# Patient Record
Sex: MALE | Race: WHITE | NOT HISPANIC OR LATINO | Employment: OTHER | URBAN - METROPOLITAN AREA
[De-identification: names, ages, dates, MRNs, and addresses within clinical notes are randomized per-mention and may not be internally consistent; named-entity substitution may affect disease eponyms.]

---

## 2017-01-13 DIAGNOSIS — I15.1 HYPERTENSION SECONDARY TO OTHER RENAL DISORDERS: ICD-10-CM

## 2017-01-13 DIAGNOSIS — N18.30 CKD (CHRONIC KIDNEY DISEASE) STAGE 3, GFR 30-59 ML/MIN (H): Primary | ICD-10-CM

## 2017-01-13 DIAGNOSIS — Q79.4 PRUNE BELLY SYNDROME: ICD-10-CM

## 2017-01-17 ENCOUNTER — TELEPHONE (OUTPATIENT)
Dept: NEPHROLOGY | Facility: CLINIC | Age: 22
End: 2017-01-17

## 2017-01-17 NOTE — TELEPHONE ENCOUNTER
Spoke to patient's mother to confirm information left in phone message -she verbalized understanding of all-and asked if he should continue his prophylatic antibiotic while he is taking his cipro  For 10 days. This is zithromax as a phrophylactic med for his lungs. Will ask Dr. Collado and let mom know.   Heidi Boone,RN,CPN

## 2017-01-17 NOTE — TELEPHONE ENCOUNTER
Left message again for patient's mother that Greg should have labs in 3 months at PCP-have faxed orders to [facility]-and patient should have also begun Cipro 500 mg daily due to positive urine culture with a bacteria he is not usually colonized with.  Dr. Collado had sent prescription to pharmacy and verified with pharmacy that it should be  once daily.  Asked mom to return call to verify that she received this information.  Heidi Boone,RN,CPN

## 2017-02-14 DIAGNOSIS — N18.30 CKD (CHRONIC KIDNEY DISEASE) STAGE 3, GFR 30-59 ML/MIN (H): ICD-10-CM

## 2017-02-14 RX ORDER — AMLODIPINE BESYLATE 5 MG/1
5 TABLET ORAL
Qty: 180 TABLET | Refills: 3 | Status: SHIPPED | OUTPATIENT
Start: 2017-02-14 | End: 2018-03-05

## 2017-02-21 ENCOUNTER — TRANSFERRED RECORDS (OUTPATIENT)
Dept: HEALTH INFORMATION MANAGEMENT | Facility: CLINIC | Age: 22
End: 2017-02-21

## 2017-04-27 DIAGNOSIS — N18.30 CKD (CHRONIC KIDNEY DISEASE) STAGE 3, GFR 30-59 ML/MIN (H): ICD-10-CM

## 2017-06-28 ENCOUNTER — TRANSFERRED RECORDS (OUTPATIENT)
Dept: HEALTH INFORMATION MANAGEMENT | Facility: CLINIC | Age: 22
End: 2017-06-28

## 2017-07-10 DIAGNOSIS — N18.4 CKD (CHRONIC KIDNEY DISEASE) STAGE 4, GFR 15-29 ML/MIN (H): Primary | ICD-10-CM

## 2017-07-10 NOTE — NURSING NOTE
Labs per clinic 2A protocol.  CKD 3  Last OV: new patient  Mary Scott LPN  Nephrology  [facility]  [phone]

## 2017-07-11 ENCOUNTER — OFFICE VISIT (OUTPATIENT)
Dept: NEPHROLOGY | Facility: CLINIC | Age: 22
End: 2017-07-11
Attending: INTERNAL MEDICINE
Payer: MEDICARE

## 2017-07-11 ENCOUNTER — ALLIED HEALTH/NURSE VISIT (OUTPATIENT)
Dept: TRANSPLANT | Facility: CLINIC | Age: 22
End: 2017-07-11
Attending: INTERNAL MEDICINE
Payer: MEDICARE

## 2017-07-11 VITALS
DIASTOLIC BLOOD PRESSURE: 66 MMHG | WEIGHT: 107.4 LBS | HEART RATE: 66 BPM | SYSTOLIC BLOOD PRESSURE: 106 MMHG | TEMPERATURE: 97.8 F | BODY MASS INDEX: 20.28 KG/M2 | HEIGHT: 61 IN

## 2017-07-11 DIAGNOSIS — N18.4 CKD (CHRONIC KIDNEY DISEASE) STAGE 4, GFR 15-29 ML/MIN (H): Primary | ICD-10-CM

## 2017-07-11 DIAGNOSIS — N18.4 CKD (CHRONIC KIDNEY DISEASE) STAGE 4, GFR 15-29 ML/MIN (H): ICD-10-CM

## 2017-07-11 LAB
ALBUMIN SERPL-MCNC: 3.6 G/DL (ref 3.4–5)
ALBUMIN UR-MCNC: 100 MG/DL
ANION GAP SERPL CALCULATED.3IONS-SCNC: 8 MMOL/L (ref 3–14)
APPEARANCE UR: CLEAR
BACTERIA #/AREA URNS HPF: ABNORMAL /HPF
BILIRUB UR QL STRIP: NEGATIVE
BUN SERPL-MCNC: 44 MG/DL (ref 7–30)
CALCIUM SERPL-MCNC: 8.8 MG/DL (ref 8.5–10.1)
CHLORIDE SERPL-SCNC: 108 MMOL/L (ref 94–109)
CO2 SERPL-SCNC: 21 MMOL/L (ref 20–32)
COLOR UR AUTO: YELLOW
CREAT SERPL-MCNC: 2.93 MG/DL (ref 0.66–1.25)
CREAT UR-MCNC: 53 MG/DL
ERYTHROCYTE [DISTWIDTH] IN BLOOD BY AUTOMATED COUNT: 13.2 % (ref 10–15)
FERRITIN SERPL-MCNC: 16 NG/ML (ref 26–388)
GFR SERPL CREATININE-BSD FRML MDRD: 27 ML/MIN/1.7M2
GLUCOSE SERPL-MCNC: 116 MG/DL (ref 70–99)
GLUCOSE UR STRIP-MCNC: NEGATIVE MG/DL
HCT VFR BLD AUTO: 39 % (ref 40–53)
HGB BLD-MCNC: 13 G/DL (ref 13.3–17.7)
HGB UR QL STRIP: NEGATIVE
IRON SATN MFR SERPL: 26 % (ref 15–46)
IRON SERPL-MCNC: 77 UG/DL (ref 35–180)
KETONES UR STRIP-MCNC: NEGATIVE MG/DL
LEUKOCYTE ESTERASE UR QL STRIP: ABNORMAL
MCH RBC QN AUTO: 29.9 PG (ref 26.5–33)
MCHC RBC AUTO-ENTMCNC: 33.3 G/DL (ref 31.5–36.5)
MCV RBC AUTO: 90 FL (ref 78–100)
MICROALBUMIN UR-MCNC: 1450 MG/L
MICROALBUMIN/CREAT UR: 2730.7 MG/G CR (ref 0–17)
MUCOUS THREADS #/AREA URNS LPF: PRESENT /LPF
NITRATE UR QL: POSITIVE
PH UR STRIP: 6 PH (ref 5–7)
PHOSPHATE SERPL-MCNC: 2.9 MG/DL (ref 2.5–4.5)
PLATELET # BLD AUTO: 305 10E9/L (ref 150–450)
POTASSIUM SERPL-SCNC: 4.4 MMOL/L (ref 3.4–5.3)
PROT UR-MCNC: 1.61 G/L
PROT/CREAT 24H UR: 3.04 G/G CR (ref 0–0.2)
PTH-INTACT SERPL-MCNC: 191 PG/ML (ref 12–72)
RBC # BLD AUTO: 4.35 10E12/L (ref 4.4–5.9)
RBC #/AREA URNS AUTO: <1 /HPF (ref 0–2)
SODIUM SERPL-SCNC: 137 MMOL/L (ref 133–144)
SP GR UR STRIP: 1 (ref 1–1.03)
TIBC SERPL-MCNC: 296 UG/DL (ref 240–430)
URN SPEC COLLECT METH UR: ABNORMAL
UROBILINOGEN UR STRIP-MCNC: 0 MG/DL (ref 0–2)
WBC # BLD AUTO: 8.8 10E9/L (ref 4–11)
WBC #/AREA URNS AUTO: 6 /HPF (ref 0–2)

## 2017-07-11 PROCEDURE — 82728 ASSAY OF FERRITIN: CPT | Performed by: INTERNAL MEDICINE

## 2017-07-11 PROCEDURE — 87186 SC STD MICRODIL/AGAR DIL: CPT | Performed by: INTERNAL MEDICINE

## 2017-07-11 PROCEDURE — 83540 ASSAY OF IRON: CPT | Performed by: INTERNAL MEDICINE

## 2017-07-11 PROCEDURE — 87088 URINE BACTERIA CULTURE: CPT | Performed by: INTERNAL MEDICINE

## 2017-07-11 PROCEDURE — 85027 COMPLETE CBC AUTOMATED: CPT | Performed by: INTERNAL MEDICINE

## 2017-07-11 PROCEDURE — 81001 URINALYSIS AUTO W/SCOPE: CPT | Performed by: INTERNAL MEDICINE

## 2017-07-11 PROCEDURE — 83970 ASSAY OF PARATHORMONE: CPT | Performed by: INTERNAL MEDICINE

## 2017-07-11 PROCEDURE — 82043 UR ALBUMIN QUANTITATIVE: CPT | Performed by: INTERNAL MEDICINE

## 2017-07-11 PROCEDURE — 36415 COLL VENOUS BLD VENIPUNCTURE: CPT | Performed by: INTERNAL MEDICINE

## 2017-07-11 PROCEDURE — 82306 VITAMIN D 25 HYDROXY: CPT | Performed by: INTERNAL MEDICINE

## 2017-07-11 PROCEDURE — 87086 URINE CULTURE/COLONY COUNT: CPT | Performed by: INTERNAL MEDICINE

## 2017-07-11 PROCEDURE — 84156 ASSAY OF PROTEIN URINE: CPT | Performed by: INTERNAL MEDICINE

## 2017-07-11 PROCEDURE — 99212 OFFICE O/P EST SF 10 MIN: CPT | Mod: ZF

## 2017-07-11 PROCEDURE — 83550 IRON BINDING TEST: CPT | Performed by: INTERNAL MEDICINE

## 2017-07-11 PROCEDURE — 80069 RENAL FUNCTION PANEL: CPT | Performed by: INTERNAL MEDICINE

## 2017-07-11 ASSESSMENT — PAIN SCALES - GENERAL: PAINLEVEL: NO PAIN (0)

## 2017-07-11 NOTE — MR AVS SNAPSHOT
"              After Visit Summary   7/11/2017    Aidan Louie    MRN: [id number]           Patient Information     Date Of Birth          1995        Visit Information        Provider Department      7/11/2017 2:00 PM Haleigh Gallo MD [facility] Nephrology        Today's Diagnoses     CKD (chronic kidney disease) stage 4, GFR 15-29 ml/min (H)    -  1      Care Instructions    Follow up in 3 months with renal panel           Follow-ups after your visit        Follow-up notes from your care team     Return in about 3 months (around 10/11/2017).      Your next 10 appointments already scheduled     Jan 31, 2018  2:30 PM CST   (Arrive by 2:00 PM)   Return Visit with Haleigh Gallo MD   [facility] Nephrology ([facility])    [address] [place] [address] [phone]              Who to contact     If you have questions or need follow up information about today's clinic visit or your schedule please contact [facility] NEPHROLOGY directly at [phone].  Normal or non-critical lab and imaging results will be communicated to you by Automattichart, letter or phone within 4 business days after the clinic has received the results. If you do not hear from us within 7 days, please contact the clinic through Autom[employer] or phone. If you have a critical or abnormal lab result, we will notify you by phone as soon as possible.  Submit refill requests through [employer] or call your pharmacy and they will forward the refill request to us. Please allow 3 business days for your refill to be completed.          Additional Information About Your Visit        Automatt[employer]ar[employer]s Information     [employer] lets you send messages to your doctor, view your test results, renew your prescriptions, schedule appointments and more. To sign up, go to [email or web address] . Click on \"Log in\" on the left side of the screen, which will take you to the Welcome page. Then click on \"Sign up Now\" on the right side of " "the page.     You will be asked to enter the access code listed below, as well as some personal information. Please follow the directions to create your username and password.     Your access code is: [email or web address]  Expires: 2018  5:30 AM     Your access code will  in 90 days. If you need help or a new code, please call your [facility] clinic or [phone].        Care EveryWhere ID     This is your Care EveryWhere ID. This could be used by other organizations to access your [facility] medical records  [id number]        Your Vitals Were     Pulse Temperature Height BMI (Body Mass Index)          66 97.8  F (36.6  C) (Oral) 1.549 m (5' 1\") 20.29 kg/m2         Blood Pressure from Last 3 Encounters:   17 108/69   17 97/70   17 106/66    Weight from Last 3 Encounters:   17 50.2 kg (110 lb 9.6 oz)   17 50.3 kg (111 lb)   17 48.7 kg (107 lb 6.4 oz)              We Performed the Following     Urine Culture Aerobic Bacterial        Primary Care Provider Office Phone # Fax #    Leena Lehman [phone]       [facility] [address] [facility]  [place] [address]        Equal Access to Services     [facility]: Hadii aad ku hadasho Soomaali, waaxda luqadaha, qaybta kaalmada adeegyada, tez cordon . So [facility] [phone].    ATENCIÓN: Si habla español, tiene a lowe disposición servicios gratuitos de asistencia lingüística. Llame al [phone].    We comply with applicable federal civil rights laws and [place] laws. We do not discriminate on the basis of race, color, national origin, age, disability, sex, sexual orientation, or gender identity.            Thank you!     Thank you for choosing [facility] NEPHROLOGY  for your care. Our goal is always to provide you with excellent care. Hearing back from our patients is one way we can continue to improve our services. Please take a few minutes to complete the written survey that you may " receive in the mail after your visit with us. Thank you!             Your Updated Medication List - Protect others around you: Learn how to safely use, store and throw away your medicines at www.disposemymeds.org.          This list is accurate as of: 7/11/17 11:59 PM.  Always use your most recent med list.                   Brand Name Dispense Instructions for use Diagnosis    albuterol (2.5 MG/3ML) 0.083% neb solution      Take 1 ampule by nebulization 3 times daily    CKD (chronic kidney disease) stage 3, GFR 30-59 ml/min, HTN (hypertension), Prune belly syndrome       amLODIPine 5 MG tablet    NORVASC    180 tablet    Take 1 tablet (5 mg) by mouth 2 times daily    CKD (chronic kidney disease) stage 3, GFR 30-59 ml/min       azithromycin 250 MG tablet    ZITHROMAX     Take  by mouth. Mon, Wed, Fri    CKD (chronic kidney disease) stage 3, GFR 30-59 ml/min       calcium carbonate 1250 MG tablet    OS-SUSAN 500 mg [place]. 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 disorders       captopril 0.1 mg/mL 0.1 mg/mL Susp    CAPOTEN    460 mL    Take 2.5 mg three times daily (2.5 mg/5ml)    CKD (chronic kidney disease) stage 3, GFR 30-59 ml/min       cloNIDine 0.1 MG tablet    CATAPRES    60 tablet    Take 1 tablet (0.1 mg) by mouth 2 times daily    CKD (chronic kidney disease) stage 3, GFR 30-59 ml/min       tobramycin (PF) 300 MG/5ML neb solution    DAX     Take 1 ampule by nebulization as needed    CKD (chronic kidney disease) stage 3, GFR 30-59 ml/min, HTN (hypertension), Prune belly syndrome       ZANTAC PO      Take 75 mg by mouth 2 times daily    CKD (chronic kidney disease) stage 3, GFR 30-59 ml/min

## 2017-07-11 NOTE — MR AVS SNAPSHOT
"              After Visit Summary   7/11/2017    Aidan Louie    MRN: [id number]           Patient Information     Date Of Birth          1995        Visit Information        Provider Department      7/11/2017 3:30 PM  NEPHROLOGY NURSE [facility]        Today's Diagnoses     CKD (chronic kidney disease) stage 4, GFR 15-29 ml/min (H)    -  1       Follow-ups after your visit        Your next 10 appointments already scheduled     Jul 11, 2017  3:30 PM CDT   Nurse Visit with  NEPHROLOGY NURSE   [facility] ([facility] [place])    [address]  [address] [facility] [address] [phone]              Who to contact     If you have questions or need follow up information about today's clinic visit or your schedule please contact [facility] SOLID ORGAN TRANSPLANT directly at [phone].  Normal or non-critical lab and imaging results will be communicated to you by BombBombhart, letter or phone within 4 business days after the clinic has received the results. If you do not hear from us within 7 days, please contact the clinic through BombBombhart or phone. If you have a critical or abnormal lab result, we will notify you by phone as soon as possible.  Submit refill requests through [employer]P or call your pharmacy and they will forward the refill request to us. Please allow 3 business days for your refill to be completed.          Additional Information About Your Visit        MyChart Information     2C2P lets you send messages to your doctor, view your test results, renew your prescriptions, schedule appointments and more. To sign up, go to [email or web address] . Click on \"Log in\" on the left side of the screen, which will take you to the Welcome page. Then click on \"Sign up Now\" on the right side of the page.     You will be asked to enter the access code listed below, as well as some personal information. Please follow the directions to " create your username and password.     Your access code is: [email or web address]  Expires: 2017  8:41 AM     Your access code will  in 90 days. If you need help or a new code, please call your [facility] or [phone].        Care EveryWhere ID     This is your Care EveryWhere ID. This could be used by other organizations to access your [address] medical records  [id number]         Blood Pressure from Last 3 Encounters:   17 106/66   16 109/65   09/17/15 115/72    Weight from Last 3 Encounters:   17 48.7 kg (107 lb 6.4 oz)   16 49.4 kg (108 lb 14.5 oz)   09/17/15 51.5 kg (113 lb 8.6 oz) (2 %)*     * Growth percentiles are based on [place] 2-20 Years data.              Today, you had the following     No orders found for display       Primary Care Provider Office Phone # Fax #    Leena Lehman [phone]       [facility] [address] [facility]][place] [address]        Equal Access to Services     [facility] : Hadii aad ku hadasho Soomaali, waaxda luqadaha, qaybta kaalmada adeegyada, tez mixon hayjohannan thomas cordon . So [facility] [phone].    ATENCIÓN: Si habla español, tiene a lowe disposición servicios gratuitos de asistencia lingüística. Llame al [phone].    We comply with applicable federal civil rights laws and [place] laws. We do not discriminate on the basis of race, color, national origin, age, disability sex, sexual orientation or gender identity.            Thank you!     Thank you for choosing [facility] SOLID ORGAN TRANSPLANT  for your care. Our goal is always to provide you with excellent care. Hearing back from our patients is one way we can continue to improve our services. Please take a few minutes to complete the written survey that you may receive in the mail after your visit with us. Thank you!             Your Updated Medication List - Protect others around you: Learn how to safely use, store and throw away your medicines at  www.disposemymeds.org.          This list is accurate as of: 7/11/17  2:44 PM.  Always use your most recent med list.                   Brand Name Dispense Instructions for use Diagnosis    albuterol (2.5 MG/3ML) 0.083% neb solution      Take 1 ampule by nebulization daily    CKD (chronic kidney disease) stage 3, GFR 30-59 ml/min, HTN (hypertension), Prune belly syndrome       amLODIPine 5 MG tablet    NORVASC    180 tablet    Take 1 tablet (5 mg) by mouth 2 times daily    CKD (chronic kidney disease) stage 3, GFR 30-59 ml/min       azithromycin 250 MG tablet    ZITHROMAX     Take  by mouth. Mon, Wed, Fri    CKD (chronic kidney disease) stage 3, GFR 30-59 ml/min       calcium carbonate 1250 MG tablet    OS-SUSAN 500 mg [place]. 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 disorders       captopril 0.1 mg/mL 0.1 mg/mL Susp    CAPOTEN    460 mL    Take 2.5 mg three times daily (2.5 mg/5ml)    CKD (chronic kidney disease) stage 3, GFR 30-59 ml/min       cholecalciferol 2000 UNITS tablet     30 tablet    Take 2,000 Units by mouth daily    CKD (chronic kidney disease) stage 3, GFR 30-59 ml/min, Prune belly syndrome, Hypertension secondary to other renal disorders       cloNIDine 0.1 MG tablet    CATAPRES    60 tablet    Take 1 tablet (0.1 mg) by mouth 2 times daily    CKD (chronic kidney disease) stage 3, GFR 30-59 ml/min       ipratropium - albuterol 0.5 mg/2.5 mg/3 mL 0.5-2.5 (3) MG/3ML neb solution    DUONEB     Take 1 ampule by nebulization 3 times daily    CKD (chronic kidney disease) stage 3, GFR 30-59 ml/min, HTN (hypertension), Prune belly syndrome       PREDNISOLONE      Every other day    CKD (chronic kidney disease) stage 3, GFR 30-59 ml/min       tobramycin (PF) 300 MG/5ML neb solution    DAX     Take 1 ampule by nebulization as needed    CKD (chronic kidney  disease) stage 3, GFR 30-59 ml/min, HTN (hypertension), Prune belly syndrome       ZANTAC PO      Take 75 mg by mouth 2 times daily    CKD (chronic kidney disease) stage 3, GFR 30-59 ml/min

## 2017-07-11 NOTE — PROGRESS NOTES
Nephrology Clinic    Aidan Louie MRN:[id number] YOB: 1995  Date of Service: 07/11/2017  Primary care provider: Leena Lehman  Requesting physician: Junaid Hess MD      ASSESSMENT AND RECOMMENDATIONS:       1. 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  Will try to get his scheduled for his transplant evaluation  On Calcium and vit D supplements, PTH 98 last checked in 12/16    2. HTN  Well controlled on [id number] BID,  captopril 2.5  and clonidine 0.1mg BID    3. Hb at goal, low iron stores but Hb is 13    4. 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 He has a hx of recurrent UTI and worsening creatinine in the past few years. He was counselled on his options of HD and transplant in his last nephrologist appointment.  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  He has presented for establishment of care with his parents  They report recurrent UTI x2 /yr usually treated with ciprofloxacin  He denies any dysuria abdominal/back pain at this time, no fevers chills. He usually has fatigue back pain when he has an active UTI. UA and labs were drawn this am.  He has been working in jobs realted to adults with disabilities and has been very busy and that usually leads to him not being able to flush his bladder and wearing izaguirre bag or not catheterizing as frequently as he would.    His other histories include HTN, BL hearing loss on hearing aids, short stature , choanal atresia SP repair, Dawood Reece anomaly, restrictive lung disease.    PAST MEDICAL HISTORY:  Past Medical History:   Diagnosis Date     Bilateral reflux nephropathy      CKD (chronic kidney disease) stage 4, GFR 15-29 ml/min (H)      Hearing loss      Dawood Reece sequence      Prune belly syndrome      Recurrent UTI      Respiratory bronchiolitis interstitial lung disease (H)      Restrictive lung disease      Tracheostomy dependence (H)      PAST SURGICAL HISTORY:  Past Surgical History:   Procedure Laterality Date     Bilateral ureteral reimplantation  5/16/2002    abdominoplasty, Mitrofanoff creation     CYSTOSCOPY  11/14/11     EXAM UNDER ANESTHESIA THROAT  6/10/2003    tonsillar hypertrophy     EXAM UNDER ANESTHESIA, RESTORATIONS, EXTRACTION(S) DENTAL COMPLEX, COMBINED  6/5/2006     GASTROSTOMY TUBE       GASTROSTOMY TUBE  3/16/2002    button change     HERNIORRHAPHY INGUINAL BILATERAL  7/23/99    left ochiopexy     LARYNGOSCOPY, BRONCHOSCOPY, COMBINED  6/21/2002     LARYNGOSCOPY, BRONCHOSCOPY, COMBINED  12/12/2002    bilateral ear debridement     LARYNGOSCOPY, BRONCHOSCOPY, COMBINED  5/28/2003     LARYNGOSCOPY, BRONCHOSCOPY, COMBINED  7/13/2007     Failed Deflux injection     LARYNGOSCOPY, BRONCHOSCOPY, COMBINED  12      VESICOSTOMY       Redo right ureteral reimplantation  3/12/2004    Excision bladder diverticuli, Left to Right pyelopyelostomy     Replacement of trach  10/15/12     Takedown and revision of Mitrofanoff stoma  2006     TRACHEOSTOMY INFANT       MEDICATIONS:  Prescription Medications as of 2017             captopril 0.1 mg/mL (CAPOTEN) 0.1 mg/mL SUSP Take 2.5 mg three times daily (2.5 mg/5ml)    amLODIPine (NORVASC) 5 MG tablet Take 1 tablet (5 mg) by mouth 2 times daily    cholecalciferol 2000 UNITS tablet Take 2,000 Units by mouth daily    calcium carbonate (OS-SUSAN 500 MG [place]. [place]) 500 MG tablet Take 1 tablet (500 mg) by mouth 3 times daily (with meals)    cloNIDine (CATAPRES) 0.1 MG tablet Take 1 tablet (0.1 mg) by mouth 2 times daily    albuterol (2.5 MG/3ML) 0.083% nebulizer solution Take 1 ampule by nebulization daily    ipratropium - albuterol 0.5 mg/2.5 mg/3 mL (DUONEB) 0.5-2.5 (3) MG/3ML nebulization Take 1 ampule by nebulization 3 times daily    tobramycin, PF, (DAX) 300 MG/5ML nebulizer solution Take 1 ampule by nebulization as needed    azithromycin (ZITHROMAX) 250 MG tablet Take  by mouth. Mon, Wed, Fri    PREDNISOLONE Every other day    Ranitidine HCl (ZANTAC PO) Take 75 mg by mouth 2 times daily          ALLERGIES:    Allergies   Allergen Reactions     Latex      Tape [Adhesive Tape]      REVIEW OF SYSTEMS:  A comprehensive review of systems was performed and found to be negative except as described here or above.   Review Of Systems  Skin: negative for, pigmentation, rash, scaling, bruising  Eyes: negative for, visual blurring, redness, tearing, itching  Ears/Nose/Throat: negative for, sneezing, postnasal drainage, vertigo  Respiratory: No shortness of breath, dyspnea on exertion, cough, or hemoptysis  Cardiovascular: negative for, palpitations, tachycardia and chest pain  Gastrointestinal: negative  "for, dysphagia, nausea, vomiting and heartburn  Genitourinary: has hx of urinary obstruction and self catheterizes, negative for, nocturia, dysuria and frequency  Musculoskeletal: negative for, arthritis, joint pain and joint swelling  Neurologic: negative for, headaches and syncope  Psychiatric: negative for, anxiety, nervous breakdown and depression  Hematologic/Lymphatic/Immunologic: negative for, allergies and anemia  Endocrine: negative for, thyroid disorder, hot flashes and night sweats      SOCIAL HISTORY:   Social History     Social History     Marital status: Single     Spouse name: N/A     Number of children: N/A     Years of education: N/A     Occupational History     Not on file.     Social History Main Topics     Smoking status: Never Smoker     Smokeless tobacco: Never Used     Alcohol use No     Drug use: No     Sexual activity: Not on file     Other Topics Concern     Not on file     Social History Narrative     FAMILY MEDICAL HISTORY:   Family History   Problem Relation Age of Onset     Type 2 Diabetes Mother      Type 2 Diabetes Maternal Grandmother      Type 2 Diabetes Maternal Grandfather      Type 2 Diabetes Paternal Grandmother      Alzheimer Disease Paternal Grandfather      PHYSICAL EXAM:   /66  Pulse 66  Temp 97.8  F (36.6  C) (Oral)  Ht 1.549 m (5' 1\")  Wt 48.7 kg (107 lb 6.4 oz)  BMI 20.29 kg/m2  GENERAL APPEARANCE: alert and no distress  EYES: nonicteric  HENT: mouth without ulcers or lesions, trach+  NECK: supple, no adenopathy  RESP: lungs clear to auscultation   CV: regular rhythm, normal rate, no rub  ABDOMEN: soft, nontender, normal bowel sounds, no HSM   Extremities: no clubbing, cyanosis, or edema  MS: no evidence of inflammation in joints, no muscle tenderness  SKIN: no rash  NEURO: mentation intact and speech intact  PSYCH: affect normal/bright   LABS:   CMP  Recent Labs   Lab Test  07/11/17   1337  12/22/16   1325  09/17/15   1205  02/05/15   1110   NA  137  143  139  " 141   POTASSIUM  4.4  4.4  4.6  4.2   CHLORIDE  108  110*  111*  111*   CO2  21  24  21  21   ANIONGAP  8  9  7  9   GLC  116*  89  140*  118*   BUN  44*  40*  50*  40*   CR  2.93*  2.79*  2.57*  2.07*   GFRESTIMATED  27*  29*  32*  42*   GFRESTBLACK  33*  35*  39*  51*   SUSAN  8.8  8.7  8.7*  8.7*   MAG   --    --   2.1  2.0   PHOS  2.9  4.2  2.7*  3.5   ALBUMIN  3.6  3.3*  3.8  3.5   ALKPHOS   --    --   80  87     CBC  Recent Labs   Lab Test  07/11/17   1337  12/22/16   1325  09/17/15   1205  02/05/15   1110  10/03/13   2128   HGB  13.0*  11.7*  13.0*  13.8  13.2   WBC  8.8   --   8.7  7.7  11.4*   RBC  4.35*   --   4.44  4.69  4.48   HCT  39.0*   --   38.9*  41.0  38.9   MCV  90   --   88  87  87   MCH  29.9   --   29.3  29.4  29.5   MCHC  33.3   --   33.4  33.7  33.9   RDW  13.2   --   13.5  13.2  13.3   PLT  305   --   326  281  338     INRNo lab results found.  ABGNo lab results found.   URINE STUDIES  Recent Labs   Lab Test  07/11/17   1341  12/22/16   1328  09/17/15   1410  02/05/15   1115   COLOR  Yellow  Straw  Quantity not sufficient  Straw   APPEARANCE  Clear  Slightly Cloudy  Quantity not sufficient  Clear   URINEGLC  Negative  Negative  Quantity not sufficient*  Negative   URINEBILI  Negative  Negative  Quantity not sufficient*  Negative   URINEKETONE  Negative  Negative  Quantity not sufficient*  Negative   SG  1.005  1.006  Quantity not sufficient  1.005   UBLD  Negative  Trace*  Quantity not sufficient*  Trace*   URINEPH  6.0  6.5  Quantity not sufficient  6.0   PROTEIN  100*  100*  Quantity not sufficient*  100*   NITRITE  Positive*  Negative  Quantity not sufficient*  Negative   LEUKEST  Small*  Large*  Quantity not sufficient*  Large*   RBCU  <1  6*  Quantity not sufficient  1   WBCU  6*  >182*  Quantity not sufficient*  15*     Recent Labs   Lab Test  12/22/16   1328  02/05/15   1115   UTPG  3.05*  5.28*     PTH  Recent Labs   Lab Test  12/22/16   1325  09/17/15   1205  02/05/15   1110   10/03/13   2128  09/20/12   1200   PTHI  98*  133*  303*  141*  74*     IRON STUDIES  Recent Labs   Lab Test  07/11/17   1337  12/22/16   1325  09/17/15   1205  02/05/15   1110   IRON  77  135  99  160   FEB  296  241  297  281   IRONSAT  26  56*  33  57*   JUVENAL  16*  56  27  34     IMAGING:  Reviewed in EMR    Haleigh Gallo MD     This patient has been evaluated by me, Glen Abel MD, on 7/11/17.  I discussed with the fellow, Dr. Gallo, and agree with the findings and plan in this note.    I have reviewed 7/11/17 medications, vital signs and labs.    Total time spent was >60 minutes, and more than 50% of face to face time was spent in counseling and/or coordination of care regarding principles of multidisciplinary care, medication management, and CKD education.   Glen Abel MD

## 2017-07-11 NOTE — NURSING NOTE
"Chief Complaint   Patient presents with     New Patient     Peds. to Adult transition       Initial /66  Pulse 66  Temp 97.8  F (36.6  C) (Oral)  Ht 1.549 m (5' 1\")  Wt 48.7 kg (107 lb 6.4 oz)  BMI 20.29 kg/m2 Estimated body mass index is 20.29 kg/(m^2) as calculated from the following:    Height as of this encounter: 1.549 m (5' 1\").    Weight as of this encounter: 48.7 kg (107 lb 6.4 oz).  Medication Reconciliation: complete  "

## 2017-07-11 NOTE — NURSING NOTE
Discussed with fellow. Nurse visit not needed at this time. Unable to cancel appointment as patient had arrived.     Yoly Mejia RN

## 2017-07-11 NOTE — LETTER
7/11/2017     RE: Aidan Louie  [address]  [place] [address]     Dear Colleague,    Thank you for referring your patient, Aidan Louie, to the [facility] NEPHROLOGY at [facility]. Please see a copy of my visit note below.    Nephrology Clinic    Aidan Louie MRN:[id number] YOB: 1995  Date of Service: 07/11/2017  Primary care provider: Leena Lehman  Requesting physician: Junaid Hess MD      ASSESSMENT AND RECOMMENDATIONS:       1. 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  Will try to get his scheduled for his transplant evaluation  On Calcium and vit D supplements, PTH 98 last checked in 12/16    2. HTN  Well controlled on [id number] BID,  captopril 2.5  and clonidine 0.1mg BID    3. Hb at goal, low iron stores but Hb is 13    4. 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 He has a hx of  recurrent UTI and worsening creatinine in the past few years. He was counselled on his options of HD and transplant in his last nephrologist appointment.  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 He has had reconstructed ureter and bladder and has to self catheterize with minimal x3 irrigation with 180ml saline , but he has been not compliant with his saline flushes, sec to current jobs with disabilities.  He has presented for establishment of care with his parents  They report recurrent UTI x2 /yr usually treated with ciprofloxacin  He denies any dysuria abdominal/back pain at this time, no fevers chills. He usually has fatigue back pain when he has an active UTI. UA and labs were drawn this am.  He has been working in jobs realted to adults with disabilities and has been very busy and that usually leads to him not being able to flush his bladder and wearing izaguirre bag or not catheterizing as frequently as he would.    His other histories include HTN, BL hearing loss on hearing aids, short stature , choanal atresia SP repair, Dawood Reece anomaly, restrictive lung disease.    PAST MEDICAL HISTORY:  Past Medical History:   Diagnosis Date     Bilateral reflux nephropathy      CKD (chronic kidney disease) stage 4, GFR 15-29 ml/min (H)      Hearing loss      Dawood Reece sequence      Prune belly syndrome      Recurrent UTI      Respiratory bronchiolitis interstitial lung disease (H)      Restrictive lung disease      Tracheostomy dependence (H)      PAST SURGICAL HISTORY:  Past Surgical History:   Procedure Laterality Date     Bilateral ureteral reimplantation  5/16/2002    abdominoplasty, Mitrofanoff creation     CYSTOSCOPY  11/14/11     EXAM UNDER ANESTHESIA THROAT  6/10/2003    tonsillar hypertrophy     EXAM UNDER ANESTHESIA, RESTORATIONS, EXTRACTION(S) DENTAL COMPLEX, COMBINED  6/5/2006     GASTROSTOMY TUBE       GASTROSTOMY TUBE  3/16/2002    button change     HERNIORRHAPHY  INGUINAL BILATERAL  99    left ochiopexy     LARYNGOSCOPY, BRONCHOSCOPY, COMBINED  2002     LARYNGOSCOPY, BRONCHOSCOPY, COMBINED  2002    bilateral ear debridement     LARYNGOSCOPY, BRONCHOSCOPY, COMBINED  2003     LARYNGOSCOPY, BRONCHOSCOPY, COMBINED  2007    Failed Deflux injection     LARYNGOSCOPY, BRONCHOSCOPY, COMBINED  12      VESICOSTOMY       Redo right ureteral reimplantation  3/12/2004    Excision bladder diverticuli, Left to Right pyelopyelostomy     Replacement of trach  10/15/12     Takedown and revision of Mitrofanoff stoma  2006     TRACHEOSTOMY INFANT       MEDICATIONS:  Prescription Medications as of 2017             captopril 0.1 mg/mL (CAPOTEN) 0.1 mg/mL SUSP Take 2.5 mg three times daily (2.5 mg/5ml)    amLODIPine (NORVASC) 5 MG tablet Take 1 tablet (5 mg) by mouth 2 times daily    cholecalciferol 2000 UNITS tablet Take 2,000 Units by mouth daily    calcium carbonate (OS-SUSAN 500 MG [place]. CA) 500 MG tablet Take 1 tablet (500 mg) by mouth 3 times daily (with meals)    cloNIDine (CATAPRES) 0.1 MG tablet Take 1 tablet (0.1 mg) by mouth 2 times daily    albuterol (2.5 MG/3ML) 0.083% nebulizer solution Take 1 ampule by nebulization daily    ipratropium - albuterol 0.5 mg/2.5 mg/3 mL (DUONEB) 0.5-2.5 (3) MG/3ML nebulization Take 1 ampule by nebulization 3 times daily    tobramycin, PF, (DAX) 300 MG/5ML nebulizer solution Take 1 ampule by nebulization as needed    azithromycin (ZITHROMAX) 250 MG tablet Take  by mouth. Mon, Wed, Fri    PREDNISOLONE Every other day    Ranitidine HCl (ZANTAC PO) Take 75 mg by mouth 2 times daily          ALLERGIES:    Allergies   Allergen Reactions     Latex      Tape [Adhesive Tape]      REVIEW OF SYSTEMS:  A comprehensive review of systems was performed and found to be negative except as described here or above.   Review Of Systems  Skin: negative for, pigmentation, rash, scaling, bruising  Eyes: negative for, visual  "blurring, redness, tearing, itching  Ears/Nose/Throat: negative for, sneezing, postnasal drainage, vertigo  Respiratory: No shortness of breath, dyspnea on exertion, cough, or hemoptysis  Cardiovascular: negative for, palpitations, tachycardia and chest pain  Gastrointestinal: negative for, dysphagia, nausea, vomiting and heartburn  Genitourinary: has hx of urinary obstruction and self catheterizes, negative for, nocturia, dysuria and frequency  Musculoskeletal: negative for, arthritis, joint pain and joint swelling  Neurologic: negative for, headaches and syncope  Psychiatric: negative for, anxiety, nervous breakdown and depression  Hematologic/Lymphatic/Immunologic: negative for, allergies and anemia  Endocrine: negative for, thyroid disorder, hot flashes and night sweats      SOCIAL HISTORY:   Social History     Social History     Marital status: Single     Spouse name: N/A     Number of children: N/A     Years of education: N/A     Occupational History     Not on file.     Social History Main Topics     Smoking status: Never Smoker     Smokeless tobacco: Never Used     Alcohol use No     Drug use: No     Sexual activity: Not on file     Other Topics Concern     Not on file     Social History Narrative     FAMILY MEDICAL HISTORY:   Family History   Problem Relation Age of Onset     Type 2 Diabetes Mother      Type 2 Diabetes Maternal Grandmother      Type 2 Diabetes Maternal Grandfather      Type 2 Diabetes Paternal Grandmother      Alzheimer Disease Paternal Grandfather      PHYSICAL EXAM:   /66  Pulse 66  Temp 97.8  F (36.6  C) (Oral)  Ht 1.549 m (5' 1\")  Wt 48.7 kg (107 lb 6.4 oz)  BMI 20.29 kg/m2  GENERAL APPEARANCE: alert and no distress  EYES: nonicteric  HENT: mouth without ulcers or lesions, trach+  NECK: supple, no adenopathy  RESP: lungs clear to auscultation   CV: regular rhythm, normal rate, no rub  ABDOMEN: soft, nontender, normal bowel sounds, no HSM   Extremities: no clubbing, cyanosis, " or edema  MS: no evidence of inflammation in joints, no muscle tenderness  SKIN: no rash  NEURO: mentation intact and speech intact  PSYCH: affect normal/bright   LABS:   CMP  Recent Labs   Lab Test  07/11/17   1337 12/22/16   1325  09/17/15   1205  02/05/15   1110   NA  137  143  139  141   POTASSIUM  4.4  4.4  4.6  4.2   CHLORIDE  108  110*  111*  111*   CO2  21  24  21  21   ANIONGAP  8  9  7  9   GLC  116*  89  140*  118*   BUN  44*  40*  50*  40*   CR  2.93*  2.79*  2.57*  2.07*   GFRESTIMATED  27*  29*  32*  42*   GFRESTBLACK  33*  35*  39*  51*   SUSAN  8.8  8.7  8.7*  8.7*   MAG   --    --   2.1  2.0   PHOS  2.9  4.2  2.7*  3.5   ALBUMIN  3.6  3.3*  3.8  3.5   ALKPHOS   --    --   80  87     CBC  Recent Labs   Lab Test  07/11/17   1337  12/22/16   1325  09/17/15   1205  02/05/15   1110  10/03/13   2128   HGB  13.0*  11.7*  13.0*  13.8  13.2   WBC  8.8   --   8.7  7.7  11.4*   RBC  4.35*   --   4.44  4.69  4.48   HCT  39.0*   --   38.9*  41.0  38.9   MCV  90   --   88  87  87   MCH  29.9   --   29.3  29.4  29.5   MCHC  33.3   --   33.4  33.7  33.9   RDW  13.2   --   13.5  13.2  13.3   PLT  305   --   326  281  338     INRNo lab results found.  ABGNo lab results found.   URINE STUDIES  Recent Labs   Lab Test  07/11/17   1341  12/22/16   1328  09/17/15   1410  02/05/15   1115   COLOR  Yellow  Straw  Quantity not sufficient  Straw   APPEARANCE  Clear  Slightly Cloudy  Quantity not sufficient  Clear   URINEGLC  Negative  Negative  Quantity not sufficient*  Negative   URINEBILI  Negative  Negative  Quantity not sufficient*  Negative   URINEKETONE  Negative  Negative  Quantity not sufficient*  Negative   SG  1.005  1.006  Quantity not sufficient  1.005   UBLD  Negative  Trace*  Quantity not sufficient*  Trace*   URINEPH  6.0  6.5  Quantity not sufficient  6.0   PROTEIN  100*  100*  Quantity not sufficient*  100*   NITRITE  Positive*  Negative  Quantity not sufficient*  Negative   LEUKEST  Small*  Large*  Quantity  not sufficient*  Large*   RBCU  <1  6*  Quantity not sufficient  1   WBCU  6*  >182*  Quantity not sufficient*  15*     Recent Labs   Lab Test  12/22/16   1328  02/05/15   1115   UTPG  3.05*  5.28*     PTH  Recent Labs   Lab Test  12/22/16   1325  09/17/15   1205  02/05/15   1110  10/03/13   2128  09/20/12   1200   PTHI  98*  133*  303*  141*  74*     IRON STUDIES  Recent Labs   Lab Test  07/11/17   1337  12/22/16   1325  09/17/15   1205  02/05/15   1110   IRON  77  135  99  160   FEB  296  241  297  281   IRONSAT  26  56*  33  57*   JUVENAL  16*  56  27  34     IMAGING:  Reviewed in EMR    Haleigh Gallo MD     This patient has been evaluated by me, Glen Abel MD, on 7/11/17.  I discussed with the fellow, Dr. Gallo, and agree with the findings and plan in this note.    I have reviewed 7/11/17 medications, vital signs and labs.    Total time spent was >60 minutes, and more than 50% of face to face time was spent in counseling and/or coordination of care regarding principles of multidisciplinary care, medication management, and CKD education.   Glen Abel MD

## 2017-07-12 LAB — DEPRECATED CALCIDIOL+CALCIFEROL SERPL-MC: 32 UG/L (ref 20–75)

## 2017-07-12 NOTE — NURSING NOTE
Referred patient to [facility] and for transplant evaluation, per Dr. Abel's recommendations.    Yoly Mejia RN

## 2017-07-13 LAB
BACTERIA SPEC CULT: ABNORMAL
Lab: ABNORMAL
MICRO REPORT STATUS: ABNORMAL
MICROORGANISM SPEC CULT: ABNORMAL
SPECIMEN SOURCE: ABNORMAL

## 2017-07-21 ENCOUNTER — APPOINTMENT (OUTPATIENT)
Dept: TRANSPLANT | Facility: CLINIC | Age: 22
End: 2017-07-21
Attending: INTERNAL MEDICINE
Payer: MEDICARE

## 2017-07-26 ENCOUNTER — MEDICAL CORRESPONDENCE (OUTPATIENT)
Dept: TRANSPLANT | Facility: CLINIC | Age: 22
End: 2017-07-26

## 2017-08-02 ENCOUNTER — DOCUMENTATION ONLY (OUTPATIENT)
Dept: TRANSPLANT | Facility: CLINIC | Age: 22
End: 2017-08-02

## 2017-08-02 NOTE — PROGRESS NOTES
I had Aidan's case briefly reviewed at today's kidney selection committee meeting; Dr Ordonez presiding.  We reviewed his multiple conditions but mainly focused on his lung disease and dependence on oxygen at night which is typically a contraindication to transplant.  Dr Ordonez and the team suggest that I have Pulmonary give opinion on candidacy, specifically Dr Golden.  No evaluation scheduled at this point.  EPIC message to Dr Golden.

## 2017-08-03 DIAGNOSIS — Q79.4 PRUNE BELLY SYNDROME: ICD-10-CM

## 2017-08-03 DIAGNOSIS — J84.9 INTERSTITIAL LUNG DISEASE (H): ICD-10-CM

## 2017-08-03 DIAGNOSIS — Z76.82 ORGAN TRANSPLANT CANDIDATE: Primary | ICD-10-CM

## 2017-08-23 DIAGNOSIS — Z01.818 ENCOUNTER FOR PREOPERATIVE EXAMINATION FOR GENERAL SURGICAL PROCEDURE: Primary | ICD-10-CM

## 2017-08-29 ENCOUNTER — TRANSFERRED RECORDS (OUTPATIENT)
Dept: HEALTH INFORMATION MANAGEMENT | Facility: CLINIC | Age: 22
End: 2017-08-29

## 2017-09-14 DIAGNOSIS — I15.1 HYPERTENSION SECONDARY TO OTHER RENAL DISORDERS: ICD-10-CM

## 2017-09-14 DIAGNOSIS — N18.30 CKD (CHRONIC KIDNEY DISEASE) STAGE 3, GFR 30-59 ML/MIN (H): ICD-10-CM

## 2017-09-14 DIAGNOSIS — Q79.4 PRUNE BELLY SYNDROME: ICD-10-CM

## 2017-09-14 NOTE — TELEPHONE ENCOUNTER
Received message from pediatric nephrology clinic:  This patient's pharmacy from [facility] in [place] called to request a refill for his Vit. D 3 2000 units daily.  Their number is [phone].     Rx sent, per standing orders.    Yoly Mejia RN

## 2017-09-27 ENCOUNTER — TELEPHONE (OUTPATIENT)
Dept: TRANSPLANT | Facility: CLINIC | Age: 22
End: 2017-09-27

## 2017-09-28 ENCOUNTER — TRANSFERRED RECORDS (OUTPATIENT)
Dept: HEALTH INFORMATION MANAGEMENT | Facility: CLINIC | Age: 22
End: 2017-09-28

## 2017-11-06 NOTE — TELEPHONE ENCOUNTER
Spoke to mom to let her know that Greg should continue the zithromax as ordered per Dr. Collado, this medication covers a different bug than the cipro for treating the UTI. Mom verbalized understanding.  Heidi BooneRN,CPN     175

## 2017-11-13 DIAGNOSIS — N18.4 CKD (CHRONIC KIDNEY DISEASE) STAGE 4, GFR 15-29 ML/MIN (H): Primary | ICD-10-CM

## 2017-11-20 ENCOUNTER — OFFICE VISIT (OUTPATIENT)
Dept: PULMONOLOGY | Facility: CLINIC | Age: 22
End: 2017-11-20
Attending: INTERNAL MEDICINE
Payer: MEDICARE

## 2017-11-20 VITALS
DIASTOLIC BLOOD PRESSURE: 70 MMHG | RESPIRATION RATE: 18 BRPM | HEART RATE: 62 BPM | WEIGHT: 111 LBS | OXYGEN SATURATION: 98 % | BODY MASS INDEX: 20.97 KG/M2 | SYSTOLIC BLOOD PRESSURE: 97 MMHG

## 2017-11-20 DIAGNOSIS — Q79.4 PRUNE BELLY SYNDROME: Primary | ICD-10-CM

## 2017-11-20 DIAGNOSIS — Z76.82 ORGAN TRANSPLANT CANDIDATE: ICD-10-CM

## 2017-11-20 DIAGNOSIS — Q79.4 PRUNE BELLY SYNDROME: ICD-10-CM

## 2017-11-20 DIAGNOSIS — J84.9 INTERSTITIAL LUNG DISEASE (H): ICD-10-CM

## 2017-11-20 PROCEDURE — 40000809 ZZH STATISTIC NO DOCUMENTATION TO SUPPORT CHARGE

## 2017-11-20 PROCEDURE — 99212 OFFICE O/P EST SF 10 MIN: CPT | Mod: ZF

## 2017-11-20 ASSESSMENT — PAIN SCALES - GENERAL: PAINLEVEL: NO PAIN (0)

## 2017-11-20 NOTE — PROGRESS NOTES
Reason for Visit  Aidan Louie is a 21 year old male who is referred by Dr Lee and Page for establish care, evaluate for renal transplant  Pulmonary HPI  I did receive a letter from Dr. Lee with a summary of Greg's clinical course throughout his life.  Currently on supplemental Oxygen 1 lpm at night. Just finished 2 month prednisone taper in October, not sure if any difference noticed. Was on prednisone lifelong with most recent dose 7.5mg QOD then tapered down to 5 then 2.5.  LIberated from ventilator at age 8, had been on vent since 15 months adenovirus infection - he was in a clinical trial (NO?) at the time of infection.  Of note, Dr. Lee's letter indicated he was on vancomycin nebulized but his mother corrected that today and said that he has only been on vancomycin IV.    Activities of daily living:  Goes to the the [facility] twice a week and rides bike  He carries stuff up steps at work, works in a restaurant  Independent with running errands on the city bus to the grocery store etc.  Current regimen:  Albuterol/ipratropium neb 3x daily, 4x if sick.  Tobramycin neb as needed, usually 7 days when needed for bronchitis. He does have a history of pseudomonas.   Azithromycin 3x weekly because of frequent infection.  Bovona 5.0 ped tube in place    Has previously seen Dr Case newton and  Dr Salina Villa  [address], [place] [address]  Phone: [phone]    Seeing Dr Abel adult nephrology at     The patient was seen and examined by Sarah Hi MD   Current Outpatient Prescriptions   Medication     cholecalciferol 2000 UNITS tablet     captopril 0.1 mg/mL (CAPOTEN) 0.1 mg/mL SUSP     amLODIPine (NORVASC) 5 MG tablet     calcium carbonate (OS-SUSAN 500 MG [place]. CA) 500 MG tablet     cloNIDine (CATAPRES) 0.1 MG tablet     albuterol (2.5 MG/3ML) 0.083% nebulizer solution     ipratropium - albuterol 0.5 mg/2.5 mg/3 mL (DUONEB) 0.5-2.5 (3) MG/3ML nebulization      tobramycin, PF, (DAX) 300 MG/5ML nebulizer solution     azithromycin (ZITHROMAX) 250 MG tablet     PREDNISOLONE     Ranitidine HCl (ZANTAC PO)     No current facility-administered medications for this visit.      Allergies   Allergen Reactions     Latex      Tape [Adhesive Tape]      Social History     Social History     Marital status: Single     Spouse name: N/A     Number of children: N/A     Years of education: N/A     Occupational History     Not on file.     Social History Main Topics     Smoking status: Never Smoker     Smokeless tobacco: Never Used     Alcohol use No     Drug use: No     Sexual activity: Not on file     Other Topics Concern     Not on file     Social History Narrative     Past Medical History:   Diagnosis Date     Bilateral reflux nephropathy      CKD (chronic kidney disease) stage 4, GFR 15-29 ml/min (H)      Hearing loss      Dawood Reece sequence      Prune belly syndrome      Recurrent UTI      Respiratory bronchiolitis interstitial lung disease (H)      Restrictive lung disease      Tracheostomy dependence (H)      Past Surgical History:   Procedure Laterality Date     Bilateral ureteral reimplantation  2002    abdominoplasty, Mitrofanoff creation     CYSTOSCOPY  11     EXAM UNDER ANESTHESIA THROAT  6/10/2003    tonsillar hypertrophy     EXAM UNDER ANESTHESIA, RESTORATIONS, EXTRACTION(S) DENTAL COMPLEX, COMBINED  2006     GASTROSTOMY TUBE       GASTROSTOMY TUBE  3/16/2002    button change     HERNIORRHAPHY INGUINAL BILATERAL  99    left ochiopexy     LARYNGOSCOPY, BRONCHOSCOPY, COMBINED  2002     LARYNGOSCOPY, BRONCHOSCOPY, COMBINED  2002    bilateral ear debridement     LARYNGOSCOPY, BRONCHOSCOPY, COMBINED  2003     LARYNGOSCOPY, BRONCHOSCOPY, COMBINED  2007    Failed Deflux injection     LARYNGOSCOPY, BRONCHOSCOPY, COMBINED  12      VESICOSTOMY       Redo right ureteral reimplantation  3/12/2004    Excision bladder diverticuli,  Left to Right pyelopyelostomy     Replacement of trach  10/15/12     Takedown and revision of Mitrofanoff stoma  12/8/2006     TRACHEOSTOMY INFANT       Family History   Problem Relation Age of Onset     Type 2 Diabetes Mother      Type 2 Diabetes Maternal Grandmother      Type 2 Diabetes Maternal Grandfather      Type 2 Diabetes Paternal Grandmother      Alzheimer Disease Paternal Grandfather      ROS Pulmonary  Dyspnea: No, Cough: No, Chest pain: No, Wheezing: No, Sputum Production: No, Hemoptysis: No  A complete ROS was otherwise negative except as noted in the HPI.  BP 97/70 (BP Location: Left arm, Patient Position: Sitting, Cuff Size: Adult Regular)  Pulse 62  Resp 18  Wt 50.3 kg (111 lb)  SpO2 98%  BMI 20.97 kg/m2  Exam:   GENERAL APPEARANCE: Well developed, well nourished, alert, and in no apparent distress.  EYES: PERRL, EOMI  HENT: Nasal mucosa with no edema and no hyperemia. No nasal polyps.  EARS: Canals clear, TMs normal  MOUTH: Oral mucosa is moist, without any lesions, no tonsillar enlargement, no oropharyngeal exudate.  NECK: Tracheostomy, Bivona 5.0 with speaking valve supple, no masses, no thyromegaly.  LYMPHATICS: No significant axillary, cervical, or supraclavicular nodes.  RESP: normal percussion, good air flow throughout.  No crackles. No rhonchi. No wheezes.  CV: Normal S1, S2, regular rhythm, normal rate. No murmur.  No rub. No gallop. No LE edema.   ABDOMEN:  Left upper quadrant gastric button. Bowel sounds normal, soft, nontender, no HSM or masses.   MS: extremities normal. No clubbing. No cyanosis.  SKIN: no rash on limited exam  NEURO: Mentation intact, speech normal, normal strength and tone, normal gait and stance  PSYCH: mentation appears normal. and affect normal/bright  Results:  PFTS today (performed after the visit)Severe obstruction, although some late through the trach was noted he did meet ATS criteria for acceptability and reproducibility most recent pulmonary function  tests showed an FEV1 of 1.49 and FVC of 2.65, so there has been a significant decline today.    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  I'm seen for the first time today.  I received a letter of introduction from Dr. Case Lee his lifelong pediatric pulmonologists.  Patient is undergoing evaluation for renal transplant it sounds like he is very active and has a nearly normal lites, including independent living classes for adults with disabilities. He denies breathing limitations and although he has a history of frequent infections, has had about one a year for the last few years.  He is just now getting over influenza, his mother is also sick.  He does have a tracheostomy, which she's had for essentially most of his life with short episodes of attempted decannulation.  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  He does not use a tracheostomy except for suctioning airway clearance.

## 2017-11-20 NOTE — NURSING NOTE
Chief Complaint   Patient presents with     Consult     Patient is here to discuss surgery     Emma Chapman CMA 2:49 PM on 11/20/2017.

## 2017-11-20 NOTE — LETTER
11/20/2017       RE: Aidan Louie  [address]  [place] [address]     Dear Colleague,    Thank you for referring your patient, Aidan Louie, to the [facility] at [facility]. Please see a copy of my visit note below.    Reason for Visit  Aidan Louie is a 21 year old male who is referred by Dr Lee and Page for establish care, evaluate for renal transplant  Pulmonary HPI  I did receive a letter from Dr. Lee with a summary of Greg's clinical course throughout his life.  Currently on supplemental Oxygen 1 lpm at night. Just finished 2 month prednisone taper in October, not sure if any difference noticed. Was on prednisone lifelong with most recent dose 7.5mg QOD then tapered down to 5 then 2.5.  LIberated from ventilator at age 8, had been on vent since 16 months adenovirus infection - he was in a clinical trial (NO?) at the time of infection.  Of note, Dr. Lee's letter indicated he was on vancomycin nebulized but his mother corrected that today and said that he has only been on vancomycin IV.    Activities of daily living:  Goes to the the [facility] twice a week and rides bike  He carries stuff up steps at work, works in a restaurant  Independent with running errands on the city bus to the grocery store etc.  Current regimen:  Albuterol/ipratropium neb 3x daily, 4x if sick.  Tobramycin neb as needed, usually 7 days when needed for bronchitis. He does have a history of pseudomonas.   Azithromycin 3x weekly because of frequent infection.  Bovona 5.0 ped tube in place    Has previously seen Dr Case zamora pulchele and  Dr Salina Villa  [address], [place] [address]  Phone: [phone]    Seeing Dr Abel adult nephrology at     The patient was seen and examined by Sarah Hi MD   Current Outpatient Prescriptions   Medication     cholecalciferol 2000 UNITS tablet     captopril 0.1  mg/mL (CAPOTEN) 0.1 mg/mL SUSP     amLODIPine (NORVASC) 5 MG tablet     calcium carbonate (OS-SUSAN 500 MG [place]. CA) 500 MG tablet     cloNIDine (CATAPRES) 0.1 MG tablet     albuterol (2.5 MG/3ML) 0.083% nebulizer solution     ipratropium - albuterol 0.5 mg/2.5 mg/3 mL (DUONEB) 0.5-2.5 (3) MG/3ML nebulization     tobramycin, PF, (DAX) 300 MG/5ML nebulizer solution     azithromycin (ZITHROMAX) 250 MG tablet     PREDNISOLONE     Ranitidine HCl (ZANTAC PO)     No current facility-administered medications for this visit.      Allergies   Allergen Reactions     Latex      Tape [Adhesive Tape]      Social History     Social History     Marital status: Single     Spouse name: N/A     Number of children: N/A     Years of education: N/A     Occupational History     Not on file.     Social History Main Topics     Smoking status: Never Smoker     Smokeless tobacco: Never Used     Alcohol use No     Drug use: No     Sexual activity: Not on file     Other Topics Concern     Not on file     Social History Narrative     Past Medical History:   Diagnosis Date     Bilateral reflux nephropathy      CKD (chronic kidney disease) stage 4, GFR 15-29 ml/min (H)      Hearing loss      Dawood Reece sequence      Prune belly syndrome      Recurrent UTI      Respiratory bronchiolitis interstitial lung disease (H)      Restrictive lung disease      Tracheostomy dependence (H)      Past Surgical History:   Procedure Laterality Date     Bilateral ureteral reimplantation  5/16/2002    abdominoplasty, Mitrofanoff creation     CYSTOSCOPY  11/14/11     EXAM UNDER ANESTHESIA THROAT  6/10/2003    tonsillar hypertrophy     EXAM UNDER ANESTHESIA, RESTORATIONS, EXTRACTION(S) DENTAL COMPLEX, COMBINED  6/5/2006     GASTROSTOMY TUBE       GASTROSTOMY TUBE  3/16/2002    button change     HERNIORRHAPHY INGUINAL BILATERAL  7/23/99    left ochiopexy     LARYNGOSCOPY, BRONCHOSCOPY, COMBINED  6/21/2002     LARYNGOSCOPY, BRONCHOSCOPY, COMBINED  12/12/2002     bilateral ear debridement     LARYNGOSCOPY, BRONCHOSCOPY, COMBINED  2003     LARYNGOSCOPY, BRONCHOSCOPY, COMBINED  2007    Failed Deflux injection     LARYNGOSCOPY, BRONCHOSCOPY, COMBINED  12      VESICOSTOMY       Redo right ureteral reimplantation  3/12/2004    Excision bladder diverticuli, Left to Right pyelopyelostomy     Replacement of trach  10/15/12     Takedown and revision of Mitrofanoff stoma  2006     TRACHEOSTOMY INFANT       Family History   Problem Relation Age of Onset     Type 2 Diabetes Mother      Type 2 Diabetes Maternal Grandmother      Type 2 Diabetes Maternal Grandfather      Type 2 Diabetes Paternal Grandmother      Alzheimer Disease Paternal Grandfather      ROS Pulmonary  Dyspnea: No, Cough: No, Chest pain: No, Wheezing: No, Sputum Production: No, Hemoptysis: No  A complete ROS was otherwise negative except as noted in the HPI.  BP 97/70 (BP Location: Left arm, Patient Position: Sitting, Cuff Size: Adult Regular)  Pulse 62  Resp 18  Wt 50.3 kg (111 lb)  SpO2 98%  BMI 20.97 kg/m2  Exam:   GENERAL APPEARANCE: Well developed, well nourished, alert, and in no apparent distress.  EYES: PERRL, EOMI  HENT: Nasal mucosa with no edema and no hyperemia. No nasal polyps.  EARS: Canals clear, TMs normal  MOUTH: Oral mucosa is moist, without any lesions, no tonsillar enlargement, no oropharyngeal exudate.  NECK: Tracheostomy, Bivona 5.0 with speaking valve supple, no masses, no thyromegaly.  LYMPHATICS: No significant axillary, cervical, or supraclavicular nodes.  RESP: normal percussion, good air flow throughout.  No crackles. No rhonchi. No wheezes.  CV: Normal S1, S2, regular rhythm, normal rate. No murmur.  No rub. No gallop. No LE edema.   ABDOMEN:  Left upper quadrant gastric button. Bowel sounds normal, soft, nontender, no HSM or masses.   MS: extremities normal. No clubbing. No cyanosis.  SKIN: no rash on limited exam  NEURO: Mentation intact, speech normal, normal  strength and tone, normal gait and stance  PSYCH: mentation appears normal. and affect normal/bright  Results:  PFTS today (performed after the visit)Severe obstruction, although some late through the trach was noted he did meet ATS criteria for acceptability and reproducibility most recent pulmonary function tests showed an FEV1 of 1.49 and FVC of 2.65, so there has been a significant decline today.    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  I'm seen for the first time today.  I received a letter of introduction from Dr. Case Lee his lifelong pediatric pulmonologists.  Patient is undergoing evaluation for renal transplant it sounds like he is very active and has a nearly normal lites, including independent living classes for adults with disabilities. He denies breathing limitations and although he has a history of frequent infections, has had about one a year for the last few years.  He is just now getting over influenza, his mother is also sick.  He does have a tracheostomy, which she's had for essentially most of his life with short episodes of attempted decannulation.  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  He does not use a tracheostomy except for suctioning airway clearance.               Again, thank you for allowing me to participate in the care of your patient.      Sincerely,    Sarah Hi MD

## 2017-11-20 NOTE — MR AVS SNAPSHOT
"              After Visit Summary   11/20/2017    Aidan Louie    MRN: [id number]           Patient Information     Date Of Birth          1995        Visit Information        Provider Department      11/20/2017 3:00 PM Sarah Hi MD [facility]        Today's Diagnoses     Organ transplant candidate        Prune belly syndrome        Interstitial lung disease (H)           Follow-ups after your visit        Your next 10 appointments already scheduled     Jan 31, 2018  2:30 PM CST   (Arrive by 2:00 PM)   Return Visit with Haleigh Gallo MD   [facility]phrology ([facility])    [address] [place] [id number] [phone]              Who to contact     If you have questions or need follow up information about today's clinic visit or your schedule please contact [facility] directly at [phone].  Normal or non-critical lab and imaging results will be communicated to you by MyChart, letter or phone within 4 business days after the clinic has received the results. If you do not hear from us within 7 days, please contact the clinic through [employer] or phone. If you have a critical or abnormal lab result, we will notify you by phone as soon as possible.  Submit refill requests through [employer] or call your pharmacy and they will forward the refill request to us. Please allow 3 business days for your refill to be completed.          Additional Information About Your Visit        [employer] Information     [employer]ics lets you send messages to your doctor, view your test results, renew your prescriptions, schedule appointments and more. To sign up, go to [email or web address] . Click on \"Log in\" on the left side of the screen, which will take you to the Welcome page. Then click on \"Sign up Now\" on the right side of the page.     You will be asked to enter the access code listed below, as " well as some personal information. Please follow the directions to create your username and password.     Your access code is: [email or web address]  Expires: 2018  5:30 AM     Your access code will  in 90 days. If you need help or a new code, please call your [facility] clinic or [phone].        Care EveryWhere ID     This is your Care EveryWhere ID. This could be used by other organizations to access your [facility] medical records  [id number]        Your Vitals Were     Pulse Respirations Pulse Oximetry BMI (Body Mass Index)          62 18 98% 20.97 kg/m2         Blood Pressure from Last 3 Encounters:   No data found for BP    Weight from Last 3 Encounters:   No data found for Wt              Today, you had the following     No orders found for display       Primary Care Provider Office Phone # Fax #    Leena Lehman [phone]       [facility] [facility] [address]  [place] [address]        Equal Access to Services     [facility] Hospital[facility] : Hadii aad ku hadasho Soomaali, waaxda luqadaha, qaybta kaalmada adeegyada, waxay idiin hayaan adeeg kharash la'johannan . [facility] [phone].    ATENCIÓN: Si habla español, tiene a lowe disposición servicios gratuitos de asistencia lingüística. [facility] [phone].    We comply with applicable federal civil rights laws and [place] laws. We do not discriminate on the basis of race, color, national origin, age, disability, sex, sexual orientation, or gender identity.            Thank you!     Thank you for choosing [facility] FOR LUNG SCIENCE AND HEALTH  for your care. Our goal is always to provide you with excellent care. Hearing back from our patients is one way we can continue to improve our services. Please take a few minutes to complete the written survey that you may receive in the mail after your visit with us. Thank you!             Your Updated Medication List - Protect others around you: Learn how to safely use, store and throw away your  medicines at www.disposemymeds.org.          This list is accurate as of: 11/20/17 11:59 PM.  Always use your most recent med list.                   Brand Name Dispense Instructions for use Diagnosis    albuterol (2.5 MG/3ML) 0.083% neb solution      Take 1 ampule by nebulization 3 times daily    CKD (chronic kidney disease) stage 3, GFR 30-59 ml/min, HTN (hypertension), Prune belly syndrome       amLODIPine 5 MG tablet    NORVASC    180 tablet    Take 1 tablet (5 mg) by mouth 2 times daily    CKD (chronic kidney disease) stage 3, GFR 30-59 ml/min       AUGMENTIN 875-125 MG per tablet   Generic drug:  amoxicillin-clavulanate     20 tablet    Take 1 tablet by mouth 2 times daily        azithromycin 250 MG tablet    ZITHROMAX     Take  by mouth. Mon, Wed, Fri    CKD (chronic kidney disease) stage 3, GFR 30-59 ml/min       calcium carbonate 1250 MG tablet    OS-SUSAN 500 mg [place]. 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 disorders       captopril 0.1 mg/mL 0.1 mg/mL Susp    CAPOTEN    460 mL    Take 2.5 mg three times daily (2.5 mg/5ml)    CKD (chronic kidney disease) stage 3, GFR 30-59 ml/min       cholecalciferol 2000 UNITS tablet     30 tablet    Take 2,000 Units by mouth daily    CKD (chronic kidney disease) stage 3, GFR 30-59 ml/min, Prune belly syndrome, Hypertension secondary to other renal disorders       cloNIDine 0.1 MG tablet    CATAPRES    60 tablet    Take 1 tablet (0.1 mg) by mouth 2 times daily    CKD (chronic kidney disease) stage 3, GFR 30-59 ml/min       ipratropium 0.02 % neb solution    ATROVENT    225 mL    Take 2.5 mLs (0.5 mg) by nebulization 3 times daily        tobramycin (PF) 300 MG/5ML neb solution    DAX     Take 1 ampule by nebulization as needed    CKD (chronic kidney disease) stage 3, GFR 30-59 ml/min, HTN (hypertension), Prune belly  syndrome       ZANTAC PO      Take 75 mg by mouth 2 times daily    CKD (chronic kidney disease) stage 3, GFR 30-59 ml/min

## 2017-11-21 ENCOUNTER — OFFICE VISIT (OUTPATIENT)
Dept: NEPHROLOGY | Facility: CLINIC | Age: 22
End: 2017-11-21
Attending: INTERNAL MEDICINE
Payer: MEDICARE

## 2017-11-21 VITALS
DIASTOLIC BLOOD PRESSURE: 69 MMHG | HEART RATE: 77 BPM | OXYGEN SATURATION: 98 % | HEIGHT: 61 IN | BODY MASS INDEX: 20.88 KG/M2 | SYSTOLIC BLOOD PRESSURE: 108 MMHG | WEIGHT: 110.6 LBS

## 2017-11-21 DIAGNOSIS — D63.1 ANEMIA OF CHRONIC RENAL FAILURE, STAGE 4 (SEVERE) (H): Primary | ICD-10-CM

## 2017-11-21 DIAGNOSIS — N18.4 ANEMIA OF CHRONIC RENAL FAILURE, STAGE 4 (SEVERE) (H): Primary | ICD-10-CM

## 2017-11-21 PROCEDURE — 99212 OFFICE O/P EST SF 10 MIN: CPT | Mod: ZF

## 2017-11-21 ASSESSMENT — PAIN SCALES - GENERAL: PAINLEVEL: NO PAIN (0)

## 2017-11-21 NOTE — LETTER
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 He has seen a pulmonologist and will likely be cleared since he has had minimal O2 needs and has been active with the current resp status.   On Calcium and vit D supplements, PTH 98 last checked in 12/16  Discussed kidney smart education and will need to be scheduled for that.   Counseled regarding the transplant assessment process and to stay in touch with his transplant coordinator for further directions.     HTN  Well controlled on [id number] BID,  captopril 2.5  and clonidine 0.1mg BID     Mild hyperkalemia  K was 4.9   Will continue to followlikely sec to poor renal functions    He was also sick with marii symptoms and just finished a course of Augmentin will follow on the 2 month labs      Recurrent UTI:  Gram -ve in prior cultures likely colonized  Advised to do regular flushes and catheterization     Patient was seen and assessed with Dr Abel  Plan of care discussed at length with his parents  Advised to follow up in 2 months with labs     REASON FOR CONSULT: worsening creatinine  and switch from pediatric to adult nephrology     HISTORY OF PRESENT ILLNESS:  Aidan Louie is a 21 year old male who presents for evaluation of   His CKDIV sec to Prune belly syndrome   He has been following Dr Judd for his CKD, and Dr Schilling has been his surgeon. He has a hx of recurrent UTI and worsening creatinine in the past few years. He was counselled on his options of HD and transplant in his last nephrologist appointment.  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 He has had reconstructed ureter and bladder and has to self catheterize with minimal x3 irrigation with 180ml saline , but he has been not compliant with his saline flushes, sec to current jobs with disabilities.  He presented for follow up with his parents  They report recurrent UTI x2 /yr usually treated with ciprofloxacin  He denies any dysuria abdominal/back pain at this time, no fevers chills. He usually has fatigue back pain when he has an active UTI.   He has been working in jobs realted to adults with disabilities and has been very busy and reports no new issues with the activity at this time.    His other histories include HTN, BL hearing loss on hearing aids, short stature , choanal atresia SP repair, Dawood Reece anomaly, restrictive lung disease.     PAST MEDICAL HISTORY:  Past Medical History:   Diagnosis Date     Bilateral reflux nephropathy      CKD (chronic kidney disease) stage 4, GFR 15-29 ml/min (H)      Hearing loss      Dawood Reece sequence      Prune belly syndrome      Recurrent UTI      Respiratory bronchiolitis interstitial lung disease (H)      Restrictive lung disease      Tracheostomy dependence (H)      PAST SURGICAL HISTORY:  Past Surgical History:   Procedure Laterality Date     Bilateral ureteral reimplantation  5/16/2002    abdominoplasty, Mitrofanoff creation     CYSTOSCOPY  11/14/11     EXAM UNDER ANESTHESIA THROAT  6/10/2003    tonsillar hypertrophy     EXAM  UNDER ANESTHESIA, RESTORATIONS, EXTRACTION(S) DENTAL COMPLEX, COMBINED  2006     GASTROSTOMY TUBE       GASTROSTOMY TUBE  3/16/2002    button change     HERNIORRHAPHY INGUINAL BILATERAL  99    left ochiopexy     LARYNGOSCOPY, BRONCHOSCOPY, COMBINED  2002     LARYNGOSCOPY, BRONCHOSCOPY, COMBINED  2002    bilateral ear debridement     LARYNGOSCOPY, BRONCHOSCOPY, COMBINED  2003     LARYNGOSCOPY, BRONCHOSCOPY, COMBINED  2007    Failed Deflux injection     LARYNGOSCOPY, BRONCHOSCOPY, COMBINED  12      VESICOSTOMY       Redo right ureteral reimplantation  3/12/2004    Excision bladder diverticuli, Left to Right pyelopyelostomy     Replacement of trach  10/15/12     Takedown and revision of Mitrofanoff stoma  2006     TRACHEOSTOMY INFANT       MEDICATIONS:  Prescription Medications as of 2017             amoxicillin-clavulanate (AUGMENTIN) 875-125 MG per tablet Take 1 tablet by mouth 2 times daily    ipratropium (ATROVENT) 0.02 % neb solution Take 2.5 mLs (0.5 mg) by nebulization 3 times daily    cholecalciferol 2000 UNITS tablet Take 2,000 Units by mouth daily    captopril 0.1 mg/mL (CAPOTEN) 0.1 mg/mL SUSP Take 2.5 mg three times daily (2.5 mg/5ml)    amLODIPine (NORVASC) 5 MG tablet Take 1 tablet (5 mg) by mouth 2 times daily    calcium carbonate (OS-SUSAN 500 MG [place]. CA) 500 MG tablet Take 1 tablet (500 mg) by mouth 3 times daily (with meals)    cloNIDine (CATAPRES) 0.1 MG tablet Take 1 tablet (0.1 mg) by mouth 2 times daily    albuterol (2.5 MG/3ML) 0.083% nebulizer solution Take 1 ampule by nebulization 3 times daily    tobramycin, PF, (DAX) 300 MG/5ML nebulizer solution Take 1 ampule by nebulization as needed    azithromycin (ZITHROMAX) 250 MG tablet Take  by mouth. Mon, Wed, Fri    Ranitidine HCl (ZANTAC PO) Take 75 mg by mouth 2 times daily          ALLERGIES:    Allergies   Allergen Reactions     Latex      Latex      Other reaction(s): Other (see comments)      "Tape [Adhesive Tape]      REVIEW OF SYSTEMS:  A comprehensive review of systems was performed and found to be negative except as described here or above.   SOCIAL HISTORY:   Social History     Social History     Marital status: Single     Spouse name: N/A     Number of children: N/A     Years of education: N/A     Occupational History     Not on file.     Social History Main Topics     Smoking status: Never Smoker     Smokeless tobacco: Never Used     Alcohol use No     Drug use: No     Sexual activity: Not on file     Other Topics Concern     Not on file     Social History Narrative     FAMILY MEDICAL HISTORY:   Family History   Problem Relation Age of Onset     Type 2 Diabetes Mother      Type 2 Diabetes Maternal Grandmother      Type 2 Diabetes Maternal Grandfather      Type 2 Diabetes Paternal Grandmother      Alzheimer Disease Paternal Grandfather      PHYSICAL EXAM:   /69  Pulse 77  Ht 1.549 m (5' 1\")  Wt 50.2 kg (110 lb 9.6 oz)  SpO2 98%  BMI 20.9 kg/m2  GENERAL APPEARANCE: alert and no distress  EYES: nonicteric  HENT: mouth without ulcers or lesions  NECK: supple, no adenopathy, Ytach+  RESP: lungs clear to auscultation   CV: regular rhythm, normal rate, no rub  ABDOMEN: soft, non tender, suprapubic cathter   Extremities: no clubbing, cyanosis, or edema  MS: no evidence of inflammation in joints, no muscle tenderness  SKIN: no rash  NEURO: mentation intact and speech normal  PSYCH: affect normal/bright   LABS:   CMP  Recent Labs   Lab Test  07/11/17   1337  12/22/16   1325  09/17/15   1205  02/05/15   1110   NA  137  143  139  141   POTASSIUM  4.4  4.4  4.6  4.2   CHLORIDE  108  110*  111*  111*   CO2  21  24  21  21   ANIONGAP  8  9  7  9   GLC  116*  89  140*  118*   BUN  44*  40*  50*  40*   CR  2.93*  2.79*  2.57*  2.07*   GFRESTIMATED  27*  29*  32*  42*   GFRESTBLACK  33*  35*  39*  51*   SUSAN  8.8  8.7  8.7*  8.7*   MAG   --    --   2.1  2.0   PHOS  2.9  4.2  2.7*  3.5   ALBUMIN  3.6  3.3*  " 3.8  3.5   ALKPHOS   --    --   80  87     CBC  Recent Labs   Lab Test  07/11/17 1337  12/22/16   1325  09/17/15   1205  02/05/15   1110  10/03/13   2128   HGB  13.0*  11.7*  13.0*  13.8  13.2   WBC  8.8   --   8.7  7.7  11.4*   RBC  4.35*   --   4.44  4.69  4.48   HCT  39.0*   --   38.9*  41.0  38.9   MCV  90   --   88  87  87   MCH  29.9   --   29.3  29.4  29.5   MCHC  33.3   --   33.4  33.7  33.9   RDW  13.2   --   13.5  13.2  13.3   PLT  305   --   326  281  338     INRNo lab results found.  ABGNo lab results found.   URINE STUDIES  Recent Labs   Lab Test  07/11/17   1341  12/22/16   1328  09/17/15   1410  02/05/15   1115   COLOR  Yellow  Straw  Quantity not sufficient  Straw   APPEARANCE  Clear  Slightly Cloudy  Quantity not sufficient  Clear   URINEGLC  Negative  Negative  Quantity not sufficient*  Negative   URINEBILI  Negative  Negative  Quantity not sufficient*  Negative   URINEKETONE  Negative  Negative  Quantity not sufficient*  Negative   SG  1.005  1.006  Quantity not sufficient  1.005   UBLD  Negative  Trace*  Quantity not sufficient*  Trace*   URINEPH  6.0  6.5  Quantity not sufficient  6.0   PROTEIN  100*  100*  Quantity not sufficient*  100*   NITRITE  Positive*  Negative  Quantity not sufficient*  Negative   LEUKEST  Small*  Large*  Quantity not sufficient*  Large*   RBCU  <1  6*  Quantity not sufficient  1   WBCU  6*  >182*  Quantity not sufficient*  15*     Recent Labs   Lab Test  07/11/17   1341  12/22/16   1328  02/05/15   1115   UTPG  3.04*  3.05*  5.28*     PTH  Recent Labs   Lab Test  07/11/17 1337 12/22/16   1325  09/17/15   1205  02/05/15   1110  10/03/13   2128  09/20/12   1200   PTHI  191*  98*  133*  303*  141*  74*     IRON STUDIES  Recent Labs   Lab Test  07/11/17   1337  12/22/16   1325  09/17/15   1205  02/05/15   1110   IRON  77  135  99  160   FEB  296  241  297  281   IRONSAT  26  56*  33  57*   JUVENAL  16*  56  27  34     IMAGING:  All imaging studies reviewed by me.   Haleigh  MD Sabino     This patient has been evaluated by me, Glen Abel MD, on 11/21/17.  I discussed with the fellow, Dr. Gallo, and agree with the findings and plan in this note.  I have reviewed 11/21/17 medications, vital signs and labs.       Total time I spent was >40 minutes, and more than 50% of face to face time with the patient was spent in counseling and/or coordination of care regarding principles of multidisciplinary care, medication management, and CKD education.    MD Haleigh Esquivel MD

## 2017-11-21 NOTE — NURSING NOTE
"Chief Complaint   Patient presents with     RECHECK     Follow up CKD stage 4.       Initial /69  Pulse 77  Ht 1.549 m (5' 1\")  Wt 50.2 kg (110 lb 9.6 oz)  SpO2 98%  BMI 20.9 kg/m2 Estimated body mass index is 20.9 kg/(m^2) as calculated from the following:    Height as of this encounter: 1.549 m (5' 1\").    Weight as of this encounter: 50.2 kg (110 lb 9.6 oz).  Medication Reconciliation: complete   Bebe Borrero., CMA    "

## 2017-11-21 NOTE — PROGRESS NOTES
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 He has seen a pulmonologist and will likely be cleared since he has had minimal O2 needs and has been active with the current resp status.   On Calcium and vit D supplements, PTH 98 last checked in 12/16  Discussed kidney smart education and will need to be scheduled for that.   Counseled regarding the transplant assessment process and to stay in touch with his transplant coordinator for further directions.     HTN  Well controlled on [id number] BID,  captopril 2.5  and clonidine 0.1mg BID     Mild hyperkalemia  K was 4.9   Will continue to followlikely sec to poor renal functions    He was also sick with marii symptoms and just finished a course of Augmentin will follow on the 2 month labs      Recurrent UTI:  Gram -ve in prior cultures likely colonized  Advised to do regular flushes and catheterization     Patient was seen and assessed with Dr Abel  Plan of care discussed at length with his parents  Advised to follow up in 2 months with labs     REASON FOR CONSULT: worsening creatinine and switch from pediatric to adult nephrology     HISTORY OF PRESENT ILLNESS:  Aidan REMY  Liban is a 21 year old male who presents for evaluation of   His CKDIV sec to Prune belly syndrome   He has been following Dr Judd for his CKD, and Dr Schilling has been his surgeon. He has a hx of recurrent UTI and worsening creatinine in the past few years. He was counselled on his options of HD and transplant in his last nephrologist appointment.  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 He has had reconstructed ureter and bladder and has to self catheterize with minimal x3 irrigation with 180ml saline , but he has been not compliant with his saline flushes, sec to current jobs with disabilities.  He presented for follow up with his parents  They report recurrent UTI x2 /yr usually treated with ciprofloxacin  He denies any dysuria abdominal/back pain at this time, no fevers chills. He usually has fatigue back pain when he has an active UTI.   He has been working in jobs realted to adults with disabilities and has been very busy and reports no new issues with the activity at this time.    His other histories include HTN, BL hearing loss on hearing aids, short stature , choanal atresia SP repair, Dawood Reece anomaly, restrictive lung disease.     PAST MEDICAL HISTORY:  Past Medical History:   Diagnosis Date     Bilateral reflux nephropathy      CKD (chronic kidney disease) stage 4, GFR 15-29 ml/min (H)      Hearing loss      Dawood Reece sequence      Prune belly syndrome      Recurrent UTI      Respiratory bronchiolitis interstitial lung disease (H)      Restrictive lung disease      Tracheostomy dependence (H)      PAST SURGICAL HISTORY:  Past Surgical History:   Procedure Laterality Date     Bilateral ureteral reimplantation  5/16/2002    abdominoplasty, Mitrofanoff creation     CYSTOSCOPY  11/14/11     EXAM UNDER ANESTHESIA THROAT  6/10/2003    tonsillar hypertrophy     EXAM UNDER ANESTHESIA, RESTORATIONS, EXTRACTION(S) DENTAL COMPLEX, COMBINED  6/5/2006      GASTROSTOMY TUBE       GASTROSTOMY TUBE  3/16/2002    button change     HERNIORRHAPHY INGUINAL BILATERAL  99    left ochiopexy     LARYNGOSCOPY, BRONCHOSCOPY, COMBINED  2002     LARYNGOSCOPY, BRONCHOSCOPY, COMBINED  2002    bilateral ear debridement     LARYNGOSCOPY, BRONCHOSCOPY, COMBINED  2003     LARYNGOSCOPY, BRONCHOSCOPY, COMBINED  2007    Failed Deflux injection     LARYNGOSCOPY, BRONCHOSCOPY, COMBINED  12      VESICOSTOMY       Redo right ureteral reimplantation  3/12/2004    Excision bladder diverticuli, Left to Right pyelopyelostomy     Replacement of trach  10/15/12     Takedown and revision of Mitrofanoff stoma  2006     TRACHEOSTOMY INFANT       MEDICATIONS:  Prescription Medications as of 2017             amoxicillin-clavulanate (AUGMENTIN) 875-125 MG per tablet Take 1 tablet by mouth 2 times daily    ipratropium (ATROVENT) 0.02 % neb solution Take 2.5 mLs (0.5 mg) by nebulization 3 times daily    cholecalciferol 2000 UNITS tablet Take 2,000 Units by mouth daily    captopril 0.1 mg/mL (CAPOTEN) 0.1 mg/mL SUSP Take 2.5 mg three times daily (2.5 mg/5ml)    amLODIPine (NORVASC) 5 MG tablet Take 1 tablet (5 mg) by mouth 2 times daily    calcium carbonate (OS-SUSAN 500 MG [place]. [place]) 500 MG tablet Take 1 tablet (500 mg) by mouth 3 times daily (with meals)    cloNIDine (CATAPRES) 0.1 MG tablet Take 1 tablet (0.1 mg) by mouth 2 times daily    albuterol (2.5 MG/3ML) 0.083% nebulizer solution Take 1 ampule by nebulization 3 times daily    tobramycin, PF, (DAX) 300 MG/5ML nebulizer solution Take 1 ampule by nebulization as needed    azithromycin (ZITHROMAX) 250 MG tablet Take  by mouth. Mon, Wed, Fri    Ranitidine HCl (ZANTAC PO) Take 75 mg by mouth 2 times daily          ALLERGIES:    Allergies   Allergen Reactions     Latex      Latex      Other reaction(s): Other (see comments)     Tape [Adhesive Tape]      REVIEW OF SYSTEMS:  A comprehensive review of systems was  "performed and found to be negative except as described here or above.   SOCIAL HISTORY:   Social History     Social History     Marital status: Single     Spouse name: N/A     Number of children: N/A     Years of education: N/A     Occupational History     Not on file.     Social History Main Topics     Smoking status: Never Smoker     Smokeless tobacco: Never Used     Alcohol use No     Drug use: No     Sexual activity: Not on file     Other Topics Concern     Not on file     Social History Narrative     FAMILY MEDICAL HISTORY:   Family History   Problem Relation Age of Onset     Type 2 Diabetes Mother      Type 2 Diabetes Maternal Grandmother      Type 2 Diabetes Maternal Grandfather      Type 2 Diabetes Paternal Grandmother      Alzheimer Disease Paternal Grandfather      PHYSICAL EXAM:   /69  Pulse 77  Ht 1.549 m (5' 1\")  Wt 50.2 kg (110 lb 9.6 oz)  SpO2 98%  BMI 20.9 kg/m2  GENERAL APPEARANCE: alert and no distress  EYES: nonicteric  HENT: mouth without ulcers or lesions  NECK: supple, no adenopathy, Ytach+  RESP: lungs clear to auscultation   CV: regular rhythm, normal rate, no rub  ABDOMEN: soft, non tender, suprapubic cathter   Extremities: no clubbing, cyanosis, or edema  MS: no evidence of inflammation in joints, no muscle tenderness  SKIN: no rash  NEURO: mentation intact and speech normal  PSYCH: affect normal/bright   LABS:   CMP  Recent Labs   Lab Test  07/11/17   1337  12/22/16   1325  09/17/15   1205  02/05/15   1110   NA  137  143  139  141   POTASSIUM  4.4  4.4  4.6  4.2   CHLORIDE  108  110*  111*  111*   CO2  21  24  21  21   ANIONGAP  8  9  7  9   GLC  116*  89  140*  118*   BUN  44*  40*  50*  40*   CR  2.93*  2.79*  2.57*  2.07*   GFRESTIMATED  27*  29*  32*  42*   GFRESTBLACK  33*  35*  39*  51*   SUSAN  8.8  8.7  8.7*  8.7*   MAG   --    --   2.1  2.0   PHOS  2.9  4.2  2.7*  3.5   ALBUMIN  3.6  3.3*  3.8  3.5   ALKPHOS   --    --   80  87     CBC  Recent Labs   Lab Test  07/11/17   " 1337  12/22/16   1325  09/17/15   1205  02/05/15   1110  10/03/13   2128   HGB  13.0*  11.7*  13.0*  13.8  13.2   WBC  8.8   --   8.7  7.7  11.4*   RBC  4.35*   --   4.44  4.69  4.48   HCT  39.0*   --   38.9*  41.0  38.9   MCV  90   --   88  87  87   MCH  29.9   --   29.3  29.4  29.5   MCHC  33.3   --   33.4  33.7  33.9   RDW  13.2   --   13.5  13.2  13.3   PLT  305   --   326  281  338     INRNo lab results found.  ABGNo lab results found.   URINE STUDIES  Recent Labs   Lab Test  07/11/17   1341  12/22/16   1328  09/17/15   1410  02/05/15   1115   COLOR  Yellow  Straw  Quantity not sufficient  Straw   APPEARANCE  Clear  Slightly Cloudy  Quantity not sufficient  Clear   URINEGLC  Negative  Negative  Quantity not sufficient*  Negative   URINEBILI  Negative  Negative  Quantity not sufficient*  Negative   URINEKETONE  Negative  Negative  Quantity not sufficient*  Negative   SG  1.005  1.006  Quantity not sufficient  1.005   UBLD  Negative  Trace*  Quantity not sufficient*  Trace*   URINEPH  6.0  6.5  Quantity not sufficient  6.0   PROTEIN  100*  100*  Quantity not sufficient*  100*   NITRITE  Positive*  Negative  Quantity not sufficient*  Negative   LEUKEST  Small*  Large*  Quantity not sufficient*  Large*   RBCU  <1  6*  Quantity not sufficient  1   WBCU  6*  >182*  Quantity not sufficient*  15*     Recent Labs   Lab Test  07/11/17   1341  12/22/16   1328  02/05/15   1115   UTPG  3.04*  3.05*  5.28*     PTH  Recent Labs   Lab Test  07/11/17   1337  12/22/16   1325  09/17/15   1205  02/05/15   1110  10/03/13   2128  09/20/12   1200   PTHI  191*  98*  133*  303*  141*  74*     IRON STUDIES  Recent Labs   Lab Test  07/11/17   1337  12/22/16   1325  09/17/15   1205  02/05/15   1110   IRON  77  135  99  160   FEB  296  241  297  281   IRONSAT  26  56*  33  57*   JUVENAL  16*  56  27  34     IMAGING:  All imaging studies reviewed by me.   Haleigh Gallo MD     This patient has been evaluated by me, Glen Abel MD, on  11/21/17.  I discussed with the fellow, Dr. Gallo, and agree with the findings and plan in this note.  I have reviewed 11/21/17 medications, vital signs and labs.       Total time I spent was >40 minutes, and more than 50% of face to face time with the patient was spent in counseling and/or coordination of care regarding principles of multidisciplinary care, medication management, and CKD education.    Glen Abel MD

## 2017-11-21 NOTE — MR AVS SNAPSHOT
"              After Visit Summary   11/21/2017    Aidan Louie    MRN: [id number]           Patient Information     Date Of Birth          1995        Visit Information        Provider Department      11/21/2017 4:00 PM Haleigh Gallo MD [facility] Nephrology        Care Instructions    Follow up in 2 months with labs          Follow-ups after your visit        Follow-up notes from your care team     Return in about 2 months (around 1/21/2018).      Your next 10 appointments already scheduled     Jan 31, 2018  2:30 PM CST   (Arrive by 2:00 PM)   Return Visit with MD ZACHARY Jaquez [facility] Nephrology ([facility])    [address] [place] [address]  3rd [facility] [address] [phone]              Who to contact     If you have questions or need follow up information about today's clinic visit or your schedule please contact [facility] NEPHROLOGY directly at [phone].  Normal or non-critical lab and imaging results will be communicated to you by MyChart, letter or phone within 4 business days after the clinic has received the results. If you do not hear from us within 7 days, please contact the clinic through [facility] [employer] or phone. If you have a critical or abnormal lab result, we will notify you by phone as soon as possible.  Submit refill requests through [facility] or call your pharmacy and they will forward the refill request to us. Please allow 3 business days for your refill to be completed.          Additional Information About Your Visit        [facility] [employer] Information     [facility] lets you send messages to your doctor, view your test results, renew your prescriptions, schedule appointments and more. To sign up, go to [email or web address] . Click on \"Log in\" on the left side of the screen, which will take you to the Welcome page. Then click on \"Sign up Now\" on the right side of the page.     You will be asked to enter the access code listed below, as well as some personal " "information. Please follow the directions to create your username and password.     Your access code is: [email or web address]  Expires: 2018  5:30 AM     Your access code will  in 90 days. If you need help or a new code, please call your [facility] clinic or [phone].        Care EveryWhere ID     This is your Care EveryWhere ID. This could be used by other organizations to access your [facility] medical records  [id number]        Your Vitals Were     Pulse Height Pulse Oximetry BMI (Body Mass Index)          77 1.549 m (5' 1\") 98% 20.9 kg/m2         Blood Pressure from Last 3 Encounters:   17 108/69   17 97/70   17 106/66    Weight from Last 3 Encounters:   17 50.2 kg (110 lb 9.6 oz)   17 50.3 kg (111 lb)   17 48.7 kg (107 lb 6.4 oz)              Today, you had the following     No orders found for display       Primary Care Provider Office Phone # Fax #    Leena Lehman [phone]       [facility] [place] [facility] [address] [facility] [place] [address]        Equal Access to Services     [facility] Center[facility]ICH[facility] : Hadii aad ku hadasho Soomaali, waaxda luqadaha, qaybta kaalmada adeegyada, waxay idiin hayjohannan thomas khangel lathelma . So [facility] [phone].    ATENCIÓN: Si habla español, tiene a lowe disposición servicios gratuitos de asistencia lingüística. Llame al [phone].    We comply with applicable federal civil rights laws and [place] laws. We do not discriminate on the basis of race, color, national origin, age, disability, sex, sexual orientation, or gender identity.            Thank you!     Thank you for choosing [facility] NEPHROLOGY  for your care. Our goal is always to provide you with excellent care. Hearing back from our patients is one way we can continue to improve our services. Please take a few minutes to complete the written survey that you may receive in the mail after your visit with us. Thank you!             Your Updated Medication List - Protect others " around you: Learn how to safely use, store and throw away your medicines at www.disposemymeds.org.          This list is accurate as of: 11/21/17  5:29 PM.  Always use your most recent med list.                   Brand Name Dispense Instructions for use Diagnosis    albuterol (2.5 MG/3ML) 0.083% neb solution      Take 1 ampule by nebulization 3 times daily    CKD (chronic kidney disease) stage 3, GFR 30-59 ml/min, HTN (hypertension), Prune belly syndrome       amLODIPine 5 MG tablet    NORVASC    180 tablet    Take 1 tablet (5 mg) by mouth 2 times daily    CKD (chronic kidney disease) stage 3, GFR 30-59 ml/min       AUGMENTIN 875-125 MG per tablet   Generic drug:  amoxicillin-clavulanate     20 tablet    Take 1 tablet by mouth 2 times daily        azithromycin 250 MG tablet    ZITHROMAX     Take  by mouth. Mon, Wed, Fri    CKD (chronic kidney disease) stage 3, GFR 30-59 ml/min       calcium carbonate 1250 MG tablet    OS-SUSAN 500 mg [place]. 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 disorders       captopril 0.1 mg/mL 0.1 mg/mL Susp    CAPOTEN    460 mL    Take 2.5 mg three times daily (2.5 mg/5ml)    CKD (chronic kidney disease) stage 3, GFR 30-59 ml/min       cholecalciferol 2000 UNITS tablet     30 tablet    Take 2,000 Units by mouth daily    CKD (chronic kidney disease) stage 3, GFR 30-59 ml/min, Prune belly syndrome, Hypertension secondary to other renal disorders       cloNIDine 0.1 MG tablet    CATAPRES    60 tablet    Take 1 tablet (0.1 mg) by mouth 2 times daily    CKD (chronic kidney disease) stage 3, GFR 30-59 ml/min       ipratropium 0.02 % neb solution    ATROVENT    225 mL    Take 2.5 mLs (0.5 mg) by nebulization 3 times daily        tobramycin (PF) 300 MG/5ML neb solution    DAX     Take 1 ampule by nebulization as needed    CKD (chronic kidney  disease) stage 3, GFR 30-59 ml/min, HTN (hypertension), Prune belly syndrome       ZANTAC PO      Take 75 mg by mouth 2 times daily    CKD (chronic kidney disease) stage 3, GFR 30-59 ml/min

## 2017-11-22 ENCOUNTER — TELEPHONE (OUTPATIENT)
Dept: TRANSPLANT | Facility: CLINIC | Age: 22
End: 2017-11-22

## 2017-11-22 ENCOUNTER — CARE COORDINATION (OUTPATIENT)
Dept: NEPHROLOGY | Facility: CLINIC | Age: 22
End: 2017-11-22

## 2017-11-22 LAB
EXPTIME-PRE: 9.95 SEC
FEF2575-%PRED-PRE: 9 %
FEF2575-PRE: 0.36 L/SEC
FEF2575-PRED: 4 L/SEC
FEFMAX-%PRED-PRE: 32 %
FEFMAX-PRE: 2.71 L/SEC
FEFMAX-PRED: 8.21 L/SEC
FEV1-%PRED-PRE: 29 %
FEV1-PRE: 1.01 L
FEV1FEV6-PRE: 51 %
FEV1FEV6-PRED: 84 %
FEV1FVC-PRE: 47 %
FEV1FVC-PRED: 87 %
FIFMAX-PRE: 1.97 L/SEC
FVC-%PRED-PRE: 54 %
FVC-PRE: 2.14 L
FVC-PRED: 3.96 L

## 2017-11-22 NOTE — TELEPHONE ENCOUNTER
I received messages from Dr Abel and Yoly Mejia that Mom had questions about next steps and the kidney transplant process.  I called Mom Valerie.  I told her that the transplant team will review the Pulmonologist's note when it is ready.  If there are no overt contraindications to transplant we will make arrangements for Aidan to come for the formal transplant evaluation or add any steps if suggested by Pulmonary.  I told her to stand by for now and it was OK to call me if she has additional questions.

## 2017-11-22 NOTE — PROGRESS NOTES
Received update from [facility] that they've been unable to reach patient to schedule class. Discussed with Dr. Gallo, who mentioned this with patient and family in the appointment. Sounds like the mother was confused about the class.    Called mother to discuss class. She is very interested in attending. Provided contact information for Stephanie Serrato.    Also discussed transplant evaluation. They have questions about the next steps, would like call from transplant team. Message sent to patient's coordinator.    Yoly Mejia RN

## 2017-12-20 ENCOUNTER — TELEPHONE (OUTPATIENT)
Dept: TRANSPLANT | Facility: CLINIC | Age: 22
End: 2017-12-20

## 2017-12-20 NOTE — TELEPHONE ENCOUNTER
I called mother Valerie to provide feedback from today's meeting where Aidan's case was briefly reviewed; Dr Garcia presiding.  I told her that the team reviewed his Pulmonary visit and specifically his poor PFTs.  I informed her that the decision was that we not proceed with transplant process at this time and noted that his last GFR was 27.  She was quite upset to hear this.  I told her that the team was reluctant to elevate his infection risk and do him more harm than good.  She expressed dissatisfaction with recent visit in Pulmonary clinic.  I told her I would be communicating with Dr Abel, maybe they would want to consider re-referral in the future when kidney function closer to qualifying level and if lung function stabilizes.

## 2018-01-04 ENCOUNTER — DOCUMENTATION ONLY (OUTPATIENT)
Dept: TRANSPLANT | Facility: CLINIC | Age: 23
End: 2018-01-04

## 2018-01-04 NOTE — PROGRESS NOTES
Message sent to Dr Abel that the transplant discussed and does not want to proceed with evaluating Aidan at this time.  Closing this referral for now.

## 2018-01-17 DIAGNOSIS — N18.4 CKD (CHRONIC KIDNEY DISEASE) STAGE 4, GFR 15-29 ML/MIN (H): Primary | ICD-10-CM

## 2018-02-14 ENCOUNTER — TELEPHONE (OUTPATIENT)
Dept: NEPHROLOGY | Facility: CLINIC | Age: 23
End: 2018-02-14

## 2018-02-14 NOTE — TELEPHONE ENCOUNTER
Lab orders have been faxed to [facility] [phone]. Left VM informing patient. Provided number for call back.  Mary Scott LPN  Nephrology  [phone]

## 2018-02-28 ENCOUNTER — OFFICE VISIT (OUTPATIENT)
Dept: NEPHROLOGY | Facility: CLINIC | Age: 23
End: 2018-02-28
Attending: INTERNAL MEDICINE
Payer: MEDICARE

## 2018-02-28 VITALS
WEIGHT: 105.6 LBS | HEIGHT: 61 IN | BODY MASS INDEX: 19.94 KG/M2 | DIASTOLIC BLOOD PRESSURE: 67 MMHG | TEMPERATURE: 97.3 F | OXYGEN SATURATION: 98 % | HEART RATE: 65 BPM | SYSTOLIC BLOOD PRESSURE: 107 MMHG

## 2018-02-28 DIAGNOSIS — N18.4 CKD (CHRONIC KIDNEY DISEASE) STAGE 4, GFR 15-29 ML/MIN (H): Primary | ICD-10-CM

## 2018-02-28 PROCEDURE — G0463 HOSPITAL OUTPT CLINIC VISIT: HCPCS | Mod: ZF

## 2018-02-28 ASSESSMENT — PAIN SCALES - GENERAL: PAINLEVEL: NO PAIN (0)

## 2018-02-28 NOTE — MR AVS SNAPSHOT
"              After Visit Summary   2/28/2018    Aidan Louie    MRN: [id number]           Patient Information     Date Of Birth          1995        Visit Information        Provider Department      2/28/2018 4:30 PM Haleigh Gallo MD [facility] Nephrology        Today's Diagnoses     CKD (chronic kidney disease) stage 4, GFR 15-29 ml/min (H)    -  1      Care Instructions    Follow up with pulm and urology for further care planning            Follow-ups after your visit        Follow-up notes from your care team     Return in about 4 months (around 6/27/2018).      Who to contact     If you have questions or need follow up information about today's clinic visit or your schedule please contact [facility] NEPHROLOGY directly at [phone].  Normal or non-critical lab and imaging results will be communicated to you by PageFreezerhart, letter or phone within 4 business days after the clinic has received the results. If you do not hear from us within 7 days, please contact the clinic through PageFreezerhart or phone. If you have a critical or abnormal lab result, we will notify you by phone as soon as possible.  Submit refill requests through [employer] or call your pharmacy and they will forward the refill request to us. Please allow 3 business days for your refill to be completed.          Additional Information About Your Visit        PageFreezerharlink bird Information     [employer] lets you send messages to your doctor, view your test results, renew your prescriptions, schedule appointments and more. To sign up, go to [email or web address] . Click on \"Log in\" on the left side of the screen, which will take you to the Welcome page. Then click on \"Sign up Now\" on the right side of the page.     You will be asked to enter the access code listed below, as well as some personal information. Please follow the directions to create your username and password.     Your access code is: [email or web address]  Expires: 5/15/2018  6:30 AM     Your access code " "will  in 90 days. If you need help or a new code, please call your [place] clinic or [phone].        Care EveryWhere ID     This is your Care EveryWhere ID. This could be used by other organizations to access your [place] medical records  [id number]        Your Vitals Were     Pulse Temperature Height Pulse Oximetry BMI (Body Mass Index)       65 97.3  F (36.3  C) (Oral) 1.549 m (5' 1\") 98% 19.95 kg/m2        Blood Pressure from Last 3 Encounters:   18 107/67   17 108/69   17 97/70    Weight from Last 3 Encounters:   18 47.9 kg (105 lb 9.6 oz)   17 50.2 kg (110 lb 9.6 oz)   17 50.3 kg (111 lb)              Today, you had the following     No orders found for display       Primary Care Provider Office Phone # Fax #    Leena Dominik [phone]       [facility] [place] [facility] [address] [facility] [place] [address]        Equal Access to Services     [facility]: Hadii aad ku hadasho Soomaali, waaxda luqadaha, qaybta kaalmada adeegyada, tez cordon . So [facility] [phone].    ATENCIÓN: Si habla español, tiene a lowe disposición servicios gratuitos de asistencia lingüística. Llame al [phone].    We comply with applicable federal civil rights laws and [place] laws. We do not discriminate on the basis of race, color, national origin, age, disability, sex, sexual orientation, or gender identity.            Thank you!     Thank you for choosing [facility] NEPHROLOGY  for your care. Our goal is always to provide you with excellent care. Hearing back from our patients is one way we can continue to improve our services. Please take a few minutes to complete the written survey that you may receive in the mail after your visit with us. Thank you!             Your Updated Medication List - Protect others around you: Learn how to safely use, store and throw away your medicines at [email or web address].          This list is accurate as of " 2/28/18 11:59 PM.  Always use your most recent med list.                   Brand Name Dispense Instructions for use Diagnosis    albuterol (2.5 MG/3ML) 0.083% neb solution      Take 1 ampule by nebulization 3 times daily    CKD (chronic kidney disease) stage 3, GFR 30-59 ml/min, HTN (hypertension), Prune belly syndrome       amLODIPine 5 MG tablet    NORVASC    180 tablet    Take 1 tablet (5 mg) by mouth 2 times daily    CKD (chronic kidney disease) stage 3, GFR 30-59 ml/min       AUGMENTIN 875-125 MG per tablet   Generic drug:  amoxicillin-clavulanate     20 tablet    Take 1 tablet by mouth 2 times daily        azithromycin 250 MG tablet    ZITHROMAX     Take  by mouth. Mon, Wed, Fri    CKD (chronic kidney disease) stage 3, GFR 30-59 ml/min       calcium carbonate 1250 MG tablet    OS-SUSAN 500 mg [place]. 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 disorders       captopril 0.1 mg/mL 0.1 mg/mL Susp    CAPOTEN    460 mL    Take 2.5 mg three times daily (2.5 mg/5ml)    CKD (chronic kidney disease) stage 3, GFR 30-59 ml/min       cholecalciferol 2000 UNITS tablet     30 tablet    Take 2,000 Units by mouth daily    CKD (chronic kidney disease) stage 3, GFR 30-59 ml/min, Prune belly syndrome, Hypertension secondary to other renal disorders       cloNIDine 0.1 MG tablet    CATAPRES    60 tablet    Take 1 tablet (0.1 mg) by mouth 2 times daily    CKD (chronic kidney disease) stage 3, GFR 30-59 ml/min       ipratropium 0.02 % neb solution    ATROVENT    225 mL    Take 2.5 mLs (0.5 mg) by nebulization 3 times daily        tobramycin (PF) 300 MG/5ML neb solution    DAX     Take 1 ampule by nebulization as needed    CKD (chronic kidney disease) stage 3, GFR 30-59 ml/min, HTN (hypertension), Prune belly syndrome       ZANTAC PO      Take 75 mg by mouth 2 times daily    CKD  (chronic kidney disease) stage 3, GFR 30-59 ml/min

## 2018-02-28 NOTE — LETTER
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 He has been transitioning from pediatric pulmonologist to adult pulmonologist. He has had transplant evaluation and was denied sec to concerns for breathing status and GFR still >20  On Calcium and vit D supplements, PTH 98 last checked in 12/16  Discussed kidney smart education and has completed that  -- creatinine and GFR stable in the past 6-8 months  -- continues to straight cath, he has had a UTI and is currently on Cipro , follows with urology     Euvolemic  HTN  Well controlled on amlodipine 5mg BID,  captopril 2.5  and clonidine 0.1mg BID     Mild hyperkalemia  K was 5.1  Will continue to followlikely sec to poor renal functions     Recurrent UTI:  Gram -ve in prior cultures likely colonized  Advised to do regular flushes and catheterization  -- symptomatic UTI and is on Cipro now     Patient was seen and assessed with Dr Abel  Plan of care discussed at length with his parents    Advised to follow up in 4 months with labs     REASON FOR CONSULT: CKD  IV     HISTORY OF PRESENT ILLNESS:  Aidan Louie is a 21 year old male who presents for evaluation of his CKDIV sec to Prune belly syndrome   He has been following Dr Hess for his CKD, and Dr Schilling has been his surgeon. He has a hx of recurrent UTI and worsening creatinine in the past few years. He was counselled on his options of HD and transplant in his last nephrologist appointment.  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 He has had reconstructed ureter and bladder and has to self catheterize with minimal x3 irrigation with 180ml saline. Since he has been more compliant with his saline flushes, he has had less frequent UTI.  He presented for follow up with his parents  He denies any dysuria abdominal/back pain at this time, no fevers chills. He usually has fatigue back pain when he has an active UTI.   He has been working in jobs realted to adults with disabilities and has been very busy and reports no new issues with the activity at this time.  His other histories include HTN, BL hearing loss on hearing aids, short stature , choanal atresia SP repair, Dawood Reece anomaly, restrictive lung disease.    He presents no new issues today, his parents had questions regarding his transplant evaluation and denial. They are in the middle of finding a new PCP and will see the new pulmonologist in May. He has had recurrent Flu and resp tract infection this winter and feels that he ha shad a mild decline in the resp status that he needed prednisone for.     PAST MEDICAL HISTORY:  Past Medical History:   Diagnosis Date     Bilateral reflux nephropathy      CKD (chronic kidney disease) stage 4, GFR 15-29 ml/min (H)      Hearing loss      Dawood Reece sequence      Prune belly syndrome      Recurrent UTI      Respiratory bronchiolitis interstitial lung disease (H)      Restrictive lung disease      Tracheostomy dependence (H)      PAST SURGICAL HISTORY:  Past Surgical  History:   Procedure Laterality Date     Bilateral ureteral reimplantation  2002    abdominoplasty, Mitrofanoff creation     CYSTOSCOPY  12     EXAM UNDER ANESTHESIA THROAT  6/10/2003    tonsillar hypertrophy     EXAM UNDER ANESTHESIA, RESTORATIONS, EXTRACTION(S) DENTAL COMPLEX, COMBINED  2006     GASTROSTOMY TUBE       GASTROSTOMY TUBE  3/16/2002    button change     HERNIORRHAPHY INGUINAL BILATERAL  89    left ochiopexy     LARYNGOSCOPY, BRONCHOSCOPY, COMBINED  2002     LARYNGOSCOPY, BRONCHOSCOPY, COMBINED  2002    bilateral ear debridement     LARYNGOSCOPY, BRONCHOSCOPY, COMBINED  2003     LARYNGOSCOPY, BRONCHOSCOPY, COMBINED  2007    Failed Deflux injection     LARYNGOSCOPY, BRONCHOSCOPY, COMBINED  12      VESICOSTOMY       Redo right ureteral reimplantation  3/12/2004    Excision bladder diverticuli, Left to Right pyelopyelostomy     Replacement of trach  10/15/12     Takedown and revision of Mitrofanoff stoma  2006     TRACHEOSTOMY INFANT       MEDICATIONS:  Prescription Medications as of 3/2/2018             amoxicillin-clavulanate (AUGMENTIN) 875-125 MG per tablet Take 1 tablet by mouth 2 times daily    ipratropium (ATROVENT) 0.02 % neb solution Take 2.5 mLs (0.5 mg) by nebulization 3 times daily    cholecalciferol 2000 UNITS tablet Take 2,000 Units by mouth daily    captopril 0.1 mg/mL (CAPOTEN) 0.1 mg/mL SUSP Take 2.5 mg three times daily (2.5 mg/5ml)    amLODIPine (NORVASC) 5 MG tablet Take 1 tablet (5 mg) by mouth 2 times daily    calcium carbonate (OS-SUSAN 500 MG [place]. CA) 500 MG tablet Take 1 tablet (500 mg) by mouth 3 times daily (with meals)    cloNIDine (CATAPRES) 0.1 MG tablet Take 1 tablet (0.1 mg) by mouth 2 times daily    albuterol (2.5 MG/3ML) 0.083% nebulizer solution Take 1 ampule by nebulization 3 times daily    tobramycin, PF, (DAX) 300 MG/5ML nebulizer solution Take 1 ampule by nebulization as needed    azithromycin (ZITHROMAX) 250  "MG tablet Take  by mouth. Mon, Wed, Fri    Ranitidine HCl (ZANTAC PO) Take 75 mg by mouth 2 times daily          ALLERGIES:    Allergies   Allergen Reactions     Latex      Latex      Other reaction(s): Other (see comments)     Tape [Adhesive Tape]      REVIEW OF SYSTEMS:  A comprehensive review of systems was performed and found to be negative except as described here or above.   SOCIAL HISTORY:   Social History     Social History     Marital status: Single     Spouse name: N/A     Number of children: N/A     Years of education: N/A     Occupational History     Not on file.     Social History Main Topics     Smoking status: Never Smoker     Smokeless tobacco: Never Used     Alcohol use No     Drug use: No     Sexual activity: Not on file     Other Topics Concern     Not on file     Social History Narrative     FAMILY MEDICAL HISTORY:   Family History   Problem Relation Age of Onset     Type 2 Diabetes Mother      Type 2 Diabetes Maternal Grandmother      Type 2 Diabetes Maternal Grandfather      Type 2 Diabetes Paternal Grandmother      Alzheimer Disease Paternal Grandfather      PHYSICAL EXAM:   /67 (BP Location: Right arm, Patient Position: Sitting, Cuff Size: Adult Regular)  Pulse 65  Temp 97.3  F (36.3  C) (Oral)  Ht 1.549 m (5' 1\")  Wt 47.9 kg (105 lb 9.6 oz)  SpO2 98%  BMI 19.95 kg/m2  GENERAL APPEARANCE: alert and no distress  EYES: nonicteric  HENT: mouth without ulcers or lesions  NECK: supple, no adenopathy, Ytach+  RESP: lungs clear to auscultation   CV: regular rhythm, normal rate, no rub  ABDOMEN: soft, non tender, suprapubic cathter   Extremities: no clubbing, cyanosis, or edema  MS: no evidence of inflammation in joints, no muscle tenderness  SKIN: no rash  NEURO: mentation intact and speech normal  PSYCH: affect normal/bright   LABS:   CMP  Recent Labs   Lab Test  07/11/17   1337  12/22/16   1325  09/17/15   1205  02/05/15   1110   NA  137  143  139  141   POTASSIUM  4.4  4.4  4.6  4.2 "   CHLORIDE  108  110*  111*  111*   CO2  21  24  21  21   ANIONGAP  8  9  7  9   GLC  116*  89  140*  118*   BUN  44*  40*  50*  40*   CR  2.93*  2.79*  2.57*  2.07*   GFRESTIMATED  27*  29*  32*  42*   GFRESTBLACK  33*  35*  39*  51*   SUSAN  8.8  8.7  8.7*  8.7*   MAG   --    --   2.1  2.0   PHOS  2.9  4.2  2.7*  3.5   ALBUMIN  3.6  3.3*  3.8  3.5   ALKPHOS   --    --   80  87     CBC  Recent Labs   Lab Test  07/11/17   1337  12/22/16   1325  09/17/15   1205  02/05/15   1110  10/03/13   2128   HGB  13.0*  11.7*  13.0*  13.8  13.2   WBC  8.8   --   8.7  7.7  11.4*   RBC  4.35*   --   4.44  4.69  4.48   HCT  39.0*   --   38.9*  41.0  38.9   MCV  90   --   88  87  87   MCH  29.9   --   29.3  29.4  29.5   MCHC  33.3   --   33.4  33.7  33.9   RDW  13.2   --   13.5  13.2  13.3   PLT  305   --   326  281  338     INRNo lab results found.  ABGNo lab results found.   URINE STUDIES  Recent Labs   Lab Test  07/11/17   1341  12/22/16   1328  09/17/15   1410  02/05/15   1115   COLOR  Yellow  Straw  Quantity not sufficient  Straw   APPEARANCE  Clear  Slightly Cloudy  Quantity not sufficient  Clear   URINEGLC  Negative  Negative  Quantity not sufficient*  Negative   URINEBILI  Negative  Negative  Quantity not sufficient*  Negative   URINEKETONE  Negative  Negative  Quantity not sufficient*  Negative   SG  1.005  1.006  Quantity not sufficient  1.005   UBLD  Negative  Trace*  Quantity not sufficient*  Trace*   URINEPH  6.0  6.5  Quantity not sufficient  6.0   PROTEIN  100*  100*  Quantity not sufficient*  100*   NITRITE  Positive*  Negative  Quantity not sufficient*  Negative   LEUKEST  Small*  Large*  Quantity not sufficient*  Large*   RBCU  <1  6*  Quantity not sufficient  1   WBCU  6*  >182*  Quantity not sufficient*  15*     Recent Labs   Lab Test  07/11/17   1341  12/22/16   1328  02/05/15   1115   UTPG  3.04*  3.05*  5.28*     PTH  Recent Labs   Lab Test  07/11/17   1337  12/22/16   1325  09/17/15   1205  02/05/15   1110   10/03/13   2128  09/20/12   1200   PTHI  191*  98*  133*  303*  141*  74*     IRON STUDIES  Recent Labs   Lab Test  07/11/17   1337  12/22/16   1325  09/17/15   1205  02/05/15   1110   IRON  77  135  99  160   FEB  296  241  297  281   IRONSAT  26  56*  33  57*   JUVENAL  16*  56  27  34     IMAGING:  All imaging studies reviewed by me.   Haleigh Gallo MD     This patient has been evaluated by me, Glen Abel MD, on 2/28/18.  I discussed with the fellow, Dr. Gallo, and agree with the findings and plan in this note.  I have reviewed 2/28/18 medications, vital signs and labs.       Total time I spent was > 40 minutes, and more than 50% of face to face time with the patient was spent in counseling and/or coordination of care regarding principles of multidisciplinary care, medication management, and CKD education.    MD Haleigh Esquivel MD

## 2018-02-28 NOTE — PROGRESS NOTES
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 He has been transitioning from pediatric pulmonologist to adult pulmonologist. He has had transplant evaluation and was denied sec to concerns for breathing status and GFR still >20  On Calcium and vit D supplements, PTH 98 last checked in 12/16  Discussed kidney smart education and has completed that  -- creatinine and GFR stable in the past 6-8 months  -- continues to straight cath, he has had a UTI and is currently on Cipro , follows with urology     Euvolemic  HTN  Well controlled on amlodipine 5mg BID,  captopril 2.5  and clonidine 0.1mg BID     Mild hyperkalemia  K was 5.1  Will continue to followlikely sec to poor renal functions     Recurrent UTI:  Gram -ve in prior cultures likely colonized  Advised to do regular flushes and catheterization  -- symptomatic UTI and is on Cipro now     Patient was seen and assessed with Dr Abel  Plan of care discussed at length with his parents    Advised to follow up in 4 months with labs     REASON FOR CONSULT: CKD IV     HISTORY OF PRESENT ILLNESS:  Aidan Louie is a 21 year old male who presents for  evaluation of his CKDIV sec to Prune belly syndrome   He has been following Dr Hess for his CKD, and Dr Schilling has been his surgeon. He has a hx of recurrent UTI and worsening creatinine in the past few years. He was counselled on his options of HD and transplant in his last nephrologist appointment.  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 He has had reconstructed ureter and bladder and has to self catheterize with minimal x3 irrigation with 180ml saline. Since he has been more compliant with his saline flushes, he has had less frequent UTI.  He presented for follow up with his parents  He denies any dysuria abdominal/back pain at this time, no fevers chills. He usually has fatigue back pain when he has an active UTI.   He has been working in jobs realted to adults with disabilities and has been very busy and reports no new issues with the activity at this time.  His other histories include HTN, BL hearing loss on hearing aids, short stature , choanal atresia SP repair, Dawood Reece anomaly, restrictive lung disease.    He presents no new issues today, his parents had questions regarding his transplant evaluation and denial. They are in the middle of finding a new PCP and will see the new pulmonologist in May. He has had recurrent Flu and resp tract infection this winter and feels that he ha shad a mild decline in the resp status that he needed prednisone for.     PAST MEDICAL HISTORY:  Past Medical History:   Diagnosis Date     Bilateral reflux nephropathy      CKD (chronic kidney disease) stage 4, GFR 15-29 ml/min (H)      Hearing loss      Dawood Reece sequence      Prune belly syndrome      Recurrent UTI      Respiratory bronchiolitis interstitial lung disease (H)      Restrictive lung disease      Tracheostomy dependence (H)      PAST SURGICAL HISTORY:  Past Surgical History:   Procedure Laterality Date     Bilateral ureteral reimplantation  5/16/2002     abdominoplasty, Mitrofanoff creation     CYSTOSCOPY  12     EXAM UNDER ANESTHESIA THROAT  6/10/2003    tonsillar hypertrophy     EXAM UNDER ANESTHESIA, RESTORATIONS, EXTRACTION(S) DENTAL COMPLEX, COMBINED  2006     GASTROSTOMY TUBE       GASTROSTOMY TUBE  3/16/2002    button change     HERNIORRHAPHY INGUINAL BILATERAL  89    left ochiopexy     LARYNGOSCOPY, BRONCHOSCOPY, COMBINED  2002     LARYNGOSCOPY, BRONCHOSCOPY, COMBINED  2002    bilateral ear debridement     LARYNGOSCOPY, BRONCHOSCOPY, COMBINED  2003     LARYNGOSCOPY, BRONCHOSCOPY, COMBINED  2007    Failed Deflux injection     LARYNGOSCOPY, BRONCHOSCOPY, COMBINED  12      VESICOSTOMY       Redo right ureteral reimplantation  3/12/2004    Excision bladder diverticuli, Left to Right pyelopyelostomy     Replacement of trach  10/15/12     Takedown and revision of Mitrofanoff stoma  2006     TRACHEOSTOMY INFANT       MEDICATIONS:  Prescription Medications as of 3/2/2018             amoxicillin-clavulanate (AUGMENTIN) 875-125 MG per tablet Take 1 tablet by mouth 2 times daily    ipratropium (ATROVENT) 0.02 % neb solution Take 2.5 mLs (0.5 mg) by nebulization 3 times daily    cholecalciferol 2000 UNITS tablet Take 2,000 Units by mouth daily    captopril 0.1 mg/mL (CAPOTEN) 0.1 mg/mL SUSP Take 2.5 mg three times daily (2.5 mg/5ml)    amLODIPine (NORVASC) 5 MG tablet Take 1 tablet (5 mg) by mouth 2 times daily    calcium carbonate (OS-SUSAN 500 MG [place]. CA) 500 MG tablet Take 1 tablet (500 mg) by mouth 3 times daily (with meals)    cloNIDine (CATAPRES) 0.1 MG tablet Take 1 tablet (0.1 mg) by mouth 2 times daily    albuterol (2.5 MG/3ML) 0.083% nebulizer solution Take 1 ampule by nebulization 3 times daily    tobramycin, PF, (DAX) 300 MG/5ML nebulizer solution Take 1 ampule by nebulization as needed    azithromycin (ZITHROMAX) 250 MG tablet Take  by mouth. Mon, Wed, Fri    Ranitidine HCl (ZANTAC PO) Take 75 mg by mouth  "2 times daily          ALLERGIES:    Allergies   Allergen Reactions     Latex      Latex      Other reaction(s): Other (see comments)     Tape [Adhesive Tape]      REVIEW OF SYSTEMS:  A comprehensive review of systems was performed and found to be negative except as described here or above.   SOCIAL HISTORY:   Social History     Social History     Marital status: Single     Spouse name: N/A     Number of children: N/A     Years of education: N/A     Occupational History     Not on file.     Social History Main Topics     Smoking status: Never Smoker     Smokeless tobacco: Never Used     Alcohol use No     Drug use: No     Sexual activity: Not on file     Other Topics Concern     Not on file     Social History Narrative     FAMILY MEDICAL HISTORY:   Family History   Problem Relation Age of Onset     Type 2 Diabetes Mother      Type 2 Diabetes Maternal Grandmother      Type 2 Diabetes Maternal Grandfather      Type 2 Diabetes Paternal Grandmother      Alzheimer Disease Paternal Grandfather      PHYSICAL EXAM:   /67 (BP Location: Right arm, Patient Position: Sitting, Cuff Size: Adult Regular)  Pulse 65  Temp 97.3  F (36.3  C) (Oral)  Ht 1.549 m (5' 1\")  Wt 47.9 kg (105 lb 9.6 oz)  SpO2 98%  BMI 19.95 kg/m2  GENERAL APPEARANCE: alert and no distress  EYES: nonicteric  HENT: mouth without ulcers or lesions  NECK: supple, no adenopathy, Ytach+  RESP: lungs clear to auscultation   CV: regular rhythm, normal rate, no rub  ABDOMEN: soft, non tender, suprapubic cathter   Extremities: no clubbing, cyanosis, or edema  MS: no evidence of inflammation in joints, no muscle tenderness  SKIN: no rash  NEURO: mentation intact and speech normal  PSYCH: affect normal/bright   LABS:   CMP  Recent Labs   Lab Test  07/11/17   1337  12/22/16   1325  09/17/15   1205  02/05/15   1110   NA  137  143  139  141   POTASSIUM  4.4  4.4  4.6  4.2   CHLORIDE  108  110*  111*  111*   CO2  21  24  21  21   ANIONGAP  8  9  7  9   GLC  116*  " 89  140*  118*   BUN  44*  40*  50*  40*   CR  2.93*  2.79*  2.57*  2.07*   GFRESTIMATED  27*  29*  32*  42*   GFRESTBLACK  33*  35*  39*  51*   SUSAN  8.8  8.7  8.7*  8.7*   MAG   --    --   2.1  2.0   PHOS  2.9  4.2  2.7*  3.5   ALBUMIN  3.6  3.3*  3.8  3.5   ALKPHOS   --    --   80  87     CBC  Recent Labs   Lab Test  07/11/17   1337  12/22/16   1325  09/17/15   1205  02/05/15   1110  10/03/13   2128   HGB  13.0*  11.7*  13.0*  13.8  13.2   WBC  8.8   --   8.7  7.7  11.4*   RBC  4.35*   --   4.44  4.69  4.48   HCT  39.0*   --   38.9*  41.0  38.9   MCV  90   --   88  87  87   MCH  29.9   --   29.3  29.4  29.5   MCHC  33.3   --   33.4  33.7  33.9   RDW  13.2   --   13.5  13.2  13.3   PLT  305   --   326  281  338     INRNo lab results found.  ABGNo lab results found.   URINE STUDIES  Recent Labs   Lab Test  07/11/17   1341  12/22/16   1328  09/17/15   1410  02/05/15   1115   COLOR  Yellow  Straw  Quantity not sufficient  Straw   APPEARANCE  Clear  Slightly Cloudy  Quantity not sufficient  Clear   URINEGLC  Negative  Negative  Quantity not sufficient*  Negative   URINEBILI  Negative  Negative  Quantity not sufficient*  Negative   URINEKETONE  Negative  Negative  Quantity not sufficient*  Negative   SG  1.005  1.006  Quantity not sufficient  1.005   UBLD  Negative  Trace*  Quantity not sufficient*  Trace*   URINEPH  6.0  6.5  Quantity not sufficient  6.0   PROTEIN  100*  100*  Quantity not sufficient*  100*   NITRITE  Positive*  Negative  Quantity not sufficient*  Negative   LEUKEST  Small*  Large*  Quantity not sufficient*  Large*   RBCU  <1  6*  Quantity not sufficient  1   WBCU  6*  >182*  Quantity not sufficient*  15*     Recent Labs   Lab Test  07/11/17   1341  12/22/16   1328  02/05/15   1115   UTPG  3.04*  3.05*  5.28*     PTH  Recent Labs   Lab Test  07/11/17   1337  12/22/16   1325  09/17/15   1205  02/05/15   1110  10/03/13   2128  09/20/12   1200   PTHI  191*  98*  133*  303*  141*  74*     IRON  STUDIES  Recent Labs   Lab Test  07/11/17   1337  12/22/16   1325  09/17/15   1205  02/05/15   1110   IRON  77  135  99  160   FEB  296  241  297  281   IRONSAT  26  56*  33  57*   JUVENAL  16*  56  27  34     IMAGING:  All imaging studies reviewed by me.   Haleigh Gallo MD     This patient has been evaluated by me, Glen Abel MD, on 2/28/18.  I discussed with the fellow, Dr. Gallo, and agree with the findings and plan in this note.  I have reviewed 2/28/18 medications, vital signs and labs.       Total time I spent was > 40 minutes, and more than 50% of face to face time with the patient was spent in counseling and/or coordination of care regarding principles of multidisciplinary care, medication management, and CKD education.    Glen Abel MD

## 2018-03-02 DIAGNOSIS — R06.00 DYSPNEA: Primary | ICD-10-CM

## 2018-03-05 ENCOUNTER — TELEPHONE (OUTPATIENT)
Dept: NEPHROLOGY | Facility: CLINIC | Age: 23
End: 2018-03-05

## 2018-03-05 DIAGNOSIS — N18.30 CKD (CHRONIC KIDNEY DISEASE) STAGE 3, GFR 30-59 ML/MIN (H): ICD-10-CM

## 2018-03-05 RX ORDER — CLONIDINE HYDROCHLORIDE 0.1 MG/1
0.1 TABLET ORAL 2 TIMES DAILY
Qty: 180 TABLET | Refills: 3 | Status: SHIPPED | OUTPATIENT
Start: 2018-03-05 | End: 2019-02-23

## 2018-03-05 RX ORDER — AMLODIPINE BESYLATE 5 MG/1
5 TABLET ORAL
Qty: 180 TABLET | Refills: 3 | Status: SHIPPED | OUTPATIENT
Start: 2018-03-05 | End: 2019-02-24

## 2018-03-05 NOTE — TELEPHONE ENCOUNTER
Akanksha with [facility], called to inquire on Rx captopril 0.1 mg/mL compound.    Please callback PharmD at [phone].

## 2018-03-06 ENCOUNTER — TELEPHONE (OUTPATIENT)
Dept: PULMONOLOGY | Facility: CLINIC | Age: 23
End: 2018-03-06

## 2018-03-06 NOTE — TELEPHONE ENCOUNTER
On Friday, 3.2.18, I spoke with pt's mother, Valerie. Valerie stated they will not be going back to Dr. Hi because they were highly unsatisfied with their care experience on their last visit. Valerie stated she plans to research and find new pulmonologist for Aidan elsewhere.

## 2018-03-06 NOTE — TELEPHONE ENCOUNTER
Confirmed with pharmacy previously refilling Captopril that compounded medication is captopril 0.1mg/ml  Si.5 mg TID (2.5mg/5ml).  [address] [facility] [address]  [phone]  Mary Scott LPN  Nephrology  [phone]

## 2018-03-07 ENCOUNTER — TELEPHONE (OUTPATIENT)
Dept: PULMONOLOGY | Facility: CLINIC | Age: 23
End: 2018-03-07

## 2018-03-07 NOTE — TELEPHONE ENCOUNTER
I called and spoke with Valerie, Greg's mom, to follow-up on the recent conversation I had with Dr. Lai.  He had asked me to see Greg again with a specific attention to candidacy for kidney transplant.  Dr. Lai and I agreed it would be difficult to say for sure but might make sense to talk with the lung transplant team about risk for future infection given his tracheostomy and his history.  Valerie reported to concerns to me.    1.  She would like to see different pulmonologist because she did not feel that I appropriately listened to her concerns and history about Greg.  She did not like the fact that I directed most of my questions to Greg and tried to hear the history from his perspective because she does not believe he remembered or was in a position to give the correct answers.  I apologized and reassured her that I would be happy to transfer his care to one of my colleagues or anyone else she preferred.    2.  She just received a letter in the mail that [facility] and [facility] would not be accepting their insurance, [place] Medicaid.  So she is attempting to find a pulmonologist closer to home, she has a local pulmonologist who has agreed to work with Greg's pediatric pulmonologist Dr. Lee.  I suggested that the transplant team may be able to offer some suggestions for her to get an exception given that renal transplant is only available at limited institutions and there may be some kind of waiver possible.

## 2018-05-01 ENCOUNTER — TRANSFERRED RECORDS (OUTPATIENT)
Dept: HEALTH INFORMATION MANAGEMENT | Facility: CLINIC | Age: 23
End: 2018-05-01

## 2018-05-03 ENCOUNTER — TELEPHONE (OUTPATIENT)
Dept: NEPHROLOGY | Facility: CLINIC | Age: 23
End: 2018-05-03

## 2018-05-03 NOTE — TELEPHONE ENCOUNTER
Patients mom reports that patient has a Adult Pulmonologist they like, Dr. Jose Baez at [facility] for Adult Pulm T [phone]. Wondering when we should schedule patient to be seen in Nephrology clinic. Call to [facility] to have OV notes per Adult Pulm faxed to clinic per Dr. Gallo. Awaiting follow up on when to schedule with Neph.  Mary Scott LPN  Nephrology  [phone]

## 2018-06-27 ENCOUNTER — TELEPHONE (OUTPATIENT)
Dept: NEPHROLOGY | Facility: CLINIC | Age: 23
End: 2018-06-27

## 2018-06-27 NOTE — TELEPHONE ENCOUNTER
Per Dr. Gallo, please see that patient get labs repeated as it has been a while , will call with results and go from there. Patient and family going on vacation July 10-18. Will fax orders to [facility] [phone]. Patients mom said he will get done next week.  Mary Scott LPN  Nephrology  [phone]

## 2018-07-23 NOTE — TELEPHONE ENCOUNTER
"Spoke to patient's mom Valerie, they got back from trip on Monday last week, unfortunately, they had some \"illness and injuries\" but Valerie states that she will call to get patient in for labs this week or early next.  Mary Scott, JOSEN  Nephrology  [phone]    "

## 2018-08-09 ENCOUNTER — TRANSFERRED RECORDS (OUTPATIENT)
Dept: HEALTH INFORMATION MANAGEMENT | Facility: CLINIC | Age: 23
End: 2018-08-09

## 2018-08-10 LAB
ALBUMIN SERPL-MCNC: 3.4 G/DL
ANION GAP SERPL CALCULATED.3IONS-SCNC: 9 MMOL/L
BUN SERPL-MCNC: 45 MG/DL
CALCIUM SERPL-MCNC: 8.7 MG/DL
CHLORIDE SERPLBLD-SCNC: 108 MMOL/L
CO2 SERPL-SCNC: 22 MMOL/L
CREAT SERPL-MCNC: 2.9 MG/DL
CREATININE RANDOM URINE: 226
CREATININE RANDOM URINE: 94
FERRITIN SERPL-MCNC: 14 NG/ML
GFR SERPL CREATININE-BSD FRML MDRD: 27.6 ML/MIN/1.73M2
GLUCOSE SERPL-MCNC: 103 MG/DL (ref 70–99)
HEMOGLOBIN: 12.3 G/DL (ref 13.3–17.7)
IRON BINDING CAP: 266
IRON SATURATION INDEX: 67 %
IRON: 178 UG/DL
MICROALBUMIN URINE MG/G CR (EXTERNAL): 1896
MICROALBUMIN, URINE: 178.2 MG/DL
PHOSPHATE SERPL-MCNC: 4.2 MG/DL
POTASSIUM SERPL-SCNC: 4.1 MMOL/L
PROT UR-MCNC: 93.4 G/DL
PROTEIN/CREATININE RATIO - QUEST: 2.42
PTH, INTACT: 152
SODIUM SERPL-SCNC: 139 MMOL/L

## 2018-08-23 LAB
25 OH VIT D TOTAL: 37
25 OH VIT D3: 37
VITAMIN D2 SERPL-MCNC: <4 PG/ML

## 2018-09-26 ENCOUNTER — TRANSFERRED RECORDS (OUTPATIENT)
Dept: HEALTH INFORMATION MANAGEMENT | Facility: CLINIC | Age: 23
End: 2018-09-26

## 2018-10-24 ENCOUNTER — TRANSFERRED RECORDS (OUTPATIENT)
Dept: HEALTH INFORMATION MANAGEMENT | Facility: CLINIC | Age: 23
End: 2018-10-24

## 2018-10-30 DIAGNOSIS — N18.4 CKD (CHRONIC KIDNEY DISEASE) STAGE 4, GFR 15-29 ML/MIN (H): Primary | ICD-10-CM

## 2018-11-07 ENCOUNTER — TELEPHONE (OUTPATIENT)
Dept: NEPHROLOGY | Facility: CLINIC | Age: 23
End: 2018-11-07

## 2018-11-07 NOTE — TELEPHONE ENCOUNTER
Patients momValerie called needing to reschedule appointment for tomorrow. Rescheduled for 12/6 at 130 with Dr. Abel. Valerie reports that patient has new PCP at [facility] Dr. Carla Cruz who recently saw patient. Will call to obtain recent office visit notes and labs. Will update Dr. Abel of change as well.  Mary Scott LPN  Nephrology  [phone]

## 2019-01-03 ENCOUNTER — OFFICE VISIT (OUTPATIENT)
Dept: NEPHROLOGY | Facility: CLINIC | Age: 24
End: 2019-01-03
Attending: INTERNAL MEDICINE
Payer: MEDICARE

## 2019-01-03 VITALS
WEIGHT: 101.2 LBS | DIASTOLIC BLOOD PRESSURE: 70 MMHG | HEART RATE: 79 BPM | SYSTOLIC BLOOD PRESSURE: 110 MMHG | BODY MASS INDEX: 19.12 KG/M2 | OXYGEN SATURATION: 98 %

## 2019-01-03 DIAGNOSIS — M80.00XS OSTEOPOROSIS WITH CURRENT PATHOLOGICAL FRACTURE, UNSPECIFIED OSTEOPOROSIS TYPE, SEQUELA: ICD-10-CM

## 2019-01-03 DIAGNOSIS — E87.20 METABOLIC ACIDOSIS: Primary | ICD-10-CM

## 2019-01-03 DIAGNOSIS — M26.04 MANDIBULAR HYPOPLASIA: ICD-10-CM

## 2019-01-03 DIAGNOSIS — N18.4 CKD (CHRONIC KIDNEY DISEASE) STAGE 4, GFR 15-29 ML/MIN (H): ICD-10-CM

## 2019-01-03 LAB
ALBUMIN SERPL-MCNC: 3.6 G/DL (ref 3.4–5)
ANION GAP SERPL CALCULATED.3IONS-SCNC: 9 MMOL/L (ref 3–14)
BUN SERPL-MCNC: 53 MG/DL (ref 7–30)
CALCIUM SERPL-MCNC: 8.4 MG/DL (ref 8.5–10.1)
CHLORIDE SERPL-SCNC: 114 MMOL/L (ref 94–109)
CO2 SERPL-SCNC: 18 MMOL/L (ref 20–32)
CREAT SERPL-MCNC: 3.22 MG/DL (ref 0.66–1.25)
CREAT UR-MCNC: 52 MG/DL
GFR SERPL CREATININE-BSD FRML MDRD: 26 ML/MIN/{1.73_M2}
GLUCOSE SERPL-MCNC: 109 MG/DL (ref 70–99)
HGB BLD-MCNC: 11.6 G/DL (ref 13.3–17.7)
MICROALBUMIN UR-MCNC: 1030 MG/L
MICROALBUMIN/CREAT UR: 1969.41 MG/G CR (ref 0–17)
PHOSPHATE SERPL-MCNC: 3.6 MG/DL (ref 2.5–4.5)
POTASSIUM SERPL-SCNC: 4.3 MMOL/L (ref 3.4–5.3)
PROT UR-MCNC: 1.36 G/L
PROT/CREAT 24H UR: 2.6 G/G CR (ref 0–0.2)
PTH-INTACT SERPL-MCNC: 177 PG/ML (ref 18–80)
SODIUM SERPL-SCNC: 141 MMOL/L (ref 133–144)

## 2019-01-03 PROCEDURE — 82306 VITAMIN D 25 HYDROXY: CPT | Performed by: INTERNAL MEDICINE

## 2019-01-03 PROCEDURE — 83970 ASSAY OF PARATHORMONE: CPT | Performed by: INTERNAL MEDICINE

## 2019-01-03 PROCEDURE — G0463 HOSPITAL OUTPT CLINIC VISIT: HCPCS | Mod: ZF

## 2019-01-03 PROCEDURE — 84080 ASSAY ALKALINE PHOSPHATASES: CPT | Performed by: INTERNAL MEDICINE

## 2019-01-03 PROCEDURE — 80069 RENAL FUNCTION PANEL: CPT | Performed by: INTERNAL MEDICINE

## 2019-01-03 PROCEDURE — 36415 COLL VENOUS BLD VENIPUNCTURE: CPT | Performed by: INTERNAL MEDICINE

## 2019-01-03 PROCEDURE — 85018 HEMOGLOBIN: CPT | Performed by: INTERNAL MEDICINE

## 2019-01-03 PROCEDURE — 82043 UR ALBUMIN QUANTITATIVE: CPT | Performed by: INTERNAL MEDICINE

## 2019-01-03 PROCEDURE — 84156 ASSAY OF PROTEIN URINE: CPT | Performed by: INTERNAL MEDICINE

## 2019-01-03 RX ORDER — SODIUM BICARBONATE 650 MG/1
650 TABLET ORAL 3 TIMES DAILY
Qty: 270 TABLET | Refills: 0 | Status: SHIPPED | OUTPATIENT
Start: 2019-01-03 | End: 2019-05-14

## 2019-01-03 RX ORDER — PREDNISONE 1 MG/1
1 TABLET ORAL EVERY OTHER DAY
Refills: 6 | Status: ON HOLD | COMMUNITY
Start: 2018-12-18 | End: 2023-04-08

## 2019-01-03 RX ORDER — FAMOTIDINE 40 MG/1
40 TABLET, FILM COATED ORAL 2 TIMES DAILY
COMMUNITY

## 2019-01-03 ASSESSMENT — PAIN SCALES - GENERAL: PAINLEVEL: NO PAIN (0)

## 2019-01-03 NOTE — LETTER
1/3/2019      RE: Aidan Louie  [address]  [place] [address]         Nephrology Clinic    Glen Abel MD  2019     Name: Aidan Louie  MRN: [id number]  Age: 23 year old  : 1995  Referring provider: Nathalia Collado     Assessment and Plan:  Mr. 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    1. CKD, stage 4:  Baseline creatinine now seems to be ~3.2 with an GFR ~26.  Etiology secondary to obstructive nephropathy from congenital abnormalities of the urogenital system and recurrent UTIs.  He was counseled regarding his options with HD and transplant.  He was advised that secondary to his trach he may have a challenge but he can be evaluated for the transplant possibilities. He has been transitioning from pediatric pulmonologist to adult pulmonologist. He has had transplant evaluation and appears to be a candidate but will not be listed until his GFR is < 20 ml/min.    -- CKD education and dialysis options provided again today   -- continue captopril 2.5 for albuminuria/renoprotection.  Urine albumin ~2 g/g today so would like to increase captopril but will reassess at next visit  -- continues to straight cath and close monitoring for UTIs; he follows closely with urology  -- RTC in 4 months     2. HTN/Volume:  Blood pressure at goal of <130/80 and euvolemic on exam.  -continue amlodipine 5mg BID,  captopril 2.5  and clonidine 0.1mg BID  -as mentioned, will likely go up on captopril at next visit for management of albuminuria    3. Anemia of CKD: Hgb at goal (11.6 today).    -no need for POORNIMA therapy  -check iron stores at next visit      4. CKD-MBD: Corrected calcium and phos at goal. Vitamin D levels at goal.  PTH mildly elevated at 177. DEXA with evidence of osteoporosis.  Bone specific alk phos  within normal limits. Secondary hyperparathyroidism does not seem to be a big contributor to decreased bone mineralization at this time.    -continue cholecalciferol at 2000 units daily  -no need for active vitamin D (calcitriol) at this time  -will refer to endocrinology for further management of osteoporosis. If therapy initiated, then would favor denosumab.      5. Metabolic acidosis: Due to CKD.    -will start sodium bicarbonate 650 mg TID for acidosis with the goal of slowing progression of CKD and bone disease     6. Recurrent UTIs: Colonized so based on symptoms.  No evidence of UTI at this time.   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  Previously followed by pediatric ENT.    -will refer to adult ENT for further management, especially regarding trach management     Follow-up: Return in about 4 months (around 5/3/2019).     HPI:   Aidan Louie is a 23 year old male who presents for evaluation of his  CKD IV secondary to Prune belly syndrome. His other histories include hypertension, bilateral hearing loss on hearing aids, short stature, choanal atresia SP repair, Dawood Reece anomaly, and restrictive lung disease. He has been following with Dr. Hess for his CKD, and Dr. Schilling has been his surgeon. He has a history of recurrent UTI and worsening creatinine in the past few years. Patient has a history of Prune belly syndrome and extensive urological abnormalities. He has had reconstructed ureter and bladder and has to self catheterize every 3 hours with minimal irrigation with 180ml saline. Since he has been more compliant with his saline flushes, he has had less frequent UTI. The patient was last evaluated in nephrology clinic on 02/28/2018 by me.    Recently, mother reports they were able to establish care with a PCP in [place], [place] in September 2018. At  that time, they discussed tapering prednisone use. They are also planning to establish care with nephrology in [place]. They had also seen a pulmonologist in November 2018, who performed bone density studies which showed significantly decreased growth hormone and moderate to severe osteoporosis. They would like to establish care with an endocrinologist at the [facility] to proceed forward with this. Mother notes Greg does typically use 1 L oxygen at night because he often leans forward when sleeping and cuts off his airway, so the oxygen mask prevents him from leaning forward and obstructing his tracheostomy. He otherwise is not dependent on oxygen. Mother would also like to see someone who could alter his tracheostomy, as it is somewhat displaced. Greg has been working at his job frequently and reports no issues with this. He has not had any recent illnesses.     Review of Systems:   Pertinent items are noted in HPI or as below, remainder of complete ROS is negative.      Active Medications:      albuterol (2.5 MG/3ML) 0.083% nebulizer solution, Take 1 ampule by nebulization 4 times daily , Disp: , Rfl:      amLODIPine (NORVASC) 5 MG tablet, Take 1 tablet (5 mg) by mouth 2 times daily, Disp: 180 tablet, Rfl: 3     amoxicillin-clavulanate (AUGMENTIN) 875-125 MG per tablet, Take 1 tablet by mouth 2 times daily, Disp: 20 tablet, Rfl: 0     azithromycin (ZITHROMAX) 250 MG tablet, Take  by mouth. Mon, Wed, Fri, Disp: , Rfl:      calcium carbonate (OS-SUSAN 500 MG [place]. CA) 500 MG tablet, Take 1 tablet (500 mg) by mouth 3 times daily (with meals), Disp: 90 tablet, Rfl: 6     captopril 0.1 mg/mL (CAPOTEN) 0.1 mg/mL SUSP, Take 2.5 mg three times daily (2.5 mg/5ml), Disp: 460 mL, Rfl: 11     cholecalciferol 2000 UNITS tablet, Take 2,000 Units by mouth daily, Disp: 30 tablet, Rfl: 11     cloNIDine (CATAPRES) 0.1 MG tablet, Take 1 tablet (0.1 mg) by mouth 2 times daily, Disp: 180 tablet, Rfl: 3     famotidine (PEPCID) 20 MG  tablet, Take 20 mg by mouth 2 times daily, Disp: , Rfl:      ipratropium (ATROVENT) 0.02 % neb solution, Take 0.5 mg by nebulization 4 times daily , Disp: 225 mL, Rfl:      predniSONE (DELTASONE) 5 MG tablet, Take 5 mg by mouth every other day Take 7.5mg daily ., Disp: , Rfl: 6     sodium bicarbonate 650 MG tablet, Take 1 tablet (650 mg) by mouth 3 times daily, Disp: 270 tablet, Rfl: 0     tobramycin, PF, (DAX) 300 MG/5ML nebulizer solution, Take 1 ampule by nebulization as needed, Disp: , Rfl:      ranitidine (ZANTAC) 150 MG tablet, Take 0.5 tablets (75 mg) by mouth 2 times daily (Patient not taking: Reported on 1/3/2019), Disp: 90 tablet, Rfl: 3     Allergies:   Latex  Tape [adhesive tape]      Past Medical History:  Bilateral reflux nephropathy  CKD (chronic kidney disease) stage 4, GFR 15-29 ml/min    Hearing loss   Hypertension   Dawood Reece sequence   Prune belly syndrome   Recurrent UTI   Respiratory bronchiolitis interstitial lung disease  Restrictive lung disease   Tracheostomy dependence   Vitamin D deficiency     Past Surgical History:  Bilateral ureteral reimplantation, abdominoplasty, mitrofanoff creation  Cystoscopy  Gastrostomy tube    Herniorrhaphy inguinal bilateral, left ochiopexy  Laryngoscopy, bronchoscopy, combined x5   vesicostomy    Redo right ureteral reimplantation  Replacement of trach   Takedown and revision of mitrofanoff stoma     Family History:   The patient has family history of diabetes (mother).    Social History:   The patient presents here with his mother and aunt. The patient has never smoked. Denies alcohol use.     Physical Exam:  /70   Pulse 79   Wt 45.9 kg (101 lb 3.2 oz)   SpO2 98%   BMI 19.12 kg/m      GENERAL APPEARANCE: alert and no distress  EYES: nonicteric  HENT: mouth without ulcers or lesions  NECK: supple, no adenopathy, trach present  RESP: lungs clear to auscultation   CV: regular rhythm, normal rate, no rub  ABDOMEN: soft, nontender,  nondistended  : No CVA tenderness   Extremities: no significant edema  MS: no evidence of inflammation in joints, no muscle tenderness  SKIN: no concerning rash  NEURO: mentation intact and speech normal  PSYCH: affect normal/bright     Laboratory:  CMP  Recent Labs   Lab Test 01/03/19  1438 08/09/18  1056 07/11/17  1337 12/22/16  1325 09/17/15  1205 02/05/15  1110    139 137 143 139 141   POTASSIUM 4.3 4.1 4.4 4.4 4.6 4.2   CHLORIDE 114* 108 108 110* 111* 111*   CO2 18* 22 21 24 21 21   ANIONGAP 9 9 8 9 7 9   * 103* 116* 89 140* 118*   BUN 53* 45 44* 40* 50* 40*   CR 3.22* 2.9 2.93* 2.79* 2.57* 2.07*   GFRESTIMATED 26* 27.6 27* 29* 32* 42*   GFRESTBLACK 30*  --  33* 35* 39* 51*   SUSAN 8.4* 8.7 8.8 8.7 8.7* 8.7*   MAG  --   --   --   --  2.1 2.0   PHOS 3.6  --  2.9 4.2 2.7* 3.5   ALBUMIN 3.6 3.4 3.6 3.3* 3.8 3.5   ALKPHOS  --   --   --   --  80 87     CBC  Recent Labs   Lab Test 01/03/19  1438 08/09/18  1056 07/11/17  1337 12/22/16  1325 09/17/15  1205 02/05/15  1110 10/03/13  2128   HGB 11.6* 12.3* 13.0* 11.7* 13.0* 13.8 13.2   WBC  --   --  8.8  --  8.7 7.7 11.4*   RBC  --   --  4.35*  --  4.44 4.69 4.48   HCT  --   --  39.0*  --  38.9* 41.0 38.9   MCV  --   --  90  --  88 87 87   MCH  --   --  29.9  --  29.3 29.4 29.5   MCHC  --   --  33.3  --  33.4 33.7 33.9   RDW  --   --  13.2  --  13.5 13.2 13.3   PLT  --   --  305  --  326 281 338     INR  No lab results found.     ABG  No lab results found.     URINE STUDIES  Recent Labs   Lab Test 07/11/17  1341 12/22/16  1328 09/17/15  1410 02/05/15  1115   COLOR Yellow Straw Quantity not sufficient Straw   APPEARANCE Clear Slightly Cloudy Quantity not sufficient Clear   URINEGLC Negative Negative Quantity not sufficient* Negative   URINEBILI Negative Negative Quantity not sufficient* Negative   URINEKETONE Negative Negative Quantity not sufficient* Negative   SG 1.005 1.006 Quantity not sufficient 1.005   UBLD Negative Trace* Quantity not sufficient* Trace*    URINEPH 6.0 6.5 Quantity not sufficient 6.0   PROTEIN 100* 100* Quantity not sufficient* 100*   NITRITE Positive* Negative Quantity not sufficient* Negative   LEUKEST Small* Large* Quantity not sufficient* Large*   RBCU <1 6* Quantity not sufficient 1   WBCU 6* >182* Quantity not sufficient* 15*     Recent Labs   Lab Test 01/03/19  1435 07/11/17  1341 12/22/16  1328 02/05/15  1115   UTPG 2.60* 3.04* 3.05* 5.28*     PTH  Recent Labs   Lab Test 07/11/17  1337 12/22/16  1325 09/17/15  1205 02/05/15  1110 10/03/13  2128 09/20/12  1200   PTHI 191* 98* 133* 303* 141* 74*     IRON STUDIES   Recent Labs   Lab Test 08/09/18  1056 08/09/18  1046 07/11/17  1337 12/22/16  1325 09/17/15  1205 02/05/15  1110   IRON 178  --  77 135 99 160     --  296 241 297 281   IRONSAT 67  --  26 56* 33 57*   JUVENAL  --  14 16* 56 27 34       Imaging:   None    Scribe Disclosure:   I, Yisel Rudd, am serving as a scribe to document services personally performed by Glen Abel MD at this visit, based upon the provider's statements to me. All documentation has been reviewed by the aforementioned provider prior to being entered into the official medical record.     Portions of this medical record were completed by a scribe. UPON MY REVIEW AND AUTHENTICATION BY ELECTRONIC SIGNATURE, this confirms (a) I performed the applicable clinical services, and (b) the record is accurate.    Total time spent was >40 minutes, and more than 50% of face to face time was spent in counseling and/or coordination of care regarding principles of multidisciplinary care, medication management, and chronic kidney disease education.  MD Glen Esquivel MD

## 2019-01-03 NOTE — NURSING NOTE
Chief Complaint   Patient presents with     RECHECK     CKD, stage 4     /70   Pulse 79   Wt 45.9 kg (101 lb 3.2 oz)   SpO2 98%   BMI 19.12 kg/m    Shua Keith CMA

## 2019-01-03 NOTE — PROGRESS NOTES
Nephrology Clinic    Glen Abel MD  2019     Name: Aidan Louie  MRN: [id number]  Age: 23 year old  : 1995  Referring provider: Nathalia Collado     Assessment and Plan:  Mr. 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    1. CKD, stage 4:  Baseline creatinine now seems to be ~3.2 with an GFR ~26.  Etiology secondary to obstructive nephropathy from congenital abnormalities of the urogenital system and recurrent UTIs.  He was counseled regarding his options with HD and transplant.  He was advised that secondary to his trach he may have a challenge but he can be evaluated for the transplant possibilities. He has been transitioning from pediatric pulmonologist to adult pulmonologist. He has had transplant evaluation and appears to be a candidate but will not be listed until his GFR is < 20 ml/min.    -- CKD education and dialysis options provided again today   -- continue captopril 2.5 for albuminuria/renoprotection.  Urine albumin ~2 g/g today so would like to increase captopril but will reassess at next visit  -- continues to straight cath and close monitoring for UTIs; he follows closely with urology  -- RTC in 4 months     2. HTN/Volume:  Blood pressure at goal of <130/80 and euvolemic on exam.  -continue amlodipine 5mg BID,  captopril 2.5  and clonidine 0.1mg BID  -as mentioned, will likely go up on captopril at next visit for management of albuminuria    3. Anemia of CKD: Hgb at goal (11.6 today).    -no need for POORNIMA therapy  -check iron stores at next visit      4. CKD-MBD: Corrected calcium and phos at goal. Vitamin D levels at goal.  PTH mildly elevated at 177. DEXA with evidence of osteoporosis.  Bone specific alk phos within normal limits. Secondary hyperparathyroidism does not seem to be a big  contributor to decreased bone mineralization at this time.    -continue cholecalciferol at 2000 units daily  -no need for active vitamin D (calcitriol) at this time  -will refer to endocrinology for further management of osteoporosis. If therapy initiated, then would favor denosumab.      5. Metabolic acidosis: Due to CKD.    -will start sodium bicarbonate 650 mg TID for acidosis with the goal of slowing progression of CKD and bone disease     6. Recurrent UTIs: Colonized so based on symptoms.  No evidence of UTI at this time.   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  Previously followed by pediatric ENT.    -will refer to adult ENT for further management, especially regarding trach management     Follow-up: Return in about 4 months (around 5/3/2019).     HPI:   Aidan Louie is a 23 year old male who presents for evaluation of his CKD IV secondary to Prune belly syndrome. His other histories include hypertension, bilateral hearing loss on hearing aids, short stature, choanal atresia SP repair, Dawood Reece anomaly, and restrictive lung disease. He has been following with Dr. Hess for his CKD, and Dr. Schilling has been his surgeon. He has a history of recurrent UTI and worsening creatinine in the past few years. Patient has a history of Prune belly syndrome and extensive urological abnormalities. He has had reconstructed ureter and bladder and has to self catheterize every 3 hours with minimal irrigation with 180ml saline. Since he has been more compliant with his saline flushes, he has had less frequent UTI. The patient was last evaluated in nephrology clinic on 02/28/2018 by me.    Recently, mother reports they were able to establish care with a PCP in [place], [place] in September 2018. At that time, they discussed tapering prednisone use. They are also planning to  establish care with nephrology in [place]. They had also seen a pulmonologist in November 2018, who performed bone density studies which showed significantly decreased growth hormone and moderate to severe osteoporosis. They would like to establish care with an endocrinologist at the [facility] to proceed forward with this. Mother notes Greg does typically use 1 L oxygen at night because he often leans forward when sleeping and cuts off his airway, so the oxygen mask prevents him from leaning forward and obstructing his tracheostomy. He otherwise is not dependent on oxygen. Mother would also like to see someone who could alter his tracheostomy, as it is somewhat displaced. Greg has been working at his job frequently and reports no issues with this. He has not had any recent illnesses.     Review of Systems:   Pertinent items are noted in HPI or as below, remainder of complete ROS is negative.      Active Medications:      albuterol (2.5 MG/3ML) 0.083% nebulizer solution, Take 1 ampule by nebulization 4 times daily , Disp: , Rfl:      amLODIPine (NORVASC) 5 MG tablet, Take 1 tablet (5 mg) by mouth 2 times daily, Disp: 180 tablet, Rfl: 3     amoxicillin-clavulanate (AUGMENTIN) 875-125 MG per tablet, Take 1 tablet by mouth 2 times daily, Disp: 20 tablet, Rfl: 0     azithromycin (ZITHROMAX) 250 MG tablet, Take  by mouth. Mon, Wed, Fri, Disp: , Rfl:      calcium carbonate (OS-SUSAN 500 MG [place]. CA) 500 MG tablet, Take 1 tablet (500 mg) by mouth 3 times daily (with meals), Disp: 90 tablet, Rfl: 6     captopril 0.1 mg/mL (CAPOTEN) 0.1 mg/mL SUSP, Take 2.5 mg three times daily (2.5 mg/5ml), Disp: 460 mL, Rfl: 11     cholecalciferol 2000 UNITS tablet, Take 2,000 Units by mouth daily, Disp: 30 tablet, Rfl: 11     cloNIDine (CATAPRES) 0.1 MG tablet, Take 1 tablet (0.1 mg) by mouth 2 times daily, Disp: 180 tablet, Rfl: 3     famotidine (PEPCID) 20 MG tablet, Take 20 mg by mouth 2 times daily, Disp: , Rfl:      ipratropium  (ATROVENT) 0.02 % neb solution, Take 0.5 mg by nebulization 4 times daily , Disp: 225 mL, Rfl:      predniSONE (DELTASONE) 5 MG tablet, Take 5 mg by mouth every other day Take 7.5mg daily ., Disp: , Rfl: 6     sodium bicarbonate 650 MG tablet, Take 1 tablet (650 mg) by mouth 3 times daily, Disp: 270 tablet, Rfl: 0     tobramycin, PF, (DAX) 300 MG/5ML nebulizer solution, Take 1 ampule by nebulization as needed, Disp: , Rfl:      ranitidine (ZANTAC) 150 MG tablet, Take 0.5 tablets (75 mg) by mouth 2 times daily (Patient not taking: Reported on 1/3/2019), Disp: 90 tablet, Rfl: 3     Allergies:   Latex  Tape [adhesive tape]      Past Medical History:  Bilateral reflux nephropathy  CKD (chronic kidney disease) stage 4, GFR 15-29 ml/min    Hearing loss   Hypertension   Dawood Reece sequence   Prune belly syndrome   Recurrent UTI   Respiratory bronchiolitis interstitial lung disease  Restrictive lung disease   Tracheostomy dependence   Vitamin D deficiency     Past Surgical History:  Bilateral ureteral reimplantation, abdominoplasty, mitrofanoff creation  Cystoscopy  Gastrostomy tube    Herniorrhaphy inguinal bilateral, left ochiopexy  Laryngoscopy, bronchoscopy, combined x5   vesicostomy    Redo right ureteral reimplantation  Replacement of trach   Takedown and revision of mitrofanoff stoma     Family History:   The patient has family history of diabetes (mother).    Social History:   The patient presents here with his mother and aunt. The patient has never smoked. Denies alcohol use.     Physical Exam:  /70   Pulse 79   Wt 45.9 kg (101 lb 3.2 oz)   SpO2 98%   BMI 19.12 kg/m     GENERAL APPEARANCE: alert and no distress  EYES: nonicteric  HENT: mouth without ulcers or lesions  NECK: supple, no adenopathy, trach present  RESP: lungs clear to auscultation   CV: regular rhythm, normal rate, no rub  ABDOMEN: soft, nontender, nondistended  : No CVA tenderness   Extremities: no significant edema  MS: no  evidence of inflammation in joints, no muscle tenderness  SKIN: no concerning rash  NEURO: mentation intact and speech normal  PSYCH: affect normal/bright     Laboratory:  CMP  Recent Labs   Lab Test 01/03/19  1438 08/09/18  1056 07/11/17  1337 12/22/16  1325 09/17/15  1205 02/05/15  1110    139 137 143 139 141   POTASSIUM 4.3 4.1 4.4 4.4 4.6 4.2   CHLORIDE 114* 108 108 110* 111* 111*   CO2 18* 22 21 24 21 21   ANIONGAP 9 9 8 9 7 9   * 103* 116* 89 140* 118*   BUN 53* 45 44* 40* 50* 40*   CR 3.22* 2.9 2.93* 2.79* 2.57* 2.07*   GFRESTIMATED 26* 27.6 27* 29* 32* 42*   GFRESTBLACK 30*  --  33* 35* 39* 51*   SUSAN 8.4* 8.7 8.8 8.7 8.7* 8.7*   MAG  --   --   --   --  2.1 2.0   PHOS 3.6  --  2.9 4.2 2.7* 3.5   ALBUMIN 3.6 3.4 3.6 3.3* 3.8 3.5   ALKPHOS  --   --   --   --  80 87     CBC  Recent Labs   Lab Test 01/03/19  1438 08/09/18  1056 07/11/17  1337 12/22/16  1325 09/17/15  1205 02/05/15  1110 10/03/13  2128   HGB 11.6* 12.3* 13.0* 11.7* 13.0* 13.8 13.2   WBC  --   --  8.8  --  8.7 7.7 11.4*   RBC  --   --  4.35*  --  4.44 4.69 4.48   HCT  --   --  39.0*  --  38.9* 41.0 38.9   MCV  --   --  90  --  88 87 87   MCH  --   --  29.9  --  29.3 29.4 29.5   MCHC  --   --  33.3  --  33.4 33.7 33.9   RDW  --   --  13.2  --  13.5 13.2 13.3   PLT  --   --  305  --  326 281 338     INR  No lab results found.     ABG  No lab results found.     URINE STUDIES  Recent Labs   Lab Test 07/11/17  1341 12/22/16  1328 09/17/15  1410 02/05/15  1115   COLOR Yellow Straw Quantity not sufficient Straw   APPEARANCE Clear Slightly Cloudy Quantity not sufficient Clear   URINEGLC Negative Negative Quantity not sufficient* Negative   URINEBILI Negative Negative Quantity not sufficient* Negative   URINEKETONE Negative Negative Quantity not sufficient* Negative   SG 1.005 1.006 Quantity not sufficient 1.005   UBLD Negative Trace* Quantity not sufficient* Trace*   URINEPH 6.0 6.5 Quantity not sufficient 6.0   PROTEIN 100* 100* Quantity not  sufficient* 100*   NITRITE Positive* Negative Quantity not sufficient* Negative   LEUKEST Small* Large* Quantity not sufficient* Large*   RBCU <1 6* Quantity not sufficient 1   WBCU 6* >182* Quantity not sufficient* 15*     Recent Labs   Lab Test 01/03/19  1435 07/11/17  1341 12/22/16  1328 02/05/15  1115   UTPG 2.60* 3.04* 3.05* 5.28*     PTH  Recent Labs   Lab Test 07/11/17  1337 12/22/16  1325 09/17/15  1205 02/05/15  1110 10/03/13  2128 09/20/12  1200   PTHI 191* 98* 133* 303* 141* 74*     IRON STUDIES   Recent Labs   Lab Test 08/09/18  1056 08/09/18  1046 07/11/17  1337 12/22/16  1325 09/17/15  1205 02/05/15  1110   IRON 178  --  77 135 99 160     --  296 241 297 281   IRONSAT 67  --  26 56* 33 57*   JUVENAL  --  14 16* 56 27 34       Imaging:   None    Scribe Disclosure:   I, Yisel Rudd, am serving as a scribe to document services personally performed by Glen Abel MD at this visit, based upon the provider's statements to me. All documentation has been reviewed by the aforementioned provider prior to being entered into the official medical record.     Portions of this medical record were completed by a scribe. UPON MY REVIEW AND AUTHENTICATION BY ELECTRONIC SIGNATURE, this confirms (a) I performed the applicable clinical services, and (b) the record is accurate.    Total time spent was >40 minutes, and more than 50% of face to face time was spent in counseling and/or coordination of care regarding principles of multidisciplinary care, medication management, and chronic kidney disease education.  Glen Abel MD

## 2019-01-03 NOTE — PATIENT INSTRUCTIONS
-We have referred you to endocrinology for osteoporosis.  -We have referred you to ENT for possible trach replacement.  -Please start sodium bicarbonate 1 tab TID  -We will look into the transplant evaluation process.   -Otherwise, return to clinic in 4 months

## 2019-01-05 LAB — ALP BONE SERPL-MCNC: 10.8 UG/L (ref 10–28.8)

## 2019-01-07 LAB
DEPRECATED CALCIDIOL+CALCIFEROL SERPL-MC: <44 UG/L (ref 20–75)
VITAMIN D2 SERPL-MCNC: <5 UG/L
VITAMIN D3 SERPL-MCNC: 39 UG/L

## 2019-02-23 DIAGNOSIS — N18.30 CKD (CHRONIC KIDNEY DISEASE) STAGE 3, GFR 30-59 ML/MIN (H): ICD-10-CM

## 2019-02-24 DIAGNOSIS — N18.30 CKD (CHRONIC KIDNEY DISEASE) STAGE 3, GFR 30-59 ML/MIN (H): ICD-10-CM

## 2019-02-24 RX ORDER — CLONIDINE HYDROCHLORIDE 0.1 MG/1
TABLET ORAL
Qty: 180 TABLET | Refills: 0 | Status: SHIPPED | OUTPATIENT
Start: 2019-02-24 | End: 2019-05-27

## 2019-02-24 RX ORDER — AMLODIPINE BESYLATE 5 MG/1
TABLET ORAL
Qty: 180 TABLET | Refills: 0 | Status: SHIPPED | OUTPATIENT
Start: 2019-02-24 | End: 2019-05-27

## 2019-02-28 NOTE — TELEPHONE ENCOUNTER
FUTURE VISIT INFORMATION      FUTURE VISIT INFORMATION:    Date: 3/4/19    Time: 10:40AM    [place] [facility] [place]  REFERRAL INFORMATION:    Referring provider:  Glen Abel MD    Referring providers clinic:  [facility]     Reason for visit/diagnosis  For trach management or replacement    RECORDS REQUESTED FROM:       Clinic name Comments Records Status Imaging Status   [facility]  1/3/19 notes with Dr Robles [facility]  9/26/18 notes with Dr Bernie Cruz  Scanned in [facility]  8/29/17 operative notes with Dr Taz Jose     10/24/11 CT Chest   10/23/13 XR Chest Scanned in [facility] PACS   [facility] 11/20/17 XR Chest [facility]   [facility]  10/24/13, 11/26/14 notes with Dr Case Lee   Scanned in EPIC

## 2019-03-04 ENCOUNTER — OFFICE VISIT (OUTPATIENT)
Dept: OTOLARYNGOLOGY | Facility: CLINIC | Age: 24
End: 2019-03-04
Payer: MEDICARE

## 2019-03-04 ENCOUNTER — PRE VISIT (OUTPATIENT)
Dept: OTOLARYNGOLOGY | Facility: CLINIC | Age: 24
End: 2019-03-04

## 2019-03-04 DIAGNOSIS — Z98.1 HISTORY OF FUSION OF CERVICAL SPINE: ICD-10-CM

## 2019-03-04 DIAGNOSIS — Q87.0 PIERRE ROBIN SYNDROME: ICD-10-CM

## 2019-03-04 DIAGNOSIS — Z93.0 TRACHEOSTOMY IN PLACE (H): Primary | ICD-10-CM

## 2019-03-04 NOTE — PROGRESS NOTES
Dear Dr. Abel:    I had the pleasure of meeting Aidan Louie in consultation at the [facility] of the [facility] at your request, for evaluation of airway and tracheostomy. The note from our visit follows. Speech recognition software may have been used in the documentation below; input is reviewed before signature to the best of my ability. I appreciate the opportunity to participate in the care of this pleasant patient.    Please feel free to contact me with any questions.    Sincerely yours,    Bebe Dumas M.D., M.P.H.  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     He was a longtime patient of Dr. Reid's, and has had a trach for many years because of the mandibular hypoplasia. Per report, a number of attempts toward decannulation had been made, but ultimately the decision was made to keep the trach in place due to concerns about his airway. He went to the operating room with Dr. Jose in August 2017 for flexible and rigid laryngoscopy/bronchoscopy. Per the operative note, this showed moderate trismus and BOT collapse; redundant, distorted, floppy epiglottis, with a grade 4 laryngoscopy. A small granuloma was seen on the medial left arytenoid. Vocal fold mobility and subglottis were normal. The 5-0 pediatric Bivona tube was noted to be in good position. Distal airway was unremarkable. It was observed that  he would be a difficult intubation. With a Rothman blade under the epiglottis he was thought possibly intubatable but this was felt to be difficult in the best case situation.    He uses oxygen only at night (1L) primarily to prevent him from leaning forward and blocking his tracheostomy. The mask helps to prevent obstruction. He has had some difficulty with the trach coming out of position. It is frequently partially out of his neck anteriorly and they are interested in exploring options for alternatives. His mom wonders if they could try out an adult size trach. His trach was last changed about a month ago.     Prior Epic and outside records were reviewed for this visit.      MEDICATIONS:     Current Outpatient Medications   Medication Sig Dispense Refill     albuterol (2.5 MG/3ML) 0.083% nebulizer solution Take 1 ampule by nebulization 4 times daily        amLODIPine (NORVASC) 5 MG tablet TAKE 1 TABLET(5 MG) BY MOUTH TWICE DAILY 180 tablet 0     amoxicillin-clavulanate (AUGMENTIN) 875-125 MG per tablet Take 1 tablet by mouth 2 times daily 20 tablet 0     azithromycin (ZITHROMAX) 250 MG tablet Take  by mouth. Mon, Wed, Fri       calcium carbonate (OS-SUSAN 500 MG [place]. [place]) 500 MG tablet Take 1 tablet (500 mg) by mouth 3 times daily (with meals) 90 tablet 6     captopril 0.1 mg/mL (CAPOTEN) 0.1 mg/mL SUSP Take 2.5 mg three times daily (2.5 mg/5ml) 460 mL 11     cholecalciferol 2000 UNITS tablet Take 2,000 Units by mouth daily 30 tablet 11     cloNIDine (CATAPRES) 0.1 MG tablet TAKE 1 TABLET(0.1 MG) BY MOUTH TWICE DAILY 180 tablet 0     famotidine (PEPCID) 20 MG tablet Take 20 mg by mouth 2 times daily       ipratropium (ATROVENT) 0.02 % neb solution Take 0.5 mg by nebulization 4 times daily  225 mL      predniSONE (DELTASONE) 5 MG tablet Take 5 mg by mouth every other day Take 7.5mg daily .  6     ranitidine (ZANTAC) 150 MG tablet Take 0.5 tablets (75 mg) by mouth 2 times daily (Patient not taking: Reported on  1/3/2019) 90 tablet 3     sodium bicarbonate 650 MG tablet Take 1 tablet (650 mg) by mouth 3 times daily 270 tablet 0     tobramycin, PF, (DAX) 300 MG/5ML nebulizer solution Take 1 ampule by nebulization as needed         ALLERGIES:    Allergies   Allergen Reactions     Latex      Latex      Other reaction(s): Other (see comments)     Tape [Adhesive Tape]        PAST MEDICAL HISTORY:   Past Medical History:   Diagnosis Date     Bilateral reflux nephropathy      CKD (chronic kidney disease) stage 4, GFR 15-29 ml/min (H)      Hearing loss      Dawood Reece sequence      Prune belly syndrome      Recurrent UTI      Respiratory bronchiolitis interstitial lung disease (H)      Restrictive lung disease      Tracheostomy dependence (H)         PAST SURGICAL HISTORY:   Past Surgical History:   Procedure Laterality Date     Bilateral ureteral reimplantation  2002    abdominoplasty, Mitrofanoff creation     CYSTOSCOPY  12     EXAM UNDER ANESTHESIA THROAT  6/10/2003    tonsillar hypertrophy     EXAM UNDER ANESTHESIA, RESTORATIONS, EXTRACTION(S) DENTAL COMPLEX, COMBINED  2006     GASTROSTOMY TUBE       GASTROSTOMY TUBE  3/16/2002    button change     HERNIORRHAPHY INGUINAL BILATERAL  99    left ochiopexy     LARYNGOSCOPY, BRONCHOSCOPY, COMBINED  2002     LARYNGOSCOPY, BRONCHOSCOPY, COMBINED  2002    bilateral ear debridement     LARYNGOSCOPY, BRONCHOSCOPY, COMBINED  2003     LARYNGOSCOPY, BRONCHOSCOPY, COMBINED  2007    Failed Deflux injection     LARYNGOSCOPY, BRONCHOSCOPY, COMBINED  12      VESICOSTOMY       Redo right ureteral reimplantation  3/12/2004    Excision bladder diverticuli, Left to Right pyelopyelostomy     Replacement of trach  10/15/12     Takedown and revision of Mitrofanoff stoma  2006     TRACHEOSTOMY INFANT         HABITS/SOCIAL HISTORY:    Social History     Tobacco Use     Smoking status: Never Smoker     Smokeless tobacco: Never Used   Substance  Use Topics     Alcohol use: No     Alcohol/week: 0.0 oz     FAMILY HISTORY:    Family History   Problem Relation Age of Onset     Diabetes Type 2  Mother      Diabetes Type 2  Maternal Grandmother      Diabetes Type 2  Maternal Grandfather      Diabetes Type 2  Paternal Grandmother      Alzheimer Disease Paternal Grandfather     Noncontributory.    REVIEW OF SYSTEMS:  The patient completed a comprehensive 11 point review of systems (below), which was reviewed. Positives are as noted below; pertinent findings are as noted in the HPI.     Patient Supplied Answers to Review of Systems  No flowsheet data found.    PHYSICAL EXAMINATION:  General: The patient was alert and conversant, and in no acute distress. Patient accompanied by his mother and aunt.  Eyes: PERRL, conjunctiva and lids normal, sclera nonicteric. Dysconjugate on right lateral gaze.  Nose: Anterior rhinoscopy: no gross abnormalities. no  bleeding; no  mucopurulence; septum grossly normal, mild to moderate mucoid drainage and/or crusting.  Oral cavity/oropharynx: No masses or lesions. Dentition in fair condition. Tongue mobility and palate elevation intact and symmetric.  Ears: Normal auricles, external auditory canals bilaterally. Visualized portions of tympanic membranes normal bilaterally.   Neck: No palpable cervical lymphadenopathy. Landmarks palpable. 5-O pediatric Bivona in position, but with about 2 cm visible beyond the neck flange. Trach site clean.  Resp: Breathing comfortably.  Neuro: Symmetric facial function. Other cranial nerves as documented above.  Psych: Normal affect, pleasant and cooperative. Has some developmental delay but is pleasant, interactive, conversant.  Voice/speech: No significant dysphonia. Able to speak with PM in position.  Extremities: No cyanosis, clubbing, or edema of the upper extremities.    Intake scores  Total Score for Last Patient-Answered VHI Questionnaire  No flowsheet data found.  Total Score for Last  Patient-Answered EAT Questionnaire  No flowsheet data found.  Total Score for Last Patient-Answered CSI Questionnaire  No flowsheet data found.    PROCEDURE:   Procedure: Flexible tracheobronchoscopy through established trach incision  Indications: The procedure was warranted to assess the position and/or condition of the tracheostomy tube and trachea  Description: After written and verbal consent were obtained, the endoscope was passed through the patient's tracheostomy. This allowed visualization of the trachea down to the ralu and mainstem bronchi.  The trach was also pulled back to allow evaluation of the trach tract, and then was replaced and secured. Bending upwards revealed good bilateral vocal fold mobility.  Findings: Trach tube in good position within the airway. Trachea clear to raul, which was sharp. Unremarkable takeoffs of mainstem bronchi. Trach tract unremarkable. Scattered mild mucus.    Procedure:  Tracheostomy tube change  Indication: This procedure was warranted as part of routine trach care, and was requested by the patient/caregiver.  Description of procedure:  The existing tracheostomy tube was removed and an adult Bivona uncuffed 5-O tube was placed using the introducer. It was selected based on same inner diameter, similar outer diameter, and longer length than his pediatric Bivona. The introducer was withdrawn and replaced with the inner cannula, and the neck ties were secured. Flexible fiberoptic endoscopy through the tube confirmed good position. There was no granulation tissue at the trach site.  The patient tolerated the procedure without difficulty. He was observed for an hour and felt that the new trach was reasonable with respect to breathing and speaking.    Procedure: Flexible fiberoptic laryngoscopy  Indications: This procedure was warranted to evaluate the patient's laryngeal anatomy and function. Risks, benefits, and alternatives were discussed.  Description: After written  informed consent was obtained, a time-out was performed to confirm patient identity, procedure, and procedure site. Topical 3% lidocaine with 0.25% phenylephrine was applied to the nasal cavities. I performed the endoscopy and no complications were apparent.  Performed by: Bebe Dumas MD MPH  SLP: NA  Findings: Normal nasopharynx. Normal mucosa, base of tongue, valleculae, and epiglottis. Small epiglottis. The right true vocal fold demonstrated normal mobility. The left true vocal fold demonstrated normal mobility. The medial edges of the vocal folds appeared smooth and straight. No focal mucosal lesions were observed on the true vocal folds.  Mucosa of the false vocal folds, aryepiglottic folds, piriform sinuses, and posterior glottis unremarkable. Airway was patent. Trach noted to be in good position.             IMPRESSION AND PLAN:   Aidan Louie presents for assessment of his tracheostomy tube, which was changed for an adult sized Bivona of similar caliber (5-0 uncuffed Bivona) which does seem to sit better in his neck. He is a known difficult airway due to his trismus, limited neck extension, and mandibular hypoplasia. Due to significant risks to his safety if he should have a failed intubation, as well as his history of failed attempts for decannulation, the decision has been made in the past to keep the trach in situ. I think this is very reasonable particularly given that he will need airway management for upcoming planned procedures.    The new trach appears to sit well. The trach site itself looks good.    He will return in six to eight weeks for a repeat exam, or sooner as needed. I appreciate the opportunity to participate in the care of this patient.

## 2019-03-04 NOTE — LETTER
3/4/2019      RE: Aidan Louie  [address]  [place] [address]       Dear Dr. Abel:    I had the pleasure of meeting Aidan Louie in consultation at the [facility] of the [facility] at your request, for evaluation of airway and tracheostomy. The note from our visit follows. Speech recognition software may have been used in the documentation below; input is reviewed before signature to the best of my ability. I appreciate the opportunity to participate in the care of this pleasant patient.    Please feel free to contact me with any questions.    Sincerely yours,    Bebe Dumas M.D., M.P.H.  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     He was a longtime patient of Dr. Reid's, and has had a trach for many years because of the mandibular hypoplasia. Per report, a number of attempts toward decannulation had been made, but ultimately the decision was made to keep the trach in place due to concerns about his airway. He went to the operating room with Dr. Jose in August 2017 for flexible and rigid laryngoscopy/bronchoscopy. Per the operative note, this showed moderate trismus and BOT collapse; redundant, distorted, floppy epiglottis, with a grade 4 laryngoscopy. A small granuloma was seen on the medial left arytenoid. Vocal fold mobility and subglottis were normal. The 5-0 pediatric Bivona tube  was noted to be in good position. Distal airway was unremarkable. It was observed that he would be a difficult intubation. With a Rothman blade under the epiglottis he was thought possibly intubatable but this was felt to be difficult in the best case situation.    He uses oxygen only at night (1L) primarily to prevent him from leaning forward and blocking his tracheostomy. The mask helps to prevent obstruction. He has had some difficulty with the trach coming out of position. It is frequently partially out of his neck anteriorly and they are interested in exploring options for alternatives. His mom wonders if they could try out an adult size trach. His trach was last changed about a month ago.     Prior Epic and outside records were reviewed for this visit.      MEDICATIONS:     Current Outpatient Medications   Medication Sig Dispense Refill     albuterol (2.5 MG/3ML) 0.083% nebulizer solution Take 1 ampule by nebulization 4 times daily        amLODIPine (NORVASC) 5 MG tablet TAKE 1 TABLET(5 MG) BY MOUTH TWICE DAILY 180 tablet 0     amoxicillin-clavulanate (AUGMENTIN) 875-125 MG per tablet Take 1 tablet by mouth 2 times daily 20 tablet 0     azithromycin (ZITHROMAX) 250 MG tablet Take  by mouth. Mon, Wed, Fri       calcium carbonate (OS-SUSAN 500 MG [place]. [place]) 500 MG tablet Take 1 tablet (500 mg) by mouth 3 times daily (with meals) 90 tablet 6     captopril 0.1 mg/mL (CAPOTEN) 0.1 mg/mL SUSP Take 2.5 mg three times daily (2.5 mg/5ml) 460 mL 11     cholecalciferol 2000 UNITS tablet Take 2,000 Units by mouth daily 30 tablet 11     cloNIDine (CATAPRES) 0.1 MG tablet TAKE 1 TABLET(0.1 MG) BY MOUTH TWICE DAILY 180 tablet 0     famotidine (PEPCID) 20 MG tablet Take 20 mg by mouth 2 times daily       ipratropium (ATROVENT) 0.02 % neb solution Take 0.5 mg by nebulization 4 times daily  225 mL      predniSONE (DELTASONE) 5 MG tablet Take 5 mg by mouth every other day Take 7.5mg daily .  6     ranitidine (ZANTAC) 150 MG tablet  Take 0.5 tablets (75 mg) by mouth 2 times daily (Patient not taking: Reported on 1/3/2019) 90 tablet 3     sodium bicarbonate 650 MG tablet Take 1 tablet (650 mg) by mouth 3 times daily 270 tablet 0     tobramycin, PF, (DAX) 300 MG/5ML nebulizer solution Take 1 ampule by nebulization as needed         ALLERGIES:    Allergies   Allergen Reactions     Latex      Latex      Other reaction(s): Other (see comments)     Tape [Adhesive Tape]        PAST MEDICAL HISTORY:   Past Medical History:   Diagnosis Date     Bilateral reflux nephropathy      CKD (chronic kidney disease) stage 4, GFR 15-29 ml/min (H)      Hearing loss      Dawood Reece sequence      Prune belly syndrome      Recurrent UTI      Respiratory bronchiolitis interstitial lung disease (H)      Restrictive lung disease      Tracheostomy dependence (H)         PAST SURGICAL HISTORY:   Past Surgical History:   Procedure Laterality Date     Bilateral ureteral reimplantation  2002    abdominoplasty, Mitrofanoff creation     CYSTOSCOPY  12     EXAM UNDER ANESTHESIA THROAT  6/10/2003    tonsillar hypertrophy     EXAM UNDER ANESTHESIA, RESTORATIONS, EXTRACTION(S) DENTAL COMPLEX, COMBINED  2006     GASTROSTOMY TUBE       GASTROSTOMY TUBE  3/16/2002    button change     HERNIORRHAPHY INGUINAL BILATERAL  99    left ochiopexy     LARYNGOSCOPY, BRONCHOSCOPY, COMBINED  2002     LARYNGOSCOPY, BRONCHOSCOPY, COMBINED  2002    bilateral ear debridement     LARYNGOSCOPY, BRONCHOSCOPY, COMBINED  2003     LARYNGOSCOPY, BRONCHOSCOPY, COMBINED  2007    Failed Deflux injection     LARYNGOSCOPY, BRONCHOSCOPY, COMBINED  12      VESICOSTOMY       Redo right ureteral reimplantation  3/12/2004    Excision bladder diverticuli, Left to Right pyelopyelostomy     Replacement of trach  10/15/12     Takedown and revision of Mitrofanoff stoma  2006     TRACHEOSTOMY INFANT         HABITS/SOCIAL HISTORY:    Social History     Tobacco Use      Smoking status: Never Smoker     Smokeless tobacco: Never Used   Substance Use Topics     Alcohol use: No     Alcohol/week: 0.0 oz     FAMILY HISTORY:    Family History   Problem Relation Age of Onset     Diabetes Type 2  Mother      Diabetes Type 2  Maternal Grandmother      Diabetes Type 2  Maternal Grandfather      Diabetes Type 2  Paternal Grandmother      Alzheimer Disease Paternal Grandfather     Noncontributory.    REVIEW OF SYSTEMS:  The patient completed a comprehensive 11 point review of systems (below), which was reviewed. Positives are as noted below; pertinent findings are as noted in the HPI.     Patient Supplied Answers to Review of Systems  No flowsheet data found.    PHYSICAL EXAMINATION:  General: The patient was alert and conversant, and in no acute distress. Patient accompanied by his mother and aunt.  Eyes: PERRL, conjunctiva and lids normal, sclera nonicteric. Dysconjugate on right lateral gaze.  Nose: Anterior rhinoscopy: no gross abnormalities. no  bleeding; no  mucopurulence; septum grossly normal, mild to moderate mucoid drainage and/or crusting.  Oral cavity/oropharynx: No masses or lesions. Dentition in fair condition. Tongue mobility and palate elevation intact and symmetric.  Ears: Normal auricles, external auditory canals bilaterally. Visualized portions of tympanic membranes normal bilaterally.   Neck: No palpable cervical lymphadenopathy. Landmarks palpable. 5-O pediatric Bivona in position, but with about 2 cm visible beyond the neck flange. Trach site clean.  Resp: Breathing comfortably.  Neuro: Symmetric facial function. Other cranial nerves as documented above.  Psych: Normal affect, pleasant and cooperative. Has some developmental delay but is pleasant, interactive, conversant.  Voice/speech: No significant dysphonia. Able to speak with PM in position.  Extremities: No cyanosis, clubbing, or edema of the upper extremities.    Intake scores  Total Score for Last  Patient-Answered VHI Questionnaire  No flowsheet data found.  Total Score for Last Patient-Answered EAT Questionnaire  No flowsheet data found.  Total Score for Last Patient-Answered CSI Questionnaire  No flowsheet data found.    PROCEDURE:   Procedure: Flexible tracheobronchoscopy through established trach incision  Indications: The procedure was warranted to assess the position and/or condition of the tracheostomy tube and trachea  Description: After written and verbal consent were obtained, the endoscope was passed through the patient's tracheostomy. This allowed visualization of the trachea down to the raul and mainstem bronchi.  The trach was also pulled back to allow evaluation of the trach tract, and then was replaced and secured. Bending upwards revealed good bilateral vocal fold mobility.  Findings: Trach tube in good position within the airway. Trachea clear to raul, which was sharp. Unremarkable takeoffs of mainstem bronchi. Trach tract unremarkable. Scattered mild mucus.    Procedure:  Tracheostomy tube change  Indication: This procedure was warranted as part of routine trach care, and was requested by the patient/caregiver.  Description of procedure:  The existing tracheostomy tube was removed and an adult Bivona uncuffed 5-O tube was placed using the introducer. It was selected based on same inner diameter, similar outer diameter, and longer length than his pediatric Bivona. The introducer was withdrawn and replaced with the inner cannula, and the neck ties were secured. Flexible fiberoptic endoscopy through the tube confirmed good position. There was no granulation tissue at the trach site.  The patient tolerated the procedure without difficulty. He was observed for an hour and felt that the new trach was reasonable with respect to breathing and speaking.    Procedure: Flexible fiberoptic laryngoscopy  Indications: This procedure was warranted to evaluate the patient's laryngeal anatomy and  function. Risks, benefits, and alternatives were discussed.  Description: After written informed consent was obtained, a time-out was performed to confirm patient identity, procedure, and procedure site. Topical 3% lidocaine with 0.25% phenylephrine was applied to the nasal cavities. I performed the endoscopy and no complications were apparent.  Performed by: Bebe Dumas MD MPH  SLP: NA  Findings: Normal nasopharynx. Normal mucosa, base of tongue, valleculae, and epiglottis. Small epiglottis. The right true vocal fold demonstrated normal mobility. The left true vocal fold demonstrated normal mobility. The medial edges of the vocal folds appeared smooth and straight. No focal mucosal lesions were observed on the true vocal folds.  Mucosa of the false vocal folds, aryepiglottic folds, piriform sinuses, and posterior glottis unremarkable. Airway was patent. Trach noted to be in good position.             IMPRESSION AND PLAN:   Aidan Louie presents for assessment of his tracheostomy tube, which was changed for an adult sized Bivona of similar caliber (5-0 uncuffed Bivona) which does seem to sit better in his neck. He is a known difficult airway due to his trismus, limited neck extension, and mandibular hypoplasia. Due to significant risks to his safety if he should have a failed intubation, as well as his history of failed attempts for decannulation, the decision has been made in the past to keep the trach in situ. I think this is very reasonable particularly given that he will need airway management for upcoming planned procedures.    The new trach appears to sit well. The trach site itself looks good.    He will return in six to eight weeks for a repeat exam, or sooner as needed. I appreciate the opportunity to participate in the care of this patient.       Bebe Dumas MD

## 2019-03-05 DIAGNOSIS — M26.04 MANDIBULAR HYPOPLASIA: Primary | ICD-10-CM

## 2019-04-10 DIAGNOSIS — N18.30 CKD (CHRONIC KIDNEY DISEASE) STAGE 3, GFR 30-59 ML/MIN (H): ICD-10-CM

## 2019-04-12 DIAGNOSIS — N18.30 CKD (CHRONIC KIDNEY DISEASE) STAGE 3, GFR 30-59 ML/MIN (H): ICD-10-CM

## 2019-04-12 NOTE — PROGRESS NOTES
Call to [facility], they dont refill compund medications. Resent to [facility] in [place]. Patients mom has been informed.  Mary Scott LPN  Nephrology  [phone]

## 2019-05-04 DIAGNOSIS — N18.30 CKD (CHRONIC KIDNEY DISEASE) STAGE 3, GFR 30-59 ML/MIN (H): Primary | ICD-10-CM

## 2019-05-13 ENCOUNTER — OFFICE VISIT (OUTPATIENT)
Dept: NEPHROLOGY | Facility: CLINIC | Age: 24
End: 2019-05-13
Attending: INTERNAL MEDICINE
Payer: MEDICARE

## 2019-05-13 ENCOUNTER — OFFICE VISIT (OUTPATIENT)
Dept: ENDOCRINOLOGY | Facility: CLINIC | Age: 24
End: 2019-05-13
Payer: MEDICARE

## 2019-05-13 VITALS
OXYGEN SATURATION: 96 % | BODY MASS INDEX: 20.32 KG/M2 | WEIGHT: 107.6 LBS | DIASTOLIC BLOOD PRESSURE: 76 MMHG | HEIGHT: 61 IN | HEART RATE: 50 BPM | SYSTOLIC BLOOD PRESSURE: 114 MMHG

## 2019-05-13 VITALS
DIASTOLIC BLOOD PRESSURE: 76 MMHG | WEIGHT: 107 LBS | SYSTOLIC BLOOD PRESSURE: 114 MMHG | HEART RATE: 50 BPM | HEIGHT: 61 IN | BODY MASS INDEX: 20.2 KG/M2

## 2019-05-13 DIAGNOSIS — N18.30 CKD (CHRONIC KIDNEY DISEASE) STAGE 3, GFR 30-59 ML/MIN (H): ICD-10-CM

## 2019-05-13 DIAGNOSIS — N18.4 CKD (CHRONIC KIDNEY DISEASE) STAGE 4, GFR 15-29 ML/MIN (H): ICD-10-CM

## 2019-05-13 DIAGNOSIS — N18.4 CKD (CHRONIC KIDNEY DISEASE) STAGE 4, GFR 15-29 ML/MIN (H): Primary | ICD-10-CM

## 2019-05-13 DIAGNOSIS — R79.89 HIGH SERUM PARATHYROID HORMONE (PTH): ICD-10-CM

## 2019-05-13 DIAGNOSIS — M85.9 LOW BONE DENSITY FOR AGE: ICD-10-CM

## 2019-05-13 DIAGNOSIS — E04.9 GOITER: Primary | ICD-10-CM

## 2019-05-13 DIAGNOSIS — R62.52 SHORT STATURE: ICD-10-CM

## 2019-05-13 LAB
ALBUMIN SERPL-MCNC: 3.2 G/DL (ref 3.4–5)
ANION GAP SERPL CALCULATED.3IONS-SCNC: 10 MMOL/L (ref 3–14)
BUN SERPL-MCNC: 64 MG/DL (ref 7–30)
CALCIUM SERPL-MCNC: 8.8 MG/DL (ref 8.5–10.1)
CHLORIDE SERPL-SCNC: 116 MMOL/L (ref 94–109)
CO2 SERPL-SCNC: 18 MMOL/L (ref 20–32)
CREAT SERPL-MCNC: 3.25 MG/DL (ref 0.66–1.25)
DEPRECATED CALCIDIOL+CALCIFEROL SERPL-MC: 36 UG/L (ref 20–75)
ERYTHROCYTE [DISTWIDTH] IN BLOOD BY AUTOMATED COUNT: 13.4 % (ref 10–15)
GFR SERPL CREATININE-BSD FRML MDRD: 26 ML/MIN/{1.73_M2}
GLUCOSE SERPL-MCNC: 81 MG/DL (ref 70–99)
HCT VFR BLD AUTO: 34.9 % (ref 40–53)
HGB BLD-MCNC: 11.3 G/DL (ref 13.3–17.7)
MCH RBC QN AUTO: 29.4 PG (ref 26.5–33)
MCHC RBC AUTO-ENTMCNC: 32.4 G/DL (ref 31.5–36.5)
MCV RBC AUTO: 91 FL (ref 78–100)
PHOSPHATE SERPL-MCNC: 4.4 MG/DL (ref 2.5–4.5)
PLATELET # BLD AUTO: 308 10E9/L (ref 150–450)
POTASSIUM SERPL-SCNC: 4.1 MMOL/L (ref 3.4–5.3)
PTH-INTACT SERPL-MCNC: 195 PG/ML (ref 18–80)
RBC # BLD AUTO: 3.85 10E12/L (ref 4.4–5.9)
SODIUM SERPL-SCNC: 144 MMOL/L (ref 133–144)
WBC # BLD AUTO: 10 10E9/L (ref 4–11)

## 2019-05-13 PROCEDURE — 83970 ASSAY OF PARATHORMONE: CPT | Performed by: INTERNAL MEDICINE

## 2019-05-13 PROCEDURE — G0463 HOSPITAL OUTPT CLINIC VISIT: HCPCS | Mod: ZF

## 2019-05-13 PROCEDURE — 82306 VITAMIN D 25 HYDROXY: CPT | Performed by: INTERNAL MEDICINE

## 2019-05-13 PROCEDURE — 85027 COMPLETE CBC AUTOMATED: CPT | Performed by: INTERNAL MEDICINE

## 2019-05-13 PROCEDURE — 36415 COLL VENOUS BLD VENIPUNCTURE: CPT | Performed by: INTERNAL MEDICINE

## 2019-05-13 PROCEDURE — 80069 RENAL FUNCTION PANEL: CPT | Performed by: INTERNAL MEDICINE

## 2019-05-13 ASSESSMENT — MIFFLIN-ST. JEOR
SCORE: 1348.73
SCORE: 1351.83

## 2019-05-13 ASSESSMENT — PAIN SCALES - GENERAL
PAINLEVEL: NO PAIN (0)
PAINLEVEL: NO PAIN (0)

## 2019-05-13 NOTE — ASSESSMENT & PLAN NOTE
In the absence of fracture history, I wouldn't call what we are seeing osteoporosis.  In fact, additional analysis of the DXA might give different results, such as if the BMD was assessed for his height age, or for bone age.   He likely never achieved a normal BMD, he is still at the age where he would be capable of continuing to increase  BMD.  I would not start therapy for the BMD without a bone biopsy which is a process that would require referral to ortho, tetracycline labeling, and then bone biopsy, followed by long time to get the results.  The BSAP is not high and the PTH is high.

## 2019-05-13 NOTE — PROGRESS NOTES
Nephrology Clinic    Glen Abel MD  2019     Name: Aidan Louie  MRN: [id number]  Age: 23 year old  : 1995  Referring provider: Nathalia Collado     Assessment and Plan:  Mr. 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     1. CKD, stage 4:  Baseline creatinine now seems to be ~3.2 with a GFR ~26.  Etiology secondary to obstructive nephropathy from congenital abnormalities of the urogenital system and recurrent UTIs.  He was counseled regarding his options with renal replacement therapy including transplant.  He was advised that secondary to his trach he may have a challenge but remains a potential transplant candidate as the trach is not for hypoxic respiratory disease. He has been transitioning from pediatric pulmonologist to adult pulmonologist. He has had transplant evaluation and appears to be a candidate but will not be listed until his GFR is < 20 ml/min.    -- CKD education and dialysis options provided again today   -- continue captopril 2.5 for albuminuria/renoprotection.  Urine albumin ~2 g/g today so would like to increase captopril but will reassess at next visit  -- continues to straight cath and close monitoring for UTIs; he follows closely with urology  -- RTC in 4 months     2. HTN/Volume:  Blood pressure at goal of <130/80 and euvolemic on exam.  -continue amlodipine 5mg BID,  captopril 2.5  and clonidine 0.1mg BID  -again, would like to increase captopril next for albuminuria which we will consider at next visit and potentially wean him off clonidine     3. Anemia of CKD: Hgb at goal (11.6 today).    -no need for POORNIMA therapy  -check iron stores at next visit      4. CKD-MBD: Corrected calcium and phos at goal. Vitamin D levels at goal.  PTH mildly elevated at 177. DEXA with evidence of  osteoporosis.  Bone specific alk phos within normal limits. Secondary hyperparathyroidism does not seem to be a big contributor to decreased bone mineralization at this time.    -continue cholecalciferol at 2000 units daily  -no need for active vitamin D (calcitriol) at this time  -will refer to endocrinology for further management of osteoporosis. If therapy initiated, then would favor denosumab.       5. Metabolic acidosis: Due to CKD.    -will start sodium bicarbonate 650 mg TID for acidosis with the goal of slowing progression of CKD and bone disease      6. Recurrent UTIs: Colonized so based on symptoms.  No evidence of UTI at this time.   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 This recently became irritated, but there are no signs of infection today. Previously followed by pediatric ENT.    -now followed by adult ENT        Follow-up: Return in about 6 months (around 11/13/2019).    Reason For Visit:   Follow-up.     HPI:   Aidan oLuie is a 23 year old male who presents for follow-up of his CKD IV secondary to Prune belly syndrome. His other histories include hypertension, bilateral hearing loss on hearing aids, short stature, choanal atresia SP repair, Dawood Reece anomaly, and restrictive lung disease. He has been following with Dr. Hess for his CKD, and Dr. Schilling has been his surgeon. He has a history of recurrent UTI and worsening creatinine in the past few years. Patient has a history of Prune belly syndrome and extensive urological abnormalities. He has had reconstructed ureter and bladder and has to self catheterize every 3 hours with minimal irrigation with 180ml saline. Since he has been more compliant with his saline flushes, he has had less frequent UTI. The patient was last evaluated in nephrology clinic on 1/3/19 by me.    Since his last visit, he has  been doing well in terms of his kidney function. His mother reports that his tracheostomy recently became irritated and he had it removed. He has not been as medically compliant, which his mother attributes to his low mood since the loss of his father at the end of March. They have no other concerns.       Review of Systems:   Pertinent items are noted in HPI or as below, remainder of complete ROS is negative.      Active Medications:  Current Outpatient Medications:      albuterol (2.5 MG/3ML) 0.083% nebulizer solution, Take 1 ampule by nebulization 4 times daily , Disp: , Rfl:      amLODIPine (NORVASC) 5 MG tablet, TAKE 1 TABLET(5 MG) BY MOUTH TWICE DAILY, Disp: 180 tablet, Rfl: 0     amoxicillin-clavulanate (AUGMENTIN) 875-125 MG per tablet, Take 1 tablet by mouth 2 times daily, Disp: 20 tablet, Rfl: 0     azithromycin (ZITHROMAX) 250 MG tablet, Take  by mouth. Mon, Wed, Fri, Disp: , Rfl:      calcium carbonate (OS-SUSAN 500 MG [place]. [place]) 500 MG tablet, Take 1 tablet (500 mg) by mouth 3 times daily (with meals), Disp: 90 tablet, Rfl: 6     captopril 0.1 mg/mL (CAPOTEN) 0.1 mg/mL SUSP, Take 2.5 mg three times daily (2.5 mg/5ml), Disp: 460 mL, Rfl: 11     cholecalciferol 2000 UNITS tablet, Take 2,000 Units by mouth daily, Disp: 30 tablet, Rfl: 11     cloNIDine (CATAPRES) 0.1 MG tablet, TAKE 1 TABLET(0.1 MG) BY MOUTH TWICE DAILY, Disp: 180 tablet, Rfl: 0     famotidine (PEPCID) 20 MG tablet, Take 20 mg by mouth 2 times daily, Disp: , Rfl:      ipratropium (ATROVENT) 0.02 % neb solution, Take 0.5 mg by nebulization 4 times daily , Disp: 225 mL, Rfl:      order for DME, Equipment being ordered: DME  Adult Bivona Uncuffed Tracheostomy Tube-5.0, Disp: 1 Units, Rfl: 11     predniSONE (DELTASONE) 5 MG tablet, Take 5 mg by mouth every other day Take 7.5mg daily ., Disp: , Rfl: 6     sodium bicarbonate 650 MG tablet, Take 1 tablet (650 mg) by mouth 3 times daily, Disp: 270 tablet, Rfl: 0     tobramycin, PF, (ADX) 300 MG/5ML  "nebulizer solution, Take 1 ampule by nebulization as needed, Disp: , Rfl:      ranitidine (ZANTAC) 150 MG tablet, Take 0.5 tablets (75 mg) by mouth 2 times daily (Patient not taking: Reported on 1/3/2019), Disp: 90 tablet, Rfl: 3     Allergies:   Latex; Latex; and Tape [adhesive tape]      Past Medical History:  Bilateral reflux nephropathy  CKD (chronic kidney disease) stage 4, GFR 15-29 ml/min     Hearing loss      Hypertension   Dawood Reece sequence             Prune belly syndrome   Recurrent UTI   Respiratory bronchiolitis interstitial lung disease  Restrictive lung disease            Tracheostomy dependence   Vitamin D deficiency      Past Surgical History:  Bilateral ureteral reimplantation, abdominoplasty, mitrofanoff creation  Cystoscopy  Gastrostomy tube                     Herniorrhaphy inguinal bilateral, left ochiopexy  Laryngoscopy, bronchoscopy, combined x5   vesicostomy               Redo right ureteral reimplantation  Replacement of trach    Takedown and revision of mitrofanoff stoma     Family History:   The patient has family history of diabetes (mother).     Social History:   The patient presents here with his mother and aunt. The patient has never smoked. Denies alcohol use.     Physical Exam:  /76   Pulse 50   Ht 1.55 m (5' 1.02\")   Wt 48.8 kg (107 lb 9.6 oz)   SpO2 96%   BMI 20.31 kg/m     GENERAL APPEARANCE: alert and no distress  EYES: nonicteric  HENT: mouth without ulcers or lesions  NECK: supple, no adenopathy, trach present  RESP: lungs clear to auscultation   CV: regular rhythm, normal rate, no rub  ABDOMEN: soft, nontender, nondistended  : No CVA tenderness   Extremities: no significant edema  MS: no evidence of inflammation in joints, no muscle tenderness  SKIN: no concerning rash  NEURO: mentation intact and speech normal  PSYCH: affect normal/bright     Laboratory:  CMP  Recent Labs   Lab Test 19  1027 19  1438 18  1056 17  1337 " 12/22/16  1325 09/17/15  1205 02/05/15  1110    141 139 137 143 139 141   POTASSIUM 4.1 4.3 4.1 4.4 4.4 4.6 4.2   CHLORIDE 116* 114* 108 108 110* 111* 111*   CO2 18* 18* 22 21 24 21 21   ANIONGAP 10 9 9 8 9 7 9   GLC 81 109* 103* 116* 89 140* 118*   BUN 64* 53* 45 44* 40* 50* 40*   CR 3.25* 3.22* 2.9 2.93* 2.79* 2.57* 2.07*   GFRESTIMATED 26* 26* 27.6 27* 29* 32* 42*   GFRESTBLACK 30* 30*  --  33* 35* 39* 51*   SUSAN 8.8 8.4* 8.7 8.8 8.7 8.7* 8.7*   MAG  --   --   --   --   --  2.1 2.0   PHOS 4.4 3.6  --  2.9 4.2 2.7* 3.5   ALBUMIN 3.2* 3.6 3.4 3.6 3.3* 3.8 3.5   ALKPHOS  --   --   --   --   --  80 87     CBC  Recent Labs   Lab Test 05/13/19  1027 01/03/19  1438 08/09/18  1056 07/11/17  1337  09/17/15  1205 02/05/15  1110   HGB 11.3* 11.6* 12.3* 13.0*   < > 13.0* 13.8   WBC 10.0  --   --  8.8  --  8.7 7.7   RBC 3.85*  --   --  4.35*  --  4.44 4.69   HCT 34.9*  --   --  39.0*  --  38.9* 41.0   MCV 91  --   --  90  --  88 87   MCH 29.4  --   --  29.9  --  29.3 29.4   MCHC 32.4  --   --  33.3  --  33.4 33.7   RDW 13.4  --   --  13.2  --  13.5 13.2     --   --  305  --  326 281    < > = values in this interval not displayed.     INR  No lab results found.  ABG  No lab results found.   URINE STUDIES  Recent Labs   Lab Test 07/11/17  1341 12/22/16  1328 09/17/15  1410 02/05/15  1115   COLOR Yellow Straw Quantity not sufficient Straw   APPEARANCE Clear Slightly Cloudy Quantity not sufficient Clear   URINEGLC Negative Negative Quantity not sufficient* Negative   URINEBILI Negative Negative Quantity not sufficient* Negative   URINEKETONE Negative Negative Quantity not sufficient* Negative   SG 1.005 1.006 Quantity not sufficient 1.005   UBLD Negative Trace* Quantity not sufficient* Trace*   URINEPH 6.0 6.5 Quantity not sufficient 6.0   PROTEIN 100* 100* Quantity not sufficient* 100*   NITRITE Positive* Negative Quantity not sufficient* Negative   LEUKEST Small* Large* Quantity not sufficient* Large*   RBCU <1 6*  Quantity not sufficient 1   WBCU 6* >182* Quantity not sufficient* 15*     Recent Labs   Lab Test 01/03/19  1435 07/11/17  1341 12/22/16  1328 02/05/15  1115   UTPG 2.60* 3.04* 3.05* 5.28*     PTH  Recent Labs   Lab Test 01/03/19  1438 07/11/17  1337 12/22/16  1325 09/17/15  1205 02/05/15  1110 10/03/13  2128 09/20/12  1200   PTHI 177* 191* 98* 133* 303* 141* 74*     IRON STUDIES   Recent Labs   Lab Test 08/09/18  1056 08/09/18  1046 07/11/17  1337 12/22/16  1325 09/17/15  1205 02/05/15  1110   IRON 178  --  77 135 99 160     --  296 241 297 281   IRONSAT 67  --  26 56* 33 57*   JUVENAL  --  14 16* 56 27 34       Scribe Disclosure:   I, Chung Dumont, am serving as a scribe to document services personally performed by Glen Abel MD at this visit, based upon the provider's statements to me. All documentation has been reviewed by the aforementioned provider prior to being entered into the official medical record.    Total time spent was 30 minutes, and more than 50% of face to face time was spent in counseling and/or coordination of care regarding principles of multidisciplinary care, medication management, and chronic kidney disease education.  Glen Abel MD

## 2019-05-13 NOTE — LETTER
2019       RE: Aidan Louie  [address]  [place] [address]     Dear Colleague,    Thank you for referring your patient, Aidan Louie, to the [facility] at [facility]. Please see a copy of my visit note below.      Nephrology Clinic    Glen Abel MD  2019     Name: Aidan Louie  MRN: [id number]  Age: 23 year old  : 1995  Referring provider: Nathalia Collado     Assessment and Plan:  Mr. 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     1. CKD, stage 4:  Baseline creatinine now seems to be ~3.2 with a GFR ~26.  Etiology secondary to obstructive nephropathy from congenital abnormalities of the urogenital system and recurrent UTIs.  He was counseled regarding his options with renal replacement therapy including transplant.  He was advised that secondary to his trach he may have a challenge but remains a potential transplant candidate as the trach is not for hypoxic respiratory disease. He has been transitioning from pediatric pulmonologist to adult pulmonologist. He has had transplant evaluation and appears to be a candidate but will not be listed until his GFR is < 20 ml/min.    -- CKD education and dialysis options provided again today   -- continue captopril 2.5 for albuminuria/renoprotection.  Urine albumin ~2 g/g today so would like to increase captopril but will reassess at next visit  -- continues to straight cath and close monitoring for UTIs; he follows closely with urology  -- RTC in 4 months     2. HTN/Volume:  Blood pressure at goal of <130/80 and euvolemic on exam.  -continue amlodipine 5mg BID,  captopril 2.5  and clonidine 0.1mg BID  -again, would like to increase captopril next for albuminuria which we will consider at next  visit and potentially wean him off clonidine     3. Anemia of CKD: Hgb at goal (11.6 today).    -no need for POORNIMA therapy  -check iron stores at next visit      4. CKD-MBD: Corrected calcium and phos at goal. Vitamin D levels at goal.  PTH mildly elevated at 177. DEXA with evidence of osteoporosis.  Bone specific alk phos within normal limits. Secondary hyperparathyroidism does not seem to be a big contributor to decreased bone mineralization at this time.    -continue cholecalciferol at 2000 units daily  -no need for active vitamin D (calcitriol) at this time  -will refer to endocrinology for further management of osteoporosis. If therapy initiated, then would favor denosumab.       5. Metabolic acidosis: Due to CKD.    -will start sodium bicarbonate 650 mg TID for acidosis with the goal of slowing progression of CKD and bone disease      6. Recurrent UTIs: Colonized so based on symptoms.  No evidence of UTI at this time.   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 This recently became irritated, but there are no signs of infection today. Previously followed by pediatric ENT.    -now followed by adult ENT        Follow-up: Return in about 6 months (around 11/13/2019).    Reason For Visit:   Follow-up.     HPI:   Aidan Louie is a 23 year old male who presents for follow-up of his CKD IV secondary to Prune belly syndrome. His other histories include hypertension, bilateral hearing loss on hearing aids, short stature, choanal atresia SP repair, Dawood Reece anomaly, and restrictive lung disease. He has been following with Dr. Hess for his CKD, and Dr. Schilling has been his surgeon. He has a history of recurrent UTI and worsening creatinine in the past few years. Patient has a history of Prune belly syndrome and extensive urological abnormalities. He has had reconstructed ureter and  bladder and has to self catheterize every 3 hours with minimal irrigation with 180ml saline. Since he has been more compliant with his saline flushes, he has had less frequent UTI. The patient was last evaluated in nephrology clinic on 1/3/19 by me.    Since his last visit, he has been doing well in terms of his kidney function. His mother reports that his tracheostomy recently became irritated and he had it removed. He has not been as medically compliant, which his mother attributes to his low mood since the loss of his father at the end of March. They have no other concerns.       Review of Systems:   Pertinent items are noted in HPI or as below, remainder of complete ROS is negative.      Active Medications:  Current Outpatient Medications:      albuterol (2.5 MG/3ML) 0.083% nebulizer solution, Take 1 ampule by nebulization 4 times daily , Disp: , Rfl:      amLODIPine (NORVASC) 5 MG tablet, TAKE 1 TABLET(5 MG) BY MOUTH TWICE DAILY, Disp: 180 tablet, Rfl: 0     amoxicillin-clavulanate (AUGMENTIN) 875-125 MG per tablet, Take 1 tablet by mouth 2 times daily, Disp: 20 tablet, Rfl: 0     azithromycin (ZITHROMAX) 250 MG tablet, Take  by mouth. Mon, Wed, Fri, Disp: , Rfl:      calcium carbonate (OS-SUSAN 500 MG [place]. [place]) 500 MG tablet, Take 1 tablet (500 mg) by mouth 3 times daily (with meals), Disp: 90 tablet, Rfl: 6     captopril 0.1 mg/mL (CAPOTEN) 0.1 mg/mL SUSP, Take 2.5 mg three times daily (2.5 mg/5ml), Disp: 460 mL, Rfl: 11     cholecalciferol 2000 UNITS tablet, Take 2,000 Units by mouth daily, Disp: 30 tablet, Rfl: 11     cloNIDine (CATAPRES) 0.1 MG tablet, TAKE 1 TABLET(0.1 MG) BY MOUTH TWICE DAILY, Disp: 180 tablet, Rfl: 0     famotidine (PEPCID) 20 MG tablet, Take 20 mg by mouth 2 times daily, Disp: , Rfl:      ipratropium (ATROVENT) 0.02 % neb solution, Take 0.5 mg by nebulization 4 times daily , Disp: 225 mL, Rfl:      order for DME, Equipment being ordered: DME  Adult Bivona Uncuffed Tracheostomy Tube-5.0,  "Disp: 1 Units, Rfl: 11     predniSONE (DELTASONE) 5 MG tablet, Take 5 mg by mouth every other day Take 7.5mg daily ., Disp: , Rfl: 6     sodium bicarbonate 650 MG tablet, Take 1 tablet (650 mg) by mouth 3 times daily, Disp: 270 tablet, Rfl: 0     tobramycin, PF, (DAX) 300 MG/5ML nebulizer solution, Take 1 ampule by nebulization as needed, Disp: , Rfl:      ranitidine (ZANTAC) 150 MG tablet, Take 0.5 tablets (75 mg) by mouth 2 times daily (Patient not taking: Reported on 1/3/2019), Disp: 90 tablet, Rfl: 3     Allergies:   Latex; Latex; and Tape [adhesive tape]      Past Medical History:  Bilateral reflux nephropathy  CKD (chronic kidney disease) stage 4, GFR 15-29 ml/min     Hearing loss      Hypertension   Dawood Reece sequence             Prune belly syndrome   Recurrent UTI   Respiratory bronchiolitis interstitial lung disease  Restrictive lung disease            Tracheostomy dependence   Vitamin D deficiency      Past Surgical History:  Bilateral ureteral reimplantation, abdominoplasty, mitrofanoff creation  Cystoscopy  Gastrostomy tube                     Herniorrhaphy inguinal bilateral, left ochiopexy  Laryngoscopy, bronchoscopy, combined x5   vesicostomy               Redo right ureteral reimplantation  Replacement of trach    Takedown and revision of mitrofanoff stoma     Family History:   The patient has family history of diabetes (mother).     Social History:   The patient presents here with his mother and aunt. The patient has never smoked. Denies alcohol use.     Physical Exam:  /76   Pulse 50   Ht 1.55 m (5' 1.02\")   Wt 48.8 kg (107 lb 9.6 oz)   SpO2 96%   BMI 20.31 kg/m      GENERAL APPEARANCE: alert and no distress  EYES: nonicteric  HENT: mouth without ulcers or lesions  NECK: supple, no adenopathy, trach present  RESP: lungs clear to auscultation   CV: regular rhythm, normal rate, no rub  ABDOMEN: soft, nontender, nondistended  : No CVA tenderness   Extremities: no significant " edema  MS: no evidence of inflammation in joints, no muscle tenderness  SKIN: no concerning rash  NEURO: mentation intact and speech normal  PSYCH: affect normal/bright     Laboratory:  CMP  Recent Labs   Lab Test 05/13/19  1027 01/03/19  1438 08/09/18  1056 07/11/17  1337 12/22/16  1325 09/17/15  1205 02/05/15  1110    141 139 137 143 139 141   POTASSIUM 4.1 4.3 4.1 4.4 4.4 4.6 4.2   CHLORIDE 116* 114* 108 108 110* 111* 111*   CO2 18* 18* 22 21 24 21 21   ANIONGAP 10 9 9 8 9 7 9   GLC 81 109* 103* 116* 89 140* 118*   BUN 64* 53* 45 44* 40* 50* 40*   CR 3.25* 3.22* 2.9 2.93* 2.79* 2.57* 2.07*   GFRESTIMATED 26* 26* 27.6 27* 29* 32* 42*   GFRESTBLACK 30* 30*  --  33* 35* 39* 51*   SUSAN 8.8 8.4* 8.7 8.8 8.7 8.7* 8.7*   MAG  --   --   --   --   --  2.1 2.0   PHOS 4.4 3.6  --  2.9 4.2 2.7* 3.5   ALBUMIN 3.2* 3.6 3.4 3.6 3.3* 3.8 3.5   ALKPHOS  --   --   --   --   --  80 87     CBC  Recent Labs   Lab Test 05/13/19  1027 01/03/19  1438 08/09/18  1056 07/11/17  1337  09/17/15  1205 02/05/15  1110   HGB 11.3* 11.6* 12.3* 13.0*   < > 13.0* 13.8   WBC 10.0  --   --  8.8  --  8.7 7.7   RBC 3.85*  --   --  4.35*  --  4.44 4.69   HCT 34.9*  --   --  39.0*  --  38.9* 41.0   MCV 91  --   --  90  --  88 87   MCH 29.4  --   --  29.9  --  29.3 29.4   MCHC 32.4  --   --  33.3  --  33.4 33.7   RDW 13.4  --   --  13.2  --  13.5 13.2     --   --  305  --  326 281    < > = values in this interval not displayed.     INR  No lab results found.  ABG  No lab results found.   URINE STUDIES  Recent Labs   Lab Test 07/11/17  1341 12/22/16  1328 09/17/15  1410 02/05/15  1115   COLOR Yellow Straw Quantity not sufficient Straw   APPEARANCE Clear Slightly Cloudy Quantity not sufficient Clear   URINEGLC Negative Negative Quantity not sufficient* Negative   URINEBILI Negative Negative Quantity not sufficient* Negative   URINEKETONE Negative Negative Quantity not sufficient* Negative   SG 1.005 1.006 Quantity not sufficient 1.005   UBLD  Negative Trace* Quantity not sufficient* Trace*   URINEPH 6.0 6.5 Quantity not sufficient 6.0   PROTEIN 100* 100* Quantity not sufficient* 100*   NITRITE Positive* Negative Quantity not sufficient* Negative   LEUKEST Small* Large* Quantity not sufficient* Large*   RBCU <1 6* Quantity not sufficient 1   WBCU 6* >182* Quantity not sufficient* 15*     Recent Labs   Lab Test 01/03/19  1435 07/11/17  1341 12/22/16  1328 02/05/15  1115   UTPG 2.60* 3.04* 3.05* 5.28*     PTH  Recent Labs   Lab Test 01/03/19  1438 07/11/17  1337 12/22/16  1325 09/17/15  1205 02/05/15  1110 10/03/13  2128 09/20/12  1200   PTHI 177* 191* 98* 133* 303* 141* 74*     IRON STUDIES   Recent Labs   Lab Test 08/09/18  1056 08/09/18  1046 07/11/17  1337 12/22/16  1325 09/17/15  1205 02/05/15  1110   IRON 178  --  77 135 99 160     --  296 241 297 281   IRONSAT 67  --  26 56* 33 57*   JUVENAL  --  14 16* 56 27 34       Scribe Disclosure:   I, Chung Dumont, am serving as a scribe to document services personally performed by Glen Abel MD at this visit, based upon the provider's statements to me. All documentation has been reviewed by the aforementioned provider prior to being entered into the official medical record.    Total time spent was 30 minutes, and more than 50% of face to face time was spent in counseling and/or coordination of care regarding principles of multidisciplinary care, medication management, and chronic kidney disease education.  Glen Abel MD         Again, thank you for allowing me to participate in the care of your patient.      Sincerely,    lGen Abel MD
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
## 2019-05-13 NOTE — ASSESSMENT & PLAN NOTE
with metabolic acidosis, secondary hyperparathyroidism. Followed in Renal . This is a risk for the bone.  It also limits the treatment options if he has osteoporosis.

## 2019-05-13 NOTE — ASSESSMENT & PLAN NOTE
history of GH treatment, ending approximately age 17.  We do not have the records related to this.  His mother believes he had low GH then.
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
## 2019-05-13 NOTE — LETTER
2019      RE: Aidan Louie  [address]  [place] [address]         Nephrology Clinic    Glen Abel MD  2019     Name: Aidan Louie  MRN: [id number]  Age: 23 year old  : 1995  Referring provider: Nathalia Collado     Assessment and Plan:  Mr. 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     1. CKD, stage 4:  Baseline creatinine now seems to be ~3.2 with a GFR ~26.  Etiology secondary to obstructive nephropathy from congenital abnormalities of the urogenital system and recurrent UTIs.  He was counseled regarding his options with renal replacement therapy including transplant.  He was advised that secondary to his trach he may have a challenge but remains a potential transplant candidate as the trach is not for hypoxic respiratory disease. He has been transitioning from pediatric pulmonologist to adult pulmonologist. He has had transplant evaluation and appears to be a candidate but will not be listed until his GFR is < 20 ml/min.    -- CKD education and dialysis options provided again today   -- continue captopril 2.5 for albuminuria/renoprotection.  Urine albumin ~2 g/g today so would like to increase captopril but will reassess at next visit  -- continues to straight cath and close monitoring for UTIs; he follows closely with urology  -- RTC in 4 months     2. HTN/Volume:  Blood pressure at goal of <130/80 and euvolemic on exam.  -continue amlodipine 5mg BID,  captopril 2.5  and clonidine 0.1mg BID  -again, would like to increase captopril next for albuminuria which we will consider at next visit and potentially wean him off clonidine     3. Anemia of CKD: Hgb at goal (11.6 today).    -no need for POORNIMA therapy  -check iron stores at next visit      4. CKD-MBD: Corrected calcium and phos at  goal. Vitamin D levels at goal.  PTH mildly elevated at 177. DEXA with evidence of osteoporosis.  Bone specific alk phos within normal limits. Secondary hyperparathyroidism does not seem to be a big contributor to decreased bone mineralization at this time.    -continue cholecalciferol at 2000 units daily  -no need for active vitamin D (calcitriol) at this time  -will refer to endocrinology for further management of osteoporosis. If therapy initiated, then would favor denosumab.       5. Metabolic acidosis: Due to CKD.    -will start sodium bicarbonate 650 mg TID for acidosis with the goal of slowing progression of CKD and bone disease      6. Recurrent UTIs: Colonized so based on symptoms.  No evidence of UTI at this time.   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 This recently became irritated, but there are no signs of infection today. Previously followed by pediatric ENT.    -now followed by adult ENT        Follow-up: Return in about 6 months (around 11/13/2019).    Reason For Visit:   Follow-up.     HPI:   Aidan Louie is a 23 year old male who presents for follow-up of his CKD IV secondary to Prune belly syndrome. His other histories include hypertension, bilateral hearing loss on hearing aids, short stature, choanal atresia SP repair, Dawood Reece anomaly, and restrictive lung disease. He has been following with Dr. Hess for his CKD, and Dr. Schilling has been his surgeon. He has a history of recurrent UTI and worsening creatinine in the past few years. Patient has a history of Prune belly syndrome and extensive urological abnormalities. He has had reconstructed ureter and bladder and has to self catheterize every 3 hours with minimal irrigation with 180ml saline. Since he has been more compliant with his saline flushes, he has had less frequent UTI. The patient was last  evaluated in nephrology clinic on 1/3/19 by me.    Since his last visit, he has been doing well in terms of his kidney function. His mother reports that his tracheostomy recently became irritated and he had it removed. He has not been as medically compliant, which his mother attributes to his low mood since the loss of his father at the end of March. They have no other concerns.       Review of Systems:   Pertinent items are noted in HPI or as below, remainder of complete ROS is negative.      Active Medications:  Current Outpatient Medications:      albuterol (2.5 MG/3ML) 0.083% nebulizer solution, Take 1 ampule by nebulization 4 times daily , Disp: , Rfl:      amLODIPine (NORVASC) 5 MG tablet, TAKE 1 TABLET(5 MG) BY MOUTH TWICE DAILY, Disp: 180 tablet, Rfl: 0     amoxicillin-clavulanate (AUGMENTIN) 875-125 MG per tablet, Take 1 tablet by mouth 2 times daily, Disp: 20 tablet, Rfl: 0     azithromycin (ZITHROMAX) 250 MG tablet, Take  by mouth. Mon, Wed, Fri, Disp: , Rfl:      calcium carbonate (OS-SUSAN 500 MG [place]. [place]) 500 MG tablet, Take 1 tablet (500 mg) by mouth 3 times daily (with meals), Disp: 90 tablet, Rfl: 6     captopril 0.1 mg/mL (CAPOTEN) 0.1 mg/mL SUSP, Take 2.5 mg three times daily (2.5 mg/5ml), Disp: 460 mL, Rfl: 11     cholecalciferol 2000 UNITS tablet, Take 2,000 Units by mouth daily, Disp: 30 tablet, Rfl: 11     cloNIDine (CATAPRES) 0.1 MG tablet, TAKE 1 TABLET(0.1 MG) BY MOUTH TWICE DAILY, Disp: 180 tablet, Rfl: 0     famotidine (PEPCID) 20 MG tablet, Take 20 mg by mouth 2 times daily, Disp: , Rfl:      ipratropium (ATROVENT) 0.02 % neb solution, Take 0.5 mg by nebulization 4 times daily , Disp: 225 mL, Rfl:      order for DME, Equipment being ordered: DME  Adult Bivona Uncuffed Tracheostomy Tube-5.0, Disp: 1 Units, Rfl: 11     predniSONE (DELTASONE) 5 MG tablet, Take 5 mg by mouth every other day Take 7.5mg daily ., Disp: , Rfl: 6     sodium bicarbonate 650 MG tablet, Take 1 tablet (650 mg) by mouth  "3 times daily, Disp: 270 tablet, Rfl: 0     tobramycin, PF, (DAX) 300 MG/5ML nebulizer solution, Take 1 ampule by nebulization as needed, Disp: , Rfl:      ranitidine (ZANTAC) 150 MG tablet, Take 0.5 tablets (75 mg) by mouth 2 times daily (Patient not taking: Reported on 1/3/2019), Disp: 90 tablet, Rfl: 3     Allergies:   Latex; Latex; and Tape [adhesive tape]      Past Medical History:  Bilateral reflux nephropathy  CKD (chronic kidney disease) stage 4, GFR 15-29 ml/min     Hearing loss      Hypertension   Dawood Reece sequence             Prune belly syndrome   Recurrent UTI   Respiratory bronchiolitis interstitial lung disease  Restrictive lung disease            Tracheostomy dependence   Vitamin D deficiency      Past Surgical History:  Bilateral ureteral reimplantation, abdominoplasty, mitrofanoff creation  Cystoscopy  Gastrostomy tube                     Herniorrhaphy inguinal bilateral, left ochiopexy  Laryngoscopy, bronchoscopy, combined x5   vesicostomy               Redo right ureteral reimplantation  Replacement of trach    Takedown and revision of mitrofanoff stoma     Family History:   The patient has family history of diabetes (mother).     Social History:   The patient presents here with his mother and aunt. The patient has never smoked. Denies alcohol use.     Physical Exam:  /76   Pulse 50   Ht 1.55 m (5' 1.02\")   Wt 48.8 kg (107 lb 9.6 oz)   SpO2 96%   BMI 20.31 kg/m      GENERAL APPEARANCE: alert and no distress  EYES: nonicteric  HENT: mouth without ulcers or lesions  NECK: supple, no adenopathy, trach present  RESP: lungs clear to auscultation   CV: regular rhythm, normal rate, no rub  ABDOMEN: soft, nontender, nondistended  : No CVA tenderness   Extremities: no significant edema  MS: no evidence of inflammation in joints, no muscle tenderness  SKIN: no concerning rash  NEURO: mentation intact and speech normal  PSYCH: affect normal/bright     Laboratory:  CMP  Recent Labs "   Lab Test 05/13/19  1027 01/03/19  1438 08/09/18  1056 07/11/17  1337 12/22/16  1325 09/17/15  1205 02/05/15  1110    141 139 137 143 139 141   POTASSIUM 4.1 4.3 4.1 4.4 4.4 4.6 4.2   CHLORIDE 116* 114* 108 108 110* 111* 111*   CO2 18* 18* 22 21 24 21 21   ANIONGAP 10 9 9 8 9 7 9   GLC 81 109* 103* 116* 89 140* 118*   BUN 64* 53* 45 44* 40* 50* 40*   CR 3.25* 3.22* 2.9 2.93* 2.79* 2.57* 2.07*   GFRESTIMATED 26* 26* 27.6 27* 29* 32* 42*   GFRESTBLACK 30* 30*  --  33* 35* 39* 51*   SUSAN 8.8 8.4* 8.7 8.8 8.7 8.7* 8.7*   MAG  --   --   --   --   --  2.1 2.0   PHOS 4.4 3.6  --  2.9 4.2 2.7* 3.5   ALBUMIN 3.2* 3.6 3.4 3.6 3.3* 3.8 3.5   ALKPHOS  --   --   --   --   --  80 87     CBC  Recent Labs   Lab Test 05/13/19  1027 01/03/19  1438 08/09/18  1056 07/11/17  1337  09/17/15  1205 02/05/15  1110   HGB 11.3* 11.6* 12.3* 13.0*   < > 13.0* 13.8   WBC 10.0  --   --  8.8  --  8.7 7.7   RBC 3.85*  --   --  4.35*  --  4.44 4.69   HCT 34.9*  --   --  39.0*  --  38.9* 41.0   MCV 91  --   --  90  --  88 87   MCH 29.4  --   --  29.9  --  29.3 29.4   MCHC 32.4  --   --  33.3  --  33.4 33.7   RDW 13.4  --   --  13.2  --  13.5 13.2     --   --  305  --  326 281    < > = values in this interval not displayed.     INR  No lab results found.  ABG  No lab results found.   URINE STUDIES  Recent Labs   Lab Test 07/11/17  1341 12/22/16  1328 09/17/15  1410 02/05/15  1115   COLOR Yellow Straw Quantity not sufficient Straw   APPEARANCE Clear Slightly Cloudy Quantity not sufficient Clear   URINEGLC Negative Negative Quantity not sufficient* Negative   URINEBILI Negative Negative Quantity not sufficient* Negative   URINEKETONE Negative Negative Quantity not sufficient* Negative   SG 1.005 1.006 Quantity not sufficient 1.005   UBLD Negative Trace* Quantity not sufficient* Trace*   URINEPH 6.0 6.5 Quantity not sufficient 6.0   PROTEIN 100* 100* Quantity not sufficient* 100*   NITRITE Positive* Negative Quantity not sufficient* Negative    LEUKEST Small* Large* Quantity not sufficient* Large*   RBCU <1 6* Quantity not sufficient 1   WBCU 6* >182* Quantity not sufficient* 15*     Recent Labs   Lab Test 01/03/19  1435 07/11/17  1341 12/22/16  1328 02/05/15  1115   UTPG 2.60* 3.04* 3.05* 5.28*     PTH  Recent Labs   Lab Test 01/03/19  1438 07/11/17  1337 12/22/16  1325 09/17/15  1205 02/05/15  1110 10/03/13  2128 09/20/12  1200   PTHI 177* 191* 98* 133* 303* 141* 74*     IRON STUDIES   Recent Labs   Lab Test 08/09/18  1056 08/09/18  1046 07/11/17  1337 12/22/16  1325 09/17/15  1205 02/05/15  1110   IRON 178  --  77 135 99 160     --  296 241 297 281   IRONSAT 67  --  26 56* 33 57*   JUVENAL  --  14 16* 56 27 34       Scribe Disclosure:   I, Chung Dumont, am serving as a scribe to document services personally performed by Glen Abel MD at this visit, based upon the provider's statements to me. All documentation has been reviewed by the aforementioned provider prior to being entered into the official medical record.    Total time spent was 30 minutes, and more than 50% of face to face time was spent in counseling and/or coordination of care regarding principles of multidisciplinary care, medication management, and chronic kidney disease education.  Glen Abel MD

## 2019-05-13 NOTE — ASSESSMENT & PLAN NOTE
In the setting of CKD stage 4 is likely due to secondary hyperparathyroidism.  Indeed at times the calcium is low or low normal.  This is a risk for the bone.

## 2019-05-14 DIAGNOSIS — E87.20 METABOLIC ACIDOSIS: ICD-10-CM

## 2019-05-15 ENCOUNTER — TELEPHONE (OUTPATIENT)
Dept: NEPHROLOGY | Facility: CLINIC | Age: 24
End: 2019-05-15

## 2019-05-15 DIAGNOSIS — E87.20 METABOLIC ACIDOSIS: ICD-10-CM

## 2019-05-15 RX ORDER — SODIUM BICARBONATE 650 MG/1
TABLET ORAL
Qty: 270 TABLET | Refills: 0 | OUTPATIENT
Start: 2019-05-15

## 2019-05-15 RX ORDER — SODIUM BICARBONATE 650 MG/1
TABLET ORAL
Qty: 270 TABLET | Refills: 3 | Status: SHIPPED | OUTPATIENT
Start: 2019-05-15 | End: 2019-05-23

## 2019-05-15 NOTE — TELEPHONE ENCOUNTER
Reviewed with Dr. Abel. Patient hadn't started bicarb yet, but will start now and follow up with next set of labs for dose adjustments (if needed). Rx sent.    Yoly Mejia RN

## 2019-05-15 NOTE — TELEPHONE ENCOUNTER
Faxed urine albumin and urine protein to [facility]  Mary Scott LPN  Nephrology  [phone]

## 2019-05-23 DIAGNOSIS — E87.20 METABOLIC ACIDOSIS: ICD-10-CM

## 2019-05-23 RX ORDER — SODIUM BICARBONATE 650 MG/1
TABLET ORAL
Qty: 270 TABLET | Refills: 3 | Status: SHIPPED | OUTPATIENT
Start: 2019-05-23 | End: 2020-05-18

## 2019-05-23 RX ORDER — SODIUM BICARBONATE 650 MG/1
TABLET ORAL
Qty: 270 TABLET | Refills: 0 | Status: SHIPPED | OUTPATIENT
Start: 2019-05-23 | End: 2021-06-10

## 2019-05-23 NOTE — TELEPHONE ENCOUNTER
Received a call from patients mom Valerie, unable to  sodium bicarbonate. Call to pharmacy, medication refill was not received for some reason? Resent. Valerie has been updated.  Mary Scott LPN  Nephrology  [phone]

## 2019-05-27 DIAGNOSIS — N18.30 CKD (CHRONIC KIDNEY DISEASE) STAGE 3, GFR 30-59 ML/MIN (H): ICD-10-CM

## 2019-05-28 RX ORDER — CLONIDINE HYDROCHLORIDE 0.1 MG/1
TABLET ORAL
Qty: 180 TABLET | Refills: 0 | Status: SHIPPED | OUTPATIENT
Start: 2019-05-28 | End: 2019-07-01

## 2019-05-28 RX ORDER — AMLODIPINE BESYLATE 5 MG/1
TABLET ORAL
Qty: 180 TABLET | Refills: 0 | Status: SHIPPED | OUTPATIENT
Start: 2019-05-28 | End: 2019-08-26

## 2019-06-04 ENCOUNTER — TELEPHONE (OUTPATIENT)
Dept: ENDOCRINOLOGY | Facility: CLINIC | Age: 24
End: 2019-06-04

## 2019-06-04 NOTE — TELEPHONE ENCOUNTER
[facility] Call Center    Phone Message    May a detailed message be left on voicemail: yes    Reason for Call: Other: PT's mother is requesting to have Dr. Judge's lab orders faxed to [facility] Attn: Dr. Bernie Chou at [phone].  Please follow up with Valerie if needed.      Action Taken: Message routed to:  [facility] ([facility] endo

## 2019-06-05 ENCOUNTER — TELEPHONE (OUTPATIENT)
Dept: ENDOCRINOLOGY | Facility: CLINIC | Age: 24
End: 2019-06-05

## 2019-06-05 ENCOUNTER — TRANSFERRED RECORDS (OUTPATIENT)
Dept: HEALTH INFORMATION MANAGEMENT | Facility: CLINIC | Age: 24
End: 2019-06-05

## 2019-06-05 NOTE — TELEPHONE ENCOUNTER
The 6/10/19 follow up appt was erroneously placed in a new patient space.  Please see if you can reschedule this visit to a telephone visit on 6/21/19 Friday morning.  Thanks    Mandie Judge MD

## 2019-06-06 ENCOUNTER — DOCUMENTATION ONLY (OUTPATIENT)
Dept: ENDOCRINOLOGY | Facility: CLINIC | Age: 24
End: 2019-06-06

## 2019-06-06 LAB
CORTISOL AM: 12.1 MCG/DL (ref 4.3–22.4)
FREE TESTOSTERONE CALC: 8.4 NG/DL (ref 4.4–16.5)
FSH SERPL-ACNC: 27.6 M[IU]/ML
LH, SERUM: 9.1 MIU/ML (ref 1.5–9.3)
PROLACTIN: 12.5 MCG/L (ref 2.1–17.7)
SEX HORMONE BINDING GLOBULIN: 44 NMOL/L (ref 10–61)
T4 FREE SERPL-MCNC: 0.8 NG/DL (ref 0.7–1.4)
TESTOST SERPL-MCNC: 484 NG/DL (ref 200–700)
THYR PEROXIDASE ABY: <28 IU/ML
TSH SERPL-ACNC: 1.51 MCU/ML (ref 0.35–4.5)

## 2019-06-10 ENCOUNTER — OFFICE VISIT (OUTPATIENT)
Dept: OTOLARYNGOLOGY | Facility: CLINIC | Age: 24
End: 2019-06-10
Payer: MEDICARE

## 2019-06-10 VITALS
SYSTOLIC BLOOD PRESSURE: 123 MMHG | WEIGHT: 107 LBS | HEIGHT: 61 IN | BODY MASS INDEX: 20.2 KG/M2 | DIASTOLIC BLOOD PRESSURE: 77 MMHG

## 2019-06-10 DIAGNOSIS — M26.04 MANDIBULAR HYPOPLASIA: ICD-10-CM

## 2019-06-10 DIAGNOSIS — Z98.1 HISTORY OF FUSION OF CERVICAL SPINE: ICD-10-CM

## 2019-06-10 DIAGNOSIS — R13.10 DYSPHAGIA, UNSPECIFIED TYPE: ICD-10-CM

## 2019-06-10 DIAGNOSIS — Z93.0 TRACHEOSTOMY IN PLACE (H): Primary | ICD-10-CM

## 2019-06-10 ASSESSMENT — MIFFLIN-ST. JEOR: SCORE: 1348.73

## 2019-06-10 NOTE — PROGRESS NOTES
Dear Colleague:    Aidan Louie recently returned for follow-up at the [facility]. My clinic note from our visit is enclosed below.  Speech recognition software may have been used in the documentation below; input is reviewed before signature to the best of my ability.     I appreciate the ongoing opportunity to participate in this patient's care.    Please feel free to contact me with any questions.      Sincerely yours,  Bebe Dumas M.D., M.P.H.  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    He was a longtime patient of Dr. Reid's, and has had a trach for many years because of the mandibular hypoplasia. Per report, a number of attempts toward decannulation had been made, but ultimately the decision was made to keep the trach in place due to concerns about his airway. He went to the operating room with Dr. Jose in August 2017 for flexible and rigid laryngoscopy/bronchoscopy. Per the operative note, this showed moderate trismus and base of tongue collapse, redundant, distorted, floppy epiglottis, with a grade 4 laryngoscopy. A small granuloma was seen on the medial left arytenoid. Vocal fold mobility and subglottis were normal. The 5-0 pediatric Bivona tube was noted to be in good position. Distal airway was unremarkable. It was observed that he would be a difficult intubation. With a Rothman blade under the epiglottis he was thought possibly intubatable but this was felt to  be difficult in the best case situation.    He uses oxygen only at night (1L) primarily to prevent him from leaning forward and blocking his tracheostomy. The mask helps to prevent obstruction. At the last visit, we changed his trach to his current 5-0 Bivona size.    He has a new 5-0 Bivona in currently.  Mom reports that there was some swelling that interfered with a trach change a month ago. She therefore left the trach out for about a week. Subsequently, she gave him some prednisone that they had at the house, and then was able to replace the trach with limited difficulty.    He has had increased trach secretions and coughing lately, although he has also not been as diligent about routine trach care, according to his mother. It seems like he has difficulty swallowing solids. Liquids are okay. He has had no recent swallow study.      MEDICATIONS:     Current Outpatient Medications   Medication Sig Dispense Refill     albuterol (2.5 MG/3ML) 0.083% nebulizer solution Take 1 ampule by nebulization 4 times daily        amLODIPine (NORVASC) 5 MG tablet TAKE 1 TABLET(5 MG) BY MOUTH TWICE DAILY 180 tablet 0     amoxicillin-clavulanate (AUGMENTIN) 875-125 MG per tablet Take 1 tablet by mouth 2 times daily 20 tablet 0     azithromycin (ZITHROMAX) 250 MG tablet Take  by mouth. Mon, Wed, Fri       calcium carbonate (OS-SUSAN 500 MG [place]. [place]) 500 MG tablet Take 1 tablet (500 mg) by mouth 3 times daily (with meals) 90 tablet 6     captopril 0.1 mg/mL (CAPOTEN) 0.1 mg/mL SUSP Take 2.5 mg three times daily (2.5 mg/5ml) 460 mL 11     cholecalciferol 2000 UNITS tablet Take 2,000 Units by mouth daily 30 tablet 11     cloNIDine (CATAPRES) 0.1 MG tablet TAKE 1 TABLET(0.1 MG) BY MOUTH TWICE DAILY 180 tablet 0     famotidine (PEPCID) 20 MG tablet Take 20 mg by mouth 2 times daily       ipratropium (ATROVENT) 0.02 % neb solution Take 0.5 mg by nebulization 4 times daily  225 mL      order for DME Equipment being ordered: DME    Adult  Bivona Uncuffed Tracheostomy Tube-5.0 1 Units 11     sodium bicarbonate 650 MG tablet TAKE 1 TABLET BY MOUTH THREE TIMES DAILY 270 tablet 3     sodium bicarbonate 650 MG tablet TAKE 1 TABLET BY MOUTH THREE TIMES DAILY 270 tablet 0     tobramycin, PF, (DAX) 300 MG/5ML nebulizer solution Take 1 ampule by nebulization as needed       predniSONE (DELTASONE) 5 MG tablet Take 5 mg by mouth every other day Take 7.5mg daily .  6     ranitidine (ZANTAC) 150 MG tablet Take 0.5 tablets (75 mg) by mouth 2 times daily (Patient not taking: Reported on 1/3/2019) 90 tablet 3       ALLERGIES:    Allergies   Allergen Reactions     Latex      Latex      Other reaction(s): Other (see comments)     Tape [Adhesive Tape]        NEW PMH/PSH: None    REVIEW OF SYSTEMS:  The patient completed a comprehensive 11 point review of systems (below), which was reviewed. Positives are as noted below.  Patient Supplied Answers to Review of Systems   ENT ROS 6/10/2019   Constitutional Unexplained fatigue   Cardiopulmonary Cough   Musculoskeletal Sore or stiff joints, Back pain       PHYSICAL EXAM:  General: The patient was alert and conversant, and in no acute distress. Patient accompanied by his mother.  Oral cavity/oropharynx: No masses or lesions. Dentition unchanged since prior. Tongue mobility and palate elevation intact and symmetric.  Neck: 5-O Bivona in good position, no significant granulation tissue. Mild to moderate crusting.  Resp: Breathing comfortably, no stridor or stertor.  Neuro: Symmetric facial function. Other cranial nerve function as documented above.  Psych: Normal affect, pleasant and cooperative.  Voice/speech: Mild dysphonia characterized by breathiness and roughness. Speaking well with PM valve in place.      Intake scores  Last 2 Scores for Patient-Answered VHI Questionnaire  No flowsheet data found.   Last 2 Scores for Patient-Answered EAT Questionnaire  No flowsheet data found.     Last 2 Scores for Patient-Answered CSI  Questionnaire  No flowsheet data found.      Procedure: Flexible tracheobronchoscopy through established trach incision  Indications: The procedure was warranted to assess the position and/or condition of the tracheostomy tube and trachea  Description: After written and verbal consent were obtained, the endoscope was passed through the patient's tracheostomy. This allowed visualization of the trachea down to the raul and mainstem bronchi.  The trach was also pulled back to allow evaluation of the trach tract, and then was replaced and secured.  Findings: Trach tube in good position within the airway. Trachea clear to raul, which was sharp. Unremarkable takeoffs of mainstem bronchi. Trach tract unremarkable. No significant granulation tissue.      IMPRESSION AND PLAN:   Aidan Louie returns with the trach in good position. He does have crusting and dried secretions in the trach.     We discussed conservative measures include humidification and lavages to keep the trach clean. They are performing regular trach changes at home.     Plan:  1) Swallow study with esophagram to evaluate swallowing further given solid dysphagia.  2) Improved trach care as discussed above. He does not have a humidifier for his trach so we will order this along with supplies. We talked about potential issues with granulation tissue and preventive measures.  3) If things are going well with the trach, he will return in one year for a checkup, but if there are any concerns (including difficulty with trach changes) they should return sooner.    I appreciate the opportunity to participate in the care of this pleasant patient.

## 2019-06-10 NOTE — PATIENT INSTRUCTIONS
Thank you for choosing [facility] today.    Please follow-up with Dr. Dumas in approximately one year.    Dr. Dumas recommended a swallow test be completed.     If you have any further questions or concerns, call us at [phone].

## 2019-06-10 NOTE — LETTER
6/10/2019      RE: Aidan Louie  [address]  [place] [address]       Dear Colleague:    Aidan Louie recently returned for follow-up at the [facility]. My clinic note from our visit is enclosed below.  Speech recognition software may have been used in the documentation below; input is reviewed before signature to the best of my ability.     I appreciate the ongoing opportunity to participate in this patient's care.    Please feel free to contact me with any questions.      Sincerely yours,  Bebe Dumas M.D., M.P.H.  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    He was a longtime patient of Dr. Reid's, and has had a trach for many years because of the mandibular hypoplasia. Per report, a number of attempts toward decannulation had been made, but ultimately the decision was made to keep the trach in place due to concerns about his airway. He went to the operating room with Dr. Jose in August 2017 for flexible and rigid laryngoscopy/bronchoscopy. Per the operative note, this showed moderate trismus and base of tongue collapse, redundant, distorted, floppy epiglottis, with a grade 4 laryngoscopy. A small granuloma was seen on the medial left arytenoid. Vocal fold mobility and subglottis were normal. The 5-0 pediatric Bivona tube was noted to be in good position. Distal airway was unremarkable. It was observed that he would be a difficult intubation. With a  Rothman blade under the epiglottis he was thought possibly intubatable but this was felt to be difficult in the best case situation.    He uses oxygen only at night (1L) primarily to prevent him from leaning forward and blocking his tracheostomy. The mask helps to prevent obstruction. At the last visit, we changed his trach to his current 5-0 Bivona size.    He has a new 5-0 Bivona in currently.  Mom reports that there was some swelling that interfered with a trach change a month ago. She therefore left the trach out for about a week. Subsequently, she gave him some prednisone that they had at the house, and then was able to replace the trach with limited difficulty.    He has had increased trach secretions and coughing lately, although he has also not been as diligent about routine trach care, according to his mother. It seems like he has difficulty swallowing solids. Liquids are okay. He has had no recent swallow study.      MEDICATIONS:     Current Outpatient Medications   Medication Sig Dispense Refill     albuterol (2.5 MG/3ML) 0.083% nebulizer solution Take 1 ampule by nebulization 4 times daily        amLODIPine (NORVASC) 5 MG tablet TAKE 1 TABLET(5 MG) BY MOUTH TWICE DAILY 180 tablet 0     amoxicillin-clavulanate (AUGMENTIN) 875-125 MG per tablet Take 1 tablet by mouth 2 times daily 20 tablet 0     azithromycin (ZITHROMAX) 250 MG tablet Take  by mouth. Mon, Wed, Fri       calcium carbonate (OS-SUSAN 500 MG [place]. CA) 500 MG tablet Take 1 tablet (500 mg) by mouth 3 times daily (with meals) 90 tablet 6     captopril 0.1 mg/mL (CAPOTEN) 0.1 mg/mL SUSP Take 2.5 mg three times daily (2.5 mg/5ml) 460 mL 11     cholecalciferol 2000 UNITS tablet Take 2,000 Units by mouth daily 30 tablet 11     cloNIDine (CATAPRES) 0.1 MG tablet TAKE 1 TABLET(0.1 MG) BY MOUTH TWICE DAILY 180 tablet 0     famotidine (PEPCID) 20 MG tablet Take 20 mg by mouth 2 times daily       ipratropium (ATROVENT) 0.02 % neb solution Take 0.5 mg by  nebulization 4 times daily  225 mL      order for DME Equipment being ordered: DME    Adult Bivona Uncuffed Tracheostomy Tube-5.0 1 Units 11     sodium bicarbonate 650 MG tablet TAKE 1 TABLET BY MOUTH THREE TIMES DAILY 270 tablet 3     sodium bicarbonate 650 MG tablet TAKE 1 TABLET BY MOUTH THREE TIMES DAILY 270 tablet 0     tobramycin, PF, (DAX) 300 MG/5ML nebulizer solution Take 1 ampule by nebulization as needed       predniSONE (DELTASONE) 5 MG tablet Take 5 mg by mouth every other day Take 7.5mg daily .  6     ranitidine (ZANTAC) 150 MG tablet Take 0.5 tablets (75 mg) by mouth 2 times daily (Patient not taking: Reported on 1/3/2019) 90 tablet 3       ALLERGIES:    Allergies   Allergen Reactions     Latex      Latex      Other reaction(s): Other (see comments)     Tape [Adhesive Tape]        NEW PMH/PSH: None    REVIEW OF SYSTEMS:  The patient completed a comprehensive 11 point review of systems (below), which was reviewed. Positives are as noted below.  Patient Supplied Answers to Review of Systems   ENT ROS 6/10/2019   Constitutional Unexplained fatigue   Cardiopulmonary Cough   Musculoskeletal Sore or stiff joints, Back pain       PHYSICAL EXAM:  General: The patient was alert and conversant, and in no acute distress. Patient accompanied by his mother.  Oral cavity/oropharynx: No masses or lesions. Dentition unchanged since prior. Tongue mobility and palate elevation intact and symmetric.  Neck: 5-O Bivona in good position, no significant granulation tissue. Mild to moderate crusting.  Resp: Breathing comfortably, no stridor or stertor.  Neuro: Symmetric facial function. Other cranial nerve function as documented above.  Psych: Normal affect, pleasant and cooperative.  Voice/speech: Mild dysphonia characterized by breathiness and roughness. Speaking well with PM valve in place.      Intake scores  Last 2 Scores for Patient-Answered VHI Questionnaire  No flowsheet data found.   Last 2 Scores for  Patient-Answered EAT Questionnaire  No flowsheet data found.     Last 2 Scores for Patient-Answered CSI Questionnaire  No flowsheet data found.      Procedure: Flexible tracheobronchoscopy through established trach incision  Indications: The procedure was warranted to assess the position and/or condition of the tracheostomy tube and trachea  Description: After written and verbal consent were obtained, the endoscope was passed through the patient's tracheostomy. This allowed visualization of the trachea down to the raul and mainstem bronchi.  The trach was also pulled back to allow evaluation of the trach tract, and then was replaced and secured.  Findings: Trach tube in good position within the airway. Trachea clear to raul, which was sharp. Unremarkable takeoffs of mainstem bronchi. Trach tract unremarkable. No significant granulation tissue.      IMPRESSION AND PLAN:   Aidan Louie returns with the trach in good position. He does have crusting and dried secretions in the trach.     We discussed conservative measures include humidification and lavages to keep the trach clean. They are performing regular trach changes at home.     Plan:  1) Swallow study with esophagram to evaluate swallowing further given solid dysphagia.  2) Improved trach care as discussed above. He does not have a humidifier for his trach so we will order this along with supplies. We talked about potential issues with granulation tissue and preventive measures.  3) If things are going well with the trach, he will return in one year for a checkup, but if there are any concerns (including difficulty with trach changes) they should return sooner.    I appreciate the opportunity to participate in the care of this pleasant patient.         Bebe Dumas MD

## 2019-06-13 PROBLEM — R79.89 HIGH SERUM FOLLICLE STIMULATING HORMONE (FSH): Status: ACTIVE | Noted: 2019-06-13

## 2019-06-14 ENCOUNTER — TELEPHONE (OUTPATIENT)
Dept: OTOLARYNGOLOGY | Facility: CLINIC | Age: 24
End: 2019-06-14

## 2019-06-14 ENCOUNTER — VIRTUAL VISIT (OUTPATIENT)
Dept: ENDOCRINOLOGY | Facility: CLINIC | Age: 24
End: 2019-06-14
Payer: MEDICARE

## 2019-06-14 DIAGNOSIS — R79.89 HIGH SERUM FOLLICLE STIMULATING HORMONE (FSH): ICD-10-CM

## 2019-06-14 DIAGNOSIS — E04.9 GOITER: ICD-10-CM

## 2019-06-14 DIAGNOSIS — N18.4 CKD (CHRONIC KIDNEY DISEASE) STAGE 4, GFR 15-29 ML/MIN (H): ICD-10-CM

## 2019-06-14 DIAGNOSIS — R79.89 HIGH SERUM PARATHYROID HORMONE (PTH): ICD-10-CM

## 2019-06-14 DIAGNOSIS — Z79.52 ON CORTICOSTEROID THERAPY: ICD-10-CM

## 2019-06-14 DIAGNOSIS — M85.9 LOW BONE DENSITY FOR AGE: Primary | ICD-10-CM

## 2019-06-14 NOTE — ASSESSMENT & PLAN NOTE
with metabolic acidosis, secondary hyperparathyroidism. Followed in Renal . This is a risk for the bone.  It also limits the bone  treatment options

## 2019-06-14 NOTE — PROGRESS NOTES
Endocrine telephone visit     Attending Assessment/Plan :     Low bone density for age  As stated before, in the absence of fracture history, I wouldn't diagnosis osteoporosis from his DXA alone.  Additional analysis of the DXA might give different results, such as if the BMD was assessed for his height age, or for bone age.   He likely never achieved a normal BMD, he is still at the age where he would be capable of continuing to increase  BMD.  I would not start therapy for the BMD without a bone biopsy which is a process that would require referral to ortho, tetracycline labeling, and then bone biopsy, followed by long time to get the results.  The BSAP is not high and the PTH is high.      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  I would be interested in past examinations for the testes, or any ultrasound imaging,    [email or web address]    CKD (chronic kidney disease) stage 4, GFR 15-29 ml/min (H)  with metabolic acidosis, secondary hyperparathyroidism. Followed in Renal . This is a risk for the bone.  It also limits the bone  treatment options     Goiter  TFTS are normal, TPO is normal    On corticosteroid therapy  This is for his lungs.  Longstanding treatment may have caused HPA suppression.     See patient instructions  Mom is uncomfortable with my recommendation to follow up after the 2020 DXA.   Will bring him back in one year    Start time 0800  Stop time end 840 AM  Total time: 40 minutes    Mandie Judge MD      HISTORY OF PRESENT ILLNESS  Greg presents today along with his mother Valerie for follow up of recent appt.   I have seen him once before. Goiter was noted on exam  Since then, labs were obtained on 6/5/19.       10/24/18 DXA ([facility] [place]) showed lowest Z-score -3.5 (T-score -3.4)  at th total bilateral  hip (mean).  He has had no  bone fractures.   He has known kyphosis (48 degrees T3 through L3)  and lumbar scoliosis (10 degrees T11 through L3) based on 2005 spine xray at [place].      Until the age of 11 he had many medical issues. His mother says he has been on corticosteroid since birth .   He took GH shots through [facility] x at least 4 years (from age 9-10), ending at age 16-17.  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 Mom again  recalls he was 5'3 at the time the shots were stopped but he lost  Height to 5'.        Dietary calcium  Milk one glass/day  Ice cream when he can  Yogurt : no  Cheese yes and no    Labs  9/17/15: 1,25 OH D 25.4, vitamin D 21  7/1/17: vitamin D 32  8/9/18 , [place] 1896 mg/d creatinine  1/3/19: Ca 8.4, creatinine 3.22, , vitamin D 39, BSAP 10.8 (10.8-28.8 mcg/L)  6/5/19: cortisol 12.1, FSH 27.6, LH 9.1, prolactin 12.5, free T4 0.8, TSH 1.51, total testosterone 484, SHBG 44, free testosterone 8.4,  TPO < 28    Imaging  3/24/02 testicular US [place]: normal testicular US    REVIEW OF SYSTEMS  Gets long tangled hair between buttocks  Respiratory: mom thinks he needs humidity for the trach; new trach  He does get erections  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 he had orchiopexy at 9 months ([facility]).    Mom recalls past history of testes getting tangled/twisted/testicluar pain-  No way he could cooperate with bone biopsy  She notes there are a couple of other offices where he also had relevant care in the past.  Complaints of Pain in low back and hips and knees   Mom is worried she will miss something and it will be too late.    Past Medical History  Past Medical History:   Diagnosis Date     Bilateral reflux nephropathy      CKD (chronic kidney disease) stage 4,  GFR 15-29 ml/min (H)      Hearing loss      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      Prune belly syndrome      Recurrent UTI      Respiratory bronchiolitis interstitial lung disease (H)      Restrictive lung disease      Scoliosis 2002     Tracheostomy dependence (H)        Medications    Current Outpatient Medications   Medication Sig Dispense Refill     albuterol (2.5 MG/3ML) 0.083% nebulizer solution Take 1 ampule by nebulization 4 times daily        amLODIPine (NORVASC) 5 MG tablet TAKE 1 TABLET(5 MG) BY MOUTH TWICE DAILY 180 tablet 0     amoxicillin-clavulanate (AUGMENTIN) 875-125 MG per tablet Take 1 tablet by mouth 2 times daily 20 tablet 0     azithromycin (ZITHROMAX) 250 MG tablet Take  by mouth. Mon, Wed, Fri       calcium carbonate (OS-SUSAN 500 MG [place]. CA) 500 MG tablet Take 1 tablet (500 mg) by mouth 3 times daily (with meals) 90 tablet 6     captopril 0.1 mg/mL (CAPOTEN) 0.1 mg/mL SUSP Take 2.5 mg three times daily (2.5 mg/5ml) 460 mL 11     cholecalciferol 2000 UNITS tablet Take 2,000 Units by mouth daily 30 tablet 11     cloNIDine (CATAPRES) 0.1 MG tablet TAKE 1 TABLET(0.1 MG) BY MOUTH TWICE DAILY 180 tablet 0     famotidine (PEPCID) 20 MG tablet Take 20 mg by mouth 2 times daily       ipratropium (ATROVENT) 0.02 % neb solution Take 0.5 mg by nebulization 4 times daily  225 mL      order for DME Equipment being ordered: Humidifier (heated), humidifier attachment, saline lavages, humidivent mask 1 each 3     order for DME Equipment being ordered: DME    Adult Bivona Uncuffed Tracheostomy Tube-5.0 1 Units 11     predniSONE (DELTASONE) 5 MG tablet Take 5 mg by mouth every other day Take 7.5mg daily .  6     ranitidine (ZANTAC) 150 MG tablet Take 0.5 tablets (75 mg) by mouth 2 times daily (Patient not taking: Reported on 1/3/2019) 90 tablet 3     sodium bicarbonate 650 MG tablet TAKE 1 TABLET BY MOUTH THREE TIMES DAILY 270 tablet 3     sodium bicarbonate 650 MG tablet TAKE 1 TABLET BY MOUTH THREE  TIMES DAILY 270 tablet 0     tobramycin, PF, (DXA) 300 MG/5ML nebulizer solution Take 1 ampule by nebulization as needed       Past Surgical History:   Procedure Laterality Date     Bilateral ureteral reimplantation  2002    abdominoplasty, Mitrofanoff creation     CYSTOSCOPY  12     EXAM UNDER ANESTHESIA THROAT  6/10/2003    tonsillar hypertrophy     EXAM UNDER ANESTHESIA, RESTORATIONS, EXTRACTION(S) DENTAL COMPLEX, COMBINED  2006     GASTROSTOMY TUBE       GASTROSTOMY TUBE  3/16/2002    button change     HERNIORRHAPHY INGUINAL BILATERAL  89    left ochiopexy     LARYNGOSCOPY, BRONCHOSCOPY, COMBINED  2002     LARYNGOSCOPY, BRONCHOSCOPY, COMBINED  2002    bilateral ear debridement     LARYNGOSCOPY, BRONCHOSCOPY, COMBINED  2003     LARYNGOSCOPY, BRONCHOSCOPY, COMBINED  2007    Failed Deflux injection     LARYNGOSCOPY, BRONCHOSCOPY, COMBINED  12      VESICOSTOMY       Redo right ureteral reimplantation  3/12/2004    Excision bladder diverticuli, Left to Right pyelopyelostomy     Replacement of trach  10/15/12     Takedown and revision of Mitrofanoff stoma  2006     TRACHEOSTOMY INFANT         Family History  family history includes Alzheimer Disease in his paternal grandfather; Diabetes Type 2  in his maternal grandfather, maternal grandmother, mother, and paternal grandmother; Thyroid Cancer in his maternal great-grandmother; Thyroid Disease in his maternal aunt.    Social History  Social History     Tobacco Use     Smoking status: Never Smoker     Smokeless tobacco: Never Used   Substance Use Topics     Alcohol use: No     Alcohol/week: 0.0 oz     Drug use: No     Lives with mother.     Physical Exam  There were no vitals taken for this visit.  There is no height or weight on file to calculate BMI.    DATA REVIEW     Ref. Range 2019 00:00   Cortisol Latest Ref Range: 4.3 - 22.4 mcg/dL 12.1   FSH Unknown 27.6   LH Latest Ref Range: 1.5 - 9.3 mIU/mL 9.1    Prolactin Latest Ref Range: 2.1 - 17.7 mcg/L 12.5   T4 Free Latest Ref Range: 0.7 - 1.4 ng/dL 0.8   Testosterone Total Latest Ref Range: 200 - 700 ng/dL 484   TSH Latest Ref Range: 0.35 - 4.50 mcU/mL 1.51   Sex Hormone Binding Globulin Latest Ref Range: 10 - 61 nmol/L 44   Thyroid Peroxidase Antibody Latest Units: IU/mL <28   FREE TESTOSTERONE CALC Latest Ref Range: 4.4 - 16.5 ng/dL 8.4

## 2019-06-14 NOTE — ASSESSMENT & PLAN NOTE
As stated before, in the absence of fracture history, I wouldn't diagnosis osteoporosis from his DXA alone.  Additional analysis of the DXA might give different results, such as if the BMD was assessed for his height age, or for bone age.   He likely never achieved a normal BMD, he is still at the age where he would be capable of continuing to increase  BMD.  I would not start therapy for the BMD without a bone biopsy which is a process that would require referral to ortho, tetracycline labeling, and then bone biopsy, followed by long time to get the results.  The BSAP is not high and the PTH is high.

## 2019-06-14 NOTE — ASSESSMENT & PLAN NOTE
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  I would be interested in past examinations for the testes, or any ultrasound imaging,    [email or web address]

## 2019-06-14 NOTE — PATIENT INSTRUCTIONS
Repeat bone density 10/24/2020 at [facility] so it can be compared to the last one.  See me after it is done.    Keep me informed of what is happening with prednisone treatment, specifically if the lung doctors are considering dose reduction below 5 mg/day.  If they want to reduce we should test the adrenal function first.     Please get me the [facility] Endocrinology records - doctors notes, labs, imaging;   I am especially interested in testicular imaging (ultrasound), bone imaging, growth charts    Get me records from other doctors that might be related to Endocrine system or bone.      Reduce calcium supplement to twice/day and keep the one glass of milk/day - spread these all out over the day    Work with the primary care doctor relative to the mechanical /orthopedic concerns.

## 2019-06-14 NOTE — TELEPHONE ENCOUNTER
[facility] Call Center    Phone Message    May a detailed message be left on voicemail: yes    Reason for Call: Other: Florencio, from [facility] called to say that she does not understand the RX sent. The RX was not signed and there are issues surrounding medicare. Florencio says they will need a face to face note that says pt will benefit from this humidifier. Florencio says she cannot do saline lavage, and she does not know what a hemavent is. Please call to clarify. Ask for Florencio in respiratory.      Action Taken: Message routed to:  [facility] ([facility]): ent

## 2019-06-17 ENCOUNTER — TRANSFERRED RECORDS (OUTPATIENT)
Dept: HEALTH INFORMATION MANAGEMENT | Facility: CLINIC | Age: 24
End: 2019-06-17

## 2019-06-26 DIAGNOSIS — Z93.0 TRACHEOSTOMY IN PLACE (H): Primary | ICD-10-CM

## 2019-07-01 DIAGNOSIS — N18.30 CKD (CHRONIC KIDNEY DISEASE) STAGE 3, GFR 30-59 ML/MIN (H): ICD-10-CM

## 2019-07-02 ENCOUNTER — THERAPY VISIT (OUTPATIENT)
Dept: SPEECH THERAPY | Facility: CLINIC | Age: 24
End: 2019-07-02
Payer: MEDICARE

## 2019-07-02 ENCOUNTER — ANCILLARY PROCEDURE (OUTPATIENT)
Dept: GENERAL RADIOLOGY | Facility: CLINIC | Age: 24
End: 2019-07-02
Attending: OTOLARYNGOLOGY
Payer: MEDICARE

## 2019-07-02 DIAGNOSIS — Z93.0 TRACHEOSTOMY IN PLACE (H): ICD-10-CM

## 2019-07-02 DIAGNOSIS — R13.10 DYSPHAGIA, UNSPECIFIED TYPE: ICD-10-CM

## 2019-07-02 RX ORDER — CLONIDINE HYDROCHLORIDE 0.1 MG/1
TABLET ORAL
Qty: 180 TABLET | Refills: 0 | Status: SHIPPED | OUTPATIENT
Start: 2019-07-02 | End: 2019-11-29

## 2019-07-02 RX ORDER — BARIUM SULFATE 400 MG/ML
SUSPENSION ORAL ONCE
Status: COMPLETED | OUTPATIENT
Start: 2019-07-02 | End: 2019-07-02

## 2019-07-02 RX ADMIN — BARIUM SULFATE 20 ML: 400 SUSPENSION ORAL at 13:56

## 2019-07-02 NOTE — TELEPHONE ENCOUNTER
Sent via fax an updated DME to Florencio in respiratory regarding tracheostomy humidification equipment requirements.

## 2019-07-05 NOTE — PROGRESS NOTES
07/02/19 1057   General Information   Type Of Visit Initial   Start Of Care Date 07/02/19   Referring Physician Bebe Dumas MD    Orders Evaluate And Treat   Orders Comment Video swallow study    Medical Diagnosis Dysphagia    Onset Of Illness/injury Or Date Of Surgery 03/04/19   Precautions/limitations No Known Precautions/limitations   Hearing WFL with aides in place    Pertinent History of Current Problem/OT: Additional Occupational Profile Info Pt is a 22 y/o male with a complex medical history including Dawood-Reece mandibular hypoplasia, coanal atresia s/p repair, cervical spine surgery (s/p fusion), restrictive lung disease, 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  Pt present with a chronic trach (adult Bivona #5); per mother, trach is in place due to concern for intubation due to obstructive upper airway anatomy vs. lung complications.  Pt has Jose-key button and a history of PEG placed at 4 months of age.  Mother reports pt takes full oral intake for nutrition but relies on FT for additional hydration and/or additional hydration in the setting of illness.   Mother reported pt begain taking solid textures at age 8; pt identifies pizza, popcorn, sandwiches, and white chocolate as preferred items.  Pt and mother report dysphagia to solid texture which began in March after trach change.  Pt had previously been in a pediatric Bivona #5.  Pt was unable to report on difficulty but mother reports concern for items remaining at the thyroid prominence as pt is taking multiple swallows to clear solid textures as well as a needing to take liquids with food.  Mother also reports eating in a reclined position and a strong cough at times which results in removal of PMSV and evidence of food particles expelled from being expelled from the trach.  There is no concern for difficulty with liquid consistencies.     Respiratory Status  Tracheostomy;Other (see comments)  (PMSV in place )   Prior Level Of Function Swallowing   Prior Level Of Function Comment Regular textures and thin liquids    Patient Role/employment History Disabled   Living Environment House/townhome   Patient/family Goals Pt pleasant and cooperative.     Clinical Swallow Evaluation   Oral Musculature generally intact   Structural Abnormalities present   Dentition present and adequate   Mucosal Quality good   Mandibular Strength and Mobility intact   Oral Labial Strength and Mobility WFL   Lingual Strength and Mobility WFL   Velar Elevation intact   Laryngeal Function Cough;Throat clear;Swallow;Voicing initiated   Oral Musculature Comments WFL   VFSS Evaluation   VFSS Additional Documentation Yes   VFSS Eval: Radiology   Radiologist [facility] Resident    Views Taken left lateral;A/P   Physical Location of Procedure [facility] Radiology    VFSS Eval: Thin Liquid Texture Trial   Mode of Presentation, Thin Liquid cup;straw;self-fed   Order of Presentation 1, 2, 3, 8, 10, 11   Preparatory Phase WFL   Oral Phase, Thin Liquid Premature pharyngeal entry   Pharyngeal Phase, Thin Liquid Residue in valleculae   Rosenbek's Penetration Aspiration Scale: Thin Liquid Trial Results 2 - contrast enters airway, remains above the vocal cords, no residue remains (penetration)   Diagnostic Statement Swallow intermittently delayed to the level of the pyriforms.  Reduced elevation and anterior movement of the laryngeal complex resulted in reduced epiglottic inversion.  Flash penetration observed on consecutive sips of thin liquids.  No other overt s/s of aspiration observed.     VFSS Eval: Nectar Thick Liquid Texture Trial   Mode of Presentation, Nectar cup;self-fed   Order of Presentation 4, 5   Preparatory Phase WFL   Oral Phase, Nectar Premature pharyngeal entry   Pharyngeal Phase, Nectar Delayed swallow reflex   Rosenbek's Penetration Aspiration Scale: Nectar-Thick Liquid Trial Results 1 - no  aspiration, contrast does not enter airway   Diagnostic Statement Swallow delayed to the level of the pyriforms; suspect reaction to poor taste/texture as a component factor to delayed swallow response.  No evidence of aspiration or penetration.     VFSS Eval: Puree Solid Texture Trial   Mode of Presentation, Puree spoon;self-fed   Order of Presentation 6   Preparatory Phase WFL   Oral Phase, Puree WFL;other (see comments)  (Slowed oral transit)   Pharyngeal Phase, Puree WFL   Rosenbek's Penetration Aspiration Scale: Puree Food Trial Results 1 - no aspiration, contrast does not enter airway   Diagnostic Statement No evidence of aspiration or penetration.  No residue remaining after pharyngeal swallow resposne.    VFSS Eval: Solid Food Texture Trial   Mode of Presentation, Solid self-fed   Order of Presentation 7, 9   Preparatory Phase WFL;Other (see comments)  (Increased time for mastication)   Oral Phase, Solid WFL   Pharyngeal Phase, Solid other (see comments)  (Residue along the pharyngeal wall )   Rosenbek's Penetration Aspiration Scale: Solid Food Trial Results 1 - no aspiration, contrast does not enter airway   Diagnostic Statement No evidence of aspiration or penetration.  Minimal amounts of residue present along the pharyngeal wall which cleared with use of a liquid wash.     Swallow Compensations   Swallow Compensations Pacing;Reduce amounts;Alternate viscosity of consistencies   Educational Assessment   Barriers to Learning No barriers   Esophageal Phase of Swallow   Patient reports or presents with symptoms of esophageal dysphagia Yes   Esophageal sweep performed during today s vidofluoroscopic exam  Please refer to radiologist's report for details   Esophageal comments Please see the radiologist report for a detailed description of skills    General Therapy Interventions   Planned Therapy Interventions Dysphagia Treatment   Dysphagia treatment Instruction of safe swallow strategies   Swallow Eval:  Clinical Impressions   Skilled Criteria for Therapy Intervention Skilled criteria met.  Treatment indicated.   Dysphagia Outcome Severity Scale (NIMISHA) Level 5 - NIMISHA   Treatment Diagnosis Mild oropharyngeal dysphagia    Diet texture recommendations Thin liquids;Regular diet   Recommended Feeding/Eating Techniques alternate between small bites and sips of food/liquid;maintain upright posture during/after eating for 30 mins;small sips/bites   Rehab Potential good, to achieve stated therapy goals   Therapy Frequency other (see comments)  (1x following VFSS)   Predicted Duration of Therapy Intervention (days/wks) 1 session    Anticipated Discharge Disposition home w/ assist   Risks and Benefits of Treatment have been explained. Yes   Patient, family and/or staff in agreement with Plan of Care Yes   Clinical Impression Comments Pt seen for a video swallow study per MD orders.  Pt presents with Bivona #5 trach and PMSV in place; pt and mother report increased difficulty with swallowing solids since trach change in March 2019.  Oral mechanism exam was unremarkable.  Under fluoroscopy, pt is exhibiting mild oropharyngeal dysphagia.  Oral phase was slow but functional.  Swallow trigger was intermittently delayed to the level of the pyriforms upon liquid trials.  Reduced elevation and anterior movement of the laryngeal complex was observed resulting in reduced epiglottic inversion.  Flash penetration observed x1 on consecutive sips of thin liquids. Pt tolerated purees and solids with no evidence of aspiration or penetration.  Minimal amounts of residue were observed along the pharyngeal wall after solid texture trials.  This residue was cleared with a liquid wash.  At this time, pt is at increased risk for aspiration with his current level of skills but remains appropriate for a regular texture diet and thin liquids when implementing the following aspiration precautions (i..e, full upright positioning) and safe swallow  strategies (i.e., pacing, small bites/sips, alternating consistencies).  Pt and mother educated on recommendations, rationale, and aspiration precautions/strategies following today's evaluation.  Further ST services for dysphagia are not indicated at this time.     Swallow Goals   SLP Swallow Goals 1   Swallow Goal 1   Goal Identifier 1   Goal Description Pt and mother will demonstrate understanding and agreement with information provided re: swallow A&P, aspiration precautions/safe swallow strategies, and rationale as determined by the SLP.    Target Date 07/02/19   Date Met 07/02/19   Total Session Time   SLP Eval: VideoFluoroscopic Swallow function Minutes (43555) 45   Total Evaluation Time 45   Therapy Certification   Certification date from 07/02/19   Certification date to 07/02/19   Medical Diagnosis Dysphagia    Certification I certify the need for these services furnished under this plan of treatment and while under my care.  (Physician co-signature of this document indicates review and certification of the therapy plan).     Thank you for the referral of Aidan Louie.  If you have any questions about this report, please contact me using the information below.       Yisel Beth M.S. CCC-SLP  Speech Language Pathologist   [facility] [place] [facility]   Department of Otoolaryngology, D&T- 4th Floor / [address]  Phone: [phone]  Pager: [phone]  Email: [email or web address]

## 2019-07-09 ENCOUNTER — TELEPHONE (OUTPATIENT)
Dept: OTOLARYNGOLOGY | Facility: CLINIC | Age: 24
End: 2019-07-09

## 2019-07-09 DIAGNOSIS — Z93.0 TRACHEOSTOMY IN PLACE (H): Primary | ICD-10-CM

## 2019-07-09 NOTE — TELEPHONE ENCOUNTER
"Talked to patient's mother. She explained that Greg had his trach partially extruding and that \"all the little bubbly looking tissue on the inside of the stoma fell out.\"      Patients mother sent above images. Per Valerie (patients mother), she increased his prednisone and his nebulizer treatments, but is adamant that he is not having trouble breathing. Insisted that the patient go to the ED to be treated. Valerie stated she would \"hopefully be able to get the trach back in tomorrow\". This nurse again stressed the importance of going to the ED, Valerie refused. Valerie also states that the granulation tissue causes him a lot of pain and he wants them removed. Valerie also stated that \"I just put a piece of gauze over it today so that he could go to work.\"     "

## 2019-07-09 NOTE — TELEPHONE ENCOUNTER
[facility] Call Center    Phone Message    May a detailed message be left on voicemail: yes    Reason for Call: Other: Patient's mother calling stating she would like to speak with Yisel on Dr. Dumas's team asap. She further states that her son's trach came partially out and she is unable to get it back in. She refused the writer's recommendation to go the ER and she declined the offer of speaking to the Triage Nurse as well. She wants Yisel to call her asap at [phone]. Thanks.      Action Taken: Message routed to:  [facility] ([facility] Ent

## 2019-07-11 DIAGNOSIS — Z93.0 TRACHEOSTOMY IN PLACE (H): Primary | ICD-10-CM

## 2019-07-31 ENCOUNTER — TELEPHONE (OUTPATIENT)
Dept: OTOLARYNGOLOGY | Facility: CLINIC | Age: 24
End: 2019-07-31

## 2019-07-31 DIAGNOSIS — Z93.0 TRACHEOSTOMY IN PLACE (H): Primary | ICD-10-CM

## 2019-07-31 RX ORDER — CIPROFLOXACIN AND DEXAMETHASONE 3; 1 MG/ML; MG/ML
SUSPENSION/ DROPS AURICULAR (OTIC)
Qty: 7.5 ML | Refills: 0 | Status: SHIPPED | OUTPATIENT
Start: 2019-07-31 | End: 2019-08-10

## 2019-07-31 NOTE — TELEPHONE ENCOUNTER
Received the following email from Greg's mom Valerie:     Thee Morillo,  Greg's throat has swelled to the point where those 'bumps' inside are pushing out and painful. Any chance there is latex in trach tube?   I'm including a couple pictures from last night after he returned from work.   As you can see he is also constantly draining mucus and Small amounts of  blood from irritation.   Other information is when he swelled last time I took out trach for 5 days, which is how long it took, to try to get swelling down. During the five days I increased his Prednisone 20mg QD and made sure of Duo Nebs QID. After 5 days I replaced trach, then the mucus & blood started from irritation over 5-7 days again. Tried keeping clean but by about day 10 this extreme swelling happened over a couple days. Please take note of last two pics that have a puss [place] and a black sore under trach itself. One is last night, next pic is 8 hours later.   Right now giving Tylenol 750mg TID since yesterday after work.   Please advise. Do I take it out again?  Sincerely,  Vlaerie Louie               Forwarded e-mail to Dr. Dumas to possibly get patient into the OR on Friday.    Called mom and informed her of the tentative plan, mom agreed to plan.

## 2019-08-01 DIAGNOSIS — Z93.0 TRACHEOSTOMY IN PLACE (H): ICD-10-CM

## 2019-08-01 DIAGNOSIS — L92.9 GRANULATION TISSUE OF SITE OF TRACHEOSTOMY: Primary | ICD-10-CM

## 2019-08-01 DIAGNOSIS — Z98.890 GRANULATION TISSUE OF SITE OF TRACHEOSTOMY: Primary | ICD-10-CM

## 2019-08-01 NOTE — PROGRESS NOTES
Discussed trach site appearance with inpatient WOC RN as a curbside; she recommended consideration of Cavilon and Optifoam as well as outpatient consult with WOC RN at [facility]. Referral placed.

## 2019-08-02 ENCOUNTER — OFFICE VISIT (OUTPATIENT)
Dept: OTOLARYNGOLOGY | Facility: CLINIC | Age: 24
End: 2019-08-02
Payer: MEDICARE

## 2019-08-02 VITALS — HEIGHT: 61 IN | BODY MASS INDEX: 20.2 KG/M2 | WEIGHT: 107 LBS

## 2019-08-02 DIAGNOSIS — L92.9 GRANULATION TISSUE OF SITE OF TRACHEOSTOMY: Primary | ICD-10-CM

## 2019-08-02 DIAGNOSIS — Q87.0 PIERRE ROBIN SYNDROME: ICD-10-CM

## 2019-08-02 DIAGNOSIS — Z93.0 TRACHEOSTOMY IN PLACE (H): ICD-10-CM

## 2019-08-02 DIAGNOSIS — M26.04 MANDIBULAR HYPOPLASIA: ICD-10-CM

## 2019-08-02 DIAGNOSIS — Z98.890 GRANULATION TISSUE OF SITE OF TRACHEOSTOMY: Primary | ICD-10-CM

## 2019-08-02 PROCEDURE — 87070 CULTURE OTHR SPECIMN AEROBIC: CPT | Performed by: OTOLARYNGOLOGY

## 2019-08-02 PROCEDURE — 87077 CULTURE AEROBIC IDENTIFY: CPT | Performed by: OTOLARYNGOLOGY

## 2019-08-02 PROCEDURE — 87186 SC STD MICRODIL/AGAR DIL: CPT | Performed by: OTOLARYNGOLOGY

## 2019-08-02 ASSESSMENT — MIFFLIN-ST. JEOR: SCORE: 1348.73

## 2019-08-02 NOTE — LETTER
8/2/2019      RE: Aidan Louie  [address]  [place] [address]       Dear Colleague:  Aidan Louie recently returned for follow-up at the [facility]. My clinic note from our visit is enclosed below.  Speech recognition software may have been used in the documentation below; input is reviewed before signature to the best of my ability.     I appreciate the ongoing opportunity to participate in this patient's care.    Please feel free to contact me with any questions.      Sincerely yours,  Bebe Dumas M.D., M.P.H.  , Laryngology  Director, 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    He was a longtime patient of Dr. Reid's, and has had a trach for many years because of the mandibular hypoplasia. Per report, a number of attempts toward decannulation had been made, but ultimately the decision was made to keep the trach in place due to concerns about his airway. He went to the operating room with Dr. Jose in August 2017 for flexible and rigid laryngoscopy/bronchoscopy. Per the operative note, this showed moderate trismus and base of tongue collapse, redundant, distorted, floppy epiglottis, with a grade 4 laryngoscopy. A small granuloma was seen on the medial left arytenoid. Vocal fold mobility and subglottis were normal. The 5-0 pediatric Bivona tube was noted to be in good position. Distal airway was unremarkable. It was observed that he would be a difficult intubation. With a Rothman  blade under the epiglottis he was thought possibly intubatable but this was felt to be difficult in the best case situation.    He currently has a 5-O Bivona in place. Over the past several weeks he has had increasing difficulty with granulation around the trach as well as associated inflammation and purulent drainage. His mother had contacted us a few weeks ago and I had prescribed antibiotics, but when she went to the pharmacy she was told there were no medications for pickup. The situation then worsened. I re-prescribed antibiotics and he was able to start this 2 days ago. The drainage has improved somewhat and the swelling has also noticeably improved, although there is still granulation present.    Apparently our previous trach supply orders were rejected and need to be re-ordered.      MEDICATIONS:     Current Outpatient Medications   Medication Sig Dispense Refill     albuterol (2.5 MG/3ML) 0.083% nebulizer solution Take 1 ampule by nebulization 4 times daily        amLODIPine (NORVASC) 5 MG tablet TAKE 1 TABLET(5 MG) BY MOUTH TWICE DAILY 180 tablet 0     amoxicillin-clavulanate (AUGMENTIN) 875-125 MG tablet Take 1 tablet by mouth 2 times daily 20 tablet 0     azithromycin (ZITHROMAX) 250 MG tablet Take  by mouth. Mon, Wed, Fri       calcium carbonate (OS-SUSAN 500 MG [place]) 500 MG tablet Take 1 tablet (500 mg) by mouth 3 times daily (with meals) 90 tablet 6     captopril 0.1 mg/mL (CAPOTEN) 0.1 mg/mL SUSP Take 2.5 mg three times daily (2.5 mg/5ml) 460 mL 11     cholecalciferol 2000 UNITS tablet Take 2,000 Units by mouth daily 30 tablet 11     ciprofloxacin-dexamethasone (CIPRODEX) 0.3-0.1 % otic suspension Apply 5 drops to tracheostomy site 2 times daily for 14 days. 7.5 mL 0     cloNIDine (CATAPRES) 0.1 MG tablet TAKE 1 TABLET(0.1 MG) BY MOUTH TWICE DAILY 180 tablet 0     famotidine (PEPCID) 20 MG tablet Take 20 mg by mouth 2 times daily       ipratropium (ATROVENT) 0.02 % neb solution Take 0.5 mg by  "nebulization 4 times daily  225 mL      order for DME Equipment being ordered: DME -    1)Adult Bivona Uncuffed Tracheostomy Tube-5.0  2)Deep suction - 10 fr  3)Suction container 1200 mL capacity  4) Suction tubing   5) Hernandez Connector 1 each 3     order for DME Tracheostomy Humidification Equipment  Equipment being ordered:     1) Air compressor  2) Nebulizer bottle  3) Aerosol tubing  4) Trach mask  5) Sterile water  6) Saline ampules (\"bullets\")  7) Heat Moisture Exchanger (HME) Also known by several other terms including: Thermal Humidifying Filters, Swedish nose, Artificial nose, Filter, Thermovent T.  8) Room/home humidifiers 1 each 11     order for DME Equipment being ordered: Humidifier (heated), humidifier attachment, saline lavages, humidivent mask 1 each 3     order for DME Equipment being ordered: DME    Adult Bivona Uncuffed Tracheostomy Tube-5.0 1 Units 11     predniSONE (DELTASONE) 5 MG tablet Take 5 mg by mouth every other day Take 7.5mg daily .  6     ranitidine (ZANTAC) 150 MG tablet Take 0.5 tablets (75 mg) by mouth 2 times daily (Patient not taking: Reported on 1/3/2019) 90 tablet 3     sodium bicarbonate 650 MG tablet TAKE 1 TABLET BY MOUTH THREE TIMES DAILY 270 tablet 3     sodium bicarbonate 650 MG tablet TAKE 1 TABLET BY MOUTH THREE TIMES DAILY 270 tablet 0     tobramycin, PF, (DAX) 300 MG/5ML nebulizer solution Take 1 ampule by nebulization as needed         ALLERGIES:    Allergies   Allergen Reactions     Latex      Latex      Other reaction(s): Other (see comments)     Tape [Adhesive Tape]        NEW PMH/PSH: None    REVIEW OF SYSTEMS:  The patient completed a comprehensive 11 point review of systems (below), which was reviewed. Positives are as noted below.  Patient Supplied Answers to Review of Systems  [facility] ENT ROS 6/10/2019   Constitutional Unexplained fatigue   Cardiopulmonary Cough   Musculoskeletal Sore or stiff joints, Back pain       PHYSICAL EXAM:  General: The patient was alert " and conversant, and in no acute distress. Patient accompanied by his mother and nurse.  Neck: Bivona 5 in place, moderate to severe granulation surrounding trach, some of it relatively mature, other more fresh. Moderate yellowish drainage, sent for culture. Moderate surrounding skin erythema and irritation, blanching; no fungal plaques.  Resp: Breathing comfortably, no stridor or stertor.  Neuro: Symmetric facial function. Other cranial nerve function as documented above.  Psych: Normal affect, pleasant and cooperative.  Voice/speech: No significant dysphonia.      Intake scores  Last 2 Scores for Patient-Answered VHI Questionnaire  No flowsheet data found.   Last 2 Scores for Patient-Answered EAT Questionnaire  No flowsheet data found.   Last 2 Scores for Patient-Answered CSI Questionnaire  No flowsheet data found.      Procedure: Flexible tracheobronchoscopy through established trach incision  Indications: The procedure was warranted to assess the position and/or condition of the tracheostomy tube and trachea  Description: After written and verbal consent were obtained, the endoscope was passed through the patient's tracheostomy. This allowed visualization of the trachea down to the raul and mainstem bronchi.  The trach was also pulled back to allow evaluation of the trach tract, and then was replaced and secured.  Findings: Trach tube in good position within the airway. Trachea clear to raul, which was sharp. Unremarkable takeoffs of mainstem bronchi. Trach tract unremarkable. Scant scattered yellow-white secretions.    Procedure:  Tracheostomy tube change  Indication: This procedure was warranted as part of routine trach care, and was requested by the patient/caregiver.  Description of procedure:  The existing tracheostomy tube was removed and a new identical tube was placed using the introducer. The introducer was withdrawn and replaced with the inner cannula, and the neck ties were secured. Flexible  fiberoptic endoscopy through the tube confirmed good position. The patient tolerated the procedure without difficulty.    Procedure: Injection of steroids to granulation tissue  Indication: This procedure was warranted to reduce the severity of his granulation tissue.   Description of procedure: After verbal consent was obtained, the areas of most prominent granulation were injected with Kenalog-40. The granulation was also treated with silver nitrate, with the excess dabbed away with cotton swabs. The patient tolerated the procedure without difficulty. Optifoam was placed; Cavilon skin barrier was also placed.      IMPRESSION AND PLAN:   Aidan Louie returns with significant granulation tissue around the trach. Today we completed a trach change, steroid injection, and silver nitrate topical treatment. I recommended finishing the antibiotics since they have already started to help. I also sent a culture. I recommended that they keep the area as clean and dry as possible, using Cavilon 1-2/xday and replacing the Optifoam 1-2x/day, and keeping the trach ties snug. We will hold off on gtts. I also placed a consult for him to see the Wound[facility]Ostomy Care clinic for additional help managing the trach site, as this has been a chronic problem. The various needed trach supplies will be re-ordered. I will see him back in 2-3 weeks to assess his progress. They understand that my hope is to manage this without having him need general anesthesia, and that my concern is any resected granulation tissue would immediately regrow if the area remains moist and inflamed. Greater than 40 minutes were spent on this visit, of which more than 50% was spent on counseling and coordination of care.     Bebe Dumas MD

## 2019-08-02 NOTE — PROGRESS NOTES
Dear Colleague:  Aidan Louie recently returned for follow-up at the [facility]. My clinic note from our visit is enclosed below.  Speech recognition software may have been used in the documentation below; input is reviewed before signature to the best of my ability.     I appreciate the ongoing opportunity to participate in this patient's care.    Please feel free to contact me with any questions.      Sincerely yours,  Bebe Dumas M.D., M.P.H.  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    He was a longtime patient of Dr. Reid's, and has had a trach for many years because of the mandibular hypoplasia. Per report, a number of attempts toward decannulation had been made, but ultimately the decision was made to keep the trach in place due to concerns about his airway. He went to the operating room with Dr. Jose in August 2017 for flexible and rigid laryngoscopy/bronchoscopy. Per the operative note, this showed moderate trismus and base of tongue collapse, redundant, distorted, floppy epiglottis, with a grade 4 laryngoscopy. A small granuloma was seen on the medial left arytenoid. Vocal fold mobility and subglottis were normal. The 5-0 pediatric Bivona tube was noted to be in good position. Distal airway was unremarkable. It was observed that he would be a difficult intubation. With a Rothman blade under the epiglottis he was thought possibly intubatable but this was felt to be  difficult in the best case situation.    He currently has a 5-O Bivona in place. Over the past several weeks he has had increasing difficulty with granulation around the trach as well as associated inflammation and purulent drainage. His mother had contacted us a few weeks ago and I had prescribed antibiotics, but when she went to the pharmacy she was told there were no medications for pickup. The situation then worsened. I re-prescribed antibiotics and he was able to start this 2 days ago. The drainage has improved somewhat and the swelling has also noticeably improved, although there is still granulation present.    Apparently our previous trach supply orders were rejected and need to be re-ordered.      MEDICATIONS:     Current Outpatient Medications   Medication Sig Dispense Refill     albuterol (2.5 MG/3ML) 0.083% nebulizer solution Take 1 ampule by nebulization 4 times daily        amLODIPine (NORVASC) 5 MG tablet TAKE 1 TABLET(5 MG) BY MOUTH TWICE DAILY 180 tablet 0     amoxicillin-clavulanate (AUGMENTIN) 875-125 MG tablet Take 1 tablet by mouth 2 times daily 20 tablet 0     azithromycin (ZITHROMAX) 250 MG tablet Take  by mouth. Mon, Wed, Fri       calcium carbonate (OS-SUSAN 500 MG [place]. [place]) 500 MG tablet Take 1 tablet (500 mg) by mouth 3 times daily (with meals) 90 tablet 6     captopril 0.1 mg/mL (CAPOTEN) 0.1 mg/mL SUSP Take 2.5 mg three times daily (2.5 mg/5ml) 460 mL 11     cholecalciferol 2000 UNITS tablet Take 2,000 Units by mouth daily 30 tablet 11     ciprofloxacin-dexamethasone (CIPRODEX) 0.3-0.1 % otic suspension Apply 5 drops to tracheostomy site 2 times daily for 14 days. 7.5 mL 0     cloNIDine (CATAPRES) 0.1 MG tablet TAKE 1 TABLET(0.1 MG) BY MOUTH TWICE DAILY 180 tablet 0     famotidine (PEPCID) 20 MG tablet Take 20 mg by mouth 2 times daily       ipratropium (ATROVENT) 0.02 % neb solution Take 0.5 mg by nebulization 4 times daily  225 mL      order for DME Equipment being ordered: DME  "-    1)Adult Bivona Uncuffed Tracheostomy Tube-5.0  2)Deep suction - 10 fr  3)Suction container 1200 mL capacity  4) Suction tubing   5) Hernandez Connector 1 each 3     order for DME Tracheostomy Humidification Equipment  Equipment being ordered:     1) Air compressor  2) Nebulizer bottle  3) Aerosol tubing  4) Trach mask  5) Sterile water  6) Saline ampules (\"bullets\")  7) Heat Moisture Exchanger (HME) Also known by several other terms including: Thermal Humidifying Filters, Swedish nose, Artificial nose, Filter, Thermovent T.  8) Room/home humidifiers 1 each 11     order for DME Equipment being ordered: Humidifier (heated), humidifier attachment, saline lavages, humidivent mask 1 each 3     order for DME Equipment being ordered: DME    Adult Bivona Uncuffed Tracheostomy Tube-5.0 1 Units 11     predniSONE (DELTASONE) 5 MG tablet Take 5 mg by mouth every other day Take 7.5mg daily .  6     ranitidine (ZANTAC) 150 MG tablet Take 0.5 tablets (75 mg) by mouth 2 times daily (Patient not taking: Reported on 1/3/2019) 90 tablet 3     sodium bicarbonate 650 MG tablet TAKE 1 TABLET BY MOUTH THREE TIMES DAILY 270 tablet 3     sodium bicarbonate 650 MG tablet TAKE 1 TABLET BY MOUTH THREE TIMES DAILY 270 tablet 0     tobramycin, PF, (DAX) 300 MG/5ML nebulizer solution Take 1 ampule by nebulization as needed         ALLERGIES:    Allergies   Allergen Reactions     Latex      Latex      Other reaction(s): Other (see comments)     Tape [Adhesive Tape]        NEW PMH/PSH: None    REVIEW OF SYSTEMS:  The patient completed a comprehensive 11 point review of systems (below), which was reviewed. Positives are as noted below.  Patient Supplied Answers to Review of Systems   ENT ROS 6/10/2019   Constitutional Unexplained fatigue   Cardiopulmonary Cough   Musculoskeletal Sore or stiff joints, Back pain       PHYSICAL EXAM:  General: The patient was alert and conversant, and in no acute distress. Patient accompanied by his mother and " nurse.  Neck: Bivona 5 in place, moderate to severe granulation surrounding trach, some of it relatively mature, other more fresh. Moderate yellowish drainage, sent for culture. Moderate surrounding skin erythema and irritation, blanching; no fungal plaques.  Resp: Breathing comfortably, no stridor or stertor.  Neuro: Symmetric facial function. Other cranial nerve function as documented above.  Psych: Normal affect, pleasant and cooperative.  Voice/speech: No significant dysphonia.      Intake scores  Last 2 Scores for Patient-Answered VHI Questionnaire  No flowsheet data found.   Last 2 Scores for Patient-Answered EAT Questionnaire  No flowsheet data found.   Last 2 Scores for Patient-Answered CSI Questionnaire  No flowsheet data found.      Procedure: Flexible tracheobronchoscopy through established trach incision  Indications: The procedure was warranted to assess the position and/or condition of the tracheostomy tube and trachea  Description: After written and verbal consent were obtained, the endoscope was passed through the patient's tracheostomy. This allowed visualization of the trachea down to the raul and mainstem bronchi.  The trach was also pulled back to allow evaluation of the trach tract, and then was replaced and secured.  Findings: Trach tube in good position within the airway. Trachea clear to raul, which was sharp. Unremarkable takeoffs of mainstem bronchi. Trach tract unremarkable. Scant scattered yellow-white secretions.    Procedure:  Tracheostomy tube change  Indication: This procedure was warranted as part of routine trach care, and was requested by the patient/caregiver.  Description of procedure:  The existing tracheostomy tube was removed and a new identical tube was placed using the introducer. The introducer was withdrawn and replaced with the inner cannula, and the neck ties were secured. Flexible fiberoptic endoscopy through the tube confirmed good position. The patient tolerated  the procedure without difficulty.    Procedure: Injection of steroids to granulation tissue  Indication: This procedure was warranted to reduce the severity of his granulation tissue.   Description of procedure: After verbal consent was obtained, the areas of most prominent granulation were injected with Kenalog-40. The granulation was also treated with silver nitrate, with the excess dabbed away with cotton swabs. The patient tolerated the procedure without difficulty. Optifoam was placed; Cavilon skin barrier was also placed.      IMPRESSION AND PLAN:   Aidan Louie returns with significant granulation tissue around the trach. Today we completed a trach change, steroid injection, and silver nitrate topical treatment. I recommended finishing the antibiotics since they have already started to help. I also sent a culture. I recommended that they keep the area as clean and dry as possible, using Cavilon 1-2/xday and replacing the Optifoam 1-2x/day, and keeping the trach ties snug. We will hold off on gtts. I also placed a consult for him to see the Wound/Ostomy Care clinic for additional help managing the trach site, as this has been a chronic problem. The various needed trach supplies will be re-ordered. I will see him back in 2-3 weeks to assess his progress. They understand that my hope is to manage this without having him need general anesthesia, and that my concern is any resected granulation tissue would immediately regrow if the area remains moist and inflamed. Greater than 40 minutes were spent on this visit, of which more than 50% was spent on counseling and coordination of care.

## 2019-08-05 LAB
BACTERIA SPEC CULT: ABNORMAL
BACTERIA SPEC CULT: ABNORMAL
Lab: ABNORMAL
SPECIMEN SOURCE: ABNORMAL

## 2019-08-06 ENCOUNTER — OFFICE VISIT (OUTPATIENT)
Dept: WOUND CARE | Facility: CLINIC | Age: 24
End: 2019-08-06
Payer: MEDICARE

## 2019-08-06 DIAGNOSIS — J95.09 TRACHEOSTOMY GRANULOMA (H): Primary | ICD-10-CM

## 2019-08-06 NOTE — PROGRESS NOTES
"   Patient comes to wound clinic for wound assessment per request of dr. Dumas. 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   Objective findings:    Location:    Anterior neck trach    Trach doesn't have erythema and friable exuberant flesh external to the trach however there is swelling which was treated with steroid injections last week. Per pt's mom the swelling has improved significantly. Pt should keep this area dry . Consider different type of trach since mom believes this area started swelling after a 'new type of trach\" was palced   I wasn't comfortable treating this with silver nitrate without an order in additon there wasn't an idicateion today. Mom will contact me fwith order numbers for the products they are requesting and I will call those in per verbal order.         Subjective finding:     Pt states: doing alright, mom does most his trach care    Patient is assessed for discomfort which is: absent    Today's status of the wound: initial assessment    Treatment: At the end of the visit mom stated that she thought I was going to see them weekly for silver nitrate treatments which didn't appear to be indicated today. I will call in verbal order for Optifoam if pt's mom calls me with the product number she is requesting. However today there was minimal drainage noted  I will contact dr Bebe Dumas if weekly silver nitrate treatments is indeed the plan. Spoke with Dr Dumas who concurred with current plan to hold silver nitrate for now.  Order for Optifoam called in to [facility].   Fax:  [phone]   Phone:  [phone]     Pt received the following instructions:    Signs and symptoms of infection taught.  Patient " needs to be seen prn.  Dr. Fink  was available for supervision of care if needed or if questions should arise and regarding plan of care.  Nery De La Fuente RN, CWON

## 2019-08-13 DIAGNOSIS — L92.9 GRANULATION TISSUE OF SITE OF TRACHEOSTOMY: Primary | ICD-10-CM

## 2019-08-13 DIAGNOSIS — Z93.0 TRACHEOSTOMY IN PLACE (H): ICD-10-CM

## 2019-08-13 DIAGNOSIS — J95.09 TRACHEOSTOMY GRANULOMA (H): ICD-10-CM

## 2019-08-13 DIAGNOSIS — Z98.890 GRANULATION TISSUE OF SITE OF TRACHEOSTOMY: Primary | ICD-10-CM

## 2019-08-22 ENCOUNTER — TELEPHONE (OUTPATIENT)
Dept: OTOLARYNGOLOGY | Facility: CLINIC | Age: 24
End: 2019-08-22

## 2019-08-22 DIAGNOSIS — Z93.0 TRACHEOSTOMY IN PLACE (H): ICD-10-CM

## 2019-08-22 NOTE — TELEPHONE ENCOUNTER
Patient's mother, Valerie, sent this nurse photos of current tracheostomy site (see pictures below). Also stated they were sent the wrong wound care items. Instead of optifoam, they were sent 4 x 4 fenestrated gauze. Sent reply to Valerie stating I would reach out to Dr. Dumas to see what she would like to do.

## 2019-08-26 ENCOUNTER — TELEPHONE (OUTPATIENT)
Dept: OTOLARYNGOLOGY | Facility: CLINIC | Age: 24
End: 2019-08-26

## 2019-08-26 DIAGNOSIS — N18.30 CKD (CHRONIC KIDNEY DISEASE) STAGE 3, GFR 30-59 ML/MIN (H): ICD-10-CM

## 2019-08-26 RX ORDER — AMLODIPINE BESYLATE 5 MG/1
TABLET ORAL
Qty: 180 TABLET | Refills: 3 | Status: SHIPPED | OUTPATIENT
Start: 2019-08-26 | End: 2020-09-22

## 2019-08-26 NOTE — TELEPHONE ENCOUNTER
Patient's mother, Valerie, e-mailed this nurse. Please see below:    Infection, its yellow and green. The 'pouches' have grown around the trach and I can't separate them. It's like having a hole on top and then original hole where you can see trach. We are out of drops. Picture taken today. I hope the full picture   opens up because it shows a great view.      Sincerely Valerie Liban      This nurses response:   Topher Valerie,     Where you able to  the prescription for Augmentin that I sent to the pharmacy?? That will continue to be helpful if you haven't already picked them up. Also, we aren't going to refill the prescription for the drops because we want to try and keep it dry. I did reach out to Nery, the wound care nurse about the gauze (she was the one who ordered it for you) and I will be calling the respiratory supply company to make sure we get him the right stuff.       At his last appointment with Dr. Dumas she recommended a follow-up of 2 - 3 weeks. Where you able to make that appointment? If not let me know and I can get some dates and times we could see him.       At this point - Dr. Dumas does not feel that surgery is the right way to go simply because they will just come back and she doesn't want him to undergo anesthesia if it can be helped. However she would like to reassess the area, to see the progress. I know  you have sent me photos but she is recommending an appointment.       We understand how difficult it is for you to make the trip here and would be willing to find someone closer to you if this is something that would help? Please let me know what you would like to do.      -Yisel     Response from Valerie:   Pharmacy said there was no rx for him. I will check again. This thing is ugly and I'll take the soonest appointment you have. He's also been very tired and complaining of being hot on/off. He keeps turning on air conditioner on and off to cool down. I don't think it's a fever.    She took a sample, what was result?   Thanks for your help.   Valeriecandice Louie [phone]    This nurse called pharmacy and confirmed the prescription was filled and is ready for pickup. Sent Valerie response with information.

## 2019-08-28 DIAGNOSIS — L92.9 GRANULATION TISSUE OF SITE OF TRACHEOSTOMY: ICD-10-CM

## 2019-08-28 DIAGNOSIS — J95.09 TRACHEOSTOMY GRANULOMA (H): ICD-10-CM

## 2019-08-28 DIAGNOSIS — Z98.890 GRANULATION TISSUE OF SITE OF TRACHEOSTOMY: ICD-10-CM

## 2019-08-28 DIAGNOSIS — Z93.0 TRACHEOSTOMY IN PLACE (H): ICD-10-CM

## 2019-09-06 ENCOUNTER — TELEPHONE (OUTPATIENT)
Dept: OTOLARYNGOLOGY | Facility: CLINIC | Age: 24
End: 2019-09-06

## 2019-09-06 NOTE — TELEPHONE ENCOUNTER
[facility] Call Center    Phone Message    May a detailed message be left on voicemail: yes    Reason for Call: Other: Pt's mom Valerie is calling stating she needs to speak with Yisel, pt was squeezed into Dr. Dumas's schedule today however, mom woke up sick and would like to be rescheduled to next week.     Action Taken: Message routed to:  [facility] ENT

## 2019-09-09 ENCOUNTER — OFFICE VISIT (OUTPATIENT)
Dept: OTOLARYNGOLOGY | Facility: CLINIC | Age: 24
End: 2019-09-09
Payer: MEDICARE

## 2019-09-09 VITALS — WEIGHT: 107 LBS | HEIGHT: 61 IN | BODY MASS INDEX: 20.2 KG/M2

## 2019-09-09 DIAGNOSIS — J95.09 TRACHEOSTOMY GRANULOMA (H): ICD-10-CM

## 2019-09-09 DIAGNOSIS — Z98.890 GRANULATION TISSUE OF SITE OF TRACHEOSTOMY: Primary | ICD-10-CM

## 2019-09-09 DIAGNOSIS — L92.9 GRANULATION TISSUE OF SITE OF TRACHEOSTOMY: Primary | ICD-10-CM

## 2019-09-09 DIAGNOSIS — M26.04 MANDIBULAR HYPOPLASIA: ICD-10-CM

## 2019-09-09 DIAGNOSIS — Z93.0 TRACHEOSTOMY IN PLACE (H): ICD-10-CM

## 2019-09-09 DIAGNOSIS — Z98.1 HISTORY OF FUSION OF CERVICAL SPINE: ICD-10-CM

## 2019-09-09 ASSESSMENT — MIFFLIN-ST. JEOR: SCORE: 1348.73

## 2019-09-09 NOTE — PROGRESS NOTES
Dear Colleague:    Aidan Louie recently returned for follow-up at the [facility]. My clinic note from our visit is enclosed below.  Speech recognition software may have been used in the documentation below; input is reviewed before signature to the best of my ability.     I appreciate the ongoing opportunity to participate in this patient's care.    Please feel free to contact me with any questions.      Sincerely yours,  Bebe Dumas M.D., M.P.H.  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    He was a longtime patient of Dr. Reid's, and has had a trach for many years because of the mandibular hypoplasia. Per report, a number of attempts toward decannulation had been made, but ultimately the decision was made to keep the trach in place due to concerns about his airway. He went to the operating room with Dr. Jose in August 2017 for flexible and rigid laryngoscopy/bronchoscopy. Per the operative note, this showed moderate trismus and base of tongue collapse, redundant, distorted, floppy epiglottis, with a grade 4 laryngoscopy. A small granuloma was seen on the medial left arytenoid. Vocal fold mobility and subglottis were normal. The 5-0 pediatric Bivona tube was noted to be in good position. Distal airway was unremarkable. It was observed that he would be a difficult intubation. With a Rothman blade under the epiglottis, he was thought possibly intubatable but this was felt  "to be difficult in the best case situation.    He currently has a 5-0 Bivona in place. Previously he had presented with significant granulation tissue and responded well to steroid injection as well as oral antibiotics. He did not follow up as scheduled, but then called reporting increasing drainage. A repeat course of antibiotics has helped considerably and the granulation is significantly decreased.      MEDICATIONS:     Current Outpatient Medications   Medication Sig Dispense Refill     albuterol (2.5 MG/3ML) 0.083% nebulizer solution Take 1 ampule by nebulization 4 times daily        amLODIPine (NORVASC) 5 MG tablet TAKE 1 TABLET(5 MG) BY MOUTH TWICE DAILY 180 tablet 3     amoxicillin-clavulanate (AUGMENTIN) 875-125 MG tablet Take 1 tablet by mouth 2 times daily 20 tablet 0     azithromycin (ZITHROMAX) 250 MG tablet Take  by mouth. Mon, Wed, Fri       calcium carbonate (OS-SUSAN 500 MG [place]. [place]) 500 MG tablet Take 1 tablet (500 mg) by mouth 3 times daily (with meals) 90 tablet 6     captopril 0.1 mg/mL (CAPOTEN) 0.1 mg/mL SUSP Take 2.5 mg three times daily (2.5 mg/5ml) 460 mL 11     cholecalciferol 2000 UNITS tablet Take 2,000 Units by mouth daily 30 tablet 11     cloNIDine (CATAPRES) 0.1 MG tablet TAKE 1 TABLET(0.1 MG) BY MOUTH TWICE DAILY 180 tablet 0     famotidine (PEPCID) 20 MG tablet Take 20 mg by mouth 2 times daily       ipratropium (ATROVENT) 0.02 % neb solution Take 0.5 mg by nebulization 4 times daily  225 mL      order for DME Tracheostomy Humidification Equipment  Equipment being ordered:     1) Air compressor  2) Nebulizer bottle  3) Aerosol tubing  4) Trach mask  5) Sterile water  6) Saline ampules (\"bullets\")  7) Heat Moisture Exchanger (HME) Also known by several other terms including: Thermal Humidifying Filters, Swedish nose, Artificial nose, Filter, Thermovent T.  8) Room/home humidifiers 1 each 11     order for DME Optifoam Basic   [id number] 30 each 11     order for DME Equipment being ordered: " DME -    1)Adult Bivona Uncuffed Tracheostomy Tube-5.0  2)Deep suction - 10 fr  3)Suction container 1200 mL capacity  4) Suction tubing   5) Hernandez Connector 1 each 3     order for DME Equipment being ordered: Humidifier (heated), humidifier attachment, saline lavages, humidivent mask 1 each 3     order for DME Equipment being ordered: DME    Adult Bivona Uncuffed Tracheostomy Tube-5.0 1 Units 11     predniSONE (DELTASONE) 5 MG tablet Take 5 mg by mouth every other day Take 7.5mg daily .  6     ranitidine (ZANTAC) 150 MG tablet Take 0.5 tablets (75 mg) by mouth 2 times daily (Patient not taking: Reported on 1/3/2019) 90 tablet 3     sodium bicarbonate 650 MG tablet TAKE 1 TABLET BY MOUTH THREE TIMES DAILY 270 tablet 3     sodium bicarbonate 650 MG tablet TAKE 1 TABLET BY MOUTH THREE TIMES DAILY 270 tablet 0     tobramycin, PF, (DAX) 300 MG/5ML nebulizer solution Take 1 ampule by nebulization as needed         ALLERGIES:    Allergies   Allergen Reactions     Latex      Latex      Other reaction(s): Other (see comments)     Tape [Adhesive Tape]        NEW PMH/PSH: None    REVIEW OF SYSTEMS:  The patient completed a comprehensive 11 point review of systems (below), which was reviewed. Positives are as noted below.  Patient Supplied Answers to Review of Systems  [facility] ENT ROS 6/10/2019   Constitutional Unexplained fatigue   Cardiopulmonary Cough   Musculoskeletal Sore or stiff joints, Back pain       PHYSICAL EXAM:  General: The patient was alert and conversant, and in no acute distress. Patient accompanied by his mother and nurse.  Neck: No palpable cervical lymphadenopathy. Trach in good position. Significantly decreased granulation surrounding trach, but there appears to be a synechia now between the previously separate collection of granulation tissue. Otherwise, tract appears healthy.  Resp: Breathing comfortably, no stridor or stertor.  Neuro: Symmetric facial function.  Psych: Normal affect, pleasant and  cooperative.  Voice/speech: stable, no significant dysphonia, mild stable dysarthria consistent with history of hearing impairment.    Intake scores  Last 2 Scores for Patient-Answered VHI Questionnaire  No flowsheet data found.   Last 2 Scores for Patient-Answered EAT Questionnaire  No flowsheet data found.   Last 2 Scores for Patient-Answered CSI Questionnaire  No flowsheet data found.      Procedure 1: Flexible tracheobronchoscopy through established trach incision  Indications: The procedure was warranted to assess the position and/or condition of the tracheostomy tube and trachea  Description: After written and verbal consent were obtained, the endoscope was passed through the patient's tracheostomy. This allowed visualization of the trachea down to the raul and mainstem bronchi.  Findings: Trach tube in good position within the airway. Trachea clear to raul, which was sharp. Unremarkable takeoffs of mainstem bronchi. Trach tract unremarkable. Scattered translucent mucus.    Procedure 2: Injection of steroids to granulation tissue  Indication: This procedure was warranted to reduce the severity of his granulation tissue.   Description of procedure: After verbal consent was obtained, the areas of most prominent granulation were injected with Kenalog-40 after 1% lidocaine with 1:100,000 epinephrine was injected. The granulation was also treated with silver nitrate, with the excess dabbed away with cotton swabs. The patient tolerated the procedure without difficulty.       IMPRESSION AND PLAN:   Aidan Louie returns with interval improvement of his trach granulation tissue. It seemed that the steroid injections helped and the oral antibiotics made the most significant difference, according to the patient and his mother. Today, Greg expressed frustration that the problem has not already been fixed and stated he felt that things were not being examined carefully enough. I explained that I am not sure how  we could examine things more, since at each visit he has tracheoscopy as well as a thorough trach check, and we have been treating the granulation tissue with steady improvement. After some discussion I believe he understood that the time required for improvement is not a sign of lack of care or carefulness.  I did also offer to help him find another provider if he feels that my approach does not fit with his preferences, but he did not seem to wish to do that.     For today I recommended:  1) Continue keeping the trach site clean and dry.  2) If there is any increase in drainage or inflammation, start a repeat course of antibiotics immediately, a prescription will be provided as a precaution.  3) Return in three weeks for a repeat trach site check. We discussed that management has been challenging due to various logistical barriers and difficulties with them getting back to the clinic (his mother is about to have a major surgery and has been ill), so things may take some time, but with time and patience we expect there to be improvement. If the synechium thins out enough, I would like to divide it in clinic rather than putting him under general anesthesia.    I appreciate the opportunity to participate in the care of this patient. Greater than 40 minutes were spent on this visit, of which more than 50% was spent on counseling and coordination of care.

## 2019-09-09 NOTE — LETTER
9/9/2019      RE: Aidan Louie  [address]  [place] [address]       Dear Colleague:    Aidan Louie recently returned for follow-up at the [facility]. My clinic note from our visit is enclosed below.  Speech recognition software may have been used in the documentation below; input is reviewed before signature to the best of my ability.     I appreciate the ongoing opportunity to participate in this patient's care.    Please feel free to contact me with any questions.      Sincerely yours,  Bebe Dmuas M.D., M.P.H.  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    He was a longtime patient of Dr. Reid's, and has had a trach for many years because of the mandibular hypoplasia. Per report, a number of attempts toward decannulation had been made, but ultimately the decision was made to keep the trach in place due to concerns about his airway. He went to the operating room with Dr. Jose in August 2017 for flexible and rigid laryngoscopy/bronchoscopy. Per the operative note, this showed moderate trismus and base of tongue collapse, redundant, distorted, floppy epiglottis, with a grade 4 laryngoscopy. A small granuloma was seen on the medial left arytenoid. Vocal fold mobility and subglottis were normal. The 5-0 pediatric Bivona tube was noted to be in good position. Distal airway was unremarkable. It was observed that he would be a difficult intubation. With a  "Rothman blade under the epiglottis, he was thought possibly intubatable but this was felt to be difficult in the best case situation.    He currently has a 5-0 Bivona in place. Previously he had presented with significant granulation tissue and responded well to steroid injection as well as oral antibiotics. He did not follow up as scheduled, but then called reporting increasing drainage. A repeat course of antibiotics has helped considerably and the granulation is significantly decreased.      MEDICATIONS:     Current Outpatient Medications   Medication Sig Dispense Refill     albuterol (2.5 MG/3ML) 0.083% nebulizer solution Take 1 ampule by nebulization 4 times daily        amLODIPine (NORVASC) 5 MG tablet TAKE 1 TABLET(5 MG) BY MOUTH TWICE DAILY 180 tablet 3     amoxicillin-clavulanate (AUGMENTIN) 875-125 MG tablet Take 1 tablet by mouth 2 times daily 20 tablet 0     azithromycin (ZITHROMAX) 250 MG tablet Take  by mouth. Mon, Wed, Fri       calcium carbonate (OS-SUSAN 500 MG [place]. [place]) 500 MG tablet Take 1 tablet (500 mg) by mouth 3 times daily (with meals) 90 tablet 6     captopril 0.1 mg/mL (CAPOTEN) 0.1 mg/mL SUSP Take 2.5 mg three times daily (2.5 mg/5ml) 460 mL 11     cholecalciferol 2000 UNITS tablet Take 2,000 Units by mouth daily 30 tablet 11     cloNIDine (CATAPRES) 0.1 MG tablet TAKE 1 TABLET(0.1 MG) BY MOUTH TWICE DAILY 180 tablet 0     famotidine (PEPCID) 20 MG tablet Take 20 mg by mouth 2 times daily       ipratropium (ATROVENT) 0.02 % neb solution Take 0.5 mg by nebulization 4 times daily  225 mL      order for DME Tracheostomy Humidification Equipment  Equipment being ordered:     1) Air compressor  2) Nebulizer bottle  3) Aerosol tubing  4) Trach mask  5) Sterile water  6) Saline ampules (\"bullets\")  7) Heat Moisture Exchanger (HME) Also known by several other terms including: Thermal Humidifying Filters, Swedish nose, Artificial nose, Filter, Thermovent T.  8) Room/home humidifiers 1 each 11     " order for DME Optifoam Basic   [id number] 30 each 11     order for DME Equipment being ordered: DME -    1)Adult Bivona Uncuffed Tracheostomy Tube-5.0  2)Deep suction - 10 fr  3)Suction container 1200 mL capacity  4) Suction tubing   5) Hernandez Connector 1 each 3     order for DME Equipment being ordered: Humidifier (heated), humidifier attachment, saline lavages, humidivent mask 1 each 3     order for DME Equipment being ordered: DME    Adult Bivona Uncuffed Tracheostomy Tube-5.0 1 Units 11     predniSONE (DELTASONE) 5 MG tablet Take 5 mg by mouth every other day Take 7.5mg daily .  6     ranitidine (ZANTAC) 150 MG tablet Take 0.5 tablets (75 mg) by mouth 2 times daily (Patient not taking: Reported on 1/3/2019) 90 tablet 3     sodium bicarbonate 650 MG tablet TAKE 1 TABLET BY MOUTH THREE TIMES DAILY 270 tablet 3     sodium bicarbonate 650 MG tablet TAKE 1 TABLET BY MOUTH THREE TIMES DAILY 270 tablet 0     tobramycin, PF, (DAX) 300 MG/5ML nebulizer solution Take 1 ampule by nebulization as needed         ALLERGIES:    Allergies   Allergen Reactions     Latex      Latex      Other reaction(s): Other (see comments)     Tape [Adhesive Tape]        NEW PMH/PSH: None    REVIEW OF SYSTEMS:  The patient completed a comprehensive 11 point review of systems (below), which was reviewed. Positives are as noted below.  Patient Supplied Answers to Review of Systems   ENT ROS 6/10/2019   Constitutional Unexplained fatigue   Cardiopulmonary Cough   Musculoskeletal Sore or stiff joints, Back pain       PHYSICAL EXAM:  General: The patient was alert and conversant, and in no acute distress. Patient accompanied by his mother and nurse.  Neck: No palpable cervical lymphadenopathy. Trach in good position. Significantly decreased granulation surrounding trach, but there appears to be a synechia now between the previously separate collection of granulation tissue. Otherwise, tract appears healthy.  Resp: Breathing comfortably, no stridor  or stertor.  Neuro: Symmetric facial function.  Psych: Normal affect, pleasant and cooperative.  Voice/speech: stable, no significant dysphonia, mild stable dysarthria consistent with history of hearing impairment.    Intake scores  Last 2 Scores for Patient-Answered VHI Questionnaire  No flowsheet data found.   Last 2 Scores for Patient-Answered EAT Questionnaire  No flowsheet data found.   Last 2 Scores for Patient-Answered CSI Questionnaire  No flowsheet data found.      Procedure 1: Flexible tracheobronchoscopy through established trach incision  Indications: The procedure was warranted to assess the position and/or condition of the tracheostomy tube and trachea  Description: After written and verbal consent were obtained, the endoscope was passed through the patient's tracheostomy. This allowed visualization of the trachea down to the raul and mainstem bronchi.  Findings: Trach tube in good position within the airway. Trachea clear to raul, which was sharp. Unremarkable takeoffs of mainstem bronchi. Trach tract unremarkable. Scattered translucent mucus.    Procedure 2: Injection of steroids to granulation tissue  Indication: This procedure was warranted to reduce the severity of his granulation tissue.   Description of procedure: After verbal consent was obtained, the areas of most prominent granulation were injected with Kenalog-40 after 1% lidocaine with 1:100,000 epinephrine was injected. The granulation was also treated with silver nitrate, with the excess dabbed away with cotton swabs. The patient tolerated the procedure without difficulty.       IMPRESSION AND PLAN:   Aidan Louie returns with interval improvement of his trach granulation tissue. It seemed that the steroid injections helped and the oral antibiotics made the most significant difference, according to the patient and his mother. Today, Greg expressed frustration that the problem has not already been fixed and stated he felt that  things were not being examined carefully enough. I explained that I am not sure how we could examine things more, since at each visit he has tracheoscopy as well as a thorough trach check, and we have been treating the granulation tissue with steady improvement. After some discussion I believe he understood that the time required for improvement is not a sign of lack of care or carefulness.  I did also offer to help him find another provider if he feels that my approach does not fit with his preferences, but he did not seem to wish to do that.     For today I recommended:  1) Continue keeping the trach site clean and dry.  2) If there is any increase in drainage or inflammation, start a repeat course of antibiotics immediately, a prescription will be provided as a precaution.  3) Return in three weeks for a repeat trach site check. We discussed that management has been challenging due to various logistical barriers and difficulties with them getting back to the clinic (his mother is about to have a major surgery and has been ill), so things may take some time, but with time and patience we expect there to be improvement. If the synechium thins out enough, I would like to divide it in clinic rather than putting him under general anesthesia.    I appreciate the opportunity to participate in the care of this patient. Greater than 40 minutes were spent on this visit, of which more than 50% was spent on counseling and coordination of care.     Bebe Dumas MD

## 2019-09-23 DIAGNOSIS — Z93.0 TRACHEOSTOMY IN PLACE (H): ICD-10-CM

## 2019-09-24 ENCOUNTER — MEDICAL CORRESPONDENCE (OUTPATIENT)
Dept: HEALTH INFORMATION MANAGEMENT | Facility: CLINIC | Age: 24
End: 2019-09-24

## 2019-10-07 ENCOUNTER — OFFICE VISIT (OUTPATIENT)
Dept: OTOLARYNGOLOGY | Facility: CLINIC | Age: 24
End: 2019-10-07
Payer: MEDICARE

## 2019-10-07 VITALS — HEIGHT: 61 IN | BODY MASS INDEX: 20.2 KG/M2 | WEIGHT: 107 LBS

## 2019-10-07 DIAGNOSIS — M26.04 MANDIBULAR HYPOPLASIA: ICD-10-CM

## 2019-10-07 DIAGNOSIS — Z98.1 HISTORY OF FUSION OF CERVICAL SPINE: ICD-10-CM

## 2019-10-07 DIAGNOSIS — J95.09 TRACHEOSTOMY GRANULOMA (H): ICD-10-CM

## 2019-10-07 DIAGNOSIS — Z43.0 TRACHEOSTOMY CARE (H): ICD-10-CM

## 2019-10-07 DIAGNOSIS — Z93.0 TRACHEOSTOMY IN PLACE (H): Primary | ICD-10-CM

## 2019-10-07 DIAGNOSIS — Q87.0 PIERRE ROBIN SYNDROME: ICD-10-CM

## 2019-10-07 ASSESSMENT — MIFFLIN-ST. JEOR: SCORE: 1348.73

## 2019-10-07 NOTE — LETTER
10/7/2019      RE: Aidan Louie  [address]  [place] [address]       Dear Colleague:    Aidan Louie recently returned for follow-up at the [facility]. My clinic note from our visit is enclosed below.  Speech recognition software may have been used in the documentation below; input is reviewed before signature to the best of my ability.     I appreciate the ongoing opportunity to participate in this patient's care.    Please feel free to contact me with any questions.      Sincerely yours,  Bebe Dumas M.D., M.P.H.  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    He was a longtime patient of Dr. Reid's, and has had a trach for many years because of the mandibular hypoplasia. Per report, a number of attempts toward decannulation had been made, but ultimately the decision was made to keep the trach in place due to concerns about his airway. He went to the operating room with Dr. Jose in August 2017 for flexible and rigid laryngoscopy/bronchoscopy. Per the operative note, this showed moderate trismus and base of tongue collapse, redundant, distorted, floppy epiglottis, with a grade 4 laryngoscopy. A small granuloma was seen on the medial left arytenoid. Vocal fold mobility and subglottis were normal. The 5-0 pediatric Bivona tube was noted to be in good position. Distal airway was unremarkable. It was observed that he would be a difficult intubation. With a  "Rothman blade under the epiglottis he was thought possibly intubatable but this was felt to be difficult in the best case situation.    He currently has a 5-O Bivona in place. He has recently had a few courses of antibiotics. Significant improvement on exam. He is still having some crusting, but granulation tissue has decreased in size.        MEDICATIONS:     Current Outpatient Medications   Medication Sig Dispense Refill     order for DME Equipment being ordered: Medi-Vac, plastic tubing connector lara type, cat 361, [facility], 1 lara tip/month x 12 months. Use for tracheostomy suctioning 1 each 11     albuterol (2.5 MG/3ML) 0.083% nebulizer solution Take 1 ampule by nebulization 4 times daily        amLODIPine (NORVASC) 5 MG tablet TAKE 1 TABLET(5 MG) BY MOUTH TWICE DAILY 180 tablet 3     amoxicillin-clavulanate (AUGMENTIN) 875-125 MG tablet Take 1 tablet by mouth 2 times daily 20 tablet 0     azithromycin (ZITHROMAX) 250 MG tablet Take  by mouth. Mon, Wed, Fri       calcium carbonate (OS-SUSAN 500 MG [place]. [place]) 500 MG tablet Take 1 tablet (500 mg) by mouth 3 times daily (with meals) 90 tablet 6     captopril 0.1 mg/mL (CAPOTEN) 0.1 mg/mL SUSP Take 2.5 mg three times daily (2.5 mg/5ml) 460 mL 11     cholecalciferol 2000 UNITS tablet Take 2,000 Units by mouth daily 30 tablet 11     cloNIDine (CATAPRES) 0.1 MG tablet TAKE 1 TABLET(0.1 MG) BY MOUTH TWICE DAILY 180 tablet 0     famotidine (PEPCID) 20 MG tablet Take 20 mg by mouth 2 times daily       ipratropium (ATROVENT) 0.02 % neb solution Take 0.5 mg by nebulization 4 times daily  225 mL      order for DME Equipment being ordered: DME -    Deep suction kits - 12 fr 90 each 3     order for DME Tracheostomy Humidification Equipment  Equipment being ordered:     1) Air compressor  2) Nebulizer bottle  3) Aerosol tubing  4) Trach mask  5) Sterile water  6) Saline ampules (\"bullets\")  7) Heat Moisture Exchanger (HME) Also known by several other " terms including: Thermal Humidifying Filters, Swedish nose, Artificial nose, Filter, Thermovent T.  8) Room/home humidifiers 1 each 11     order for DME Optifoam Basic   [id number] 30 each 11     order for DME Equipment being ordered: Humidifier (heated), humidifier attachment, saline lavages, humidivent mask 1 each 3     order for DME Equipment being ordered: DME    Adult Bivona Uncuffed Tracheostomy Tube-5.0 1 Units 11     predniSONE (DELTASONE) 5 MG tablet Take 5 mg by mouth every other day Take 7.5mg daily .  6     ranitidine (ZANTAC) 150 MG tablet Take 0.5 tablets (75 mg) by mouth 2 times daily (Patient not taking: Reported on 1/3/2019) 90 tablet 3     sodium bicarbonate 650 MG tablet TAKE 1 TABLET BY MOUTH THREE TIMES DAILY 270 tablet 3     sodium bicarbonate 650 MG tablet TAKE 1 TABLET BY MOUTH THREE TIMES DAILY 270 tablet 0     tobramycin, PF, (DAX) 300 MG/5ML nebulizer solution Take 1 ampule by nebulization as needed         ALLERGIES:    Allergies   Allergen Reactions     Latex      Latex      Other reaction(s): Other (see comments)     Tape [Adhesive Tape]        NEW PMH/PSH: None    REVIEW OF SYSTEMS:  The patient completed a comprehensive 11 point review of systems (below), which was reviewed. Positives are as noted below.  Patient Supplied Answers to Review of Systems   ENT ROS 6/10/2019   Constitutional Unexplained fatigue   Cardiopulmonary Cough   Musculoskeletal Sore or stiff joints, Back pain       PHYSICAL EXAM:  General: The patient was alert and conversant, and in no acute distress.    Oral cavity/oropharynx: No masses or lesions. Dentition unchanged since prior. Tongue mobility and palate elevation intact and symmetric.  Neck: No palpable cervical lymphadenopathy, 5-O Bivona in good position. Small scar across granulation tissue.  Resp: Breathing comfortably, no stridor or stertor.  Neuro: Symmetric facial function.   Psych: Normal affect, pleasant and cooperative.  Voice/speech:  Stable.      Intake scores  Last 2 Scores for Patient-Answered VHI Questionnaire  No flowsheet data found.   Last 2 Scores for Patient-Answered EAT Questionnaire  No flowsheet data found.   Last 2 Scores for Patient-Answered CSI Questionnaire  No flowsheet data found.      Procedure: Division of scar tissue, injection of steroids  Indication: This procedure was warranted given a scar band that formed between two regions of granulation tissue on either side of the tracheostomy tube. The granulation was injected with 1% lidocaine with 1:100,000 epinephrine. It was then sharply divided. The granulation was then injected with 0.5 ml of kenalog-40. Then silver nitrate was applied sparingly.    Procedure: Flexible tracheobronchoscopy through established trach incision  Indications: The procedure was warranted to assess the position and/or condition of the tracheostomy tube and trachea  Description: After written and verbal consent were obtained, the endoscope was passed through the patient's tracheostomy. This allowed visualization of the trachea down to the raul and mainstem bronchi.  Findings: Trach tube in good position within the airway. Trachea clear to raul, which was sharp. Unremarkable takeoffs of mainstem bronchi.     Procedure:  Tracheostomy tube change  Indication: This procedure was warranted as part of routine trach care, and was requested by the patient/caregiver. It was performed by the patient's mother under my supervision.  Description of procedure:  The existing tracheostomy tube was removed and a new identical tube was placed using the introducer. The introducer was withdrawn and replaced with the inner cannula, and the neck ties were secured. Flexible fiberoptic endoscopy through the tube confirmed good position.   The patient tolerated the procedure without difficulty.    IMPRESSION AND PLAN:   Aidan Louie returns for trach care. He has significant improvement on exam. He is still having  some crusting, but granulation tissue has dramatically decreased in size.  Today we completed a steroid injection as well as a division of a band of granuloma that crossed across the upper stoma. His mother was also able to complete a trach change under my supervision. Now that the granulation tissue is significantly reduced, his mother is comfortable resuming trach care, which she has done for a long time for him. We discussed the importance of good trach hygiene, which is important for preventing a repeated flare of granulation/infection of the trach site. They would like to follow up in about 6 months, or sooner as needed. I appreciate the opportunity to participate in the care of this pleasant patient.         Bebe Dumas MD

## 2019-10-07 NOTE — NURSING NOTE
Per patient's mom, DM order for medi-vac plastic tubing connector lara type was faxed to [phone] as instructed.    Medi-vac plastic tubing connector lara type cat 361, manufacture for [employer]  1 lara tip/monthx 12 month for use of tracheostomy suctioning.    [phone] [facility]  Fax [phone]    I spoke with Alvin and he will contact patient's mom.

## 2019-10-07 NOTE — PROGRESS NOTES
Dear Colleague:    Aidan Louie recently returned for follow-up at the [facility]. My clinic note from our visit is enclosed below.  Speech recognition software may have been used in the documentation below; input is reviewed before signature to the best of my ability.     I appreciate the ongoing opportunity to participate in this patient's care.    Please feel free to contact me with any questions.      Sincerely yours,  Bebe Dumas M.D., M.P.H.  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    He was a longtime patient of Dr. Reid's, and has had a trach for many years because of the mandibular hypoplasia. Per report, a number of attempts toward decannulation had been made, but ultimately the decision was made to keep the trach in place due to concerns about his airway. He went to the operating room with Dr. Jose in August 2017 for flexible and rigid laryngoscopy/bronchoscopy. Per the operative note, this showed moderate trismus and base of tongue collapse, redundant, distorted, floppy epiglottis, with a grade 4 laryngoscopy. A small granuloma was seen on the medial left arytenoid. Vocal fold mobility and subglottis were normal. The 5-0 pediatric Bivona tube was noted to be in good position. Distal airway was unremarkable. It was observed that he would be a difficult intubation. With a Rothman blade under the epiglottis he was thought possibly intubatable but this was felt to  "be difficult in the best case situation.    He currently has a 5-O Bivona in place. He has recently had a few courses of antibiotics. Significant improvement on exam. He is still having some crusting, but granulation tissue has decreased in size.        MEDICATIONS:     Current Outpatient Medications   Medication Sig Dispense Refill     order for DME Equipment being ordered: Medi-Vac, plastic tubing connector lara type, cat 361, [facility], 1 lara tip/month x 12 months. Use for tracheostomy suctioning 1 each 11     albuterol (2.5 MG/3ML) 0.083% nebulizer solution Take 1 ampule by nebulization 4 times daily        amLODIPine (NORVASC) 5 MG tablet TAKE 1 TABLET(5 MG) BY MOUTH TWICE DAILY 180 tablet 3     amoxicillin-clavulanate (AUGMENTIN) 875-125 MG tablet Take 1 tablet by mouth 2 times daily 20 tablet 0     azithromycin (ZITHROMAX) 250 MG tablet Take  by mouth. Mon, Wed, Fri       calcium carbonate (OS-SUSAN 500 MG [place]. [place]) 500 MG tablet Take 1 tablet (500 mg) by mouth 3 times daily (with meals) 90 tablet 6     captopril 0.1 mg/mL (CAPOTEN) 0.1 mg/mL SUSP Take 2.5 mg three times daily (2.5 mg/5ml) 460 mL 11     cholecalciferol 2000 UNITS tablet Take 2,000 Units by mouth daily 30 tablet 11     cloNIDine (CATAPRES) 0.1 MG tablet TAKE 1 TABLET(0.1 MG) BY MOUTH TWICE DAILY 180 tablet 0     famotidine (PEPCID) 20 MG tablet Take 20 mg by mouth 2 times daily       ipratropium (ATROVENT) 0.02 % neb solution Take 0.5 mg by nebulization 4 times daily  225 mL      order for DME Equipment being ordered: DME -    Deep suction kits - 12 fr 90 each 3     order for DME Tracheostomy Humidification Equipment  Equipment being ordered:     1) Air compressor  2) Nebulizer bottle  3) Aerosol tubing  4) Trach mask  5) Sterile water  6) Saline ampules (\"bullets\")  7) Heat Moisture Exchanger (HME) Also known by several other terms including: Thermal Humidifying Filters, Swedish nose, Artificial nose, Filter, " Thermovent T.  8) Room/home humidifiers 1 each 11     order for DME Optifoam Basic   [id number] 30 each 11     order for DME Equipment being ordered: Humidifier (heated), humidifier attachment, saline lavages, humidivent mask 1 each 3     order for DME Equipment being ordered: DME    Adult Bivona Uncuffed Tracheostomy Tube-5.0 1 Units 11     predniSONE (DELTASONE) 5 MG tablet Take 5 mg by mouth every other day Take 7.5mg daily .  6     ranitidine (ZANTAC) 150 MG tablet Take 0.5 tablets (75 mg) by mouth 2 times daily (Patient not taking: Reported on 1/3/2019) 90 tablet 3     sodium bicarbonate 650 MG tablet TAKE 1 TABLET BY MOUTH THREE TIMES DAILY 270 tablet 3     sodium bicarbonate 650 MG tablet TAKE 1 TABLET BY MOUTH THREE TIMES DAILY 270 tablet 0     tobramycin, PF, (DAX) 300 MG/5ML nebulizer solution Take 1 ampule by nebulization as needed         ALLERGIES:    Allergies   Allergen Reactions     Latex      Latex      Other reaction(s): Other (see comments)     Tape [Adhesive Tape]        NEW PMH/PSH: None    REVIEW OF SYSTEMS:  The patient completed a comprehensive 11 point review of systems (below), which was reviewed. Positives are as noted below.  Patient Supplied Answers to Review of Systems   ENT ROS 6/10/2019   Constitutional Unexplained fatigue   Cardiopulmonary Cough   Musculoskeletal Sore or stiff joints, Back pain       PHYSICAL EXAM:  General: The patient was alert and conversant, and in no acute distress.    Oral cavity/oropharynx: No masses or lesions. Dentition unchanged since prior. Tongue mobility and palate elevation intact and symmetric.  Neck: No palpable cervical lymphadenopathy, 5-O Bivona in good position. Small scar across granulation tissue.  Resp: Breathing comfortably, no stridor or stertor.  Neuro: Symmetric facial function.   Psych: Normal affect, pleasant and cooperative.  Voice/speech: Stable.      Intake scores  Last 2 Scores for Patient-Answered VHI Questionnaire  No flowsheet  data found.   Last 2 Scores for Patient-Answered EAT Questionnaire  No flowsheet data found.   Last 2 Scores for Patient-Answered CSI Questionnaire  No flowsheet data found.      Procedure: Division of scar tissue, injection of steroids  Indication: This procedure was warranted given a scar band that formed between two regions of granulation tissue on either side of the tracheostomy tube. The granulation was injected with 1% lidocaine with 1:100,000 epinephrine. It was then sharply divided. The granulation was then injected with 0.5 ml of kenalog-40. Then silver nitrate was applied sparingly.    Procedure: Flexible tracheobronchoscopy through established trach incision  Indications: The procedure was warranted to assess the position and/or condition of the tracheostomy tube and trachea  Description: After written and verbal consent were obtained, the endoscope was passed through the patient's tracheostomy. This allowed visualization of the trachea down to the raul and mainstem bronchi.  Findings: Trach tube in good position within the airway. Trachea clear to raul, which was sharp. Unremarkable takeoffs of mainstem bronchi.     Procedure:  Tracheostomy tube change  Indication: This procedure was warranted as part of routine trach care, and was requested by the patient/caregiver. It was performed by the patient's mother under my supervision.  Description of procedure:  The existing tracheostomy tube was removed and a new identical tube was placed using the introducer. The introducer was withdrawn and replaced with the inner cannula, and the neck ties were secured. Flexible fiberoptic endoscopy through the tube confirmed good position.   The patient tolerated the procedure without difficulty.    IMPRESSION AND PLAN:   Aidan Louie returns for trach care. He has significant improvement on exam. He is still having some crusting, but granulation tissue has dramatically decreased in size.  Today we completed a  steroid injection as well as a division of a band of granuloma that crossed across the upper stoma. His mother was also able to complete a trach change under my supervision. Now that the granulation tissue is significantly reduced, his mother is comfortable resuming trach care, which she has done for a long time for him. We discussed the importance of good trach hygiene, which is important for preventing a repeated flare of granulation/infection of the trach site. They would like to follow up in about 6 months, or sooner as needed. I appreciate the opportunity to participate in the care of this pleasant patient.

## 2019-10-08 NOTE — PATIENT INSTRUCTIONS
You were seen in the ENT Clinic today by Dr. Dumas  If you have any questions or concerns after your appointment, please call   -  ENT Clinic: [phone] scheduling option 1 and nurse advice option 3.          Thank you for choosing [facility] and allowing us to be apart of your care team!  Yahaira Seaman LPN

## 2019-11-06 ENCOUNTER — TELEPHONE (OUTPATIENT)
Dept: OTOLARYNGOLOGY | Facility: CLINIC | Age: 24
End: 2019-11-06

## 2019-11-06 NOTE — TELEPHONE ENCOUNTER
Patient's mother calling requesting DME orders for patient so that she can go directly to the pharmacy with them, as the pharmacy continues to say they are not receiving them. Sent all DME's via mail to patient's mother. Number given for Dr. Piter Garibay's RN Care Coordinator. Valerie also stated the patient's trach site is back to how it was when they came in for the appointment despite cleaning. Valerie wanted patient to be seen again.

## 2019-11-13 ENCOUNTER — DOCUMENTATION ONLY (OUTPATIENT)
Dept: OTOLARYNGOLOGY | Facility: CLINIC | Age: 24
End: 2019-11-13

## 2019-11-20 DIAGNOSIS — N18.4 CKD (CHRONIC KIDNEY DISEASE) STAGE 4, GFR 15-29 ML/MIN (H): Primary | ICD-10-CM

## 2019-11-20 DIAGNOSIS — N31.9 NEUROGENIC BLADDER: ICD-10-CM

## 2019-11-29 DIAGNOSIS — N18.30 CKD (CHRONIC KIDNEY DISEASE) STAGE 3, GFR 30-59 ML/MIN (H): ICD-10-CM

## 2019-11-29 RX ORDER — CLONIDINE HYDROCHLORIDE 0.1 MG/1
TABLET ORAL
Qty: 180 TABLET | Refills: 0 | Status: SHIPPED | OUTPATIENT
Start: 2019-11-29 | End: 2019-12-09

## 2019-12-06 ENCOUNTER — TELEPHONE (OUTPATIENT)
Dept: NEPHROLOGY | Facility: CLINIC | Age: 24
End: 2019-12-06

## 2019-12-06 DIAGNOSIS — N18.30 CKD (CHRONIC KIDNEY DISEASE) STAGE 3, GFR 30-59 ML/MIN (H): Primary | ICD-10-CM

## 2019-12-06 DIAGNOSIS — N18.4 CKD (CHRONIC KIDNEY DISEASE) STAGE 4, GFR 15-29 ML/MIN (H): ICD-10-CM

## 2019-12-09 ENCOUNTER — OFFICE VISIT (OUTPATIENT)
Dept: NEPHROLOGY | Facility: CLINIC | Age: 24
End: 2019-12-09
Attending: INTERNAL MEDICINE
Payer: MEDICARE

## 2019-12-09 ENCOUNTER — OFFICE VISIT (OUTPATIENT)
Dept: OTOLARYNGOLOGY | Facility: CLINIC | Age: 24
End: 2019-12-09
Payer: MEDICARE

## 2019-12-09 VITALS — WEIGHT: 105 LBS | BODY MASS INDEX: 19.83 KG/M2 | HEIGHT: 61 IN

## 2019-12-09 VITALS
OXYGEN SATURATION: 97 % | SYSTOLIC BLOOD PRESSURE: 140 MMHG | TEMPERATURE: 97.7 F | BODY MASS INDEX: 20.01 KG/M2 | HEART RATE: 74 BPM | WEIGHT: 105.9 LBS | DIASTOLIC BLOOD PRESSURE: 87 MMHG

## 2019-12-09 DIAGNOSIS — Q87.0 PIERRE ROBIN SYNDROME: ICD-10-CM

## 2019-12-09 DIAGNOSIS — N18.4 CKD (CHRONIC KIDNEY DISEASE) STAGE 4, GFR 15-29 ML/MIN (H): ICD-10-CM

## 2019-12-09 DIAGNOSIS — N18.30 CKD (CHRONIC KIDNEY DISEASE) STAGE 3, GFR 30-59 ML/MIN (H): ICD-10-CM

## 2019-12-09 DIAGNOSIS — M26.04 MANDIBULAR HYPOPLASIA: ICD-10-CM

## 2019-12-09 DIAGNOSIS — Z43.0 TRACHEOSTOMY CARE (H): ICD-10-CM

## 2019-12-09 DIAGNOSIS — Z93.0 TRACHEOSTOMY IN PLACE (H): Primary | ICD-10-CM

## 2019-12-09 DIAGNOSIS — I15.1 HYPERTENSION SECONDARY TO OTHER RENAL DISORDERS: Primary | ICD-10-CM

## 2019-12-09 LAB
ALBUMIN SERPL-MCNC: 3.6 G/DL (ref 3.4–5)
ANION GAP SERPL CALCULATED.3IONS-SCNC: 7 MMOL/L (ref 3–14)
BUN SERPL-MCNC: 48 MG/DL (ref 7–30)
CALCIUM SERPL-MCNC: 9.4 MG/DL (ref 8.5–10.1)
CHLORIDE SERPL-SCNC: 106 MMOL/L (ref 94–109)
CO2 SERPL-SCNC: 25 MMOL/L (ref 20–32)
CREAT SERPL-MCNC: 4.31 MG/DL (ref 0.66–1.25)
ERYTHROCYTE [DISTWIDTH] IN BLOOD BY AUTOMATED COUNT: 12.8 % (ref 10–15)
GFR SERPL CREATININE-BSD FRML MDRD: 18 ML/MIN/{1.73_M2}
GLUCOSE SERPL-MCNC: 91 MG/DL (ref 70–99)
HCT VFR BLD AUTO: 37.4 % (ref 40–53)
HGB BLD-MCNC: 12.1 G/DL (ref 13.3–17.7)
MCH RBC QN AUTO: 29.1 PG (ref 26.5–33)
MCHC RBC AUTO-ENTMCNC: 32.4 G/DL (ref 31.5–36.5)
MCV RBC AUTO: 90 FL (ref 78–100)
PHOSPHATE SERPL-MCNC: 4.2 MG/DL (ref 2.5–4.5)
PLATELET # BLD AUTO: 313 10E9/L (ref 150–450)
POTASSIUM SERPL-SCNC: 4.1 MMOL/L (ref 3.4–5.3)
PTH-INTACT SERPL-MCNC: 331 PG/ML (ref 18–80)
RBC # BLD AUTO: 4.16 10E12/L (ref 4.4–5.9)
SODIUM SERPL-SCNC: 137 MMOL/L (ref 133–144)
WBC # BLD AUTO: 9.2 10E9/L (ref 4–11)

## 2019-12-09 PROCEDURE — 85027 COMPLETE CBC AUTOMATED: CPT | Performed by: INTERNAL MEDICINE

## 2019-12-09 PROCEDURE — 83970 ASSAY OF PARATHORMONE: CPT | Performed by: INTERNAL MEDICINE

## 2019-12-09 PROCEDURE — 80069 RENAL FUNCTION PANEL: CPT | Performed by: INTERNAL MEDICINE

## 2019-12-09 PROCEDURE — G0463 HOSPITAL OUTPT CLINIC VISIT: HCPCS | Mod: ZF

## 2019-12-09 PROCEDURE — 82306 VITAMIN D 25 HYDROXY: CPT | Performed by: INTERNAL MEDICINE

## 2019-12-09 PROCEDURE — 36415 COLL VENOUS BLD VENIPUNCTURE: CPT | Performed by: INTERNAL MEDICINE

## 2019-12-09 RX ORDER — CLONIDINE HYDROCHLORIDE 0.1 MG/1
0.1 TABLET ORAL 2 TIMES DAILY
Qty: 180 TABLET | Refills: 11 | Status: SHIPPED | OUTPATIENT
Start: 2019-12-09 | End: 2021-02-24

## 2019-12-09 ASSESSMENT — MIFFLIN-ST. JEOR: SCORE: 1339.4

## 2019-12-09 ASSESSMENT — PAIN SCALES - GENERAL
PAINLEVEL: NO PAIN (0)
PAINLEVEL: NO PAIN (0)

## 2019-12-09 NOTE — LETTER
2019       RE: Aidan Louie  [address]  [place] [address]     Dear Colleague,    Thank you for referring your patient, Aidan Louie, to the [facility] NEPHROLOGY at [facility]. Please see a copy of my visit note below.      Nephrology Clinic    Glen Abel MD  2019     Name: Aidan Louie  MRN: [id number]  Age: 23 year old  : 1995  Referring provider: Nathalia Collado     Assessment and Plan:  Mr. 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     1. CKD, stage 4:  Previous baseline creatinine now seems to be ~3.2 with a GFR ~26 but now 4.3 with an eGFR of 18 ml/min.  Etiology secondary to obstructive nephropathy from congenital abnormalities of the urogenital system and recurrent UTIs.  His kidney disease appears to have worsened over the past year.  He was counseled regarding his options with renal replacement therapy including transplant.  He was advised that secondary to his trach he may have a challenge but remains a potential transplant candidate as the trach is not for hypoxic respiratory disease. He has been transitioning from pediatric pulmonologist to adult pulmonologist. He has had transplant evaluation and appears to be a candidate but will not be listed until his GFR is < 20 ml/min.    -- CKD education and dialysis options provided again today   -- continue captopril 2.5 for albuminuria/renoprotection.  Urine albumin ~2 g/g in past so would like to increase captopril but will reassess at next visit  -- continues to straight cath and close monitoring for UTIs; he follows closely with urology  -- will refer again for becoming active on the kidney transplant list  -- RTC in 4 months     2. HTN/Volume:  Blood pressure at goal of <130/80 and  euvolemic on exam.    -continue amlodipine 5mg BID,  captopril 2.5  and clonidine 0.1mg BID  -again, would like to increase captopril next for albuminuria which we will consider at next visit and potentially wean him off clonidine     3. Anemia of CKD: Hgb at goal (12.1 today).    -no need for POORNIMA therapy  -check iron stores at next visit      4. CKD-MBD: Corrected calcium and phos at goal. Vitamin D levels at goal.  PTH elevated at 331. DEXA with evidence of osteoporosis.  Bone specific alk phos within normal limits. Secondary hyperparathyroidism does not seem to be a big contributor to decreased bone mineralization at this time.    -continue cholecalciferol at 2000 units daily  -no need for active vitamin D (calcitriol) at this time  -followed by endocrinology and no further management of osteoporosis recommended at this time. If therapy initiated, then would favor denosumab.       5. Metabolic acidosis: Due to CKD.    -will start sodium bicarbonate 650 mg TID for acidosis with the goal of slowing progression of CKD and bone disease      6. Recurrent UTIs: Colonized so based on symptoms.  No evidence of UTI at this time.   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 This recently became irritated, but there are no signs of infection today. Previously followed by pediatric ENT.    -now followed by adult ENT     Reason For Visit:   CKD follow-up    HPI:   Aidan Louie is a 23 year old male who presents for follow-up of his CKD IV secondary to Prune belly syndrome. His other histories include hypertension, bilateral hearing loss on hearing aids, short stature, choanal atresia SP repair, Dawood Reece anomaly, and restrictive lung disease. He has been following with Dr. Hess for his CKD, and Dr. Schilling has been his surgeon. He has a history of recurrent UTI and worsening creatinine  in the past few years. Patient has a history of Prune belly syndrome and extensive urological abnormalities. He has had reconstructed ureter and bladder and has to self catheterize every 3 hours with minimal irrigation with 180ml saline. Since he has been more compliant with his saline flushes, he has had less frequent UTI.    He presents today with his mother who reports that he is doing well in regard to his kidney function. She notes that his Mitrofanoff has been leaking more than usual. She feels that his tracheostomy has been healing appropriately. He is continued on amlodipine twice daily. No other concerns.      Review of Systems:   Pertinent items are noted in HPI or as below, remainder of complete ROS is negative.      Active Medications:     Current Outpatient Medications:      albuterol (2.5 MG/3ML) 0.083% nebulizer solution, Take 1 ampule by nebulization 4 times daily , Disp: , Rfl:      amLODIPine (NORVASC) 5 MG tablet, TAKE 1 TABLET(5 MG) BY MOUTH TWICE DAILY, Disp: 180 tablet, Rfl: 3     azithromycin (ZITHROMAX) 250 MG tablet, Take  by mouth. Mon, Wed, Fri, Disp: , Rfl:      calcium carbonate (OS-SUSAN 500 MG [place]. [place]) 500 MG tablet, Take 1 tablet (500 mg) by mouth 3 times daily (with meals), Disp: 90 tablet, Rfl: 6     captopril 0.1 mg/mL (CAPOTEN) 0.1 mg/mL SUSP, Take 2.5 mg three times daily (2.5 mg/5ml), Disp: 460 mL, Rfl: 11     cholecalciferol 2000 UNITS tablet, Take 2,000 Units by mouth daily, Disp: 30 tablet, Rfl: 11     cloNIDine (CATAPRES) 0.1 MG tablet, TAKE 1 TABLET(0.1 MG) BY MOUTH TWICE DAILY, Disp: 180 tablet, Rfl: 0     famotidine (PEPCID) 20 MG tablet, Take 20 mg by mouth 2 times daily, Disp: , Rfl:      ipratropium (ATROVENT) 0.02 % neb solution, Take 0.5 mg by nebulization 4 times daily , Disp: 225 mL, Rfl:      order for DME, Equipment being ordered: Medi-Vac, plastic tubing connector lara type, cat 361, [employer], 1 lara tip/month x 12 months. Use for  "tracheostomy suctioning, Disp: 1 each, Rfl: 11     order for DME, Equipment being ordered: DME  Adult Bivona Uncuffed Tracheostomy Tube-5.0  Patient will do weekly trach changes., Disp: 4 Units, Rfl: 11     order for DME, Equipment being ordered: DME -  Deep suction kits - 12 fr, Disp: 90 each, Rfl: 3     order for DME, Tracheostomy Humidification Equipment Equipment being ordered:   1) Air compressor 2) Nebulizer bottle 3) Aerosol tubing 4) Trach mask 5) Sterile water 6) Saline ampules (\"bullets\") 7) Heat Moisture Exchanger (HME) Also known by several other terms including: Thermal Humidifying Filters, Swedish nose, Artificial nose, Filter, Thermovent T. 8) Room/home humidifiers, Disp: 1 each, Rfl: 11     order for DME, Optifoam Basic   [id number], Disp: 30 each, Rfl: 11     order for DME, Equipment being ordered: Humidifier (heated), humidifier attachment, saline lavages, humidivent mask, Disp: 1 each, Rfl: 3     predniSONE (DELTASONE) 5 MG tablet, Take 5 mg by mouth every other day Take 7.5mg daily ., Disp: , Rfl: 6     sodium bicarbonate 650 MG tablet, TAKE 1 TABLET BY MOUTH THREE TIMES DAILY, Disp: 270 tablet, Rfl: 3     tobramycin, PF, (DAX) 300 MG/5ML nebulizer solution, Take 1 ampule by nebulization as needed, Disp: , Rfl:      amoxicillin-clavulanate (AUGMENTIN) 875-125 MG tablet, Take 1 tablet by mouth 2 times daily (Patient not taking: Reported on 12/9/2019), Disp: 20 tablet, Rfl: 0     ranitidine (ZANTAC) 150 MG tablet, Take 0.5 tablets (75 mg) by mouth 2 times daily (Patient not taking: Reported on 1/3/2019), Disp: 90 tablet, Rfl: 3     sodium bicarbonate 650 MG tablet, TAKE 1 TABLET BY MOUTH THREE TIMES DAILY (Patient not taking: Reported on 12/9/2019), Disp: 270 tablet, Rfl: 0     Allergies:   Latex; Latex; and Tape [adhesive tape]      Past Medical History:  Bilateral reflux nephropathy  CKD (chronic kidney disease) stage 4, GFR 15-29 ml/min     Hearing loss      Hypertension   Dawood Newell " sequence             Prune belly syndrome   Recurrent UTI   Respiratory bronchiolitis interstitial lung disease  Restrictive lung disease            Tracheostomy dependence   Vitamin D deficiency      Past Surgical History:  Bilateral ureteral reimplantation, abdominoplasty, mitrofanoff creation  Cystoscopy  Gastrostomy tube                     Herniorrhaphy inguinal bilateral, left ochiopexy  Laryngoscopy, bronchoscopy, combined x5   vesicostomy               Redo right ureteral reimplantation  Replacement of trach    Takedown and revision of mitrofanoff stoma     Family History:   The patient has family history of diabetes (mother).      Social History:   The patient presents here with his mother and aunt. The patient has never smoked. Denies alcohol use.      Physical Exam:  BP (!) 140/87 (BP Location: Left arm, Patient Position: Sitting, Cuff Size: Adult Regular)   Pulse 74   Temp 97.7  F (36.5  C)   Wt 48 kg (105 lb 14.4 oz)   SpO2 97%   BMI 20.01 kg/m      GENERAL APPEARANCE: alert and no distress  EYES: nonicteric  HENT: mouth without ulcers or lesions  NECK: supple, no adenopathy, trach present  RESP: lungs clear to auscultation   CV: regular rhythm, normal rate, no rub  ABDOMEN: soft, nontender, nondistended  : no CVA tenderness   Extremities: no significant edema  MS: no evidence of inflammation in joints, no muscle tenderness  SKIN: no concerning rash  NEURO: mentation intact and speech normal  PSYCH: affect normal/bright     Laboratory:  CMP  Recent Labs   Lab Test 19  1027 19  1438 18  1056 17  1337 16  1325 09/17/15  1205 02/05/15  1110    141 139 137 143 139 141   POTASSIUM 4.1 4.3 4.1 4.4 4.4 4.6 4.2   CHLORIDE 116* 114* 108 108 110* 111* 111*   CO2 18* 18* 22 21 24 21 21   ANIONGAP 10 9 9 8 9 7 9   GLC 81 109* 103* 116* 89 140* 118*   BUN 64* 53* 45 44* 40* 50* 40*   CR 3.25* 3.22* 2.9 2.93* 2.79* 2.57* 2.07*   GFRESTIMATED 26* 26* 27.6 27* 29* 32*  42*   GFRESTBLACK 30* 30*  --  33* 35* 39* 51*   SUSAN 8.8 8.4* 8.7 8.8 8.7 8.7* 8.7*   MAG  --   --   --   --   --  2.1 2.0   PHOS 4.4 3.6  --  2.9 4.2 2.7* 3.5   ALBUMIN 3.2* 3.6 3.4 3.6 3.3* 3.8 3.5   ALKPHOS  --   --   --   --   --  80 87     CBC  Recent Labs   Lab Test 05/13/19  1027 01/03/19  1438 08/09/18  1056 07/11/17  1337  09/17/15  1205 02/05/15  1110   HGB 11.3* 11.6* 12.3* 13.0*   < > 13.0* 13.8   WBC 10.0  --   --  8.8  --  8.7 7.7   RBC 3.85*  --   --  4.35*  --  4.44 4.69   HCT 34.9*  --   --  39.0*  --  38.9* 41.0   MCV 91  --   --  90  --  88 87   MCH 29.4  --   --  29.9  --  29.3 29.4   MCHC 32.4  --   --  33.3  --  33.4 33.7   RDW 13.4  --   --  13.2  --  13.5 13.2     --   --  305  --  326 281    < > = values in this interval not displayed.     INR  No lab results found.  ABG  No lab results found.   URINE STUDIES  Recent Labs   Lab Test 07/11/17  1341 12/22/16  1328 09/17/15  1410 02/05/15  1115   COLOR Yellow Straw Quantity not sufficient Straw   APPEARANCE Clear Slightly Cloudy Quantity not sufficient Clear   URINEGLC Negative Negative Quantity not sufficient* Negative   URINEBILI Negative Negative Quantity not sufficient* Negative   URINEKETONE Negative Negative Quantity not sufficient* Negative   SG 1.005 1.006 Quantity not sufficient 1.005   UBLD Negative Trace* Quantity not sufficient* Trace*   URINEPH 6.0 6.5 Quantity not sufficient 6.0   PROTEIN 100* 100* Quantity not sufficient* 100*   NITRITE Positive* Negative Quantity not sufficient* Negative   LEUKEST Small* Large* Quantity not sufficient* Large*   RBCU <1 6* Quantity not sufficient 1   WBCU 6* >182* Quantity not sufficient* 15*     Recent Labs   Lab Test 01/03/19  1435 07/11/17  1341 12/22/16  1328 02/05/15  1115   UTPG 2.60* 3.04* 3.05* 5.28*     PTH  Recent Labs   Lab Test 05/13/19  1027 01/03/19  1438 07/11/17  1337 12/22/16  1325 09/17/15  1205 02/05/15  1110 10/03/13  2128 09/20/12  1200   PTHI 195* 177* 191* 98* 133*  303* 141* 74*     IRON STUDIES   Recent Labs   Lab Test 08/09/18  1056 08/09/18  1046 07/11/17  1337 12/22/16  1325 09/17/15  1205 02/05/15  1110   IRON 178  --  77 135 99 160     --  296 241 297 281   IRONSAT 67  --  26 56* 33 57*   JUVENAL  --  14 16* 56 27 34       Scribe Disclosure:   I, Chung Dumont, am serving as a scribe to document services personally performed by Glen Abel MD at this visit, based upon the provider's statements to me. All documentation has been reviewed by the aforementioned provider prior to being entered into the official medical record.    Total time spent was >40 minutes, and more than 50% of face to face time was spent in counseling and/or coordination of care regarding principles of multidisciplinary care, medication management, and chronic kidney disease education.  Glen Abel MD      Again, thank you for allowing me to participate in the care of your patient.      Sincerely,    Glen Abel MD

## 2019-12-09 NOTE — LETTER
2019      RE: Aidan Louie  [address]  [place] [address]         Nephrology Clinic    Glen Abel MD  2019     Name: Aidan Louie  MRN: [id number]  Age: 23 year old  : 1995  Referring provider: Nathalia Collado     Assessment and Plan:  Mr. 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     1. CKD, stage 4:  Previous baseline creatinine now seems to be ~3.2 with a GFR ~26 but now 4.3 with an eGFR of 18 ml/min.  Etiology secondary to obstructive nephropathy from congenital abnormalities of the urogenital system and recurrent UTIs.  His kidney disease appears to have worsened over the past year.  He was counseled regarding his options with renal replacement therapy including transplant.  He was advised that secondary to his trach he may have a challenge but remains a potential transplant candidate as the trach is not for hypoxic respiratory disease. He has been transitioning from pediatric pulmonologist to adult pulmonologist. He has had transplant evaluation and appears to be a candidate but will not be listed until his GFR is < 20 ml/min.    -- CKD education and dialysis options provided again today   -- continue captopril 2.5 for albuminuria/renoprotection.  Urine albumin ~2 g/g in past so would like to increase captopril but will reassess at next visit  -- continues to straight cath and close monitoring for UTIs; he follows closely with urology  -- will refer again for becoming active on the kidney transplant list  -- RTC in 4 months     2. HTN/Volume:  Blood pressure at goal of <130/80 and euvolemic on exam.    -continue amlodipine 5mg BID,  captopril 2.5  and clonidine 0.1mg BID  -again, would like to increase captopril next for albuminuria which we will consider at next visit and  potentially wean him off clonidine     3. Anemia of CKD: Hgb at goal (12.1 today).    -no need for POORNIMA therapy  -check iron stores at next visit      4. CKD-MBD: Corrected calcium and phos at goal. Vitamin D levels at goal.  PTH elevated at 331. DEXA with evidence of osteoporosis.  Bone specific alk phos within normal limits. Secondary hyperparathyroidism does not seem to be a big contributor to decreased bone mineralization at this time.    -continue cholecalciferol at 2000 units daily  -no need for active vitamin D (calcitriol) at this time  -followed by endocrinology and no further management of osteoporosis recommended at this time. If therapy initiated, then would favor denosumab.       5. Metabolic acidosis: Due to CKD.    -will start sodium bicarbonate 650 mg TID for acidosis with the goal of slowing progression of CKD and bone disease      6. Recurrent UTIs: Colonized so based on symptoms.  No evidence of UTI at this time.   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 This recently became irritated, but there are no signs of infection today. Previously followed by pediatric ENT.    -now followed by adult ENT     Reason For Visit:   CKD follow-up    HPI:   Aidan Louie is a 23 year old male who presents for follow-up of his CKD IV secondary to Prune belly syndrome. His other histories include hypertension, bilateral hearing loss on hearing aids, short stature, choanal atresia SP repair, Dawood Reece anomaly, and restrictive lung disease. He has been following with Dr. Hess for his CKD, and Dr. Schilling has been his surgeon. He has a history of recurrent UTI and worsening creatinine in the past few years. Patient has a history of Prune belly syndrome and extensive urological abnormalities. He has had reconstructed ureter and bladder and has to self catheterize every 3  hours with minimal irrigation with 180ml saline. Since he has been more compliant with his saline flushes, he has had less frequent UTI.    He presents today with his mother who reports that he is doing well in regard to his kidney function. She notes that his Mitrofanoff has been leaking more than usual. She feels that his tracheostomy has been healing appropriately. He is continued on amlodipine twice daily. No other concerns.      Review of Systems:   Pertinent items are noted in HPI or as below, remainder of complete ROS is negative.      Active Medications:     Current Outpatient Medications:      albuterol (2.5 MG/3ML) 0.083% nebulizer solution, Take 1 ampule by nebulization 4 times daily , Disp: , Rfl:      amLODIPine (NORVASC) 5 MG tablet, TAKE 1 TABLET(5 MG) BY MOUTH TWICE DAILY, Disp: 180 tablet, Rfl: 3     azithromycin (ZITHROMAX) 250 MG tablet, Take  by mouth. Mon, Wed, Fri, Disp: , Rfl:      calcium carbonate (OS-SUSAN 500 MG [place]. [place]) 500 MG tablet, Take 1 tablet (500 mg) by mouth 3 times daily (with meals), Disp: 90 tablet, Rfl: 6     captopril 0.1 mg/mL (CAPOTEN) 0.1 mg/mL SUSP, Take 2.5 mg three times daily (2.5 mg/5ml), Disp: 460 mL, Rfl: 11     cholecalciferol 2000 UNITS tablet, Take 2,000 Units by mouth daily, Disp: 30 tablet, Rfl: 11     cloNIDine (CATAPRES) 0.1 MG tablet, TAKE 1 TABLET(0.1 MG) BY MOUTH TWICE DAILY, Disp: 180 tablet, Rfl: 0     famotidine (PEPCID) 20 MG tablet, Take 20 mg by mouth 2 times daily, Disp: , Rfl:      ipratropium (ATROVENT) 0.02 % neb solution, Take 0.5 mg by nebulization 4 times daily , Disp: 225 mL, Rfl:      order for DME, Equipment being ordered: Medi-Vac, plastic tubing connector lara type, cat 361, [employer], 1 lara tip/month x 12 months. Use for tracheostomy suctioning, Disp: 1 each, Rfl: 11     order for DME, Equipment being ordered: DME  Adult Bivona Uncuffed Tracheostomy Tube-5.0  Patient will do weekly trach changes., Disp: 4 Units,  "Rfl: 11     order for DME, Equipment being ordered: DME -  Deep suction kits - 12 fr, Disp: 90 each, Rfl: 3     order for DME, Tracheostomy Humidification Equipment Equipment being ordered:   1) Air compressor 2) Nebulizer bottle 3) Aerosol tubing 4) Trach mask 5) Sterile water 6) Saline ampules (\"bullets\") 7) Heat Moisture Exchanger (HME) Also known by several other terms including: Thermal Humidifying Filters, Swedish nose, Artificial nose, Filter, Thermovent T. 8) Room/home humidifiers, Disp: 1 each, Rfl: 11     order for DME, Optifoam Basic   [id number], Disp: 30 each, Rfl: 11     order for DME, Equipment being ordered: Humidifier (heated), humidifier attachment, saline lavages, humidivent mask, Disp: 1 each, Rfl: 3     predniSONE (DELTASONE) 5 MG tablet, Take 5 mg by mouth every other day Take 7.5mg daily ., Disp: , Rfl: 6     sodium bicarbonate 650 MG tablet, TAKE 1 TABLET BY MOUTH THREE TIMES DAILY, Disp: 270 tablet, Rfl: 3     tobramycin, PF, (DAX) 300 MG/5ML nebulizer solution, Take 1 ampule by nebulization as needed, Disp: , Rfl:      amoxicillin-clavulanate (AUGMENTIN) 875-125 MG tablet, Take 1 tablet by mouth 2 times daily (Patient not taking: Reported on 12/9/2019), Disp: 20 tablet, Rfl: 0     ranitidine (ZANTAC) 150 MG tablet, Take 0.5 tablets (75 mg) by mouth 2 times daily (Patient not taking: Reported on 1/3/2019), Disp: 90 tablet, Rfl: 3     sodium bicarbonate 650 MG tablet, TAKE 1 TABLET BY MOUTH THREE TIMES DAILY (Patient not taking: Reported on 12/9/2019), Disp: 270 tablet, Rfl: 0     Allergies:   Latex; Latex; and Tape [adhesive tape]      Past Medical History:  Bilateral reflux nephropathy  CKD (chronic kidney disease) stage 4, GFR 15-29 ml/min     Hearing loss      Hypertension   Dawood Reece sequence             Prune belly syndrome   Recurrent UTI   Respiratory bronchiolitis interstitial lung disease  Restrictive lung disease            Tracheostomy dependence   Vitamin D deficiency      Past " Surgical History:  Bilateral ureteral reimplantation, abdominoplasty, mitrofanoff creation  Cystoscopy  Gastrostomy tube                     Herniorrhaphy inguinal bilateral, left ochiopexy  Laryngoscopy, bronchoscopy, combined x5   vesicostomy               Redo right ureteral reimplantation  Replacement of trach    Takedown and revision of mitrofanoff stoma     Family History:   The patient has family history of diabetes (mother).      Social History:   The patient presents here with his mother and aunt. The patient has never smoked. Denies alcohol use.      Physical Exam:  BP (!) 140/87 (BP Location: Left arm, Patient Position: Sitting, Cuff Size: Adult Regular)   Pulse 74   Temp 97.7  F (36.5  C)   Wt 48 kg (105 lb 14.4 oz)   SpO2 97%   BMI 20.01 kg/m      GENERAL APPEARANCE: alert and no distress  EYES: nonicteric  HENT: mouth without ulcers or lesions  NECK: supple, no adenopathy, trach present  RESP: lungs clear to auscultation   CV: regular rhythm, normal rate, no rub  ABDOMEN: soft, nontender, nondistended  : no CVA tenderness   Extremities: no significant edema  MS: no evidence of inflammation in joints, no muscle tenderness  SKIN: no concerning rash  NEURO: mentation intact and speech normal  PSYCH: affect normal/bright     Laboratory:  CMP  Recent Labs   Lab Test 19  1027 19  1438 18  1056 17  1337 16  1325 09/17/15  1205 02/05/15  1110    141 139 137 143 139 141   POTASSIUM 4.1 4.3 4.1 4.4 4.4 4.6 4.2   CHLORIDE 116* 114* 108 108 110* 111* 111*   CO2 18* 18* 22 21 24 21 21   ANIONGAP 10 9 9 8 9 7 9   GLC 81 109* 103* 116* 89 140* 118*   BUN 64* 53* 45 44* 40* 50* 40*   CR 3.25* 3.22* 2.9 2.93* 2.79* 2.57* 2.07*   GFRESTIMATED 26* 26* 27.6 27* 29* 32* 42*   GFRESTBLACK 30* 30*  --  33* 35* 39* 51*   SUSAN 8.8 8.4* 8.7 8.8 8.7 8.7* 8.7*   MAG  --   --   --   --   --  2.1 2.0   PHOS 4.4 3.6  --  2.9 4.2 2.7* 3.5   ALBUMIN 3.2* 3.6 3.4 3.6 3.3* 3.8 3.5    ALKPHOS  --   --   --   --   --  80 87     CBC  Recent Labs   Lab Test 05/13/19  1027 01/03/19  1438 08/09/18  1056 07/11/17  1337  09/17/15  1205 02/05/15  1110   HGB 11.3* 11.6* 12.3* 13.0*   < > 13.0* 13.8   WBC 10.0  --   --  8.8  --  8.7 7.7   RBC 3.85*  --   --  4.35*  --  4.44 4.69   HCT 34.9*  --   --  39.0*  --  38.9* 41.0   MCV 91  --   --  90  --  88 87   MCH 29.4  --   --  29.9  --  29.3 29.4   MCHC 32.4  --   --  33.3  --  33.4 33.7   RDW 13.4  --   --  13.2  --  13.5 13.2     --   --  305  --  326 281    < > = values in this interval not displayed.     INR  No lab results found.  ABG  No lab results found.   URINE STUDIES  Recent Labs   Lab Test 07/11/17  1341 12/22/16  1328 09/17/15  1410 02/05/15  1115   COLOR Yellow Straw Quantity not sufficient Straw   APPEARANCE Clear Slightly Cloudy Quantity not sufficient Clear   URINEGLC Negative Negative Quantity not sufficient* Negative   URINEBILI Negative Negative Quantity not sufficient* Negative   URINEKETONE Negative Negative Quantity not sufficient* Negative   SG 1.005 1.006 Quantity not sufficient 1.005   UBLD Negative Trace* Quantity not sufficient* Trace*   URINEPH 6.0 6.5 Quantity not sufficient 6.0   PROTEIN 100* 100* Quantity not sufficient* 100*   NITRITE Positive* Negative Quantity not sufficient* Negative   LEUKEST Small* Large* Quantity not sufficient* Large*   RBCU <1 6* Quantity not sufficient 1   WBCU 6* >182* Quantity not sufficient* 15*     Recent Labs   Lab Test 01/03/19  1435 07/11/17  1341 12/22/16  1328 02/05/15  1115   UTPG 2.60* 3.04* 3.05* 5.28*     PTH  Recent Labs   Lab Test 05/13/19  1027 01/03/19  1438 07/11/17  1337 12/22/16  1325 09/17/15  1205 02/05/15  1110 10/03/13  2128 09/20/12  1200   PTHI 195* 177* 191* 98* 133* 303* 141* 74*     IRON STUDIES   Recent Labs   Lab Test 08/09/18  1056 08/09/18  1046 07/11/17  1337 12/22/16  1325 09/17/15  1205 02/05/15  1110   IRON 178  --  77 135 99 160     --  296 241  297 281   [facility] 67  --  26 56* 33 57*   JUVENAL  --  14 16* 56 27 34       Scribe Disclosure:   I, Chung Dumont, am serving as a scribe to document services personally performed by Glen Abel MD at this visit, based upon the provider's statements to me. All documentation has been reviewed by the aforementioned provider prior to being entered into the official medical record.    Total time spent was >40 minutes, and more than 50% of face to face time was spent in counseling and/or coordination of care regarding principles of multidisciplinary care, medication management, and chronic kidney disease education.  MD Glen Esquivel MD

## 2019-12-09 NOTE — PROGRESS NOTES
Dear Colleague:    Aidan Louie recently returned for follow-up at the [facility]. My clinic note from our visit is enclosed below.  Speech recognition software may have been used in the documentation below; input is reviewed before signature to the best of my ability.     I appreciate the ongoing opportunity to participate in this patient's care.    Please feel free to contact me with any questions.      Sincerely yours,  Bebe Dumas M.D., M.P.H.  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    He was a longtime patient of Dr. Reid's, and has had a trach for many years because of the mandibular hypoplasia. Per report, a number of attempts toward decannulation had been made, but ultimately the decision was made to keep the trach in place due to concerns about his airway. He went to the operating room with Dr. Jose in August 2017 for flexible and rigid laryngoscopy/bronchoscopy. Per the operative note, this showed moderate trismus and base of tongue collapse, redundant, distorted, floppy epiglottis, with a grade 4 laryngoscopy. A small granuloma was seen on the medial left arytenoid. Vocal fold mobility and subglottis were normal. The 5-0 pediatric Bivona tube was noted to be in good position. Distal airway was unremarkable. It was observed that he would be a difficult intubation. With a Rothman blade under the epiglottis he was thought possibly intubatable but this was felt to  be difficult in the best case situation.    He currently has a 5-0 Bivona in place, and has a vertically wide stoma. 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  Fortunately, with good wound care, antibiotics, steroid injections, and silver nitrate, as well as clipping of the stomach him, his trach site significantly improved.  He returns today for a routine trach check and change. His mother reports that a month ago, his trach site did get inflamed again, and she was uncomfortable doing a routine trach change at home. A few days ago, it settled back down. She believes the patient may had been picking at his trach site.      MEDICATIONS:     Current Outpatient Medications   Medication Sig Dispense Refill     albuterol (2.5 MG/3ML) 0.083% nebulizer solution Take 1 ampule by nebulization 4 times daily        amLODIPine (NORVASC) 5 MG tablet TAKE 1 TABLET(5 MG) BY MOUTH TWICE DAILY 180 tablet 3     azithromycin (ZITHROMAX) 250 MG tablet Take  by mouth. Mon, Wed, Fri       calcium carbonate (OS-SUSAN 500 MG [place]. [place]) 500 MG tablet Take 1 tablet (500 mg) by mouth 3 times daily (with meals) 90 tablet 6     captopril 0.1 mg/mL (CAPOTEN) 0.1 mg/mL SUSP Take 2.5 mg three times daily (2.5 mg/5ml) 460 mL 11     cholecalciferol 2000 UNITS tablet Take 2,000 Units by mouth daily 30 tablet 11     cloNIDine (CATAPRES) 0.1 MG tablet Take 1 tablet (0.1 mg) by mouth 2 times daily 180 tablet 11     famotidine (PEPCID) 20 MG tablet Take 20 mg by mouth 2 times daily       ipratropium (ATROVENT) 0.02 % neb solution Take 0.5 mg by nebulization 4 times daily  225 mL      order for DME Equipment being ordered: Medi-Vac, plastic tubing connector lara type, cat 361, [facility] [employer], 1 lara tip/month x 12 months. Use for tracheostomy suctioning 1 each  "11     order for DME Equipment being ordered: DME    Adult Bivona Uncuffed Tracheostomy Tube-5.0    Patient will do weekly trach changes. 4 Units 11     order for DME Equipment being ordered: DME -    Deep suction kits - 12 fr 90 each 3     order for DME Tracheostomy Humidification Equipment  Equipment being ordered:     1) Air compressor  2) Nebulizer bottle  3) Aerosol tubing  4) Trach mask  5) Sterile water  6) Saline ampules (\"bullets\")  7) Heat Moisture Exchanger (HME) Also known by several other terms including: Thermal Humidifying Filters, Swedish nose, Artificial nose, Filter, Thermovent T.  8) Room/home humidifiers 1 each 11     order for DME Optifoam Basic   [id number] 30 each 11     order for DME Equipment being ordered: Humidifier (heated), humidifier attachment, saline lavages, humidivent mask 1 each 3     predniSONE (DELTASONE) 5 MG tablet Take 5 mg by mouth every other day Take 7.5mg daily .  6     sodium bicarbonate 650 MG tablet TAKE 1 TABLET BY MOUTH THREE TIMES DAILY 270 tablet 3     tobramycin, PF, (DAX) 300 MG/5ML nebulizer solution Take 1 ampule by nebulization as needed       amoxicillin-clavulanate (AUGMENTIN) 875-125 MG tablet Take 1 tablet by mouth 2 times daily (Patient not taking: Reported on 12/9/2019) 20 tablet 0     ranitidine (ZANTAC) 150 MG tablet Take 0.5 tablets (75 mg) by mouth 2 times daily (Patient not taking: Reported on 1/3/2019) 90 tablet 3     sodium bicarbonate 650 MG tablet TAKE 1 TABLET BY MOUTH THREE TIMES DAILY (Patient not taking: Reported on 12/9/2019) 270 tablet 0       ALLERGIES:    Allergies   Allergen Reactions     Latex      Latex      Other reaction(s): Other (see comments)     Tape [Adhesive Tape]        NEW PMH/PSH: None    REVIEW OF SYSTEMS:  The patient completed a comprehensive 11 point review of systems (below), which was reviewed. Positives are as noted below.  Patient Supplied Answers to Review of Systems  [facility] E[facility] ROS 6/10/2019   Constitutional Unexplained " fatigue   Cardiopulmonary Cough   Musculoskeletal Sore or stiff joints, Back pain       PHYSICAL EXAM:  General: The patient was alert and conversant, and in no acute distress. Patient accompanied by his mother.  Neck: No palpable cervical lymphadenopathy, trach in good position, mild granulomas bilaterally. Broad cranial-caudal trach site. No acute infection.  Resp: Breathing comfortably, no stridor or stertor.  Neuro: Symmetric facial function.   Psych: Normal affect, pleasant and cooperative.  Voice/speech: No significant dysphonia.      Intake scores  Last 2 Scores for Patient-Answered VHI Questionnaire  No flowsheet data found.   Last 2 Scores for Patient-Answered EAT Questionnaire  No flowsheet data found.   Last 2 Scores for Patient-Answered CSI Questionnaire  No flowsheet data found.      Procedure:  Tracheostomy tube change  Indication: This procedure was warranted as part of routine trach care, and was requested by the patient/caregiver.  Description of procedure:  His mother performed the trach change under my supervision. The existing tracheostomy tube was removed and a new identical tube was placed using the introducer. The introducer was withdrawn and replaced with the inner cannula, and the neck ties were secured. Flexible fiberoptic endoscopy through the tube confirmed good position.   The patient tolerated the procedure without difficulty.    Procedure: Flexible tracheobronchoscopy through established trach incision  Indications: The procedure was warranted to assess the position and/or condition of the tracheostomy tube and trachea  Description: After written and verbal consent were obtained, the endoscope was passed through the patient's tracheostomy. This allowed visualization of the trachea down to the raul and mainstem bronchi.   Findings: Trach tube in good position within the airway. Trachea clear to raul, which was sharp. Unremarkable takeoffs of mainstem bronchi.     IMPRESSION AND  PLAN:   Aidan Louie returns with an essentially stable exam although it sounds like his trach site did flare up again between visits. This time it resolved without antibiotics or other intervention.     We discussed the importance of not picking at the site so that the trach tube can sit comfortably and the tissue surrounding the trach does not get inflamed.     He will return in six months for a checkup or sooner as needed, as his mother is comfortable performing trach changes. I appreciate the opportunity to participate in the care of this pleasant patient.

## 2019-12-09 NOTE — PROGRESS NOTES
Nephrology Clinic    Glen Abel MD  2019     Name: Aidan Louie  MRN: [id number]  Age: 23 year old  : 1995  Referring provider: Nathalia Collado     Assessment and Plan:  Mr. 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     1. CKD, stage 4:  Previous baseline creatinine now seems to be ~3.2 with a GFR ~26 but now 4.3 with an eGFR of 18 ml/min.  Etiology secondary to obstructive nephropathy from congenital abnormalities of the urogenital system and recurrent UTIs.  His kidney disease appears to have worsened over the past year.  He was counseled regarding his options with renal replacement therapy including transplant.  He was advised that secondary to his trach he may have a challenge but remains a potential transplant candidate as the trach is not for hypoxic respiratory disease. He has been transitioning from pediatric pulmonologist to adult pulmonologist. He has had transplant evaluation and appears to be a candidate but will not be listed until his GFR is < 20 ml/min.    -- CKD education and dialysis options provided again today   -- continue captopril 2.5 for albuminuria/renoprotection.  Urine albumin ~2 g/g in past so would like to increase captopril but will reassess at next visit  -- continues to straight cath and close monitoring for UTIs; he follows closely with urology  -- will refer again for becoming active on the kidney transplant list  -- RTC in 4 months     2. HTN/Volume:  Blood pressure at goal of <130/80 and euvolemic on exam.    -continue amlodipine 5mg BID,  captopril 2.5  and clonidine 0.1mg BID  -again, would like to increase captopril next for albuminuria which we will consider at next visit and potentially wean him off clonidine     3. Anemia of CKD: Hgb at goal (12.1 today).    -no need  for POORNIMA therapy  -check iron stores at next visit      4. CKD-MBD: Corrected calcium and phos at goal. Vitamin D levels at goal.  PTH elevated at 331. DEXA with evidence of osteoporosis.  Bone specific alk phos within normal limits. Secondary hyperparathyroidism does not seem to be a big contributor to decreased bone mineralization at this time.    -continue cholecalciferol at 2000 units daily  -no need for active vitamin D (calcitriol) at this time  -followed by endocrinology and no further management of osteoporosis recommended at this time. If therapy initiated, then would favor denosumab.       5. Metabolic acidosis: Due to CKD.    -will start sodium bicarbonate 650 mg TID for acidosis with the goal of slowing progression of CKD and bone disease      6. Recurrent UTIs: Colonized so based on symptoms.  No evidence of UTI at this time.   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 This recently became irritated, but there are no signs of infection today. Previously followed by pediatric ENT.    -now followed by adult ENT     Reason For Visit:   CKD follow-up    HPI:   Aidan Louie is a 23 year old male who presents for follow-up of his CKD IV secondary to Prune belly syndrome. His other histories include hypertension, bilateral hearing loss on hearing aids, short stature, choanal atresia SP repair, Dawood Reece anomaly, and restrictive lung disease. He has been following with Dr. Hess for his CKD, and Dr. Schilling has been his surgeon. He has a history of recurrent UTI and worsening creatinine in the past few years. Patient has a history of Prune belly syndrome and extensive urological abnormalities. He has had reconstructed ureter and bladder and has to self catheterize every 3 hours with minimal irrigation with 180ml saline. Since he has been more compliant with his saline  flushes, he has had less frequent UTI.    He presents today with his mother who reports that he is doing well in regard to his kidney function. She notes that his Mitrofanoff has been leaking more than usual. She feels that his tracheostomy has been healing appropriately. He is continued on amlodipine twice daily. No other concerns.      Review of Systems:   Pertinent items are noted in HPI or as below, remainder of complete ROS is negative.      Active Medications:     Current Outpatient Medications:      albuterol (2.5 MG/3ML) 0.083% nebulizer solution, Take 1 ampule by nebulization 4 times daily , Disp: , Rfl:      amLODIPine (NORVASC) 5 MG tablet, TAKE 1 TABLET(5 MG) BY MOUTH TWICE DAILY, Disp: 180 tablet, Rfl: 3     azithromycin (ZITHROMAX) 250 MG tablet, Take  by mouth. Mon, Wed, Fri, Disp: , Rfl:      calcium carbonate (OS-SUSAN 500 MG [place]) 500 MG tablet, Take 1 tablet (500 mg) by mouth 3 times daily (with meals), Disp: 90 tablet, Rfl: 6     captopril 0.1 mg/mL (CAPOTEN) 0.1 mg/mL SUSP, Take 2.5 mg three times daily (2.5 mg/5ml), Disp: 460 mL, Rfl: 11     cholecalciferol 2000 UNITS tablet, Take 2,000 Units by mouth daily, Disp: 30 tablet, Rfl: 11     cloNIDine (CATAPRES) 0.1 MG tablet, TAKE 1 TABLET(0.1 MG) BY MOUTH TWICE DAILY, Disp: 180 tablet, Rfl: 0     famotidine (PEPCID) 20 MG tablet, Take 20 mg by mouth 2 times daily, Disp: , Rfl:      ipratropium (ATROVENT) 0.02 % neb solution, Take 0.5 mg by nebulization 4 times daily , Disp: 225 mL, Rfl:      order for DME, Equipment being ordered: Medi-Vac, plastic tubing connector lara type, cat 361, [employer], 1 lara tip/month x 12 months. Use for tracheostomy suctioning, Disp: 1 each, Rfl: 11     order for DME, Equipment being ordered: DME  Adult Bivona Uncuffed Tracheostomy Tube-5.0  Patient will do weekly trach changes., Disp: 4 Units, Rfl: 11     order for DME, Equipment being ordered: DME -  Deep suction kits - 12 fr, Disp: 90  "each, Rfl: 3     order for DME, Tracheostomy Humidification Equipment Equipment being ordered:   1) Air compressor 2) Nebulizer bottle 3) Aerosol tubing 4) Trach mask 5) Sterile water 6) Saline ampules (\"bullets\") 7) Heat Moisture Exchanger (HME) Also known by several other terms including: Thermal Humidifying Filters, Swedish nose, Artificial nose, Filter, Thermovent T. 8) Room/home humidifiers, Disp: 1 each, Rfl: 11     order for DME, Optifoam Basic   [id number], Disp: 30 each, Rfl: 11     order for DME, Equipment being ordered: Humidifier (heated), humidifier attachment, saline lavages, humidivent mask, Disp: 1 each, Rfl: 3     predniSONE (DELTASONE) 5 MG tablet, Take 5 mg by mouth every other day Take 7.5mg daily ., Disp: , Rfl: 6     sodium bicarbonate 650 MG tablet, TAKE 1 TABLET BY MOUTH THREE TIMES DAILY, Disp: 270 tablet, Rfl: 3     tobramycin, PF, (DAX) 300 MG/5ML nebulizer solution, Take 1 ampule by nebulization as needed, Disp: , Rfl:      amoxicillin-clavulanate (AUGMENTIN) 875-125 MG tablet, Take 1 tablet by mouth 2 times daily (Patient not taking: Reported on 12/9/2019), Disp: 20 tablet, Rfl: 0     ranitidine (ZANTAC) 150 MG tablet, Take 0.5 tablets (75 mg) by mouth 2 times daily (Patient not taking: Reported on 1/3/2019), Disp: 90 tablet, Rfl: 3     sodium bicarbonate 650 MG tablet, TAKE 1 TABLET BY MOUTH THREE TIMES DAILY (Patient not taking: Reported on 12/9/2019), Disp: 270 tablet, Rfl: 0     Allergies:   Latex; Latex; and Tape [adhesive tape]      Past Medical History:  Bilateral reflux nephropathy  CKD (chronic kidney disease) stage 4, GFR 15-29 ml/min     Hearing loss      Hypertension   Dawood Reece sequence             Prune belly syndrome   Recurrent UTI   Respiratory bronchiolitis interstitial lung disease  Restrictive lung disease            Tracheostomy dependence   Vitamin D deficiency      Past Surgical History:  Bilateral ureteral reimplantation, abdominoplasty, mitrofanoff " creation  Cystoscopy  Gastrostomy tube                     Herniorrhaphy inguinal bilateral, left ochiopexy  Laryngoscopy, bronchoscopy, combined x5   vesicostomy               Redo right ureteral reimplantation  Replacement of trach    Takedown and revision of mitrofanoff stoma     Family History:   The patient has family history of diabetes (mother).      Social History:   The patient presents here with his mother and aunt. The patient has never smoked. Denies alcohol use.      Physical Exam:  BP (!) 140/87 (BP Location: Left arm, Patient Position: Sitting, Cuff Size: Adult Regular)   Pulse 74   Temp 97.7  F (36.5  C)   Wt 48 kg (105 lb 14.4 oz)   SpO2 97%   BMI 20.01 kg/m     GENERAL APPEARANCE: alert and no distress  EYES: nonicteric  HENT: mouth without ulcers or lesions  NECK: supple, no adenopathy, trach present  RESP: lungs clear to auscultation   CV: regular rhythm, normal rate, no rub  ABDOMEN: soft, nontender, nondistended  : no CVA tenderness   Extremities: no significant edema  MS: no evidence of inflammation in joints, no muscle tenderness  SKIN: no concerning rash  NEURO: mentation intact and speech normal  PSYCH: affect normal/bright     Laboratory:  CMP  Recent Labs   Lab Test 19  1027 19  1438 18  1056 17  1337 16  1325 09/17/15  1205 02/05/15  1110    141 139 137 143 139 141   POTASSIUM 4.1 4.3 4.1 4.4 4.4 4.6 4.2   CHLORIDE 116* 114* 108 108 110* 111* 111*   CO2 18* 18* 22 21 24 21 21   ANIONGAP 10 9 9 8 9 7 9   GLC 81 109* 103* 116* 89 140* 118*   BUN 64* 53* 45 44* 40* 50* 40*   CR 3.25* 3.22* 2.9 2.93* 2.79* 2.57* 2.07*   GFRESTIMATED 26* 26* 27.6 27* 29* 32* 42*   GFRESTBLACK 30* 30*  --  33* 35* 39* 51*   SUSAN 8.8 8.4* 8.7 8.8 8.7 8.7* 8.7*   MAG  --   --   --   --   --  2.1 2.0   PHOS 4.4 3.6  --  2.9 4.2 2.7* 3.5   ALBUMIN 3.2* 3.6 3.4 3.6 3.3* 3.8 3.5   ALKPHOS  --   --   --   --   --  80 87     CBC  Recent Labs   Lab Test 19  0871  01/03/19  1438 08/09/18  1056 07/11/17  1337  09/17/15  1205 02/05/15  1110   HGB 11.3* 11.6* 12.3* 13.0*   < > 13.0* 13.8   WBC 10.0  --   --  8.8  --  8.7 7.7   RBC 3.85*  --   --  4.35*  --  4.44 4.69   HCT 34.9*  --   --  39.0*  --  38.9* 41.0   MCV 91  --   --  90  --  88 87   MCH 29.4  --   --  29.9  --  29.3 29.4   MCHC 32.4  --   --  33.3  --  33.4 33.7   RDW 13.4  --   --  13.2  --  13.5 13.2     --   --  305  --  326 281    < > = values in this interval not displayed.     INR  No lab results found.  ABG  No lab results found.   URINE STUDIES  Recent Labs   Lab Test 07/11/17  1341 12/22/16  1328 09/17/15  1410 02/05/15  1115   COLOR Yellow Straw Quantity not sufficient Straw   APPEARANCE Clear Slightly Cloudy Quantity not sufficient Clear   URINEGLC Negative Negative Quantity not sufficient* Negative   URINEBILI Negative Negative Quantity not sufficient* Negative   URINEKETONE Negative Negative Quantity not sufficient* Negative   SG 1.005 1.006 Quantity not sufficient 1.005   UBLD Negative Trace* Quantity not sufficient* Trace*   URINEPH 6.0 6.5 Quantity not sufficient 6.0   PROTEIN 100* 100* Quantity not sufficient* 100*   NITRITE Positive* Negative Quantity not sufficient* Negative   LEUKEST Small* Large* Quantity not sufficient* Large*   RBCU <1 6* Quantity not sufficient 1   WBCU 6* >182* Quantity not sufficient* 15*     Recent Labs   Lab Test 01/03/19  1435 07/11/17  1341 12/22/16  1328 02/05/15  1115   UTPG 2.60* 3.04* 3.05* 5.28*     PTH  Recent Labs   Lab Test 05/13/19  1027 01/03/19  1438 07/11/17  1337 12/22/16  1325 09/17/15  1205 02/05/15  1110 10/03/13 2128 09/20/12  1200   PTHI 195* 177* 191* 98* 133* 303* 141* 74*     IRON STUDIES   Recent Labs   Lab Test 08/09/18  1056 08/09/18  1046 07/11/17  1337 12/22/16  1325 09/17/15  1205 02/05/15  1110   IRON 178  --  77 135 99 160     --  296 241 297 281   IRONSAT 67  --  26 56* 33 57*   JUVENAL  --  14 16* 56 27 34       Kaz  Disclosure:   I, Chung Dumont, am serving as a scribe to document services personally performed by Glen Abel MD at this visit, based upon the provider's statements to me. All documentation has been reviewed by the aforementioned provider prior to being entered into the official medical record.    Total time spent was >40 minutes, and more than 50% of face to face time was spent in counseling and/or coordination of care regarding principles of multidisciplinary care, medication management, and chronic kidney disease education.  Glen Abel MD

## 2019-12-09 NOTE — LETTER
12/9/2019    RE: Aidan Louie  [address]  [place] [address]     Dear Colleague:    Aidan Louie recently returned for follow-up at the [facility]. My clinic note from our visit is enclosed below.  Speech recognition software may have been used in the documentation below; input is reviewed before signature to the best of my ability.     I appreciate the ongoing opportunity to participate in this patient's care.    Please feel free to contact me with any questions.      Sincerely yours,  Bebe Dumas M.D., M.P.H.  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    He was a longtime patient of Dr. Reid's, and has had a trach for many years because of the mandibular hypoplasia. Per report, a number of attempts toward decannulation had been made, but ultimately the decision was made to keep the trach in place due to concerns about his airway. He went to the operating room with Dr. Jose in August 2017 for flexible and rigid laryngoscopy/bronchoscopy. Per the operative note, this showed moderate trismus and base of tongue collapse, redundant, distorted, floppy epiglottis, with a grade 4 laryngoscopy. A small granuloma was seen on the medial left arytenoid. Vocal fold mobility and subglottis were normal. The 5-0 pediatric Bivona tube was noted to be in good position. Distal airway was unremarkable. It was observed that he would be a difficult intubation. With a  Rothman blade under the epiglottis he was thought possibly intubatable but this was felt to be difficult in the best case situation.    He currently has a 5-0 Bivona in place, and has a vertically wide stoma. 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  Fortunately, with good wound care, antibiotics, steroid injections, and silver nitrate, as well as clipping of the stomach him, his trach site significantly improved.  He returns today for a routine trach check and change. His mother reports that a month ago, his trach site did get inflamed again, and she was uncomfortable doing a routine trach change at home. A few days ago, it settled back down. She believes the patient may had been picking at his trach site.      MEDICATIONS:     Current Outpatient Medications   Medication Sig Dispense Refill     albuterol (2.5 MG/3ML) 0.083% nebulizer solution Take 1 ampule by nebulization 4 times daily        amLODIPine (NORVASC) 5 MG tablet TAKE 1 TABLET(5 MG) BY MOUTH TWICE DAILY 180 tablet 3     azithromycin (ZITHROMAX) 250 MG tablet Take  by mouth. Mon, Wed, Fri       calcium carbonate (OS-SUSAN 500 MG [place]. [place]) 500 MG tablet Take 1 tablet (500 mg) by mouth 3 times daily (with meals) 90 tablet 6     captopril 0.1 mg/mL (CAPOTEN) 0.1 mg/mL SUSP Take 2.5 mg three times daily (2.5 mg/5ml) 460 mL 11     cholecalciferol 2000 UNITS tablet Take 2,000 Units by mouth daily 30 tablet 11     cloNIDine (CATAPRES) 0.1 MG tablet Take 1 tablet (0.1 mg) by mouth 2 times daily 180 tablet 11     famotidine (PEPCID) 20 MG tablet Take 20 mg by mouth 2 times daily       ipratropium (ATROVENT) 0.02 % neb solution Take 0.5 mg by nebulization 4 times daily  225 mL      order for DME Equipment being ordered: Medi-Vac, plastic tubing connector lara type, cat 361, [employer]  [employer] [facility], 1 lara tip/month x 12 months. Use for tracheostomy suctioning 1 each 11     order for DME Equipment being ordered: DME    Adult Bivona Uncuffed Tracheostomy Tube-5.0    Patient will do weekly trach changes. 4 Units 11     order for DME Equipment being ordered: DME -    Deep suction kits - 12 fr 90 each 3     order for DME Tracheostomy Humidification Equipment  Equipment being ordered:     1) Air compressor  2) Nebulizer bottle  3) Aerosol tubing  4) Trach mask  5) Sterile water  6) Saline ampules (\"bullets\")  7) Heat Moisture Exchanger (HME) Also known by several other terms including: Thermal Humidifying Filters, Swedish nose, Artificial nose, Filter, Thermovent T.  8) Room/home humidifiers 1 each 11     order for DME Optifoam Basic   [id number] 30 each 11     order for DME Equipment being ordered: Humidifier (heated), humidifier attachment, saline lavages, humidivent mask 1 each 3     predniSONE (DELTASONE) 5 MG tablet Take 5 mg by mouth every other day Take 7.5mg daily .  6     sodium bicarbonate 650 MG tablet TAKE 1 TABLET BY MOUTH THREE TIMES DAILY 270 tablet 3     tobramycin, PF, (DAX) 300 MG/5ML nebulizer solution Take 1 ampule by nebulization as needed       amoxicillin-clavulanate (AUGMENTIN) 875-125 MG tablet Take 1 tablet by mouth 2 times daily (Patient not taking: Reported on 12/9/2019) 20 tablet 0     ranitidine (ZANTAC) 150 MG tablet Take 0.5 tablets (75 mg) by mouth 2 times daily (Patient not taking: Reported on 1/3/2019) 90 tablet 3     sodium bicarbonate 650 MG tablet TAKE 1 TABLET BY MOUTH THREE TIMES DAILY (Patient not taking: Reported on 12/9/2019) 270 tablet 0       ALLERGIES:    Allergies   Allergen Reactions     Latex      Latex      Other reaction(s): Other (see comments)     Tape [Adhesive Tape]        NEW PMH/PSH: None    REVIEW OF SYSTEMS:  The patient completed a comprehensive 11 point review of systems (below), which was reviewed. Positives are as noted " below.  Patient Supplied Answers to Review of Systems   ENT ROS 6/10/2019   Constitutional Unexplained fatigue   Cardiopulmonary Cough   Musculoskeletal Sore or stiff joints, Back pain       PHYSICAL EXAM:  General: The patient was alert and conversant, and in no acute distress. Patient accompanied by his mother.  Neck: No palpable cervical lymphadenopathy, trach in good position, mild granulomas bilaterally. Broad cranial-caudal trach site. No acute infection.  Resp: Breathing comfortably, no stridor or stertor.  Neuro: Symmetric facial function.   Psych: Normal affect, pleasant and cooperative.  Voice/speech: No significant dysphonia.      Intake scores  Last 2 Scores for Patient-Answered VHI Questionnaire  No flowsheet data found.   Last 2 Scores for Patient-Answered EAT Questionnaire  No flowsheet data found.   Last 2 Scores for Patient-Answered CSI Questionnaire  No flowsheet data found.      Procedure:  Tracheostomy tube change  Indication: This procedure was warranted as part of routine trach care, and was requested by the patient/caregiver.  Description of procedure:  His mother performed the trach change under my supervision. The existing tracheostomy tube was removed and a new identical tube was placed using the introducer. The introducer was withdrawn and replaced with the inner cannula, and the neck ties were secured. Flexible fiberoptic endoscopy through the tube confirmed good position.   The patient tolerated the procedure without difficulty.    Procedure: Flexible tracheobronchoscopy through established trach incision  Indications: The procedure was warranted to assess the position and/or condition of the tracheostomy tube and trachea  Description: After written and verbal consent were obtained, the endoscope was passed through the patient's tracheostomy. This allowed visualization of the trachea down to the raul and mainstem bronchi.   Findings: Trach tube in good position within the airway.  Trachea clear to raul, which was sharp. Unremarkable takeoffs of mainstem bronchi.     IMPRESSION AND PLAN:   Aidan Louie returns with an essentially stable exam although it sounds like his trach site did flare up again between visits. This time it resolved without antibiotics or other intervention.     We discussed the importance of not picking at the site so that the trach tube can sit comfortably and the tissue surrounding the trach does not get inflamed.     He will return in six months for a checkup or sooner as needed, as his mother is comfortable performing trach changes. I appreciate the opportunity to participate in the care of this pleasant patient.      Bebe Dumas MD

## 2019-12-09 NOTE — PATIENT INSTRUCTIONS
Thank you for choosing  [facility].  Please follow up with Dr. Dumas in approximately six months as needed or sooner as needed.     [phone] appointment scheduling option 1 and nurse advice option 3.

## 2019-12-09 NOTE — LETTER
December 10, 2019       TO: Aidan Louie  [address]  [place] [address]       Dear Mr. Louie,    We are writing to inform you of your test results. Your kidney function is slightly worse.  Fortunately, you have no major electrolyte complications.  Having said that, your kidney function is slightly worse than 20% and thus we need to plan for kidney transplantation.  I will attempt to contact your transplant team and clarify the remaining steps to become active on the transplant list.  Please let us know if you have additional questions.         Resulted Orders   CBC with platelets   Result Value Ref Range    WBC 9.2 4.0 - 11.0 10e9/L    RBC Count 4.16 (L) 4.4 - 5.9 10e12/L    Hemoglobin 12.1 (L) 13.3 - 17.7 g/dL    Hematocrit 37.4 (L) 40.0 - 53.0 %    MCV 90 78 - 100 fl    MCH 29.1 26.5 - 33.0 pg    MCHC 32.4 31.5 - 36.5 g/dL    RDW 12.8 10.0 - 15.0 %    Platelet Count 313 150 - 450 10e9/L   Renal panel   Result Value Ref Range    Sodium 137 133 - 144 mmol/L    Potassium 4.1 3.4 - 5.3 mmol/L    Chloride 106 94 - 109 mmol/L    Carbon Dioxide 25 20 - 32 mmol/L    Anion Gap 7 3 - 14 mmol/L    Glucose 91 70 - 99 mg/dL    Urea Nitrogen 48 (H) 7 - 30 mg/dL    Creatinine 4.31 (H) 0.66 - 1.25 mg/dL    GFR Estimate 18 (L) >60 mL/min/[1.73_m2]      Comment:      Non  GFR Calc  Starting 12/18/2018, serum creatinine based estimated GFR (eGFR) will be   calculated using the Chronic Kidney Disease Epidemiology Collaboration   (CKD-EPI) equation.      GFR Estimate If Black 21 (L) >60 mL/min/[1.73_m2]      Comment:       GFR Calc  Starting 12/18/2018, serum creatinine based estimated GFR (eGFR) will be   calculated using the Chronic Kidney Disease Epidemiology Collaboration   (CKD-EPI) equation.      Calcium 9.4 8.5 - 10.1 mg/dL    Phosphorus 4.2 2.5 - 4.5 mg/dL    Albumin 3.6 3.4 - 5.0 g/dL       It was a pleasure to see you at your recent visit. Please contact us at  [phone] if you have any questions or concerns. Thank you, we look forward to seeing you at your next visit.       Sincerely,    Glen Abel MD.   of Medicine  [employer] [facility] [employer]  Department of Medicine  Division of Renal Diseases and Hypertension  [address] [place] [address]

## 2019-12-10 ENCOUNTER — TELEPHONE (OUTPATIENT)
Dept: NEPHROLOGY | Facility: CLINIC | Age: 24
End: 2019-12-10

## 2019-12-10 DIAGNOSIS — N18.30 CKD (CHRONIC KIDNEY DISEASE) STAGE 3, GFR 30-59 ML/MIN (H): Primary | ICD-10-CM

## 2019-12-10 DIAGNOSIS — N18.4 CKD (CHRONIC KIDNEY DISEASE) STAGE 4, GFR 15-29 ML/MIN (H): ICD-10-CM

## 2019-12-10 LAB
DEPRECATED CALCIDIOL+CALCIFEROL SERPL-MC: <46 UG/L (ref 20–75)
VITAMIN D2 SERPL-MCNC: <5 UG/L
VITAMIN D3 SERPL-MCNC: 41 UG/L

## 2019-12-10 NOTE — TELEPHONE ENCOUNTER
Informed patients momValerie of lab results (sent letter also) per Dr. Abel. Kidney function is slightly worse, informed her that Dr. Abel will attempt to contact the Transplant team regarding activation on transplant list. Valerie reports that patient forgot to mention that he had some back pain and she suspects he might have a UTI, she will follow up with Dr. Cruz regarding UA. Faxed urine protein tests to Dr. Cruz's office to add on as he wasn't able to leave sample yesterday at visit.  Mary Scott LPN  Nephrology  [phone]

## 2019-12-12 NOTE — TELEPHONE ENCOUNTER
Per Dr. Abel, standing renal panel every 3 months placed and will placed and communicated to Valerie and faxed to Yasmeen.  Mary Scott, JOSEN  Nephrology  [phone]

## 2019-12-14 ENCOUNTER — TELEPHONE (OUTPATIENT)
Dept: ENDOCRINOLOGY | Facility: CLINIC | Age: 24
End: 2019-12-14

## 2019-12-14 NOTE — TELEPHONE ENCOUNTER
Called and spoke to pt's mom to schedule yearly follow up for June. She wants to know if labs will be expected, and if so if they can be done on the same afternoon the patient sees ENT on 5/4/20.     If any labs require fasting/morning draw, she requests pt gets them done locally, as they live in [place], [place].    Thanks,  Carolee

## 2019-12-14 NOTE — TELEPHONE ENCOUNTER
----- Message from India Elaine sent at 2019 12:40 PM CDT -----  Regardin year follow up  Schedule 1 year follow up with Dr. Judge 2020

## 2019-12-17 NOTE — TELEPHONE ENCOUNTER
No.He is too complicated and he already gets too many labs for me to anticipate 6 months in advance what will be appropriate in May or June, 2020. Sorry. He might not need any labs.    Mandie Judge MD

## 2019-12-17 NOTE — TELEPHONE ENCOUNTER
He is scheduled for f/u 6/14/2020 requesting to  do labs here  5/4/2020 when here for ENT visit. I do not see any future labs placed . Can he do them then or wait until seen 6/14/2020 and send down then once seen? Loren Mirza, RN on 12/17/2019 at 4:16 PM

## 2019-12-19 NOTE — TELEPHONE ENCOUNTER
I spoke with his guardian  who was not happy about  not being able to get his  labs done a month before his follow up . She made that comment that they will come in June but may get a second opinion .Loren Mirza RN on 12/19/2019 at 11:41 AM

## 2020-01-07 ENCOUNTER — TELEPHONE (OUTPATIENT)
Dept: UROLOGY | Facility: CLINIC | Age: 25
End: 2020-01-07

## 2020-01-07 NOTE — TELEPHONE ENCOUNTER
[facility] Call Center    Phone Message    May a detailed message be left on voicemail: yes    Reason for Call: Order(s): Other:   Reason for requested: catheter and drain vijay espino wanting to know if Dr Segura will sign off on orders (verbal orders requested order  Number [id number]  Date needed: as soon as possible   Provider name: Wenceslao       Action Taken: Message routed to:  [facility] uro

## 2020-01-09 NOTE — TELEPHONE ENCOUNTER
[facility] Call Center    Phone Message    May a detailed message be left on voicemail: yes    Reason for Call: Other: Emma with [employer] calling to f/u on a form sent to clinic earlier this week. She states they received the form signed correctly, but they had made a mistake on the initial form. They are now resending the form with the correct information, and ask for it to please be signed and sent back again. Please advise.    Action Taken: Message routed to:  [facility] Urology

## 2020-01-17 NOTE — TELEPHONE ENCOUNTER
Help I donot see that we did this request but any info you have let me know Shannan Crews, MILANA Staff Nurse

## 2020-01-17 NOTE — TELEPHONE ENCOUNTER
We did not order supplies from here he goes to [facility] and they sent it Monday of this week Shannan Crews LPN Staff Nurse

## 2020-01-17 NOTE — TELEPHONE ENCOUNTER
[facility]    Phone Message    May a detailed message be left on voicemail: yes    Reason for Call: Other: Same provider calling again about not having recieved corrected orders for catheters from Dr Billy.  I didn't see Dr Billy  in the record anywhere.  It's possible he signed it for Dr Judge though.  Main point they need a phone call at [phone] Ext [phone]  Xin or the corrected order faxed back to them.    Action Taken: Message routed to:  [facility] urology

## 2020-04-10 ENCOUNTER — TELEPHONE (OUTPATIENT)
Dept: OTOLARYNGOLOGY | Facility: CLINIC | Age: 25
End: 2020-04-10

## 2020-04-10 NOTE — TELEPHONE ENCOUNTER
"Spoke to mother regarding recommendations for trach care from the laryngology team at the [facility].     Mother states that patient has not left the house in 2 months due to concern of juan r COVID-19. Patient had contracted adenovirus in the pass \"which nearly killed him\" so both he and his mom are very aware of the risk to the patient if he were to contract this virus.     Right now patient is wearing an HME and covering trach with a clothe cover. Encouraged mom to continue these extra precautionary measures.     Will mail trach recommendations along with information of the [employer] Micron HME filter from [employer] in case she is interested in obtaining this product.    Stephanie Sebastian RN, BSN.  4/10/2020 8:59 AM    "

## 2020-04-29 ENCOUNTER — TELEPHONE (OUTPATIENT)
Dept: OTOLARYNGOLOGY | Facility: CLINIC | Age: 25
End: 2020-04-29

## 2020-04-29 NOTE — TELEPHONE ENCOUNTER
Left voicemail message for pt regarding appointment on 5/4/20 and switching to video visit.asked pt to return call to confirm video appt. Also would like appt time moved to 1:30 pm and the appointment length should be 30 min. Call back number provided on voicemail.

## 2020-05-01 NOTE — TELEPHONE ENCOUNTER
LVM regarding patient's appointment on 5/4/20 at 1:30pm. Requested a call back from patient to discuss set up for virtual visit. If patient returns call, please verify when they will be available to go through this process.    Radha Quiles, EMT

## 2020-05-12 DIAGNOSIS — E87.20 METABOLIC ACIDOSIS: ICD-10-CM

## 2020-05-13 RX ORDER — SODIUM BICARBONATE 650 MG/1
TABLET ORAL
Qty: 270 TABLET | Refills: 3 | OUTPATIENT
Start: 2020-05-13

## 2020-05-18 RX ORDER — SODIUM BICARBONATE 650 MG/1
TABLET ORAL
Qty: 270 TABLET | Refills: 3 | Status: SHIPPED | OUTPATIENT
Start: 2020-05-18 | End: 2021-03-19

## 2020-05-20 ENCOUNTER — TRANSFERRED RECORDS (OUTPATIENT)
Dept: HEALTH INFORMATION MANAGEMENT | Facility: CLINIC | Age: 25
End: 2020-05-20

## 2020-05-26 ENCOUNTER — TRANSFERRED RECORDS (OUTPATIENT)
Dept: HEALTH INFORMATION MANAGEMENT | Facility: CLINIC | Age: 25
End: 2020-05-26

## 2020-06-03 ENCOUNTER — TELEPHONE (OUTPATIENT)
Dept: ENDOCRINOLOGY | Facility: CLINIC | Age: 25
End: 2020-06-03

## 2020-06-03 DIAGNOSIS — N18.30 CKD (CHRONIC KIDNEY DISEASE) STAGE 3, GFR 30-59 ML/MIN (H): Primary | ICD-10-CM

## 2020-06-03 NOTE — TELEPHONE ENCOUNTER
----- Message from Eula Cormier sent at 6/1/2020  2:47 PM CDT -----  Regarding: Reschedule  Pt's mom Valerie cancelled 6/3 and requested reschedule call.

## 2020-06-05 LAB
ALBUMIN SERPL-MCNC: NORMAL G/DL
ANION GAP SERPL CALCULATED.3IONS-SCNC: 7 MMOL/L
BUN SERPL-MCNC: 55 MG/DL
CALCIUM SERPL-MCNC: 9.3 MG/DL
CHLORIDE SERPLBLD-SCNC: 106 MMOL/L
CO2 SERPL-SCNC: 25 MMOL/L
CREAT SERPL-MCNC: 4.3 MG/DL
CREATININE URINE: 93.4
GFR SERPL CREATININE-BSD FRML MDRD: 17.2 ML/MIN/1.73M2
GLUCOSE SERPL-MCNC: 98 MG/DL (ref 70–99)
Lab: 2.42 MG/G CR
PHOSPHATE SERPL-MCNC: NORMAL MG/DL
POTASSIUM SERPL-SCNC: 4.9 MMOL/L
PROT UR-MCNC: 226 G/DL
SODIUM SERPL-SCNC: 138 MMOL/L

## 2020-06-08 DIAGNOSIS — N18.30 CKD (CHRONIC KIDNEY DISEASE) STAGE 3, GFR 30-59 ML/MIN (H): Primary | ICD-10-CM

## 2020-06-15 ENCOUNTER — TELEPHONE (OUTPATIENT)
Dept: OTOLARYNGOLOGY | Facility: CLINIC | Age: 25
End: 2020-06-15

## 2020-06-15 NOTE — TELEPHONE ENCOUNTER
Spoke with patient's mom, Valerie, to inform that we are unable to do the virtual visit due to patient being in [place]. Patient scheduled for an in-person appointment at 6/29 at 1:00pm. Patient's mom expressed understanding.    Radha Quiles, EMT

## 2020-06-25 ENCOUNTER — REFERRAL (OUTPATIENT)
Dept: TRANSPLANT | Facility: CLINIC | Age: 25
End: 2020-06-25

## 2020-06-25 VITALS — BODY MASS INDEX: 20.62 KG/M2 | HEIGHT: 60 IN | WEIGHT: 105 LBS

## 2020-06-25 DIAGNOSIS — Z76.82 ORGAN TRANSPLANT CANDIDATE: ICD-10-CM

## 2020-06-25 DIAGNOSIS — J98.4 DISEASE OF LUNG: ICD-10-CM

## 2020-06-25 DIAGNOSIS — Z01.818 PRE-TRANSPLANT EVALUATION FOR KIDNEY TRANSPLANT: ICD-10-CM

## 2020-06-25 DIAGNOSIS — N18.4 CHRONIC RENAL FAILURE, STAGE 4 (SEVERE) (H): ICD-10-CM

## 2020-06-25 DIAGNOSIS — N18.9 CHRONIC KIDNEY DISEASE: Primary | ICD-10-CM

## 2020-06-25 ASSESSMENT — MIFFLIN-ST. JEOR: SCORE: 1318.78

## 2020-06-25 NOTE — TELEPHONE ENCOUNTER
Mary and [facility] staff,    Lulu Hernandezh retired 2/2019.   Nimo Mcdowell, GUNNER will be the new coordinator for Aidan.     [facility] staff please Start New Referral.   ( The first referral was Ended by Lulu Perry as he was 'too Well'.)  Please call patient Aidan as soon as possible.     I called Mother Valerie Louie to inform her of the process of new referral. I gave her the phone numbers of Guzman Torres ,  Main line to reach [facility].  Imelda GRECO Will call Valerie Guardian for Aidan at 2:15 pm today.       Malu   ----- Message -----  From: Mary Scott LPN Sent: 6/25/2020   9:39 AM CDT  To: Lulu Marie RN Subject: any update?                                      Shashi Lawson,    I spoke to patients mom the other day, she is upset that she hasnt heard from anyone regarding his transplant status. His last 2 gfrs were 19 and 18. She is worried. Can you call her?  Thanks  Mary

## 2020-06-25 NOTE — Clinical Note
Pre K Not on dialysis [facility] pt- Has TO HAVE IN PERSON PKE and mother (Valerie) is guardian and will attend with him He has a trach  Has tentative date of 8/12 - mom is aware NO VIRTUAL VISIT and this date may change  Speak with mom See smart set orders Thanks , Nimo

## 2020-06-25 NOTE — LETTER
Aidan Louie  [address] [place] [address]      Dear Aidan,    Thank you for your interest in the Transplant Center at [facility]. We look forward to being a part of your care team and assisting you through the transplant process.    As we discussed, your transplant coordinator is Nimo Mcdowell ([place]).  You may call your coordinator at any time with questions or concerns call [phone].    Please complete the following.    1. Fill out and return the enclosed forms    Authorization for Electronic Communication    Authorization to Discuss Protected Health Information    Authorization for Release of Protected Health Information    Authorization for Care Everywhere Release of Information    2. Sign up for:    xCloud, access to your electronic medical record (see enclosed pamphlet)    [email or web address], a transplant education website    You can use these tools to learn more about your transplant, communicate with your care team, and track your medical details      Sincerely,  Solid Organ Transplant  [facility], [facility]    cc: PCP

## 2020-06-25 NOTE — TELEPHONE ENCOUNTER
PCP: Taz Hargrove  Referring Provider: Dr. Glen Abel   Referring Diagnosis: CKD Stage 3    Is patient under the age of 65? Yes  Is patient diabetic? No  Is patient on insulin? No  Was patient offered a pancreas transplant referral? No    Is patient in a group home/assisted living? No  Does patient have a guardian? Yes, patients mother.     Referral intake process completed.  Patient is aware that after financial approval is received, medical records will be requested.   Patient confirmed for a callback from transplant coordinator on 7/7/2020 (within 2 weeks)  Tentative evaluation date 8/12/2020. (within 4 weeks)    Confirmed coordinator will discuss evaluation process in more detail at the time of their call.   Patient is aware of the need to arrange age appropriate cancer screening, vaccinations, and dental care.  Reminded patient to complete questionnaire, complete medical records release, and review packet prior to evaluation visit .  Assessed patient for special needs (ie--wheelchair, assistance, guardian, and ):  No   Patient instructed to call [phone] with questions.     JUAN MANUEL Shepherd, LPN   Solid Organ Transplant

## 2020-06-26 ENCOUNTER — DOCUMENTATION ONLY (OUTPATIENT)
Dept: TRANSPLANT | Facility: CLINIC | Age: 25
End: 2020-06-26

## 2020-06-29 ENCOUNTER — OFFICE VISIT (OUTPATIENT)
Dept: OTOLARYNGOLOGY | Facility: CLINIC | Age: 25
End: 2020-06-29
Payer: MEDICARE

## 2020-06-29 ENCOUNTER — OFFICE VISIT (OUTPATIENT)
Dept: NEPHROLOGY | Facility: CLINIC | Age: 25
End: 2020-06-29
Attending: INTERNAL MEDICINE
Payer: MEDICARE

## 2020-06-29 VITALS
TEMPERATURE: 97.7 F | OXYGEN SATURATION: 95 % | SYSTOLIC BLOOD PRESSURE: 116 MMHG | DIASTOLIC BLOOD PRESSURE: 76 MMHG | HEART RATE: 97 BPM | WEIGHT: 97.7 LBS | HEIGHT: 61 IN | BODY MASS INDEX: 18.45 KG/M2

## 2020-06-29 DIAGNOSIS — Z98.890 GRANULATION TISSUE OF SITE OF TRACHEOSTOMY: ICD-10-CM

## 2020-06-29 DIAGNOSIS — Z93.0 TRACHEOSTOMY IN PLACE (H): Primary | ICD-10-CM

## 2020-06-29 DIAGNOSIS — L92.9 GRANULATION TISSUE OF SITE OF TRACHEOSTOMY: ICD-10-CM

## 2020-06-29 DIAGNOSIS — E44.0 MODERATE PROTEIN-CALORIE MALNUTRITION (H): Primary | ICD-10-CM

## 2020-06-29 DIAGNOSIS — M26.04 MANDIBULAR HYPOPLASIA: ICD-10-CM

## 2020-06-29 DIAGNOSIS — Q87.0 PIERRE ROBIN SYNDROME: ICD-10-CM

## 2020-06-29 DIAGNOSIS — Z98.1 HISTORY OF FUSION OF CERVICAL SPINE: ICD-10-CM

## 2020-06-29 DIAGNOSIS — N18.4 CKD (CHRONIC KIDNEY DISEASE) STAGE 4, GFR 15-29 ML/MIN (H): ICD-10-CM

## 2020-06-29 PROCEDURE — G0463 HOSPITAL OUTPT CLINIC VISIT: HCPCS | Mod: ZF

## 2020-06-29 ASSESSMENT — MIFFLIN-ST. JEOR: SCORE: 1301.22

## 2020-06-29 ASSESSMENT — PAIN SCALES - GENERAL: PAINLEVEL: NO PAIN (0)

## 2020-06-29 NOTE — LETTER
6/29/2020      RE: Aidan Louie  [address] [place] [address]       Dear Colleague:    Aidan Louie recently returned for follow-up at the [facility]. My clinic note from our visit is enclosed below.  Speech recognition software may have been used in the documentation below; input is reviewed before signature to the best of my ability.     I appreciate the ongoing opportunity to participate in this patient's care.    Please feel free to contact me with any questions.      Sincerely yours,  Bebe Dumas M.D., M.P.H.  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    He was a longtime patient of Dr. Reid's, and has had a trach for many years because of the mandibular hypoplasia. Per report, a number of attempts toward decannulation had been made, but ultimately the decision was made to keep the trach in place due to concerns about his airway. He went to the operating room with Dr. Jose in August 2017 for flexible and rigid laryngoscopy/bronchoscopy. Per the operative note, this showed moderate trismus and base of tongue collapse, redundant, distorted, floppy epiglottis, with a grade 4 laryngoscopy. A small granuloma was seen on the medial left arytenoid. Vocal fold mobility and subglottis were normal. The 5-0 pediatric Bivona tube was noted to be in good position. Distal airway was unremarkable. It was observed that he would be a difficult intubation. With a Rothman  blade under the epiglottis he was thought possibly intubatable but this was felt to be difficult even in a best case situation.    He currently has a 5-0 Bivona in place, and has a vertically tall stomal opening. 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  With good wound care, antibiotics, steroid injections, and silver nitrate, as well as clipping of synechium, his trach site significantly improved.      He returns today for a routine trach check and change. Was last seen 6 months ago. Mom reports that he has continued to pick at the trach site.  There is some recurrent granulation tissue noted.        MEDICATIONS:     Current Outpatient Medications   Medication Sig Dispense Refill     albuterol (2.5 MG/3ML) 0.083% nebulizer solution Take 1 ampule by nebulization 4 times daily        amLODIPine (NORVASC) 5 MG tablet TAKE 1 TABLET(5 MG) BY MOUTH TWICE DAILY 180 tablet 3     azithromycin (ZITHROMAX) 250 MG tablet Take  by mouth. Mon, Wed, Fri       calcium carbonate (OS-SUSAN 500 MG [place]. [place]) 500 MG tablet Take 1 tablet (500 mg) by mouth 3 times daily (with meals) 90 tablet 6     captopril 0.1 mg/mL (CAPOTEN) 0.1 mg/mL SUSP Take 2.5 mg three times daily (2.5 mg/5ml) 460 mL 11     cholecalciferol 2000 UNITS tablet Take 2,000 Units by mouth daily 30 tablet 11     cloNIDine (CATAPRES) 0.1 MG tablet Take 1 tablet (0.1 mg) by mouth 2 times daily 180 tablet 11     famotidine (PEPCID) 20 MG tablet Take 20 mg by mouth 2 times daily       ipratropium (ATROVENT) 0.02 % neb solution Take 0.5 mg by nebulization 4 times daily  225 mL      Nutritional Supplements (NEPRO) LIQD Take 1 Can by mouth daily 30 each 11     order for DME Equipment being ordered: Medi-Vac, plastic tubing connector lara type, cat 361, [facility], 1 lara  "tip/month x 12 months. Use for tracheostomy suctioning 1 each 11     order for DME Equipment being ordered: DME    Adult Bivona Uncuffed Tracheostomy Tube-5.0    Patient will do weekly trach changes. 4 Units 11     order for DME Equipment being ordered: DME -    Deep suction kits - 12 fr 90 each 3     order for DME Tracheostomy Humidification Equipment  Equipment being ordered:     1) Air compressor  2) Nebulizer bottle  3) Aerosol tubing  4) Trach mask  5) Sterile water  6) Saline ampules (\"bullets\")  7) Heat Moisture Exchanger (HME) Also known by several other terms including: Thermal Humidifying Filters, Swedish nose, Artificial nose, Filter, Thermovent T.  8) Room/home humidifiers 1 each 11     order for DME Optifoam Basic   [id number] 30 each 11     order for DME Equipment being ordered: Humidifier (heated), humidifier attachment, saline lavages, humidivent mask 1 each 3     predniSONE (DELTASONE) 5 MG tablet Take 2.5 mg by mouth every other day Take 7.5mg daily   6     sodium bicarbonate 650 MG tablet TAKE 1 TABLET BY MOUTH THREE TIMES DAILY 270 tablet 3     tobramycin, PF, (DAX) 300 MG/5ML nebulizer solution Take 1 ampule by nebulization as needed       amoxicillin-clavulanate (AUGMENTIN) 875-125 MG tablet Take 1 tablet by mouth 2 times daily (Patient not taking: Reported on 12/9/2019) 20 tablet 0     ranitidine (ZANTAC) 150 MG tablet Take 0.5 tablets (75 mg) by mouth 2 times daily (Patient not taking: Reported on 1/3/2019) 90 tablet 3     sodium bicarbonate 650 MG tablet TAKE 1 TABLET BY MOUTH THREE TIMES DAILY (Patient not taking: Reported on 12/9/2019) 270 tablet 0       ALLERGIES:    Allergies   Allergen Reactions     Latex      Latex      Other reaction(s): Other (see comments)     Tape [Adhesive Tape]        NEW PMH/PSH: None    REVIEW OF SYSTEMS:  The patient completed a comprehensive 11 point review of systems (below), which was reviewed. Positives are as noted below.  Patient Supplied Answers to " Review of Systems   ENT ROS 6/10/2019   Constitutional Unexplained fatigue   Cardiopulmonary Cough   Musculoskeletal Sore or stiff joints, Back pain         PHYSICAL EXAM:  General: The patient was alert and conversant, and in no acute distress. Patient accompanied by his mother.  Neck: No palpable cervical lymphadenopathy, trach in good position, moderate dry granulomas bilaterally. Broad cranial-caudal trach site. No acute infection.  Resp: Breathing comfortably, no stridor or stertor.  Neuro: Symmetric facial function.   Psych: Normal affect, pleasant and cooperative.  Voice/speech: No significant dysphonia.    Intake scores  Last 2 Scores for Patient-Answered VHI Questionnaire  No flowsheet data found.   Last 2 Scores for Patient-Answered EAT Questionnaire  No flowsheet data found.   Last 2 Scores for Patient-Answered CSI Questionnaire  No flowsheet data found.      Procedure:  Tracheostomy tube change  Indication: This procedure was warranted as part of routine trach care, and was requested by the patient/caregiver. The patient's mother requested to perform the trach change with my supervision.  Description of procedure:  The existing tracheostomy tube was removed and a new identical tube was placed using the introducer. The introducer was withdrawn and replaced with the inner cannula, and the neck ties were secured. Flexible fiberoptic endoscopy through the tube confirmed good position.   Silver nitrate was sparingly applied to the right aspect of the granulation tissue.  The patient tolerated the procedure without difficulty and the patient's mother was able to complete the trach change smoothly.      IMPRESSION AND PLAN:   Aidan Louie returns with persistent, slightly worse granulation around his trach site.     We again discussed the importance of keeping the area clean and avoiding digital trauma. Silver nitrate was applied. His mother feels comfortable resuming routine trach changes now that she  was able to complete it independently today, with my supervision.     We will therefore plan a recheck in clinic in approximately six months, or sooner as needed.        Bebe Dumas MD

## 2020-06-29 NOTE — LETTER
2020      RE: Aidan Louie  [address] [facility] [address]         Nephrology Clinic    Glen Abel MD  DOS: 2020    Name: Aidan Louie  MRN: [id number]  Age: 23 year old  : 1995  Referring provider: Nathalia Collado     Assessment and Plan:  Mr. 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     1. CKD, stage 4:  Previous baseline creatinine now seems to be ~3.2 with a GFR ~26 but now 4.3 with an eGFR of 17 ml/min.  Etiology secondary to obstructive nephropathy from congenital abnormalities of the urogenital system and recurrent UTIs.  His kidney disease appears to have worsened over the past year.  He was counseled regarding his options with renal replacement therapy including transplant.  He was advised that secondary to his trach he may have a challenge but remains a potential transplant candidate as the trach is not for hypoxic respiratory disease. He has been transitioning from pediatric pulmonologist to adult pulmonologist. He is undergoing transplant evaluation.    -- CKD education and dialysis options provided again today   -- continue captopril 2.5 for albuminuria/renoprotection.  Urine albumin ~2 g/g in past so would like to increase captopril but will reassess at next visit  -- continues Aguirre for now (per urology) and close monitoring for UTIs; he follows closely with urology.  He may soon transition back to straight caths.   -- continue kidney transplant evaluation  -- RTC in 3 months     2. HTN/Volume:  Blood pressure at goal of <130/80 and euvolemic on exam.    -continue amlodipine 5mg BID,  captopril 2.5  and clonidine 0.1mg BID  -again, would like to increase captopril next for albuminuria which we will consider at next visit and potentially wean him off clonidine     3. Anemia  of CKD: Hgb has been at goal (last 12.1 ).    -no need for POORNIMA therapy  -check iron stores at next visit      4. CKD-MBD: Corrected calcium and phos at goal. Vitamin D levels at goal.  PTH elevated at 331. DEXA with evidence of osteoporosis.  Bone specific alk phos within normal limits. Secondary hyperparathyroidism does not seem to be a big contributor to decreased bone mineralization at this time.    -continue cholecalciferol at 2000 units daily  -no need for active vitamin D (calcitriol) at this time  -followed by endocrinology and no further management of osteoporosis recommended at this time. If therapy initiated, then would favor denosumab.       5. Metabolic acidosis: Due to CKD.  Bicarbonate level at goal.      -continue sodium bicarbonate 650 mg TID for acidosis with the goal of slowing progression of CKD and bone disease      6. Recurrent UTIs: Colonized so based on symptoms.  No evidence of UTI at this time.   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 This recently became irritated, but there are no signs of infection today. Previously followed by pediatric ENT.    -now followed by adult ENT     8.  Malnutrition: In part due to protein-energy wasting as result of CKD.    -start nepro 1 can daily    Reason For Visit:   CKD follow-up    HPI:   Aidan Louie is a 23 year old male who presents for follow-up of his CKD IV secondary to Prune belly syndrome. His other histories include hypertension, bilateral hearing loss on hearing aids, short stature, choanal atresia SP repair, Dawood Reece anomaly, and restrictive lung disease. He has been following with Dr. Hess for his CKD, and Dr. Schilling has been his surgeon. He has a history of recurrent UTI and worsening creatinine in the past few years. Patient has a history of Prune belly syndrome and extensive urological  abnormalities. He has had reconstructed ureter and bladder and has to self catheterize every 3 hours with minimal irrigation with 180ml saline. Since he has been more compliant with his saline flushes, he has had less frequent UTI.    He presents today with his mother who reports that he is doing well with the exception of low mood during the COVID crisis. She notes that he now has a Aguirre and is being followed closely by Urology for obstruction. She feels that his tracheostomy has been healing appropriately. He is continued on amlodipine twice daily. No other concerns.      Review of Systems:   Pertinent items are noted in HPI or as below, remainder of complete ROS is negative.      Current Outpatient Medications   Medication     albuterol (2.5 MG/3ML) 0.083% nebulizer solution     amLODIPine (NORVASC) 5 MG tablet     azithromycin (ZITHROMAX) 250 MG tablet     calcium carbonate (OS-SUSAN 500 MG [place]. [place]) 500 MG tablet     captopril 0.1 mg/mL (CAPOTEN) 0.1 mg/mL SUSP     cholecalciferol 2000 UNITS tablet     cloNIDine (CATAPRES) 0.1 MG tablet     famotidine (PEPCID) 20 MG tablet     ipratropium (ATROVENT) 0.02 % neb solution     Nutritional Supplements (NEPRO) LIQD     order for DME     order for DME     order for DME     order for DME     order for DME     order for DME     predniSONE (DELTASONE) 5 MG tablet     sodium bicarbonate 650 MG tablet     tobramycin, PF, (DAX) 300 MG/5ML nebulizer solution     amoxicillin-clavulanate (AUGMENTIN) 875-125 MG tablet     ranitidine (ZANTAC) 150 MG tablet     sodium bicarbonate 650 MG tablet     No current facility-administered medications for this visit.            Allergies:   Latex; Latex; and Tape [adhesive tape]      Past Medical History:  Bilateral reflux nephropathy  CKD (chronic kidney disease) stage 4, GFR 15-29 ml/min     Hearing loss      Hypertension   Dawood Reece sequence             Prune belly syndrome   Recurrent UTI   Respiratory bronchiolitis interstitial  "lung disease  Restrictive lung disease            Tracheostomy dependence   Vitamin D deficiency      Past Surgical History:  Bilateral ureteral reimplantation, abdominoplasty, mitrofanoff creation  Cystoscopy  Gastrostomy tube                     Herniorrhaphy inguinal bilateral, left ochiopexy  Laryngoscopy, bronchoscopy, combined x5   vesicostomy               Redo right ureteral reimplantation  Replacement of trach    Takedown and revision of mitrofanoff stoma     Family History:   The patient has family history of diabetes (mother).      Social History:   The patient presents here with his mother and aunt. The patient has never smoked. Denies alcohol use.      Physical Exam:  /76   Pulse 97   Temp 97.7  F (36.5  C) (Oral)   Ht 1.549 m (5' 0.98\")   Wt 44.3 kg (97 lb 11.2 oz)   SpO2 95%   BMI 18.47 kg/m     GENERAL APPEARANCE: alert and no distress  EYES: nonicteric  HENT: mouth without ulcers or lesions  NECK: supple, no adenopathy, trach present  RESP: lungs clear to auscultation   CV: regular rhythm, normal rate, no rub  ABDOMEN: soft, nontender, nondistended  : no CVA tenderness   Extremities: no significant edema  MS: no evidence of inflammation in joints, no muscle tenderness  SKIN: no concerning rash  NEURO: mentation intact and speech normal  PSYCH: low mood    Laboratory:  CMP  Recent Labs   Lab Test 20  1311 19  1027 19  1438 18  1056 17  1337  09/17/15  1205 02/05/15  1110    137 144 141 139 137   < > 139 141   POTASSIUM 4.9 4.1 4.1 4.3 4.1 4.4   < > 4.6 4.2   CHLORIDE 106 106 116* 114* 108 108   < > 111* 111*   CO2 25 25 18* 18* 22 21   < > 21 21   ANIONGAP 7 7 10 9 9 8   < > 7 9   GLC 98 91 81 109* 103* 116*   < > 140* 118*   BUN 55 48* 64* 53* 45 44*   < > 50* 40*   CR 4.3 4.31* 3.25* 3.22* 2.9 2.93*   < > 2.57* 2.07*   GFRESTIMATED 17.2 18* 26* 26* 27.6 27*   < > 32* 42*   GFRESTBLACK  --  21* 30* 30*  --  33*   < > 39* 51*   SUSAN 9.3 " 9.4 8.8 8.4* 8.7 8.8   < > 8.7* 8.7*   MAG  --   --   --   --   --   --   --  2.1 2.0   PHOS  --  4.2 4.4 3.6  --  2.9   < > 2.7* 3.5   ALBUMIN  --  3.6 3.2* 3.6 3.4 3.6   < > 3.8 3.5   ALKPHOS  --   --   --   --   --   --   --  80 87    < > = values in this interval not displayed.     CBC  Recent Labs   Lab Test 12/09/19  1311 05/13/19  1027 01/03/19  1438 08/09/18  1056 07/11/17  1337  09/17/15  1205   HGB 12.1* 11.3* 11.6* 12.3* 13.0*   < > 13.0*   WBC 9.2 10.0  --   --  8.8  --  8.7   RBC 4.16* 3.85*  --   --  4.35*  --  4.44   HCT 37.4* 34.9*  --   --  39.0*  --  38.9*   MCV 90 91  --   --  90  --  88   MCH 29.1 29.4  --   --  29.9  --  29.3   MCHC 32.4 32.4  --   --  33.3  --  33.4   RDW 12.8 13.4  --   --  13.2  --  13.5    308  --   --  305  --  326    < > = values in this interval not displayed.     INR  No lab results found.  ABG  No lab results found.   URINE STUDIES  Recent Labs   Lab Test 07/11/17  1341 12/22/16  1328 09/17/15  1410 02/05/15  1115   COLOR Yellow Straw Quantity not sufficient Straw   APPEARANCE Clear Slightly Cloudy Quantity not sufficient Clear   URINEGLC Negative Negative Quantity not sufficient* Negative   URINEBILI Negative Negative Quantity not sufficient* Negative   URINEKETONE Negative Negative Quantity not sufficient* Negative   SG 1.005 1.006 Quantity not sufficient 1.005   UBLD Negative Trace* Quantity not sufficient* Trace*   URINEPH 6.0 6.5 Quantity not sufficient 6.0   PROTEIN 100* 100* Quantity not sufficient* 100*   NITRITE Positive* Negative Quantity not sufficient* Negative   LEUKEST Small* Large* Quantity not sufficient* Large*   RBCU <1 6* Quantity not sufficient 1   WBCU 6* >182* Quantity not sufficient* 15*     Recent Labs   Lab Test 01/03/19  1435 07/11/17  1341 12/22/16  1328 02/05/15  1115   UTPG 2.60* 3.04* 3.05* 5.28*     PTH  Recent Labs   Lab Test 12/09/19  1311 05/13/19  1027 01/03/19  1438 07/11/17  1337 12/22/16  1325 09/17/15  1205 02/05/15  1110  10/03/13  2128 09/20/12  1200   PTHI 331* 195* 177* 191* 98* 133* 303* 141* 74*     IRON STUDIES   Recent Labs   Lab Test 08/09/18  1056 08/09/18  1046 07/11/17  1337 12/22/16  1325 09/17/15  1205 02/05/15  1110   IRON 178  --  77 135 99 160     --  296 241 297 281   IRONSAT 67  --  26 56* 33 57*   JUVENAL  --  14 16* 56 27 34     Total time spent was >40 minutes, and more than 50% of face to face time was spent in counseling and/or coordination of care regarding principles of multidisciplinary care, medication management, and chronic kidney disease education.  Glen Abel MD

## 2020-06-29 NOTE — LETTER
RE: Aidan Louie  [address] [facility] [address]     Nephrology Clinic    Glen Abel MD    DOS: 2020    Name: Aidan Louie  MRN: [id number]  Age: 23 year old  : 1995  Referring provider: Nathalia Collado     Assessment and Plan:  Mr. 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     1. CKD, stage 4:  Previous baseline creatinine now seems to be ~3.2 with a GFR ~26 but now 4.3 with an eGFR of 17 ml/min.  Etiology secondary to obstructive nephropathy from congenital abnormalities of the urogenital system and recurrent UTIs.  His kidney disease appears to have worsened over the past year.  He was counseled regarding his options with renal replacement therapy including transplant.  He was advised that secondary to his trach he may have a challenge but remains a potential transplant candidate as the trach is not for hypoxic respiratory disease. He has been transitioning from pediatric pulmonologist to adult pulmonologist. He is undergoing transplant evaluation.    -- CKD education and dialysis options provided again today   -- continue captopril 2.5 for albuminuria/renoprotection.  Urine albumin ~2 g/g in past so would like to increase captopril but will reassess at next visit  -- continues Aguirre for now (per urology) and close monitoring for UTIs; he follows closely with urology.  He may soon transition back to straight caths.   -- continue kidney transplant evaluation  -- RTC in 3 months     2. HTN/Volume:  Blood pressure at goal of <130/80 and euvolemic on exam.    -continue amlodipine 5mg BID,  captopril 2.5  and clonidine 0.1mg BID  -again, would like to increase captopril next for albuminuria which we will consider at next visit and potentially wean him off clonidine     3. Anemia of CKD: Hgb has  been at goal (last 12.1 ).    -no need for POORNIMA therapy  -check iron stores at next visit      4. CKD-MBD: Corrected calcium and phos at goal. Vitamin D levels at goal.  PTH elevated at 331. DEXA with evidence of osteoporosis.  Bone specific alk phos within normal limits. Secondary hyperparathyroidism does not seem to be a big contributor to decreased bone mineralization at this time.    -continue cholecalciferol at 2000 units daily  -no need for active vitamin D (calcitriol) at this time  -followed by endocrinology and no further management of osteoporosis recommended at this time. If therapy initiated, then would favor denosumab.       5. Metabolic acidosis: Due to CKD.  Bicarbonate level at goal.      -continue sodium bicarbonate 650 mg TID for acidosis with the goal of slowing progression of CKD and bone disease      6. Recurrent UTIs: Colonized so based on symptoms.  No evidence of UTI at this time.   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 This recently became irritated, but there are no signs of infection today. Previously followed by pediatric ENT.    -now followed by adult ENT     8.  Malnutrition: In part due to protein-energy wasting as result of CKD.    -start nepro 1 can daily    Reason For Visit:   CKD follow-up    HPI:   Aidan Louie is a 23 year old male who presents for follow-up of his CKD IV secondary to Prune belly syndrome. His other histories include hypertension, bilateral hearing loss on hearing aids, short stature, choanal atresia SP repair, Dawood Reece anomaly, and restrictive lung disease. He has been following with Dr. Hess for his CKD, and Dr. Schilling has been his surgeon. He has a history of recurrent UTI and worsening creatinine in the past few years. Patient has a history of Prune belly syndrome and extensive urological abnormalities. He has  had reconstructed ureter and bladder and has to self catheterize every 3 hours with minimal irrigation with 180ml saline. Since he has been more compliant with his saline flushes, he has had less frequent UTI.    He presents today with his mother who reports that he is doing well with the exception of low mood during the COVID crisis. She notes that he now has a Aguirre and is being followed closely by Urology for obstruction. She feels that his tracheostomy has been healing appropriately. He is continued on amlodipine twice daily. No other concerns.      Review of Systems:   Pertinent items are noted in HPI or as below, remainder of complete ROS is negative.      Current Outpatient Medications   Medication     albuterol (2.5 MG/3ML) 0.083% nebulizer solution     amLODIPine (NORVASC) 5 MG tablet     azithromycin (ZITHROMAX) 250 MG tablet     calcium carbonate (OS-SUSAN 500 MG [place]) 500 MG tablet     captopril 0.1 mg/mL (CAPOTEN) 0.1 mg/mL SUSP     cholecalciferol 2000 UNITS tablet     cloNIDine (CATAPRES) 0.1 MG tablet     famotidine (PEPCID) 20 MG tablet     ipratropium (ATROVENT) 0.02 % neb solution     Nutritional Supplements (NEPRO) LIQD     order for DME     order for DME     order for DME     order for DME     order for DME     order for DME     predniSONE (DELTASONE) 5 MG tablet     sodium bicarbonate 650 MG tablet     tobramycin, PF, (DAX) 300 MG/5ML nebulizer solution     amoxicillin-clavulanate (AUGMENTIN) 875-125 MG tablet     ranitidine (ZANTAC) 150 MG tablet     sodium bicarbonate 650 MG tablet     No current facility-administered medications for this visit.            Allergies:   Latex; Latex; and Tape [adhesive tape]      Past Medical History:  Bilateral reflux nephropathy  CKD (chronic kidney disease) stage 4, GFR 15-29 ml/min     Hearing loss      Hypertension   Dawood Reece sequence             Prune belly syndrome   Recurrent UTI   Respiratory bronchiolitis interstitial lung disease  Restrictive  "lung disease            Tracheostomy dependence   Vitamin D deficiency      Past Surgical History:  Bilateral ureteral reimplantation, abdominoplasty, mitrofanoff creation  Cystoscopy  Gastrostomy tube                     Herniorrhaphy inguinal bilateral, left ochiopexy  Laryngoscopy, bronchoscopy, combined x5   vesicostomy               Redo right ureteral reimplantation  Replacement of trach    Takedown and revision of mitrofanoff stoma     Family History:   The patient has family history of diabetes (mother).      Social History:   The patient presents here with his mother and aunt. The patient has never smoked. Denies alcohol use.      Physical Exam:  /76   Pulse 97   Temp 97.7  F (36.5  C) (Oral)   Ht 1.549 m (5' 0.98\")   Wt 44.3 kg (97 lb 11.2 oz)   SpO2 95%   BMI 18.47 kg/m     GENERAL APPEARANCE: alert and no distress  EYES: nonicteric  HENT: mouth without ulcers or lesions  NECK: supple, no adenopathy, trach present  RESP: lungs clear to auscultation   CV: regular rhythm, normal rate, no rub  ABDOMEN: soft, nontender, nondistended  : no CVA tenderness   Extremities: no significant edema  MS: no evidence of inflammation in joints, no muscle tenderness  SKIN: no concerning rash  NEURO: mentation intact and speech normal  PSYCH: low mood    Laboratory:  CMP  Recent Labs   Lab Test 20  1311 19  1027 19  1438 18  1056 17  1337  09/17/15  1205 02/05/15  1110    137 144 141 139 137   < > 139 141   POTASSIUM 4.9 4.1 4.1 4.3 4.1 4.4   < > 4.6 4.2   CHLORIDE 106 106 116* 114* 108 108   < > 111* 111*   CO2 25 25 18* 18* 22 21   < > 21 21   ANIONGAP 7 7 10 9 9 8   < > 7 9   GLC 98 91 81 109* 103* 116*   < > 140* 118*   BUN 55 48* 64* 53* 45 44*   < > 50* 40*   CR 4.3 4.31* 3.25* 3.22* 2.9 2.93*   < > 2.57* 2.07*   GFRESTIMATED 17.2 18* 26* 26* 27.6 27*   < > 32* 42*   GFRESTBLACK  --  21* 30* 30*  --  33*   < > 39* 51*   SUSAN 9.3 9.4 8.8 8.4* 8.7 8.8   < " > 8.7* 8.7*   MAG  --   --   --   --   --   --   --  2.1 2.0   PHOS  --  4.2 4.4 3.6  --  2.9   < > 2.7* 3.5   ALBUMIN  --  3.6 3.2* 3.6 3.4 3.6   < > 3.8 3.5   ALKPHOS  --   --   --   --   --   --   --  80 87    < > = values in this interval not displayed.     CBC  Recent Labs   Lab Test 12/09/19  1311 05/13/19  1027 01/03/19  1438 08/09/18  1056 07/11/17  1337  09/17/15  1205   HGB 12.1* 11.3* 11.6* 12.3* 13.0*   < > 13.0*   WBC 9.2 10.0  --   --  8.8  --  8.7   RBC 4.16* 3.85*  --   --  4.35*  --  4.44   HCT 37.4* 34.9*  --   --  39.0*  --  38.9*   MCV 90 91  --   --  90  --  88   MCH 29.1 29.4  --   --  29.9  --  29.3   MCHC 32.4 32.4  --   --  33.3  --  33.4   RDW 12.8 13.4  --   --  13.2  --  13.5    308  --   --  305  --  326    < > = values in this interval not displayed.     INR  No lab results found.  ABG  No lab results found.   URINE STUDIES  Recent Labs   Lab Test 07/11/17  1341 12/22/16  1328 09/17/15  1410 02/05/15  1115   COLOR Yellow Straw Quantity not sufficient Straw   APPEARANCE Clear Slightly Cloudy Quantity not sufficient Clear   URINEGLC Negative Negative Quantity not sufficient* Negative   URINEBILI Negative Negative Quantity not sufficient* Negative   URINEKETONE Negative Negative Quantity not sufficient* Negative   SG 1.005 1.006 Quantity not sufficient 1.005   UBLD Negative Trace* Quantity not sufficient* Trace*   URINEPH 6.0 6.5 Quantity not sufficient 6.0   PROTEIN 100* 100* Quantity not sufficient* 100*   NITRITE Positive* Negative Quantity not sufficient* Negative   LEUKEST Small* Large* Quantity not sufficient* Large*   RBCU <1 6* Quantity not sufficient 1   WBCU 6* >182* Quantity not sufficient* 15*     Recent Labs   Lab Test 01/03/19  1435 07/11/17  1341 12/22/16  1328 02/05/15  1115   UTPG 2.60* 3.04* 3.05* 5.28*     PTH  Recent Labs   Lab Test 12/09/19  1311 05/13/19  1027 01/03/19  1438 07/11/17  1337 12/22/16  1325 09/17/15  1205 02/05/15  1110 10/03/13  2128  09/20/12  1200   PTHI 331* 195* 177* 191* 98* 133* 303* 141* 74*     IRON STUDIES   Recent Labs   Lab Test 08/09/18  1056 08/09/18  1046 07/11/17  1337 12/22/16  1325 09/17/15  1205 02/05/15  1110   IRON 178  --  77 135 99 160     --  296 241 297 281   IRONSAT 67  --  26 56* 33 57*   JUVENAL  --  14 16* 56 27 34     Total time spent was >40 minutes, and more than 50% of face to face time was spent in counseling and/or coordination of care regarding principles of multidisciplinary care, medication management, and chronic kidney disease education.    Glen Abel MD

## 2020-06-29 NOTE — LETTER
2020       RE: Aidan Louie  [address] [place] [address]     Dear Colleague,    Thank you for referring your patient, Aidan Louie, to the [facility] NEPHROLOGY at [facility]. Please see a copy of my visit note below.      Nephrology Clinic    Glen Abel MD  DOS: 2020    Name: Aidan Louie  MRN: [id number]  Age: 23 year old  : 1995  Referring provider: Nathalia Collado     Assessment and Plan:  Mr. 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     1. CKD, stage 4:  Previous baseline creatinine now seems to be ~3.2 with a GFR ~26 but now 4.3 with an eGFR of 17 ml/min.  Etiology secondary to obstructive nephropathy from congenital abnormalities of the urogenital system and recurrent UTIs.  His kidney disease appears to have worsened over the past year.  He was counseled regarding his options with renal replacement therapy including transplant.  He was advised that secondary to his trach he may have a challenge but remains a potential transplant candidate as the trach is not for hypoxic respiratory disease. He has been transitioning from pediatric pulmonologist to adult pulmonologist. He is undergoing transplant evaluation.    -- CKD education and dialysis options provided again today   -- continue captopril 2.5 for albuminuria/renoprotection.  Urine albumin ~2 g/g in past so would like to increase captopril but will reassess at next visit  -- continues Aguirre for now (per urology) and close monitoring for UTIs; he follows closely with urology.  He may soon transition back to straight caths.   -- continue kidney transplant evaluation  -- RTC in 3 months     2. HTN/Volume:  Blood pressure at goal of <130/80 and euvolemic on exam.    -continue amlodipine  5mg BID,  captopril 2.5  and clonidine 0.1mg BID  -again, would like to increase captopril next for albuminuria which we will consider at next visit and potentially wean him off clonidine     3. Anemia of CKD: Hgb has been at goal (last 12.1 ).    -no need for POORNIMA therapy  -check iron stores at next visit      4. CKD-MBD: Corrected calcium and phos at goal. Vitamin D levels at goal.  PTH elevated at 331. DEXA with evidence of osteoporosis.  Bone specific alk phos within normal limits. Secondary hyperparathyroidism does not seem to be a big contributor to decreased bone mineralization at this time.    -continue cholecalciferol at 2000 units daily  -no need for active vitamin D (calcitriol) at this time  -followed by endocrinology and no further management of osteoporosis recommended at this time. If therapy initiated, then would favor denosumab.       5. Metabolic acidosis: Due to CKD.  Bicarbonate level at goal.      -continue sodium bicarbonate 650 mg TID for acidosis with the goal of slowing progression of CKD and bone disease      6. Recurrent UTIs: Colonized so based on symptoms.  No evidence of UTI at this time.   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 This recently became irritated, but there are no signs of infection today. Previously followed by pediatric ENT.    -now followed by adult ENT     8.  Malnutrition: In part due to protein-energy wasting as result of CKD.    -start nepro 1 can daily    Reason For Visit:   CKD follow-up    HPI:   Aidan Louie is a 23 year old male who presents for follow-up of his CKD IV secondary to Prune belly syndrome. His other histories include hypertension, bilateral hearing loss on hearing aids, short stature, choanal atresia SP repair, Dawood Reece anomaly, and restrictive lung disease. He has been following with Dr. Hess for his CKD,  and Dr. Schilling has been his surgeon. He has a history of recurrent UTI and worsening creatinine in the past few years. Patient has a history of Prune belly syndrome and extensive urological abnormalities. He has had reconstructed ureter and bladder and has to self catheterize every 3 hours with minimal irrigation with 180ml saline. Since he has been more compliant with his saline flushes, he has had less frequent UTI.    He presents today with his mother who reports that he is doing well with the exception of low mood during the COVID crisis. She notes that he now has a Aguirre and is being followed closely by Urology for obstruction. She feels that his tracheostomy has been healing appropriately. He is continued on amlodipine twice daily. No other concerns.      Review of Systems:   Pertinent items are noted in HPI or as below, remainder of complete ROS is negative.      Current Outpatient Medications   Medication     albuterol (2.5 MG/3ML) 0.083% nebulizer solution     amLODIPine (NORVASC) 5 MG tablet     azithromycin (ZITHROMAX) 250 MG tablet     calcium carbonate (OS-SUSAN 500 MG [place]. CA) 500 MG tablet     captopril 0.1 mg/mL (CAPOTEN) 0.1 mg/mL SUSP     cholecalciferol 2000 UNITS tablet     cloNIDine (CATAPRES) 0.1 MG tablet     famotidine (PEPCID) 20 MG tablet     ipratropium (ATROVENT) 0.02 % neb solution     Nutritional Supplements (NEPRO) LIQD     order for DME     order for DME     order for DME     order for DME     order for DME     order for DME     predniSONE (DELTASONE) 5 MG tablet     sodium bicarbonate 650 MG tablet     tobramycin, PF, (DAX) 300 MG/5ML nebulizer solution     amoxicillin-clavulanate (AUGMENTIN) 875-125 MG tablet     ranitidine (ZANTAC) 150 MG tablet     sodium bicarbonate 650 MG tablet     No current facility-administered medications for this visit.            Allergies:   Latex; Latex; and Tape [adhesive tape]      Past Medical History:  Bilateral reflux nephropathy  CKD (chronic  "kidney disease) stage 4, GFR 15-29 ml/min     Hearing loss      Hypertension   Dawood Reece sequence             Prune belly syndrome   Recurrent UTI   Respiratory bronchiolitis interstitial lung disease  Restrictive lung disease            Tracheostomy dependence   Vitamin D deficiency      Past Surgical History:  Bilateral ureteral reimplantation, abdominoplasty, mitrofanoff creation  Cystoscopy  Gastrostomy tube                     Herniorrhaphy inguinal bilateral, left ochiopexy  Laryngoscopy, bronchoscopy, combined x5   vesicostomy               Redo right ureteral reimplantation  Replacement of trach    Takedown and revision of mitrofanoff stoma     Family History:   The patient has family history of diabetes (mother).      Social History:   The patient presents here with his mother and aunt. The patient has never smoked. Denies alcohol use.      Physical Exam:  /76   Pulse 97   Temp 97.7  F (36.5  C) (Oral)   Ht 1.549 m (5' 0.98\")   Wt 44.3 kg (97 lb 11.2 oz)   SpO2 95%   BMI 18.47 kg/m     GENERAL APPEARANCE: alert and no distress  EYES: nonicteric  HENT: mouth without ulcers or lesions  NECK: supple, no adenopathy, trach present  RESP: lungs clear to auscultation   CV: regular rhythm, normal rate, no rub  ABDOMEN: soft, nontender, nondistended  : no CVA tenderness   Extremities: no significant edema  MS: no evidence of inflammation in joints, no muscle tenderness  SKIN: no concerning rash  NEURO: mentation intact and speech normal  PSYCH: low mood    Laboratory:  CMP  Recent Labs   Lab Test 20  1311 19  1027 19  1438 18  1056 17  1337  09/17/15  1205 02/05/15  1110    137 144 141 139 137   < > 139 141   POTASSIUM 4.9 4.1 4.1 4.3 4.1 4.4   < > 4.6 4.2   CHLORIDE 106 106 116* 114* 108 108   < > 111* 111*   CO2 25 25 18* 18* 22 21   < > 21 21   ANIONGAP 7 7 10 9 9 8   < > 7 9   GLC 98 91 81 109* 103* 116*   < > 140* 118*   BUN 55 48* 64* 53* 45 " 44*   < > 50* 40*   CR 4.3 4.31* 3.25* 3.22* 2.9 2.93*   < > 2.57* 2.07*   GFRESTIMATED 17.2 18* 26* 26* 27.6 27*   < > 32* 42*   GFRESTBLACK  --  21* 30* 30*  --  33*   < > 39* 51*   SUSAN 9.3 9.4 8.8 8.4* 8.7 8.8   < > 8.7* 8.7*   MAG  --   --   --   --   --   --   --  2.1 2.0   PHOS  --  4.2 4.4 3.6  --  2.9   < > 2.7* 3.5   ALBUMIN  --  3.6 3.2* 3.6 3.4 3.6   < > 3.8 3.5   ALKPHOS  --   --   --   --   --   --   --  80 87    < > = values in this interval not displayed.     CBC  Recent Labs   Lab Test 12/09/19  1311 05/13/19  1027 01/03/19  1438 08/09/18  1056 07/11/17  1337  09/17/15  1205   HGB 12.1* 11.3* 11.6* 12.3* 13.0*   < > 13.0*   WBC 9.2 10.0  --   --  8.8  --  8.7   RBC 4.16* 3.85*  --   --  4.35*  --  4.44   HCT 37.4* 34.9*  --   --  39.0*  --  38.9*   MCV 90 91  --   --  90  --  88   MCH 29.1 29.4  --   --  29.9  --  29.3   MCHC 32.4 32.4  --   --  33.3  --  33.4   RDW 12.8 13.4  --   --  13.2  --  13.5    308  --   --  305  --  326    < > = values in this interval not displayed.     INR  No lab results found.  ABG  No lab results found.   URINE STUDIES  Recent Labs   Lab Test 07/11/17  1341 12/22/16  1328 09/17/15  1410 02/05/15  1115   COLOR Yellow Straw Quantity not sufficient Straw   APPEARANCE Clear Slightly Cloudy Quantity not sufficient Clear   URINEGLC Negative Negative Quantity not sufficient* Negative   URINEBILI Negative Negative Quantity not sufficient* Negative   URINEKETONE Negative Negative Quantity not sufficient* Negative   SG 1.005 1.006 Quantity not sufficient 1.005   UBLD Negative Trace* Quantity not sufficient* Trace*   URINEPH 6.0 6.5 Quantity not sufficient 6.0   PROTEIN 100* 100* Quantity not sufficient* 100*   NITRITE Positive* Negative Quantity not sufficient* Negative   LEUKEST Small* Large* Quantity not sufficient* Large*   RBCU <1 6* Quantity not sufficient 1   WBCU 6* >182* Quantity not sufficient* 15*     Recent Labs   Lab Test 01/03/19  1435 07/11/17  1341  12/22/16  1328 02/05/15  1115   UTPG 2.60* 3.04* 3.05* 5.28*     PTH  Recent Labs   Lab Test 12/09/19  1311 05/13/19  1027 01/03/19  1438 07/11/17  1337 12/22/16  1325 09/17/15  1205 02/05/15  1110 10/03/13  2128 09/20/12  1200   PTHI 331* 195* 177* 191* 98* 133* 303* 141* 74*     IRON STUDIES   Recent Labs   Lab Test 08/09/18  1056 08/09/18  1046 07/11/17  1337 12/22/16  1325 09/17/15  1205 02/05/15  1110   IRON 178  --  77 135 99 160     --  296 241 297 281   IRONSAT 67  --  26 56* 33 57*   JUVENAL  --  14 16* 56 27 34     Total time spent was >40 minutes, and more than 50% of face to face time was spent in counseling and/or coordination of care regarding principles of multidisciplinary care, medication management, and chronic kidney disease education.  Glen Abel MD      Again, thank you for allowing me to participate in the care of your patient.      Sincerely,    Glen Abel MD

## 2020-06-29 NOTE — PROGRESS NOTES
Dear Colleague:    Aidan Louie recently returned for follow-up at the [facility]. My clinic note from our visit is enclosed below.  Speech recognition software may have been used in the documentation below; input is reviewed before signature to the best of my ability.     I appreciate the ongoing opportunity to participate in this patient's care.    Please feel free to contact me with any questions.      Sincerely yours,  Bebe Dumas M.D., M.P.H.  , Laryngology  Director, [facility]  Otolaryngology- Head & Neck Surgery  [phone]            =====  HISTORY OF PRESENT ILLNESS:  Aidan Louie is a pleasant 24-year-old male with a complex past medical history including Dawood-Reece, mandibular hypoplasia, choanal atresia status post repair, history of cervical spine fusion, restrictive lung disease, and obstructive nephropathy from congenital abnormalities of the urinary tract (Prune belly syndrome, Eagle/Hoyt syndrome), who presents for trach care.    He was a longtime patient of Dr. Reid's, and has had a trach for many years because of the mandibular hypoplasia. Per report, a number of attempts toward decannulation had been made, but ultimately the decision was made to keep the trach in place due to concerns about his airway. He went to the operating room with Dr. Jose in August 2017 for flexible and rigid laryngoscopy/bronchoscopy. Per the operative note, this showed moderate trismus and base of tongue collapse, redundant, distorted, floppy epiglottis, with a grade 4 laryngoscopy. A small granuloma was seen on the medial left arytenoid. Vocal fold mobility and subglottis were normal. The 5-0 pediatric Bivona tube was noted to be in good position. Distal airway was unremarkable. It was observed that he would be a difficult intubation. With a Rothman blade under the epiglottis he was thought possibly intubatable but this was felt to  be difficult even in a best case situation.    He currently has a 5-0 Bivona in place, and has a vertically tall stomal opening. 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  With good wound care, antibiotics, steroid injections, and silver nitrate, as well as clipping of synechium, his trach site significantly improved.      He returns today for a routine trach check and change. Was last seen 6 months ago. Mom reports that he has continued to pick at the trach site.  There is some recurrent granulation tissue noted.        MEDICATIONS:     Current Outpatient Medications   Medication Sig Dispense Refill     albuterol (2.5 MG/3ML) 0.083% nebulizer solution Take 1 ampule by nebulization 4 times daily        amLODIPine (NORVASC) 5 MG tablet TAKE 1 TABLET(5 MG) BY MOUTH TWICE DAILY 180 tablet 3     azithromycin (ZITHROMAX) 250 MG tablet Take  by mouth. Mon, Wed, Fri       calcium carbonate (OS-SUSAN 500 MG [place]. CA) 500 MG tablet Take 1 tablet (500 mg) by mouth 3 times daily (with meals) 90 tablet 6     captopril 0.1 mg/mL (CAPOTEN) 0.1 mg/mL SUSP Take 2.5 mg three times daily (2.5 mg/5ml) 460 mL 11     cholecalciferol 2000 UNITS tablet Take 2,000 Units by mouth daily 30 tablet 11     cloNIDine (CATAPRES) 0.1 MG tablet Take 1 tablet (0.1 mg) by mouth 2 times daily 180 tablet 11     famotidine (PEPCID) 20 MG tablet Take 20 mg by mouth 2 times daily       ipratropium (ATROVENT) 0.02 % neb solution Take 0.5 mg by nebulization 4 times daily  225 mL      Nutritional Supplements (NEPRO) LIQD Take 1 Can by mouth daily 30 each 11     order for DME Equipment being ordered: Medi-Vac, plastic tubing connector lara type, cat 361, [facility] 1 lara tip/month x 12 months. Use for tracheostomy suctioning 1 each 11     order for DME  "Equipment being ordered: DME    Adult Bivona Uncuffed Tracheostomy Tube-5.0    Patient will do weekly trach changes. 4 Units 11     order for DME Equipment being ordered: DME -    Deep suction kits - 12 fr 90 each 3     order for DME Tracheostomy Humidification Equipment  Equipment being ordered:     1) Air compressor  2) Nebulizer bottle  3) Aerosol tubing  4) Trach mask  5) Sterile water  6) Saline ampules (\"bullets\")  7) Heat Moisture Exchanger (HME) Also known by several other terms including: Thermal Humidifying Filters, Swedish nose, Artificial nose, Filter, Thermovent T.  8) Room/home humidifiers 1 each 11     order for DME Optifoam Basic   [id number] 30 each 11     order for DME Equipment being ordered: Humidifier (heated), humidifier attachment, saline lavages, humidivent mask 1 each 3     predniSONE (DELTASONE) 5 MG tablet Take 2.5 mg by mouth every other day Take 7.5mg daily   6     sodium bicarbonate 650 MG tablet TAKE 1 TABLET BY MOUTH THREE TIMES DAILY 270 tablet 3     tobramycin, PF, (DAX) 300 MG/5ML nebulizer solution Take 1 ampule by nebulization as needed       amoxicillin-clavulanate (AUGMENTIN) 875-125 MG tablet Take 1 tablet by mouth 2 times daily (Patient not taking: Reported on 12/9/2019) 20 tablet 0     ranitidine (ZANTAC) 150 MG tablet Take 0.5 tablets (75 mg) by mouth 2 times daily (Patient not taking: Reported on 1/3/2019) 90 tablet 3     sodium bicarbonate 650 MG tablet TAKE 1 TABLET BY MOUTH THREE TIMES DAILY (Patient not taking: Reported on 12/9/2019) 270 tablet 0       ALLERGIES:    Allergies   Allergen Reactions     Latex      Latex      Other reaction(s): Other (see comments)     Tape [Adhesive Tape]        NEW PMH/PSH: None    REVIEW OF SYSTEMS:  The patient completed a comprehensive 11 point review of systems (below), which was reviewed. Positives are as noted below.  Patient Supplied Answers to Review of Systems  [facility] ENT ROS 6/10/2019   Constitutional Unexplained fatigue "   Cardiopulmonary Cough   Musculoskeletal Sore or stiff joints, Back pain         PHYSICAL EXAM:  General: The patient was alert and conversant, and in no acute distress. Patient accompanied by his mother.  Neck: No palpable cervical lymphadenopathy, trach in good position, moderate dry granulomas bilaterally. Broad cranial-caudal trach site. No acute infection.  Resp: Breathing comfortably, no stridor or stertor.  Neuro: Symmetric facial function.   Psych: Normal affect, pleasant and cooperative.  Voice/speech: No significant dysphonia.    Intake scores  Last 2 Scores for Patient-Answered VHI Questionnaire  No flowsheet data found.   Last 2 Scores for Patient-Answered EAT Questionnaire  No flowsheet data found.   Last 2 Scores for Patient-Answered CSI Questionnaire  No flowsheet data found.      Procedure:  Tracheostomy tube change  Indication: This procedure was warranted as part of routine trach care, and was requested by the patient/caregiver. The patient's mother requested to perform the trach change with my supervision.  Description of procedure:  The existing tracheostomy tube was removed and a new identical tube was placed using the introducer. The introducer was withdrawn and replaced with the inner cannula, and the neck ties were secured. Flexible fiberoptic endoscopy through the tube confirmed good position.   Silver nitrate was sparingly applied to the right aspect of the granulation tissue.  The patient tolerated the procedure without difficulty and the patient's mother was able to complete the trach change smoothly.      IMPRESSION AND PLAN:   Aidan Louie returns with persistent, slightly worse granulation around his trach site.     We again discussed the importance of keeping the area clean and avoiding digital trauma. Silver nitrate was applied. His mother feels comfortable resuming routine trach changes now that she was able to complete it independently today, with my supervision.     We will  therefore plan a recheck in clinic in approximately six months, or sooner as needed.

## 2020-06-29 NOTE — NURSING NOTE
Chief Complaint   Patient presents with     RECHECK     Trach change     Vitals all take at previous appointment today.    Radha Quiles, EMT

## 2020-07-02 NOTE — PATIENT INSTRUCTIONS
1.  You were seen in the ENT Clinic today by . If you have any questions or concerns after your appointment, please call [phone]. Press option #1 for scheduling related needs. Press option #3 for Nurse advice.    2.  Plan is to return to clinic in 6-12 months for trach change.      Stephanie Han LPN  [facility] - Otolaryngology

## 2020-07-07 NOTE — TELEPHONE ENCOUNTER
"I reviewed medical /surgical to date in Epic/CE. Per Dr. Abel's note: CRD stage 4  due to obstructive nephropathy from congenital abnormalities of urinary tract . Prune Belly Syndrome( Eagle- Barrette Syndrome) with recurrent UTIs. Eliecer reflux nephropathy. GFR 12/9/2019 18 . BMI 19.85. Not on dialysis. Financial clearance. Kidney encounter closed here 1/4/2018 GFR 27 at the time and lung function needed to be stabilized.  Mother is the legal guardian and she requested this call be completed with her. Verbal and written consent to speak with mother about pt's health. Mom also will send in the GUS forms.  [facility]   See notes in Epic for further history.  I will request further records.  Records at :   Birth to around 2002-[place]  2005-present- Records at [facility] last three years   [place] - [facility] or Dor[place] from 2005-to present    Dr Tai Billy - Urology at [facility] [address] , [place]. Pt.was doing self cath and then back to a urinary bag and will be going back to self cath Q 3 hours. Has had reconstructive bladder surgeries.       Trach is not for hypoxic respiratory disease. He has been transitioning from pediatric pulmonologist to adult pulmonologist. Trach for mandibular hypoplasia, concerns for airway obstruction. Mom was a practicing nurse. Mom does the trach and Zeyad G-tube changes. No current signs of infection at Trach or G-tube per mom. Mom states pt.does use O2 1 l at HS \" for comfort\"    Denies any cardiac history .  Osteoporosis- followed by Endo .   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 Pt. Does own ADLs, medications, self cath .    Zeyad G-tube is for only when needed , ie illness. Mom states \"some talk \" about maybe removing it.    Mom denies pt.seeing any mental health care providers. Mom states her  " "of 17 years,pt's step father,  passed away a year ago from cancer. Mom states the pt.and step father were very close. Biological father is \"not in the picture any more. \"      Contacted patient's mother, Valerie, and introduced myself as their Transplant Coordinator, also introduced the role of the Transplant Coordinator in the transplant process.  Explained the purpose of this call including reviewing next steps and answering questions.    Confirmed Referring Provider, Dialysis Center(NA at this time), and Primary Care Physician. Notified patient of the importance of continued communication with referring providers and primary care physicians.    Reviewed components of transplant evaluation process including necessary appointments, tests, and procedures.  Pt.lives in [facility].and virtual visits are not covered. Mom, as of this moment today, is aware we are not bringing PKE pts in for IN PERSON PKE. I did explain the in person PKE and Selection Committee process. I did give mom [email or web address] and instructed on how to get registered and she and the pt.could go ahead any time now and view the 15 Pre Kidney modules and to call me when they complete them to further review information. Answered mom's questions to the best of my ability.  Mom has my contact information for any further questions or concerns.  Mom is aware our  will call when she is able to schedule PKE in person. Mom verbalized understanding of content discussed with her today.  Answered questions for patient regarding evaluation, provided my name and contact information and requested they call with any additional questions.    Determined that patient would like additional information regarding transplant by:     Drop Down choices: Mail, Email, MyChart, Phone Call- encouraged    Encourage MyChart - encouraged   Notified patients that they will hear from a Transplant  to schedule evaluation.       "

## 2020-07-08 ENCOUNTER — DOCUMENTATION ONLY (OUTPATIENT)
Dept: TRANSPLANT | Facility: CLINIC | Age: 25
End: 2020-07-08

## 2020-07-29 NOTE — PROGRESS NOTES
Nephrology Clinic    Glen Abel MD  DOS: 2020    Name: Aidan Louie  MRN: [id number]  Age: 23 year old  : 1995  Referring provider: Nathalia Collado     Assessment and Plan:  Mr. 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     1. CKD, stage 4:  Previous baseline creatinine now seems to be ~3.2 with a GFR ~26 but now 4.3 with an eGFR of 17 ml/min.  Etiology secondary to obstructive nephropathy from congenital abnormalities of the urogenital system and recurrent UTIs.  His kidney disease appears to have worsened over the past year.  He was counseled regarding his options with renal replacement therapy including transplant.  He was advised that secondary to his trach he may have a challenge but remains a potential transplant candidate as the trach is not for hypoxic respiratory disease. He has been transitioning from pediatric pulmonologist to adult pulmonologist. He is undergoing transplant evaluation.    -- CKD education and dialysis options provided again today   -- continue captopril 2.5 for albuminuria/renoprotection.  Urine albumin ~2 g/g in past so would like to increase captopril but will reassess at next visit  -- continues Aguirre for now (per urology) and close monitoring for UTIs; he follows closely with urology.  He may soon transition back to straight caths.   -- continue kidney transplant evaluation  -- RTC in 3 months     2. HTN/Volume:  Blood pressure at goal of <130/80 and euvolemic on exam.    -continue amlodipine 5mg BID,  captopril 2.5  and clonidine 0.1mg BID  -again, would like to increase captopril next for albuminuria which we will consider at next visit and potentially wean him off clonidine     3. Anemia of CKD: Hgb has been at goal (last 12.1 ).    -no need for POORNIMA therapy  -check iron  stores at next visit      4. CKD-MBD: Corrected calcium and phos at goal. Vitamin D levels at goal.  PTH elevated at 331. DEXA with evidence of osteoporosis.  Bone specific alk phos within normal limits. Secondary hyperparathyroidism does not seem to be a big contributor to decreased bone mineralization at this time.    -continue cholecalciferol at 2000 units daily  -no need for active vitamin D (calcitriol) at this time  -followed by endocrinology and no further management of osteoporosis recommended at this time. If therapy initiated, then would favor denosumab.       5. Metabolic acidosis: Due to CKD.  Bicarbonate level at goal.      -continue sodium bicarbonate 650 mg TID for acidosis with the goal of slowing progression of CKD and bone disease      6. Recurrent UTIs: Colonized so based on symptoms.  No evidence of UTI at this time.   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 This recently became irritated, but there are no signs of infection today. Previously followed by pediatric ENT.    -now followed by adult ENT     8.  Malnutrition: In part due to protein-energy wasting as result of CKD.    -start nepro 1 can daily    Reason For Visit:   CKD follow-up    HPI:   Aidan Louie is a 23 year old male who presents for follow-up of his CKD IV secondary to Prune belly syndrome. His other histories include hypertension, bilateral hearing loss on hearing aids, short stature, choanal atresia SP repair, Dawood Reece anomaly, and restrictive lung disease. He has been following with Dr. Hess for his CKD, and Dr. Schilling has been his surgeon. He has a history of recurrent UTI and worsening creatinine in the past few years. Patient has a history of Prune belly syndrome and extensive urological abnormalities. He has had reconstructed ureter and bladder and has to self catheterize every  3 hours with minimal irrigation with 180ml saline. Since he has been more compliant with his saline flushes, he has had less frequent UTI.    He presents today with his mother who reports that he is doing well with the exception of low mood during the COVID crisis. She notes that he now has a Aguirre and is being followed closely by Urology for obstruction. She feels that his tracheostomy has been healing appropriately. He is continued on amlodipine twice daily. No other concerns.      Review of Systems:   Pertinent items are noted in HPI or as below, remainder of complete ROS is negative.      Current Outpatient Medications   Medication     albuterol (2.5 MG/3ML) 0.083% nebulizer solution     amLODIPine (NORVASC) 5 MG tablet     azithromycin (ZITHROMAX) 250 MG tablet     calcium carbonate (OS-SUSAN 500 MG [place]) 500 MG tablet     captopril 0.1 mg/mL (CAPOTEN) 0.1 mg/mL SUSP     cholecalciferol 2000 UNITS tablet     cloNIDine (CATAPRES) 0.1 MG tablet     famotidine (PEPCID) 20 MG tablet     ipratropium (ATROVENT) 0.02 % neb solution     Nutritional Supplements (NEPRO) LIQD     order for DME     order for DME     order for DME     order for DME     order for DME     order for DME     predniSONE (DELTASONE) 5 MG tablet     sodium bicarbonate 650 MG tablet     tobramycin, PF, (DAX) 300 MG/5ML nebulizer solution     amoxicillin-clavulanate (AUGMENTIN) 875-125 MG tablet     ranitidine (ZANTAC) 150 MG tablet     sodium bicarbonate 650 MG tablet     No current facility-administered medications for this visit.            Allergies:   Latex; Latex; and Tape [adhesive tape]      Past Medical History:  Bilateral reflux nephropathy  CKD (chronic kidney disease) stage 4, GFR 15-29 ml/min     Hearing loss      Hypertension   Dawood Reece sequence             Prune belly syndrome   Recurrent UTI   Respiratory bronchiolitis interstitial lung disease  Restrictive lung disease            Tracheostomy dependence   Vitamin D  "deficiency      Past Surgical History:  Bilateral ureteral reimplantation, abdominoplasty, mitrofanoff creation  Cystoscopy  Gastrostomy tube                     Herniorrhaphy inguinal bilateral, left ochiopexy  Laryngoscopy, bronchoscopy, combined x5   vesicostomy               Redo right ureteral reimplantation  Replacement of trach    Takedown and revision of mitrofanoff stoma     Family History:   The patient has family history of diabetes (mother).      Social History:   The patient presents here with his mother and aunt. The patient has never smoked. Denies alcohol use.      Physical Exam:  /76   Pulse 97   Temp 97.7  F (36.5  C) (Oral)   Ht 1.549 m (5' 0.98\")   Wt 44.3 kg (97 lb 11.2 oz)   SpO2 95%   BMI 18.47 kg/m     GENERAL APPEARANCE: alert and no distress  EYES: nonicteric  HENT: mouth without ulcers or lesions  NECK: supple, no adenopathy, trach present  RESP: lungs clear to auscultation   CV: regular rhythm, normal rate, no rub  ABDOMEN: soft, nontender, nondistended  : no CVA tenderness   Extremities: no significant edema  MS: no evidence of inflammation in joints, no muscle tenderness  SKIN: no concerning rash  NEURO: mentation intact and speech normal  PSYCH: low mood    Laboratory:  CMP  Recent Labs   Lab Test 20  1311 19  1027 19  1438 18  1056 17  1337  09/17/15  1205 02/05/15  1110    137 144 141 139 137   < > 139 141   POTASSIUM 4.9 4.1 4.1 4.3 4.1 4.4   < > 4.6 4.2   CHLORIDE 106 106 116* 114* 108 108   < > 111* 111*   CO2 25 25 18* 18* 22 21   < > 21 21   ANIONGAP 7 7 10 9 9 8   < > 7 9   GLC 98 91 81 109* 103* 116*   < > 140* 118*   BUN 55 48* 64* 53* 45 44*   < > 50* 40*   CR 4.3 4.31* 3.25* 3.22* 2.9 2.93*   < > 2.57* 2.07*   GFRESTIMATED 17.2 18* 26* 26* 27.6 27*   < > 32* 42*   GFRESTBLACK  --  21* 30* 30*  --  33*   < > 39* 51*   SUSAN 9.3 9.4 8.8 8.4* 8.7 8.8   < > 8.7* 8.7*   MAG  --   --   --   --   --   --   --  2.1 2.0 "   PHOS  --  4.2 4.4 3.6  --  2.9   < > 2.7* 3.5   ALBUMIN  --  3.6 3.2* 3.6 3.4 3.6   < > 3.8 3.5   ALKPHOS  --   --   --   --   --   --   --  80 87    < > = values in this interval not displayed.     CBC  Recent Labs   Lab Test 12/09/19  1311 05/13/19  1027 01/03/19  1438 08/09/18  1056 07/11/17  1337  09/17/15  1205   HGB 12.1* 11.3* 11.6* 12.3* 13.0*   < > 13.0*   WBC 9.2 10.0  --   --  8.8  --  8.7   RBC 4.16* 3.85*  --   --  4.35*  --  4.44   HCT 37.4* 34.9*  --   --  39.0*  --  38.9*   MCV 90 91  --   --  90  --  88   MCH 29.1 29.4  --   --  29.9  --  29.3   MCHC 32.4 32.4  --   --  33.3  --  33.4   RDW 12.8 13.4  --   --  13.2  --  13.5    308  --   --  305  --  326    < > = values in this interval not displayed.     INR  No lab results found.  ABG  No lab results found.   URINE STUDIES  Recent Labs   Lab Test 07/11/17  1341 12/22/16  1328 09/17/15  1410 02/05/15  1115   COLOR Yellow Straw Quantity not sufficient Straw   APPEARANCE Clear Slightly Cloudy Quantity not sufficient Clear   URINEGLC Negative Negative Quantity not sufficient* Negative   URINEBILI Negative Negative Quantity not sufficient* Negative   URINEKETONE Negative Negative Quantity not sufficient* Negative   SG 1.005 1.006 Quantity not sufficient 1.005   UBLD Negative Trace* Quantity not sufficient* Trace*   URINEPH 6.0 6.5 Quantity not sufficient 6.0   PROTEIN 100* 100* Quantity not sufficient* 100*   NITRITE Positive* Negative Quantity not sufficient* Negative   LEUKEST Small* Large* Quantity not sufficient* Large*   RBCU <1 6* Quantity not sufficient 1   WBCU 6* >182* Quantity not sufficient* 15*     Recent Labs   Lab Test 01/03/19  1435 07/11/17  1341 12/22/16  1328 02/05/15  1115   UTPG 2.60* 3.04* 3.05* 5.28*     PTH  Recent Labs   Lab Test 12/09/19  1311 05/13/19  1027 01/03/19  1438 07/11/17  1337 12/22/16  1325 09/17/15  1205 02/05/15  1110 10/03/13  2128 09/20/12  1200   PTHI 331* 195* 177* 191* 98* 133* 303* 141* 74*     IRON  STUDIES   Recent Labs   Lab Test 08/09/18  1056 08/09/18  1046 07/11/17  1337 12/22/16  1325 09/17/15  1205 02/05/15  1110   IRON 178  --  77 135 99 160     --  296 241 297 281   IRONSAT 67  --  26 56* 33 57*   JUVENAL  --  14 16* 56 27 34     Total time spent was >40 minutes, and more than 50% of face to face time was spent in counseling and/or coordination of care regarding principles of multidisciplinary care, medication management, and chronic kidney disease education.  Glen Abel MD

## 2020-07-31 NOTE — TELEPHONE ENCOUNTER
Left message for patient's mother to call re: PKE scheduling. Patient is [place] resident so will need in person appointments, requested a call back to schedule and informed her we are holding in person PKE on Thursdays

## 2020-08-05 NOTE — TELEPHONE ENCOUNTER
Spoke to patient's mother re: save the date PKE for Wed 8/12. Informed her that we will need to schedule in person as the patient resides in [place]. She would like to reschedule to 9/10/20.  Informed her I will update transplant coordinator and we will be sending her information about appts and education closer to the scheduled eval date.

## 2020-08-31 ENCOUNTER — TRANSFERRED RECORDS (OUTPATIENT)
Dept: HEALTH INFORMATION MANAGEMENT | Facility: CLINIC | Age: 25
End: 2020-08-31

## 2020-09-08 ENCOUNTER — TELEPHONE (OUTPATIENT)
Dept: TRANSPLANT | Facility: CLINIC | Age: 25
End: 2020-09-08

## 2020-09-08 NOTE — TELEPHONE ENCOUNTER
Patient Call: General    Reason for call: Valerie was told they have an appointment this Thurs for kidney eval but she just realized she hasn't rec'd any appt info or confirmation. Valerie is wondering if that's still going on or if it was rescheduled again. Thank you.    Call back needed? Yes    Return Call Needed  Same as documented in contacts section  When to return call?: Same day: Route High Priority

## 2020-09-08 NOTE — TELEPHONE ENCOUNTER
I returned mom's call around 11:15 AM . She stated she already heard from Kamryn and re-scheduled to 10/15/20. She stated she did receive the maroon colored Pre Transplant Education packet in the mail. She will read through the content . I also asked her to wait to sign the forms until after her discussion with the surgeon at E on 10/15/20 and then we will discuss this further and have her sign this. I went over again how to get into MTP modules and to contact me once she has completed these so I can review some information with her. I also stated that closer to 10/15/20 PKE she will get the PKE schedule and instructions. Mom also will send in the GUS forms. Mom verbalized understanding  of our discussion today and has my contact information.

## 2020-09-22 DIAGNOSIS — N18.30 CKD (CHRONIC KIDNEY DISEASE) STAGE 3, GFR 30-59 ML/MIN (H): ICD-10-CM

## 2020-09-22 RX ORDER — AMLODIPINE BESYLATE 5 MG/1
TABLET ORAL
Qty: 180 TABLET | Refills: 3 | Status: SHIPPED | OUTPATIENT
Start: 2020-09-22 | End: 2021-09-22

## 2020-09-25 ENCOUNTER — TELEPHONE (OUTPATIENT)
Dept: NEPHROLOGY | Facility: CLINIC | Age: 25
End: 2020-09-25

## 2020-09-25 DIAGNOSIS — N18.4 CKD (CHRONIC KIDNEY DISEASE) STAGE 4, GFR 15-29 ML/MIN (H): Primary | ICD-10-CM

## 2020-09-25 NOTE — TELEPHONE ENCOUNTER
[facility] Call Center    Phone Message    May a detailed message be left on voicemail: yes     Reason for Call: Other: Patient's mom Valerie called in to schedule a follow up appointment with  for in person they live in [place]. Please call Valerie back thank you.     Action Taken: Message routed to:  [facility] ([facility] neph    Travel Screening: Not Applicable

## 2020-10-08 NOTE — TELEPHONE ENCOUNTER
10/08/20 10 AM : I contacted the pt's mother to get updated email and explain how to get [employer] set up and mom also given the [employer] Help Desk information. I re explained how to access and complete the 15 Pre Transplant Education MTP modules before 10/15/20 PKE. I asked mom to please let me know when she has completed MTP and I then will answer any question she may have and further review some information . Mom confirmed that she did get the 1006.tv class education packet. Mom will bring this to PKE and have staff help her sign the forms at that time. I reviewed the timing and process of PKE day. I stated she will get a map and schedule information before PKE. Mom requesting she also bring her sister with her as she needs help with the pt. , his whch, etc. I did send an email to Bj in regards to this. I also spent some time talking with mom . She just lost her  about 8 months ago- had a BMT here. She sounded like she could use seeing a mental health care provider for herself- depression . She did not appear to be in any immediate crisis.. She stated she did not have any thoughts of hurting herself or others. She stated she just recently started up with a mental health care provider.  She relies on her sister a lot for help. She just stated it is so hard to get out due to Covid and they really have kept very quarantined. Mom and her sister will come with pt to PKE. I also sent an in basket  message to the Social Workers as an FYI before PKE 10/15/20 in regards to things mom stated to me.    Mom states she feels prepared for 10/15/20 PKE . She has my contact information for any further questions or concerns.

## 2020-10-15 ENCOUNTER — ALLIED HEALTH/NURSE VISIT (OUTPATIENT)
Dept: TRANSPLANT | Facility: CLINIC | Age: 25
End: 2020-10-15
Attending: INTERNAL MEDICINE
Payer: MEDICARE

## 2020-10-15 ENCOUNTER — DOCUMENTATION ONLY (OUTPATIENT)
Dept: TRANSPLANT | Facility: CLINIC | Age: 25
End: 2020-10-15

## 2020-10-15 ENCOUNTER — ANCILLARY PROCEDURE (OUTPATIENT)
Dept: GENERAL RADIOLOGY | Facility: CLINIC | Age: 25
End: 2020-10-15
Payer: MEDICARE

## 2020-10-15 VITALS
HEART RATE: 68 BPM | BODY MASS INDEX: 19.71 KG/M2 | OXYGEN SATURATION: 98 % | DIASTOLIC BLOOD PRESSURE: 68 MMHG | HEIGHT: 61 IN | SYSTOLIC BLOOD PRESSURE: 113 MMHG | WEIGHT: 104.4 LBS

## 2020-10-15 DIAGNOSIS — J98.4 DISEASE OF LUNG: ICD-10-CM

## 2020-10-15 DIAGNOSIS — Z76.82 ORGAN TRANSPLANT CANDIDATE: ICD-10-CM

## 2020-10-15 DIAGNOSIS — N18.9 CHRONIC KIDNEY DISEASE: ICD-10-CM

## 2020-10-15 DIAGNOSIS — N18.4 CHRONIC RENAL FAILURE, STAGE 4 (SEVERE) (H): ICD-10-CM

## 2020-10-15 DIAGNOSIS — Z93.0 TRACHEOSTOMY DEPENDENCE (H): ICD-10-CM

## 2020-10-15 DIAGNOSIS — Q87.0 PIERRE ROBIN SEQUENCE: ICD-10-CM

## 2020-10-15 DIAGNOSIS — Z01.818 PRE-TRANSPLANT EVALUATION FOR KIDNEY TRANSPLANT: ICD-10-CM

## 2020-10-15 DIAGNOSIS — Z11.59 ENCOUNTER FOR SCREENING FOR OTHER VIRAL DISEASES: ICD-10-CM

## 2020-10-15 DIAGNOSIS — N18.4 STAGE 4 CHRONIC KIDNEY DISEASE (H): ICD-10-CM

## 2020-10-15 DIAGNOSIS — J98.4 RESTRICTIVE LUNG DISEASE: ICD-10-CM

## 2020-10-15 DIAGNOSIS — N13.722 BILATERAL REFLUX NEPHROPATHY: ICD-10-CM

## 2020-10-15 LAB
ABO + RH BLD: NORMAL
ABO + RH BLD: NORMAL
ALBUMIN SERPL-MCNC: 3.9 G/DL (ref 3.4–5)
ALP SERPL-CCNC: 94 U/L (ref 40–150)
ALT SERPL W P-5'-P-CCNC: 15 U/L (ref 0–70)
ANION GAP SERPL CALCULATED.3IONS-SCNC: 8 MMOL/L (ref 3–14)
APTT PPP: 31 SEC (ref 22–37)
AST SERPL W P-5'-P-CCNC: 10 U/L (ref 0–45)
BASOPHILS # BLD AUTO: 0.1 10E9/L (ref 0–0.2)
BASOPHILS NFR BLD AUTO: 1 %
BILIRUB SERPL-MCNC: 0.2 MG/DL (ref 0.2–1.3)
BLOOD BANK CMNT PATIENT-IMP: NORMAL
BLOOD BANK CMNT PATIENT-IMP: NORMAL
BUN SERPL-MCNC: 73 MG/DL (ref 7–30)
CALCIUM SERPL-MCNC: 9.6 MG/DL (ref 8.5–10.1)
CARDIOLIPIN ANTIBODY IGG: <1.6 GPL-U/ML (ref 0–19.9)
CARDIOLIPIN ANTIBODY IGM: 0.8 MPL-U/ML (ref 0–19.9)
CHLORIDE SERPL-SCNC: 109 MMOL/L (ref 94–109)
CMV IGG SERPL QL IA: <0.2 AI (ref 0–0.8)
CO2 SERPL-SCNC: 21 MMOL/L (ref 20–32)
CREAT SERPL-MCNC: 5.1 MG/DL (ref 0.66–1.25)
DIFFERENTIAL METHOD BLD: ABNORMAL
EBV VCA IGG SER QL IA: 2 AI (ref 0–0.8)
EOSINOPHIL # BLD AUTO: 1 10E9/L (ref 0–0.7)
EOSINOPHIL NFR BLD AUTO: 10.1 %
ERYTHROCYTE [DISTWIDTH] IN BLOOD BY AUTOMATED COUNT: 12.8 % (ref 10–15)
GFR SERPL CREATININE-BSD FRML MDRD: 15 ML/MIN/{1.73_M2}
GLUCOSE SERPL-MCNC: 94 MG/DL (ref 70–99)
HBV CORE AB SERPL QL IA: NONREACTIVE
HBV SURFACE AB SERPL IA-ACNC: 0.61 M[IU]/ML
HBV SURFACE AG SERPL QL IA: NONREACTIVE
HCT VFR BLD AUTO: 34 % (ref 40–53)
HCV AB SERPL QL IA: NONREACTIVE
HGB BLD-MCNC: 10.8 G/DL (ref 13.3–17.7)
HIV 1+2 AB+HIV1 P24 AG SERPL QL IA: NONREACTIVE
IMM GRANULOCYTES # BLD: 0 10E9/L (ref 0–0.4)
IMM GRANULOCYTES NFR BLD: 0.3 %
INR PPP: 1.03 (ref 0.86–1.14)
LYMPHOCYTES # BLD AUTO: 2 10E9/L (ref 0.8–5.3)
LYMPHOCYTES NFR BLD AUTO: 20.4 %
MCH RBC QN AUTO: 28.6 PG (ref 26.5–33)
MCHC RBC AUTO-ENTMCNC: 31.8 G/DL (ref 31.5–36.5)
MCV RBC AUTO: 90 FL (ref 78–100)
MONOCYTES # BLD AUTO: 0.5 10E9/L (ref 0–1.3)
MONOCYTES NFR BLD AUTO: 4.6 %
NEUTROPHILS # BLD AUTO: 6.2 10E9/L (ref 1.6–8.3)
NEUTROPHILS NFR BLD AUTO: 63.6 %
NRBC # BLD AUTO: 0 10*3/UL
NRBC BLD AUTO-RTO: 0 /100
PLATELET # BLD AUTO: 299 10E9/L (ref 150–450)
POTASSIUM SERPL-SCNC: 4.6 MMOL/L (ref 3.4–5.3)
PROT SERPL-MCNC: 7.4 G/DL (ref 6.8–8.8)
RBC # BLD AUTO: 3.78 10E12/L (ref 4.4–5.9)
SODIUM SERPL-SCNC: 137 MMOL/L (ref 133–144)
SPECIMEN EXP DATE BLD: NORMAL
T PALLIDUM AB SER QL: NONREACTIVE
THROMBIN TIME: 15.7 SEC (ref 13–19)
VZV IGG SER QL IA: 3.5 AI (ref 0–0.8)
WBC # BLD AUTO: 9.8 10E9/L (ref 4–11)

## 2020-10-15 PROCEDURE — 85610 PROTHROMBIN TIME: CPT | Performed by: PATHOLOGY

## 2020-10-15 PROCEDURE — 71046 X-RAY EXAM CHEST 2 VIEWS: CPT | Mod: GC | Performed by: RADIOLOGY

## 2020-10-15 PROCEDURE — 86147 CARDIOLIPIN ANTIBODY EA IG: CPT | Performed by: PATHOLOGY

## 2020-10-15 PROCEDURE — 85730 THROMBOPLASTIN TIME PARTIAL: CPT | Performed by: PATHOLOGY

## 2020-10-15 PROCEDURE — 93010 ELECTROCARDIOGRAM REPORT: CPT | Performed by: INTERNAL MEDICINE

## 2020-10-15 PROCEDURE — 85613 RUSSELL VIPER VENOM DILUTED: CPT | Performed by: PATHOLOGY

## 2020-10-15 PROCEDURE — 85390 FIBRINOLYSINS SCREEN I&R: CPT | Mod: 90 | Performed by: PATHOLOGY

## 2020-10-15 PROCEDURE — 86665 EPSTEIN-BARR CAPSID VCA: CPT | Performed by: PATHOLOGY

## 2020-10-15 PROCEDURE — 36415 COLL VENOUS BLD VENIPUNCTURE: CPT | Performed by: PATHOLOGY

## 2020-10-15 PROCEDURE — 85670 THROMBIN TIME PLASMA: CPT | Performed by: PATHOLOGY

## 2020-10-15 PROCEDURE — 81241 F5 GENE: CPT | Performed by: PATHOLOGY

## 2020-10-15 PROCEDURE — 86704 HEP B CORE ANTIBODY TOTAL: CPT | Performed by: PATHOLOGY

## 2020-10-15 PROCEDURE — 86787 VARICELLA-ZOSTER ANTIBODY: CPT | Performed by: PATHOLOGY

## 2020-10-15 PROCEDURE — 99207 PR NO CHARGE LOS: CPT

## 2020-10-15 PROCEDURE — 85025 COMPLETE CBC W/AUTO DIFF WBC: CPT | Performed by: PATHOLOGY

## 2020-10-15 PROCEDURE — G0452 MOLECULAR PATHOLOGY INTERPR: HCPCS | Mod: 26 | Performed by: PATHOLOGY

## 2020-10-15 PROCEDURE — 80053 COMPREHEN METABOLIC PANEL: CPT | Performed by: PATHOLOGY

## 2020-10-15 PROCEDURE — 86803 HEPATITIS C AB TEST: CPT | Performed by: PATHOLOGY

## 2020-10-15 PROCEDURE — 86905 BLOOD TYPING RBC ANTIGENS: CPT | Performed by: PATHOLOGY

## 2020-10-15 PROCEDURE — 999N001110 HC STATISTIC HIV 1/2 ANTIGEN/ANTIBODY PRETRANSPLANT ONLY: Performed by: PATHOLOGY

## 2020-10-15 PROCEDURE — 86780 TREPONEMA PALLIDUM: CPT | Performed by: PATHOLOGY

## 2020-10-15 PROCEDURE — 99203 OFFICE O/P NEW LOW 30 MIN: CPT

## 2020-10-15 PROCEDURE — 86481 TB AG RESPONSE T-CELL SUSP: CPT | Performed by: PATHOLOGY

## 2020-10-15 PROCEDURE — 86644 CMV ANTIBODY: CPT | Performed by: PATHOLOGY

## 2020-10-15 PROCEDURE — 99204 OFFICE O/P NEW MOD 45 MIN: CPT | Mod: 95 | Performed by: SURGERY

## 2020-10-15 PROCEDURE — 81240 F2 GENE: CPT | Performed by: PATHOLOGY

## 2020-10-15 PROCEDURE — 87340 HEPATITIS B SURFACE AG IA: CPT | Performed by: PATHOLOGY

## 2020-10-15 PROCEDURE — 86886 COOMBS TEST INDIRECT TITER: CPT | Performed by: PATHOLOGY

## 2020-10-15 PROCEDURE — 86706 HEP B SURFACE ANTIBODY: CPT | Performed by: PATHOLOGY

## 2020-10-15 ASSESSMENT — MIFFLIN-ST. JEOR: SCORE: 1338.55

## 2020-10-15 NOTE — PROGRESS NOTES
"Kidney Transplant Referral - 6/25/2020  Aidan Louie and mother /guardian stated they accessed [facility] website at home and pre-transplant modules were viewed;     Content reviewed:    Living Donation and how to access that program    Paired exchange    Kidney Donor Profile Index (KDPI)    Waiting list issues (right to decline without penalty, high PHS risk donors, what to expect when called with an offer)    Hospital experience,  length of stay , need to stay locally post-discharge (2-4 weeks)    Surgical options (with pictures)                             Post-surgery lifting and driving restrictions    Post-transplant routines, frequency of lab work and clinic visits    Need to stay locally post-discharge (2-4 weeks)    Role of Transplant Coordinator    Participants were informed of the benefits of transplant as well as potential risks such as infection, cancer, and death.  The need for total adherence with immunosuppression medications and following transplant regimens was stressed.  The overall evaluation/approval/listing process was reviewed.        Patient's mother/guardian statedpatient was provided with the following documents:  What You Need to Know About a Kidney Transplant  Adult Kidney Transplant - A Guide for Patients  SRTR Data Sheet - Kidney  Brochure - Kidney Allocation  Brochure - Multiple Listing and Waiting Time Transfer  What Every Patient Needs to Know (UNOS)  UNOS Facts and Figures  Finding a Donor  [facility] - Quick Start Guide  KDPI Consent  Receipt of Information form    Aidan Louie/Guardian signed the  Receipt of Information for Organ Transplant Recipient.\" He was provided Nimo Mcdowell's business card and instructed to call with additional questions.      Summary    Team s concerns/comments:   1) Tracheostomy,compromised lung function  2) Hx, reflux nephropathy, neurogenic bladder.   S/P multiple ureteral surgeries.  3) Patient very tired, sleeping " through appointments.       Candidacy category: Yellow    Action/Plan:   1) Repeat PFT's. Anesthesia consult  2) Following with Dr. Castillo, request films of most recent cystogram through [facility].  3) Mother is patient's guardian and was involved in all consultation but unable to assess patient's comprehension of information or independence in care. Input per SW.    Expected Selection Meeting Discussion: 10/21/20

## 2020-10-15 NOTE — PROGRESS NOTES
TRANSPLANT NEPHROLOGY RECIPIENT EVALUATION NOTE    Assessment and Plan:  # Kidney Transplant Evaluation: Patient is a fair candidate overall. Benefits of a living donor transplant were discussed.    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 Current eGFR 15-17 ml/min. He may benefit from a kidney transplant. Followed by [facility] and [facility]. Would like to obtain results of recent UDS and VCUG if possible.     # Cardiac Risk: he has no known history of cardiac disease. Will need EKG and ECHO.    # Complex pulmonary history: see HPI for detail. Will refer to PAC and pulmonary and may need updated PFTs.     # Cognitive Delay: appreciate social work input.    # G-Tube: chronic, placed in setting of poor oral intake and dehydration, but now has good oral intake and G-tube rarely used.     # Multiple abdominal surgeries     # Health Maintenance: Dental: should be updated if not done within the past 6-12 months.    Discussed the risks and benefits of a transplant, including the risk of surgery and immunosuppression medications.  Patient's overall evaluation will be discussed in the Transplant Program's regular meeting with a final recommendation on the patients suitability for transplant to be made at that time.  Patient was seen in conjunction with Dr. Amadou Lai as part of a shared visit.    PHYSICIAN ATTESTATION AND EVALUATION  Patient was seen by myself, Dr. Amadou Lai, in conjunction with Cheyenne Fountain PA-C as part of a shared visit.    I personally reviewed the patient's past medical and surgical history, vital signs, medications and pertinent laboratory results and radiology findings.  Present and past medical history, along with significant physical exam  findings were all reviewed with ABDIAS.    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  Patient reports feeling okay overall with some medical complaints.    Key management decisions made by me and discussed with ABDIAS include the following, with full Assessment and Plan noted above by ABDIAS:  # Kidney Transplant Evaluation: Patient is a fair candidate overall. Benefits of a living donor transplant were discussed.    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  Preferably, a preemptive living donor kidney transplant.    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  Patient is followed closely by Urology.    # Cardiac Risk: No known cardiac disease.  Will obtain cardiac echo and EKG.    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  He is on prn oxygen.  Would recommend updated PFTs.  Also recommend referral to Anesthesia pre-op clinic.    # Cognitive Delay: Patient appears to have very good support at home.  Appreciate  input.    # H/o Malnutrition and Poor Oral Intake, s/p G-Tube Placement: Patient appears to be doing better with this with stable weight and no need for G-tube.    # Multiple Abdominal Surgeries: Transplant Surgery to review and decide on any imaging.    Amadou Lai MD    Evaluation:  Aidan Louie was seen in consultation at the request of Dr. Geneva Demarco for evaluation as a  potential kidney transplant recipient.    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 His mother, Valerie, accompanies Mr. Louie today and provides the majority of the history.    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 Was previously doing intermittent self-catheterization several times per day, but now typically maintains a izaguirre catheter. He does bladder irrigations with normal saline several times per day. Typical UTI symptoms are foul odor/appearance of urine. Only checking for UTI if symptoms as urine is likely colonized. UTI 1-2 times per year currently, previously more before above-mentioned measures instated. Bilateral hydronephrosis on imaging back to at least 2017. UDS (safe bladder pressures with good capacity), per recent urology note. Most recent renal imaging from May 2020 showed echogenic kidneys. Mag3 earlier this year showed 75% uptake by the right kidney and 25% by the left, as well as no drainage from either collecting system. He has had a slow decline in kidney function over the past several years. eGFR 15-17 ml/min over the past several months.        Kidney Disease Dx: Reflux nephropathy       Biopsy Proven: No         On Dialysis: No       Primary Nephrologist: Dr. Abel          Pulmonary: severe scoliosis and kyphosis with previous cervical fusion resulting in severe restrictive process and chronic respiratory failure with hypoxia. History of adenovirus pneumonia as a child requiring prolonged admission and chronic ventilator use for a few years (discontinued at age 8) with subsequent  bronchiolitis and interstitial lung disease. Also has mandibular hypoplasia with chronic tracheostomy (previously considered decannulation, but tracheostomy maintained for airway protection). He uses 1 L oxygen at night. Does not require oxygen during the day. Uses chest vest and duo nebs 3 times daily. Followed by pulmonary at [facility] and ENT at [facility]. PFTs done recently, although not thought to be diagnostic due to issues with tracheostomy. On prophylactic azithromycin 3 days per week and prednisone 1 mg every other day. He has not had any admissions for respiratory issues in several years.         Diabetic Hx: None           Cardiac/Vascular Disease Risk Factors:        - None         Viral Serology Status       CMV IgG Antibody: Negative       EBV IgG Antibody: Positive         Volume Status/Weight:        Volume status: Euvolemic       Weight:  Acceptable BMI       BMI: Body mass index is 19.46 kg/m .         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 He would take the bus independently. He volunteered at the library. Was previously involved in basketball and track via [employer]. Less active now with covid, but can still walk several blocks. No chest pain or SOB.    Fatigue/Decreased Energy: [x] No [] Yes    Chest Pain or SOB with Exertion: [x] No [] Yes    Significant Weight Change: [x] No [] Yes    Nausea, Vomiting or Diarrhea: [x] No [] Yes    Fever, Sweats or Chills:  [x] No [] Yes    Leg Swelling [x] No [] Yes        History of Cancer: None    Review of Systems:  A comprehensive review of systems was obtained and negative, except as noted in the HPI or PMH.    Past Medical History:   Medical record was reviewed and PMH was discussed with patient and noted below.  Past Medical History:   Diagnosis Date     Bilateral reflux nephropathy      CKD (chronic kidney disease) stage 4, GFR 15-29 ml/min (H)      Hearing loss       Hypertension      Kyphosis      Dawood Reece sequence      Prune belly syndrome      Recurrent UTI      Respiratory bronchiolitis interstitial lung disease (H)      Restrictive lung disease      Scoliosis 2002     Thyroid disease      Tracheostomy dependence (H)        Past Social History:   Past Surgical History:   Procedure Laterality Date     Bilateral ureteral reimplantation  2002    abdominoplasty, Mitrofanoff creation     CYSTOSCOPY  12     EXAM UNDER ANESTHESIA THROAT  6/10/2003    tonsillar hypertrophy     EXAM UNDER ANESTHESIA, RESTORATIONS, EXTRACTION(S) DENTAL COMPLEX, COMBINED  2006     GASTROSTOMY TUBE       GASTROSTOMY TUBE  3/16/2002    button change     HERNIORRHAPHY INGUINAL BILATERAL  89    left ochiopexy     LARYNGOSCOPY, BRONCHOSCOPY, COMBINED  2002     LARYNGOSCOPY, BRONCHOSCOPY, COMBINED  2002    bilateral ear debridement     LARYNGOSCOPY, BRONCHOSCOPY, COMBINED  2003     LARYNGOSCOPY, BRONCHOSCOPY, COMBINED  2007    Failed Deflux injection     LARYNGOSCOPY, BRONCHOSCOPY, COMBINED  13      VESICOSTOMY       Redo right ureteral reimplantation  3/12/2004    Excision bladder diverticuli, Left to Right pyelopyelostomy     Replacement of trach  10/15/12     Takedown and revision of Mitrofanoff stoma  2006     TRACHEOSTOMY INFANT       Personal history of bleeding or anesthesia problems: No    Family History:  Family History   Problem Relation Age of Onset     Diabetes Type 2  Mother      Diabetes Type 2  Maternal Grandmother      Diabetes Type 2  Maternal Grandfather      Diabetes Type 2  Paternal Grandmother      Alzheimer Disease Paternal Grandfather      Thyroid Disease Maternal Aunt         hashimoto     Thyroid Cancer Maternal Great-Grandmother        Personal History:   Social History     Socioeconomic History     Marital status: Single     Spouse name: Not on file     Number of children: Not on file     Years of education: Not  on file     Highest education level: Not on file   Occupational History     Not on file   Social Needs     Financial resource strain: Not on file     Food insecurity     Worry: Not on file     Inability: Not on file     Transportation needs     Medical: Not on file     Non-medical: Not on file   Tobacco Use     Smoking status: Never Smoker     Smokeless tobacco: Never Used   Substance and Sexual Activity     Alcohol use: No     Alcohol/week: 0.0 standard drinks     Drug use: No     Sexual activity: Not on file   Lifestyle     Physical activity     Days per week: Not on file     Minutes per session: Not on file     Stress: Not on file   Relationships     Social connections     Talks on phone: Not on file     Gets together: Not on file     Attends Taoist service: Not on file     Active member of club or organization: Not on file     Attends meetings of clubs or organizations: Not on file     Relationship status: Not on file     Intimate partner violence     Fear of current or ex partner: Not on file     Emotionally abused: Not on file     Physically abused: Not on file     Forced sexual activity: Not on file   Other Topics Concern     Parent/sibling w/ CABG, MI or angioplasty before 65F 55M? Not Asked   Social History Narrative     Not on file       Allergies:  Allergies   Allergen Reactions     Latex      Latex      Other reaction(s): Other (see comments)     Tape [Adhesive Tape]        Medications:  Current Outpatient Medications   Medication Sig     albuterol (2.5 MG/3ML) 0.083% nebulizer solution Take 1 ampule by nebulization 4 times daily      amLODIPine (NORVASC) 5 MG tablet TAKE 1 TABLET(5 MG) BY MOUTH TWICE DAILY     azithromycin (ZITHROMAX) 250 MG tablet Take  by mouth. Mon, Wed, Fri     calcium carbonate (OS-SUSAN 500 MG [place]. [place]) 500 MG tablet Take 1 tablet (500 mg) by mouth 3 times daily (with meals)     captopril 0.1 mg/mL (CAPOTEN) 0.1 mg/mL SUSP Take 2.5 mg three times daily (2.5 mg/5ml)      "cholecalciferol 2000 UNITS tablet Take 2,000 Units by mouth daily     cloNIDine (CATAPRES) 0.1 MG tablet Take 1 tablet (0.1 mg) by mouth 2 times daily     famotidine (PEPCID) 20 MG tablet Take 20 mg by mouth 2 times daily     ipratropium (ATROVENT) 0.02 % neb solution Take 0.5 mg by nebulization 4 times daily      Nutritional Supplements (NEPRO) LIQD Take 1 Can by mouth daily     order for DME Equipment being ordered: Medi-Vac, plastic tubing connector lara type, cat 361, [facility], 1 lara tip/month x 12 months. Use for tracheostomy suctioning     order for DME Equipment being ordered: DME    Adult Bivona Uncuffed Tracheostomy Tube-5.0    Patient will do weekly trach changes.     order for DME Equipment being ordered: DME -    Deep suction kits - 12 fr     order for DME Tracheostomy Humidification Equipment  Equipment being ordered:     1) Air compressor  2) Nebulizer bottle  3) Aerosol tubing  4) Trach mask  5) Sterile water  6) Saline ampules (\"bullets\")  7) Heat Moisture Exchanger (HME) Also known by several other terms including: Thermal Humidifying Filters, Swedish nose, Artificial nose, Filter, Thermovent T.  8) Room/home humidifiers     order for DME Optifoam Basic   [id number]     order for DME Equipment being ordered: Humidifier (heated), humidifier attachment, saline lavages, humidivent mask     sodium bicarbonate 650 MG tablet TAKE 1 TABLET BY MOUTH THREE TIMES DAILY     tobramycin, PF, (DAX) 300 MG/5ML nebulizer solution Take 1 ampule by nebulization as needed     amoxicillin-clavulanate (AUGMENTIN) 875-125 MG tablet Take 1 tablet by mouth 2 times daily (Patient not taking: Reported on 12/9/2019)     predniSONE (DELTASONE) 5 MG tablet Take 1 mg by mouth every other day Taking every other day     ranitidine (ZANTAC) 150 MG tablet Take 0.5 tablets (75 mg) by mouth 2 times daily (Patient not taking: Reported on 1/3/2019)     sodium bicarbonate 650 MG tablet TAKE 1 TABLET BY MOUTH " "THREE TIMES DAILY (Patient not taking: Reported on 12/9/2019)     No current facility-administered medications for this visit.        Vitals:  /68   Pulse 68   Ht 1.56 m (5' 1.42\")   Wt 47.4 kg (104 lb 6.4 oz)   SpO2 98%   BMI 19.46 kg/m      Exam:  GENERAL APPEARANCE: alert and no distress  HENT: mouth without ulcers or lesions  LYMPHATICS: no cervical or supraclavicular nodes  RESP: no audible wheeze  CV: regular rhythm, normal rate, no rub, no murmur  EDEMA: no LE edema bilaterally  ABDOMEN: soft, nondistended, nontender, bowel sounds normal  MS: extremities normal - no gross deformities noted, no evidence of inflammation in joints, no muscle tenderness  SKIN: no rash     Results:   Recent Results (from the past 336 hour(s))   EKG 12-lead, tracing only [EKG1]    Collection Time: 10/15/20 11:21 AM   Result Value Ref Range    Interpretation ECG Click View Image link to view waveform and result    Treponema Abs w Reflex to RPR and Titer    Collection Time: 10/15/20 11:38 AM   Result Value Ref Range    Treponema Antibodies Nonreactive NR^Nonreactive   Varicella Zoster Virus Antibody IgG [[id number]]    Collection Time: 10/15/20 11:38 AM   Result Value Ref Range    Varicella Zoster Virus Antibody IgG 3.5 (H) 0.0 - 0.8 AI   HIV Antigen Antibody Combo Pretransplant    Collection Time: 10/15/20 11:38 AM   Result Value Ref Range    HIV Antigen Antibody Combo Pretransplant Nonreactive NR^Nonreactive   Hepatitis C antibody [[id number]]    Collection Time: 10/15/20 11:38 AM   Result Value Ref Range    Hepatitis C Antibody Nonreactive NR^Nonreactive   Hepatitis B surface antigen [[id number]]    Collection Time: 10/15/20 11:38 AM   Result Value Ref Range    Hep B Surface Agn Nonreactive NR^Nonreactive   Hepatitis B Surface Antibody [[id number]]    Collection Time: 10/15/20 11:38 AM   Result Value Ref Range    Hepatitis B Surface Antibody 0.61 <8.00 m[IU]/mL   Hepatitis B core antibody [[id number]]    Collection Time: 10/15/20 11:38 " AM   Result Value Ref Range    Hepatitis B Core Chioma Nonreactive NR^Nonreactive   EBV Capsid Antibody IgG [[id number]]    Collection Time: 10/15/20 11:38 AM   Result Value Ref Range    EBV Capsid Antibody IgG 2.0 (H) 0.0 - 0.8 AI   CMV Antibody IgG [[id number]]    Collection Time: 10/15/20 11:38 AM   Result Value Ref Range    CMV Antibody IgG <0.2 0.0 - 0.8 AI   Thrombin time [YRT801]    Collection Time: 10/15/20 11:38 AM   Result Value Ref Range    Thrombin Time 15.7 13.0 - 19.0 sec   Partial thromboplastin time [LAB56]    Collection Time: 10/15/20 11:38 AM   Result Value Ref Range    PTT 31 22 - 37 sec   INR [[id number]]    Collection Time: 10/15/20 11:38 AM   Result Value Ref Range    INR 1.03 0.86 - 1.14   Lupus Anticoagulant Panel [[id number]]    Collection Time: 10/15/20 11:38 AM   Result Value Ref Range    Lupus Result Negative NEG^Negative   Cardiolipin Chioma IgG and IgM [LAB 6836]    Collection Time: 10/15/20 11:38 AM   Result Value Ref Range    Cardiolipin Antibody IgG <1.6 0.0 - 19.9 GPL-U/mL    Cardiolipin Antibody IgM 0.8 0.0 - 19.9 MPL-U/mL   CBC with platelets differential [[id number]]    Collection Time: 10/15/20 11:38 AM   Result Value Ref Range    WBC 9.8 4.0 - 11.0 10e9/L    RBC Count 3.78 (L) 4.4 - 5.9 10e12/L    Hemoglobin 10.8 (L) 13.3 - 17.7 g/dL    Hematocrit 34.0 (L) 40.0 - 53.0 %    MCV 90 78 - 100 fl    MCH 28.6 26.5 - 33.0 pg    MCHC 31.8 31.5 - 36.5 g/dL    RDW 12.8 10.0 - 15.0 %    Platelet Count 299 150 - 450 10e9/L    Diff Method Automated Method     % Neutrophils 63.6 %    % Lymphocytes 20.4 %    % Monocytes 4.6 %    % Eosinophils 10.1 %    % Basophils 1.0 %    % Immature Granulocytes 0.3 %    Nucleated RBCs 0 0 /100    Absolute Neutrophil 6.2 1.6 - 8.3 10e9/L    Absolute Lymphocytes 2.0 0.8 - 5.3 10e9/L    Absolute Monocytes 0.5 0.0 - 1.3 10e9/L    Absolute Eosinophils 1.0 (H) 0.0 - 0.7 10e9/L    Absolute Basophils 0.1 0.0 - 0.2 10e9/L    Abs Immature Granulocytes 0.0 0 - 0.4 10e9/L    Absolute  Nucleated RBC 0.0    Quantiferon-TB Gold Plus    Collection Time: 10/15/20 11:38 AM    Specimen: Blood   Result Value Ref Range    MTB Quantiferon Result Negative NEG^Negative    TB1 Ag minus Nil 0.01 IU/mL    TB2 Ag minus Nil 0.04 IU/mL    Mitogen minus Nil 9.95 IU/mL    NIL Result 0.05 IU/mL   Comprehensive metabolic panel [LAB17]    Collection Time: 10/15/20 11:38 AM   Result Value Ref Range    Sodium 137 133 - 144 mmol/L    Potassium 4.6 3.4 - 5.3 mmol/L    Chloride 109 94 - 109 mmol/L    Carbon Dioxide 21 20 - 32 mmol/L    Anion Gap 8 3 - 14 mmol/L    Glucose 94 70 - 99 mg/dL    Urea Nitrogen 73 (H) 7 - 30 mg/dL    Creatinine 5.10 (H) 0.66 - 1.25 mg/dL    GFR Estimate 15 (L) >60 mL/min/[1.73_m2]    GFR Estimate If Black 17 (L) >60 mL/min/[1.73_m2]    Calcium 9.6 8.5 - 10.1 mg/dL    Bilirubin Total 0.2 0.2 - 1.3 mg/dL    Albumin 3.9 3.4 - 5.0 g/dL    Protein Total 7.4 6.8 - 8.8 g/dL    Alkaline Phosphatase 94 40 - 150 U/L    ALT 15 0 - 70 U/L    AST 10 0 - 45 U/L   Antibody titer red cell [[id number]]    Collection Time: 10/15/20 11:39 AM   Result Value Ref Range    Antibody Titer       ANTI A1: ,   ANTI B: IGM 32, IGG 32  Patient also has an antibody with no identifiable specific reactivity that may falsely   elevate titer value.     ABO/Rh type and screen    Collection Time: 10/15/20 11:39 AM   Result Value Ref Range    ABO O     RH(D) Pos     Antibody Screen Neg     Test Valid Only At          [facility]    Specimen Expires 10/18/2020    Blood Group A Subtype [[id number]]    Collection Time: 10/15/20 11:52 AM   Result Value Ref Range    Antigen Type Canceled, Test credited     Blood Bank Comment       Patient is not ABO type A or AB. A1 subtyping not applicable. 10.15.20 SD   ABO type [[id number]]    Collection Time: 10/15/20 12:20 PM   Result Value Ref Range    ABO O     RH(D) Pos     Specimen Expires 10/18/2020

## 2020-10-15 NOTE — LETTER
10/15/2020         RE: Aidan Louie  [address] [place] [address]        Dear Colleague,    Thank you for referring your patient, Aidan Louie, to the [facility]. Please see a copy of my visit note below.    Transplant Surgery Consult Note     Medical record number: [id number]  YOB: 1995,   Consult requested  for evaluation of kidney transplant candidacy.    Assessment and Recommendations:Mr. Louie appears to be a fair candidate for kidney transplantation and has a good understanding of the risks and benefits of this approach to the management of renal failure. 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 states he may have had this done in June at [place] by Dr Billy  Will need to obtain films to review      Transplant order: Mr. 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  Recommend he be considered as a candidate for kidney.  Cardiology consult for cardiac risk stratification to be ordered: No  CT abdomen and pelvis without contrast to be ordered for assessment of vascular targets: No  Transplant listing labs ordered to include HLA, ABOx2, Cr, etc.  Dietician consult ordered: Yes  Social work consult ordered: Yes  Imaging reports reviewed:  yes  Radiology images reviewed:no      The majority of our  visit was spent in counselling, discussing the medical and surgical risks of kidney transplantation. We discussed approximate wait time and how that is influenced by issues such as blood type and sensitization (PRA) and access to a living donor. I contrasted potential waiting time for living vs  donor kidneys from  normal (0-85%) or higher (%) kidney donor profile index (KDPI) donors and their associated outcomes. I would not recommend this individual to consider kidneys from high KDPI donors. The reason for this decision is best summarized as: multiple potential living donors. Potential surgical complications of kidney transplantation include bleeding, superficial or deep wound complications (infection, hernia, lymphocele), ureteral anastomotic failure (leak or stenosis), graft thrombosis, need for reoperation and other issues such as cardiac complications, pneumonia, deep venous thrombosis, pulmonary embolism, post transplant diabetes and death. The potential for recurrent disease or need for retransplantation was also addressed. We discussed the possible need for ureteral stent (and subsequent removal), and the utility of protocol biopsy and laboratory studies to evaluate for rejection or recurrent disease. We discussed the risk of graft rejection, our center's average graft and patient survival rates, immunosuppression protocols, as well as the potential opportunity to participate in clinical trials.  We also discussed the average length of stay, recovery process, and posttransplant lab and monitoring protocol.  I emphasized the need for strict immunosuppression medication adherence and the potential for complications of immunosuppression such as skin cancer or lymphoma, as well as a very low but not zero risk of donor-derived disease transmission risks (infection, cancer). Mr. Louie asked good questions and his candidacy will be reviewed at our Multidisciplinary Selection Committee. Thank you for  the opportunity to participate in Mr. Louie's care.      Total time: 45 minutes  Counselling time: 25 minutes        ---------------------------------------------------------------------------------------------------    HPI: Mr. 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 The patient is non-diabetic.       The patient is not on dialysis.    Has potential kidney donors:  Yes .  Interested in participation in paired exchange if a donor is willing: Yes     The patient has the following pertinent history:       No    Yes  Dialysis:    []      [] via:       Blood Transfusion                  []      []  Number of units:   Most recently:  Pregnancy:    []      [] Number:       Previous Transplant:  []      [] Details:    Cancer    []      [] Comment:   Kidney stones   []      [] Comment:      Recurrent infections  []      []  Type:                  Bladder dysfunction  []      [] Cause:    Claudication   []      [] Distance:    Previous Amputation  []      [] Cause:     Chronic anticoagulation  []      [] Indication:   Advent  []      []      Past Medical History:   Diagnosis Date     Bilateral reflux nephropathy      CKD (chronic kidney disease) stage 4, GFR 15-29 ml/min (H)      Hearing loss      Hypertension      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      Prune belly syndrome      Recurrent UTI      Respiratory bronchiolitis interstitial lung disease (H)      Restrictive lung disease      Scoliosis 2002     Thyroid disease      Tracheostomy dependence (H)      Past Surgical History:   Procedure Laterality Date     Bilateral ureteral reimplantation  5/16/2002    abdominoplasty, Mitrofanoff creation     CYSTOSCOPY  11/14/11     EXAM UNDER ANESTHESIA THROAT  6/10/2003    tonsillar hypertrophy     EXAM UNDER ANESTHESIA, RESTORATIONS, EXTRACTION(S) DENTAL COMPLEX, COMBINED  6/5/2006     GASTROSTOMY TUBE       GASTROSTOMY TUBE  3/16/2002    button change     HERNIORRHAPHY INGUINAL BILATERAL   99    left ochiopexy     LARYNGOSCOPY, BRONCHOSCOPY, COMBINED  2002     LARYNGOSCOPY, BRONCHOSCOPY, COMBINED  2002    bilateral ear debridement     LARYNGOSCOPY, BRONCHOSCOPY, COMBINED  2003     LARYNGOSCOPY, BRONCHOSCOPY, COMBINED  2007    Failed Deflux injection     LARYNGOSCOPY, BRONCHOSCOPY, COMBINED  12      VESICOSTOMY       Redo right ureteral reimplantation  3/12/2004    Excision bladder diverticuli, Left to Right pyelopyelostomy     Replacement of trach  10/15/12     Takedown and revision of Mitrofanoff stoma  2006     TRACHEOSTOMY INFANT       Family History   Problem Relation Age of Onset     Diabetes Type 2  Mother      Diabetes Type 2  Maternal Grandmother      Diabetes Type 2  Maternal Grandfather      Diabetes Type 2  Paternal Grandmother      Alzheimer Disease Paternal Grandfather      Thyroid Disease Maternal Aunt         hashimoto     Thyroid Cancer Maternal Great-Grandmother      Social History     Socioeconomic History     Marital status: Single     Spouse name: Not on file     Number of children: Not on file     Years of education: Not on file     Highest education level: Not on file   Occupational History     Not on file   Social Needs     Financial resource strain: Not on file     Food insecurity     Worry: Not on file     Inability: Not on file     Transportation needs     Medical: Not on file     Non-medical: Not on file   Tobacco Use     Smoking status: Never Smoker     Smokeless tobacco: Never Used   Substance and Sexual Activity     Alcohol use: No     Alcohol/week: 0.0 standard drinks     Drug use: No     Sexual activity: Not on file   Lifestyle     Physical activity     Days per week: Not on file     Minutes per session: Not on file     Stress: Not on file   Relationships     Social connections     Talks on phone: Not on file     Gets together: Not on file     Attends Rastafarian service: Not on file     Active member of club or organization: Not  on file     Attends meetings of clubs or organizations: Not on file     Relationship status: Not on file     Intimate partner violence     Fear of current or ex partner: Not on file     Emotionally abused: Not on file     Physically abused: Not on file     Forced sexual activity: Not on file   Other Topics Concern     Parent/sibling w/ CABG, MI or angioplasty before 65F 55M? Not Asked   Social History Narrative     Not on file       ROS:   CONSTITUTIONAL:  No fevers or chills  EYES: negative for icterus  ENT:  negative for hearing loss, tinnitus and sore throat  RESPIRATORY:  negative for cough, sputum, dyspnea  CARDIOVASCULAR:  negative for chest pain Fatigue  GASTROINTESTINAL:  negative for nausea, vomiting, diarrhea or constipation  GENITOURINARY:  negative for incontinence, dysuria, bladder emptying problems  HEME:  No easy bruising  INTEGUMENT:  negative for rash and pruritus  NEURO:  Negative for headache, seizure disorder  Allergies:   Allergies   Allergen Reactions     Latex      Latex      Other reaction(s): Other (see comments)     Tape [Adhesive Tape]      Medications:  Prescription Medications as of 10/15/2020       Rx Number Disp Refills Start End Last Dispensed Date Next Fill Date Owning Pharmacy    albuterol (2.5 MG/3ML) 0.083% nebulizer solution            Sig: Take 1 ampule by nebulization 4 times daily     Class: Historical    Route: Nebulization    amLODIPine (NORVASC) 5 MG tablet  180 tablet 3 9/22/2020    [id number] - [place] - [address] AT [facility] & [address] [facility] [address]    Sig: TAKE 1 TABLET(5 MG) BY MOUTH TWICE DAILY    Class: E-Prescribe    azithromycin (ZITHROMAX) 250 MG tablet            Sig: Take  by mouth. Mon, Wed, Fri    Class: Historical    Route: Oral    calcium carbonate (OS-SUSAN 500 MG [place]. CA) 500 MG tablet  90 tablet 6 11/7/2016    [facility] - [place] - [address]    Sig: Take 1 tablet (500 mg) by mouth 3 times daily  (with meals)    Class: E-Prescribe    Route: Oral    captopril 0.1 mg/mL (CAPOTEN) 0.1 mg/mL SUSP  460 mL 11 6/3/2020    [facility] #[id number] - [place], [place] - [address] AT [facility] & [facility]    Sig: Take 2.5 mg three times daily (2.5 mg/5ml)    Class: E-Prescribe    cholecalciferol 2000 UNITS tablet  30 tablet 11 9/14/2017    [facility] [address]    Sig: Take 2,000 Units by mouth daily    Class: E-Prescribe    Route: Oral    cloNIDine (CATAPRES) 0.1 MG tablet  180 tablet 11 12/9/2019    [facility] #[id number] - [place], [place] - [address] AT [place] [address]    Sig: Take 1 tablet (0.1 mg) by mouth 2 times daily    Class: E-Prescribe    Route: Oral    famotidine (PEPCID) 20 MG tablet        [facility] #[id number] - [place], [place] - [address] AT [place] [address]    Sig: Take 20 mg by mouth 2 times daily    Class: Historical    Route: Oral    ipratropium (ATROVENT) 0.02 % neb solution  225 mL  11/20/2017    [facility] [address]    Sig: Take 0.5 mg by nebulization 4 times daily     Class: Historical    Route: Nebulization    Nutritional Supplements (NEPRO) LIQD  30 each 11 6/29/2020    [facility] #[id number] - [place], [place] - [address] AT [facility] [address]    Sig: Take 1 Can by mouth daily    Class: E-Prescribe    Route: Oral    order for DME  1 each 11 10/7/2019        Sig: Equipment being ordered: Medi-Vac, plastic tubing connector lara type, cat 361, Plaid inc, 1 lara tip/month x 12 months. Use for tracheostomy suctioning    Class: Local Print    order for DME  4 Units 11 10/7/2019        Sig: Equipment being ordered: DME    Adult Bivona Uncuffed Tracheostomy Tube-5.0    Patient will do weekly trach changes.    Class: Local Print    order for DME  90 each 3 9/23/2019        Sig: Equipment being  "ordered: DME -    Deep suction kits - 12 fr    Class: Local Print    order for DME  1 each 11 8/28/2019        Sig: Tracheostomy Humidification Equipment  Equipment being ordered:     1) Air compressor  2) Nebulizer bottle  3) Aerosol tubing  4) Trach mask  5) Sterile water  6) Saline ampules (\"bullets\")  7) Heat Moisture Exchanger (HME) Also known by several other terms including: Thermal Humidifying Filters, Swedish nose, Artificial nose, Filter, Thermovent T.  8) Room/home humidifiers    Class: Local Print    order for DME  30 each 11 8/28/2019        Sig: Optifoam Basic   [id number]    Class: Local Print    order for DME  1 each 3 6/10/2019    [facility] #[id number] - [place] [employer] [address] AT [place] [address]    Sig: Equipment being ordered: Humidifier (heated), humidifier attachment, saline lavages, humidivent mask    Class: Local Print    sodium bicarbonate 650 MG tablet  270 tablet 3 5/18/2020    [facility] #[id number] - [place], [place] - [address] AT [place] [address]    Sig: TAKE 1 TABLET BY MOUTH THREE TIMES DAILY    Class: E-Prescribe    tobramycin, PF, (DAX) 300 MG/5ML nebulizer solution            Sig: Take 1 ampule by nebulization as needed    Class: Historical    Route: Nebulization    amoxicillin-clavulanate (AUGMENTIN) 875-125 MG tablet  20 tablet 0 9/9/2019    [facility] #[id number] - [place] - [address] AT [place] [address]    Sig: Take 1 tablet by mouth 2 times daily    Class: E-Prescribe    Route: Oral    predniSONE (DELTASONE) 5 MG tablet   6 12/18/2018    [facility] #[id number] - [place] [employer] [address] AT [place] [address]    Sig: Take 1 mg by mouth every other day Taking every other day    Class: Historical    Route: Oral    ranitidine (ZANTAC) 150 MG tablet  90 tablet 3 3/5/2018    [facility] #[id number] - [place], [place] - [address] AT [facility] [address]    Sig: Take 0.5 " tablets (75 mg) by mouth 2 times daily    Class: E-Prescribe    Route: Oral    sodium bicarbonate 650 MG tablet  270 tablet 0 5/23/2019    [facility] #[id number] - [place], [place] - [address] AT [facility] [address]    Sig: TAKE 1 TABLET BY MOUTH THREE TIMES DAILY    Class: E-Prescribe        Exam:     There were no vitals taken for this visit.  Appearance: in no apparent distress.   Skin: normal  Eyes:  no redness or discharge.  Sclera anicteric  Head and Neck: Normal, no rashes or jaundice  Respiratory: easy respirations, no audible wheezing.  Abdomen: flat, Surgical scars consistent with history     Psychiatric: Normal mood and affect    Diagnostics:   No results found for this or any previous visit (from the past 672 hour(s)).  No results found for: CPRA      Again, thank you for allowing me to participate in the care of your patient.        Sincerely,        CHRIS

## 2020-10-15 NOTE — PROGRESS NOTES
Transplant Surgery Consult Note     Medical record number: [id number]  YOB: 1995,   Consult requested  for evaluation of kidney transplant candidacy.    Assessment and Recommendations:Mr. Louie appears to be a fair candidate for kidney transplantation and has a good understanding of the risks and benefits of this approach to the management of renal failure. 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  Recommend he be considered as a candidate for kidney.  Cardiology consult for cardiac risk stratification to be ordered: No  CT abdomen and pelvis without contrast to be ordered for assessment of vascular targets: No  Transplant listing labs ordered to include HLA, ABOx2, Cr, etc.  Dietician consult ordered: Yes  Social work consult ordered: Yes  Imaging reports reviewed:  yes  Radiology images reviewed:no      The majority of our visit was spent in counselling, discussing the medical and surgical risks of kidney transplantation. We discussed approximate wait time and how that is influenced by issues such as blood type and sensitization (PRA) and access to a living donor. I  contrasted potential waiting time for living vs  donor kidneys from  normal (0-85%) or higher (%) kidney donor profile index (KDPI) donors and their associated outcomes. I would not recommend this individual to consider kidneys from high KDPI donors. The reason for this decision is best summarized as: multiple potential living donors. Potential surgical complications of kidney transplantation include bleeding, superficial or deep wound complications (infection, hernia, lymphocele), ureteral anastomotic failure (leak or stenosis), graft thrombosis, need for reoperation and other issues such as cardiac complications, pneumonia, deep venous thrombosis, pulmonary embolism, post transplant diabetes and death. The potential for recurrent disease or need for retransplantation was also addressed. We discussed the possible need for ureteral stent (and subsequent removal), and the utility of protocol biopsy and laboratory studies to evaluate for rejection or recurrent disease. We discussed the risk of graft rejection, our center's average graft and patient survival rates, immunosuppression protocols, as well as the potential opportunity to participate in clinical trials.  We also discussed the average length of stay, recovery process, and posttransplant lab and monitoring protocol.  I emphasized the need for strict immunosuppression medication adherence and the potential for complications of immunosuppression such as skin cancer or lymphoma, as well as a very low but not zero risk of donor-derived disease transmission risks (infection, cancer). Mr. Louie asked good questions and his candidacy will be reviewed at our Multidisciplinary Selection Committee. Thank you for the opportunity to participate in Mr. Louie's care.      Total time: 45 minutes  Counselling time: 25 minutes        ---------------------------------------------------------------------------------------------------    HPI: Mr. 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 The patient is non-diabetic.       The patient is not on dialysis.    Has potential kidney donors:  Yes .  Interested in participation in paired exchange if a donor is willing: Yes     The patient has the following pertinent history:       No    Yes  Dialysis:    []      [] via:       Blood Transfusion                  []      []  Number of units:   Most recently:  Pregnancy:    []      [] Number:       Previous Transplant:  []      [] Details:    Cancer    []      [] Comment:   Kidney stones   []      [] Comment:      Recurrent infections  []      []  Type:                  Bladder dysfunction  []      [] Cause:    Claudication   []      [] Distance:    Previous Amputation  []      [] Cause:     Chronic anticoagulation  []      [] Indication:   Worship  []      []      Past Medical History:   Diagnosis Date     Bilateral reflux nephropathy      CKD (chronic kidney disease) stage 4, GFR 15-29 ml/min (H)      Hearing loss      Hypertension      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      Prune belly syndrome      Recurrent UTI      Respiratory bronchiolitis interstitial lung disease (H)      Restrictive lung disease      Scoliosis 2002     Thyroid disease      Tracheostomy dependence (H)      Past Surgical History:   Procedure Laterality Date     Bilateral ureteral reimplantation  5/16/2002    abdominoplasty, Mitrofanoff creation     CYSTOSCOPY  11/14/11     EXAM UNDER ANESTHESIA THROAT  6/10/2003    tonsillar hypertrophy     EXAM UNDER ANESTHESIA, RESTORATIONS, EXTRACTION(S) DENTAL COMPLEX, COMBINED  6/5/2006     GASTROSTOMY TUBE       GASTROSTOMY TUBE  3/16/2002    button change     HERNIORRHAPHY INGUINAL BILATERAL  7/23/99    left ochiopexy     LARYNGOSCOPY, BRONCHOSCOPY, COMBINED  6/21/2002     LARYNGOSCOPY, BRONCHOSCOPY, COMBINED  12/12/2002    bilateral ear debridement     LARYNGOSCOPY, BRONCHOSCOPY, COMBINED  5/28/2003     LARYNGOSCOPY, BRONCHOSCOPY,  COMBINED  2007    Failed Deflux injection     LARYNGOSCOPY, BRONCHOSCOPY, COMBINED  12      VESICOSTOMY       Redo right ureteral reimplantation  3/12/2004    Excision bladder diverticuli, Left to Right pyelopyelostomy     Replacement of trach  10/15/12     Takedown and revision of Mitrofanoff stoma  2006     TRACHEOSTOMY INFANT       Family History   Problem Relation Age of Onset     Diabetes Type 2  Mother      Diabetes Type 2  Maternal Grandmother      Diabetes Type 2  Maternal Grandfather      Diabetes Type 2  Paternal Grandmother      Alzheimer Disease Paternal Grandfather      Thyroid Disease Maternal Aunt         hashimoto     Thyroid Cancer Maternal Great-Grandmother      Social History     Socioeconomic History     Marital status: Single     Spouse name: Not on file     Number of children: Not on file     Years of education: Not on file     Highest education level: Not on file   Occupational History     Not on file   Social Needs     Financial resource strain: Not on file     Food insecurity     Worry: Not on file     Inability: Not on file     Transportation needs     Medical: Not on file     Non-medical: Not on file   Tobacco Use     Smoking status: Never Smoker     Smokeless tobacco: Never Used   Substance and Sexual Activity     Alcohol use: No     Alcohol/week: 0.0 standard drinks     Drug use: No     Sexual activity: Not on file   Lifestyle     Physical activity     Days per week: Not on file     Minutes per session: Not on file     Stress: Not on file   Relationships     Social connections     Talks on phone: Not on file     Gets together: Not on file     Attends [employer] service: Not on file     Active member of club or organization: Not on file     Attends meetings of clubs or organizations: Not on file     Relationship status: Not on file     Intimate partner violence     Fear of current or ex partner: Not on file     Emotionally abused: Not on file     Physically abused:  Not on file     Forced sexual activity: Not on file   Other Topics Concern     Parent/sibling w/ CABG, MI or angioplasty before 65F 55M? Not Asked   Social History Narrative     Not on file       ROS:   CONSTITUTIONAL:  No fevers or chills  EYES: negative for icterus  ENT:  negative for hearing loss, tinnitus and sore throat  RESPIRATORY:  negative for cough, sputum, dyspnea  CARDIOVASCULAR:  negative for chest pain Fatigue  GASTROINTESTINAL:  negative for nausea, vomiting, diarrhea or constipation  GENITOURINARY:  negative for incontinence, dysuria, bladder emptying problems  HEME:  No easy bruising  INTEGUMENT:  negative for rash and pruritus  NEURO:  Negative for headache, seizure disorder  Allergies:   Allergies   Allergen Reactions     Latex      Latex      Other reaction(s): Other (see comments)     Tape [Adhesive Tape]      Medications:  Prescription Medications as of 10/15/2020       Rx Number Disp Refills Start End Last Dispensed Date Next Fill Date Owning Pharmacy    albuterol (2.5 MG/3ML) 0.083% nebulizer solution            Sig: Take 1 ampule by nebulization 4 times daily     Class: Historical    Route: Nebulization    amLODIPine (NORVASC) 5 MG tablet  180 tablet 3 9/22/2020    [facility] #[id number] - [place], [place] - [address] AT [facility] [address]    Sig: TAKE 1 TABLET(5 MG) BY MOUTH TWICE DAILY    Class: E-Prescribe    azithromycin (ZITHROMAX) 250 MG tablet            Sig: Take  by mouth. Mon, Wed, Fri    Class: Historical    Route: Oral    calcium carbonate (OS-SUSAN 500 MG [place]. CA) 500 MG tablet  90 tablet 6 11/7/2016    [facility] [place] - [address] [facility]    Sig: Take 1 tablet (500 mg) by mouth 3 times daily (with meals)    Class: E-Prescribe    Route: Oral    captopril 0.1 mg/mL (CAPOTEN) 0.1 mg/mL SUSP  460 mL 11 6/3/2020    [facility] #[id number] - [place] - [address] AT [facility]    Sig: Take  2.5 mg three times daily (2.5 mg/5ml)    Class: E-Prescribe    cholecalciferol 2000 UNITS tablet  30 tablet 11 9/14/2017    [facility] [address]    Sig: Take 2,000 Units by mouth daily    Class: E-Prescribe    Route: Oral    cloNIDine (CATAPRES) 0.1 MG tablet  180 tablet 11 12/9/2019    [id number] [address] AT [place] [address]    Sig: Take 1 tablet (0.1 mg) by mouth 2 times daily    Class: E-Prescribe    Route: Oral    famotidine (PEPCID) 20 MG tablet        [facility] #[id number] - [place], [place] [address] AT [place] [address]    Sig: Take 20 mg by mouth 2 times daily    Class: Historical    Route: Oral    ipratropium (ATROVENT) 0.02 % neb solution  225 mL  11/20/2017    [facility] [address]    Sig: Take 0.5 mg by nebulization 4 times daily     Class: Historical    Route: Nebulization    Nutritional Supplements (NEPRO) LIQD  30 each 11 6/29/2020    [facility] #[id number] - [place], [place] [address] AT [place] [address]    Sig: Take 1 Can by mouth daily    Class: E-Prescribe    Route: Oral    order for DME  1 each 11 10/7/2019        Sig: Equipment being ordered: Medi-Vac, plastic tubing connector lara type, cat 361, Veebow, 1 lara tip/month x 12 months. Use for tracheostomy suctioning    Class: Local Print    order for DME  4 Units 11 10/7/2019        Sig: Equipment being ordered: DME    Adult Bivona Uncuffed Tracheostomy Tube-5.0    Patient will do weekly trach changes.    Class: Local Print    order for DME  90 each 3 9/23/2019        Sig: Equipment being ordered: DME -    Deep suction kits - 12 fr    Class: Local Print    order for DME  1 each 11 8/28/2019        Sig: Tracheostomy Humidification Equipment  Equipment being ordered:     1) Air compressor  2) Nebulizer bottle  3) Aerosol  "tubing  4) Trach mask  5) Sterile water  6) Saline ampules (\"bullets\")  7) Heat Moisture Exchanger (HME) Also known by several other terms including: Thermal Humidifying Filters, Swedish nose, Artificial nose, Filter, Thermovent T.  8) Room/home humidifiers    Class: Local Print    order for DME  30 each 11 8/28/2019        Sig: Optifoam Basic   [id number]    Class: Local Print    order for DME  1 each 3 6/10/2019    [facility] #[id number] - [place], [place] - [address] AT [place] [address]    Sig: Equipment being ordered: Humidifier (heated), humidifier attachment, saline lavages, humidivent mask    Class: Local Print    sodium bicarbonate 650 MG tablet  270 tablet 3 5/18/2020    [id number] - [place] - [address] AT [place] [address]    Sig: TAKE 1 TABLET BY MOUTH THREE TIMES DAILY    Class: E-Prescribe    tobramycin, PF, (DAX) 300 MG/5ML nebulizer solution            Sig: Take 1 ampule by nebulization as needed    Class: Historical    Route: Nebulization    amoxicillin-clavulanate (AUGMENTIN) 875-125 MG tablet  20 tablet 0 9/9/2019    [facility] #[id number] - [place], [place] - [address] AT [place] [address]    Sig: Take 1 tablet by mouth 2 times daily    Class: E-Prescribe    Route: Oral    predniSONE (DELTASONE) 5 MG tablet   6 12/18/2018    [facility] #[id number] - [place], [place] - [address] AT [place] [address]    Sig: Take 1 mg by mouth every other day Taking every other day    Class: Historical    Route: Oral    ranitidine (ZANTAC) 150 MG tablet  90 tablet 3 3/5/2018    [facility] #[id number] - [place], [place] - [address] AT [place] [address]    Sig: Take 0.5 tablets (75 mg) by mouth 2 times daily    Class: E-Prescribe    Route: Oral    sodium bicarbonate 650 MG tablet  270 tablet 0 5/23/2019    [facility] #[id number] - [place] - [address] AT [facility]    " Sig: TAKE 1 TABLET BY MOUTH THREE TIMES DAILY    Class: E-Prescribe        Exam:     There were no vitals taken for this visit.  Appearance: in no apparent distress.   Skin: normal  Eyes:  no redness or discharge.  Sclera anicteric  Head and Neck: Normal, no rashes or jaundice  Respiratory: easy respirations, no audible wheezing.  Abdomen: flat, Surgical scars consistent with history     Psychiatric: Normal mood and affect    Diagnostics:   No results found for this or any previous visit (from the past 672 hour(s)).  No results found for: CPRA

## 2020-10-15 NOTE — PROGRESS NOTES
[facility] Solid Organ Transplant  Outpatient MNT: Kidney Transplant Evaluation    Current BMI: 19.74 (HT 61 in,  lbs/47.4 kg)  BMI is within recommendation of <35 for kidney transplant    Frailty Test not completed        Time Spent: 15 minutes  Visit Type: Initial  Referring Physician: Page  Pt accompanied by: Mother and aunt    History of previous txp: No  Dialysis: No    Nutrition Assessment  Information obtained from mother.  Pt lives at home with mother.  Pt prepares his own food of a regular diet TID. Majority of foods are pre made freezer items or shelf stable items    Appetite: Good    Vitamins, Supplements, Pertinent Meds: Calcium, Vitamin D, Melatonin  Herbal Medicines/Supplements: None    Weight hx: Pt's mother recalls pt weight of 110# a few months ago.  Current wt 104#, so some wt loss noted.     Edema: None    Diet Recall  Breakfast Croissandwich, waffles, cinnamon toast sticks, eggs, oatmeal with apples   Lunch [place] (PB&J), ramen, or TV dinner   Dinner Chicken pot pie   Snacks Microwave popcorn   Beverages Large glass of milk with all meals, water, some juice   Alcohol No   Dining out 2-3x/month     Physical Activity  None     Labs  No current labs available.      Nutrition Diagnosis  Excessive Na+ intake r/t food and nutrition related knowledge deficit AEB diet recall reveals high Na+ foods (mostly processed foods).    Nutrition Intervention  Nutrition education provided:  Discussed sodium intake (low sodium foods and drinks, seasoning food without salt and tips for low sodium diet).    Reviewed post txp diet guidelines in brief (will review in further detail post txp):  (1) Review of proper food safety measures d/t immunosuppressant therapy post-op and increased risk for food-borne illness    (2) Avoid the following post txp d/t risk for rejection, unknown effects on the organs, and/or potential interactions with immunosuppressants:  - Herbal, Chinese, holistic, chiropractic,  natural, alternative medicines and supplements  - Detoxes and cleanses  - Weight loss pills  - Protein powders or other products with extracts or herbs (ie green tea extract)    (3) Med regimen and possible side effects    Patient Understanding: Pt did not verbalize understanding of education provided. Pt was sleeping during entire encounter.  Mother apologized for him stating they had a full day of appointments yesterday, didn't get to bed until midnight, then needed to be up at 5 am this morning.  Attempted to have mother wake pt for frailty test, but pt declined participating.     Expected Engagement: Unable to assess  Follow-Up Plans: PRN     Nutrition Goals  1. Limit Na+ <2000mg/day  2. Pt to verbalize understanding of 3 aspects of post txp education provided    Provided pt with contact info.   Sydnie Heard RD, LD  [facility] [phone]

## 2020-10-15 NOTE — PROGRESS NOTES
Psychosocial Assessment For Kidney Transplantation  Patient Name/ Age: Aidan Louie 24 year old   Medical Record #: [id number]  Duration of Interview:  30 min  Process:   Face-to-Face Interview                (counseling < 50%)   Present at Appointment: Greg, his mother/guardian (paperwork is in chart, flied under media 1/24/2017), and aunt Valerie          : GERSON Wright LIC Date:  October 15, 2020        Type of transplant: Kidney     Donor type:      Cadaver and relative   Prior Transplants:    No Status of Transplant:           Current Living Situation    Location:   [address] [place] [id number]  With Whom: lives with mother/guardian       Family/ Social Support:    Greg reported he does not have any children. He has one sister, Melissa, who his mother reported is on the Autism Spectrum and lives in a group home. He does not have a relationship with his biological father. His stepfather passed away from cancer within the last year.    available, helpful   Committed Relationship:     Single   Other Supports: Aunt Virginia, extended family, friend Jony   available, helpful       Activities/ Functional Ability    Current Level: ambulatory, hearing impaired (hearing aids), independent with ADL's and cognitively impaired (developmental delay per mother)     Transportation Other: mother, uses bus system       Vocational/Employment/Financial     Employment   disabled   Job Description   Greg is on SSI. 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   Income   SSI   Insurance      At this time, patient can afford medication costs:  Yes  Medicare and MA       Medical Status    Current Mode of Treatment for ESRD None   Complications None       Behavioral    Tobacco Use No Chemical Dependency No   Greg denied tobacco use.  Greg denied any current or history of alcohol, substances use, or chemical dependency treatment.      Psychiatric Impairment No  Greg denied any  depression or anxiety. Greg's mother reported she feels he has some grief issues due to his stepfather passing away. Greg reported there have been times he has been sad but said he is fine. Discussed grief process and mental health therapy if needed.     Reading Ability: Okay  Education Level: High School  Mother reviews paperwork with Greg so he is able to understand better.  Recent Legal History No      Coping Style/Strategies: Plays video games, violin        Ability to Adhere to Complex Medical Regime: Yes     Adherence History: Greg reported he takes his medications as prescribed (he does take them on his own per guardian) and attends his appointments a scheduled.         Education  _X_ Medicare  _X_ Rehabilitation  _X_ Donor issues  _X_ Community resources  _X_ Post discharge housing  _X_ Financial resources  _X_ Medical insurance options  _X_ Psych adjustment  _X_ Family adjustment  _X_ Health Care Directive Yes, On File   Psychosocial Risks of Transplant Reviewed and Discussed:  _X_ Increased stress related to emotional,            family, social, employment or financial           situation  _X_ Effect on work and/or disability benefits  _X_ Effect on future health and life           insurance  _X_ Transplant outcome expectations may           not be met  _X_ Mental Health Risks: anxiety,           depression, PTSD, guilt, grief and           chronic fatigue     Notable Items:   None noted.       Final Evaluation/Assessment   Patient and guardian seemed to process information well. Guardian appeared well informed, motivated and able to follow post transplant requirements. Pt initially was resistant to answering this writer's questions because he was tired but eventually was somewhat engaged. Guardian was engaged. Has adequate income and insurance coverage. Adequate social support. No major contraindications noted for transplant.  At this time guardian appears to understand the risks and benefits of  transplant.      Recommendation  Acceptable    Selection Criteria Met:  Plan for support Yes   Chemical Dependence Yes   Smoking Yes   Mental Health Yes   Adequate Finances Yes    Signature: GERSON Wright [facility]   Title: Clinical

## 2020-10-15 NOTE — LETTER
10/15/2020         RE: Aidan Louie  [address] [place] [address]        Dear Colleague,    Thank you for referring your patient, Aidan Louie, to the [facility]ANSPL[facility]. Please see a copy of my visit note below.    TRANSPLANT NEPHROLOGY RECIPIENT EVALUATION NOTE    Assessment and Plan:  # Kidney Transplant Evaluation: Patient is a fair candidate overall. Benefits of a living donor transplant were discussed.    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 Current eGFR 15-17 ml/min. He may benefit from a kidney transplant. Followed by Jessie Canales and Page. Would like to obtain results of recent UDS and VCUG if possible.     # Cardiac Risk: he has no known history of cardiac disease. Will need EKG and ECHO.    # Complex pulmonary history: see HPI for detail. Will refer to PAC and pulmonary and may need updated PFTs.     # Cognitive Delay: appreciate social work input.    # G-Tube: chronic, placed in setting of poor oral intake and dehydration, but now has good oral intake and G-tube rarely used.     # Multiple abdominal surgeries     # Health Maintenance: Dental: should be updated if not done within the past 6-12 months.    Discussed the risks and benefits of a transplant, including the risk of surgery and immunosuppression medications.  Patient's overall evaluation will be discussed in the Transplant Program's regular meeting with a final recommendation on the patients suitability for transplant to be made at that time.  Patient was seen in conjunction with Dr. Amadou Lai as part of a shared visit.    PHYSICIAN ATTESTATION AND EVALUATION  Patient was seen by myself, Dr. Amadou Lai, in conjunction with Cheyenne Fountain,  MODESTA as part of a shared visit.    I personally reviewed the patient's past medical and surgical history, vital signs, medications and pertinent laboratory results and radiology findings.  Present and past medical history, along with significant physical exam findings were all reviewed with ABDIAS.    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  Patient reports feeling okay overall with some medical complaints.    Key management decisions made by me and discussed with ABDIAS include the following, with full Assessment and Plan noted above by ABDIAS:  # Kidney Transplant Evaluation: Patient is a fair candidate overall. Benefits of a living donor transplant were discussed.    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  Preferably, a preemptive living donor kidney transplant.    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  Patient is followed closely by Urology.    # Cardiac Risk: No known cardiac disease.  Will obtain cardiac echo and EKG.    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  He is on prn oxygen.  Would recommend updated PFTs.  Also recommend referral to Anesthesia pre-op clinic.    # Cognitive Delay: Patient appears to have very good support at home.  Appreciate  input.    # H/o Malnutrition and Poor Oral Intake, s/p G-Tube Placement: Patient appears to be doing better with this with stable weight and no  need for G-tube.    # Multiple Abdominal Surgeries: Transplant Surgery to review and decide on any imaging.    Amadou Lai MD    Evaluation:  Aidan Louie was seen in consultation at the request of Dr. Geneva Demarco for evaluation as a potential kidney transplant recipient.    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 His mother, Valerie, accompanies Mr. Louie today and provides the majority of the history.    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 Was previously doing intermittent self-catheterization several times per day, but now typically maintains a izaguirre catheter. He does bladder irrigations with normal saline several times per day. Typical UTI symptoms are foul odor/appearance of urine. Only checking for UTI if symptoms as urine is likely colonized. UTI 1-2 times per year currently, previously more before above-mentioned measures instated. Bilateral hydronephrosis on imaging back to at least 2017. UDS (safe bladder pressures with good capacity), per recent urology note. Most recent renal imaging from May 2020 showed echogenic kidneys. Mag3 earlier this year showed 75% uptake by the right kidney and 25% by the left, as well as no drainage from either collecting system. He has had a slow decline in kidney function over the past several years. eGFR 15-17 ml/min over the past several months.        Kidney Disease Dx: Reflux nephropathy       Biopsy Proven: No         On Dialysis: No       Primary Nephrologist: Dr. Abel          Pulmonary: severe scoliosis and kyphosis with previous cervical  fusion resulting in severe restrictive process and chronic respiratory failure with hypoxia. History of adenovirus pneumonia as a child requiring prolonged admission and chronic ventilator use for a few years (discontinued at age 8) with subsequent bronchiolitis and interstitial lung disease. Also has mandibular hypoplasia with chronic tracheostomy (previously considered decannulation, but tracheostomy maintained for airway protection). He uses 1 L oxygen at night. Does not require oxygen during the day. Uses chest vest and duo nebs 3 times daily. Followed by pulmonary at [facility] and ENT at [facility]. PFTs done recently, although not thought to be diagnostic due to issues with tracheostomy. On prophylactic azithromycin 3 days per week and prednisone 1 mg every other day. He has not had any admissions for respiratory issues in several years.         Diabetic Hx: None           Cardiac/Vascular Disease Risk Factors:        - None         Viral Serology Status       CMV IgG Antibody: Negative       EBV IgG Antibody: Positive         Volume Status/Weight:        Volume status: Euvolemic       Weight:  Acceptable BMI       BMI: Body mass index is 19.46 kg/m .         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 He would take the bus independently. He volunteered at the [employer]. Was previously involved in basketball and track via [employer]. Less active now with covid, but can still walk several blocks. No chest pain or SOB.    Fatigue/Decreased Energy: [x] No [] Yes    Chest Pain or SOB with Exertion: [x] No [] Yes    Significant Weight Change: [x] No [] Yes    Nausea, Vomiting or Diarrhea: [x] No [] Yes    Fever, Sweats or Chills:  [x] No [] Yes    Leg Swelling [x] No [] Yes        History of Cancer: None    Review of Systems:  A comprehensive review of systems was obtained and negative, except as noted in the HPI or PMH.    Past Medical  History:   Medical record was reviewed and PM was discussed with patient and noted below.  Past Medical History:   Diagnosis Date     Bilateral reflux nephropathy      CKD (chronic kidney disease) stage 4, GFR 15-29 ml/min (H)      Hearing loss      Hypertension      Kyphosis      Dawood Reece sequence      Prune belly syndrome      Recurrent UTI      Respiratory bronchiolitis interstitial lung disease (H)      Restrictive lung disease      Scoliosis      Thyroid disease      Tracheostomy dependence (H)        Past Social History:   Past Surgical History:   Procedure Laterality Date     Bilateral ureteral reimplantation  2002    abdominoplasty, Mitrofanoff creation     CYSTOSCOPY  12     EXAM UNDER ANESTHESIA THROAT  6/10/2003    tonsillar hypertrophy     EXAM UNDER ANESTHESIA, RESTORATIONS, EXTRACTION(S) DENTAL COMPLEX, COMBINED  2006     GASTROSTOMY TUBE       GASTROSTOMY TUBE  3/16/2002    button change     HERNIORRHAPHY INGUINAL BILATERAL  89    left ochiopexy     LARYNGOSCOPY, BRONCHOSCOPY, COMBINED  2002     LARYNGOSCOPY, BRONCHOSCOPY, COMBINED  2002    bilateral ear debridement     LARYNGOSCOPY, BRONCHOSCOPY, COMBINED  2003     LARYNGOSCOPY, BRONCHOSCOPY, COMBINED  2007    Failed Deflux injection     LARYNGOSCOPY, BRONCHOSCOPY, COMBINED  13      VESICOSTOMY       Redo right ureteral reimplantation  3/12/2004    Excision bladder diverticuli, Left to Right pyelopyelostomy     Replacement of trach  10/15/12     Takedown and revision of Mitrofanoff stoma  2006     TRACHEOSTOMY INFANT       Personal history of bleeding or anesthesia problems: No    Family History:  Family History   Problem Relation Age of Onset     Diabetes Type 2  Mother      Diabetes Type 2  Maternal Grandmother      Diabetes Type 2  Maternal Grandfather      Diabetes Type 2  Paternal Grandmother      Alzheimer Disease Paternal Grandfather      Thyroid Disease Maternal Aunt          hashimoto     Thyroid Cancer Maternal Great-Grandmother        Personal History:   Social History     Socioeconomic History     Marital status: Single     Spouse name: Not on file     Number of children: Not on file     Years of education: Not on file     Highest education level: Not on file   Occupational History     Not on file   Social Needs     Financial resource strain: Not on file     Food insecurity     Worry: Not on file     Inability: Not on file     Transportation needs     Medical: Not on file     Non-medical: Not on file   Tobacco Use     Smoking status: Never Smoker     Smokeless tobacco: Never Used   Substance and Sexual Activity     Alcohol use: No     Alcohol/week: 0.0 standard drinks     Drug use: No     Sexual activity: Not on file   Lifestyle     Physical activity     Days per week: Not on file     Minutes per session: Not on file     Stress: Not on file   Relationships     Social connections     Talks on phone: Not on file     Gets together: Not on file     Attends Yarsani service: Not on file     Active member of club or organization: Not on file     Attends meetings of clubs or organizations: Not on file     Relationship status: Not on file     Intimate partner violence     Fear of current or ex partner: Not on file     Emotionally abused: Not on file     Physically abused: Not on file     Forced sexual activity: Not on file   Other Topics Concern     Parent/sibling w/ CABG, MI or angioplasty before 65F 55M? Not Asked   Social History Narrative     Not on file       Allergies:  Allergies   Allergen Reactions     Latex      Latex      Other reaction(s): Other (see comments)     Tape [Adhesive Tape]        Medications:  Current Outpatient Medications   Medication Sig     albuterol (2.5 MG/3ML) 0.083% nebulizer solution Take 1 ampule by nebulization 4 times daily      amLODIPine (NORVASC) 5 MG tablet TAKE 1 TABLET(5 MG) BY MOUTH TWICE DAILY     azithromycin (ZITHROMAX) 250 MG tablet Take  by  "mouth. Mon, Wed, Fri     calcium carbonate (OS-SUSAN 500 MG [place]. CA) 500 MG tablet Take 1 tablet (500 mg) by mouth 3 times daily (with meals)     captopril 0.1 mg/mL (CAPOTEN) 0.1 mg/mL SUSP Take 2.5 mg three times daily (2.5 mg/5ml)     cholecalciferol 2000 UNITS tablet Take 2,000 Units by mouth daily     cloNIDine (CATAPRES) 0.1 MG tablet Take 1 tablet (0.1 mg) by mouth 2 times daily     famotidine (PEPCID) 20 MG tablet Take 20 mg by mouth 2 times daily     ipratropium (ATROVENT) 0.02 % neb solution Take 0.5 mg by nebulization 4 times daily      Nutritional Supplements (NEPRO) LIQD Take 1 Can by mouth daily     order for DME Equipment being ordered: Medi-Vac, plastic tubing connector lara type, cat 361, SparCode, 1 lara tip/month x 12 months. Use for tracheostomy suctioning     order for DME Equipment being ordered: DME    Adult Bivona Uncuffed Tracheostomy Tube-5.0    Patient will do weekly trach changes.     order for DME Equipment being ordered: DME -    Deep suction kits - 12 fr     order for DME Tracheostomy Humidification Equipment  Equipment being ordered:     1) Air compressor  2) Nebulizer bottle  3) Aerosol tubing  4) Trach mask  5) Sterile water  6) Saline ampules (\"bullets\")  7) Heat Moisture Exchanger (HME) Also known by several other terms including: Thermal Humidifying Filters, Swedish nose, Artificial nose, Filter, Thermovent T.  8) Room/home humidifiers     order for DME Optifoam Basic   [id number]     order for DME Equipment being ordered: Humidifier (heated), humidifier attachment, saline lavages, humidivent mask     sodium bicarbonate 650 MG tablet TAKE 1 TABLET BY MOUTH THREE TIMES DAILY     tobramycin, PF, (DAX) 300 MG/5ML nebulizer solution Take 1 ampule by nebulization as needed     amoxicillin-clavulanate (AUGMENTIN) 875-125 MG tablet Take 1 tablet by mouth 2 times daily (Patient not taking: Reported on 12/9/2019)     predniSONE (DELTASONE) 5 MG tablet Take 1 mg by " "mouth every other day Taking every other day     ranitidine (ZANTAC) 150 MG tablet Take 0.5 tablets (75 mg) by mouth 2 times daily (Patient not taking: Reported on 1/3/2019)     sodium bicarbonate 650 MG tablet TAKE 1 TABLET BY MOUTH THREE TIMES DAILY (Patient not taking: Reported on 12/9/2019)     No current facility-administered medications for this visit.        Vitals:  /68   Pulse 68   Ht 1.56 m (5' 1.42\")   Wt 47.4 kg (104 lb 6.4 oz)   SpO2 98%   BMI 19.46 kg/m      Exam:  GENERAL APPEARANCE: alert and no distress  HENT: mouth without ulcers or lesions  RESP: no audible wheeze  CV: normal rate  EDEMA: no LE edema bilaterally  ABDOMEN: nondistended  MS: extremities normal - no gross deformities noted, no evidence of inflammation in joints, no muscle tenderness  SKIN: no rash    Results:   Recent Results (from the past 336 hour(s))   EKG 12-lead, tracing only [EKG1]    Collection Time: 10/15/20 11:21 AM   Result Value Ref Range    Interpretation ECG Click View Image link to view waveform and result    Treponema Abs w Reflex to RPR and Titer    Collection Time: 10/15/20 11:38 AM   Result Value Ref Range    Treponema Antibodies Nonreactive NR^Nonreactive   Varicella Zoster Virus Antibody IgG [[id number]]    Collection Time: 10/15/20 11:38 AM   Result Value Ref Range    Varicella Zoster Virus Antibody IgG 3.5 (H) 0.0 - 0.8 AI   HIV Antigen Antibody Combo Pretransplant    Collection Time: 10/15/20 11:38 AM   Result Value Ref Range    HIV Antigen Antibody Combo Pretransplant Nonreactive NR^Nonreactive   Hepatitis C antibody [[id number]]    Collection Time: 10/15/20 11:38 AM   Result Value Ref Range    Hepatitis C Antibody Nonreactive NR^Nonreactive   Hepatitis B surface antigen [PHM662]    Collection Time: 10/15/20 11:38 AM   Result Value Ref Range    Hep B Surface Agn Nonreactive NR^Nonreactive   Hepatitis B Surface Antibody [[id number]]    Collection Time: 10/15/20 11:38 AM   Result Value Ref Range    Hepatitis B " Surface Antibody 0.61 <8.00 m[IU]/mL   Hepatitis B core antibody [[id number]]    Collection Time: 10/15/20 11:38 AM   Result Value Ref Range    Hepatitis B Core Chioma Nonreactive NR^Nonreactive   EBV Capsid Antibody IgG [[id number]]    Collection Time: 10/15/20 11:38 AM   Result Value Ref Range    EBV Capsid Antibody IgG 2.0 (H) 0.0 - 0.8 AI   CMV Antibody IgG [[id number]]    Collection Time: 10/15/20 11:38 AM   Result Value Ref Range    CMV Antibody IgG <0.2 0.0 - 0.8 AI   Thrombin time [WRK127]    Collection Time: 10/15/20 11:38 AM   Result Value Ref Range    Thrombin Time 15.7 13.0 - 19.0 sec   Partial thromboplastin time [LAB56]    Collection Time: 10/15/20 11:38 AM   Result Value Ref Range    PTT 31 22 - 37 sec   INR [[id number]]    Collection Time: 10/15/20 11:38 AM   Result Value Ref Range    INR 1.03 0.86 - 1.14   Lupus Anticoagulant Panel [[id number]]    Collection Time: 10/15/20 11:38 AM   Result Value Ref Range    Lupus Result Negative NEG^Negative   Cardiolipin Chioma IgG and IgM [LAB 6836]    Collection Time: 10/15/20 11:38 AM   Result Value Ref Range    Cardiolipin Antibody IgG <1.6 0.0 - 19.9 GPL-U/mL    Cardiolipin Antibody IgM 0.8 0.0 - 19.9 MPL-U/mL   CBC with platelets differential [[id number]]    Collection Time: 10/15/20 11:38 AM   Result Value Ref Range    WBC 9.8 4.0 - 11.0 10e9/L    RBC Count 3.78 (L) 4.4 - 5.9 10e12/L    Hemoglobin 10.8 (L) 13.3 - 17.7 g/dL    Hematocrit 34.0 (L) 40.0 - 53.0 %    MCV 90 78 - 100 fl    MCH 28.6 26.5 - 33.0 pg    MCHC 31.8 31.5 - 36.5 g/dL    RDW 12.8 10.0 - 15.0 %    Platelet Count 299 150 - 450 10e9/L    Diff Method Automated Method     % Neutrophils 63.6 %    % Lymphocytes 20.4 %    % Monocytes 4.6 %    % Eosinophils 10.1 %    % Basophils 1.0 %    % Immature Granulocytes 0.3 %    Nucleated RBCs 0 0 /100    Absolute Neutrophil 6.2 1.6 - 8.3 10e9/L    Absolute Lymphocytes 2.0 0.8 - 5.3 10e9/L    Absolute Monocytes 0.5 0.0 - 1.3 10e9/L    Absolute Eosinophils 1.0 (H) 0.0 - 0.7 10e9/L     Absolute Basophils 0.1 0.0 - 0.2 10e9/L    Abs Immature Granulocytes 0.0 0 - 0.4 10e9/L    Absolute Nucleated RBC 0.0    Quantiferon-TB Gold Plus    Collection Time: 10/15/20 11:38 AM    Specimen: Blood   Result Value Ref Range    MTB Quantiferon Result Negative NEG^Negative    TB1 Ag minus Nil 0.01 IU/mL    TB2 Ag minus Nil 0.04 IU/mL    Mitogen minus Nil 9.95 IU/mL    NIL Result 0.05 IU/mL   Comprehensive metabolic panel [LAB17]    Collection Time: 10/15/20 11:38 AM   Result Value Ref Range    Sodium 137 133 - 144 mmol/L    Potassium 4.6 3.4 - 5.3 mmol/L    Chloride 109 94 - 109 mmol/L    Carbon Dioxide 21 20 - 32 mmol/L    Anion Gap 8 3 - 14 mmol/L    Glucose 94 70 - 99 mg/dL    Urea Nitrogen 73 (H) 7 - 30 mg/dL    Creatinine 5.10 (H) 0.66 - 1.25 mg/dL    GFR Estimate 15 (L) >60 mL/min/[1.73_m2]    GFR Estimate If Black 17 (L) >60 mL/min/[1.73_m2]    Calcium 9.6 8.5 - 10.1 mg/dL    Bilirubin Total 0.2 0.2 - 1.3 mg/dL    Albumin 3.9 3.4 - 5.0 g/dL    Protein Total 7.4 6.8 - 8.8 g/dL    Alkaline Phosphatase 94 40 - 150 U/L    ALT 15 0 - 70 U/L    AST 10 0 - 45 U/L   Antibody titer red cell [[id number]]    Collection Time: 10/15/20 11:39 AM   Result Value Ref Range    Antibody Titer       ANTI A1: ,   ANTI B: IGM 32, IGG 32  Patient also has an antibody with no identifiable specific reactivity that may falsely   elevate titer value.     ABO/Rh type and screen    Collection Time: 10/15/20 11:39 AM   Result Value Ref Range    ABO O     RH(D) Pos     Antibody Screen Neg     Test Valid Only At          [facility],    Specimen Expires 10/18/2020    Blood Group A Subtype [[id number]]    Collection Time: 10/15/20 11:52 AM   Result Value Ref Range    Antigen Type Canceled, Test credited     Blood Bank Comment       Patient is not ABO type A or AB. A1 subtyping not applicable. 10.15.20 SD   ABO type [[id number]]    Collection Time: 10/15/20 12:20 PM   Result Value Ref  Range    ABO O     RH(D) Pos     Specimen Expires 10/18/2020              Again, thank you for allowing me to participate in the care of your patient.        Sincerely,      Amadou Lai MD

## 2020-10-16 LAB
ABO + RH BLD: NORMAL
ABO + RH BLD: NORMAL
BLD GP AB SCN SERPL QL: NORMAL
BLD GP AB SCN TITR SERPL: NORMAL {TITER}
BLOOD BANK CMNT PATIENT-IMP: NORMAL
GAMMA INTERFERON BACKGROUND BLD IA-ACNC: 0.05 IU/ML
INTERPRETATION ECG - MUSE: NORMAL
LA PPP-IMP: NEGATIVE
M TB IFN-G CD4+ BCKGRND COR BLD-ACNC: 9.95 IU/ML
M TB TUBERC IFN-G BLD QL: NEGATIVE
MITOGEN IGNF BCKGRD COR BLD-ACNC: 0.01 IU/ML
MITOGEN IGNF BCKGRD COR BLD-ACNC: 0.04 IU/ML
SPECIMEN EXP DATE BLD: NORMAL

## 2020-10-19 LAB — COPATH REPORT: NORMAL

## 2020-10-20 LAB
A* LOCUS: NORMAL
A*: NORMAL
ABTEST METHOD: NORMAL
B* LOCUS: NORMAL
B*: NORMAL
BW-1: NORMAL
C* LOCUS: NORMAL
C*: NORMAL
DPA1* LOCUS NMDP: NORMAL
DPA1* NMDP: NORMAL
DPA1*: NORMAL
DPA1*LOCUS: NORMAL
DPB1* LOCUS NMDP: NORMAL
DPB1* NMDP: NORMAL
DPB1*: NORMAL
DPB1*LOCUS: NORMAL
DQA1*: NORMAL
DQA1*LOCUS: NORMAL
DQB1* LOCUS: NORMAL
DRB1* LOCUS: NORMAL
DRB1*: NORMAL
DRB3* LOCUS: NORMAL
DRB4*: NORMAL
DRSSO TEST METHOD: NORMAL
PROTOCOL CUTOFF: NORMAL
SA1 CELL: NORMAL
SA1 COMMENTS: NORMAL
SA1 HI RISK ABY: NORMAL
SA1 MOD RISK ABY: NORMAL
SA1 TEST METHOD: NORMAL
SA2 CELL: NORMAL
SA2 COMMENTS: NORMAL
SA2 HI RISK ABY UA: NORMAL
SA2 MOD RISK ABY: NORMAL
SA2 TEST METHOD: NORMAL
UNACCEPTABLE ANTIGEN: NORMAL
UNOS CPRA: 24

## 2020-10-21 ENCOUNTER — COMMITTEE REVIEW (OUTPATIENT)
Dept: TRANSPLANT | Facility: CLINIC | Age: 25
End: 2020-10-21

## 2020-10-21 ENCOUNTER — TELEPHONE (OUTPATIENT)
Dept: TRANSPLANT | Facility: CLINIC | Age: 25
End: 2020-10-21

## 2020-10-21 NOTE — TELEPHONE ENCOUNTER
10/21/20 4:35 PM :Contacted patient's mother (Valerie)  to review outcome of selection committee meeting today 10/21/20(See selection committee encounter).   Explained to mom that the pt needs to complete  all components of the evaluation to be eligible for active status on the waiting list or to proceed with a live donor kidney transplant.   Reviewed next steps based on outcomes:   The following items will need to be successfully completed before active status :  1.Pulmonary - determine suitability for anesthesia. Mom will make appt.with primary pulmonologist   2. PFTs- a  from  our Office will contact you to arrange for this .  2. PAC- a  from our Office will contact you to schedule this .  3. Echo-a  from our Office will contact you to arrange for this.  4.Hepatitis B series vaccinations due to a non reactive result . Please arrange for this through your PCP>  5. Need the recent cystogram in PACS to review- requested. 7/2/2019 in PACS.  6. Needs updated cystogram with Dr. Billy here. Scheduling will contact mom to arrange for this .  After Committee I spoke with mom  She stated the 7/2019 cystogram maybe the most updated one. I will also follow up with a call to Dr. Canales's Office at [facility] . They stated 7/20 was the most recent cystogram      Patient will be listed as inactive (is not on dialysis and evaluation is not complete)-will receive:   -An Evaluation Summary Letter indicating what is needed to complete evaluation-discussed with patient if they would like to have testing done with [facility] or locally. Will be generated and routed .   -A listing letter indicating inactive status- will be generated and routed.     Confirmed with mom  that on successful completion of outstanding components, patient is eligible for active status and they will receive a follow-up call.      Confirmed that mom  has contact information for additional questions or concerns.  Mom is aware that once I  get the pt officially listed INACTIVE in [facility] pt will be transferred to the [facility] team of Jimena Alvares LPN and Alyce Abel RN Transplant Coordinator and they also can be reached at [phone]. Mom verbalized understanding of our discussion today .

## 2020-10-21 NOTE — COMMITTEE REVIEW
Abdominal Committee Review Note     Evaluation Date:In person PKE 10/15/20   Committee Review Date: 10/21/2020    Organ being evaluated for: Kidney    Transplant Phase: Referral  Transplant Status: Active    Transplant Coordinator: Nimo Mcdowell  Transplant Surgeon: Dr. Demarco    Referring Physician: Glen Abel    Primary Diagnosis: Prune Belly , Reflux nephrology, neurogenic bladder,and bilateral megaureter. S/P ureteral and bladder augmentation , Mitrofanoff.   Secondary Diagnosis: CKD- not yet on dialysis    Committee Review Members:  Nephrology Kulwinder Schuler, APRN CNP   Nurse Odilia Solis, GUNNER, Nimo Mcdowell, RN   Nutrition Norma Paez, RD   Pharmacist Delroy Hartmann, [facility]    - Clinical Tika Dean, [facility] [place] [place], Polina Peacock, MSW   Transplant Cheyenne Fountain PA-C, Ana Bustillos, GUNNER, Elizabeth Restrepo, PHANI, Yazmin Alvarado, GUNNER, Geneva Demarco MD, Jen Melissa RN       Transplant Eligibility: Irreversible chronic kidney disease treated w/dialysis or expected need for dialysis    Committee Review Decision: Approved    Relative Contraindications: None    Absolute Contraindications: None    Committee Chair Geneva Demarco MD verbally attested to the committee's decision.    Committee Discussion Details:  Reviewed PKE results to date.  Pt.is not yet on dialysis. 10/15/20 GFR 15. Has some potential LD. Nephrology- Dr. Collier and Urology with Dr. Canales at [facility]. Continue to follow with Dr. Billy, encourage pt to follow Dr. Canales here at [facility] . 7/2/2019 Cystogram in PACS . Did request Summer 2020 cystogram imaging - needs to be reviewed .  Noted he did not complete frailty test-pt declined. Per team this is not needed.   Multiple ABD surgeries.    Complex pulmonary history.Pulmonary Uses 1 Liter O2 HS. Uses chest vest and nebs. Restrictive process. On a ventilator as a child . Trach.No hospital admissions for >5 years due to Pulmonary.     Pt  has been approved today as a kidney transplant candidate. List pt on inactive status. Noted per Dr. Demarco would prefer LD. Pt has some potential LD.    The following items will need to be successfully completed before active status :  1.Pulmonary - determine suitability for anesthesia . Pt can see his primary Pulmonologist   2.PFTs- to be completed here.  2. PAC-to be complete here.  3. Echo- to be completed here  4.Hepatitis B series vaccinations due to a non reactive result   5. Need the recent cystogram in PACS to review- requested. 7/2/2019 in PACS.  6. Updated cystogram   After Committee I spoke with mom  She stated the 7/2019 cystogram maybe the most updated one. I will also follow up with a call to Dr. Canales's Office at [facility]       Addendum: I followed up with [facility] and the most recent cystogram is 7/2/2019, which is already in PACS.  Email sent to Dr. Demarco asking if he wants an updated cystogram completed.10/26/20 email back from Dr. Demarco stated to request any Spring/Summer 2020 Urology imaging. I contact [facility] and medical record request to be sent for Imaging and written report of 5/20/20 US bladder/kidney . Per Dr. Demarco contact Dr. Billy about doing a cystogram here.

## 2020-10-22 ENCOUNTER — TELEPHONE (OUTPATIENT)
Dept: TRANSPLANT | Facility: CLINIC | Age: 25
End: 2020-10-22

## 2020-10-22 ENCOUNTER — DOCUMENTATION ONLY (OUTPATIENT)
Dept: TRANSPLANT | Facility: CLINIC | Age: 25
End: 2020-10-22

## 2020-10-22 DIAGNOSIS — J98.4 RESTRICTIVE LUNG DISEASE: ICD-10-CM

## 2020-10-22 DIAGNOSIS — J98.4 DISEASE OF LUNG: ICD-10-CM

## 2020-10-22 DIAGNOSIS — N18.4 CHRONIC RENAL FAILURE, STAGE 4 (SEVERE) (H): ICD-10-CM

## 2020-10-22 DIAGNOSIS — Q87.0 PIERRE ROBIN SEQUENCE: ICD-10-CM

## 2020-10-22 DIAGNOSIS — Z93.0 TRACHEOSTOMY DEPENDENCE (H): ICD-10-CM

## 2020-10-22 DIAGNOSIS — N13.722 BILATERAL REFLUX NEPHROPATHY: ICD-10-CM

## 2020-10-22 DIAGNOSIS — N18.9 CHRONIC KIDNEY DISEASE: Primary | ICD-10-CM

## 2020-10-22 DIAGNOSIS — Z76.82 ORGAN TRANSPLANT CANDIDATE: ICD-10-CM

## 2020-10-22 NOTE — TELEPHONE ENCOUNTER
"10/22/20 I left mom a second VM today as I need the pt's SSN to list him inactive in UNOS. I accidentally delted it  here today while in the process of starting the listing. Please CB at either [phone] or [phone]. Mom called back within about five minutes of this call. Mom stated she just got off the phone with her PCP, as she and Aidan have some symptoms of Covid and had a recent exposure. Her PCP told her they need to be seen this evening and where to go. Mom she needed to let me know that a friend she had dinner with the night before they came for in person PKE on 10/15/20 just found out she is positive for Covifd . Her friend developed symptoms on Sat. 10/17/20. Mom stated she , her sister, and [place] have all had some symptoms of chest tightness, little SOB , and fatigue. No loss of taste or smell or fever. I told her she needed to follow her PCP advise. Mom stated her car was \"in the shop.\" I told mom that she needed to get her sister or someone to take them to where her PCP reccommended they go this evening in regards to their Covid symptoms. Mom stated she would follow her PCP advise this evening. I stated this was very important ,marii since Aidan has a trach and pulmonary issues. I stated I would inform our management staff. Mom gave me Aidan's SSN for UNOS listing . I stated I would contact her again tomorrow or Monday 10/26/20 to follow up and to review more the outcomes from Selection Committee, listing , and send out a Summary and Listing Letter. Mom verbalized undersensing of our discussion . Mom has my contact information for any further questions or concerns .  "

## 2020-10-22 NOTE — LETTER
10/22/20        Aidan Louie  [address] [place] [place] [address]      Dear Aidan,      Your evaluation results were reviewed at our Multidisciplinary Selection Committee on 10/21/20. The Committee has approved you as a candidate for kidney transplant pending the successful completion of the following items:     1. Pulmonary consult for clearance for kidney transplant. This can be arranged through your primary Pulmonologist,  Dr. Salina Baez.      2. PFTs. A  from our Office will contact you to schedule this.    3. Anesthesia Pre Op Clinic (PAC) to determine suitability for anesthesia. A  from our Office will contact you to schedule this.    4. Echocardiogram . A  from  our Office will contact you to schedule this.    5. Cystogram with Dr. Billy here at [facility]. A  will be contacting you to arrange for this.    6. Hepatitis B series vaccinations due to a non reactive result. You can arrange for this through your primary care provider.     7. Dental work should be updated if not done within the past 6-12 months.      Please continue to follow all recommendations per your Nephrologist and your other health care providers.     A  from our Office will call you to make appointments for items # 2-5. Items 1,6,7 can be arranged through your primary care provider.    You have been added onto the kidney wait list here today 10/22/20, please see my separate letter regarding the details of this. You have been placed on INACTIVE(HOLD)status due to needing all of the above items successfully completed. Upon successful completion , your results will be reviewed at the Multidisciplinary Committee for approval. Upon approval, you will be eligible to be changed to ACTIVE status or else to proceed with a live donor kidney transplant in the event of an approved live donor.    Please have any potential live donors register now online with our Program to initiate  their evaluations at [email or web address]. You will be notified by our Office in the event of an approved live donor.     You will now be followed by the wait list team of Alyce Abel RN Transplant Coordinator and Jimena Alvares LPN .       For any questions , please contact the wait list team at the Transplant Office at [phone]      Sincerely,      Nimo Mcdowell RN BSN Pre Kidney Pancreas Transplant Coordinator  Solid Organ Transplant  [facility], [facility]                 The Organ Procurement and Transplantation Network  Toll-free patient services line:     Your resource for organ transplant information    If you have a question regarding your own medical care, you always should call your transplant hospital first. However, for general organ transplant-related information, you can call the Organ Procurement and Transplantation Network (OPTN) toll-free patient services line at [phone]- [phone]. Anyone, including potential transplant candidates, candidates, recipients, family members, friends, living donors, and donor family members, can call this number to:    ;     Talk about organ donation, living donation, the transplant process, the donation process, and transplant policies.    Get a free patient information kit with helpful booklets, waiting list and transplant information, and a list of all transplant hospitals.    Ask questions about the OPTN website (https://optn.transplant.hrsa.gov/), the United Network for Organ Sharing s (UNOS) website (https://unos.org/), or the UNOS website for living donors and transplant recipients. (https://www.transplantliving.org/).    Learn how the OPTN can help you.    Talk about any concerns that you may have with a transplant hospital.    The [facility] s transplant system, the OPTN, is managed under federal contract by the United Network for Organ Sharing (UNOS), which is a non-profit charitable organization.  The OPTN helps create and define organ sharing policies that make the best use of donated organs. This process continuously evaluating new advances and discoveries so policies can be adapted to best serve patients waiting for transplants. To do so, the OPTN works closely with transplant professionals, transplant patients, transplant candidates, donor families, living donors, and the public. All transplant programs and organ procurement organizations throughout the country are OPTN members and are obligated to follow the policies the OPTN creates for allocating organs.    The OPTN also is responsible for:      Providing educational material for patients, the public, and professionals.    Raising awareness of the need for donated organs and tissue.    Coordinating organ procurement, matching, and placement.    Collecting information about every organ transplant and donation that occurs in the [place].    Remember, you should contact your transplant hospital directly if you have questions or concerns about your own medical care including medical records, work-up progress, and test results.    We are not your transplant hospital, and our staff will not be able to answer questions about your case, so please keep your transplant hospital s phone number handy.    However, while you research your transplant needs and learn as much as you can about transplantation and donation, we welcome your call to our toll-free patient services line at [phone].          Updated 4/1/2019

## 2020-10-22 NOTE — LETTER
2020    Aidan Louie  [address]  [place] [address]      Dear Mr. Louie,    This letter is sent to confirm that you are a candidate in the kidney transplant program at the [facility].  You were placed on the kidney INACTIVE(HOLD) waitlist on 10/22/20.  This means you will accumulate waiting time but not receive  donor calls. You have been placed on INACTIVE(HOLD)status due to an incomplete evaluation, please see my separate letter regarding the details of this.     Items we will need from you:      We will receive approval from you insurance company for the transplant procedure when you are ready to be changed to ACTIVE status.  It is critical that you notify us if there is any change in your insurance.  It is also important that you familiarize yourself with the details of your specific insurance policy.  Our patient  is available to assist you if you should have any questions regarding your coverage.      An ALA or PRA blood sample will  need to be sent here every 3  months to match you with  donors or any potential living donors when you are on ACTIVE status. You do not need to send this sample now because you are on INACTIVE status. In the future, special mailing boxes (called mailers) will be sent to you directly from the Outreach Department.  You should take the physician order form and the  to your home laboratory when it is time to for this testing to be done.  Additional mailers can be obtained by calling the Transplant Office and asking to speak to a kidney .      During this waiting period, we may request additional periodic laboratory tests with your primary physician.  It will be your responsibility to remind your physician to forward your results to the Transplant Office.                  We need to be kept informed of any changes in your medical condition such as:    o changes  in your medications,   o significant changes in your health  o significant infections (such as pneumonia or abscesses)  o blood transfusions  o any condition which requires hospitalization  o any surgery      Remember to complete any routine cancer screening tests required before your transplant.  This includes colonoscopy; prostrate screening for men, and mammogram and gynecologic testing for women, as well as dental work.  Your primary care clinic can assist you with arranging for these exams.  Remind your caregivers to forward copies of the records and final reports.    We want you to know that our program has physician and surgeon coverage 24 hours a day, 365 days a year. If this coverage changes or there are substantial program changes, you will be notified in writing by letter.     Attached is a letter from the United Network for Organ Sharing (UNOS). It describes the services and information offered to patients by UNOS and the Organ Procurement and Transplantation Network.    We appreciate having had the opportunity to participate in your care.  If you have questions, please feel free to call the Transplant Office at [phone] or [phone].      Sincerely,       Kidney Transplant Program    Enclosures: ALA/YONATHAN Physician Order, Telephone Contact List, Travel Resources, UNOS Letter, Waitlist Information Update and While You Are Waiting  CC:   Glen Collier ; Taz Hargrove(PCP);Salina Baez MD                      The Organ Procurement and Transplantation Network  Toll-free patient services line:     Your resource for organ transplant information    If you have a question regarding your own medical care, you always should call your transplant hospital first. However, for general organ transplant-related information, you can call the Organ Procurement and Transplantation Network (OPTN) toll-free patient services line at [phone]- [phone]. Anyone, including potential transplant candidates,  candidates, recipients, family members, friends, living donors, and donor family members, can call this number to:          Talk about organ donation, living donation, the transplant process, the donation process, and transplant policies.    Get a free patient information kit with helpful booklets, waiting list and transplant information, and a list of all transplant hospitals.    Ask questions about the OPTN website (https://optn.transplant.hrsa.gov/), the United Network for Organ Sharing s (UNOS) website (https://unos.org/), or the UNOS website for living donors and transplant recipients. (https://www.transplantliving.org/).    Learn how the OPTN can help you.    Talk about any concerns that you may have with a transplant hospital.    The [facility] transplant system, the OPTN, is managed under federal contract by the United Network for Organ Sharing (UNOS), which is a non-profit charitable organization. The OPTN helps create and define organ sharing policies that make the best use of donated organs. This process continuously evaluating new advances and discoveries so policies can be adapted to best serve patients waiting for transplants. To do so, the OPTN works closely with transplant professionals, transplant patients, transplant candidates, donor families, living donors, and the public. All transplant programs and organ procurement organizations throughout the country are OPTN members and are obligated to follow the policies the OPTN creates for allocating organs.    The OPTN also is responsible for:      Providing educational material for patients, the public, and professionals.    Raising awareness of the need for donated organs and tissue.    Coordinating organ procurement, matching, and placement.    Collecting information about every organ transplant and donation that occurs in the [place].    Remember, you should contact your transplant hospital directly if you have questions or concerns about your  own medical care including medical records, work-up progress, and test results.    We are not your transplant hospital, and our staff will not be able to answer questions about your case, so please keep your transplant hospital s phone number handy.    However, while you research your transplant needs and learn as much as you can about transplantation and donation, we welcome your call to our toll-free patient services line at [phone].          Updated 4/1/2019

## 2020-10-22 NOTE — Clinical Note
Needs the following scheduled on the same day if possible coming from [place] area PFTs - Pt has a trach  PAC  Echo  Will need VCUG w/o contrast - believe Dr. [facility] will order . I will let you know if we schedule this . Pt is not on dialysis Pt will need mom and another caregiver with him Pt and mom just tested positive to Covid on 10/22/20 so these items will not need to be scheduled for awhile yet.    Thanks,  Nimo  [phone]

## 2020-10-22 NOTE — PROGRESS NOTES
10/22/20 Selection Committee(10/21/20  Summary Letter and Inactive Listing Letter started on 10/22/20 . Completed and routed on 10/26/20

## 2020-10-23 ENCOUNTER — TRANSFERRED RECORDS (OUTPATIENT)
Dept: HEALTH INFORMATION MANAGEMENT | Facility: CLINIC | Age: 25
End: 2020-10-23

## 2020-10-26 ENCOUNTER — TELEPHONE (OUTPATIENT)
Dept: TRANSPLANT | Facility: CLINIC | Age: 25
End: 2020-10-26

## 2020-10-26 NOTE — TELEPHONE ENCOUNTER
10/26/20 I contacted mom to follow up to our conversation last week. Mom stated pt, herself , and her sister Covid tests came back positive on 10/24/20. I also made Odilia Rodriguez and Allison Mandujano aware of this.     I then briefly reviewed again our conversation in regards to Selection Committee outcomes. Explained to mom that the pt needs to complete  all components of the evaluation to be eligible for active status on the waiting list or to proceed with a live donor kidney transplant. I stressed that Dr. Demarco is encouraging LD.  Reviewed next steps based on outcomes:   The following items will need to be successfully completed before active status :  1.Pulmonary - determine suitability for anesthesia. Mom will make appt.with primary pulmonologist   2. PFTs- a  from  our Office will contact you to arrange for this .  2. PAC- a  from our Office will contact you to schedule this .  3. Echo-a  from our Office will contact you to arrange for this.  4.Hepatitis B series vaccinations due to a non reactive result . Please arrange for this through your PCP>  5. Need the recent cystogram in PACS to review- requested. 7/2/2019 in PACS.  6. Needs updated cystogram with Dr. Billy here. Scheduling will contact mom to arrange for this .  After Committee I spoke with mom  She stated the 7/2019 cystogram maybe the most updated one. I will also follow up with a call to Dr. Canales's Office at [facility] . They stated 7/20 was the most recent cystogram      Patient will be listed as inactive (is not on dialysis and evaluation is not complete)-will receive:   -An Evaluation Summary Letter indicating what is needed to complete evaluation-discussed with patient if they would like to have testing done with [facility] or locally. Generated and routed today .                 -A listing letter indicating inactive status- Generated and routed today .                   Confirmed with mom  that on successful  completion of outstanding components, patient is eligible for active status and they will receive a follow-up call.      Confirmed that mom  has contact information for additional questions or concerns.  Mom is aware that pt will now be followed by the  [facility] team of Jimena Alvares LPN and Alyce Abel RN Transplant Coordinator and they also can be reached at [phone]. Mom verbalized understanding of our discussion today.  Pt. Transferred to the [facility] team today .

## 2020-10-27 ENCOUNTER — TELEPHONE (OUTPATIENT)
Dept: TRANSPLANT | Facility: CLINIC | Age: 25
End: 2020-10-27

## 2020-10-27 NOTE — TELEPHONE ENCOUNTER
[facility] requesting they fax us copy of Aidan's positive Covid 19 testing done 10/23/2020.  Fax received and sent to be scanned into Epic.  Coordinators notified.

## 2020-10-29 ENCOUNTER — TEAM CONFERENCE (OUTPATIENT)
Dept: TRANSPLANT | Facility: CLINIC | Age: 25
End: 2020-10-29

## 2020-10-29 NOTE — TELEPHONE ENCOUNTER
"10/29/20 Imaging Review Meeting   Attended :  Dr Alix Mcdowell RN      Discussion : Outside  Cystogram through [facility] 7/2/2019 in PACS and written report scanned into Epic   Dr. Thomson did not want to discuss the cystogram. Stated to check with Dr. Demarco.     Decision: Per Dr. Demarco today : \"I'll review at the next review meeting that I'm at. Please add to agenda\"  I checked with Bebe Thomson and as of now Dr. Demarco is scheduled to be at the 11/05/20 Imaging Review Meeting .   My colleagues were notified to please bring this forward then.        "

## 2020-10-30 ENCOUNTER — DOCUMENTATION ONLY (OUTPATIENT)
Dept: TRANSPLANT | Facility: CLINIC | Age: 25
End: 2020-10-30

## 2020-10-30 ENCOUNTER — TELEPHONE (OUTPATIENT)
Dept: UROLOGY | Facility: CLINIC | Age: 25
End: 2020-10-30

## 2020-10-30 DIAGNOSIS — Q79.4 PRUNE BELLY SYNDROME: Primary | ICD-10-CM

## 2020-10-30 NOTE — TELEPHONE ENCOUNTER
Message left asking pt to call and schedule [facility], message sent to the scheduling team.    If pt calls back please schedule for [facility] first available.    ----- Message from Nimo Mcdowell RN sent at 10/30/2020 10:08 AM CDT -----  Regarding: RE: Pt in [place]  Needs updated Cystogram  Thank you .     Merry, can you please notify myself, Jimena Alvares LPN , and Alyce Abel RN when this is scheduled .     Thank you ,     Nimo Mcdowell RN   [phone]  ----- Message -----  From: Tai Billy MD  Sent: 10/30/2020   9:49 AM CDT  To: Nimo Mcdowell RN, Yaneth Potter CMA  Subject: RE: Pt in [place]  Needs updated Cystogr#    Merry please schedule for urodynamics with fluoro and then see me  ----- Message -----  From: Nimo Mcdowell RN  Sent: 10/29/2020   2:51 PM CDT  To: Tai Billy MD  Subject: Pt in [place]  Needs updated Cystogram       Dr Billy,     This pt just started Pre Kidney Evaluation. Dr Demarco stated will need a cystogram through the [facility] and to follow up with you .    Should I have our team make an appt for this pt to see you , scheduled a cystogram ?    Dr Demarco would like the pt to now see you here rather then at [place].     Please advise on scheduling .    Thank you ,       Nimo Mcdowell RN BSN Pre Kidney Pancreas Transplant Coordinator.  [phone]

## 2020-10-30 NOTE — PROGRESS NOTES
10/30/20 In basket messaging :  Tai Billy MD sent to Nimo Mcdowell, GUNNER; Yaneth Potter CMA Merry please schedule for urodynamics with fluoro and then see me    Previous Messages    ----- Message -----   From: Nimo Mcdowell RN   Sent: 10/29/2020   2:51 PM CDT   To: Tai Billy MD   Subject: Pt in Kidney Eval  Needs updated Cystogram       Dr Billy,     This pt just started Pre Kidney Evaluation. Dr Demarco stated will need a cystogram through the [facility] and to follow up with you .     Should I have our team make an appt for this pt to see you , scheduled a cystogram ?     Dr Demarco would like the pt to now see you here rather then at [place].     Please advise on scheduling .     Thank you ,       Nimo Mcdowell RN BSN Pre Kidney Pancreas Transplant Coordinator.   [phone]             This message was also forwarded to Jimena Alvares LPN and Alyce Abel RN Transplant Coordinator  - Wait List Team

## 2020-11-03 NOTE — TELEPHONE ENCOUNTER
MEDICAL RECORDS REQUEST   [facility]  Urology Clinic  [address] [place] [address]  PHONE: [phone]  Fax: [phone]        FUTURE VISIT INFORMATION                                                   Aidan JONEL FOSTER Louie: 1995 scheduled for future visit at [facility]    APPOINTMENT INFORMATION:    Date: 20 12:45PM    Provider:  Tai Canales MD    Reason for Visit/Diagnosis: NGB    REFERRAL INFORMATION:    Referring provider:  CARMITA VILLALBA    Specialty: N/A    Referring providers clinic:  N/A    Clinic contact number:      RECORDS REQUESTED FOR VISIT                                                     NOTES  STATUS/DETAILS   OFFICE NOTE from referring provider  yes   OFFICE NOTE from other specialist  no   DISCHARGE SUMMARY from hospital  no   DISCHARGE REPORT from the ER  no   OPERATIVE REPORT  no   MEDICATION LIST  no   NEUROGENIC BLADDER      CT ABDOMEN/PELVIS (IMAGES & REPORT)  no   CYSTOGRAM (IMAGES & REPORT)  no   IVP (IMAGES & REPORT)  no   KUB (IMAGES & REPORT)  no   LABS  yes   RENAL/BLADDER ULTRASOUND (IMAGES & REPORT)  no     PRE-VISIT CHECKLIST      Record collection complete Yes- Internal recs in epic    Appointment appropriately scheduled           (right time/right provider) Yes   MyChart activation No- Declined    Questionnaire complete If no, please explain: In process      Completed by: Lupe Jett

## 2020-11-05 ENCOUNTER — TEAM CONFERENCE (OUTPATIENT)
Dept: TRANSPLANT | Facility: CLINIC | Age: 25
End: 2020-11-05

## 2020-11-05 ENCOUNTER — PRE VISIT (OUTPATIENT)
Dept: UROLOGY | Facility: CLINIC | Age: 25
End: 2020-11-05

## 2020-11-05 NOTE — TELEPHONE ENCOUNTER
ATTENDEES: DEVIN Adams A. Doyle, A. Lecuyer-Koich, M. Siers, G. Schmid     DISCUSSION:  Review of 2019 Cystogram.      DECISION: Dr. Demarco advises patient have another cystogram as patient had reported had one in June 2020 at [facility], but the images in PACS are from 2019.

## 2020-11-06 ENCOUNTER — TRANSFERRED RECORDS (OUTPATIENT)
Dept: HEALTH INFORMATION MANAGEMENT | Facility: CLINIC | Age: 25
End: 2020-11-06

## 2020-11-13 ENCOUNTER — PRE VISIT (OUTPATIENT)
Dept: UROLOGY | Facility: CLINIC | Age: 25
End: 2020-11-13

## 2020-11-13 NOTE — TELEPHONE ENCOUNTER
Reason for visit: Neurogenic bladder consult     Relevant information: Mitrofanoff, kid Tx evaluation consult    Records/imaging/labs/orders: all appointments scheduled    Pt called: no need for a call    At Rooming: standard

## 2020-11-19 DIAGNOSIS — Q79.4 PRUNE BELLY SYNDROME: ICD-10-CM

## 2020-11-19 DIAGNOSIS — N18.4 CKD (CHRONIC KIDNEY DISEASE) STAGE 4, GFR 15-29 ML/MIN (H): Primary | ICD-10-CM

## 2020-11-23 ENCOUNTER — VIRTUAL VISIT (OUTPATIENT)
Dept: NEPHROLOGY | Facility: CLINIC | Age: 25
End: 2020-11-23
Attending: INTERNAL MEDICINE
Payer: MEDICARE

## 2020-11-23 DIAGNOSIS — Z86.2 HISTORY OF IRON DEFICIENCY ANEMIA: Primary | ICD-10-CM

## 2020-11-23 DIAGNOSIS — Z86.2 HISTORY OF ANEMIA DUE TO CKD: ICD-10-CM

## 2020-11-23 DIAGNOSIS — N18.9 HISTORY OF ANEMIA DUE TO CKD: ICD-10-CM

## 2020-11-23 PROCEDURE — 99443 PR PHYSICIAN TELEPHONE EVALUATION 21-30 MIN: CPT | Mod: 95 | Performed by: INTERNAL MEDICINE

## 2020-11-23 RX ORDER — FERROUS GLUCONATE 324(38)MG
324 TABLET ORAL DAILY
Qty: 90 TABLET | Refills: 11 | Status: SHIPPED | OUTPATIENT
Start: 2020-11-23

## 2020-11-23 NOTE — PROGRESS NOTES
"Aidan Louie is a 24 year old male who is being evaluated via a billable telephone visit.      The patient has been notified of following:     \"This telephone visit will be conducted via a call between you and your physician/provider. We have found that certain health care needs can be provided without the need for a physical exam.  This service lets us provide the care you need with a short phone conversation.  If a prescription is necessary we can send it directly to your pharmacy.  If lab work is needed we can place an order for that and you can then stop by our lab to have the test done at a later time.    Telephone visits are billed at different rates depending on your insurance coverage. During this emergency period, for some insurers they may be billed the same as an in-person visit.  Please reach out to your insurance provider with any questions.    If during the course of the call the physician/provider feels a telephone visit is not appropriate, you will not be charged for this service.\"    Patient has given verbal consent for Telephone visit?  Yes    What phone number would you like to be contacted at? [phone]    How would you like to obtain your AVS? Mail a copy    Phone call duration: 40 minutes    Glen Abel MD      "

## 2020-11-23 NOTE — LETTER
"2020       RE: Aidan Louie  [address] [place] [address]     Dear Colleague,    Thank you for referring your patient, Aidan Louie, to the [facility] [place] at [facility]. Please see a copy of my visit note below.    Aidan Louie is a 24 year old male who is being evaluated via a billable telephone visit.      The patient has been notified of following:     \"This telephone visit will be conducted via a call between you and your physician/provider. We have found that certain health care needs can be provided without the need for a physical exam.  This service lets us provide the care you need with a short phone conversation.  If a prescription is necessary we can send it directly to your pharmacy.  If lab work is needed we can place an order for that and you can then stop by our lab to have the test done at a later time.    Telephone visits are billed at different rates depending on your insurance coverage. During this emergency period, for some insurers they may be billed the same as an in-person visit.  Please reach out to your insurance provider with any questions.    If during the course of the call the physician/provider feels a telephone visit is not appropriate, you will not be charged for this service.\"    Patient has given verbal consent for Telephone visit?  Yes    What phone number would you like to be contacted at? [phone]    How would you like to obtain your AVS? Mail a copy    Phone call duration: 40 minutes    Glen Abel MD          Nephrology Clinic - Telephone Visit    Glen Abel MD  DOS: 2020     Name: Aidan Louie  MRN: [id number]  Age: 24 year old  : 1995  Referring provider: Nathalia Collado     Assessment and Plan:  Mr. 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     1. CKD, stage 4:  Previous baseline serum Cr ~3.2 with a GFR ~26 but now appears to be progressing over the past year with the last serum Cr of 5.1 with an eGFR of 15 ml/min.  Etiology secondary to obstructive nephropathy from congenital abnormalities of the urogenital system and recurrent UTIs.  His kidney disease appears to have worsened over the past year.  He was counseled regarding his options with renal replacement therapy including transplant.  He was advised that secondary to his trach he may have a challenge but remains a potential transplant candidate as the trach is not for hypoxic respiratory disease. He is undergoing transplant evaluation.    -- CKD education and dialysis options provided again today   -- continue captopril 2.5 for albuminuria/renoprotection.  Urine albumin ~2 g/g in past so would like to increase captopril but will reassess at next visit  -- continues straight caths.   -- continue kidney transplant evaluation  -- renal panel in 1 week (initially ordered prior to this visit)  -- RTC in 3 months     2. HTN/Volume:  Blood pressure at goal of <130/80 and euvolemic on exam in the past.    -continue amlodipine 5mg BID,  captopril 2.5  and clonidine 0.1mg BID  -again, would like to increase captopril next for albuminuria which we will consider at next visit and potentially wean him off clonidine     3. Anemia of CKD: Hgb has been at goal (last 10.8 ).   -will start ferrous gluconate 1 tab daily with goal of maintaining adequate iron stores; will check iron stores in 1 week  -no need for POORNIMA therapyt      4. CKD-MBD: Corrected calcium and phos have been at goal. Vitamin D levels at goal in past.  Last PTH elevated at 331. DEXA with evidence of osteoporosis.  Bone specific alk phos within normal limits. Secondary hyperparathyroidism does not seem to be a big contributor to decreased bone  mineralization at this time.    -continue cholecalciferol at 2000 units daily  -no need for active vitamin D (calcitriol) at this time  -followed by endocrinology and no further management of osteoporosis recommended at this time. If therapy initiated, then would favor denosumab.       5. Metabolic acidosis: Due to CKD.  Bicarbonate level has been near goal.      -continue sodium bicarbonate 650 mg TID for acidosis with the goal of slowing progression of CKD and bone disease      6. Recurrent UTIs: Colonized so based on symptoms.  No evidence of UTI at this time.   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 This recently became irritated, but there are no signs of infection today. Previously followed by pediatric ENT.    -now followed by adult ENT     8.  Malnutrition: In part due to protein-energy wasting as result of CKD.    -start nepro 1 can daily at previous visit (unsure if he is taking this supplement)    Reason For Visit:   CKD follow-up    HPI:   Aidan Louie is a 24 year old man who presents for follow-up of his CKD IV secondary to Prune belly syndrome. His other histories include hypertension, bilateral hearing loss on hearing aids, short stature, choanal atresia SP repair, Dawood Reece anomaly, and restrictive lung disease. He has been following with Dr. Hess for his CKD, and Dr. Schilling has been his surgeon. He has a history of recurrent UTI and worsening creatinine in the past few years. Patient has a history of Prune belly syndrome and extensive urological abnormalities. He has had reconstructed ureter and bladder and has to self catheterize every 3 hours with minimal irrigation with 180ml saline. Since he has been more compliant with his saline flushes, he has had less frequent UTI.    He presents today with his mother who reports that he is doing well after beig  diagnosed with COVID disease. He does report persistent fatigue but denies dyspnea and cough. She feels that his tracheostomy has been healing appropriately though there may me some granulomatous changes that may need to be addressed further with his ENT and pulmonary providers. He is continued on amlodipine twice daily. No other concerns.      Review of Systems:   Pertinent items are noted in HPI or as below, remainder of complete ROS is negative.      Current Outpatient Medications   Medication     albuterol (2.5 MG/3ML) 0.083% nebulizer solution     amLODIPine (NORVASC) 5 MG tablet     calcium carbonate (OS-SUSAN 500 MG [place]. CA) 500 MG tablet     captopril 0.1 mg/mL (CAPOTEN) 0.1 mg/mL SUSP     cholecalciferol 2000 UNITS tablet     cloNIDine (CATAPRES) 0.1 MG tablet     famotidine (PEPCID) 20 MG tablet     ipratropium (ATROVENT) 0.02 % neb solution     Nutritional Supplements (NEPRO) LIQD     order for DME     order for DME     order for DME     order for DME     order for DME     order for DME     predniSONE (DELTASONE) 5 MG tablet     sodium bicarbonate 650 MG tablet     tobramycin, PF, (DAX) 300 MG/5ML nebulizer solution     amoxicillin-clavulanate (AUGMENTIN) 875-125 MG tablet     azithromycin (ZITHROMAX) 250 MG tablet     ranitidine (ZANTAC) 150 MG tablet     sodium bicarbonate 650 MG tablet     No current facility-administered medications for this visit.       Allergies:   Latex; Latex; and Tape [adhesive tape]      Past Medical History:  Bilateral reflux nephropathy  CKD (chronic kidney disease) stage 4, GFR 15-29 ml/min     Hearing loss      Hypertension   Dawood Reece sequence             Prune belly syndrome   Recurrent UTI   Respiratory bronchiolitis interstitial lung disease  Restrictive lung disease            Tracheostomy dependence   Vitamin D deficiency      Past Surgical History:  Bilateral ureteral reimplantation, abdominoplasty, mitrofanoff creation  Cystoscopy  Gastrostomy  tube                     Herniorrhaphy inguinal bilateral, left orchiopexy  Laryngoscopy, bronchoscopy, combined x5   vesicostomy               Redo right ureteral reimplantation  Replacement of trach    Takedown and revision of mitrofanoff stoma     Family History:   The patient has family history of diabetes (mother).      Social History:   The patient presents here with his mother and aunt. The patient has never smoked. Denies alcohol use.      Physical Exam:  Deferred as encounter was a telephone visit.      Laboratory:  CMP  Recent Labs   Lab Test 10/15/20  1138 20  1311 19  1027 19  1438 17  1337 17  1337 09/17/15  1205 09/17/15  1205 02/05/15  1110    138 137 144 141   < > 137   < > 139 141   POTASSIUM 4.6 4.9 4.1 4.1 4.3   < > 4.4   < > 4.6 4.2   CHLORIDE 109 106 106 116* 114*   < > 108   < > 111* 111*   CO2 21 25 25 18* 18*   < > 21   < > 21 21   ANIONGAP 8 7 7 10 9   < > 8   < > 7 9   GLC 94 98 91 81 109*   < > 116*   < > 140* 118*   BUN 73* 55 48* 64* 53*   < > 44*   < > 50* 40*   CR 5.10* 4.3 4.31* 3.25* 3.22*   < > 2.93*   < > 2.57* 2.07*   GFRESTIMATED 15* 17.2 18* 26* 26*   < > 27*   < > 32* 42*   GFRESTBLACK 17*  --  21* 30* 30*  --  33*   < > 39* 51*   SUSAN 9.6 9.3 9.4 8.8 8.4*   < > 8.8   < > 8.7* 8.7*   MAG  --   --   --   --   --   --   --   --  2.1 2.0   PHOS  --   --  4.2 4.4 3.6  --  2.9   < > 2.7* 3.5   PROTTOTAL 7.4  --   --   --   --   --   --   --   --   --    ALBUMIN 3.9  --  3.6 3.2* 3.6   < > 3.6   < > 3.8 3.5   BILITOTAL 0.2  --   --   --   --   --   --   --   --   --    ALKPHOS 94  --   --   --   --   --   --   --  80 87   AST 10  --   --   --   --   --   --   --   --   --    ALT 15  --   --   --   --   --   --   --   --   --     < > = values in this interval not displayed.     CBC  Recent Labs   Lab Test 10/15/20  1138 19  1311 19  1027 19  1438 17  1337 17  1337   HGB 10.8* 12.1* 11.3* 11.6*   < > 13.0*    WBC 9.8 9.2 10.0  --   --  8.8   RBC 3.78* 4.16* 3.85*  --   --  4.35*   HCT 34.0* 37.4* 34.9*  --   --  39.0*   MCV 90 90 91  --   --  90   MCH 28.6 29.1 29.4  --   --  29.9   MCHC 31.8 32.4 32.4  --   --  33.3   RDW 12.8 12.8 13.4  --   --  13.2    313 308  --   --  305    < > = values in this interval not displayed.     INR  Recent Labs   Lab Test 10/15/20  1138   INR 1.03   PTT 31     ABG  No lab results found.   URINE STUDIES  Recent Labs   Lab Test 07/11/17  1341 12/22/16  1328 09/17/15  1410 02/05/15  1115   COLOR Yellow Straw Quantity not sufficient Straw   APPEARANCE Clear Slightly Cloudy Quantity not sufficient Clear   URINEGLC Negative Negative Quantity not sufficient* Negative   URINEBILI Negative Negative Quantity not sufficient* Negative   URINEKETONE Negative Negative Quantity not sufficient* Negative   SG 1.005 1.006 Quantity not sufficient 1.005   UBLD Negative Trace* Quantity not sufficient* Trace*   URINEPH 6.0 6.5 Quantity not sufficient 6.0   PROTEIN 100* 100* Quantity not sufficient* 100*   NITRITE Positive* Negative Quantity not sufficient* Negative   LEUKEST Small* Large* Quantity not sufficient* Large*   RBCU <1 6* Quantity not sufficient 1   WBCU 6* >182* Quantity not sufficient* 15*     Recent Labs   Lab Test 01/03/19  1435 07/11/17  1341 12/22/16  1328 02/05/15  1115   UTPG 2.60* 3.04* 3.05* 5.28*     PTH  Recent Labs   Lab Test 12/09/19  1311 05/13/19  1027 01/03/19  1438 07/11/17  1337 12/22/16  1325 09/17/15  1205 02/05/15  1110 10/03/13  2128 09/20/12  1200   PTHI 331* 195* 177* 191* 98* 133* 303* 141* 74*     IRON STUDIES   Recent Labs   Lab Test 08/09/18  1056 08/09/18  1046 07/11/17  1337 12/22/16  1325 09/17/15  1205 02/05/15  1110   IRON 178  --  77 135 99 160     --  296 241 297 281   IRONSAT 67  --  26 56* 33 57*   JUVENAL  --  14 16* 56 27 34     Total time spent was >40 minutes, and more than 50% of face to face time was spent in counseling and/or coordination of  "care regarding principles of multidisciplinary care, medication management, and chronic kidney disease education.  Glen Abel MD        Aidan Louie is a 24 year old male who is being evaluated via a billable telephone visit.      The patient has been notified of following:     \"This telephone visit will be conducted via a call between you and your physician/provider. We have found that certain health care needs can be provided without the need for a physical exam.  This service lets us provide the care you need with a short phone conversation.  If a prescription is necessary we can send it directly to your pharmacy.  If lab work is needed we can place an order for that and you can then stop by our lab to have the test done at a later time.    Telephone visits are billed at different rates depending on your insurance coverage. During this emergency period, for some insurers they may be billed the same as an in-person visit.  Please reach out to your insurance provider with any questions.    If during the course of the call the physician/provider feels a telephone visit is not appropriate, you will not be charged for this service.\"    Patient has given verbal consent for Telephone visit?  Yes    What phone number would you like to be contacted at? [phone]    How would you like to obtain your AVS? Mail a copy    Phone call duration: 40 minutes    Glen Abel MD        "

## 2020-11-23 NOTE — PROGRESS NOTES
Nephrology Clinic - Telephone Visit    Glen Abel MD  DOS: 2020     Name: Aidan Louie  MRN: [id number]  Age: 24 year old  : 1995  Referring provider: Nathalia Clolado     Assessment and Plan:  Mr. 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     1. CKD, stage 4:  Previous baseline serum Cr ~3.2 with a GFR ~26 but now appears to be progressing over the past year with the last serum Cr of 5.1 with an eGFR of 15 ml/min.  Etiology secondary to obstructive nephropathy from congenital abnormalities of the urogenital system and recurrent UTIs.  His kidney disease appears to have worsened over the past year.  He was counseled regarding his options with renal replacement therapy including transplant.  He was advised that secondary to his trach he may have a challenge but remains a potential transplant candidate as the trach is not for hypoxic respiratory disease. He is undergoing transplant evaluation.    -- CKD education and dialysis options provided again today   -- continue captopril 2.5 for albuminuria/renoprotection.  Urine albumin ~2 g/g in past so would like to increase captopril but will reassess at next visit  -- continues straight caths.   -- continue kidney transplant evaluation  -- renal panel in 1 week (initially ordered prior to this visit)  -- RTC in 3 months     2. HTN/Volume:  Blood pressure at goal of <130/80 and euvolemic on exam in the past.    -continue amlodipine 5mg BID,  captopril 2.5  and clonidine 0.1mg BID  -again, would like to increase captopril next for albuminuria which we will consider at next visit and potentially wean him off clonidine     3. Anemia of CKD: Hgb has been at goal (last 10.8 ).   -will start ferrous gluconate 1 tab daily with goal of maintaining adequate iron  stores; will check iron stores in 1 week  -no need for POORNIMA therapyt      4. CKD-MBD: Corrected calcium and phos have been at goal. Vitamin D levels at goal in past.  Last PTH elevated at 331. DEXA with evidence of osteoporosis.  Bone specific alk phos within normal limits. Secondary hyperparathyroidism does not seem to be a big contributor to decreased bone mineralization at this time.    -continue cholecalciferol at 2000 units daily  -no need for active vitamin D (calcitriol) at this time  -followed by endocrinology and no further management of osteoporosis recommended at this time. If therapy initiated, then would favor denosumab.       5. Metabolic acidosis: Due to CKD.  Bicarbonate level has been near goal.      -continue sodium bicarbonate 650 mg TID for acidosis with the goal of slowing progression of CKD and bone disease      6. Recurrent UTIs: Colonized so based on symptoms.  No evidence of UTI at this time.   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 This recently became irritated, but there are no signs of infection today. Previously followed by pediatric ENT.    -now followed by adult ENT     8.  Malnutrition: In part due to protein-energy wasting as result of CKD.    -start nepro 1 can daily at previous visit (unsure if he is taking this supplement)    Reason For Visit:   CKD follow-up    HPI:   Aidan Louie is a 24 year old man who presents for follow-up of his CKD IV secondary to Prune belly syndrome. His other histories include hypertension, bilateral hearing loss on hearing aids, short stature, choanal atresia SP repair, Dawood Reece anomaly, and restrictive lung disease. He has been following with Dr. Hess for his CKD, and Dr. Schilling has been his surgeon. He has a history of recurrent UTI and worsening creatinine in the past few years. Patient has a history of  Prune belly syndrome and extensive urological abnormalities. He has had reconstructed ureter and bladder and has to self catheterize every 3 hours with minimal irrigation with 180ml saline. Since he has been more compliant with his saline flushes, he has had less frequent UTI.    He presents today with his mother who reports that he is doing well after beig diagnosed with COVID disease. He does report persistent fatigue but denies dyspnea and cough. She feels that his tracheostomy has been healing appropriately though there may me some granulomatous changes that may need to be addressed further with his ENT and pulmonary providers. He is continued on amlodipine twice daily. No other concerns.      Review of Systems:   Pertinent items are noted in HPI or as below, remainder of complete ROS is negative.      Current Outpatient Medications   Medication     albuterol (2.5 MG/3ML) 0.083% nebulizer solution     amLODIPine (NORVASC) 5 MG tablet     calcium carbonate (OS-SUSAN 500 MG [place]. [place]) 500 MG tablet     captopril 0.1 mg/mL (CAPOTEN) 0.1 mg/mL SUSP     cholecalciferol 2000 UNITS tablet     cloNIDine (CATAPRES) 0.1 MG tablet     famotidine (PEPCID) 20 MG tablet     ipratropium (ATROVENT) 0.02 % neb solution     Nutritional Supplements (NEPRO) LIQD     order for DME     order for DME     order for DME     order for DME     order for DME     order for DME     predniSONE (DELTASONE) 5 MG tablet     sodium bicarbonate 650 MG tablet     tobramycin, PF, (DAX) 300 MG/5ML nebulizer solution     amoxicillin-clavulanate (AUGMENTIN) 875-125 MG tablet     azithromycin (ZITHROMAX) 250 MG tablet     ranitidine (ZANTAC) 150 MG tablet     sodium bicarbonate 650 MG tablet     No current facility-administered medications for this visit.       Allergies:   Latex; Latex; and Tape [adhesive tape]      Past Medical History:  Bilateral reflux nephropathy  CKD (chronic kidney disease) stage 4, GFR 15-29 ml/min     Hearing  loss      Hypertension   Dawood Reece sequence             Prune belly syndrome   Recurrent UTI   Respiratory bronchiolitis interstitial lung disease  Restrictive lung disease            Tracheostomy dependence   Vitamin D deficiency      Past Surgical History:  Bilateral ureteral reimplantation, abdominoplasty, mitrofanoff creation  Cystoscopy  Gastrostomy tube                     Herniorrhaphy inguinal bilateral, left orchiopexy  Laryngoscopy, bronchoscopy, combined x5   vesicostomy               Redo right ureteral reimplantation  Replacement of trach    Takedown and revision of mitrofanoff stoma     Family History:   The patient has family history of diabetes (mother).      Social History:   The patient presents here with his mother and aunt. The patient has never smoked. Denies alcohol use.      Physical Exam:  Deferred as encounter was a telephone visit.      Laboratory:  CMP  Recent Labs   Lab Test 10/15/20  1138 20  1311 19  1027 19  1438 17  1337 17  1337 09/17/15  1205 09/17/15  1205 02/05/15  1110    138 137 144 141   < > 137   < > 139 141   POTASSIUM 4.6 4.9 4.1 4.1 4.3   < > 4.4   < > 4.6 4.2   CHLORIDE 109 106 106 116* 114*   < > 108   < > 111* 111*   CO2 21 25 25 18* 18*   < > 21   < > 21 21   ANIONGAP 8 7 7 10 9   < > 8   < > 7 9   GLC 94 98 91 81 109*   < > 116*   < > 140* 118*   BUN 73* 55 48* 64* 53*   < > 44*   < > 50* 40*   CR 5.10* 4.3 4.31* 3.25* 3.22*   < > 2.93*   < > 2.57* 2.07*   GFRESTIMATED 15* 17.2 18* 26* 26*   < > 27*   < > 32* 42*   GFRESTBLACK 17*  --  21* 30* 30*  --  33*   < > 39* 51*   SUSAN 9.6 9.3 9.4 8.8 8.4*   < > 8.8   < > 8.7* 8.7*   MAG  --   --   --   --   --   --   --   --  2.1 2.0   PHOS  --   --  4.2 4.4 3.6  --  2.9   < > 2.7* 3.5   PROTTOTAL 7.4  --   --   --   --   --   --   --   --   --    ALBUMIN 3.9  --  3.6 3.2* 3.6   < > 3.6   < > 3.8 3.5   BILITOTAL 0.2  --   --   --   --   --   --   --   --   --    ALKPHOS 94   --   --   --   --   --   --   --  80 87   AST 10  --   --   --   --   --   --   --   --   --    ALT 15  --   --   --   --   --   --   --   --   --     < > = values in this interval not displayed.     CBC  Recent Labs   Lab Test 10/15/20  1138 12/09/19  1311 05/13/19  1027 01/03/19  1438 07/11/17  1337 07/11/17  1337   HGB 10.8* 12.1* 11.3* 11.6*   < > 13.0*   WBC 9.8 9.2 10.0  --   --  8.8   RBC 3.78* 4.16* 3.85*  --   --  4.35*   HCT 34.0* 37.4* 34.9*  --   --  39.0*   MCV 90 90 91  --   --  90   MCH 28.6 29.1 29.4  --   --  29.9   MCHC 31.8 32.4 32.4  --   --  33.3   RDW 12.8 12.8 13.4  --   --  13.2    313 308  --   --  305    < > = values in this interval not displayed.     INR  Recent Labs   Lab Test 10/15/20  1138   INR 1.03   PTT 31     ABG  No lab results found.   URINE STUDIES  Recent Labs   Lab Test 07/11/17  1341 12/22/16  1328 09/17/15  1410 02/05/15  1115   COLOR Yellow Straw Quantity not sufficient Straw   APPEARANCE Clear Slightly Cloudy Quantity not sufficient Clear   URINEGLC Negative Negative Quantity not sufficient* Negative   URINEBILI Negative Negative Quantity not sufficient* Negative   URINEKETONE Negative Negative Quantity not sufficient* Negative   SG 1.005 1.006 Quantity not sufficient 1.005   UBLD Negative Trace* Quantity not sufficient* Trace*   URINEPH 6.0 6.5 Quantity not sufficient 6.0   PROTEIN 100* 100* Quantity not sufficient* 100*   NITRITE Positive* Negative Quantity not sufficient* Negative   LEUKEST Small* Large* Quantity not sufficient* Large*   RBCU <1 6* Quantity not sufficient 1   WBCU 6* >182* Quantity not sufficient* 15*     Recent Labs   Lab Test 01/03/19  1435 07/11/17  1341 12/22/16  1328 02/05/15  1115   UTPG 2.60* 3.04* 3.05* 5.28*     PTH  Recent Labs   Lab Test 12/09/19  1311 05/13/19  1027 01/03/19  1438 07/11/17  1337 12/22/16  1325 09/17/15  1205 02/05/15  1110 10/03/13  2128 09/20/12  1200   PTHI 331* 195* 177* 191* 98* 133* 303* 141* 74*     IRON STUDIES  "  Recent Labs   Lab Test 08/09/18  1056 08/09/18  1046 07/11/17  1337 12/22/16  1325 09/17/15  1205 02/05/15  1110   IRON 178  --  77 135 99 160     --  296 241 297 281   IRONSAT 67  --  26 56* 33 57*   JUVENAL  --  14 16* 56 27 34     Total time spent was >40 minutes, and more than 50% of face to face time was spent in counseling and/or coordination of care regarding principles of multidisciplinary care, medication management, and chronic kidney disease education.  Glen Abel MD        Aidan Louie is a 24 year old male who is being evaluated via a billable telephone visit.      The patient has been notified of following:     \"This telephone visit will be conducted via a call between you and your physician/provider. We have found that certain health care needs can be provided without the need for a physical exam.  This service lets us provide the care you need with a short phone conversation.  If a prescription is necessary we can send it directly to your pharmacy.  If lab work is needed we can place an order for that and you can then stop by our lab to have the test done at a later time.    Telephone visits are billed at different rates depending on your insurance coverage. During this emergency period, for some insurers they may be billed the same as an in-person visit.  Please reach out to your insurance provider with any questions.    If during the course of the call the physician/provider feels a telephone visit is not appropriate, you will not be charged for this service.\"    Patient has given verbal consent for Telephone visit?  Yes    What phone number would you like to be contacted at? [phone]    How would you like to obtain your AVS? Mail a copy    Phone call duration: 40 minutes    Glen Abel MD      "

## 2020-11-30 ENCOUNTER — ANCILLARY PROCEDURE (OUTPATIENT)
Dept: RADIOLOGY | Facility: AMBULATORY SURGERY CENTER | Age: 25
End: 2020-11-30
Attending: NURSE PRACTITIONER
Payer: MEDICARE

## 2020-11-30 ENCOUNTER — PRE VISIT (OUTPATIENT)
Dept: UROLOGY | Facility: CLINIC | Age: 25
End: 2020-11-30

## 2020-11-30 ENCOUNTER — OFFICE VISIT (OUTPATIENT)
Dept: UROLOGY | Facility: CLINIC | Age: 25
End: 2020-11-30
Payer: MEDICARE

## 2020-11-30 VITALS — DIASTOLIC BLOOD PRESSURE: 68 MMHG | SYSTOLIC BLOOD PRESSURE: 105 MMHG | HEART RATE: 80 BPM

## 2020-11-30 VITALS — SYSTOLIC BLOOD PRESSURE: 105 MMHG | HEART RATE: 80 BPM | DIASTOLIC BLOOD PRESSURE: 68 MMHG

## 2020-11-30 DIAGNOSIS — N31.9 NEUROGENIC BLADDER: Primary | ICD-10-CM

## 2020-11-30 DIAGNOSIS — Q79.4 PRUNE BELLY SYNDROME: ICD-10-CM

## 2020-11-30 DIAGNOSIS — N18.4 CKD (CHRONIC KIDNEY DISEASE) STAGE 4, GFR 15-29 ML/MIN (H): ICD-10-CM

## 2020-11-30 PROBLEM — Z98.890 HISTORY OF TRACHEOSTOMY: Status: ACTIVE | Noted: 2020-09-02

## 2020-11-30 PROBLEM — Z79.52 ON PREDNISONE THERAPY: Status: ACTIVE | Noted: 2020-09-02

## 2020-11-30 PROBLEM — J96.11 CHRONIC RESPIRATORY FAILURE WITH HYPOXIA (H): Status: ACTIVE | Noted: 2020-09-02

## 2020-11-30 LAB
ALBUMIN SERPL-MCNC: 3.7 G/DL (ref 3.4–5)
ALBUMIN UR-MCNC: >300 MG/DL
ANION GAP SERPL CALCULATED.3IONS-SCNC: 8 MMOL/L (ref 3–14)
APPEARANCE UR: CLEAR
BILIRUB UR QL STRIP: NEGATIVE
BUN SERPL-MCNC: 61 MG/DL (ref 7–30)
CALCIUM SERPL-MCNC: 9.3 MG/DL (ref 8.5–10.1)
CHLORIDE SERPL-SCNC: 111 MMOL/L (ref 94–109)
CO2 SERPL-SCNC: 22 MMOL/L (ref 20–32)
COLOR UR AUTO: YELLOW
CREAT SERPL-MCNC: 4.8 MG/DL (ref 0.66–1.25)
ERYTHROCYTE [DISTWIDTH] IN BLOOD BY AUTOMATED COUNT: 13.5 % (ref 10–15)
FERRITIN SERPL-MCNC: 43 NG/ML (ref 26–388)
GFR SERPL CREATININE-BSD FRML MDRD: 16 ML/MIN/{1.73_M2}
GLUCOSE SERPL-MCNC: 84 MG/DL (ref 70–99)
GLUCOSE UR STRIP-MCNC: NEGATIVE MG/DL
HCT VFR BLD AUTO: 32.7 % (ref 40–53)
HGB BLD-MCNC: 10.3 G/DL (ref 13.3–17.7)
HGB UR QL STRIP: ABNORMAL
IRON SATN MFR SERPL: 34 % (ref 15–46)
IRON SERPL-MCNC: 92 UG/DL (ref 35–180)
KETONES UR STRIP-MCNC: NEGATIVE MG/DL
LEUKOCYTE ESTERASE UR QL STRIP: ABNORMAL
MCH RBC QN AUTO: 28.9 PG (ref 26.5–33)
MCHC RBC AUTO-ENTMCNC: 31.5 G/DL (ref 31.5–36.5)
MCV RBC AUTO: 92 FL (ref 78–100)
NITRATE UR QL: NEGATIVE
PH UR STRIP: 8.5 PH (ref 5–7)
PHOSPHATE SERPL-MCNC: 5.4 MG/DL (ref 2.5–4.5)
PLATELET # BLD AUTO: 332 10E9/L (ref 150–450)
POTASSIUM SERPL-SCNC: 5.1 MMOL/L (ref 3.4–5.3)
PTH-INTACT SERPL-MCNC: 858 PG/ML (ref 18–80)
RBC # BLD AUTO: 3.57 10E12/L (ref 4.4–5.9)
SODIUM SERPL-SCNC: 140 MMOL/L (ref 133–144)
SP GR UR STRIP: 1.02 (ref 1–1.03)
TIBC SERPL-MCNC: 272 UG/DL (ref 240–430)
UROBILINOGEN UR STRIP-ACNC: 0.2 EU/DL (ref 0.2–1)
WBC # BLD AUTO: 8.3 10E9/L (ref 4–11)

## 2020-11-30 PROCEDURE — 82306 VITAMIN D 25 HYDROXY: CPT | Performed by: PATHOLOGY

## 2020-11-30 PROCEDURE — 74430 CONTRAST X-RAY BLADDER: CPT | Performed by: NURSE PRACTITIONER

## 2020-11-30 PROCEDURE — 83970 ASSAY OF PARATHORMONE: CPT | Performed by: PATHOLOGY

## 2020-11-30 PROCEDURE — 83550 IRON BINDING TEST: CPT | Performed by: PATHOLOGY

## 2020-11-30 PROCEDURE — 85027 COMPLETE CBC AUTOMATED: CPT | Performed by: PATHOLOGY

## 2020-11-30 PROCEDURE — 83540 ASSAY OF IRON: CPT | Performed by: PATHOLOGY

## 2020-11-30 PROCEDURE — 99207 PR NO CHARGE INJECTABLE MED/DRUG: CPT | Performed by: NURSE PRACTITIONER

## 2020-11-30 PROCEDURE — 51784 ANAL/URINARY MUSCLE STUDY: CPT | Mod: 51 | Performed by: NURSE PRACTITIONER

## 2020-11-30 PROCEDURE — 81003 URINALYSIS AUTO W/O SCOPE: CPT | Performed by: NURSE PRACTITIONER

## 2020-11-30 PROCEDURE — 99214 OFFICE O/P EST MOD 30 MIN: CPT | Performed by: UROLOGY

## 2020-11-30 PROCEDURE — 51600 INJECTION FOR BLADDER X-RAY: CPT | Mod: 51 | Performed by: NURSE PRACTITIONER

## 2020-11-30 PROCEDURE — 81003 URINALYSIS AUTO W/O SCOPE: CPT | Performed by: PATHOLOGY

## 2020-11-30 PROCEDURE — 80069 RENAL FUNCTION PANEL: CPT | Performed by: PATHOLOGY

## 2020-11-30 PROCEDURE — 82728 ASSAY OF FERRITIN: CPT | Performed by: PATHOLOGY

## 2020-11-30 PROCEDURE — 51726 COMPLEX CYSTOMETROGRAM: CPT | Performed by: NURSE PRACTITIONER

## 2020-11-30 PROCEDURE — 36415 COLL VENOUS BLD VENIPUNCTURE: CPT | Performed by: PATHOLOGY

## 2020-11-30 RX ORDER — MUPIROCIN 20 MG/G
OINTMENT TOPICAL DAILY PRN
COMMUNITY
Start: 2020-08-27

## 2020-11-30 RX ORDER — CAPTOPRIL 50 MG/1
TABLET ORAL
COMMUNITY
Start: 2020-10-31 | End: 2021-03-19

## 2020-11-30 RX ORDER — TRAZODONE HYDROCHLORIDE 50 MG/1
25 TABLET, FILM COATED ORAL
COMMUNITY
Start: 2020-08-27

## 2020-11-30 RX ORDER — ACETAMINOPHEN 500 MG
500 TABLET ORAL EVERY 8 HOURS PRN
COMMUNITY
Start: 2020-08-27

## 2020-11-30 RX ORDER — SULFAMETHOXAZOLE/TRIMETHOPRIM 800-160 MG
1 TABLET ORAL ONCE
Status: COMPLETED | OUTPATIENT
Start: 2020-11-30 | End: 2020-11-30

## 2020-11-30 RX ORDER — MAGNESIUM HYDROXIDE 1200 MG/15ML
LIQUID ORAL 3 TIMES DAILY
COMMUNITY
Start: 2020-07-09

## 2020-11-30 RX ADMIN — SULFAMETHOXAZOLE AND TRIMETHOPRIM 1 TABLET: 800; 160 TABLET ORAL at 11:55

## 2020-11-30 ASSESSMENT — PAIN SCALES - GENERAL: PAINLEVEL: NO PAIN (0)

## 2020-11-30 NOTE — PROGRESS NOTES
PREPROCEDURE DIAGNOSES:    1. 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 History of ureteral-bladder augmentation, Mitrofanoff, bilateral megaureters, and a left to right transuretero-ureterostomy.  3. Chronic renal insufficiency, currently under workup for renal transplant.    POSTPROCEDURE DIAGNOSES:  -Normal bladder capacity (550 mL) with largely absent filling sensations.  -Fair bladder compliance without DO/DOI.  -The patient never leaks to define DLPP.  -No voiding phase as the patient does not void volitionally.  -Mildly increased EMG activity during the latter half of filling.  -Fluoroscopy reveals an augmented bladder wall without clear diverticulae or cellules. Right-sided vesicoureteral reflux was observed.  The bladder neck was closed throughout the study.    PROCEDURE:    1. Sterile urethral catheterization for measurement of postvoid residual urine volume.  2. Complex filling cystometrogram with measurement of bladder and rectal pressures.  3. Electromyography of the pelvic floor during urodynamics.  4. Fluoroscopic imaging of the bladder during urodynamics, at least 3 views.    5. Interpretation of urodynamics and flouroscopic imaging.      INDICATIONS FOR PROCEDURE:  Mr. Aidan Louie is a pleasant 24 year old male with neurogenic bladder 2/2 Prune-Belly syndrome, a history of ureteral-bladder augmentation, Mitrofanoff, bilateral megaureters, and a left to right transuretero-ureterostomy, and chronic renal insufficiency, currently under workup for renal transplant. Video urodynamic assessment is requested today by Dr. Billy to better characterize Mr. Aidan Louie's voiding dysfunction.      VOIDING DIARY:  Not completed.    DESCRIPTION OF PROCEDURE:  Risks, benefits, and alternatives to urodynamics were discussed with the patient and he wished to proceed.  Urodynamics are planned to better assess the primary etiology for Mr. Louie's urologic dysfunction.  After informed consent  "was obtained, the patient was taken to the procedure room where the study was initiated. Findings below.     PRE-STUDY URINE DIP:  Postvoid residual by catheter: 10 mL.  Pretest urine dipstick was positive for small leukocytes, but negative for nitrites. The patient denies any UTI symptoms today; specifically, denies dysuria, frequency, urgency, hematuria, fevers, chills, N/V. In this patient who has had a Aguirre catheter in place, colonization is likely and therefore some degree of pyuria is to be expected. We discussed the risks vs benefits of proceeding with today's study. The patient verbalized understanding and wishes to proceed.     Next a 7F double-lumen urodynamics catheter was inserted into the bladder under sterile technique via the Mitrofanoff.  A 7F abdominal manometry catheter was placed in the rectum.  EMG pads were placed on both sides of the anal verge.  The bladder was filled with 200 mL of Omnipaque at 30 mL/minute and serial pressures were recorded.  With coughing there was an appropriate rise in vesical and abdominal pressures with no change in detrusor pressure, confirming good study catheter placement.    DURING THE FILLING PHASE:  Sensations largely absent.  Maximum Capacity: 550 mL (patient reports feeling like he is \"going to burst\")    Uninhibited detrusor contractions: None.  Compliance: Fair. PDet=27 cmH20 at capacity. Compliance ratio of 15.  Continence: The patient never leaks to define DLPP.   EMG: Mostly concordant. Some mildly increased activity during the latter half of filling.    DURING THE VOIDING PHASE:  No formal voiding phase as the patient does not void volitionally.     FLUOROSCOPIC IMAGING OF THE BLADDER DURING URODYNAMICS:  Please note, image numbers on UDS tracings correlate with iSite series numbers on PACS images. Fluoroscopy during today's procedure demonstrated an augmented bladder wall without clear diverticulae or cellules. Right-sided vesicoureteral reflux was " observed.  The bladder neck was closed throughout the study.  At the completion of the study, all catheters were removed and the patient was brought back into the consultation room to further discuss today's study results.      ASSESSMENT/PLAN:  Mr. Aidan Louie is a pleasant 24 year old male with neurogenic bladder 2/2 Prune-Belly syndrome, a history of ureteral-bladder augmentation, Mitrofanoff, bilateral megaureters, and a left to right transuretero-ureterostomy, and chronic renal insufficiency, currently under workup for renal transplant who demonstrated the following findings today on urodynamic evaluation:    -Normal bladder capacity (550 mL) with largely absent filling sensations.  -Fair bladder compliance without DO/DOI.  -The patient never leaks to define DLPP.  -No voiding phase as the patient does not void volitionally.  -Mildly increased EMG activity during the latter half of filling.  -Fluoroscopy reveals an augmented bladder wall without clear diverticulae or cellules. Right-sided vesicoureteral reflux was observed.  The bladder neck was closed throughout the study.    The patient will follow up as scheduled with Dr. Billy to further discuss today's study results and make plans for how best to proceed.      - A single Bactrim was provided for UTI prophylaxis following completion of today's study per department protocol.  The risk of UTI with VUDS is low at ~2.5-3%.      Thank you for allowing me to participate in the care of Mr. Aidan Louie and please don't hesitate to contact me with any questions or concerns.      This procedure was performed under a collaborative agreement with Dr. Tai Billy, Professor and  of Urology, [facility].    LIVAN Mix, CNP  Department of Urology

## 2020-11-30 NOTE — PROGRESS NOTES
HPI:  Aidan Louie is a 24 year old male  with Prune Belly and Dawood Reece syndrome who has prior megaureters and ureteral augment and left to right TUU who has managed with clean intermittent catheterization for years through a prolapsed Mitrofanoff. I have seen him before at [place].     But then developed CRI and is now wearing indwelling urethral Aguirre. He used to do the CIC on his own. Indwelling Aguirre was because he was not always reliably catheterizing, especially when Mom was not around to remind him. Then they noticed that his Cr got better with the indwelling Aguirre in Mitrofanoff so they eventually migrated to wearing the indwelling all the time. But now he is home all the time he could catheterize more often.     Mom emphasizes that the UTIs were less with the indwelling Aguirre but I emphasized I'd rather know about UTI problems before the transplant and address the root cause.     No kidney or bladder infections for 2 years with Aguirre in.     Currently making normal amounts of urine. Cr 4 to 5. He is listed for renal transplant. No available donor.     Past Medical History:   Diagnosis Date     Bilateral reflux nephropathy      CKD (chronic kidney disease) stage 4, GFR 15-29 ml/min (H)      Hearing loss      Hypertension      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      Prune belly syndrome      Recurrent UTI      Respiratory bronchiolitis interstitial lung disease (H)      Restrictive lung disease      Scoliosis 2002     Thyroid disease      Tracheostomy dependence (H)      Past Surgical History:   Procedure Laterality Date     Bilateral ureteral reimplantation  5/16/2002    abdominoplasty, Mitrofanoff creation     CYSTOSCOPY  11/14/11     EXAM UNDER ANESTHESIA THROAT  6/10/2003    tonsillar hypertrophy     EXAM UNDER ANESTHESIA, RESTORATIONS, EXTRACTION(S) DENTAL COMPLEX, COMBINED  6/5/2006     GASTROSTOMY TUBE       GASTROSTOMY TUBE  3/16/2002    button change     HERNIORRHAPHY INGUINAL  BILATERAL  99    left ochiopexy     LARYNGOSCOPY, BRONCHOSCOPY, COMBINED  2002     LARYNGOSCOPY, BRONCHOSCOPY, COMBINED  2002    bilateral ear debridement     LARYNGOSCOPY, BRONCHOSCOPY, COMBINED  2003     LARYNGOSCOPY, BRONCHOSCOPY, COMBINED  2007    Failed Deflux injection     LARYNGOSCOPY, BRONCHOSCOPY, COMBINED  12      VESICOSTOMY       Redo right ureteral reimplantation  3/12/2004    Excision bladder diverticuli, Left to Right pyelopyelostomy     Replacement of trach  10/15/12     Takedown and revision of Mitrofanoff stoma  2006     TRACHEOSTOMY INFANT       Current Outpatient Medications   Medication     acetaminophen (TYLENOL) 500 MG tablet     albuterol (2.5 MG/3ML) 0.083% nebulizer solution     Alum Hydroxide-Mag Trisilicate 80-20 MG CHEW     amLODIPine (NORVASC) 5 MG tablet     amoxicillin-clavulanate (AUGMENTIN) 875-125 MG tablet     azithromycin (ZITHROMAX) 250 MG tablet     calcium carbonate (OS-SUSAN 500 MG [place]. [place]) 500 MG tablet     captopril (CAPOTEN) 50 MG tablet     captopril 0.1 mg/mL (CAPOTEN) 0.1 mg/mL SUSP     cholecalciferol 2000 UNITS tablet     cloNIDine (CATAPRES) 0.1 MG tablet     famotidine (PEPCID) 20 MG tablet     ferrous gluconate (FERGON) 324 (38 Fe) MG tablet     ipratropium (ATROVENT) 0.02 % neb solution     mupirocin (BACTROBAN) 2 % external ointment     NONFORMULARY     Nutritional Supplements (NEPRO) LIQD     order for DME     order for DME     order for DME     order for DME     order for DME     order for DME     predniSONE (DELTASONE) 5 MG tablet     ranitidine (ZANTAC) 150 MG tablet     sodium bicarbonate 650 MG tablet     sodium bicarbonate 650 MG tablet     sodium chloride 0.9%, bottle, 0.9 % irrigation     tobramycin, PF, (DAX) 300 MG/5ML nebulizer solution     traZODone (DESYREL) 50 MG tablet     No current facility-administered medications for this visit.         Allergies   Allergen Reactions     Latex      Latex      Other  reaction(s): Other (see comments)  Other reaction(s): Contact Dermatitis, Other (see comments)     Adhesive Tape Rash     FH: No family history of urologic problems similar to those being experienced by the patient.   Social History     Socioeconomic History     Marital status: Single     Spouse name: Not on file     Number of children: Not on file     Years of education: Not on file     Highest education level: Not on file   Occupational History     Not on file   Social Needs     Financial resource strain: Not on file     Food insecurity     Worry: Not on file     Inability: Not on file     Transportation needs     Medical: Not on file     Non-medical: Not on file   Tobacco Use     Smoking status: Never Smoker     Smokeless tobacco: Never Used   Substance and Sexual Activity     Alcohol use: No     Alcohol/week: 0.0 standard drinks     Drug use: No     Sexual activity: Not on file   Lifestyle     Physical activity     Days per week: Not on file     Minutes per session: Not on file     Stress: Not on file   Relationships     Social connections     Talks on phone: Not on file     Gets together: Not on file     Attends Cheondoism service: Not on file     Active member of club or organization: Not on file     Attends meetings of clubs or organizations: Not on file     Relationship status: Not on file     Intimate partner violence     Fear of current or ex partner: Not on file     Emotionally abused: Not on file     Physically abused: Not on file     Forced sexual activity: Not on file   Other Topics Concern     Parent/sibling w/ CABG, MI or angioplasty before 65F 55M? Not Asked   Social History Narrative     Not on file      ROS: 10 point ROS neg other than the symptoms noted above in the HPI.    GENERAL: Healthy, alert and no distress  EYES: Eyes grossly normal to inspection.  No discharge or erythema, or obvious scleral/conjunctival abnormalities.  RESP: No audible wheeze, cough, or visible cyanosis.  No visible  retractions or increased work of breathing.    SKIN: Visible skin clear. No significant rash, abnormal pigmentation or lesions.  NEURO: Cranial nerves grossly intact.  Mentation and speech appropriate for age.  PSYCH: Mentation appears normal, affect normal/bright, judgement and insight intact, normal speech and appearance well-groomed.  ABD: prolapsed Mitrofanoff stoma. Midline lower abd scar. Aguirre in Mitrofanoff    Imaging: augmented bladder, reflux up common sheath ureter and to upper b/l ureters.     Labs: Cr 4 to 5.     A/P: Bladder pressures look safe on UDS for a transplant. Remove Aguirre. Start CIC per stoma and bladder irrigation. Video visit in 3-6 months to assess how clean intermittent catheterization is going and if UTIs are a problem. If going well then we will clear for transplant.

## 2020-11-30 NOTE — LETTER
11/30/2020       RE: Aidan Louie  [address] [place] [address]     Dear Colleague,    Thank you for referring your patient, Aidan Louie, to the [facility] UROLOGY CLINIC [place] at [facility]. Please see a copy of my visit note below.    PREPROCEDURE DIAGNOSES:    1. 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 History of ureteral-bladder augmentation, Mitrofanoff, bilateral megaureters, and a left to right transuretero-ureterostomy.  3. Chronic renal insufficiency, currently under workup for renal transplant.    POSTPROCEDURE DIAGNOSES:  -Normal bladder capacity (550 mL) with largely absent filling sensations.  -Fair bladder compliance without DO/DOI.  -The patient never leaks to define DLPP.  -No voiding phase as the patient does not void volitionally.  -Mildly increased EMG activity during the latter half of filling.  -Fluoroscopy reveals an augmented bladder wall without clear diverticulae or cellules. Right-sided vesicoureteral reflux was observed.  The bladder neck was closed throughout the study.    PROCEDURE:    1. Sterile urethral catheterization for measurement of postvoid residual urine volume.  2. Complex filling cystometrogram with measurement of bladder and rectal pressures.  3. Electromyography of the pelvic floor during urodynamics.  4. Fluoroscopic imaging of the bladder during urodynamics, at least 3 views.    5. Interpretation of urodynamics and flouroscopic imaging.      INDICATIONS FOR PROCEDURE:  Mr. Aidan Louie is a pleasant 24 year old male with neurogenic bladder 2/2 Prune-Belly syndrome, a history of ureteral-bladder augmentation, Mitrofanoff, bilateral megaureters, and a left to right transuretero-ureterostomy, and chronic renal insufficiency, currently under workup for renal transplant. Video urodynamic assessment is requested today by Dr. Billy to better characterize Mr. Aidan Louie's voiding  "dysfunction.      VOIDING DIARY:  Not completed.    DESCRIPTION OF PROCEDURE:  Risks, benefits, and alternatives to urodynamics were discussed with the patient and he wished to proceed.  Urodynamics are planned to better assess the primary etiology for Mr. Louie's urologic dysfunction.  After informed consent was obtained, the patient was taken to the procedure room where the study was initiated. Findings below.     PRE-STUDY URINE DIP:  Postvoid residual by catheter: 10 mL.  Pretest urine dipstick was positive for small leukocytes, but negative for nitrites. The patient denies any UTI symptoms today; specifically, denies dysuria, frequency, urgency, hematuria, fevers, chills, N/V. In this patient who has had a Aguirre catheter in place, colonization is likely and therefore some degree of pyuria is to be expected. We discussed the risks vs benefits of proceeding with today's study. The patient verbalized understanding and wishes to proceed.     Next a 7F double-lumen urodynamics catheter was inserted into the bladder under sterile technique via the Mitrofanoff.  A 7F abdominal manometry catheter was placed in the rectum.  EMG pads were placed on both sides of the anal verge.  The bladder was filled with 200 mL of Omnipaque at 30 mL/minute and serial pressures were recorded.  With coughing there was an appropriate rise in vesical and abdominal pressures with no change in detrusor pressure, confirming good study catheter placement.    DURING THE FILLING PHASE:  Sensations largely absent.  Maximum Capacity: 550 mL (patient reports feeling like he is \"going to burst\")    Uninhibited detrusor contractions: None.  Compliance: Fair. PDet=27 cmH20 at capacity. Compliance ratio of 15.  Continence: The patient never leaks to define DLPP.   EMG: Mostly concordant. Some mildly increased activity during the latter half of filling.    DURING THE VOIDING PHASE:  No formal voiding phase as the patient does not void volitionally. "     FLUOROSCOPIC IMAGING OF THE BLADDER DURING URODYNAMICS:  Please note, image numbers on UDS tracings correlate with iSite series numbers on PACS images. Fluoroscopy during today's procedure demonstrated an augmented bladder wall without clear diverticulae or cellules. Right-sided vesicoureteral reflux was observed.  The bladder neck was closed throughout the study.  At the completion of the study, all catheters were removed and the patient was brought back into the consultation room to further discuss today's study results.      ASSESSMENT/PLAN:  Mr. Aidan Louie is a pleasant 24 year old male with neurogenic bladder 2/2 Prune-Belly syndrome, a history of ureteral-bladder augmentation, Mitrofanoff, bilateral megaureters, and a left to right transuretero-ureterostomy, and chronic renal insufficiency, currently under workup for renal transplant who demonstrated the following findings today on urodynamic evaluation:    -Normal bladder capacity (550 mL) with largely absent filling sensations.  -Fair bladder compliance without DO/DOI.  -The patient never leaks to define DLPP.  -No voiding phase as the patient does not void volitionally.  -Mildly increased EMG activity during the latter half of filling.  -Fluoroscopy reveals an augmented bladder wall without clear diverticulae or cellules. Right-sided vesicoureteral reflux was observed.  The bladder neck was closed throughout the study.    The patient will follow up as scheduled with Dr. Billy to further discuss today's study results and make plans for how best to proceed.      - A single Bactrim was provided for UTI prophylaxis following completion of today's study per department protocol.  The risk of UTI with VUDS is low at ~2.5-3%.      Thank you for allowing me to participate in the care of Mr. Aidan Louie and please don't hesitate to contact me with any questions or concerns.      This procedure was performed under a collaborative agreement with  Dr. Tai Billy, Professor and  of Urology, [facility].    LIVAN Mix, CNP  Department of Urology

## 2020-11-30 NOTE — NURSING NOTE
Chief Complaint   Patient presents with     Urodynamics Study       Blood pressure 105/68, pulse 80. There is no height or weight on file to calculate BMI.    Patient Active Problem List   Diagnosis     Prune belly syndrome     HTN (hypertension)     CKD (chronic kidney disease) stage 4, GFR 15-29 ml/min (H)     Vitamin D deficiency     Recurrent UTI     Low bone density for age     Goiter     Short stature     High serum parathyroid hormone (PTH)     High serum follicle stimulating hormone (FSH)     On corticosteroid therapy     Tracheostomy granuloma (H)     Bilateral reflux nephropathy     Dawood Reece sequence     Tracheostomy dependence (H)     Restrictive lung disease     Scoliosis     Chronic respiratory failure with hypoxia (H)     History of tracheostomy     On prednisone therapy       Allergies   Allergen Reactions     Latex      Latex      Other reaction(s): Other (see comments)  Other reaction(s): Contact Dermatitis, Other (see comments)     Adhesive Tape Rash       Current Outpatient Medications   Medication Sig Dispense Refill     acetaminophen (TYLENOL) 500 MG tablet Take 500 mg by mouth       albuterol (2.5 MG/3ML) 0.083% nebulizer solution Take 1 ampule by nebulization 4 times daily        Alum Hydroxide-Mag Trisilicate 80-20 MG CHEW Take 1,250 mg by mouth       amLODIPine (NORVASC) 5 MG tablet TAKE 1 TABLET(5 MG) BY MOUTH TWICE DAILY 180 tablet 3     amoxicillin-clavulanate (AUGMENTIN) 875-125 MG tablet Take 1 tablet by mouth 2 times daily 20 tablet 0     azithromycin (ZITHROMAX) 250 MG tablet Take  by mouth. Mon, Wed, Fri       calcium carbonate (OS-SUSAN 500 MG [place]. [place]) 500 MG tablet Take 1 tablet (500 mg) by mouth 3 times daily (with meals) 90 tablet 6     captopril (CAPOTEN) 50 MG tablet        captopril 0.1 mg/mL (CAPOTEN) 0.1 mg/mL SUSP Take 2.5 mg three times daily (2.5 mg/5ml) 460 mL 11     cholecalciferol 2000 UNITS tablet Take 2,000 Units by mouth daily 30 tablet 11     cloNIDine  "(CATAPRES) 0.1 MG tablet Take 1 tablet (0.1 mg) by mouth 2 times daily 180 tablet 11     famotidine (PEPCID) 20 MG tablet Take 20 mg by mouth 2 times daily       ferrous gluconate (FERGON) 324 (38 Fe) MG tablet Take 1 tablet (324 mg) by mouth daily 90 tablet 11     ipratropium (ATROVENT) 0.02 % neb solution Take 0.5 mg by nebulization 4 times daily  225 mL      mupirocin (BACTROBAN) 2 % external ointment        NONFORMULARY TK 2.5 ML PO TID       Nutritional Supplements (NEPRO) LIQD Take 1 Can by mouth daily 30 each 11     order for DME Equipment being ordered: Medi-Vac, plastic tubing connector lara type, cat 361, Smish, 1 lara tip/month x 12 months. Use for tracheostomy suctioning 1 each 11     order for DME Equipment being ordered: DME    Adult Bivona Uncuffed Tracheostomy Tube-5.0    Patient will do weekly trach changes. 4 Units 11     order for DME Equipment being ordered: DME -    Deep suction kits - 12 fr 90 each 3     order for DME Tracheostomy Humidification Equipment  Equipment being ordered:     1) Air compressor  2) Nebulizer bottle  3) Aerosol tubing  4) Trach mask  5) Sterile water  6) Saline ampules (\"bullets\")  7) Heat Moisture Exchanger (HME) Also known by several other terms including: Thermal Humidifying Filters, Swedish nose, Artificial nose, Filter, Thermovent T.  8) Room/home humidifiers 1 each 11     order for DME Optifoam Basic   [id number] 30 each 11     order for DME Equipment being ordered: Humidifier (heated), humidifier attachment, saline lavages, humidivent mask 1 each 3     predniSONE (DELTASONE) 5 MG tablet Take 1 mg by mouth every other day Taking every other day  6     ranitidine (ZANTAC) 150 MG tablet Take 0.5 tablets (75 mg) by mouth 2 times daily 90 tablet 3     sodium bicarbonate 650 MG tablet TAKE 1 TABLET BY MOUTH THREE TIMES DAILY 270 tablet 3     sodium bicarbonate 650 MG tablet TAKE 1 TABLET BY MOUTH THREE TIMES DAILY 270 tablet 0     sodium " chloride 0.9%, bottle, 0.9 % irrigation        tobramycin, PF, (DAX) 300 MG/5ML nebulizer solution Take 1 ampule by nebulization as needed       traZODone (DESYREL) 50 MG tablet Take 25 mg by mouth         Social History     Tobacco Use     Smoking status: Never Smoker     Smokeless tobacco: Never Used   Substance Use Topics     Alcohol use: No     Alcohol/week: 0.0 standard drinks     Drug use: No       Invasive Procedure Safety Checklist:    Procedure: Urodynamics    Action: Complete sections and checkboxes as appropriate.  Pre-procedure:  1. Patient ID Verified with 2 identifiers (Jessica and  or MRN) : YES    2. Procedure and site verified with patient/designee (when able) : YES    3. Accurate consent documentation in medical record : YES    4. H&P (or appropriate assessment) documented in medical record : N/A  H&P must be up to 30 days prior to procedure an updated within 24 hours of Procedure as applicable.     5. Relevant diagnostic and radiology test results appropriately labeled and displayed as applicable : YES    6. Blood products, implants, devices, and/or special equipment available for the procedure as applicable : YES    7. Procedure site(s) marked with provider initials [Exclusions: none] : NO    8. Marking not required. Reason : Yes  Procedure does not require site marking    Time Out:     Time-Out performed immediately prior to starting procedure, including verbal and active participation of all team members addressing: YES    1. Correct patient identity.  2. Confirmed that the correct side and site are marked.  3. An accurate procedure to be done.  4. Agreement on the procedure to be done.  5. Correct patient position.  6. Relevant images and results are properly labeled and appropriately displayed.  7. The need to administer antibiotics or fluids for irrigation purposes during the procedure as applicable.  8. Safety precautions based on patient history or medication use.    During Procedure:  Verification of correct person, site, and procedure occurs any time the responsibility for care of the patient is transferred to another member of the care team.    The following medication was given: Sulfamethoxazole / Trimethoprim    MEDICATION:  Bactrim  ROUTE: PO  SITE: Orally  DOSE: 800/160mg  LOT #: [id number]  : [employer]  EXPIRATION DATE: 10/2021  NDC#: 4608-9030-48   Was there drug waste? No    Prior to administration, verified patient identity using patient's name and date of birth.  Due to administration, patient instructed to remain in clinic for 15 minutes  afterwards, and to report any adverse reaction to me immediately.    Drug Amount Wasted:  None.  Vial/Syringe: Single dose vial      The following medication was given:     MEDICATION:  Omnipaque (Iohexol Injection) (240mgI/mL)  ROUTE: Provider Administered  SITE: Provider Administered via catheter  DOSE: 200mL  LOT #: [id number]  : [employer]  EXPIRATION DATE: 2/15/2023  NDC#: [address]-67   Was there drug waste? No    Prior to injection, verified patient identity using patient's name and date of birth.  Due to injection administration, patient instructed to remain in clinic for 15 minutes  afterwards, and to report any adverse reaction to me immediately.    Drug Amount Wasted:  None.  Vial/Syringe: Single dose vial      The following medication was given: See Above    Removal:  14 Fr straight tipped silicone izaguirre catheter removed from mitrofanoff meatus without difficulty after removing 4 ml's of fluid from the balloon.    Insertion:  14 Fr straight tipped silicone straight catheter inserted into mitrofanoff meatus in the usual sterile fashion without difficulty.      Patient did tolerate procedure well.        Nehal Vaughn CMA  11/30/2020  11:53 AM

## 2020-11-30 NOTE — PATIENT INSTRUCTIONS
Please follow up in 3-6 month with a video appointment     It was a pleasure meeting with you today.  Thank you for allowing me and my team the privilege of caring for you today.  YOU are the reason we are here, and I truly hope we provided you with the excellent service you deserve.  Please let us know if there is anything else we can do for you so that we can be sure you are leaving completely satisfied with your care experience.

## 2020-11-30 NOTE — LETTER
11/30/2020       RE: Aidan Louie  [address] [place] [address]     Dear Colleague,    Thank you for referring your patient, Aidan Louie, to the [facility] UROLOGY CLINIC [place] at [facility]. Please see a copy of my visit note below.    HPI:  Aidan Louie is a 24 year old male  with Prune Belly and Dawood Reece syndrome who has prior megaureters and ureteral augment and left to right TUU who has managed with clean intermittent catheterization for years through a prolapsed Mitrofanoff. I have seen him before at [facility] [place].     But then developed CRI and is now wearing indwelling urethral Aguirre. He used to do the CIC on his own. Indwelling Aguirre was because he was not always reliably catheterizing, especially when Mom was not around to remind him. Then they noticed that his Cr got better with the indwelling Aguirre in Mitrofanoff so they eventually migrated to wearing the indwelling all the time. But now he is home all the time he could catheterize more often.     Mom emphasizes that the UTIs were less with the indwelling Aguirre but I emphasized I'd rather know about UTI problems before the transplant and address the root cause.     No kidney or bladder infections for 2 years with Aguirre in.     Currently making normal amounts of urine. Cr 4 to 5. He is listed for renal transplant. No available donor.     Past Medical History:   Diagnosis Date     Bilateral reflux nephropathy      CKD (chronic kidney disease) stage 4, GFR 15-29 ml/min (H)      Hearing loss      Hypertension      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      Prune belly syndrome      Recurrent UTI      Respiratory bronchiolitis interstitial lung disease (H)      Restrictive lung disease      Scoliosis 2002     Thyroid disease      Tracheostomy dependence (H)      Past Surgical History:   Procedure Laterality Date     Bilateral ureteral reimplantation  5/16/2002    abdominoplasty,  Mitrofanoff creation     CYSTOSCOPY  12     EXAM UNDER ANESTHESIA THROAT  6/10/2003    tonsillar hypertrophy     EXAM UNDER ANESTHESIA, RESTORATIONS, EXTRACTION(S) DENTAL COMPLEX, COMBINED  2006     GASTROSTOMY TUBE       GASTROSTOMY TUBE  3/16/2002    button change     HERNIORRHAPHY INGUINAL BILATERAL  99    left ochiopexy     LARYNGOSCOPY, BRONCHOSCOPY, COMBINED  2002     LARYNGOSCOPY, BRONCHOSCOPY, COMBINED  2002    bilateral ear debridement     LARYNGOSCOPY, BRONCHOSCOPY, COMBINED  2003     LARYNGOSCOPY, BRONCHOSCOPY, COMBINED  2007    Failed Deflux injection     LARYNGOSCOPY, BRONCHOSCOPY, COMBINED  12      VESICOSTOMY       Redo right ureteral reimplantation  3/12/2004    Excision bladder diverticuli, Left to Right pyelopyelostomy     Replacement of trach  10/15/12     Takedown and revision of Mitrofanoff stoma  2006     TRACHEOSTOMY INFANT       Current Outpatient Medications   Medication     acetaminophen (TYLENOL) 500 MG tablet     albuterol (2.5 MG/3ML) 0.083% nebulizer solution     Alum Hydroxide-Mag Trisilicate 80-20 MG CHEW     amLODIPine (NORVASC) 5 MG tablet     amoxicillin-clavulanate (AUGMENTIN) 875-125 MG tablet     azithromycin (ZITHROMAX) 250 MG tablet     calcium carbonate (OS-SUSAN 500 MG [place]. [place]) 500 MG tablet     captopril (CAPOTEN) 50 MG tablet     captopril 0.1 mg/mL (CAPOTEN) 0.1 mg/mL SUSP     cholecalciferol 2000 UNITS tablet     cloNIDine (CATAPRES) 0.1 MG tablet     famotidine (PEPCID) 20 MG tablet     ferrous gluconate (FERGON) 324 (38 Fe) MG tablet     ipratropium (ATROVENT) 0.02 % neb solution     mupirocin (BACTROBAN) 2 % external ointment     NONFORMULARY     Nutritional Supplements (NEPRO) LIQD     order for DME     order for DME     order for DME     order for DME     order for DME     order for DME     predniSONE (DELTASONE) 5 MG tablet     ranitidine (ZANTAC) 150 MG tablet     sodium bicarbonate 650 MG tablet     sodium  bicarbonate 650 MG tablet     sodium chloride 0.9%, bottle, 0.9 % irrigation     tobramycin, PF, (DAX) 300 MG/5ML nebulizer solution     traZODone (DESYREL) 50 MG tablet     No current facility-administered medications for this visit.         Allergies   Allergen Reactions     Latex      Latex      Other reaction(s): Other (see comments)  Other reaction(s): Contact Dermatitis, Other (see comments)     Adhesive Tape Rash     FH: No family history of urologic problems similar to those being experienced by the patient.   Social History     Socioeconomic History     Marital status: Single     Spouse name: Not on file     Number of children: Not on file     Years of education: Not on file     Highest education level: Not on file   Occupational History     Not on file   Social Needs     Financial resource strain: Not on file     Food insecurity     Worry: Not on file     Inability: Not on file     Transportation needs     Medical: Not on file     Non-medical: Not on file   Tobacco Use     Smoking status: Never Smoker     Smokeless tobacco: Never Used   Substance and Sexual Activity     Alcohol use: No     Alcohol/week: 0.0 standard drinks     Drug use: No     Sexual activity: Not on file   Lifestyle     Physical activity     Days per week: Not on file     Minutes per session: Not on file     Stress: Not on file   Relationships     Social connections     Talks on phone: Not on file     Gets together: Not on file     Attends [employer] service: Not on file     Active member of club or organization: Not on file     Attends meetings of clubs or organizations: Not on file     Relationship status: Not on file     Intimate partner violence     Fear of current or ex partner: Not on file     Emotionally abused: Not on file     Physically abused: Not on file     Forced sexual activity: Not on file   Other Topics Concern     Parent/sibling w/ CABG, MI or angioplasty before 65F 55M? Not Asked   Social History Narrative     Not on  file      ROS: 10 point ROS neg other than the symptoms noted above in the HPI.    GENERAL: Healthy, alert and no distress  EYES: Eyes grossly normal to inspection.  No discharge or erythema, or obvious scleral/conjunctival abnormalities.  RESP: No audible wheeze, cough, or visible cyanosis.  No visible retractions or increased work of breathing.    SKIN: Visible skin clear. No significant rash, abnormal pigmentation or lesions.  NEURO: Cranial nerves grossly intact.  Mentation and speech appropriate for age.  PSYCH: Mentation appears normal, affect normal/bright, judgement and insight intact, normal speech and appearance well-groomed.  ABD: prolapsed Mitrofanoff stoma. Midline lower abd scar. Aguirre in Mitrofanoff    Imaging: augmented bladder, reflux up common sheath ureter and to upper b/l ureters.     Labs: Cr 4 to 5.     A/P: Bladder pressures look safe on UDS for a transplant. Remove Aguirre. Start CIC per stoma and bladder irrigation. Video visit in 3-6 months to assess how clean intermittent catheterization is going and if UTIs are a problem. If going well then we will clear for transplant.     Sincerely,    Tai Billy MD

## 2020-12-01 LAB
DEPRECATED CALCIDIOL+CALCIFEROL SERPL-MC: <49 UG/L (ref 20–75)
VITAMIN D2 SERPL-MCNC: <5 UG/L
VITAMIN D3 SERPL-MCNC: 44 UG/L

## 2020-12-08 ENCOUNTER — TELEPHONE (OUTPATIENT)
Dept: OTOLARYNGOLOGY | Facility: CLINIC | Age: 25
End: 2020-12-08

## 2020-12-08 NOTE — TELEPHONE ENCOUNTER
Left message for pts mother Valerie, in regards to rescheduling appt scheduled for 12/28 as provider will be out that day. Writers call back number provided on vm.

## 2021-01-04 ENCOUNTER — TELEPHONE (OUTPATIENT)
Dept: OTOLARYNGOLOGY | Facility: CLINIC | Age: 26
End: 2021-01-04

## 2021-01-04 DIAGNOSIS — Z43.0 TRACHEOSTOMY CARE (H): Primary | ICD-10-CM

## 2021-01-04 NOTE — TELEPHONE ENCOUNTER
[facility] Call Center    Phone Message    May a detailed message be left on voicemail: yes     Reason for Call: Other: The pt's mom Valerie called about an appt w/Dr. Dumas. They were exposed to Covid so need to resched. She is asking for an afternoon in February if possible. The first appt for Dr SYED Is 2.19.21 in the am. Please call Valerie to discuss. Also, she is calling about a DME order. She needs an order for Nitrile gloves, 4 box per month x 1 year, and Deep suction catheters, 5-10 [place], 4w5P=09 per month x 1 year. [facility] [phone], ph: [phone]. Please call Valerie to discuss. Thanks.    Action Taken: Message routed to:  [facility] ent    Travel Screening: Not Applicable

## 2021-01-04 NOTE — TELEPHONE ENCOUNTER
Returned call to patient's mom. Discussed DME orders with Valerie and confirmed fax number. Fax sent to [facility]. 2 sheets.    Rescheduled appointment to 2/15/2021 at 3:30 PM. Patient mom confirmed appointment date and time. No further questions at this time.    Jessi Baldwin RN, BSN on 1/4/2021 at 2:53 PM

## 2021-01-19 ENCOUNTER — TELEPHONE (OUTPATIENT)
Dept: TRANSPLANT | Facility: CLINIC | Age: 26
End: 2021-01-19

## 2021-01-19 NOTE — TELEPHONE ENCOUNTER
Coordinator spoke with pt's mom, Valerie. Valerie states she is still trying to get COVID repeat testing completed locally in [place]. Wondering if we could send pt's PCP a note stating repeat COVID testing is needed for kidney transplant.    Coordinator asked if pt had completed any of items listed out on letter sent to pt on 10/22/2020 from their pre coordinator. Mom stated she is typically more organized but has lost letter and would like another copy sent to her. She would like to go over everything pt needs to complete once she has letter.     Valerie will call pt's PCP office the end of this week and set up COVID testing. She will call coordinator back once she receives letter so all items can be reviewed that pt needs to complete. Valerie verbalized understanding of information and has no further questions. Gave contact info. Encouraged to reach out if questions arise.       Letter from Nimo and order to PCP for COVID    Pt to call set up COVID testing

## 2021-01-20 ENCOUNTER — DOCUMENTATION ONLY (OUTPATIENT)
Dept: TRANSPLANT | Facility: CLINIC | Age: 26
End: 2021-01-20

## 2021-02-01 ENCOUNTER — TELEPHONE (OUTPATIENT)
Dept: TRANSPLANT | Facility: CLINIC | Age: 26
End: 2021-02-01

## 2021-02-01 NOTE — TELEPHONE ENCOUNTER
Patient Call: General  Route to LPN    Reason for call: Call from pt Mom  Pt woke her up during the night He is mentally challenged and he crawled in bed with her c/o pain in his back  Kidney area and from around his stoma for urine  Felt warm  She ws able to get him to take some Tylenol  He has been doing self cathing   She was able to see is urine was more darker than usual  This Am his temp 99.5 ? UTI  He seems back to him normal self  Made himself a big breakfast      Call back needed? Yes    Return Call Needed  Same as documented in contacts section  When to return call?: Greater than one day: Route standard priority     Coordinator called pt's mom, Valerie back. She sates he is much better and thinks he waited too long to self-cath. States he is feeling much better. Pt still has low-grade fever (reports under 100). Coordinator recommended Valerie call pt's PCP to review all his symptoms.    Valerie says pt has not had any f/u COVID testing. Has follow-up with Dr. Bernie Cruz, PCP, on Monday, 2/8/2021 and will discuss to get scheduled.

## 2021-02-24 DIAGNOSIS — I15.1 HYPERTENSION SECONDARY TO OTHER RENAL DISORDERS: ICD-10-CM

## 2021-02-24 RX ORDER — CLONIDINE HYDROCHLORIDE 0.1 MG/1
0.1 TABLET ORAL 2 TIMES DAILY
Qty: 180 TABLET | Refills: 11 | Status: ON HOLD | OUTPATIENT
Start: 2021-02-24 | End: 2023-04-08

## 2021-03-15 ENCOUNTER — TELEPHONE (OUTPATIENT)
Dept: OTOLARYNGOLOGY | Facility: CLINIC | Age: 26
End: 2021-03-15

## 2021-03-15 NOTE — TELEPHONE ENCOUNTER
[facility] Call Center    Phone Message    May a detailed message be left on voicemail: yes     Reason for Call: Other: Pt's mother calling to change Appt, Next Available showing June needs later in the day for a Trach change , Please call Pt's Mother to discuss  Thank you,    Action Taken: Message routed to:  [facility] ([facility] ENT    Travel Screening: Not Applicable

## 2021-03-15 NOTE — TELEPHONE ENCOUNTER
Spoke to patients mother to discuss appointment. Mother states that she has cellulitis and that transportation is difficult right now - therefore would not be able to bring patient to appointment today. Writer expressed empathy to the situation. Offered patient appointment on 5/3 at 430 pm.     Patient's mother reports that patient is not having issues with trach. No difficulty breathing or acute issues. Reports that there is some increased swelling around trach (it is usually swollen, although increased now). Also reports increased sneezing- patient has allergies which is causing this.    Encouraged patient's mother to call with concerns or any problems with tracheostomy. Offered for patient to see our NP for a trach change if need a sooner appointment. Will call back to let us know if she wishes to pursue this option. Denies further questions at this time.    Jessi Baldwin RN on 3/15/2021 at 10:07 AM

## 2021-03-19 RX ORDER — CINACALCET 30 MG/1
30 TABLET, FILM COATED ORAL DAILY
COMMUNITY

## 2021-03-19 NOTE — PROGRESS NOTES
Preoperative Assessment Center Medication History Note    Medication history completed on March 19, 2021 by this writer. See Epic admission navigator for prior to admission medications. Operating room staff will still need to confirm medications and last dose information on day of surgery.     Medication history interview sources:  Patient Interview     Changes made to PTA medication list (reason)  Added:   -- cinacalcet    Deleted:   -- augmentin  -- captorpil tablets - taking suspension     Changed:   -- updated directions for azithromycin (on for prophylaxis)     Additional medication history information (including reliability of information, actions taken by pharmacist):    -- No recent (within 30 days) course of antibiotics  -- No recent (within 30 days) course of systemic steroids  -- Patient declines being on any other prescription or over-the-counter medications    Prior to Admission medications    Medication Sig Last Dose Taking? Auth Provider   albuterol (2.5 MG/3ML) 0.083% nebulizer solution Take 1 ampule by nebulization 4 times daily  Taking Yes Reported, Patient   amLODIPine (NORVASC) 5 MG tablet TAKE 1 TABLET(5 MG) BY MOUTH TWICE DAILY Taking Yes Glen Abel MD   azithromycin (ZITHROMAX) 250 MG tablet Take 250 mg by mouth three times a week Mon, Wed, Fri Taking Yes Reported, Patient   calcium carbonate (OS-SUSAN 500 MG [place]. [place]) 500 MG tablet Take 1 tablet (500 mg) by mouth 3 times daily (with meals) Taking Yes Junaid Hess MD   captopril 0.1 mg/mL (CAPOTEN) 0.1 mg/mL SUSP Take 2.5 mg three times daily (2.5 mg/5ml) Taking Yes Glen Abel MD   cholecalciferol 2000 UNITS tablet Take 2,000 Units by mouth daily Taking Yes Glen Abel MD   cinacalcet (SENSIPAR) 30 MG tablet Take 30 mg by mouth daily Taking Yes Unknown, Entered By History   cloNIDine (CATAPRES) 0.1 MG tablet Take 1 tablet (0.1 mg) by mouth 2 times daily Taking Yes Glen Abel MD  "  famotidine (PEPCID) 20 MG tablet Take 40 mg by mouth 2 times daily  Taking Yes Reported, Patient   ferrous gluconate (FERGON) 324 (38 Fe) MG tablet Take 1 tablet (324 mg) by mouth daily Taking Yes Glen Abel MD   ipratropium (ATROVENT) 0.02 % neb solution Take 0.5 mg by nebulization 4 times daily  Taking Yes Sarah Hi MD   predniSONE (DELTASONE) 1 MG tablet Take 1 mg by mouth every other day  Taking Yes Reported, Patient   sodium bicarbonate 650 MG tablet TAKE 1 TABLET BY MOUTH THREE TIMES DAILY Taking Yes Glen Abel MD   sodium chloride 0.9%, bottle, 0.9 % irrigation Irrigate with as directed 3 times daily Bladder irrigation Taking Yes Reported, Patient   acetaminophen (TYLENOL) 500 MG tablet Take 500 mg by mouth Not Taking  Reported, Patient   Alum Hydroxide-Mag Trisilicate 80-20 MG CHEW Take 1,250 mg by mouth as needed  Not Taking  Reported, Patient   mupirocin (BACTROBAN) 2 % external ointment Apply topically daily as needed  Not Taking  Reported, Patient   Nutritional Supplements (NEPRO) LIQD Take 1 Can by mouth daily   Glen Abel MD   order for DME Equipment being ordered: Medi-Vac, plastic tubing connector lara type, cat 361, [facility] [employer], 1 lara tip/month x 12 months. Use for tracheostomy suctioning   Bebe Dumas MD   order for DME Equipment being ordered: DME    Adult Bivona Uncuffed Tracheostomy Tube-5.0    Patient will do weekly trach changes.   Bebe Dumas MD   order for DME Equipment being ordered: DME -    Deep suction kits - 12 fr   Bebe Dumas MD   order for DME Tracheostomy Humidification Equipment  Equipment being ordered:     1) Air compressor  2) Nebulizer bottle  3) Aerosol tubing  4) Trach mask  5) Sterile water  6) Saline ampules (\"bullets\")  7) Heat Moisture Exchanger (HME) Also known by several other terms including: Thermal Humidifying Filters, Swedish nose, " Artificial nose, Filter, Thermovent T.  8) Room/home humidifiers   Bebe Dumas MD   order for DME Optifoam Basic   [id number]   Bebe Dumas MD   order for DME Equipment being ordered: Humidifier (heated), humidifier attachment, saline lavages, humidivent mask   Bebe Dumas MD   tobramycin, PF, (DAX) 300 MG/5ML nebulizer solution Take 1 ampule by nebulization as needed Not Taking  Reported, Patient   traZODone (DESYREL) 50 MG tablet Take 25 mg by mouth Not Taking  Reported, Patient           Medication history completed by: Lul Desai RP

## 2021-03-22 ENCOUNTER — ANCILLARY PROCEDURE (OUTPATIENT)
Dept: CARDIOLOGY | Facility: CLINIC | Age: 26
End: 2021-03-22
Attending: PHYSICIAN ASSISTANT
Payer: MEDICARE

## 2021-03-22 ENCOUNTER — ANESTHESIA EVENT (OUTPATIENT)
Dept: SURGERY | Facility: CLINIC | Age: 26
End: 2021-03-22

## 2021-03-22 ENCOUNTER — OFFICE VISIT (OUTPATIENT)
Dept: SURGERY | Facility: CLINIC | Age: 26
End: 2021-03-22
Attending: PHYSICIAN ASSISTANT
Payer: MEDICARE

## 2021-03-22 ENCOUNTER — OFFICE VISIT (OUTPATIENT)
Dept: SURGERY | Facility: CLINIC | Age: 26
End: 2021-03-22
Payer: MEDICARE

## 2021-03-22 VITALS
OXYGEN SATURATION: 97 % | SYSTOLIC BLOOD PRESSURE: 107 MMHG | HEART RATE: 75 BPM | DIASTOLIC BLOOD PRESSURE: 66 MMHG | BODY MASS INDEX: 20.01 KG/M2 | HEIGHT: 61 IN | WEIGHT: 106 LBS | RESPIRATION RATE: 16 BRPM | TEMPERATURE: 97.4 F

## 2021-03-22 DIAGNOSIS — Z01.818 PRE-TRANSPLANT EVALUATION FOR KIDNEY TRANSPLANT: ICD-10-CM

## 2021-03-22 DIAGNOSIS — N18.4 CHRONIC RENAL FAILURE, STAGE 4 (SEVERE) (H): ICD-10-CM

## 2021-03-22 DIAGNOSIS — N18.9 CHRONIC KIDNEY DISEASE: ICD-10-CM

## 2021-03-22 DIAGNOSIS — Z76.82 ORGAN TRANSPLANT CANDIDATE: ICD-10-CM

## 2021-03-22 DIAGNOSIS — Z01.818 PREOP EXAMINATION: Primary | ICD-10-CM

## 2021-03-22 DIAGNOSIS — J98.4 DISEASE OF LUNG: ICD-10-CM

## 2021-03-22 DIAGNOSIS — Z01.818 PRE-OP EVALUATION: Primary | ICD-10-CM

## 2021-03-22 DIAGNOSIS — N18.4 CKD (CHRONIC KIDNEY DISEASE) STAGE 4, GFR 15-29 ML/MIN (H): ICD-10-CM

## 2021-03-22 PROCEDURE — 99204 OFFICE O/P NEW MOD 45 MIN: CPT | Performed by: PHYSICIAN ASSISTANT

## 2021-03-22 PROCEDURE — 93306 TTE W/DOPPLER COMPLETE: CPT | Performed by: INTERNAL MEDICINE

## 2021-03-22 ASSESSMENT — PAIN SCALES - GENERAL: PAINLEVEL: NO PAIN (0)

## 2021-03-22 ASSESSMENT — LIFESTYLE VARIABLES: TOBACCO_USE: 0

## 2021-03-22 ASSESSMENT — MIFFLIN-ST. JEOR: SCORE: 1337.68

## 2021-03-22 NOTE — CONSULTS
Anesthesia Consult Note      Reason for consult: possible future kidney transplant     Date of Encounter: 3/22/2021  Referring Physician: transplant team  Primary Care Physician:  Yasmeen Irving  Aidan Louie is a 25 year old male who presents for anesthesia consult in preparation for possible future kidney transplant on date TBD at [facility]. Patient is being evaluated for comorbid conditions of hypertension, mandibular hypoplasia with trach dependence, restrictive lung disease, neurogenic bladder s/p mitrofinoff, prune belly syndrome, maninder reece sequence       Mr. 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 He follows in nephrology with Dr. Abel.  He has a history of recurrent UTI. His creatinine has been worsening over the past couple years. He has been seen by the transplant team to discuss future kidney transplant. Of note, he has a history of reconstructed bladder and ureter and follows with Dr. Billy. 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 Dumas.      History is obtained from the patient and chart review.      Past Medical History  Past Medical History:   Diagnosis Date     Bilateral reflux nephropathy      CKD (chronic kidney disease) stage 4, GFR 15-29 ml/min (H)      Hearing loss      Hypertension      Kyphosis 2002     Maninder Reece sequence      Prune belly syndrome      Recurrent UTI      Respiratory bronchiolitis interstitial lung disease (H)      Restrictive lung disease      Scoliosis 2002     Thyroid disease      Tracheostomy dependence (H)        Past Surgical History  Past Surgical History:   Procedure Laterality Date     Bilateral ureteral reimplantation  5/16/2002    abdominoplasty, Mitrofanoff creation     CYSTOSCOPY  11/14/11     EXAM UNDER ANESTHESIA THROAT  6/10/2003    tonsillar hypertrophy     EXAM UNDER ANESTHESIA, RESTORATIONS, EXTRACTION(S) DENTAL COMPLEX, COMBINED  6/5/2006      GASTROSTOMY TUBE       GASTROSTOMY TUBE  3/16/2002    button change     HERNIORRHAPHY INGUINAL BILATERAL  99    left ochiopexy     LARYNGOSCOPY, BRONCHOSCOPY, COMBINED  2002     LARYNGOSCOPY, BRONCHOSCOPY, COMBINED  2002    bilateral ear debridement     LARYNGOSCOPY, BRONCHOSCOPY, COMBINED  2003     LARYNGOSCOPY, BRONCHOSCOPY, COMBINED  2007    Failed Deflux injection     LARYNGOSCOPY, BRONCHOSCOPY, COMBINED  12      VESICOSTOMY       Redo right ureteral reimplantation  3/12/2004    Excision bladder diverticuli, Left to Right pyelopyelostomy     Replacement of trach  10/15/12     Takedown and revision of Mitrofanoff stoma  2006     TRACHEOSTOMY INFANT           Prior to Admission Medications  Current Outpatient Medications   Medication Sig Dispense Refill     acetaminophen (TYLENOL) 500 MG tablet Take 500 mg by mouth every 8 hours as needed        albuterol (2.5 MG/3ML) 0.083% nebulizer solution Take 1 ampule by nebulization 4 times daily        Alum Hydroxide-Mag Trisilicate 80-20 MG CHEW Take 1,250 mg by mouth as needed        amLODIPine (NORVASC) 5 MG tablet TAKE 1 TABLET(5 MG) BY MOUTH TWICE DAILY 180 tablet 3     azithromycin (ZITHROMAX) 250 MG tablet Take 250 mg by mouth three times a week Mon, Wed, Fri       calcium carbonate (OS-SUSAN 500 MG [place]. [place]) 500 MG tablet Take 1 tablet (500 mg) by mouth 3 times daily (with meals) 90 tablet 6     captopril 0.1 mg/mL (CAPOTEN) 0.1 mg/mL SUSP Take 2.5 mg three times daily (2.5 mg/5ml) 460 mL 11     cholecalciferol 2000 UNITS tablet Take 2,000 Units by mouth daily 30 tablet 11     cinacalcet (SENSIPAR) 30 MG tablet Take 30 mg by mouth daily       cloNIDine (CATAPRES) 0.1 MG tablet Take 1 tablet (0.1 mg) by mouth 2 times daily 180 tablet 11     famotidine (PEPCID) 20 MG tablet Take 40 mg by mouth 2 times daily        ferrous gluconate (FERGON) 324 (38 Fe) MG tablet Take 1 tablet (324 mg) by mouth daily 90 tablet 11      "ipratropium (ATROVENT) 0.02 % neb solution Take 0.5 mg by nebulization 4 times daily  225 mL      mupirocin (BACTROBAN) 2 % external ointment Apply topically daily as needed        Nutritional Supplements (NEPRO) LIQD Take 1 Can by mouth daily 30 each 11     order for DME Equipment being ordered: Medi-Vac, plastic tubing connector lara type, cat 361, [employer], 1 lara tip/month x 12 months. Use for tracheostomy suctioning 1 each 11     order for DME Equipment being ordered: DME    Adult Bivona Uncuffed Tracheostomy Tube-5.0    Patient will do weekly trach changes. 4 Units 11     order for DME Equipment being ordered: DME -    Deep suction kits - 12 fr 90 each 3     order for DME Tracheostomy Humidification Equipment  Equipment being ordered:     1) Air compressor  2) Nebulizer bottle  3) Aerosol tubing  4) Trach mask  5) Sterile water  6) Saline ampules (\"bullets\")  7) Heat Moisture Exchanger (HME) Also known by several other terms including: Thermal Humidifying Filters, Swedish nose, Artificial nose, Filter, Thermovent T.  8) Room/home humidifiers 1 each 11     order for DME Optifoam Basic   [id number] 30 each 11     order for DME Equipment being ordered: Humidifier (heated), humidifier attachment, saline lavages, humidivent mask 1 each 3     predniSONE (DELTASONE) 1 MG tablet Take 1 mg by mouth every other day   6     sodium bicarbonate 650 MG tablet TAKE 1 TABLET BY MOUTH THREE TIMES DAILY 270 tablet 0     sodium chloride 0.9%, bottle, 0.9 % irrigation Irrigate with as directed 3 times daily Bladder irrigation       tobramycin, PF, (DAX) 300 MG/5ML nebulizer solution Take 1 ampule by nebulization as needed       traZODone (DESYREL) 50 MG tablet Take 25 mg by mouth nightly as needed          Allergies  Allergies   Allergen Reactions     Latex      Latex      Other reaction(s): Other (see comments)  Other reaction(s): Contact Dermatitis, Other (see comments)     Adhesive Tape Rash "       Social History  Social History     Socioeconomic History     Marital status: Single     Spouse name: Not on file     Number of children: Not on file     Years of education: Not on file     Highest education level: Not on file   Occupational History     Not on file   Social Needs     Financial resource strain: Not on file     Food insecurity     Worry: Not on file     Inability: Not on file     Transportation needs     Medical: Not on file     Non-medical: Not on file   Tobacco Use     Smoking status: Never Smoker     Smokeless tobacco: Never Used   Substance and Sexual Activity     Alcohol use: No     Alcohol/week: 0.0 standard drinks     Drug use: No     Sexual activity: Not on file   Lifestyle     Physical activity     Days per week: Not on file     Minutes per session: Not on file     Stress: Not on file   Relationships     Social connections     Talks on phone: Not on file     Gets together: Not on file     Attends Restorationist service: Not on file     Active member of club or organization: Not on file     Attends meetings of clubs or organizations: Not on file     Relationship status: Not on file     Intimate partner violence     Fear of current or ex partner: Not on file     Emotionally abused: Not on file     Physically abused: Not on file     Forced sexual activity: Not on file   Other Topics Concern     Parent/sibling w/ CABG, MI or angioplasty before 65F 55M? Not Asked   Social History Narrative     Not on file       Family History  Family History   Problem Relation Age of Onset     Diabetes Type 2  Mother      Diabetes Type 2  Maternal Grandmother      Deep Vein Thrombosis (DVT) Maternal Grandmother      Diabetes Type 2  Maternal Grandfather      Diabetes Type 2  Paternal Grandmother      Alzheimer Disease Paternal Grandfather      Thyroid Disease Maternal Aunt         hashimoto     Anesthesia Reaction Maternal Aunt         patients mom reports her sister had an allergic reaction to something during a  "previous surgery     Thyroid Cancer Maternal Great-Grandmother        Review of Systems    The complete review of systems is negative other than noted in the HPI or here.   Anesthesia Evaluation   Pt has had prior anesthetic. Type: General.    No history of anesthetic complications       ROS/MED HX  ENT/Pulmonary: Comment: Dawood Newell samuel- trach in place    Restrictive lung disease- uses 1-2 L O2 at night    COVID positive 10/2020- able to treat at home   (-) tobacco use   Neurologic:  - neg neurologic ROS     Cardiovascular:     (+) hypertension-----Previous cardiac testing   Echo: Date: 2008 Results:  See results in care everywhere. EF 61%, no significant valve abnormalities   Stress Test: Date: Results:    ECG Reviewed: Date: 7/2020 Results:  SR with short [place]  Cath: Date: Results:   (-) taking anticoagulants/antiplatelets   METS/Exercise Tolerance: 3 - Able to walk 1-2 blocks without stopping Comment: Able to ascend a flight of stairs without SAPP, was a runner/  age 10-21   Hematologic:     (+) anemia,  (-) history of blood transfusion   Musculoskeletal: Comment: Scoliosis/ kyphosis      GI/Hepatic: Comment: g tube in place, not currently being used    (+) GERD, Asymptomatic on medication,     Renal/Genitourinary: Comment: Prune Belly syndrome  Under evaluation for future kidney transplant    H/o recurrent UTI  Neurogenic bladder s/p mitrofinoff    (+) renal disease, type: CRI, Pt does not require dialysis,     Endo:     (+) Chronic steroid usage for     Psychiatric/Substance Use:  - neg psychiatric ROS     Infectious Disease:  - neg infectious disease ROS     Malignancy:  - neg malignancy ROS     Other:      (+) , other significant disability Disability list: hearing loss.           Temp: 97.4  F (36.3  C) Temp src: Oral BP: 107/66 Pulse: 75   Resp: 16 SpO2: 97 %         106 lbs 0 oz  5' 1.22\"   Body mass index is 19.88 kg/m .       Physical Exam    Constitutional: Awake, alert, " cooperative, no apparent distress, and appears stated age.  Eyes: Pupils equal, round and reactive to light  HENT: Normocephalic, oral pharynx with moist mucus membranes. Trach in place.   Respiratory: Clear to auscultation bilaterally, no crackles or wheezing.  Cardiovascular: Regular rate and rhythm, normal S1 and S2, and no murmur noted.   No edema. Palpable pulses to radial arteries.   GI: Normal bowel sounds, soft, non-distended, non-tender  Skin: Warm and dry.    Musculoskeletal: There is no redness, warmth, or swelling of the exposed joints.   Neurologic: Awake, alert, oriented to name, place and time. Cranial nerves II-XII are grossly intact.   Neuropsychiatric: Calm, cooperative. Normal affect.     Labs / testing: (personally reviewed)  Component      Latest Ref Rng & Units 11/30/2020   Sodium      133 - 144 mmol/L 140   Potassium      3.4 - 5.3 mmol/L 5.1   Chloride      94 - 109 mmol/L 111 (H)   Carbon Dioxide      20 - 32 mmol/L 22   Anion Gap      3 - 14 mmol/L 8   Glucose      70 - 99 mg/dL 84   Urea Nitrogen      7 - 30 mg/dL 61 (H)   Creatinine      0.66 - 1.25 mg/dL 4.80 (H)   GFR Estimate      >60 mL/min/1.73:m2 16 (L)   GFR Estimate If Black      >60 mL/min/1.73:m2 18 (L)   Calcium      8.5 - 10.1 mg/dL 9.3   Phosphorus      2.5 - 4.5 mg/dL 5.4 (H)   Albumin      3.4 - 5.0 g/dL 3.7   WBC      4.0 - 11.0 10e9/L 8.3   RBC Count      4.4 - 5.9 10e12/L 3.57 (L)   Hemoglobin      13.3 - 17.7 g/dL 10.3 (L)   Hematocrit      40.0 - 53.0 % 32.7 (L)   MCV      78 - 100 fl 92   MCH      26.5 - 33.0 pg 28.9   MCHC      31.5 - 36.5 g/dL 31.5   RDW      10.0 - 15.0 % 13.5   Platelet Count      150 - 450 10e9/L 332   Ferritin      26 - 388 ng/mL 43       EKG 7/2020   SR with short [place]      ECHO 2008   See results in care everywhere. EF 61%, no significant valve abnormalities          Outside records reviewed from: care everywhere    ASSESSMENT and PLAN  Aidan Louie is a 25 year old male not yet scheduled  for kidney transplant on date TBD in treatment of CKD.  PAC referral for risk assessment and optimization for anesthesia with comorbid conditions of hypertension, mandibular hypoplasia with trach dependence, neurogenic bladder s/p mitrofinoff, prune belly syndrome, maninder en sequence:      Pre-operative considerations:   1.  Cardiac:  Functional status- METS 4. denies cardiac symptoms.  hypertension using amlodipine. Controlled. Patient will complete ECHO later today per transplant team. high risk surgery with 6.6% (RCRI #) risk of major adverse cardiac event.      2.  Pulm:  mandibular hypoplasia in the setting of Maninder En sequence. trach in place. Follows with Dr. Dumas and will be seen for follow up 5/1/21.  Restrictive lung disease using albuterol and ipratropium nebs, prednisone 1 mg every other day. Followed by Dr. Baez at outside facility most recently 9/2020- stable with recommendations to follow up sometime this spring. Patient mother reports he will be seen in April by his pulmonologist. Patient's mother will ask ferguson his ENT and pulmonologist if there are any further recommendations for optimization prior to a future kidney transplant      3.  GI:  Risk of PONV score = 1.  If > 2, anti-emetic intervention recommended. GERD using Pepcid.  G tube in place- not currently using.      4.  : CKD followed by Dr. Abel. He is due for follow up with nephrology- his mother reports she will schedule ASAP. Under consideration for future kidney transplant.    ~metabolic acidosis using sodium bicarbonate    ~recurrent UTIs. Followed by Dr. Billy   ~neurogenic bladder s/p mitrofinoff. now self catheterization. followed with Dr. Billy- will be seen for follow up 4/19/21   ~patients mother will ask urology and nephrology if there are any further recommendations for optimization prior to a future kidney transplant     5.  heme: anemia of CKD- using ferrous gluconate.  hgb was 10/3 11/2020     VTE  risk: 0.5%      Patient discussed with Dr. Hameed       50 minutes were spent completing chart review, seeing the patient, reviewing labs and test results, discussing patient care with anesthesia and completing documentation         Sharon Mancia PA-C      Preoperative Assessment Center  [facility]  Office phone: [phone]  Fax: [phone]

## 2021-03-22 NOTE — PATIENT INSTRUCTIONS
When you see your pulmonologist in April, ENT in May, Nephrologist in April and urologist in April, please ask about optimization prior to a future kidney transplant

## 2021-03-22 NOTE — ANESTHESIA PREPROCEDURE EVALUATION
Anesthesia Pre-Procedure Evaluation    Patient: Aidan Louie   MRN: [id number] : 1995        Preoperative Diagnosis: * No surgery found *   Procedure :      Past Medical History:   Diagnosis Date     Bilateral reflux nephropathy      CKD (chronic kidney disease) stage 4, GFR 15-29 ml/min (H)      Hearing loss      Hypertension      Kyphosis      Dawood Reece sequence      Prune belly syndrome      Recurrent UTI      Respiratory bronchiolitis interstitial lung disease (H)      Restrictive lung disease      Scoliosis      Thyroid disease      Tracheostomy dependence (H)       Past Surgical History:   Procedure Laterality Date     Bilateral ureteral reimplantation  2002    abdominoplasty, Mitrofanoff creation     CYSTOSCOPY  11     EXAM UNDER ANESTHESIA THROAT  6/10/2003    tonsillar hypertrophy     EXAM UNDER ANESTHESIA, RESTORATIONS, EXTRACTION(S) DENTAL COMPLEX, COMBINED  2006     GASTROSTOMY TUBE       GASTROSTOMY TUBE  3/16/2002    button change     HERNIORRHAPHY INGUINAL BILATERAL  99    left ochiopexy     LARYNGOSCOPY, BRONCHOSCOPY, COMBINED  2002     LARYNGOSCOPY, BRONCHOSCOPY, COMBINED  2002    bilateral ear debridement     LARYNGOSCOPY, BRONCHOSCOPY, COMBINED  2003     LARYNGOSCOPY, BRONCHOSCOPY, COMBINED  2007    Failed Deflux injection     LARYNGOSCOPY, BRONCHOSCOPY, COMBINED  12      VESICOSTOMY       Redo right ureteral reimplantation  3/12/2004    Excision bladder diverticuli, Left to Right pyelopyelostomy     Replacement of trach  10/15/12     Takedown and revision of Mitrofanoff stoma  2006     TRACHEOSTOMY INFANT        Allergies   Allergen Reactions     Latex      Latex      Other reaction(s): Other (see comments)  Other reaction(s): Contact Dermatitis, Other (see comments)     Adhesive Tape Rash      Social History     Tobacco Use     Smoking status: Never Smoker     Smokeless tobacco: Never Used   Substance Use Topics      Alcohol use: No     Alcohol/week: 0.0 standard drinks      Wt Readings from Last 1 Encounters:   10/15/20 47.4 kg (104 lb 6.4 oz)        Anesthesia Evaluation   Pt has had prior anesthetic. Type: General.    No history of anesthetic complications       ROS/MED HX  ENT/Pulmonary: Comment: Dawood Newell sequence- trach in place    Restrictive lung disease- uses 1-2 L O2 at night    COVID positive 10/2020- able to treat at home   (-) tobacco use   Neurologic:  - neg neurologic ROS     Cardiovascular:     (+) hypertension-----Previous cardiac testing   Echo: Date: 2008 Results:  See results in care everywhere. EF 61%, no significant valve abnormalities   Stress Test: Date: Results:    ECG Reviewed: Date: 7/2020 Results:  SR with short IN  Cath: Date: Results:   (-) taking anticoagulants/antiplatelets   METS/Exercise Tolerance: 3 - Able to walk 1-2 blocks without stopping Comment: Able to ascend a flight of stairs without SAPP, was a runner/  age 10-21   Hematologic:     (+) anemia,  (-) history of blood transfusion   Musculoskeletal: Comment: Scoliosis/ kyphosis      GI/Hepatic: Comment: g tube in place, not currently being used    (+) GERD, Asymptomatic on medication,     Renal/Genitourinary: Comment: Prune Belly syndrome  Under evaluation for future kidney transplant    H/o recurrent UTI  Neurogenic bladder s/p mitrofinoff    (+) renal disease, type: CRI, Pt does not require dialysis,     Endo:     (+) Chronic steroid usage for     Psychiatric/Substance Use:  - neg psychiatric ROS     Infectious Disease:  - neg infectious disease ROS     Malignancy:  - neg malignancy ROS     Other:      (+) , other significant disability Disability list: hearing loss.            OUTSIDE LABS:  CBC:   Lab Results   Component Value Date    WBC 8.3 11/30/2020    WBC 9.8 10/15/2020    HGB 10.3 (L) 11/30/2020    HGB 10.8 (L) 10/15/2020    HCT 32.7 (L) 11/30/2020    HCT 34.0 (L) 10/15/2020     11/30/2020      10/15/2020     BMP:   Lab Results   Component Value Date     11/30/2020     10/15/2020    POTASSIUM 5.1 11/30/2020    POTASSIUM 4.6 10/15/2020    CHLORIDE 111 (H) 11/30/2020    CHLORIDE 109 10/15/2020    CO2 22 11/30/2020    CO2 21 10/15/2020    BUN 61 (H) 11/30/2020    BUN 73 (H) 10/15/2020    CR 4.80 (H) 11/30/2020    CR 5.10 (H) 10/15/2020    GLC 84 11/30/2020    GLC 94 10/15/2020     COAGS:   Lab Results   Component Value Date    PTT 31 10/15/2020    INR 1.03 10/15/2020     POC: No results found for: BGM, HCG, HCGS  HEPATIC:   Lab Results   Component Value Date    ALBUMIN 3.7 11/30/2020    PROTTOTAL 7.4 10/15/2020    ALT 15 10/15/2020    AST 10 10/15/2020    ALKPHOS 94 10/15/2020    BILITOTAL 0.2 10/15/2020     OTHER:   Lab Results   Component Value Date    SUSAN 9.3 11/30/2020    PHOS 5.4 (H) 11/30/2020    MAG 2.1 09/17/2015    TSH 1.51 06/05/2019    T4 0.8 06/05/2019    CRP <2.9 09/17/2015             PAC Discussion and Assessment                          PAC Resident/NP Anesthesia Assessment:                 Reviewed and Signed by PAC Anesthesiologist  Anesthesiologist: Yoseph  Date: 3/22/21                     Sharon Mancia PA-C

## 2021-04-06 ENCOUNTER — TRANSFERRED RECORDS (OUTPATIENT)
Dept: HEALTH INFORMATION MANAGEMENT | Facility: CLINIC | Age: 26
End: 2021-04-06

## 2021-04-13 ENCOUNTER — PRE VISIT (OUTPATIENT)
Dept: UROLOGY | Facility: CLINIC | Age: 26
End: 2021-04-13

## 2021-04-13 NOTE — TELEPHONE ENCOUNTER
Reason for visit: Symptom check     Relevant information: Chanel, CIC    Records/imaging/labs/orders: all records available    Pt called: no need for a call

## 2021-04-19 ENCOUNTER — VIRTUAL VISIT (OUTPATIENT)
Dept: UROLOGY | Facility: CLINIC | Age: 26
End: 2021-04-19
Payer: MEDICARE

## 2021-04-19 DIAGNOSIS — N31.9 NEUROGENIC BLADDER: Primary | ICD-10-CM

## 2021-04-19 DIAGNOSIS — N18.4 CKD (CHRONIC KIDNEY DISEASE) STAGE 4, GFR 15-29 ML/MIN (H): ICD-10-CM

## 2021-04-19 PROCEDURE — 99442 PR PHYSICIAN TELEPHONE EVALUATION 11-20 MIN: CPT | Mod: 95 | Performed by: UROLOGY

## 2021-04-19 ASSESSMENT — PAIN SCALES - GENERAL: PAINLEVEL: NO PAIN (0)

## 2021-04-19 NOTE — PROGRESS NOTES
HPI:  From prior note:  Aidan Louie is a 24 year old male  with Prune Belly and Dawood Reece syndrome who has prior megaureters and ureteral augment and left to right TUU who has managed with clean intermittent catheterization for years through a prolapsed Mitrofanoff. I have seen him before at [place].     But then developed CRI and is now wearing indwelling urethral Aguirre. He used to do the CIC on his own. Indwelling Aguirre was because he was not always reliably catheterizing, especially when Mom was not around to remind him. Then they noticed that his Cr got better with the indwelling Aguirre in Mitrofanoff so they eventually migrated to wearing the indwelling all the time. But now he is home all the time he could catheterize more often.      Mom emphasizes that the UTIs were less with the indwelling Aguirre but I emphasized I'd rather know about UTI problems before the transplant and address the root cause.      No kidney or bladder infections for 2 years with Aguirre in.     Currently making normal amounts of urine. Cr 4 to 5. He is listed for renal transplant. No available donor.    Interval history:     Has been doing clean intermittent catheterization day and night and taking cranberry pills for UTIs over the last 5 months. Has only had one UTI in that interval. Catheterizing on his own.     Getting mucous on shirt from prolapsed stoma. No incontinence from stoma.       accompanied by his mother:      Exam:  There were no vitals taken for this visit.  A full physical exam could not be performed as this was a telephone visit  General:  Alert and oriented  // Respiratory: No coughing, wheezing, or shortness of breath // Psychiatric: Normal affect, tone, and pace of words    Review of Imaging:  The following imaging exams were independently viewed and interpreted by me and discussed with patient:  None new    Review of Labs:  The following labs were reviewed by me and discussed with the patient:  None  new    Assessment & Plan   Doing well with CIC per stoma and has had no significant UTIs. Mom asked if stoma should be revised for prolapse -- I advised against that.     He is clear for transplant from my perspective.     Tai Billy MD  [facility] [place]      ==========================    Additional Billing and Coding Information:      20 minutes spent on the date of the encounter doing chart review, history and exam, documentation and further activities per the note    ==========================        Aidan is a 25 year old who is being evaluated via a billable telephone visit.      What phone number would you like to be contacted at? [phone]  How would you like to obtain your AVS? Mail a copy  Phone call duration: 15 minutes

## 2021-04-19 NOTE — LETTER
4/19/2021       RE: Aidan Louie  [address] [place] [address]     Dear Colleague,    Thank you for referring your patient, Aidan Louie, to the [facility] [place] at [facility]. Please see a copy of my visit note below.    HPI:  From prior note:  Aidan Louie is a 24 year old male  with Prune Belly and Dawood Reece syndrome who has prior megaureters and ureteral augment and left to right TUU who has managed with clean intermittent catheterization for years through a prolapsed Mitrofanoff. I have seen him before at [place].     But then developed CRI and is now wearing indwelling urethral Aguirre. He used to do the CIC on his own. Indwelling Aguirre was because he was not always reliably catheterizing, especially when Mom was not around to remind him. Then they noticed that his Cr got better [facility] so they eventually migrated to wearing the indwelling all the time. But now he is home all the time he could catheterize more often.      Mom emphasizes that the UTIs were less with the indwelling Aguirre but I emphasized I'd rather know about UTI problems before the transplant and address the root cause.      No kidney or bladder infections for 2 years with Aguirre in.     Currently making normal amounts of urine. Cr 4 to 5. He is listed for renal transplant. No available donor.    Interval history:     Has been doing clean intermittent catheterization day and night and taking cranberry pills for UTIs over the last 5 months. Has only had one UTI in that interval. Catheterizing on his own.     Getting mucous on shirt from prolapsed stoma. No incontinence from stoma.       accompanied by his mother:      Exam:  There were no vitals taken for this visit.  A full physical exam could not be performed as this was a telephone visit  General:  Alert and oriented  // Respiratory: No coughing, wheezing, or  shortness of breath // Psychiatric: Normal affect, tone, and pace of words    Review of Imaging:  The following imaging exams were independently viewed and interpreted by me and discussed with patient:  None new    Review of Labs:  The following labs were reviewed by me and discussed with the patient:  None new    Assessment & Plan   Doing well with CIC per stoma and has had no significant UTIs. Mom asked if stoma should be revised for prolapse -- I advised against that.     He is clear for transplant from my perspective.     Tai Billy MD  [facility] UROLOGY [facility]IN[facility] [place]      ==========================    Additional Billing and Coding Information:      20 minutes spent on the date of the encounter doing chart review, history and exam, documentation and further activities per the note    ==========================        Aidan is a 25 year old who is being evaluated via a billable telephone visit.      What phone number would you like to be contacted at? [phone]  How would you like to obtain your AVS? Mail a copy  Phone call duration: 15 minutes

## 2021-05-03 ENCOUNTER — TELEPHONE (OUTPATIENT)
Dept: OTOLARYNGOLOGY | Facility: CLINIC | Age: 26
End: 2021-05-03

## 2021-05-03 NOTE — TELEPHONE ENCOUNTER
Spoke to patients mother Valerie in regards to rescheduling appointment originally scheduled for today. Advised mom that provider suggested pt see our NP for his trach change as she would have sooner availability. pts mom was in agreement to this and pt has been scheduled.

## 2021-05-03 NOTE — TELEPHONE ENCOUNTER
[facility] Call Center    Phone Message    May a detailed message be left on voicemail: yes     Reason for Call: Other: Pts mom had to cancel Pts Appt today and would like Pt to be seen sooner than the next available Appt that Dr. Dumas has available. Please call Pts mom back to discuss. Thanks!     Action Taken: Message routed to:  [facility] ([facility] ENT    Travel Screening: Not Applicable

## 2021-06-07 ENCOUNTER — DOCUMENTATION ONLY (OUTPATIENT)
Dept: OTOLARYNGOLOGY | Facility: CLINIC | Age: 26
End: 2021-06-07

## 2021-06-07 ENCOUNTER — OFFICE VISIT (OUTPATIENT)
Dept: OTOLARYNGOLOGY | Facility: CLINIC | Age: 26
End: 2021-06-07
Payer: MEDICARE

## 2021-06-07 VITALS
TEMPERATURE: 97.3 F | HEART RATE: 71 BPM | HEIGHT: 61 IN | BODY MASS INDEX: 18.86 KG/M2 | WEIGHT: 99.87 LBS | OXYGEN SATURATION: 99 %

## 2021-06-07 DIAGNOSIS — Z93.0 TRACHEOSTOMY IN PLACE (H): Primary | ICD-10-CM

## 2021-06-07 DIAGNOSIS — J39.8 INCREASED TRACHEAL SECRETIONS: ICD-10-CM

## 2021-06-07 LAB
GRAM STN SPEC: NORMAL
GRAM STN SPEC: NORMAL
Lab: NORMAL
SPECIMEN SOURCE: NORMAL

## 2021-06-07 PROCEDURE — 87205 SMEAR GRAM STAIN: CPT | Performed by: PATHOLOGY

## 2021-06-07 PROCEDURE — 31615 TRCHEOBRNCHSC EST TRACHS INC: CPT | Performed by: REGISTERED NURSE

## 2021-06-07 PROCEDURE — 99213 OFFICE O/P EST LOW 20 MIN: CPT | Mod: 25 | Performed by: REGISTERED NURSE

## 2021-06-07 ASSESSMENT — MIFFLIN-ST. JEOR: SCORE: 1306.38

## 2021-06-07 ASSESSMENT — PAIN SCALES - GENERAL: PAINLEVEL: NO PAIN (0)

## 2021-06-07 NOTE — NURSING NOTE
"Chief Complaint   Patient presents with     RECHECK     trach change       Pulse 71, temperature 97.3  F (36.3  C), temperature source Temporal, height 1.549 m (5' 1\"), weight 45.3 kg (99 lb 13.9 oz), SpO2 99 %.    Maurisio Quach, EMT  "

## 2021-06-07 NOTE — PROGRESS NOTES
June 7, 2021     HPI: Aidan Louie is a 25 year old male who presents today with his mother for a tracheostomy change. 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     He is a longtime patient of Dr. Ballesteros and has had a trach for many years due to mandibular hypoplasia. Per report, a number of attempts toward decannulation had been made but was decided to keep trach in place due to airway concerns..     He currently has a 5-0 Bivona in place. His mother has been completing monthly trach changes. He has not had his trach changed in the last month due to his mother being hospitalized for cellulitis. He is here for a trach change and assessment of stoma due to worsening granulation tissue around stoma.     Mother reports increased trach secretions in the last couple of weeks. Granulation tissue around trach has also increased in size.     Patient denies pain, bleeding or shortness of breathe. Denies fever/chills or redness at trach site.    Past Medical History:  Past Medical History:   Diagnosis Date     Bilateral reflux nephropathy      CKD (chronic kidney disease) stage 4, GFR 15-29 ml/min (H)      Hearing loss      Hypertension      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      Prune belly syndrome      Recurrent UTI      Respiratory bronchiolitis interstitial lung disease (H)      Restrictive lung disease      Scoliosis 2002     Thyroid disease      Tracheostomy dependence (H)        Past Surgical History:  Past Surgical History:   Procedure Laterality Date     Bilateral ureteral reimplantation  5/16/2002    abdominoplasty, Mitrofanoff creation     CYSTOSCOPY  11/14/11     EXAM UNDER ANESTHESIA THROAT  6/10/2003    tonsillar hypertrophy     EXAM UNDER ANESTHESIA, RESTORATIONS, EXTRACTION(S) DENTAL  COMPLEX, COMBINED  2006     GASTROSTOMY TUBE       GASTROSTOMY TUBE  3/16/2002    button change     HERNIORRHAPHY INGUINAL BILATERAL  99    left ochiopexy     LARYNGOSCOPY, BRONCHOSCOPY, COMBINED  2002     LARYNGOSCOPY, BRONCHOSCOPY, COMBINED  2002    bilateral ear debridement     LARYNGOSCOPY, BRONCHOSCOPY, COMBINED  2003     LARYNGOSCOPY, BRONCHOSCOPY, COMBINED  2007    Failed Deflux injection     LARYNGOSCOPY, BRONCHOSCOPY, COMBINED  12      VESICOSTOMY       Redo right ureteral reimplantation  3/12/2004    Excision bladder diverticuli, Left to Right pyelopyelostomy     Replacement of trach  10/15/12     Takedown and revision of Mitrofanoff stoma  2006     TRACHEOSTOMY INFANT       Medications:  Current Outpatient Medications   Medication Sig Dispense Refill     acetaminophen (TYLENOL) 500 MG tablet Take 500 mg by mouth every 8 hours as needed        albuterol (2.5 MG/3ML) 0.083% nebulizer solution Take 1 ampule by nebulization 4 times daily        Alum Hydroxide-Mag Trisilicate 80-20 MG CHEW Take 1,250 mg by mouth as needed        amLODIPine (NORVASC) 5 MG tablet TAKE 1 TABLET(5 MG) BY MOUTH TWICE DAILY 180 tablet 3     azithromycin (ZITHROMAX) 250 MG tablet Take 250 mg by mouth three times a week Mon, Wed, Fri       calcium carbonate (OS-SUSAN 500 MG [place]. [place]) 500 MG tablet Take 1 tablet (500 mg) by mouth 3 times daily (with meals) 90 tablet 6     captopril 0.1 mg/mL (CAPOTEN) 0.1 mg/mL SUSP Take 2.5 mg three times daily (2.5 mg/5ml) 460 mL 11     cholecalciferol 2000 UNITS tablet Take 2,000 Units by mouth daily 30 tablet 11     cinacalcet (SENSIPAR) 30 MG tablet Take 30 mg by mouth daily       cloNIDine (CATAPRES) 0.1 MG tablet Take 1 tablet (0.1 mg) by mouth 2 times daily 180 tablet 11     famotidine (PEPCID) 20 MG tablet Take 40 mg by mouth 2 times daily        ferrous gluconate (FERGON) 324 (38 Fe) MG tablet Take 1 tablet (324 mg) by mouth daily 90 tablet 11      "ipratropium (ATROVENT) 0.02 % neb solution Take 0.5 mg by nebulization 4 times daily  225 mL      mupirocin (BACTROBAN) 2 % external ointment Apply topically daily as needed        Nutritional Supplements (NEPRO) LIQD Take 1 Can by mouth daily 30 each 11     order for DME Equipment being ordered: Medi-Vac, plastic tubing connector lara type, cat 361, SuperGen, 1 lara tip/month x 12 months. Use for tracheostomy suctioning 1 each 11     order for DME Equipment being ordered: DME    Adult Bivona Uncuffed Tracheostomy Tube-5.0    Patient will do weekly trach changes. 4 Units 11     order for DME Equipment being ordered: DME -    Deep suction kits - 12 fr 90 each 3     order for DME Tracheostomy Humidification Equipment  Equipment being ordered:     1) Air compressor  2) Nebulizer bottle  3) Aerosol tubing  4) Trach mask  5) Sterile water  6) Saline ampules (\"bullets\")  7) Heat Moisture Exchanger (HME) Also known by several other terms including: Thermal Humidifying Filters, Swedish nose, Artificial nose, Filter, Thermovent T.  8) Room/home humidifiers 1 each 11     order for DME Optifoam Basic   [id number] 30 each 11     order for DME Equipment being ordered: Humidifier (heated), humidifier attachment, saline lavages, humidivent mask 1 each 3     predniSONE (DELTASONE) 1 MG tablet Take 1 mg by mouth every other day   6     sodium bicarbonate 650 MG tablet TAKE 1 TABLET BY MOUTH THREE TIMES DAILY 270 tablet 0     sodium chloride 0.9%, bottle, 0.9 % irrigation Irrigate with as directed 3 times daily Bladder irrigation       tobramycin, PF, (DAX) 300 MG/5ML nebulizer solution Take 1 ampule by nebulization as needed       traZODone (DESYREL) 50 MG tablet Take 25 mg by mouth nightly as needed        Allergies:  Allergies   Allergen Reactions     Latex      Latex      Other reaction(s): Other (see comments)  Other reaction(s): Contact Dermatitis, Other (see comments)     Adhesive Tape Rash      Social " History:  Social History     Tobacco Use     Smoking status: Never Smoker     Smokeless tobacco: Never Used   Substance Use Topics     Alcohol use: No     Alcohol/week: 0.0 standard drinks     Drug use: No     ROS: 10 point ROS neg other than the symptoms noted above in the HPI.    Physical Exam:    There were no vitals taken for this visit.     Constitutional:  The patient was alert, cooperative, and in no acute distress.     Communication:  Normal; communicates verbally with passy-malou valve   Respiratory: Breathing comfortably without stridor or exertion of accessory muscles.    Neck: 5-0 bivona trach in place in good position. Crusting around trach with moderate amount of thick yellow/green mucus around stoma. condition. Large amount of granulation tissue on lateral sides of stoma without bleeding or redness. This granulation tissue is separate from each other.   Granulation tissue at stoma site inferior to trach baseplate:      Procedure:  Tracheostomy tube change  Indication: This procedure was warranted as part of routine trach care, and was requested by the patient/caregiver.  Description of procedure:  The existing tracheostomy tube was removed. A #5.0 Bivona tracheostomy tube was placed. This tracheostomy tube was placed using the introducer. The introducer was withdrawn and the neck ties were secured.  Flexible fiberoptic endoscopy through the tube confirmed good position. The patient tolerated the procedure without difficulty.     Assessment/Plan:  1. Tracheostomy in place (H)  Patient had trach tube changed today without complication. Mother is requesting new order for extra trach supplies to be able to change trach monthly.     2. Granulation tissue at tracheostomy site  Patient with return of hypertrophic granulation tissue at trach site due to increased moisture and secretions at trach site. This granulation tissue was cauterized by Dr. Dumas today using silver nitrate. Discussed with patient and  mother the importance of meticulous trach cares to decrease the amount of granulation tissue surrounding stoma. Will continue to monitor.    3. Tracheostomy secretions  Sputum culture taken today. Will await results and place order for antibiotics if indicated.     Patient will follow up in 6-12 months for repeat stoma evaluation and tracheostomy tube check.     Stephanie Sebastian DNP, APRN, CNP  Otolaryngology  Head & Neck Surgery  [phone]    45 minutes spent on the date of the encounter doing chart review, history and exam, documentation and further activities per the note

## 2021-06-07 NOTE — PATIENT INSTRUCTIONS
- You were seen in the ENT Clinic today by Stephanie Sebastian NP.   - The plan is will call with culture results and plan of care.  - Continue to change trach every month.  - Practice good trach hygiene including keeping trach dry and free of secretions.  - Please send a MyChart message or call the ENT clinic at [phone] with any questions or concerns.

## 2021-06-07 NOTE — LETTER
6/7/2021       RE: Aidan Louie  [address] [place] [address]     Dear Colleague,    Thank you for referring your patient, Aidan Louie, to the [facility] [place] at [facility]. Please see a copy of my visit note below.      June 7, 2021     HPI: Aidan Louie is a 25 year old male who presents today with his mother for a tracheostomy change. 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     He is a longtime patient of Dr. Ballesteros and has had a trach for many years due to mandibular hypoplasia. Per report, a number of attempts toward decannulation had been made but was decided to keep trach in place due to airway concerns..     He currently has a 5-0 Bivona in place. His mother has been completing monthly trach changes. He has not had his trach changed in the last month due to his mother being hospitalized for cellulitis. He is here for a trach change and assessment of stoma due to worsening granulation tissue around stoma.     Mother reports increased trach secretions in the last couple of weeks. Granulation tissue around trach has also increased in size.     Patient denies pain, bleeding or shortness of breathe. Denies fever/chills or redness at trach site.    Past Medical History:  Past Medical History:   Diagnosis Date     Bilateral reflux nephropathy      CKD (chronic kidney disease) stage 4, GFR 15-29 ml/min (H)      Hearing loss      Hypertension      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      Prune belly syndrome      Recurrent UTI      Respiratory bronchiolitis interstitial lung disease (H)      Restrictive lung disease      Scoliosis 2002     Thyroid disease      Tracheostomy dependence (H)         Past Surgical History:  Past Surgical History:   Procedure Laterality Date     Bilateral ureteral reimplantation  2002    abdominoplasty, Mitrofanoff creation     CYSTOSCOPY  12     EXAM UNDER ANESTHESIA THROAT  6/10/2003    tonsillar hypertrophy     EXAM UNDER ANESTHESIA, RESTORATIONS, EXTRACTION(S) DENTAL COMPLEX, COMBINED  2006     GASTROSTOMY TUBE       GASTROSTOMY TUBE  3/16/2002    button change     HERNIORRHAPHY INGUINAL BILATERAL  89    left ochiopexy     LARYNGOSCOPY, BRONCHOSCOPY, COMBINED  2002     LARYNGOSCOPY, BRONCHOSCOPY, COMBINED  2002    bilateral ear debridement     LARYNGOSCOPY, BRONCHOSCOPY, COMBINED  2003     LARYNGOSCOPY, BRONCHOSCOPY, COMBINED  2007    Failed Deflux injection     LARYNGOSCOPY, BRONCHOSCOPY, COMBINED  13      VESICOSTOMY       Redo right ureteral reimplantation  3/12/2004    Excision bladder diverticuli, Left to Right pyelopyelostomy     Replacement of trach  10/15/12     Takedown and revision of Mitrofanoff stoma  2006     TRACHEOSTOMY INFANT       Medications:  Current Outpatient Medications   Medication Sig Dispense Refill     acetaminophen (TYLENOL) 500 MG tablet Take 500 mg by mouth every 8 hours as needed        albuterol (2.5 MG/3ML) 0.083% nebulizer solution Take 1 ampule by nebulization 4 times daily        Alum Hydroxide-Mag Trisilicate 80-20 MG CHEW Take 1,250 mg by mouth as needed        amLODIPine (NORVASC) 5 MG tablet TAKE 1 TABLET(5 MG) BY MOUTH TWICE DAILY 180 tablet 3     azithromycin (ZITHROMAX) 250 MG tablet Take 250 mg by mouth three times a week Mon, Wed, Fri       calcium carbonate (OS-SUSAN 500 MG [place]. [place]) 500 MG tablet Take 1 tablet (500 mg) by mouth 3 times daily (with meals) 90 tablet 6     captopril 0.1 mg/mL (CAPOTEN) 0.1 mg/mL SUSP Take 2.5 mg three times daily (2.5 mg/5ml) 460 mL 11     cholecalciferol 2000 UNITS tablet Take 2,000 Units by mouth daily 30 tablet 11     cinacalcet  "(SENSIPAR) 30 MG tablet Take 30 mg by mouth daily       cloNIDine (CATAPRES) 0.1 MG tablet Take 1 tablet (0.1 mg) by mouth 2 times daily 180 tablet 11     famotidine (PEPCID) 20 MG tablet Take 40 mg by mouth 2 times daily        ferrous gluconate (FERGON) 324 (38 Fe) MG tablet Take 1 tablet (324 mg) by mouth daily 90 tablet 11     ipratropium (ATROVENT) 0.02 % neb solution Take 0.5 mg by nebulization 4 times daily  225 mL      mupirocin (BACTROBAN) 2 % external ointment Apply topically daily as needed        Nutritional Supplements (NEPRO) LIQD Take 1 Can by mouth daily 30 each 11     order for DME Equipment being ordered: Medi-Vac, plastic tubing connector lara type, cat 361, Raptr, 1 lara tip/month x 12 months. Use for tracheostomy suctioning 1 each 11     order for DME Equipment being ordered: DME    Adult Bivona Uncuffed Tracheostomy Tube-5.0    Patient will do weekly trach changes. 4 Units 11     order for DME Equipment being ordered: DME -    Deep suction kits - 12 fr 90 each 3     order for DME Tracheostomy Humidification Equipment  Equipment being ordered:     1) Air compressor  2) Nebulizer bottle  3) Aerosol tubing  4) Trach mask  5) Sterile water  6) Saline ampules (\"bullets\")  7) Heat Moisture Exchanger (HME) Also known by several other terms including: Thermal Humidifying Filters, Swedish nose, Artificial nose, Filter, Thermovent T.  8) Room/home humidifiers 1 each 11     order for DME Optifoam Basic   [id number] 30 each 11     order for DME Equipment being ordered: Humidifier (heated), humidifier attachment, saline lavages, humidivent mask 1 each 3     predniSONE (DELTASONE) 1 MG tablet Take 1 mg by mouth every other day   6     sodium bicarbonate 650 MG tablet TAKE 1 TABLET BY MOUTH THREE TIMES DAILY 270 tablet 0     sodium chloride 0.9%, bottle, 0.9 % irrigation Irrigate with as directed 3 times daily Bladder irrigation       tobramycin, PF, (DAX) 300 MG/5ML nebulizer " solution Take 1 ampule by nebulization as needed       traZODone (DESYREL) 50 MG tablet Take 25 mg by mouth nightly as needed        Allergies:  Allergies   Allergen Reactions     Latex      Latex      Other reaction(s): Other (see comments)  Other reaction(s): Contact Dermatitis, Other (see comments)     Adhesive Tape Rash      Social History:  Social History     Tobacco Use     Smoking status: Never Smoker     Smokeless tobacco: Never Used   Substance Use Topics     Alcohol use: No     Alcohol/week: 0.0 standard drinks     Drug use: No     ROS: 10 point ROS neg other than the symptoms noted above in the HPI.    Physical Exam:    There were no vitals taken for this visit.     Constitutional:  The patient was alert, cooperative, and in no acute distress.     Communication:  Normal; communicates verbally with passy-malou valve   Respiratory: Breathing comfortably without stridor or exertion of accessory muscles.    Neck: 5-0 bivona trach in place in good position. Crusting around trach with moderate amount of thick yellow/green mucus around stoma. condition. Large amount of granulation tissue on lateral sides of stoma without bleeding or redness. This granulation tissue is separate from each other.   Granulation tissue at stoma site inferior to trach baseplate:      Procedure:  Tracheostomy tube change  Indication: This procedure was warranted as part of routine trach care, and was requested by the patient/caregiver.  Description of procedure:  The existing tracheostomy tube was removed. A #5.0 Bivona tracheostomy tube was placed. This tracheostomy tube was placed using the introducer. The introducer was withdrawn and the neck ties were secured.  Flexible fiberoptic endoscopy through the tube confirmed good position. The patient tolerated the procedure without difficulty.     Assessment/Plan:  1. Tracheostomy in place (H)  Patient had trach tube changed today without complication. Mother is requesting new order for  extra trach supplies to be able to change trach monthly.     2. Granulation tissue at tracheostomy site  Patient with return of hypertrophic granulation tissue at trach site due to increased moisture and secretions at trach site. This granulation tissue was cauterized by Dr. Dumas today using silver nitrate. Discussed with patient and mother the importance of meticulous trach cares to decrease the amount of granulation tissue surrounding stoma. Will continue to monitor.    3. Tracheostomy secretions  Sputum culture taken today. Will await results and place order for antibiotics if indicated.     Patient will follow up in 6-12 months for repeat stoma evaluation and tracheostomy tube check.     Stephanie Sebastian DNP, APRN, CNP  Otolaryngology  Head & Neck Surgery  [phone]    45 minutes spent on the date of the encounter doing chart review, history and exam, documentation and further activities per the note

## 2021-06-09 ENCOUNTER — TELEPHONE (OUTPATIENT)
Dept: NEPHROLOGY | Facility: CLINIC | Age: 26
End: 2021-06-09

## 2021-06-09 DIAGNOSIS — N18.4 CKD (CHRONIC KIDNEY DISEASE) STAGE 4, GFR 15-29 ML/MIN (H): Primary | ICD-10-CM

## 2021-06-09 DIAGNOSIS — E87.20 METABOLIC ACIDOSIS: ICD-10-CM

## 2021-06-09 NOTE — TELEPHONE ENCOUNTER
[facility] Call Center    Phone Message    May a detailed message be left on voicemail: yes     Reason for Call: Medication Refill Request    Has the patient contacted the pharmacy for the refill? Yes   Name of medication being requested: sodium bicarbonate 650 MG tablet [[id number]] (Order [id number])  Provider who prescribed the medication: Glen Abel MD  Pharmacy: [id number] - [place], [place] - [address] AT [facility] & [facility] [address]  Date medication is needed: ASAP    Mother was not able to give correct mg to writer.  Told writer that the bottle was in the washer and they can't seem to figure out correct mg.  Mother would like to speak with a nurse.  Please reach out to mother.       Action Taken: Message routed to:  [facility] Nephrology    Travel Screening: Not Applicable

## 2021-06-09 NOTE — TELEPHONE ENCOUNTER
[facility] Call Center    Phone Message    May a detailed message be left on voicemail: yes     Reason for Call: Order(s): Other:   Reason for requested: Lab order for patient appointment on 8/30/21.  Please send to [facility] in Dr. Bernie Gordillo  Date needed: Before 8/1/21  Provider name: Dr. Abel       Action Taken: Message routed to:  [facility] ([facility] Nephrology    Travel Screening: Not Applicable

## 2021-06-10 RX ORDER — SODIUM BICARBONATE 650 MG/1
TABLET ORAL
Qty: 270 TABLET | Refills: 0 | Status: SHIPPED | OUTPATIENT
Start: 2021-06-10 | End: 2021-09-07

## 2021-06-11 DIAGNOSIS — J39.8 INCREASED TRACHEAL SECRETIONS: Primary | ICD-10-CM

## 2021-06-11 NOTE — PROGRESS NOTES
Left message with mother, Valerie, regarding prescription sent to local pharmacy for 10 day course of augmentin. Contact information left on message for any further question or concerns.    Stephanie Sebastian DNP, APRN, CNP.  6/11/2021 1:58 PM

## 2021-06-21 ENCOUNTER — DOCUMENTATION ONLY (OUTPATIENT)
Dept: OTOLARYNGOLOGY | Facility: CLINIC | Age: 26
End: 2021-06-21

## 2021-06-21 NOTE — PROGRESS NOTES
Trach and equipment order faxed to liz keita at [facility] at [phone]. 4 pgs faxed.

## 2021-06-23 ENCOUNTER — TELEPHONE (OUTPATIENT)
Dept: TRANSPLANT | Facility: CLINIC | Age: 26
End: 2021-06-23

## 2021-06-23 DIAGNOSIS — Z76.82 ORGAN TRANSPLANT CANDIDATE: ICD-10-CM

## 2021-06-23 DIAGNOSIS — N18.9 CHRONIC KIDNEY DISEASE (CKD): ICD-10-CM

## 2021-06-28 NOTE — TELEPHONE ENCOUNTER
Coordinator called pt's Mom to check-in. Pt has not received COVID vaccine yet but is planning on going in this week for his first shot.

## 2021-07-28 DIAGNOSIS — N18.30 CKD (CHRONIC KIDNEY DISEASE) STAGE 3, GFR 30-59 ML/MIN (H): ICD-10-CM

## 2021-08-11 ENCOUNTER — TELEPHONE (OUTPATIENT)
Dept: TRANSPLANT | Facility: CLINIC | Age: 26
End: 2021-08-11

## 2021-08-11 NOTE — TELEPHONE ENCOUNTER
Coordinator spoke with pt's mom. Will send request to scheduling to see if RWL can be moved up earlier than 8/30. Otherwise will keep appoint as scheduled.

## 2021-08-11 NOTE — TELEPHONE ENCOUNTER
Patient Call: Mom Valerie called, was told by local nephrologist Aidan needs to be seen sooner than 08/30/2021 [facility] Appt., mom reported  Cr., is 6 , GFR 11    Call back needed? Yes    Return Call Needed  Same as documented in contacts section  When to return call?: Same day: Route High Priority

## 2021-08-11 NOTE — TELEPHONE ENCOUNTER
Patient Call: General  Route to LPN    Reason for call: connect with pts mother regarding kidney functions     Call back needed? Yes    Return Call Needed  Same as documented in contacts section  When to return call?: Same day: Route High Priority

## 2021-08-24 ENCOUNTER — DOCUMENTATION ONLY (OUTPATIENT)
Dept: TRANSPLANT | Facility: CLINIC | Age: 26
End: 2021-08-24

## 2021-08-25 ENCOUNTER — TELEPHONE (OUTPATIENT)
Dept: NEPHROLOGY | Facility: CLINIC | Age: 26
End: 2021-08-25

## 2021-08-25 DIAGNOSIS — N18.4 CKD (CHRONIC KIDNEY DISEASE) STAGE 4, GFR 15-29 ML/MIN (H): Primary | ICD-10-CM

## 2021-08-25 NOTE — TELEPHONE ENCOUNTER
Call to patient. Spoke to Valerie, patients mom to inform them of appointment with Dr. Lopez Monday August 30th and to get labs drawn prior.   Valerie reports that patient had labs drawn 8/10 or the 9th and his Cr was 6 and the Nephrologist there Dr. Sharma wanted him to start dialysis. Writer explained that we never received any notes and will call [facility] to see if the can send over most recent visit and labs.  Valerie also reports that patient was told to stop captopril and start cinacalcet for PTH in the 1200? Range and he is to get monthly labs through [facility]. Valerie also reports that they will be driving from a weeding near [place] on Monday and may be late, she will get if so. Patient also has appointment with Transplant team that day. Write unable to schedule lab appointment prior, scheduled for after appointment with Dr. Abel. Await records. Page out to Dr. Abel for update and any further recommendations.  Mary Scott LPN  Nephrology  [phone]

## 2021-08-30 ENCOUNTER — ALLIED HEALTH/NURSE VISIT (OUTPATIENT)
Dept: TRANSPLANT | Facility: CLINIC | Age: 26
End: 2021-08-30
Attending: INTERNAL MEDICINE
Payer: MEDICARE

## 2021-08-30 ENCOUNTER — LAB (OUTPATIENT)
Dept: LAB | Facility: CLINIC | Age: 26
End: 2021-08-30
Payer: MEDICARE

## 2021-08-30 ENCOUNTER — OFFICE VISIT (OUTPATIENT)
Dept: NEPHROLOGY | Facility: CLINIC | Age: 26
End: 2021-08-30
Attending: INTERNAL MEDICINE
Payer: MEDICARE

## 2021-08-30 VITALS
DIASTOLIC BLOOD PRESSURE: 71 MMHG | HEART RATE: 67 BPM | SYSTOLIC BLOOD PRESSURE: 122 MMHG | BODY MASS INDEX: 19.33 KG/M2 | OXYGEN SATURATION: 96 % | WEIGHT: 102.4 LBS | HEIGHT: 61 IN | TEMPERATURE: 98 F

## 2021-08-30 DIAGNOSIS — Z76.82 ORGAN TRANSPLANT CANDIDATE: Primary | ICD-10-CM

## 2021-08-30 DIAGNOSIS — N18.9 HISTORY OF ANEMIA DUE TO CKD: Primary | ICD-10-CM

## 2021-08-30 DIAGNOSIS — Z76.82 AWAITING ORGAN TRANSPLANT: ICD-10-CM

## 2021-08-30 DIAGNOSIS — N18.9 HISTORY OF ANEMIA DUE TO CKD: ICD-10-CM

## 2021-08-30 DIAGNOSIS — N18.4 CKD (CHRONIC KIDNEY DISEASE) STAGE 4, GFR 15-29 ML/MIN (H): ICD-10-CM

## 2021-08-30 DIAGNOSIS — Z86.2 HISTORY OF ANEMIA DUE TO CKD: Primary | ICD-10-CM

## 2021-08-30 DIAGNOSIS — Z86.2 HISTORY OF ANEMIA DUE TO CKD: ICD-10-CM

## 2021-08-30 DIAGNOSIS — N18.5 CKD (CHRONIC KIDNEY DISEASE) STAGE 5, GFR LESS THAN 15 ML/MIN (H): ICD-10-CM

## 2021-08-30 LAB
ALBUMIN SERPL-MCNC: 3.7 G/DL (ref 3.4–5)
ANION GAP SERPL CALCULATED.3IONS-SCNC: 8 MMOL/L (ref 3–14)
BUN SERPL-MCNC: 84 MG/DL (ref 7–30)
CALCIUM SERPL-MCNC: 8.5 MG/DL (ref 8.5–10.1)
CHLORIDE BLD-SCNC: 112 MMOL/L (ref 94–109)
CO2 SERPL-SCNC: 21 MMOL/L (ref 20–32)
CREAT SERPL-MCNC: 6.12 MG/DL (ref 0.66–1.25)
CREAT UR-MCNC: 36 MG/DL
CREAT UR-MCNC: 36 MG/DL
ERYTHROCYTE [DISTWIDTH] IN BLOOD BY AUTOMATED COUNT: 12.8 % (ref 10–15)
FERRITIN SERPL-MCNC: 66 NG/ML (ref 26–388)
GFR SERPL CREATININE-BSD FRML MDRD: 12 ML/MIN/1.73M2
GLUCOSE BLD-MCNC: 84 MG/DL (ref 70–99)
HCT VFR BLD AUTO: 30.8 % (ref 40–53)
HGB BLD-MCNC: 9.9 G/DL (ref 13.3–17.7)
IRON SATN MFR SERPL: 29 % (ref 15–46)
IRON SERPL-MCNC: 74 UG/DL (ref 35–180)
MCH RBC QN AUTO: 28.4 PG (ref 26.5–33)
MCHC RBC AUTO-ENTMCNC: 32.1 G/DL (ref 31.5–36.5)
MCV RBC AUTO: 89 FL (ref 78–100)
MICROALBUMIN UR-MCNC: 1490 MG/L
MICROALBUMIN/CREAT UR: 4138.89 MG/G CR (ref 0–17)
PHOSPHATE SERPL-MCNC: 6 MG/DL (ref 2.5–4.5)
PLATELET # BLD AUTO: 263 10E3/UL (ref 150–450)
POTASSIUM BLD-SCNC: 5 MMOL/L (ref 3.4–5.3)
PROT UR-MCNC: 1.77 G/L
PROT/CREAT 24H UR: 4.92 G/G CR (ref 0–0.2)
PTH-INTACT SERPL-MCNC: 1178 PG/ML (ref 18–80)
RBC # BLD AUTO: 3.48 10E6/UL (ref 4.4–5.9)
SODIUM SERPL-SCNC: 141 MMOL/L (ref 133–144)
TIBC SERPL-MCNC: 254 UG/DL (ref 240–430)
WBC # BLD AUTO: 7.6 10E3/UL (ref 4–11)

## 2021-08-30 PROCEDURE — 85027 COMPLETE CBC AUTOMATED: CPT | Performed by: PATHOLOGY

## 2021-08-30 PROCEDURE — 83970 ASSAY OF PARATHORMONE: CPT | Performed by: INTERNAL MEDICINE

## 2021-08-30 PROCEDURE — 84156 ASSAY OF PROTEIN URINE: CPT | Performed by: PATHOLOGY

## 2021-08-30 PROCEDURE — 99214 OFFICE O/P EST MOD 30 MIN: CPT | Performed by: INTERNAL MEDICINE

## 2021-08-30 PROCEDURE — 82728 ASSAY OF FERRITIN: CPT | Performed by: PATHOLOGY

## 2021-08-30 PROCEDURE — 82306 VITAMIN D 25 HYDROXY: CPT | Performed by: INTERNAL MEDICINE

## 2021-08-30 PROCEDURE — 82043 UR ALBUMIN QUANTITATIVE: CPT | Performed by: PATHOLOGY

## 2021-08-30 PROCEDURE — 40000724 ZZH UMP OPEN ENCOUNTER >70 DAYS

## 2021-08-30 PROCEDURE — 36415 COLL VENOUS BLD VENIPUNCTURE: CPT | Performed by: PATHOLOGY

## 2021-08-30 PROCEDURE — 83550 IRON BINDING TEST: CPT | Performed by: PATHOLOGY

## 2021-08-30 PROCEDURE — 80069 RENAL FUNCTION PANEL: CPT | Performed by: PATHOLOGY

## 2021-08-30 RX ORDER — PHENOL 1.4 %
10 AEROSOL, SPRAY (ML) MUCOUS MEMBRANE
COMMUNITY

## 2021-08-30 ASSESSMENT — PAIN SCALES - GENERAL: PAINLEVEL: NO PAIN (0)

## 2021-08-30 ASSESSMENT — MIFFLIN-ST. JEOR: SCORE: 1317.86

## 2021-08-30 NOTE — PROGRESS NOTES
Patient Name: Aidan Louie  : 1995  Age: 25 year old  MRN: [id number]  Date of Initial Social Work Evaluation: October 15, 2020     Patient on transplant wait list.  Saw today to update psychosocial assessment along with his mother Valerie who is his legal guardian (paperwork is in chart, flied under media 2017), and his aunt Virginia.      Presenting Information   Living Situation: Pt lives with his mother/guardian in [place], [place].  If not local, plans for short term stay:  Pt plans on staying at a local hotel. Discussed hotel lodging is their financial responsibility.  Previous Functional Status: Pt has a developmental delay (per mother), is independent with ADL's, takes his own medications, hearing impaired, and does not drive (mother drives or uses bus system)  Cultural/Language/Spiritual Considerations: None identified at this time     Support System  Primary Support Person Mother/guardian   Other support:  Aunt [place], friend Rogers and Jony  Plan for support in immediate post-transplant period: Mother    Health Care Directive  Decision Maker: Mother is legal guardian   Alternate Decision Maker: Mother is currently working on updating paperwork to identify a guardian should she no longer be able to be pt's guardian  Health Care Directive: Copy in Chart    Mental Health/Coping:   History of Mental Health: Denied  History of Chemical Health: Denied  Current status: Pt denied any current mental health or chemical health concerns. Pt's mother brought up she feels pt continues to have grief issues after the loss of his stepfather but pt denied this.  Coping: Appropriate  Services Needed/Recommended: None identified at this time     Financial   Income: SSI   Impact of transplant on income: None identified at this time   Insurance and medication coverage: Medicare and [place]  Financial concerns: None identified at this time   Resources needed: None identified at this time     Assessment and  recommendations and plan: Pt is not yet on dialysis. Pt reported he follows his physician's recommendations, takes his medications as prescribed, and attends his appointments as scheduled. Reviewed transplant education (Medicare, rehabilitation, donor issues, community/financial resources, and psych/family adjustment) as well as psychosocial risks of transplant. Patient seemed to process information well. Pt and his guardian appeared well informed, motivated, and able to follow post transplant requirements. Behavior was appropriate during interview. Has adequate income and insurance coverage. Adequate social support. No major contraindications noted for transplant. At this time, patient and guardian appears to understand the risks and benefits of transplant.     Tika Dean [facility]    Kidney/Pancreas/Auto Islet Transplant Programs

## 2021-08-30 NOTE — PROGRESS NOTES
Nephrology Clinic - Telephone Visit    Glen Abel MD  DOS: 2021     Name: Aidan Louie  MRN: [id number]  Age: 25 year old  : 1995  Referring provider: Nathalia Collado     Assessment and Plan:  Mr. 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     1. CKD, stage 5:  Previous baseline serum Cr ~3.2 with a GFR ~26 but now appears to be progressing over the past year with the last serum Cr of 6.1 with an eGFR of 12 ml/min.  Etiology secondary to obstructive nephropathy from congenital abnormalities of the urogenital system and recurrent UTIs.  His kidney disease appears to have worsened over the past year.  He was counseled regarding his options with renal replacement therapy including transplant.  He was advised that secondary to his trach he may have a challenge but remains a potential transplant candidate as the trach is not for hypoxic respiratory disease. He is undergoing transplant evaluation.    -- CKD education and dialysis options provided again today   -- continue captopril 2.5 TID for albuminuria/renoprotection.  Urine albumin ~4.1 g/g but unable to increase captopril due to advanced CKD and high normal serum potassium.  At this point, it may be more reasonable to reduce captopril if potassium starts to rise further.    -- continues straight caths.   -- continue kidney transplant evaluation  -- renal panel today  -- RTC in 3 months     2. HTN/Volume:  Blood pressure at goal of <130/80 and euvolemic on exam.    -continue amlodipine 5mg BID,  captopril 2.5 mg TID and clonidine 0.1mg BID  -again, low threshold to to reduce or discontinue captopril next if hyperkalemic or kidney function signifcantly worsens     3. Anemia of CKD: Hgb has been at goal (10.3 today ).  Iron stores remain low (ferritin  remains low at 66 and iron sat ok at 29).     -continue ferrous gluconate 1 tab daily with goal of maintaining adequate iron stores  -low threshold to start IV iron; will reassess at next visit  -no need for POORNIMA therapyt      4. CKD-MBD: Corrected calcium and vitamin D level at goal.  Phos minimally elevated.   have been at goal. Vitamin D levels at goal in past.  PTH elevated at 1,178.. DEXA with evidence of osteoporosis.  Bone specific alk phos within normal limits in past. Secondary hyperparathyroidism not initially a big contributor to decreased bone mineralizationbut now seems more reasonable.    -continue cholecalciferol at 2000 units daily  -agree with starting sensipar at 30 mg daily as initially prescribed by his nephrologisit  -no need for active vitamin D (calcitriol) at this time  -followed by endocrinology and no further management of osteoporosis recommended at this time. If therapy initiated, then would favor denosumab.       5. Metabolic acidosis: Due to CKD.  Bicarbonate level has been near goal.      -continue sodium bicarbonate 650 mg TID for acidosis with the goal of slowing progression of CKD and bone disease.  Low threshold to increase at next visit if bicarb is lower.        6. Recurrent UTIs: Colonized so based on symptoms.  No evidence of UTI at this time.   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 This recently became irritated, but there are no signs of infection today. Previously followed by pediatric ENT.    -now followed by adult ENT     8.  Malnutrition: In part due to protein-energy wasting as result of CKD.    -start nepro 1 can daily at previous visit (unsure if he is taking this supplement)    Reason For Visit:   CKD follow-up    HPI:   Aidan Louie is a 25 year old man who presents for follow-up of his CKD IV secondary to Prune belly  syndrome. His other histories include hypertension, bilateral hearing loss on hearing aids, short stature, choanal atresia SP repair, Dawood Reece anomaly, and restrictive lung disease. He has been following with Dr. Hess for his CKD, and Dr. Schilling has been his surgeon. He has a history of recurrent UTI and worsening creatinine in the past few years. Patient has a history of Prune belly syndrome and extensive urological abnormalities. He has had reconstructed ureter and bladder and has to self catheterize every 3 hours with minimal irrigation with 180ml saline. Since he has been more compliant with his saline flushes, he has had less frequent UTI.    He presents today with his mother who reports that he is doing well after beig diagnosed with COVID disease. He does report persistent fatigue but denies dyspnea and cough. She feels that his tracheostomy has been healing appropriately though there may me some granulomatous changes that may need to be addressed further with his ENT and pulmonary providers. He is continued on amlodipine twice daily. No other concerns.      Interval History:  Overall, patient is doing well.  He denies overt uremeic symptoms.  He and mom remain hesitant for starting dialysis now and would like to wait as long as possible.  They have not started senspar as requested by his nephrologist.  He is undergoing transplant evaluation.  His mother had numerous questions regarding indications to starting dialysis and requirements for kidney transplantation that were answered in detail        Review of Systems:   Pertinent items are noted in HPI or as below, remainder of complete ROS is negative.      Current Outpatient Medications   Medication     acetaminophen (TYLENOL) 500 MG tablet     albuterol (2.5 MG/3ML) 0.083% nebulizer solution     amLODIPine (NORVASC) 5 MG tablet     azithromycin (ZITHROMAX) 250 MG tablet     calcium carbonate (OS-SUSAN 500 MG [place]. [place]) 500 MG tablet     cholecalciferol  2000 UNITS tablet     cinacalcet (SENSIPAR) 30 MG tablet     cloNIDine (CATAPRES) 0.1 MG tablet     Cranberry 500 MG TABS     famotidine (PEPCID) 20 MG tablet     ferrous gluconate (FERGON) 324 (38 Fe) MG tablet     ipratropium (ATROVENT) 0.02 % neb solution     Melatonin 10 MG CAPS     mupirocin (BACTROBAN) 2 % external ointment     Nutritional Supplements (NEPRO) LIQD     order for DME     order for DME     order for DME     order for DME     order for DME     order for DME     sodium bicarbonate 650 MG tablet     sodium chloride 0.9%, bottle, 0.9 % irrigation     tobramycin, PF, (DAX) 300 MG/5ML nebulizer solution     traZODone (DESYREL) 50 MG tablet     Alum Hydroxide-Mag Trisilicate 80-20 MG CHEW     captopril 0.1 mg/mL (CAPOTEN) 0.1 mg/mL SUSP     predniSONE (DELTASONE) 1 MG tablet     No current facility-administered medications for this visit.      Allergies:   Latex; Latex; and Tape [adhesive tape]      Past Medical History:  Bilateral reflux nephropathy  CKD (chronic kidney disease) stage 4, GFR 15-29 ml/min     Hearing loss      Hypertension   Dawood Reece sequence             Prune belly syndrome   Recurrent UTI   Respiratory bronchiolitis interstitial lung disease  Restrictive lung disease            Tracheostomy dependence   Vitamin D deficiency      Past Surgical History:  Bilateral ureteral reimplantation, abdominoplasty, mitrofanoff creation  Cystoscopy  Gastrostomy tube                     Herniorrhaphy inguinal bilateral, left orchiopexy  Laryngoscopy, bronchoscopy, combined x5   vesicostomy               Redo right ureteral reimplantation  Replacement of trach    Takedown and revision of mitrofanoff stoma     Family History:   The patient has family history of diabetes (mother).      Social History:   The patient presents here with his mother and aunt. The patient has never smoked. Denies alcohol use.      Physical Exam:  /71   Pulse 67   Temp 98  F (36.7  C) (Oral)   Ht 1.549 m  "(5' 1\")   Wt 46.4 kg (102 lb 6.4 oz)   SpO2 96%   BMI 19.35 kg/m     GENERAL APPEARANCE: alert and no distress  EYES: nonicteric  HENT: mouth without ulcers or lesions  NECK: supple, no adenopathy, trach present  RESP: lungs clear to auscultation   CV: regular rhythm, normal rate, no rub  ABDOMEN: soft, nontender, nondistended  : no CVA tenderness   Extremities: no significant edema  MS: no evidence of inflammation in joints, no muscle tenderness  SKIN: no concerning rash  NEURO: mentation intact and speech normal  PSYCH: affect normal/bright     Laboratory:  CMP  Recent Labs   Lab Test 11/30/20  1415 10/15/20  1138 05/26/20  0000 12/09/19  1311 05/13/19  1027 01/03/19  1438 09/17/15  1205 02/05/15  1110    137 138 137 144 141 139 141   POTASSIUM 5.1 4.6 4.9 4.1 4.1 4.3 4.6 4.2   CHLORIDE 111* 109 106 106 116* 114* 111* 111*   CO2 22 21 25 25 18* 18* 21 21   ANIONGAP 8 8 7 7 10 9 7 9   GLC 84 94 98 91 81 109* 140* 118*   BUN 61* 73* 55 48* 64* 53* 50* 40*   CR 4.80* 5.10* 4.3 4.31* 3.25* 3.22* 2.57* 2.07*   GFRESTIMATED 16* 15* 17.2 18* 26* 26* 32* 42*   GFRESTBLACK 18* 17*  --  21* 30* 30* 39* 51*   SUSAN 9.3 9.6 9.3 9.4 8.8 8.4* 8.7* 8.7*   MAG  --   --   --   --   --   --  2.1 2.0   PHOS 5.4*  --   --  4.2 4.4 3.6 2.7* 3.5   PROTTOTAL  --  7.4  --   --   --   --   --   --    ALBUMIN 3.7 3.9  --  3.6 3.2* 3.6 3.8 3.5   BILITOTAL  --  0.2  --   --   --   --   --   --    ALKPHOS  --  94  --   --   --   --  80 87   AST  --  10  --   --   --   --   --   --    ALT  --  15  --   --   --   --   --   --      CBC  Recent Labs   Lab Test 11/30/20  1415 10/15/20  1138 12/09/19  1311 05/13/19  1027   HGB 10.3* 10.8* 12.1* 11.3*   WBC 8.3 9.8 9.2 10.0   RBC 3.57* 3.78* 4.16* 3.85*   HCT 32.7* 34.0* 37.4* 34.9*   MCV 92 90 90 91   MCH 28.9 28.6 29.1 29.4   MCHC 31.5 31.8 32.4 32.4   RDW 13.5 12.8 12.8 13.4    299 313 308     INR  Recent Labs   Lab Test 10/15/20  1138   INR 1.03   PTT 31     ABG  No lab results " found.   URINE STUDIES  Recent Labs   Lab Test 11/30/20  1132 07/11/17  1341 12/22/16  1328 09/17/15  1410 02/05/15  1115   COLOR Yellow Yellow Straw Quantity not sufficient Straw   APPEARANCE Clear Clear Slightly Cloudy Quantity not sufficient Clear   URINEGLC Negative Negative Negative Quantity not sufficient* Negative   URINEBILI Negative Negative Negative Quantity not sufficient* Negative   URINEKETONE Negative Negative Negative Quantity not sufficient* Negative   SG 1.020 1.005 1.006 Quantity not sufficient 1.005   UBLD Small* Negative Trace* Quantity not sufficient* Trace*   URINEPH 8.5* 6.0 6.5 Quantity not sufficient 6.0   PROTEIN >300* 100* 100* Quantity not sufficient* 100*   UROBILINOGEN 0.2  --   --   --   --    NITRITE Negative Positive* Negative Quantity not sufficient* Negative   LEUKEST Small* Small* Large* Quantity not sufficient* Large*   RBCU  --  <1 6* Quantity not sufficient 1   WBCU  --  6* >182* Quantity not sufficient* 15*     Recent Labs   Lab Test 01/03/19  1435 07/11/17  1341 12/22/16  1328 02/05/15  1115   UTPG 2.60* 3.04* 3.05* 5.28*     PTH  Recent Labs   Lab Test 11/30/20  1415 12/09/19  1311 05/13/19  1027 01/03/19  1438 07/11/17  1337 12/22/16  1325 09/17/15  1205 02/05/15  1110 10/03/13  2128   PTHI 858* 331* 195* 177* 191* 98* 133* 303* 141*     IRON STUDIES   Recent Labs   Lab Test 11/30/20  1415 08/09/18  1056 08/09/18  1046 07/11/17  1337 12/22/16  1325 09/17/15  1205 02/05/15  1110   IRON 92 178  --  77 135 99 160    266  --  296 241 297 281   IRONSAT 34 67  --  26 56* 33 57*   JUVENAL 43  --  14 16* 56 27 34     All above labs and imaging were reviewed by me.  Glen Abel MD  [phone]

## 2021-08-30 NOTE — LETTER
2021   RE: Aidan Louie  [address] [place] [address]     Dear Colleague,    Thank you for referring your patient, Aidan Louie, to the [facility] [place] at [facility]. Please see a copy of my visit note below.    Nephrology Clinic - Telephone Visit    Glen Abel MD  DOS: 2021     Name: Aidan Louie  MRN: [id number]  Age: 25 year old  : 1995  Referring provider: Nathalia Collado     Assessment and Plan:  Mr. 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     1. CKD, stage 5:  Previous baseline serum Cr ~3.2 with a GFR ~26 but now appears to be progressing over the past year with the last serum Cr of 6.1 with an eGFR of 12 ml/min.  Etiology secondary to obstructive nephropathy from congenital abnormalities of the urogenital system and recurrent UTIs.  His kidney disease appears to have worsened over the past year.  He was counseled regarding his options with renal replacement therapy including transplant.  He was advised that secondary to his trach he may have a challenge but remains a potential transplant candidate as the trach is not for hypoxic respiratory disease. He is undergoing transplant evaluation.    -- CKD education and dialysis options provided again today   -- continue captopril 2.5 TID for albuminuria/renoprotection.  Urine albumin ~4.1 g/g but unable to increase captopril due to advanced CKD and high normal serum potassium.  At this point, it may be more reasonable to reduce captopril if potassium starts to rise further.    -- continues straight caths.   -- continue kidney transplant evaluation  -- renal panel today  -- RTC in 3 months     2. HTN/Volume:  Blood pressure at goal of <130/80 and  euvolemic on exam.    -continue amlodipine 5mg BID,  captopril 2.5 mg TID and clonidine 0.1mg BID  -again, low threshold to to reduce or discontinue captopril next if hyperkalemic or kidney function signifcantly worsens     3. Anemia of CKD: Hgb has been at goal (10.3 today ).  Iron stores remain low (ferritin remains low at 66 and iron sat ok at 29).     -continue ferrous gluconate 1 tab daily with goal of maintaining adequate iron stores  -low threshold to start IV iron; will reassess at next visit  -no need for POORNIMA therapyt      4. CKD-MBD: Corrected calcium and vitamin D level at goal.  Phos minimally elevated.   have been at goal. Vitamin D levels at goal in past.  PTH elevated at 1,178.. DEXA with evidence of osteoporosis.  Bone specific alk phos within normal limits in past. Secondary hyperparathyroidism not initially a big contributor to decreased bone mineralizationbut now seems more reasonable.    -continue cholecalciferol at 2000 units daily  -agree with starting sensipar at 30 mg daily as initially prescribed by his nephrologisit  -no need for active vitamin D (calcitriol) at this time  -followed by endocrinology and no further management of osteoporosis recommended at this time. If therapy initiated, then would favor denosumab.       5. Metabolic acidosis: Due to CKD.  Bicarbonate level has been near goal.      -continue sodium bicarbonate 650 mg TID for acidosis with the goal of slowing progression of CKD and bone disease.  Low threshold to increase at next visit if bicarb is lower.        6. Recurrent UTIs: Colonized so based on symptoms.  No evidence of UTI at this time.   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 This recently became irritated, but there are no signs of infection today. Previously followed by pediatric ENT.    -now followed by adult ENT     8.   Malnutrition: In part due to protein-energy wasting as result of CKD.    -start nepro 1 can daily at previous visit (unsure if he is taking this supplement)    Reason For Visit:   CKD follow-up    HPI:   Aidan Louie is a 25 year old man who presents for follow-up of his CKD IV secondary to Prune belly syndrome. His other histories include hypertension, bilateral hearing loss on hearing aids, short stature, choanal atresia SP repair, Dawood Reece anomaly, and restrictive lung disease. He has been following with Dr. Hess for his CKD, and Dr. Schilling has been his surgeon. He has a history of recurrent UTI and worsening creatinine in the past few years. Patient has a history of Prune belly syndrome and extensive urological abnormalities. He has had reconstructed ureter and bladder and has to self catheterize every 3 hours with minimal irrigation with 180ml saline. Since he has been more compliant with his saline flushes, he has had less frequent UTI.    He presents today with his mother who reports that he is doing well after beig diagnosed with COVID disease. He does report persistent fatigue but denies dyspnea and cough. She feels that his tracheostomy has been healing appropriately though there may me some granulomatous changes that may need to be addressed further with his ENT and pulmonary providers. He is continued on amlodipine twice daily. No other concerns.      Interval History:  Overall, patient is doing well.  He denies overt uremeic symptoms.  He and mom remain hesitant for starting dialysis now and would like to wait as long as possible.  They have not started senspar as requested by his nephrologist.  He is undergoing transplant evaluation.  His mother had numerous questions regarding indications to starting dialysis and requirements for kidney transplantation that were answered in detail        Review of Systems:   Pertinent items are noted in HPI or as below, remainder of complete ROS is  negative.      Current Outpatient Medications   Medication     acetaminophen (TYLENOL) 500 MG tablet     albuterol (2.5 MG/3ML) 0.083% nebulizer solution     amLODIPine (NORVASC) 5 MG tablet     azithromycin (ZITHROMAX) 250 MG tablet     calcium carbonate (OS-SUSAN 500 MG [place]. [place]) 500 MG tablet     cholecalciferol 2000 UNITS tablet     cinacalcet (SENSIPAR) 30 MG tablet     cloNIDine (CATAPRES) 0.1 MG tablet     Cranberry 500 MG TABS     famotidine (PEPCID) 20 MG tablet     ferrous gluconate (FERGON) 324 (38 Fe) MG tablet     ipratropium (ATROVENT) 0.02 % neb solution     Melatonin 10 MG CAPS     mupirocin (BACTROBAN) 2 % external ointment     Nutritional Supplements (NEPRO) LIQD     order for DME     order for DME     order for DME     order for DME     order for DME     order for DME     sodium bicarbonate 650 MG tablet     sodium chloride 0.9%, bottle, 0.9 % irrigation     tobramycin, PF, (DAX) 300 MG/5ML nebulizer solution     traZODone (DESYREL) 50 MG tablet     Alum Hydroxide-Mag Trisilicate 80-20 MG CHEW     captopril 0.1 mg/mL (CAPOTEN) 0.1 mg/mL SUSP     predniSONE (DELTASONE) 1 MG tablet     No current facility-administered medications for this visit.      Allergies:   Latex; Latex; and Tape [adhesive tape]      Past Medical History:  Bilateral reflux nephropathy  CKD (chronic kidney disease) stage 4, GFR 15-29 ml/min     Hearing loss      Hypertension   Dawood Reece sequence             Prune belly syndrome   Recurrent UTI   Respiratory bronchiolitis interstitial lung disease  Restrictive lung disease            Tracheostomy dependence   Vitamin D deficiency      Past Surgical History:  Bilateral ureteral reimplantation, abdominoplasty, mitrofanoff creation  Cystoscopy  Gastrostomy tube                     Herniorrhaphy inguinal bilateral, left orchiopexy  Laryngoscopy, bronchoscopy, combined x5   vesicostomy               Redo right ureteral reimplantation  Replacement of trach    Takedown and  "revision of mitrofanoff stoma     Family History:   The patient has family history of diabetes (mother).      Social History:   The patient presents here with his mother and aunt. The patient has never smoked. Denies alcohol use.      Physical Exam:  /71   Pulse 67   Temp 98  F (36.7  C) (Oral)   Ht 1.549 m (5' 1\")   Wt 46.4 kg (102 lb 6.4 oz)   SpO2 96%   BMI 19.35 kg/m     GENERAL APPEARANCE: alert and no distress  EYES: nonicteric  HENT: mouth without ulcers or lesions  NECK: supple, no adenopathy, trach present  RESP: lungs clear to auscultation   CV: regular rhythm, normal rate, no rub  ABDOMEN: soft, nontender, nondistended  : no CVA tenderness   Extremities: no significant edema  MS: no evidence of inflammation in joints, no muscle tenderness  SKIN: no concerning rash  NEURO: mentation intact and speech normal  PSYCH: affect normal/bright     Laboratory:  CMP  Recent Labs   Lab Test 11/30/20  1415 10/15/20  1138 05/26/20  0000 12/09/19  1311 05/13/19  1027 01/03/19  1438 09/17/15  1205 02/05/15  1110    137 138 137 144 141 139 141   POTASSIUM 5.1 4.6 4.9 4.1 4.1 4.3 4.6 4.2   CHLORIDE 111* 109 106 106 116* 114* 111* 111*   CO2 22 21 25 25 18* 18* 21 21   ANIONGAP 8 8 7 7 10 9 7 9   GLC 84 94 98 91 81 109* 140* 118*   BUN 61* 73* 55 48* 64* 53* 50* 40*   CR 4.80* 5.10* 4.3 4.31* 3.25* 3.22* 2.57* 2.07*   GFRESTIMATED 16* 15* 17.2 18* 26* 26* 32* 42*   GFRESTBLACK 18* 17*  --  21* 30* 30* 39* 51*   SUSAN 9.3 9.6 9.3 9.4 8.8 8.4* 8.7* 8.7*   MAG  --   --   --   --   --   --  2.1 2.0   PHOS 5.4*  --   --  4.2 4.4 3.6 2.7* 3.5   PROTTOTAL  --  7.4  --   --   --   --   --   --    ALBUMIN 3.7 3.9  --  3.6 3.2* 3.6 3.8 3.5   BILITOTAL  --  0.2  --   --   --   --   --   --    ALKPHOS  --  94  --   --   --   --  80 87   AST  --  10  --   --   --   --   --   --    ALT  --  15  --   --   --   --   --   --      CBC  Recent Labs   Lab Test 11/30/20  1415 10/15/20  1138 12/09/19  1311 05/13/19  1027   HGB " 10.3* 10.8* 12.1* 11.3*   WBC 8.3 9.8 9.2 10.0   RBC 3.57* 3.78* 4.16* 3.85*   HCT 32.7* 34.0* 37.4* 34.9*   MCV 92 90 90 91   MCH 28.9 28.6 29.1 29.4   MCHC 31.5 31.8 32.4 32.4   RDW 13.5 12.8 12.8 13.4    299 313 308     INR  Recent Labs   Lab Test 10/15/20  1138   INR 1.03   PTT 31     ABG  No lab results found.   URINE STUDIES  Recent Labs   Lab Test 11/30/20  1132 07/11/17  1341 12/22/16  1328 09/17/15  1410 02/05/15  1115   COLOR Yellow Yellow Straw Quantity not sufficient Straw   APPEARANCE Clear Clear Slightly Cloudy Quantity not sufficient Clear   URINEGLC Negative Negative Negative Quantity not sufficient* Negative   URINEBILI Negative Negative Negative Quantity not sufficient* Negative   URINEKETONE Negative Negative Negative Quantity not sufficient* Negative   SG 1.020 1.005 1.006 Quantity not sufficient 1.005   UBLD Small* Negative Trace* Quantity not sufficient* Trace*   URINEPH 8.5* 6.0 6.5 Quantity not sufficient 6.0   PROTEIN >300* 100* 100* Quantity not sufficient* 100*   UROBILINOGEN 0.2  --   --   --   --    NITRITE Negative Positive* Negative Quantity not sufficient* Negative   LEUKEST Small* Small* Large* Quantity not sufficient* Large*   RBCU  --  <1 6* Quantity not sufficient 1   WBCU  --  6* >182* Quantity not sufficient* 15*     Recent Labs   Lab Test 01/03/19  1435 07/11/17  1341 12/22/16  1328 02/05/15  1115   UTPG 2.60* 3.04* 3.05* 5.28*     PTH  Recent Labs   Lab Test 11/30/20  1415 12/09/19  1311 05/13/19  1027 01/03/19  1438 07/11/17  1337 12/22/16  1325 09/17/15  1205 02/05/15  1110 10/03/13  2128   PTHI 858* 331* 195* 177* 191* 98* 133* 303* 141*     IRON STUDIES   Recent Labs   Lab Test 11/30/20  1415 08/09/18  1056 08/09/18  1046 07/11/17  1337 12/22/16  1325 09/17/15  1205 02/05/15  1110   IRON 92 178  --  77 135 99 160    266  --  296 241 297 281   IRONSAT 34 67  --  26 56* 33 57*   JUVENAL 43  --  14 16* 56 27 34     All above labs and imaging were reviewed by  me.  Glen Abel MD  [phone]

## 2021-08-31 LAB
DEPRECATED CALCIDIOL+CALCIFEROL SERPL-MC: <50 UG/L (ref 20–75)
VITAMIN D2 SERPL-MCNC: <5 UG/L
VITAMIN D3 SERPL-MCNC: 45 UG/L

## 2021-09-01 ENCOUNTER — TELEPHONE (OUTPATIENT)
Dept: NEPHROLOGY | Facility: CLINIC | Age: 26
End: 2021-09-01

## 2021-09-01 ENCOUNTER — COMMITTEE REVIEW (OUTPATIENT)
Dept: TRANSPLANT | Facility: CLINIC | Age: 26
End: 2021-09-01

## 2021-09-01 ENCOUNTER — TELEPHONE (OUTPATIENT)
Dept: TRANSPLANT | Facility: CLINIC | Age: 26
End: 2021-09-01

## 2021-09-01 ENCOUNTER — DOCUMENTATION ONLY (OUTPATIENT)
Dept: TRANSPLANT | Facility: CLINIC | Age: 26
End: 2021-09-01

## 2021-09-01 DIAGNOSIS — N18.6 ESRD (END STAGE RENAL DISEASE) (H): ICD-10-CM

## 2021-09-01 DIAGNOSIS — Z76.82 ORGAN TRANSPLANT CANDIDATE: ICD-10-CM

## 2021-09-01 NOTE — TELEPHONE ENCOUNTER
Called pts mom Valerie regarding committee. Let her know that Aidan was approved for active status. Pt is requiring a prior authorization for insurance approval. Will follow up with Valerie once that is acquired for active status. Explained that we will need a PRA level drawn and we will send a kit out in the mail to them, verified address. Valerie verbalized understanding of information and has no further questions. Encouraged to reach out if questions arise.

## 2021-09-01 NOTE — TELEPHONE ENCOUNTER
Called patient to inform them of status change to ACTIVE on 21. Status change letter and PRA orders to be mailed. Patient is in agreement with listing ACTIVE status.      Discussed:   - Pt is eligible for  donor offers and pt can receive calls 24 hours a day, 7 days a week, and on call staff need to be able to reach pt or one of emergency contacts within one hour or they might skip over to next recipient on list.   - Reviewed organ offer process including request to accept or deny offer, without penalty  - Expected length of surgical procedure, expected inpatient length of stay post transplant, and potential to stay locally post transplant.    - Verified contact information as documented in Epic   -Instructed patient about importance of having PRAs drawn every 3 months via: (Mailers/[facility]).   -Reminded pt to stay up on health maintenance and to call with any updates on their health status, insurance or contact information.   -Reminded pt to inform RNCC as soon as possible if they test positive for COVID.     -Provided name and contact information for additional questions or concerns.  Pt expressed excellent understanding of all and was in good agreement with the plan.

## 2021-09-01 NOTE — COMMITTEE REVIEW
Abdominal Committee Review Note     Evaluation Date: 10/15/2020  Committee Review Date: 2021    Organ being evaluated for: Kidney    Transplant Phase: Waitlist  Transplant Status: Inactive    Transplant Coordinator: Elizabeth Rodriguez  Transplant Surgeon:       Referring Physician: Glen Abel    Primary Diagnosis: Prune Belly Syndrome  Secondary Diagnosis:     Committee Review Members:  Carmel, Nancy Paez RD   Nephrology Haider Tejada MD, Kulwinder Schuler, APRN CNP, Amadou Lai MD   Pharmacy Ede Arellano, [facility]    - Clinical Tika Dean, MSW, Polina Peacock, [facility]   Transplant Ana Bustillos, RN, April Giraldo, RN, Odilia Solis, RN, Yazmin Alvarado, RN, Kayley Whaley, RN, Jen Melissa, RN   Transplant Surgery Ana Horta MD, MD       Transplant Eligibility:     Committee Review Decision: Make Active      Committee Chair Ana Horta MD verbally attested to the committee's decision.    Committee Discussion Details: Reviewed the followin. Pt has neurogenic bladder with recurrent UTIs. He straight caths with flushes and was cleared by Dr Billy in 2021      2. Hx of severe scoliosis and kyphosis with cervical fusion resulting in severe restrictive process and chronic respiratory failure/hypoxia. He also had adenovirus pneumonia as a child requirng chronic vent use for a few years (stopped at age 8). Subsequent bronchiolitis and interstitial lung disease. Being followed by outside pulm which he was seen in 2021- he's on 1-2L oxygen at night, none during the day. He has had some granulation tissue and skin diving his ostomy into 2 visible holes which prevented him from completing PFTs at last visit.  They were completed 2020 with the same problem-  they showed severe obstructive process with FEV1 of 1.08- but it was noted the pt has a trach and developed a hole beneath the trach site which leaks air      3.  Outside pulmonary- he's been doing well over the last 8 months- not requiring prednisone bursts and has not had any frequent respiratory infections. Trach secretions are minimal using duonebs TID. 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 EKG and ECHO- were normal EF 60-65%     6. Cognitive delay- SW had no issues      7. G-tube- chronic malnutrition partyl due to protein energy wasting as result of CKD-      8. Positive COVID back in 10/2020- has since had 2 negative tests      9. PACS consult back in 3/2021    Committee determined that pt is OK for active status and should follow up yearly with neph, social work and transplant surgeon due to complexity.

## 2021-09-04 DIAGNOSIS — E87.20 METABOLIC ACIDOSIS: ICD-10-CM

## 2021-09-05 DIAGNOSIS — E87.20 METABOLIC ACIDOSIS: ICD-10-CM

## 2021-09-07 RX ORDER — SODIUM BICARBONATE 650 MG/1
TABLET ORAL
Qty: 270 TABLET | Refills: 0 | OUTPATIENT
Start: 2021-09-07

## 2021-09-07 RX ORDER — SODIUM BICARBONATE 650 MG/1
TABLET ORAL
Qty: 270 TABLET | Refills: 1 | Status: SHIPPED | OUTPATIENT
Start: 2021-09-07 | End: 2021-10-05

## 2021-09-21 DIAGNOSIS — N18.30 CKD (CHRONIC KIDNEY DISEASE) STAGE 3, GFR 30-59 ML/MIN (H): ICD-10-CM

## 2021-09-22 RX ORDER — AMLODIPINE BESYLATE 5 MG/1
TABLET ORAL
Qty: 180 TABLET | Refills: 3 | Status: ON HOLD | OUTPATIENT
Start: 2021-09-22 | End: 2023-04-08

## 2021-10-05 DIAGNOSIS — E87.20 METABOLIC ACIDOSIS: ICD-10-CM

## 2021-10-05 RX ORDER — SODIUM BICARBONATE 650 MG/1
TABLET ORAL
Qty: 270 TABLET | Refills: 1 | Status: ON HOLD | OUTPATIENT
Start: 2021-10-05 | End: 2023-04-08

## 2021-10-19 ENCOUNTER — LAB (OUTPATIENT)
Dept: LAB | Facility: CLINIC | Age: 26
End: 2021-10-19
Payer: MEDICARE

## 2021-10-19 ENCOUNTER — OFFICE VISIT (OUTPATIENT)
Dept: NEPHROLOGY | Facility: CLINIC | Age: 26
End: 2021-10-19
Attending: INTERNAL MEDICINE
Payer: MEDICARE

## 2021-10-19 VITALS
BODY MASS INDEX: 18.65 KG/M2 | DIASTOLIC BLOOD PRESSURE: 67 MMHG | SYSTOLIC BLOOD PRESSURE: 123 MMHG | HEART RATE: 94 BPM | WEIGHT: 98.7 LBS | OXYGEN SATURATION: 97 %

## 2021-10-19 DIAGNOSIS — N18.4 CKD (CHRONIC KIDNEY DISEASE) STAGE 4, GFR 15-29 ML/MIN (H): ICD-10-CM

## 2021-10-19 DIAGNOSIS — N18.6 ESRD (END STAGE RENAL DISEASE) (H): ICD-10-CM

## 2021-10-19 DIAGNOSIS — N18.4 ANEMIA IN STAGE 4 CHRONIC KIDNEY DISEASE (H): ICD-10-CM

## 2021-10-19 DIAGNOSIS — D63.1 ANEMIA IN STAGE 4 CHRONIC KIDNEY DISEASE (H): ICD-10-CM

## 2021-10-19 DIAGNOSIS — Z76.82 ORGAN TRANSPLANT CANDIDATE: ICD-10-CM

## 2021-10-19 DIAGNOSIS — N18.4 CKD (CHRONIC KIDNEY DISEASE) STAGE 4, GFR 15-29 ML/MIN (H): Primary | ICD-10-CM

## 2021-10-19 LAB
ANION GAP SERPL CALCULATED.3IONS-SCNC: 9 MMOL/L (ref 3–14)
BUN SERPL-MCNC: 79 MG/DL (ref 7–30)
CALCIUM SERPL-MCNC: 8 MG/DL (ref 8.5–10.1)
CHLORIDE BLD-SCNC: 114 MMOL/L (ref 94–109)
CO2 SERPL-SCNC: 20 MMOL/L (ref 20–32)
CREAT SERPL-MCNC: 6.14 MG/DL (ref 0.66–1.25)
GFR SERPL CREATININE-BSD FRML MDRD: 12 ML/MIN/1.73M2
GLUCOSE BLD-MCNC: 90 MG/DL (ref 70–99)
POTASSIUM BLD-SCNC: 5 MMOL/L (ref 3.4–5.3)
SODIUM SERPL-SCNC: 143 MMOL/L (ref 133–144)

## 2021-10-19 PROCEDURE — 86832 HLA CLASS I HIGH DEFIN QUAL: CPT | Performed by: SURGERY

## 2021-10-19 PROCEDURE — 80048 BASIC METABOLIC PNL TOTAL CA: CPT | Performed by: PATHOLOGY

## 2021-10-19 PROCEDURE — 36415 COLL VENOUS BLD VENIPUNCTURE: CPT | Performed by: PATHOLOGY

## 2021-10-19 PROCEDURE — 86833 HLA CLASS II HIGH DEFIN QUAL: CPT | Performed by: SURGERY

## 2021-10-19 PROCEDURE — 99214 OFFICE O/P EST MOD 30 MIN: CPT | Performed by: INTERNAL MEDICINE

## 2021-10-19 NOTE — PROGRESS NOTES
TRANSPLANT NEPHROLOGY WAITLIST VISIT    Recommendations:   -BMP today as patient/mother have questions re: medications. I will reach out to Dr. Abel after completion.   -PRA to be done today as well. Recommended continuing every 3 months.   -Will reach out to Allison Mandujano/Yadira Costello re: Living donor link for living donors.   -Labs per General nephrology   -Mom had questions re: scoliosis worsening/specialist. Interested in receiving care at [facility].   -RTC in 12 months with virtual visit vs outreach clinic in [place].     Assessment and Plan:  #Kidney Transplant Wait List Evaluation: Patient is a good candidate overall. Patient should remain active on the wait list. He follows with Dr. Abel for management of his chronic kidney disease. Next appointment 11/2021. 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 Abel, but per mom also has local nephrologist  -BMP today to guide sodium bicarbonate and captopril therapy.      # Cardiac Risk:   EKG and ECHO- were normal EF 60-65% from 2020.     #Neurogenic bladder  -Neurogenic bladder with recurrent UTIs. He straight caths with flushes and was cleared by Dr Billy in 4/2021. Chronically colonized so treating based on symptoms.   -Advised him to continue with appropriate straight catheterizations.      #Chronic respiratory failure/hypoxia/restrictive lung disease requiring tracheostomy  -Follows with Dr. Baez. Last seen 6/2021 where he was felt to be doing well and plans were to wean off prednisone. Plan to see them again in the spring.   -Had issues with trach secretions and was prescribed Augmentin in 6/2021 by [facility] ENT who he follows with.      #Cognitive delay- SW had no issues      #Malnutrition - has G tube      #COVID infection   -Back in 10/2020; 2 negative tests since then    -Vaccinated    KDPI: We discussed approximate remaining wait time and how that is influenced by issues such as blood type and sensitization (PRA) and access to a living donor. I contrasted potential waiting time for living vs  donor kidneys from  normal (0-85%) or higher (%) kidney donor profile index (KDPI) donors and their associated outcomes. I would not recommend Mr. Louie to consider kidneys from high KDPI donors. The reason for this decision is best summarized as: improved long term graft survival.    Patient presently appears to be enough of an acceptable kidney transplant recipient candidate to have any potential kidney donors start the evaluation process.     Patient s overall re-evaluation may require further discussion in the Transplant Program s multidisciplinary selection committee for a final recommendation on the patient s suitability for transplant.     Reason for Visit:  Aidan Louie is a 25 year old male with ESKD from Prune Belly syndrome , who presents for kidney transplant wait list evaluation.     Date of Initial Transplant Evaluation:  10/5/2020      Current Transplant Phase: Waitlist: Active  Official UNOS Listing Date: 10/22/2020  Blood Type: O pos    cPRA: 24    Date of cPRA: 10/15/2020  Transplant Coordinator: Elizabeth Rodriguez Transplant Office phone number [phone]        History of Present Illness:   24 yo M with hx of CKD IV 2/2 obstructive nephropathy from congenital abnormalities of the urinary tract secondary to Prune belly syndrome (Eagle-Hoty syndrome) with recurrent UTIs here today for waitlist visit.     He had committee review on  where he was made active on the wait list.     Interim Events: None          Kidney Disease: CKD IV        Kidney Disease Dx: Obstructive nephropathy, CAKUT from Dinosaur-Hoyt syndrome         On Dialysis: No       Primary Nephrologist: Dr. Glen Canales is primary urologist.          Cardiac/Vascular  Disease History:       Known CAD: No       Known PAD/Claudication Symptoms: No       Known Heart Failure: No       Arrhythmia:  No       Pulmonary Hypertension: No        Valvular Disease: No       Other: None       New Cardiac/Vascular Events: No         Functional Capacity/Frailty:        Has not completed formal frailty testing. However, he has been active, approved to active status on waiting list.     # COVID Vaccination Up To Date:   -Will check with coordinator; if not completed will request this be done.     Other Pertinent Transplant Surgical Issues:  Recent Blood Transfusion: No  Previous Abdominal Transplant: No  Bladder Dysfunction: Yes; cleared by Dr. Canales Urology  Chronic/Recurrent Infections: Yes; follow symptoms to guide treatment as chronically colonized. Cleared for transplant by Dr. Canales Urology   Chronic Anticoagulation: No  Jehovah s Witness: No       Active Problem List:   Patient Active Problem List   Diagnosis     Prune belly syndrome     HTN (hypertension)     CKD (chronic kidney disease) stage 4, GFR 15-29 ml/min (H)     Vitamin D deficiency     Recurrent UTI     Low bone density for age     Goiter     Short stature     High serum parathyroid hormone (PTH)     High serum follicle stimulating hormone (FSH)     On corticosteroid therapy     Tracheostomy granuloma (H)     Bilateral reflux nephropathy     Dawood Reece sequence     Tracheostomy dependence (H)     Restrictive lung disease     Scoliosis     Chronic respiratory failure with hypoxia (H)     History of tracheostomy     On prednisone therapy       Personal History:  Past Medical History:   Diagnosis Date     Bilateral reflux nephropathy      CKD (chronic kidney disease) stage 4, GFR 15-29 ml/min (H)      Hearing loss      Hypertension      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      Prune belly syndrome      Recurrent UTI      Respiratory bronchiolitis interstitial lung disease (H)      Restrictive lung disease      Scoliosis  2002     Thyroid disease      Tracheostomy dependence (H)           Allergies:  Allergies   Allergen Reactions     Latex      Latex      Other reaction(s): Other (see comments)  Other reaction(s): Contact Dermatitis, Other (see comments)     Adhesive Tape Rash        Medications:  Current Outpatient Medications   Medication Sig     acetaminophen (TYLENOL) 500 MG tablet Take 500 mg by mouth every 8 hours as needed      albuterol (2.5 MG/3ML) 0.083% nebulizer solution Take 1 ampule by nebulization 4 times daily      Alum Hydroxide-Mag Trisilicate 80-20 MG CHEW Take 1,250 mg by mouth as needed  (Patient not taking: Reported on 8/30/2021)     amLODIPine (NORVASC) 5 MG tablet TAKE 1 TABLET(5 MG) BY MOUTH TWICE DAILY     azithromycin (ZITHROMAX) 250 MG tablet Take 250 mg by mouth three times a week Mon, Wed, Fri     calcium carbonate (OS-SUSAN 500 MG "[employer]. [place]) 500 MG tablet Take 1 tablet (500 mg) by mouth 3 times daily (with meals)     captopril 0.1 mg/mL (CAPOTEN) 0.1 mg/mL SUSP Take 2.5 mg three times daily (2.5 mg/5ml) (Patient not taking: Reported on 8/30/2021)     cholecalciferol 2000 UNITS tablet Take 2,000 Units by mouth daily     cinacalcet (SENSIPAR) 30 MG tablet Take 30 mg by mouth daily     cloNIDine (CATAPRES) 0.1 MG tablet Take 1 tablet (0.1 mg) by mouth 2 times daily     Cranberry 500 MG TABS Take by mouth 3 times daily (with meals)     famotidine (PEPCID) 20 MG tablet Take 40 mg by mouth 2 times daily      ferrous gluconate (FERGON) 324 (38 Fe) MG tablet Take 1 tablet (324 mg) by mouth daily     ipratropium (ATROVENT) 0.02 % neb solution Take 0.5 mg by nebulization 4 times daily      Melatonin 10 MG CAPS Take by mouth daily as needed     mupirocin (BACTROBAN) 2 % external ointment Apply topically daily as needed      Nutritional Supplements (NEPRO) LIQD Take 1 Can by mouth daily     order for DME Equipment being ordered: Medi-Vac, plastic tubing connector lara type, cat 361, [facility]  "[facility], 1 lara tip/month x 12 months. Use for tracheostomy suctioning     order for DME Equipment being ordered: DME    Adult Bivona Uncuffed Tracheostomy Tube-5.0    Patient will do weekly trach changes.     order for DME Equipment being ordered: DME -    Deep suction kits - 12 fr     order for DME Tracheostomy Humidification Equipment  Equipment being ordered:     1) Air compressor  2) Nebulizer bottle  3) Aerosol tubing  4) Trach mask  5) Sterile water  6) Saline ampules (\"bullets\")  7) Heat Moisture Exchanger (HME) Also known by several other terms including: Thermal Humidifying Filters, Swedish nose, Artificial nose, Filter, Thermovent T.  8) Room/home humidifiers     order for DME Optifoam Basic   [id number]     order for DME Equipment being ordered: Humidifier (heated), humidifier attachment, saline lavages, humidivent mask     predniSONE (DELTASONE) 1 MG tablet Take 1 mg by mouth every other day  (Patient not taking: Reported on 8/30/2021)     sodium bicarbonate 650 MG tablet TAKE 1 TABLET BY MOUTH THREE TIMES DAILY     sodium chloride 0.9%, bottle, 0.9 % irrigation Irrigate with as directed 3 times daily Bladder irrigation     tobramycin, PF, (DAX) 300 MG/5ML nebulizer solution Take 1 ampule by nebulization as needed     traZODone (DESYREL) 50 MG tablet Take 25 mg by mouth nightly as needed      No current facility-administered medications for this visit.        Vitals:  /67   Pulse 94   Wt 44.8 kg (98 lb 11.2 oz)   SpO2 97%   BMI 18.65 kg/m       Exam:  GENERAL APPEARANCE: alert and no distress  HENT: mouth without ulcers or lesions. Tracheostomy in place.   LYMPHATICS: no cervical or supraclavicular nodes  RESP: lungs clear to auscultation - no rales, rhonchi or wheezes  CV: regular rhythm, normal rate, no rub, no murmur  EDEMA: no LE edema bilaterally  ABDOMEN: soft, nondistended, nontender, bowel sounds normal  MS: Scoliosis. extremities normal.   SKIN: no rash    Zaid Scott" MD  Transplant Nephrology   Pager: [phone]

## 2021-10-19 NOTE — LETTER
10/19/2021       RE: Aidan Louie  [address]  [facility] [address]     Dear Colleague,    Thank you for referring your patient, Aidan Louie, to the [facility] NEPHROLOGY CLINIC [place] at [facility]. Please see a copy of my visit note below.            TRANSPLANT NEPHROLOGY WAITLIST VISIT    Recommendations:   -BMP today as patient/mother have questions re: medications. I will reach out to Dr. Abel after completion.   -PRA to be done today as well. Recommended continuing every 3 months.   -Will reach out to Allison Mandujano/Yadira Costello re: Living donor link for living donors.   -Labs per General nephrology   -Mom had questions re: scoliosis worsening/specialist. Interested in receiving care at [facility].   -RTC in 12 months with virtual visit vs outreach clinic in [place].     Assessment and Plan:  #Kidney Transplant Wait List Evaluation: Patient is a good candidate overall. Patient should remain active on the wait list. He follows with Dr. Abel for management of his chronic kidney disease. Next appointment 11/2021. 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 Abel, but per mom also has local nephrologist  -BMP today to guide sodium bicarbonate and captopril therapy.      # Cardiac Risk:   EKG and ECHO- were normal EF 60-65% from 2020.     #Neurogenic bladder  -Neurogenic bladder with recurrent UTIs. He straight caths with flushes and was cleared by Dr Billy in 4/2021. Chronically colonized so treating based on symptoms.   -Advised him to continue with appropriate straight catheterizations.      #Chronic respiratory failure/hypoxia/restrictive lung disease requiring tracheostomy  -Follows with Dr. Baez. Last seen 6/2021 where he was felt to be doing well and plans were to  wean off prednisone. Plan to see them again in the spring.   -Had issues with trach secretions and was prescribed Augmentin in 2021 by [facility] who he follows with.      #Cognitive delay- SW had no issues      #Malnutrition - has G tube      #COVID infection   -Back in 10/2020; 2 negative tests since then   -Vaccinated    KDPI: We discussed approximate remaining wait time and how that is influenced by issues such as blood type and sensitization (PRA) and access to a living donor. I contrasted potential waiting time for living vs  donor kidneys from  normal (0-85%) or higher (%) kidney donor profile index (KDPI) donors and their associated outcomes. I would not recommend Mr. Louie to consider kidneys from high KDPI donors. The reason for this decision is best summarized as: improved long term graft survival.    Patient presently appears to be enough of an acceptable kidney transplant recipient candidate to have any potential kidney donors start the evaluation process.     Patient s overall re-evaluation may require further discussion in the Transplant Program s multidisciplinary selection committee for a final recommendation on the patient s suitability for transplant.     Reason for Visit:  Aidan Louie is a 25 year old male with ESKD from Prune Belly syndrome , who presents for kidney transplant wait list evaluation.     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     He had committee review on  where he was made  active on the wait list.     Interim Events: None          Kidney Disease: CKD IV        Kidney Disease Dx: Obstructive nephropathy, CAKUT from [place]-Hoyt syndrome         On Dialysis: No       Primary Nephrologist: Dr. Glen Canales is primary urologist.          Cardiac/Vascular Disease History:       Known CAD: No       Known PAD/Claudication Symptoms: No       Known Heart Failure: No       Arrhythmia:  No       Pulmonary Hypertension: No        Valvular Disease: No       Other: None       New Cardiac/Vascular Events: No         Functional Capacity/Frailty:        Has not completed formal frailty testing. However, he has been active, approved to active status on waiting list.     # COVID Vaccination Up To Date:   -Will check with coordinator; if not completed will request this be done.     Other Pertinent Transplant Surgical Issues:  Recent Blood Transfusion: No  Previous Abdominal Transplant: No  Bladder Dysfunction: Yes; cleared by Dr. Canales Urology  Chronic/Recurrent Infections: Yes; follow symptoms to guide treatment as chronically colonized. Cleared for transplant by Dr. Canales Urology   Chronic Anticoagulation: No  Jehovah s Witness: No       Active Problem List:   Patient Active Problem List   Diagnosis     Prune belly syndrome     HTN (hypertension)     CKD (chronic kidney disease) stage 4, GFR 15-29 ml/min (H)     Vitamin D deficiency     Recurrent UTI     Low bone density for age     Goiter     Short stature     High serum parathyroid hormone (PTH)     High serum follicle stimulating hormone (FSH)     On corticosteroid therapy     Tracheostomy granuloma (H)     Bilateral reflux nephropathy     Dawood Reece sequence     Tracheostomy dependence (H)     Restrictive lung disease     Scoliosis     Chronic respiratory failure with hypoxia (H)     History of tracheostomy     On prednisone therapy       Personal History:  Past Medical History:   Diagnosis Date     Bilateral reflux  nephropathy      CKD (chronic kidney disease) stage 4, GFR 15-29 ml/min (H)      Hearing loss      Hypertension      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      Prune belly syndrome      Recurrent UTI      Respiratory bronchiolitis interstitial lung disease (H)      Restrictive lung disease      Scoliosis 2002     Thyroid disease      Tracheostomy dependence (H)           Allergies:  Allergies   Allergen Reactions     Latex      Latex      Other reaction(s): Other (see comments)  Other reaction(s): Contact Dermatitis, Other (see comments)     Adhesive Tape Rash        Medications:  Current Outpatient Medications   Medication Sig     acetaminophen (TYLENOL) 500 MG tablet Take 500 mg by mouth every 8 hours as needed      albuterol (2.5 MG/3ML) 0.083% nebulizer solution Take 1 ampule by nebulization 4 times daily      Alum Hydroxide-Mag Trisilicate 80-20 MG CHEW Take 1,250 mg by mouth as needed  (Patient not taking: Reported on 8/30/2021)     amLODIPine (NORVASC) 5 MG tablet TAKE 1 TABLET(5 MG) BY MOUTH TWICE DAILY     azithromycin (ZITHROMAX) 250 MG tablet Take 250 mg by mouth three times a week Mon, Wed, Fri     calcium carbonate (OS-SUSAN 500 MG [place]. [place]) 500 MG tablet Take 1 tablet (500 mg) by mouth 3 times daily (with meals)     captopril 0.1 mg/mL (CAPOTEN) 0.1 mg/mL SUSP Take 2.5 mg three times daily (2.5 mg/5ml) (Patient not taking: Reported on 8/30/2021)     cholecalciferol 2000 UNITS tablet Take 2,000 Units by mouth daily     cinacalcet (SENSIPAR) 30 MG tablet Take 30 mg by mouth daily     cloNIDine (CATAPRES) 0.1 MG tablet Take 1 tablet (0.1 mg) by mouth 2 times daily     Cranberry 500 MG TABS Take by mouth 3 times daily (with meals)     famotidine (PEPCID) 20 MG tablet Take 40 mg by mouth 2 times daily      ferrous gluconate (FERGON) 324 (38 Fe) MG tablet Take 1 tablet (324 mg) by mouth daily     ipratropium (ATROVENT) 0.02 % neb solution Take 0.5 mg by nebulization 4 times daily      Melatonin 10 MG CAPS  "Take by mouth daily as needed     mupirocin (BACTROBAN) 2 % external ointment Apply topically daily as needed      Nutritional Supplements (NEPRO) LIQD Take 1 Can by mouth daily     order for DME Equipment being ordered: Medi-Vac, plastic tubing connector lara type, cat 361, [facility] Healthcare[facility]insic, 1 lara tip/month x 12 months. Use for tracheostomy suctioning     order for DME Equipment being ordered: DME    Adult Bivona Uncuffed Tracheostomy Tube-5.0    Patient will do weekly trach changes.     order for DME Equipment being ordered: DME -    Deep suction kits - 12 fr     order for DME Tracheostomy Humidification Equipment  Equipment being ordered:     1) Air compressor  2) Nebulizer bottle  3) Aerosol tubing  4) Trach mask  5) Sterile water  6) Saline ampules (\"bullets\")  7) Heat Moisture Exchanger (HME) Also known by several other terms including: Thermal Humidifying Filters, Swedish nose, Artificial nose, Filter, Thermovent T.  8) Room/home humidifiers     order for DME Optifoam Basic   [id number]     order for DME Equipment being ordered: Humidifier (heated), humidifier attachment, saline lavages, humidivent mask     predniSONE (DELTASONE) 1 MG tablet Take 1 mg by mouth every other day  (Patient not taking: Reported on 8/30/2021)     sodium bicarbonate 650 MG tablet TAKE 1 TABLET BY MOUTH THREE TIMES DAILY     sodium chloride 0.9%, bottle, 0.9 % irrigation Irrigate with as directed 3 times daily Bladder irrigation     tobramycin, PF, (DAX) 300 MG/5ML nebulizer solution Take 1 ampule by nebulization as needed     traZODone (DESYREL) 50 MG tablet Take 25 mg by mouth nightly as needed      No current facility-administered medications for this visit.        Vitals:  /67   Pulse 94   Wt 44.8 kg (98 lb 11.2 oz)   SpO2 97%   BMI 18.65 kg/m       Exam:  GENERAL APPEARANCE: alert and no distress  HENT: mouth without ulcers or lesions. Tracheostomy in place.   LYMPHATICS: no cervical or " supraclavicular nodes  RESP: lungs clear to auscultation - no rales, rhonchi or wheezes  CV: regular rhythm, normal rate, no rub, no murmur  EDEMA: no LE edema bilaterally  ABDOMEN: soft, nondistended, nontender, bowel sounds normal  MS: Scoliosis. extremities normal.   SKIN: no rash    Zaid Scott MD  Transplant Nephrology   Pager: [phone]        Again, thank you for allowing me to participate in the care of your patient.      Sincerely,    Zaid Scott MD

## 2021-10-19 NOTE — PATIENT INSTRUCTIONS
-BMP today as patient/mother have questions re: medications. I will reach out to Dr. Abel after completion.   -PRA to be done today as well. Recommended continuing every 3 months.   -Will reach out to Allison Mandujano/Yadira Costello re: Living donor link for living donors.   -Labs per General nephrology   -RTC in 12 months with virtual visit vs outreach clinic in [place].

## 2021-10-22 LAB
PROTOCOL CUTOFF: NORMAL
SA 1 CELL: NORMAL
SA 1 TEST METHOD: NORMAL
SA 2 CELL: NORMAL
SA 2 TEST METHOD: NORMAL
SA1 HI RISK ABY: NORMAL
SA1 MOD RISK ABY: NORMAL
SA2 HI RISK ABY: NORMAL
SA2 MOD RISK ABY: NORMAL
UNACCEPTABLE ANTIGENS: NORMAL
UNOS CPRA: 24
ZZZSA 1  COMMENTS: NORMAL
ZZZSA 2 COMMENTS: NORMAL

## 2021-10-31 ENCOUNTER — ORGAN (OUTPATIENT)
Dept: TRANSPLANT | Facility: CLINIC | Age: 26
End: 2021-10-31

## 2021-10-31 ENCOUNTER — DOCUMENTATION ONLY (OUTPATIENT)
Dept: TRANSPLANT | Facility: CLINIC | Age: 26
End: 2021-10-31
Payer: MEDICARE

## 2021-10-31 DIAGNOSIS — Z76.82 AWAITING ORGAN TRANSPLANT: Primary | ICD-10-CM

## 2021-10-31 NOTE — PROGRESS NOTES
PATHOLOGY HLA CROSSMATCH CONSULTATION: DONOR/RECIPIENT  VIRTUAL CROSSMATCH - Kidney  Consultation Date: 10/31/2021  Consultation Requested by: Dr. Sterling    Regarding: Compatibility of  donor organ UNOS #[id number] from OPO: NVLV  with Aidan Louie    Findings: Regarding a virtual crossmatch between Aidan Louie and  donor listed above (match ID [id number]):  The most recent (10.19.21) and 1 additional patient serum/sera  were analyzed.  The patient has no antibodies listed with HLA specificity against the donor organ.      Record Review Indicates: I personally reviewed the most recent serum, the historic peak sera, and all other sera with solid-phase HLA Single Antigen test results:  The patient has no HLA antibodies against the donor organ.     The results of this virtual XM are:   -most recent serum: compatible   -peak #1: compatible      Disclaimer: Clinical judgement must take into account other factors, such as non-HLA antibodies not detected in the assay. The VXM gives probabilities only.  The probability does not account for the potential for auto-antibodies that may be present in the patient's serum.  These autoantibodies may render the physical crossmatch falsely positive, and would be detected by an autologous crossmatch.  When possible, confirm findings with prospective allogeneic and autologous flow crossmatches before going to transplant as clinically indicated.     Joan Talley MD  Medical Director, Immunology/Histocompatibility Laboratory

## 2021-10-31 NOTE — Clinical Note
Elizabeth, please call pt's mom Valerie. She stated she has a lot of questions regarding Greg's care.  She stated she has been trying to reach her coordinator but no one has called her back.  Thank you, Asher.

## 2021-11-01 DIAGNOSIS — Z76.82 ORGAN TRANSPLANT CANDIDATE: ICD-10-CM

## 2021-11-01 DIAGNOSIS — N18.6 ESRD (END STAGE RENAL DISEASE) (H): ICD-10-CM

## 2021-11-01 NOTE — TELEPHONE ENCOUNTER
Some donors have risk factors or behaviors that increase their chance of having an infection such as human immunodeficiency virus (HIV), hepatitis B virus (HBV), and/or hepatitis C virus (HCV). This donor has met one or more of the risk criteria in the last 30 days for these infections.  This donor was tested for HIV, HBV, and HCV, which resulted as negative, but as with every test, there is a small chance that the test result was negative even if the virus is present.   A negative test might happen if the donor had a very recent infection.  For this reason, we identify and discuss donors who have potential exposures for HIV, HBV, and/or HCV in the last month that might be missed by testing. The risk for undetected infections is very low but not zero.    Studies show that transplant candidates may have a higher chance of survival by accepting organs from donors with risk factors for these infections compared to waiting for an organ from a donor without these risk factors.    All transplant recipients are tested for HIV, HCV, and HBV after transplant.  In the very rare event that transmission would occur, there are effective therapies available, your medical team would collaborate with a team of infectious disease experts to treat you accordingly.    *On-call Coordinator can reference list below of risk criteria behaviors if patient asks additional questions about criteria  List of Risk Criteria:    1. Sex in exchange for money or drugs  2. Sex with a person who had sex in exchange for money or drugs  3. Drug injection for nonmedical reasons    Asher Ma RN on 10/31/2021 at 5:26 PM

## 2021-11-01 NOTE — TELEPHONE ENCOUNTER
Organ Offer Encounter Information    Organ Offer Information  Organ offer date & time: 10/31/2021  5:00 PM  Coordinator/Fellow/Attending name: Asher Ma RN   Organ(s):  Organ UNOS ID Match Run ID Comment Organ Laterality   Kidney [id number] NVLV       Discussed organ offer with: Caregiver  Patient/Caregiver name: MotherValerie  Discussed risk category with Patient/Other: DCD, PHS Risk Criteria Met  Understood donor criteria, verbalized understanding  Patient/Other asked to speak to a surgeon?: No  Discussed program-specific outcomes: Asked questions regarding SRTR, verbalized understanding  Organ offer decision status Patient/Other: Declined Offer  UNOS decline reason: 801 - Candidate ill, unavailable, refused, or temporarily unsuitable  Detail: MotherValerie stated she wait for a living donor.   Additional Comments: 10/31/2021 5:14 PM  Kidney: Import  MD: Marciano   OPO Contact: Akira [phone]  VXM Results: VXM compatible with no DSA per Dr Talley.   XM Plan (FXM must be done with serum no older than 10 days from transplant): Pending.   Plan (Admission, NPO, Donor OR): Called patient's mom Valerie with a back up DCD, increased risk Kidney offer. She stated she will decline DCD offer at this time. Dr Tariq notified and offer declined 801.   - - -     Asher Ma RN on 10/31/2021 at 6:25 PM    Attestation I have discussed all of the above with the Patient/Legal Guardian/Caregiver regarding this organ offer.: Yes  Coordinator/Fellow/Attending name: Asher Ma, GUNNER

## 2021-11-01 NOTE — TELEPHONE ENCOUNTER
There are three types of donors - living donors, brain dead donors, and DCD donors.  Living donation would be like if your family member donated an organ to you.  Brain death donors are  donors who have no brain stem reflexes whose bodies are being kept alive with life support.  And then there is your type of offer - DCD donation.    DCD stands for donation after cardiac death.  It is when the potential donor had a brain injury but did not progress to brain death, so the family is choosing to withdraw life support.  There is a chance that the donor may not pass in time suitable for donation and transplant, but without moving forward, we have no way of knowing for sure.    So the important part for you to know about this is kidneys from DCD donors have the same long term outcomes as kidneys from brain dead donors.  Sometimes they may take longer to come to full power after transplant. This is called 'delayed graft function' and may result in the need for some dialysis treatments for the first few days or weeks.     Asher Ma RN on 10/31/2021 at 05:24 PM

## 2021-11-01 NOTE — PROGRESS NOTES
Called pt's guardian Valerie back regarding offer over the weekend. Pt's mom was wondering why he received this offer and thought he was live donor only. Writer let her know that living donor was encouraged but he was not listed for live donor only. She had many questions about typing and how kidney offers come to her son. Encouraged her to ask to talk to on call surgeon/neph about each offer that comes to pt. Gave Valerie donor website, my direct line, NKF resource and my transplant place videos to learn more about the transplant process. Valerie insisted to come in and talk to a surgeon about live donor vs  donor options. Will order surgeon visit and follow up after appt. Encouraged Valerie to sign up for [place] so she can see his lab results right away when they result, sent another link for her. Also explained PRA level and what that means for sensitization. Valerie feels overwhelmed as she is caring for 3 individuals and just recently lost her . Encouraged her to reach out with any transplant questions and will follow up with her after the surgeon visit.

## 2021-11-29 ENCOUNTER — OFFICE VISIT (OUTPATIENT)
Dept: TRANSPLANT | Facility: CLINIC | Age: 26
End: 2021-11-29
Attending: TRANSPLANT SURGERY
Payer: MEDICARE

## 2021-11-29 VITALS
BODY MASS INDEX: 18.82 KG/M2 | WEIGHT: 99.6 LBS | SYSTOLIC BLOOD PRESSURE: 136 MMHG | HEART RATE: 96 BPM | OXYGEN SATURATION: 97 % | DIASTOLIC BLOOD PRESSURE: 66 MMHG

## 2021-11-29 DIAGNOSIS — Z76.82 ORGAN TRANSPLANT CANDIDATE: ICD-10-CM

## 2021-11-29 DIAGNOSIS — N18.6 ESRD (END STAGE RENAL DISEASE) (H): ICD-10-CM

## 2021-11-29 PROCEDURE — 99213 OFFICE O/P EST LOW 20 MIN: CPT | Performed by: TRANSPLANT SURGERY

## 2021-11-29 NOTE — PROGRESS NOTES
Transplant Surgery      Reason For Visit: kidney waitilst    History of Present Illness:  On kidney transplant [facility] with 13 months waiting time.  Blood type O, PRA 24%.    Tracheostomy    Had Covid    Chronic UTIs.  Doing intertermitant straight caths.    Prune Belly symptoms.      Prolapsed mitrofanoff.    Cleared by urology for transplant     Exam:   /66   Pulse 96   Wt 45.2 kg (99 lb 9.6 oz)   SpO2 97%   BMI 18.82 kg/m    Young adult male, no apparent distress  Trache in place but easy respirations  Cognitive delay  Thin,    Surgical incisions and stoma c/w history    Impression:  Challenging but acceptable candidate for kidney transplant.    Plan: Continued active status    Dann Thomson MD, PhD  Transplant Surgery    TT:  20 min

## 2021-11-29 NOTE — LETTER
11/29/2021     RE: Aidan Louie  [address] [place] [address]    Dear Colleague,    Thank you for referring your patient, Aidan Louie, to the [facility] TRANSPLANT [facility]INIC. Please see a copy of my visit note below.    Transplant Surgery    Reason For Visit: kidney waitilst    History of Present Illness:  On kidney transplant [facility] with 13 months waiting time.  Blood type O, PRA 24%.    Tracheostomy  Had Covid  Chronic UTIs.  Doing intertermitant straight caths.  Prune Belly symptoms.    Prolapsed mitrofanoff.  Cleared by urology for transplant     Exam:   /66   Pulse 96   Wt 45.2 kg (99 lb 9.6 oz)   SpO2 97%   BMI 18.82 kg/m    Young adult male, no apparent distress  Trache in place but easy respirations  Cognitive delay  Thin,    Surgical incisions and stoma c/w history    Impression:  Challenging but acceptable candidate for kidney transplant.    Plan: Continued active status    Dann Thomson MD, PhD  Transplant Surgery    TT:  20 min

## 2021-12-08 ENCOUNTER — TRANSFERRED RECORDS (OUTPATIENT)
Dept: HEALTH INFORMATION MANAGEMENT | Facility: CLINIC | Age: 26
End: 2021-12-08

## 2021-12-09 ENCOUNTER — TELEPHONE (OUTPATIENT)
Dept: TRANSPLANT | Facility: CLINIC | Age: 26
End: 2021-12-09
Payer: MEDICARE

## 2021-12-09 ENCOUNTER — DOCUMENTATION ONLY (OUTPATIENT)
Dept: TRANSPLANT | Facility: CLINIC | Age: 26
End: 2021-12-09

## 2021-12-09 NOTE — TELEPHONE ENCOUNTER
Called patient guardian to inform them of status change on Kidney Waitlist. Pt changed to inactive on 21 due to being admitted with chest pain at [facility] currently too sick for transplant . Pt mother was informed that they are still accruing time on the waitlist, they just cannot receive  donor offers at this time. Mom Valerie was adamant that pt should not be receiving a cadaver kidney and that he should be living donor only. Will confirm with Dr Thomson as he just saw pt. Status change letter will be sent to patient. Pt verbalized understanding of information and has no further questions. Encouraged to reach out if questions arise.

## 2021-12-09 NOTE — TELEPHONE ENCOUNTER
Outside provider called from [facility] [address]. Pt is currently admitted with chest pain and she feels that he would need further care and to be [facility] the [facility] . Provided her with the admissions number.

## 2021-12-15 ENCOUNTER — COMMITTEE REVIEW (OUTPATIENT)
Dept: TRANSPLANT | Facility: CLINIC | Age: 26
End: 2021-12-15
Payer: MEDICARE

## 2021-12-15 NOTE — COMMITTEE REVIEW
Abdominal Patient Discussion Note Transplant Coordinator: Elizabeth Rodriguez  Transplant Surgeon:       Referring Physician: Glen Abel    Committee Review Members:  Nephrology Haider Tejada MD, Kulwinder Schuler, APRN CNP, Zaid Scott MD, Jyoti Restrepo MD, Amadou Lai MD, Luis Peña MD   Pharmacy Ede Arellano, [facility]    - Clinical Polina Ara [place], [facility]   Transplant Cheyenne Fountain PA-C, Ana Bustillos RN, Dann Thomson MD, April Giraldo, RN, Meera Hoyt, RN, Odilia Solis, RN, Yazmin Alvarado, RN, Kayley Whaley, RN, Jen Melissa, GUNNER       Additional Discussion Notes and Findings: Pt to remain inactive as he is in the hospital with chest pain.

## 2021-12-22 ENCOUNTER — TELEPHONE (OUTPATIENT)
Dept: NEPHROLOGY | Facility: CLINIC | Age: 26
End: 2021-12-22
Payer: MEDICARE

## 2021-12-22 NOTE — TELEPHONE ENCOUNTER
Patients mom  Valerie called in tears and upset, she called on Monday but writer was not able to call her back until today. Valerie reports that patient has been admitted at [facility] is on dialysis but that they are wanting to discharge him now but there are issues regarding his trach. Valerie wanted to know if Dr. Abel and Dr. Sharma could consult regarding about his case. Writer sent Dr. Abel a text page regarding this call. Writer called [facility] to try to find a direct way to connect Dr. Lopez to Dr. Sharma but was told they would send Dr. Sharma an urgent message, writer provided them Dr. Abel's pager as he is rounding on patients in the hospital at this time and also sent him a text page so he is aware  Mary Scott LPN  Nephrology  [phone]

## 2022-02-16 ENCOUNTER — TELEPHONE (OUTPATIENT)
Dept: OTOLARYNGOLOGY | Facility: CLINIC | Age: 27
End: 2022-02-16
Payer: MEDICARE

## 2022-02-16 DIAGNOSIS — Z43.0 TRACHEOSTOMY CARE (H): ICD-10-CM

## 2022-02-16 DIAGNOSIS — Z93.0 TRACHEOSTOMY IN PLACE (H): Primary | ICD-10-CM

## 2022-02-16 NOTE — TELEPHONE ENCOUNTER
[facility] Call Center    Phone Message    May a detailed message be left on voicemail: yes     Reason for Call: Other: Aidan Padilla's mom, needs the prescription for deep suction catheters, size 12 [place] renewed. She is needing this sent over to [facility] Fx: [phone]. Aidan is out and he is sounding a little congested. She is requesting that this is sent over today. She is also wondering who Dr. Dumas's head nurse is. Please send prescription over to [facility] & call Valerie to discuss further. Thank you!     Action Taken: Message routed to:  [facility] ENT    Travel Screening: Not Applicable

## 2022-02-18 ENCOUNTER — DOCUMENTATION ONLY (OUTPATIENT)
Dept: TRANSPLANT | Facility: CLINIC | Age: 27
End: 2022-02-18

## 2022-02-18 NOTE — TELEPHONE ENCOUNTER
Spoke to patient's mom and informed her that this order was sent over on Wednesday, 2/16/22. Sent to the fax number provided. Provided Valerie with writer direct line number for future nursing needs. No further questions at this time.    Jessi Baldwin RN on 2/18/2022 at 3:32 PM

## 2022-03-05 DIAGNOSIS — I15.1 HYPERTENSION SECONDARY TO OTHER RENAL DISORDERS: ICD-10-CM

## 2022-03-07 RX ORDER — CLONIDINE HYDROCHLORIDE 0.1 MG/1
TABLET ORAL
Qty: 180 TABLET | Refills: 11 | OUTPATIENT
Start: 2022-03-07

## 2022-03-11 ENCOUNTER — TRANSFERRED RECORDS (OUTPATIENT)
Dept: HEALTH INFORMATION MANAGEMENT | Facility: CLINIC | Age: 27
End: 2022-03-11

## 2022-03-18 ENCOUNTER — LAB (OUTPATIENT)
Dept: LAB | Facility: CLINIC | Age: 27
End: 2022-03-18
Payer: MEDICARE

## 2022-03-18 ENCOUNTER — TRANSFERRED RECORDS (OUTPATIENT)
Dept: HEALTH INFORMATION MANAGEMENT | Facility: CLINIC | Age: 27
End: 2022-03-18

## 2022-03-18 DIAGNOSIS — N18.6 ESRD (END STAGE RENAL DISEASE) (H): ICD-10-CM

## 2022-03-18 DIAGNOSIS — Z76.82 ORGAN TRANSPLANT CANDIDATE: ICD-10-CM

## 2022-03-18 PROCEDURE — 86832 HLA CLASS I HIGH DEFIN QUAL: CPT

## 2022-03-18 PROCEDURE — 86833 HLA CLASS II HIGH DEFIN QUAL: CPT

## 2022-03-30 ENCOUNTER — TRANSFERRED RECORDS (OUTPATIENT)
Dept: HEALTH INFORMATION MANAGEMENT | Facility: CLINIC | Age: 27
End: 2022-03-30

## 2022-06-30 ENCOUNTER — CARE COORDINATION (OUTPATIENT)
Dept: NEPHROLOGY | Facility: CLINIC | Age: 27
End: 2022-06-30

## 2022-06-30 NOTE — PROGRESS NOTES
Received a VM from Aidan Padilla's mother. She was wondering about scheduling a follow up with Dr. Abel. Writer reviewed chart. Aidan started dialysis up near [facility] a few months back. Writer explained that his nephrologist managing the dialysis is his general nephrologist. She had many other questions in regard to transplant and waitlist as well as where they get supplies for his suprapubic catheter as she thought Dr. Abel would order those. Writer told her she needs to talk to Meera, his transplant RN (gave number to transplant to her) and Urology for suprapubic catheter supplies. She verbalized understanding.

## 2022-08-02 ENCOUNTER — TELEPHONE (OUTPATIENT)
Dept: TRANSPLANT | Facility: CLINIC | Age: 27
End: 2022-08-02

## 2022-08-02 NOTE — TELEPHONE ENCOUNTER
Called pts mom Valerie, to check in on pt status and update we have not received discharge records from 12/2021 hospitalization. Left  with direct line for return call.  Email sent requesting updates on pt status.       Called Dr. Baez's office to see if they can assist in getting records from 12/2021 hospitalization.  Admission for PD catheter in March. Will fax records provided fax number [phone].

## 2022-08-11 ENCOUNTER — LAB (OUTPATIENT)
Dept: LAB | Facility: CLINIC | Age: 27
End: 2022-08-11
Payer: MEDICARE

## 2022-08-11 DIAGNOSIS — N18.6 ESRD (END STAGE RENAL DISEASE) (H): ICD-10-CM

## 2022-08-11 DIAGNOSIS — Z76.82 ORGAN TRANSPLANT CANDIDATE: ICD-10-CM

## 2022-08-11 PROCEDURE — 86833 HLA CLASS II HIGH DEFIN QUAL: CPT

## 2022-08-11 PROCEDURE — 86832 HLA CLASS I HIGH DEFIN QUAL: CPT

## 2022-08-11 PROCEDURE — 36415 COLL VENOUS BLD VENIPUNCTURE: CPT

## 2022-09-08 ENCOUNTER — TRANSFERRED RECORDS (OUTPATIENT)
Dept: HEALTH INFORMATION MANAGEMENT | Facility: CLINIC | Age: 27
End: 2022-09-08

## 2022-09-23 ENCOUNTER — TELEPHONE (OUTPATIENT)
Dept: TRANSPLANT | Facility: CLINIC | Age: 27
End: 2022-09-23

## 2022-09-23 DIAGNOSIS — N18.6 ESRD (END STAGE RENAL DISEASE) (H): ICD-10-CM

## 2022-09-23 DIAGNOSIS — Z76.82 ORGAN TRANSPLANT CANDIDATE: ICD-10-CM

## 2022-09-23 NOTE — TELEPHONE ENCOUNTER
Called pts mom, Valerie, to check in on how pt is doing and where he is dialyzing. Pt is doing well he is currently dialyzing through [facility], Anat is RN working with pt, her number is [phone]. Pt was hospitalized a few times between 12/2021 and 04/2022, will discuss with HD RN. Reports pt is vaccinated against Covid. Updated will bring pt in for RWL with neph, SW, transplant surgeon and CT a/p. Valerie had some questions regarding living donor options, pt doesn't have anyone signed up. Reviewed donor sign up website/ process and emailed information to her. Gave direct line to Valerie in case further questions arise.     Email sent to Valerie with donor website and contact information.     Called Anat, PD RN, to discuss how pt is doing on PD and review start time/ who is primary nephrologist. Left VM with direct line for return call.

## 2022-09-26 NOTE — TELEPHONE ENCOUNTER
Received voicemail from Eva. Has covid vaccine information. 2728 can be requested via fax number [phone] with attention to Zohra. Returned call, Left  with direct line for return call.     9/27/22 received call back from Eva. Pt has had covid vaccines completed 7/2021, will fax documentation to us. Only hospitalizations pt has had were the 12/2021 and hospitalization for dialysis access. Dr. Sharma is primary nephrologist, dialysis start date is 12/29/21. Eva provided email in case further questions arise.

## 2022-09-27 ENCOUNTER — DOCUMENTATION ONLY (OUTPATIENT)
Dept: TRANSPLANT | Facility: CLINIC | Age: 27
End: 2022-09-27

## 2022-09-27 ENCOUNTER — TRANSFERRED RECORDS (OUTPATIENT)
Dept: HEALTH INFORMATION MANAGEMENT | Facility: CLINIC | Age: 27
End: 2022-09-27

## 2022-10-06 ENCOUNTER — DOCUMENTATION ONLY (OUTPATIENT)
Dept: TRANSPLANT | Facility: CLINIC | Age: 27
End: 2022-10-06

## 2022-11-01 ENCOUNTER — OFFICE VISIT (OUTPATIENT)
Dept: TRANSPLANT | Facility: CLINIC | Age: 27
End: 2022-11-01
Attending: NURSE PRACTITIONER
Payer: MEDICARE

## 2022-11-01 ENCOUNTER — OFFICE VISIT (OUTPATIENT)
Dept: NEPHROLOGY | Facility: CLINIC | Age: 27
End: 2022-11-01
Attending: NURSE PRACTITIONER
Payer: MEDICARE

## 2022-11-01 ENCOUNTER — APPOINTMENT (OUTPATIENT)
Dept: LAB | Facility: CLINIC | Age: 27
End: 2022-11-01
Payer: MEDICARE

## 2022-11-01 ENCOUNTER — ANCILLARY PROCEDURE (OUTPATIENT)
Dept: CT IMAGING | Facility: CLINIC | Age: 27
End: 2022-11-01
Attending: NURSE PRACTITIONER
Payer: MEDICARE

## 2022-11-01 VITALS
DIASTOLIC BLOOD PRESSURE: 71 MMHG | OXYGEN SATURATION: 98 % | SYSTOLIC BLOOD PRESSURE: 109 MMHG | HEIGHT: 60 IN | BODY MASS INDEX: 19.56 KG/M2 | WEIGHT: 99.65 LBS | HEART RATE: 79 BPM

## 2022-11-01 DIAGNOSIS — N18.6 ESRD (END STAGE RENAL DISEASE) (H): ICD-10-CM

## 2022-11-01 DIAGNOSIS — N18.6 ESRD (END STAGE RENAL DISEASE) (H): Primary | ICD-10-CM

## 2022-11-01 DIAGNOSIS — Z76.82 ORGAN TRANSPLANT CANDIDATE: Primary | ICD-10-CM

## 2022-11-01 DIAGNOSIS — Z76.82 ORGAN TRANSPLANT CANDIDATE: ICD-10-CM

## 2022-11-01 DIAGNOSIS — Q79.4 PRUNE BELLY SYNDROME: ICD-10-CM

## 2022-11-01 PROCEDURE — 86832 HLA CLASS I HIGH DEFIN QUAL: CPT | Performed by: NURSE PRACTITIONER

## 2022-11-01 PROCEDURE — 36415 COLL VENOUS BLD VENIPUNCTURE: CPT | Performed by: NURSE PRACTITIONER

## 2022-11-01 PROCEDURE — 74176 CT ABD & PELVIS W/O CONTRAST: CPT | Mod: MG | Performed by: RADIOLOGY

## 2022-11-01 PROCEDURE — 99214 OFFICE O/P EST MOD 30 MIN: CPT | Performed by: NURSE PRACTITIONER

## 2022-11-01 PROCEDURE — G1010 CDSM STANSON: HCPCS | Performed by: RADIOLOGY

## 2022-11-01 PROCEDURE — 99215 OFFICE O/P EST HI 40 MIN: CPT | Performed by: SURGERY

## 2022-11-01 PROCEDURE — 86833 HLA CLASS II HIGH DEFIN QUAL: CPT | Performed by: NURSE PRACTITIONER

## 2022-11-01 NOTE — PROGRESS NOTES
Transplant Surgery Consult Note     Medical record number: [id number]  YOB: 1995,   Consult requested  for evaluation of kidney transplant candidacy.    Assessment and Recommendations:Mr. Louie appears to be a good candidate for kidney transplantation and has a fair understanding of the risks and benefits of this approach to the management of renal failure. 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  Recommend he be considered as a candidate for kidney transplant.  Cardiology consult for cardiac risk stratification to be ordered: No  CT abdomen and pelvis without contrast to be ordered for assessment of vascular targets: Yes. Ordered for today  Transplant listing labs ordered to include HLA, ABOx2, Cr, etc.  Dietician consult ordered: Yes  Social work consult ordered: Yes  Imaging reports reviewed:  CT from 11/2022  IMPRESSION:   1.  Patient has a horseshoe kidney. Areas of scarring and atrophy of  the kidneys demonstrated with bilateral extrarenal pelves and mild  hydronephrosis with urothelial thickening. Lobulated appearance of the  right kidney makes assessment for any underlying lesions difficult.  The majority of these regions appear to be isoattenuating to renal  parenchyma rather than mass-like. Consider follow-up ultrasound to  further evaluate.  2.  Patient has also had ureteral reimplantation (possibly with an  ileal conduit) and Mitrofanoff procedure. There is fairly marked  asymmetric wall thickening of the right bladder which superiorly  appears to be related to the ureteral reimplantation and Mitrofanoff.  More inferiorly this is of uncertain etiology. There is also some  stranding in the adjacent perivesicular  fat. Correlation with any  recent cystoscopy or prior CT imaging of this region if available  recommended. If the patient has not had a recent evaluation of this,  could consider direct visualization with cystoscopy.  3.  Peritoneal dialysis catheter coiled in the pelvis. Small amount of  abdominal and pelvic ascites likely related to dialysis. There is some  stranding in the peritoneum with small amount of ascites in the left  upper quadrant which may also be related to dialysis.  4.  Chronic interstitial lung disease partially visualized.  Radiology images reviewed: CT images from 2022 CT reviewed. B EIA's suitable for transplant  Recipient suitable to move forward with work up of living donors:  Yes  Will likely need retrograde cystogram to eval bladder. Will need to discuss with urology  Will need native kidney ultrasound to eval horse shoe kidney given these kidneys are higher risk for RCC and his appears lobulated on CT  Urology to comment on the asymmetric bladder wall thickening      The majority of our visit was spent in counselling, discussing the medical and surgical risks of kidney transplantation. We discussed approximate wait time and how that is influenced by issues such as blood type and sensitization (PRA) and access to a living donor. I contrasted potential waiting time for living vs  donor kidneys from  normal (0-85%) or higher (%) kidney donor profile index (KDPI) donors and their associated outcomes. I would not recommend this individual to consider kidneys from high KDPI donors. The reason for this decision is best summarized as: not on dialysis yet. Potential surgical complications of kidney transplantation include bleeding, superficial or deep wound complications (infection, hernia, lymphocele), ureteral anastomotic failure (leak or stenosis), graft thrombosis, need for reoperation and other issues such as cardiac complications, pneumonia, deep venous thrombosis, pulmonary  embolism, post transplant diabetes and death. The potential for recurrent disease or need for retransplantation was also addressed. We discussed the possible need for ureteral stent (and subsequent removal), and the utility of protocol biopsy and laboratory studies to evaluate for rejection or recurrent disease. We discussed the risk of graft rejection, our center's average graft and patient survival rates, immunosuppression protocols, as well as the potential opportunity to participate in clinical trials.  We also discussed the average length of stay, recovery process, and posttransplant lab and monitoring protocol.  I emphasized the need for strict immunosuppression medication adherence and the potential for complications of immunosuppression such as skin cancer or lymphoma, as well as a very low but not zero risk of donor-derived disease transmission risks (infection, cancer). Mr. Louie asked good questions and his candidacy will be reviewed at our Multidisciplinary Selection Committee. Thank you for the opportunity to participate in Mr. Louie's care.      Total time: 60 minutes        Ana Horta MD FACS  Assistant Professor of Surgery  Director, Living Kidney Donor Program.    ---------------------------------------------------------------------------------------------------    HPI: Mr. Louie has Chronic renal failure due to prune belly/reflux. The patient is non-diabetic.       The patient is not on dialysis.    Has potential kidney donors:  Doesn't know .  Interested in participation in paired exchange if a donor is willing: Doesn't know   S/P bladder augmentation and Mitrofanoff  G-tube for nutrition    The patient has the following pertinent history:       No    Yes  Dialysis:    [x]      [] via:       Blood Transfusion                  []      []  Number of units:   Most recently:  Pregnancy:    []      [] Number:       Previous Transplant:  []      [] Details:    Cancer    []       [] Comment:   Kidney stones   []      [] Comment:      Recurrent infections  []      []  Type:                  Bladder dysfunction  []      [] Cause:    Claudication   []      [] Distance:    Previous Amputation  []      [] Cause:     Chronic anticoagulation  []      [] Indication:   Yazidism  []      []      Past Medical History:   Diagnosis Date     Bilateral reflux nephropathy      CKD (chronic kidney disease) stage 4, GFR 15-29 ml/min (H)      Hearing loss      Hypertension      Kyphosis      Dawood Reece sequence      Prune belly syndrome      Recurrent UTI      Respiratory bronchiolitis interstitial lung disease (H)      Restrictive lung disease      Scoliosis      Thyroid disease      Tracheostomy dependence (H)      Past Surgical History:   Procedure Laterality Date     Bilateral ureteral reimplantation  2002    abdominoplasty, Mitrofanoff creation     CYSTOSCOPY  12     EXAM UNDER ANESTHESIA THROAT  6/10/2003    tonsillar hypertrophy     EXAM UNDER ANESTHESIA, RESTORATIONS, EXTRACTION(S) DENTAL COMPLEX, COMBINED  2006     GASTROSTOMY TUBE       GASTROSTOMY TUBE  3/16/2002    button change     HERNIORRHAPHY INGUINAL BILATERAL  99    left ochiopexy     LARYNGOSCOPY, BRONCHOSCOPY, COMBINED  2002     LARYNGOSCOPY, BRONCHOSCOPY, COMBINED  2002    bilateral ear debridement     LARYNGOSCOPY, BRONCHOSCOPY, COMBINED  2003     LARYNGOSCOPY, BRONCHOSCOPY, COMBINED  2007    Failed Deflux injection     LARYNGOSCOPY, BRONCHOSCOPY, COMBINED  12      VESICOSTOMY       Redo right ureteral reimplantation  3/12/2004    Excision bladder diverticuli, Left to Right pyelopyelostomy     Replacement of trach  10/15/12     Takedown and revision of Mitrofanoff stoma  2006     TRACHEOSTOMY INFANT       Family History   Problem Relation Age of Onset     Diabetes Type 2  Mother      Diabetes Type 2  Maternal Grandmother      Deep Vein Thrombosis (DVT) Maternal  Grandmother      Diabetes Type 2  Maternal Grandfather      Diabetes Type 2  Paternal Grandmother      Alzheimer Disease Paternal Grandfather      Thyroid Disease Maternal Aunt         hashimoto     Anesthesia Reaction Maternal Aunt         patients mom reports her sister had an allergic reaction to something during a previous surgery     Thyroid Cancer Maternal Great-Grandmother      Social History     Socioeconomic History     Marital status: Single     Spouse name: Not on file     Number of children: Not on file     Years of education: Not on file     Highest education level: Not on file   Occupational History     Not on file   Tobacco Use     Smoking status: Never     Smokeless tobacco: Never   Substance and Sexual Activity     Alcohol use: No     Alcohol/week: 0.0 standard drinks     Drug use: No     Sexual activity: Not on file   Other Topics Concern     Parent/sibling w/ CABG, MI or angioplasty before 65F 55M? Not Asked   Social History Narrative     Not on file     Social Determinants of Health     Financial Resource Strain: Not on file   Food Insecurity: Not on file   Transportation Needs: Not on file   Physical Activity: Not on file   Stress: Not on file   Social Connections: Not on file   Intimate Partner Violence: Not on file   Housing Stability: Not on file       ROS:   CONSTITUTIONAL:  No fevers or chills  EYES: negative for icterus  ENT:  negative for hearing loss, tinnitus and sore throat  RESPIRATORY:  negative for cough, sputum, dyspnea  CARDIOVASCULAR:  negative for chest pain Fatigue  GASTROINTESTINAL:  negative for nausea, vomiting, diarrhea or constipation  GENITOURINARY:  negative for incontinence, dysuria, bladder emptying problems  HEME:  No easy bruising  INTEGUMENT:  negative for rash and pruritus  NEURO:  Negative for headache, seizure disorder  Allergies:   Allergies   Allergen Reactions     Latex      Latex      Other reaction(s): Other (see comments)  Other reaction(s): Contact  Dermatitis, Other (see comments)     Adhesive Tape Rash     Medications:  Prescription Medications as of 11/1/2022       Rx Number Disp Refills Start End Last Dispensed Date Next Fill Date Owning Pharmacy    acetaminophen (TYLENOL) 500 MG tablet    8/27/2020        Sig: Take 500 mg by mouth every 8 hours as needed     Class: Historical    Route: Oral    albuterol (2.5 MG/3ML) 0.083% nebulizer solution            Sig: Take 1 ampule by nebulization 4 times daily     Class: Historical    Route: Nebulization    Alum Hydroxide-Mag Trisilicate 80-20 MG CHEW    8/27/2020        Sig: Take 1,250 mg by mouth as needed     Class: Historical    Route: Oral    amLODIPine (NORVASC) 5 MG tablet  180 tablet 3 9/22/2021    [id number] - [place], [place] [address] [facility] [place] [address]    Sig: TAKE 1 TABLET(5 MG) BY MOUTH TWICE DAILY    Class: E-Prescribe    azithromycin (ZITHROMAX) 250 MG tablet            Sig: Take 250 mg by mouth three times a week Mon, Wed, Fri    Class: Historical    Route: Oral    calcium carbonate (OS-SUSAN 500 MG Koi. CA) 500 MG tablet  90 tablet 6 11/7/2016    [facility] [address]    Sig: Take 1 tablet (500 mg) by mouth 3 times daily (with meals)    Class: E-Prescribe    Route: Oral    captopril 0.1 mg/mL (CAPOTEN) 0.1 mg/mL SUSP  460 mL 11 7/28/2021    [facility] #[id number] - [place] [address] [facility] AT [place] [address]    Sig: Take 2.5 mg three times daily (2.5 mg/5ml)    Class: E-Prescribe    cholecalciferol 2000 UNITS tablet  30 tablet 11 9/14/2017    [facility] [address]    Sig: Take 2,000 Units by mouth daily    Class: E-Prescribe    Route: Oral    cinacalcet (SENSIPAR) 30 MG tablet            Sig: Take 30 mg by mouth daily    Class: Historical    Route: Oral    cloNIDine (CATAPRES) 0.1 MG tablet  180 tablet 11 2/24/2021    [facility] #[id number]  - [place], [place] - [address] AT [place] [address]    Sig: Take 1 tablet (0.1 mg) by mouth 2 times daily    Class: E-Prescribe    Route: Oral    Cranberry 500 MG TABS        [facility] #[id number] - [place], [place] - [address] AT [place] [address]    Sig: Take by mouth 3 times daily (with meals)    Class: Historical    Route: Oral    famotidine (PEPCID) 20 MG tablet        [facility] #[id number] - [place], [place] - [address] AT [place] [address]    Sig: Take 40 mg by mouth 2 times daily     Class: Historical    Route: Oral    ferrous gluconate (FERGON) 324 (38 Fe) MG tablet  90 tablet 11 11/23/2020    [facility] #[id number] - [place], [place] - [address] AT [place] [address]    Sig: Take 1 tablet (324 mg) by mouth daily    Class: E-Prescribe    Route: Oral    ipratropium (ATROVENT) 0.02 % neb solution  225 mL  11/20/2017    [facility] [place], [place] - [facility] [address] [facility]    Sig: Take 0.5 mg by nebulization 4 times daily     Class: Historical    Route: Nebulization    Melatonin 10 MG CAPS        [facility] #[id number] - [place], [place] - [address] AT [place] [address]    Sig: Take by mouth daily as needed    Class: Historical    Route: Oral    mupirocin (BACTROBAN) 2 % external ointment    8/27/2020        Sig: Apply topically daily as needed     Class: Historical    Route: Topical    Nutritional Supplements (NEPRO) LIQD  30 each 11 6/29/2020    [facility] #[id number] - [place], [place] - [address] AT [place] [address]    Sig: Take 1 Can by mouth daily    Class: E-Prescribe    Route: Oral    order for DME  1 each 11 10/7/2019        Sig: Equipment being ordered: Medi-Vac, plastic tubing connector lara type, cat 361, [facility], 1 lara tip/month x 12 months. Use for tracheostomy suctioning    Class: Local Print    order for DME  4 Units 11 10/7/2019        Sig:  "Equipment being ordered: DME    Adult Bivona Uncuffed Tracheostomy Tube-5.0    Patient will do weekly trach changes.    Class: Local Print    order for DME  90 each 3 9/23/2019        Sig: Equipment being ordered: DME -    Deep suction kits - 12 fr    Class: Local Print    order for DME  1 each 11 8/28/2019        Sig: Tracheostomy Humidification Equipment  Equipment being ordered:     1) Air compressor  2) Nebulizer bottle  3) Aerosol tubing  4) Trach mask  5) Sterile water  6) Saline ampules (\"bullets\")  7) Heat Moisture Exchanger (HME) Also known by several other terms including: Thermal Humidifying Filters, Swedish nose, Artificial nose, Filter, Thermovent T.  8) Room/home humidifiers    Class: Local Print    order for DME  30 each 11 8/28/2019        Sig: Optifoam Basic   [id number]    Class: Local Print    order for DME  1 each 3 6/10/2019    [id number] [place] - [address] AT [place] [address]    Sig: Equipment being ordered: Humidifier (heated), humidifier attachment, saline lavages, humidivent mask    Class: Local Print    predniSONE (DELTASONE) 1 MG tablet   6 12/18/2018    [facility][id number] [place] - [address] AT [place] [address]    Sig: Take 1 mg by mouth every other day     Class: Historical    Route: Oral    sodium bicarbonate 650 MG tablet  270 tablet 1 10/5/2021    [facility] #[id number] - [place], [place] - [address] AT [place] [address]    Sig: TAKE 1 TABLET BY MOUTH THREE TIMES DAILY    Class: E-Prescribe    sodium chloride 0.9%, bottle, 0.9 % irrigation    7/9/2020        Sig: Irrigate with as directed 3 times daily Bladder irrigation    Class: Historical    Route: Irrigation    tobramycin, PF, (DAX) 300 MG/5ML nebulizer solution            Sig: Take 1 ampule by nebulization as needed    Class: Historical    Route: Nebulization    traZODone (DESYREL) 50 MG tablet    8/27/2020        Sig: Take 25 mg by " mouth nightly as needed     Class: Historical    Route: Oral        Exam:     There were no vitals taken for this visit.  Appearance: in no apparent distress.   Skin: normal  Eyes:  no redness or discharge.  Sclera anicteric  Head and Neck: Normal, no rashes or jaundice  Respiratory: easy respirations, no audible wheezing.  Abdomen: flat, No distention and Surgical scars consistent with history   Extremeties: femoral 3+/3+, Edema, none  Neuro: without deficit   Psychiatric: Normal mood and affect    Diagnostics:   No results found for this or any previous visit (from the past 672 hour(s)).  OS cPRA   Date Value Ref Range Status   10/15/2020 24  Final

## 2022-11-01 NOTE — PROGRESS NOTES
TRANSPLANT NEPHROLOGY WAITLIST VISIT    Assessment and Plan:  # Kidney Transplant Wait List Evaluation: Patient is a good candidate overall. Patient should be discussed at committee for consideration of active status.     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    # Cardiac Risk:  ECHO with normal LVEF and no valvular abnormalities.     # Chronic respiratory failure/hypoxia/restrictive lung disease requiring tracheostomy: follows with Dr. Baez. Next appt for 2022.      # H/o Malnutrition and Poor Oral Intake, s/p G-Tube Placement: Patient appears to be doing better with this with stable weight.      # Health Maintenance: Dental: Not up to date     Discussed the risks and benefits of a transplant, including the risk of surgery and immunosuppression medications.    KDPI: We discussed approximate remaining wait time and how that is influenced by issues such as blood type and sensitization (PRA) and access to a living donor. I contrasted potential waiting time for living vs  donor kidneys from  normal (0-85%) or higher (%) kidney donor profile index (KDPI) donors and their associated outcomes. I would not recommend Mr. Louie to consider kidneys from high KDPI donors. The reason for this decision is best summarized as: improved long term graft survival.    Patient presently appears to be enough of an acceptable kidney transplant recipient candidate to have any potential kidney donors start the evaluation process.  Patient s overall re-evaluation may require further discussion in the Transplant Program s  multidisciplinary selection committee for a final recommendation on the patient s suitability for transplant.     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            Interim Events:   12/2021:  Admitted with elevated D-dimer, rising creatinine, and hyper kalemia for which she started hemodialysis.  Imaging was negative for PE and DVT.      2/2022: admitted with UTI, urosepsis? Records not available.      3/10/2022: PD catheter placement. Complicated by peritonitis. No infection recurrence since.              Kidney Disease:        Kidney Disease Dx: obstructive nephropathy         On Dialysis: Yes, Date initiated: 12/2021 and Dialysis Type: PD;BPs stable, still making urine.        Primary Nephrologist: Dr. Sharma          Cardiac/Vascular Disease History:       Known CAD: No       Known PAD/Claudication Symptoms: No       Known Heart Failure: No       Arrhythmia:  No       Pulmonary Hypertension: No        Valvular Disease: No       Other: None       New Cardiac/Vascular Events: No         Functional Capacity/Frailty:        Going for walks outside or goes to the mall with his caregivers on occasion. Has stairs down to his basement he has to go up and down, reports no chest  pains or shortness of breath.       Allergy Testing Questions:   Medication that caused a reaction None   Antibiotics used that didn't give an allergic reaction?  None    COVID Vaccination Up To Date: Yes, but due for booster.         Other Pertinent Transplant Surgical Issues:  Recent Blood Transfusion: Yes, with winter admissions  Previous Abdominal Transplant: No  Bladder Dysfunction: Yes; see above   Chronic/Recurrent Infections: Yes; UTIs, last in 2/2022  Chronic Anticoagulation: No  Jehovah s Witness: No       Active Problem List:   Patient Active Problem List   Diagnosis     Prune belly syndrome     HTN (hypertension)     CKD (chronic kidney disease) stage 4, GFR 15-29 ml/min (H)     Vitamin D deficiency     Recurrent UTI     Low bone density for age     Goiter     Short stature     High serum parathyroid hormone (PTH)     High serum follicle stimulating hormone (FSH)     On corticosteroid therapy     Tracheostomy granuloma (H)     Bilateral reflux nephropathy     Dawood Reece sequence     Tracheostomy dependence (H)     Restrictive lung disease     Scoliosis     Chronic respiratory failure with hypoxia (H)     History of tracheostomy     On prednisone therapy       Personal History:  Nonsmoker      Allergies:  Allergies   Allergen Reactions     Latex      Latex      Other reaction(s): Other (see comments)  Other reaction(s): Contact Dermatitis, Other (see comments)     Adhesive Tape Rash        Medications:  Current Outpatient Medications   Medication Sig     acetaminophen (TYLENOL) 500 MG tablet Take 500 mg by mouth every 8 hours as needed      albuterol (2.5 MG/3ML) 0.083% nebulizer solution Take 1 ampule by nebulization 4 times daily      Alum Hydroxide-Mag Trisilicate 80-20 MG CHEW Take 1,250 mg by mouth as needed  (Patient not taking: Reported on 8/30/2021)     amLODIPine (NORVASC) 5 MG tablet TAKE 1 TABLET(5 MG) BY MOUTH TWICE DAILY     azithromycin (ZITHROMAX) 250 MG tablet Take 250 mg by mouth three  "times a week Mon, Wed, Fri     calcium carbonate (OS-SUSAN 500 MG [place]. [place]) 500 MG tablet Take 1 tablet (500 mg) by mouth 3 times daily (with meals)     captopril 0.1 mg/mL (CAPOTEN) 0.1 mg/mL SUSP Take 2.5 mg three times daily (2.5 mg/5ml) (Patient not taking: Reported on 8/30/2021)     cholecalciferol 2000 UNITS tablet Take 2,000 Units by mouth daily     cinacalcet (SENSIPAR) 30 MG tablet Take 30 mg by mouth daily     cloNIDine (CATAPRES) 0.1 MG tablet Take 1 tablet (0.1 mg) by mouth 2 times daily     Cranberry 500 MG TABS Take by mouth 3 times daily (with meals)     famotidine (PEPCID) 20 MG tablet Take 40 mg by mouth 2 times daily      ferrous gluconate (FERGON) 324 (38 Fe) MG tablet Take 1 tablet (324 mg) by mouth daily     ipratropium (ATROVENT) 0.02 % neb solution Take 0.5 mg by nebulization 4 times daily      Melatonin 10 MG CAPS Take by mouth daily as needed     mupirocin (BACTROBAN) 2 % external ointment Apply topically daily as needed      Nutritional Supplements (NEPRO) LIQD Take 1 Can by mouth daily     order for DME Equipment being ordered: Medi-Vac, plastic tubing connector lara type, cat 361, [email or web address] [employer], 1 lara tip/month x 12 months. Use for tracheostomy suctioning     order for DME Equipment being ordered: DME    Adult Bivona Uncuffed Tracheostomy Tube-5.0    Patient will do weekly trach changes.     order for DME Equipment being ordered: DME -    Deep suction kits - 12 fr     order for DME Tracheostomy Humidification Equipment  Equipment being ordered:     1) Air compressor  2) Nebulizer bottle  3) Aerosol tubing  4) Trach mask  5) Sterile water  6) Saline ampules (\"bullets\")  7) Heat Moisture Exchanger (HME) Also known by several other terms including: Thermal Humidifying Filters, Swedish nose, Artificial nose, Filter, Thermovent T.  8) Room/home humidifiers     order for DME Optifoam Basic   [id number]     order for DME Equipment being ordered: Humidifier (heated), humidifier " attachment, saline lavages, humidivent mask     predniSONE (DELTASONE) 1 MG tablet Take 1 mg by mouth every other day  (Patient not taking: Reported on 8/30/2021)     sodium bicarbonate 650 MG tablet TAKE 1 TABLET BY MOUTH THREE TIMES DAILY     sodium chloride 0.9%, bottle, 0.9 % irrigation Irrigate with as directed 3 times daily Bladder irrigation     tobramycin, PF, (DAX) 300 MG/5ML nebulizer solution Take 1 ampule by nebulization as needed     traZODone (DESYREL) 50 MG tablet Take 25 mg by mouth nightly as needed      No current facility-administered medications for this visit.        Vitals:  There were no vitals taken for this visit.     Exam:  GENERAL APPEARANCE: alert and no distress  HENT: mouth without ulcers or lesions  LYMPHATICS: no cervical or supraclavicular nodes  RESP: lungs clear to auscultation - no rales, rhonchi or wheezes  CV: regular rhythm, normal rate, no rub, no murmur  FEMORAL PULSES: normal  EDEMA: no LE edema bilaterally  ABDOMEN: soft, nondistended, nontender, bowel sounds normal  MS: extremities normal - no gross deformities noted, no evidence of inflammation in joints, no muscle tenderness  SKIN: no rash

## 2022-11-01 NOTE — NURSING NOTE
Chief Complaint   Patient presents with     Transplant Waitlist Maintenance     KIDNEY     Blood pressure 109/71, pulse 79, height 1.524 m (5'), weight 45.2 kg (99 lb 10.4 oz), SpO2 98 %.    Steph Wilhelm, CMA

## 2022-11-01 NOTE — LETTER
11/1/2022         RE: Aidan Louie  [address] [facility] [address]        Dear Colleague,    Thank you for referring your patient, Aidan Louie, to the [facility]. Please see a copy of my visit note below.    Transplant Surgery Consult Note     Medical record number: [id number]  YOB: 1995,   Consult requested  for evaluation of kidney transplant candidacy.    Assessment and Recommendations:Mr. Louie appears to be a good candidate for kidney transplantation and has a fair understanding of the risks and benefits of this approach to the management of renal failure. 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  Recommend he be considered as a candidate for kidney transplant.  Cardiology consult for cardiac risk stratification to be ordered: No  CT abdomen and pelvis without contrast to be ordered for assessment of vascular targets: Yes. Ordered for today  Transplant listing labs ordered to include HLA, ABOx2, Cr, etc.  Dietician consult ordered: Yes  Social work consult ordered: Yes  Imaging reports reviewed:  CT from 11/2022  IMPRESSION:   1.  Patient has a horseshoe kidney. Areas of scarring and atrophy of  the kidneys demonstrated with bilateral extrarenal pelves and mild  hydronephrosis with urothelial thickening. Lobulated appearance of the  right kidney makes assessment for any underlying lesions difficult.  The majority of these regions appear to be isoattenuating to renal  parenchyma rather than mass-like. Consider follow-up ultrasound to  further evaluate.  2.  Patient has also had ureteral reimplantation (possibly with an  ileal conduit) and Mitrofanoff procedure. There is  fairly marked  asymmetric wall thickening of the right bladder which superiorly  appears to be related to the ureteral reimplantation and Mitrofanoff.  More inferiorly this is of uncertain etiology. There is also some  stranding in the adjacent perivesicular fat. Correlation with any  recent cystoscopy or prior CT imaging of this region if available  recommended. If the patient has not had a recent evaluation of this,  could consider direct visualization with cystoscopy.  3.  Peritoneal dialysis catheter coiled in the pelvis. Small amount of  abdominal and pelvic ascites likely related to dialysis. There is some  stranding in the peritoneum with small amount of ascites in the left  upper quadrant which may also be related to dialysis.  4.  Chronic interstitial lung disease partially visualized.  Radiology images reviewed: CT images from 2022 CT reviewed. B EIA's suitable for transplant  Recipient suitable to move forward with work up of living donors:  Yes  Will likely need retrograde cystogram to eval bladder. Will need to discuss with urology  Will need native kidney ultrasound to eval horse shoe kidney given these kidneys are higher risk for RCC and his appears lobulated on CT  Urology to comment on the asymmetric bladder wall thickening      The majority of our visit was spent in counselling, discussing the medical and surgical risks of kidney transplantation. We discussed approximate wait time and how that is influenced by issues such as blood type and sensitization (PRA) and access to a living donor. I contrasted potential waiting time for living vs  donor kidneys from  normal (0-85%) or higher (%) kidney donor profile index (KDPI) donors and their associated outcomes. I would not recommend this individual to consider kidneys from high KDPI donors. The reason for this decision is best summarized as: not on dialysis yet. Potential surgical complications of kidney transplantation include  bleeding, superficial or deep wound complications (infection, hernia, lymphocele), ureteral anastomotic failure (leak or stenosis), graft thrombosis, need for reoperation and other issues such as cardiac complications, pneumonia, deep venous thrombosis, pulmonary embolism, post transplant diabetes and death. The potential for recurrent disease or need for retransplantation was also addressed. We discussed the possible need for ureteral stent (and subsequent removal), and the utility of protocol biopsy and laboratory studies to evaluate for rejection or recurrent disease. We discussed the risk of graft rejection, our center's average graft and patient survival rates, immunosuppression protocols, as well as the potential opportunity to participate in clinical trials.  We also discussed the average length of stay, recovery process, and posttransplant lab and monitoring protocol.  I emphasized the need for strict immunosuppression medication adherence and the potential for complications of immunosuppression such as skin cancer or lymphoma, as well as a very low but not zero risk of donor-derived disease transmission risks (infection, cancer). Mr. Louie asked good questions and his candidacy will be reviewed at our Multidisciplinary Selection Committee. Thank you for the opportunity to participate in Mr. Louie's care.      Total time: 60 minutes        Ana Horta MD FACS  Assistant Professor of Surgery  Director, Living Kidney Donor Program.    ---------------------------------------------------------------------------------------------------    HPI: Mr. Louie has Chronic renal failure due to prune belly/reflux. The patient is non-diabetic.       The patient is not on dialysis.    Has potential kidney donors:  Doesn't know .  Interested in participation in paired exchange if a donor is willing: Doesn't know   S/P bladder augmentation and Mitrofanoff  G-tube for nutrition    The patient has the following  pertinent history:       No    Yes  Dialysis:    [x]      [] via:       Blood Transfusion                  []      []  Number of units:   Most recently:  Pregnancy:    []      [] Number:       Previous Transplant:  []      [] Details:    Cancer    []      [] Comment:   Kidney stones   []      [] Comment:      Recurrent infections  []      []  Type:                  Bladder dysfunction  []      [] Cause:    Claudication   []      [] Distance:    Previous Amputation  []      [] Cause:     Chronic anticoagulation  []      [] Indication:   Restorationist  []      []      Past Medical History:   Diagnosis Date     Bilateral reflux nephropathy      CKD (chronic kidney disease) stage 4, GFR 15-29 ml/min (H)      Hearing loss      Hypertension      Kyphosis      Dawood Reece sequence      Prune belly syndrome      Recurrent UTI      Respiratory bronchiolitis interstitial lung disease (H)      Restrictive lung disease      Scoliosis      Thyroid disease      Tracheostomy dependence (H)      Past Surgical History:   Procedure Laterality Date     Bilateral ureteral reimplantation  2002    abdominoplasty, Mitrofanoff creation     CYSTOSCOPY  12     EXAM UNDER ANESTHESIA THROAT  6/10/2003    tonsillar hypertrophy     EXAM UNDER ANESTHESIA, RESTORATIONS, EXTRACTION(S) DENTAL COMPLEX, COMBINED  2006     GASTROSTOMY TUBE       GASTROSTOMY TUBE  3/16/2002    button change     HERNIORRHAPHY INGUINAL BILATERAL  99    left ochiopexy     LARYNGOSCOPY, BRONCHOSCOPY, COMBINED  2002     LARYNGOSCOPY, BRONCHOSCOPY, COMBINED  2002    bilateral ear debridement     LARYNGOSCOPY, BRONCHOSCOPY, COMBINED  2003     LARYNGOSCOPY, BRONCHOSCOPY, COMBINED  2007    Failed Deflux injection     LARYNGOSCOPY, BRONCHOSCOPY, COMBINED  12      VESICOSTOMY       Redo right ureteral reimplantation  3/12/2004    Excision bladder diverticuli, Left to Right pyelopyelostomy     Replacement of trach   10/15/12     Takedown and revision of Mitrofanoff stoma  12/8/2006     TRACHEOSTOMY INFANT       Family History   Problem Relation Age of Onset     Diabetes Type 2  Mother      Diabetes Type 2  Maternal Grandmother      Deep Vein Thrombosis (DVT) Maternal Grandmother      Diabetes Type 2  Maternal Grandfather      Diabetes Type 2  Paternal Grandmother      Alzheimer Disease Paternal Grandfather      Thyroid Disease Maternal Aunt         hashimoto     Anesthesia Reaction Maternal Aunt         patients mom reports her sister had an allergic reaction to something during a previous surgery     Thyroid Cancer Maternal Great-Grandmother      Social History     Socioeconomic History     Marital status: Single     Spouse name: Not on file     Number of children: Not on file     Years of education: Not on file     Highest education level: Not on file   Occupational History     Not on file   Tobacco Use     Smoking status: Never     Smokeless tobacco: Never   Substance and Sexual Activity     Alcohol use: No     Alcohol/week: 0.0 standard drinks     Drug use: No     Sexual activity: Not on file   Other Topics Concern     Parent/sibling w/ CABG, MI or angioplasty before 65F 55M? Not Asked   Social History Narrative     Not on file     Social Determinants of Health     Financial Resource Strain: Not on file   Food Insecurity: Not on file   Transportation Needs: Not on file   Physical Activity: Not on file   Stress: Not on file   Social Connections: Not on file   Intimate Partner Violence: Not on file   Housing Stability: Not on file       ROS:   CONSTITUTIONAL:  No fevers or chills  EYES: negative for icterus  ENT:  negative for hearing loss, tinnitus and sore throat  RESPIRATORY:  negative for cough, sputum, dyspnea  CARDIOVASCULAR:  negative for chest pain Fatigue  GASTROINTESTINAL:  negative for nausea, vomiting, diarrhea or constipation  GENITOURINARY:  negative for incontinence, dysuria, bladder emptying problems  HEME:   No easy bruising  INTEGUMENT:  negative for rash and pruritus  NEURO:  Negative for headache, seizure disorder  Allergies:   Allergies   Allergen Reactions     Latex      Latex      Other reaction(s): Other (see comments)  Other reaction(s): Contact Dermatitis, Other (see comments)     Adhesive Tape Rash     Medications:  Prescription Medications as of 11/1/2022       Rx Number Disp Refills Start End Last Dispensed Date Next Fill Date Owning Pharmacy    acetaminophen (TYLENOL) 500 MG tablet    8/27/2020        Sig: Take 500 mg by mouth every 8 hours as needed     Class: Historical    Route: Oral    albuterol (2.5 MG/3ML) 0.083% nebulizer solution            Sig: Take 1 ampule by nebulization 4 times daily     Class: Historical    Route: Nebulization    Alum Hydroxide-Mag Trisilicate 80-20 MG CHEW    8/27/2020        Sig: Take 1,250 mg by mouth as needed     Class: Historical    Route: Oral    amLODIPine (NORVASC) 5 MG tablet  180 tablet 3 9/22/2021    [id number] [place] - [address] AT [place] [address]    Sig: TAKE 1 TABLET(5 MG) BY MOUTH TWICE DAILY    Class: E-Prescribe    azithromycin (ZITHROMAX) 250 MG tablet            Sig: Take 250 mg by mouth three times a week Mon, Wed, Fri    Class: Historical    Route: Oral    calcium carbonate (OS-SUSAN 500 MG [place]. [place]) 500 MG tablet  90 tablet 6 11/7/2016    [facility], [place] [address] [address] [facility]    Sig: Take 1 tablet (500 mg) by mouth 3 times daily (with meals)    Class: E-Prescribe    Route: Oral    captopril 0.1 mg/mL (CAPOTEN) 0.1 mg/mL SUSP  460 mL 11 7/28/2021    [facility] #[id number] - [place], [place] - [address] AT [place] [address]    Sig: Take 2.5 mg three times daily (2.5 mg/5ml)    Class: E-Prescribe    cholecalciferol 2000 UNITS tablet  30 tablet 11 9/14/2017    [facility], [place] [address] [address] [facility]    Sig: Take 2,000 Units by mouth  daily    Class: E-Prescribe    Route: Oral    cinacalcet (SENSIPAR) 30 MG tablet            Sig: Take 30 mg by mouth daily    Class: Historical    Route: Oral    cloNIDine (CATAPRES) 0.1 MG tablet  180 tablet 11 2/24/2021    [facility][id number] - [place], [place] - [address] AT [place] [address]    Sig: Take 1 tablet (0.1 mg) by mouth 2 times daily    Class: E-Prescribe    Route: Oral    Cranberry 500 MG TABS        [facility] #[id number] - [place], [place] - [address] AT [place] [address]    Sig: Take by mouth 3 times daily (with meals)    Class: Historical    Route: Oral    famotidine (PEPCID) 20 MG tablet        [facility][id number] - [place], [place] - [address] AT [place] [address]    Sig: Take 40 mg by mouth 2 times daily     Class: Historical    Route: Oral    ferrous gluconate (FERGON) 324 (38 Fe) MG tablet  90 tablet 11 11/23/2020    [facility] #[id number] - [place], [place] - [address] AT [place] [address]    Sig: Take 1 tablet (324 mg) by mouth daily    Class: E-Prescribe    Route: Oral    ipratropium (ATROVENT) 0.02 % neb solution  225 mL  11/20/2017    [facility] [place], [place] - [address] [facility]    Sig: Take 0.5 mg by nebulization 4 times daily     Class: Historical    Route: Nebulization    Melatonin 10 MG CAPS        [facility][id number] - [place], [place] - [address] AT [place] [address]    Sig: Take by mouth daily as needed    Class: Historical    Route: Oral    mupirocin (BACTROBAN) 2 % external ointment    8/27/2020        Sig: Apply topically daily as needed     Class: Historical    Route: Topical    Nutritional Supplements (NEPRO) LIQD  30 each 11 6/29/2020    [facility] #[id number] - [place], [place] - [address] AT [facility] [address]    Sig: Take 1 Can by mouth daily    Class: E-Prescribe    Route: Oral    order for DME  1 each 11 10/7/2019         "Sig: Equipment being ordered: Medi-Vac, plastic tubing connector lara type, cat 361, [facility], 1 lara tip/month x 12 months. Use for tracheostomy suctioning    Class: Local Print    order for DME  4 Units 11 10/7/2019        Sig: Equipment being ordered: DME    Adult Bivona Uncuffed Tracheostomy Tube-5.0    Patient will do weekly trach changes.    Class: Local Print    order for DME  90 each 3 9/23/2019        Sig: Equipment being ordered: DME -    Deep suction kits - 12 fr    Class: Local Print    order for DME  1 each 11 8/28/2019        Sig: Tracheostomy Humidification Equipment  Equipment being ordered:     1) Air compressor  2) Nebulizer bottle  3) Aerosol tubing  4) Trach mask  5) Sterile water  6) Saline ampules (\"bullets\")  7) Heat Moisture Exchanger (HME) Also known by several other terms including: Thermal Humidifying Filters, Swedish nose, Artificial nose, Filter, Thermovent T.  8) Room/home humidifiers    Class: Local Print    order for DME  30 each 11 8/28/2019        Sig: Optifoam Basic   [id number]    Class: Local Print    order for DME  1 each 3 6/10/2019    [id number] [place] - [address] AT [place] [address]    Sig: Equipment being ordered: Humidifier (heated), humidifier attachment, saline lavages, humidivent mask    Class: Local Print    predniSONE (DELTASONE) 1 MG tablet   6 12/18/2018    [facility] #[id number] - [place] - [address] AT [place] [address]    Sig: Take 1 mg by mouth every other day     Class: Historical    Route: Oral    sodium bicarbonate 650 MG tablet  270 tablet 1 10/5/2021    [id number] [place] - [address] AT [place] [address]    Sig: TAKE 1 TABLET BY MOUTH THREE TIMES DAILY    Class: E-Prescribe    sodium chloride 0.9%, bottle, 0.9 % irrigation    7/9/2020        Sig: Irrigate with as directed 3 times daily Bladder irrigation    Class: " Historical    Route: Irrigation    tobramycin, PF, (DAX) 300 MG/5ML nebulizer solution            Sig: Take 1 ampule by nebulization as needed    Class: Historical    Route: Nebulization    traZODone (DESYREL) 50 MG tablet    8/27/2020        Sig: Take 25 mg by mouth nightly as needed     Class: Historical    Route: Oral        Exam:     There were no vitals taken for this visit.  Appearance: in no apparent distress.   Skin: normal  Eyes:  no redness or discharge.  Sclera anicteric  Head and Neck: Normal, no rashes or jaundice  Respiratory: easy respirations, no audible wheezing.  Abdomen: flat, No distention and Surgical scars consistent with history   Extremeties: femoral 3+/3+, Edema, none  Neuro: without deficit   Psychiatric: Normal mood and affect    Diagnostics:   No results found for this or any previous visit (from the past 672 hour(s)).  UNOS cPRA   Date Value Ref Range Status   10/15/2020 24  Final         Ana Horta MD,

## 2022-11-01 NOTE — LETTER
2022       RE: Aidan Louie  [address]  [place] [address]     Dear Colleague,    Thank you for referring your patient, Aidan Louie, to the [facility] [place] at [facility]. Please see a copy of my visit note below.    TRANSPLANT NEPHROLOGY WAITLIST VISIT    Assessment and Plan:  # Kidney Transplant Wait List Evaluation: Patient is a good candidate overall. Patient should be discussed at committee for consideration of active status.     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    # Cardiac Risk:  ECHO with normal LVEF and no valvular abnormalities.     # Chronic respiratory failure/hypoxia/restrictive lung disease requiring tracheostomy: follows with Dr. Baez. Next appt for 2022.      # H/o Malnutrition and Poor Oral Intake, s/p G-Tube Placement: Patient appears to be doing better with this with stable weight.      # Health Maintenance: Dental: Not up to date     Discussed the risks and benefits of a transplant, including the risk of surgery and immunosuppression medications.    KDPI: We discussed approximate remaining wait time and how that is influenced by issues such as blood type and sensitization (PRA) and access to a living donor. I contrasted potential waiting time for living vs  donor kidneys from  normal (0-85%) or higher (%) kidney donor profile index (KDPI) donors and their associated outcomes. I would not recommend Mr. Louie to consider kidneys from high KDPI donors. The reason for this  decision is best summarized as: improved long term graft survival.    Patient presently appears to be enough of an acceptable kidney transplant recipient candidate to have any potential kidney donors start the evaluation process.  Patient s overall re-evaluation may require further discussion in the Transplant Program s multidisciplinary selection committee for a final recommendation on the patient s suitability for transplant.     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            Interim Events:   12/2021:  Admitted with elevated D-dimer, rising creatinine, and hyper kalemia for which she started hemodialysis.  Imaging was negative for PE and DVT.      2/2022: admitted with UTI, urosepsis? Records not available.      3/10/2022: PD catheter placement. Complicated by peritonitis. No infection recurrence since.              Kidney Disease:        Kidney Disease Dx: obstructive nephropathy         On Dialysis: Yes, Date initiated: 12/2021 and Dialysis Type: PD;BPs stable, still making urine.        Primary Nephrologist: Dr. Sharma          Cardiac/Vascular Disease History:       Known CAD: No       Known PAD/Claudication Symptoms: No       Known Heart Failure: No        Arrhythmia:  No       Pulmonary Hypertension: No        Valvular Disease: No       Other: None       New Cardiac/Vascular Events: No         Functional Capacity/Frailty:        Going for walks outside or goes to the mall with his caregivers on occasion. Has stairs down to his basement he has to go up and down, reports no chest pains or shortness of breath.       Allergy Testing Questions:   Medication that caused a reaction None   Antibiotics used that didn't give an allergic reaction?  None    COVID Vaccination Up To Date: Yes, but due for booster.         Other Pertinent Transplant Surgical Issues:  Recent Blood Transfusion: Yes, with winter admissions  Previous Abdominal Transplant: No  Bladder Dysfunction: Yes; see above   Chronic/Recurrent Infections: Yes; UTIs, last in 2/2022  Chronic Anticoagulation: No  Jehovah s Witness: No       Active Problem List:   Patient Active Problem List   Diagnosis     Prune belly syndrome     HTN (hypertension)     CKD (chronic kidney disease) stage 4, GFR 15-29 ml/min (H)     Vitamin D deficiency     Recurrent UTI     Low bone density for age     Goiter     Short stature     High serum parathyroid hormone (PTH)     High serum follicle stimulating hormone (FSH)     On corticosteroid therapy     Tracheostomy granuloma (H)     Bilateral reflux nephropathy     Dawood Reece sequence     Tracheostomy dependence (H)     Restrictive lung disease     Scoliosis     Chronic respiratory failure with hypoxia (H)     History of tracheostomy     On prednisone therapy       Personal History:  Nonsmoker      Allergies:  Allergies   Allergen Reactions     Latex      Latex      Other reaction(s): Other (see comments)  Other reaction(s): Contact Dermatitis, Other (see comments)     Adhesive Tape Rash        Medications:  Current Outpatient Medications   Medication Sig     acetaminophen (TYLENOL) 500 MG tablet Take 500 mg by mouth every 8 hours as needed      albuterol (2.5 MG/3ML) 0.083% nebulizer  solution Take 1 ampule by nebulization 4 times daily      Alum Hydroxide-Mag Trisilicate 80-20 MG CHEW Take 1,250 mg by mouth as needed  (Patient not taking: Reported on 8/30/2021)     amLODIPine (NORVASC) 5 MG tablet TAKE 1 TABLET(5 MG) BY MOUTH TWICE DAILY     azithromycin (ZITHROMAX) 250 MG tablet Take 250 mg by mouth three times a week Mon, Wed, Fri     calcium carbonate (OS-SUSAN 500 MG [place]) 500 MG tablet Take 1 tablet (500 mg) by mouth 3 times daily (with meals)     captopril 0.1 mg/mL (CAPOTEN) 0.1 mg/mL SUSP Take 2.5 mg three times daily (2.5 mg/5ml) (Patient not taking: Reported on 8/30/2021)     cholecalciferol 2000 UNITS tablet Take 2,000 Units by mouth daily     cinacalcet (SENSIPAR) 30 MG tablet Take 30 mg by mouth daily     cloNIDine (CATAPRES) 0.1 MG tablet Take 1 tablet (0.1 mg) by mouth 2 times daily     Cranberry 500 MG TABS Take by mouth 3 times daily (with meals)     famotidine (PEPCID) 20 MG tablet Take 40 mg by mouth 2 times daily      ferrous gluconate (FERGON) 324 (38 Fe) MG tablet Take 1 tablet (324 mg) by mouth daily     ipratropium (ATROVENT) 0.02 % neb solution Take 0.5 mg by nebulization 4 times daily      Melatonin 10 MG CAPS Take by mouth daily as needed     mupirocin (BACTROBAN) 2 % external ointment Apply topically daily as needed      Nutritional Supplements (NEPRO) LIQD Take 1 Can by mouth daily     order for DME Equipment being ordered: Medi-Vac, plastic tubing connector lara type, cat 361, [facility], 1 lara tip/month x 12 months. Use for tracheostomy suctioning     order for DME Equipment being ordered: DME    Adult Bivona Uncuffed Tracheostomy Tube-5.0    Patient will do weekly trach changes.     order for DME Equipment being ordered: DME -    Deep suction kits - 12 fr     order for DME Tracheostomy Humidification Equipment  Equipment being ordered:     1) Air compressor  2) Nebulizer bottle  3) Aerosol tubing  4) Trach mask  5) Sterile water  6)  "Saline ampules (\"bullets\")  7) Heat Moisture Exchanger (HME) Also known by several other terms including: Thermal Humidifying Filters, Swedish nose, Artificial nose, Filter, Thermovent T.  8) Room/home humidifiers     order for DME Optifoam Basic   [id number]     order for DME Equipment being ordered: Humidifier (heated), humidifier attachment, saline lavages, humidivent mask     predniSONE (DELTASONE) 1 MG tablet Take 1 mg by mouth every other day  (Patient not taking: Reported on 8/30/2021)     sodium bicarbonate 650 MG tablet TAKE 1 TABLET BY MOUTH THREE TIMES DAILY     sodium chloride 0.9%, bottle, 0.9 % irrigation Irrigate with as directed 3 times daily Bladder irrigation     tobramycin, PF, (DAX) 300 MG/5ML nebulizer solution Take 1 ampule by nebulization as needed     traZODone (DESYREL) 50 MG tablet Take 25 mg by mouth nightly as needed      No current facility-administered medications for this visit.        Vitals:  There were no vitals taken for this visit.     Exam:  GENERAL APPEARANCE: alert and no distress  HENT: mouth without ulcers or lesions  LYMPHATICS: no cervical or supraclavicular nodes  RESP: lungs clear to auscultation - no rales, rhonchi or wheezes  CV: regular rhythm, normal rate, no rub, no murmur  FEMORAL PULSES: normal  EDEMA: no LE edema bilaterally  ABDOMEN: soft, nondistended, nontender, bowel sounds normal  MS: extremities normal - no gross deformities noted, no evidence of inflammation in joints, no muscle tenderness  SKIN: no rash          Again, thank you for allowing me to participate in the care of your patient.      Sincerely,    Kulwinder Schuler, APRN CNP      "

## 2022-11-01 NOTE — PROGRESS NOTES
"Patient Name: Aidan Louie  : 1995  Age: 26 year old  MRN: [id number]  Date of Initial Social Work Evaluation: 2020    Patient on transplant wait list-inactive. Saw today to update psychosocial assessment along with his mother Valerie who is his legal guardian (paperwork in the chart filed under media tab 2017) and his aunt Virginia.     Presenting Information   Living Situation: Pt lives with his mother in [place], [place].   If not local, plans for short term stay: Pt has relatives in the [place] area who could be of assistance. If not, pt and his mother plan to stay in a local hotel.   Previous Functional Status: Pt has a developmental delay (per mother). Pt receives 2 hours/day of PCA services (Monday-Friday). They help pt with grooming and getting out in the community. Per mother, pt needs reminders to take his medications as he has been having a hard time with this as of late. Pt is mainly driven around by his mother but is able to utilize the bus system independently and has attended medical appointments with no supervision.   Cultural/Language/Spiritual Considerations: None identified at this time.     Support System  Primary Support Person Mother/guardian  Other support: None reported. Per mother, pt has not been very social over the past 2 years due to several factors including grieving the loss of his stepfather, losing his job, and general isolation due to the pandemic.   Plan for support in immediate post-transplant period: Mother to assist.     Health Care Directive  Decision Maker: Mother/guardian.  Alternate Decision Maker: Mother is hopeful to update the guardian paperwork to identify another guardian should she no longer be able to fulfill these duties. She plans to have a discussion with her cousin \"Ken\" sometime early next year.   Health Care Directive: Mother is his health care agent.     Mental Health/Coping:   History of Mental Health: None " "reported.  History of Chemical Health: None reported.   Current status: Mother names that patient seems to be \"more depressed\" due to factors named above (see \"other support\" section). Per mother, pt has been experiencing increased isolation and loss of motivation to do the things he used to enjoy (such as get out in the community or return to work). Pt's mother plans to pursue counseling services for him. She is not open to pursuing medications at this time.   Coping: Appropriate to the situation but needing further support.   Services Needed/Recommended: Mother to pursue counseling services to address pt's increased depression.     Financial   Income: Supplemental Security Income and mothers disability.   Impact of transplant on income: None identified at this time.   Insurance and medication coverage: Medicare and [place] Medicaid.   Financial concerns: Mother indicated she does not have a lot in her savings.   Resources needed: Sw discussed talking to [facility] about travel and lodging benefits and looking into the Chronic Renal Disease Program of [place].    Assessment and recommendations and plan:  Reviewed transplant education (Medicare, rehabilitation, donor issues, community/financial resources, and psych/family adjustment) as well as psychosocial risks of transplant. Provided patient with a copy of post-transplant informational sheet that includes information on potential costs of medications, Medicare ESRD, post-transplant lodging, etc. Patient and guardian ppeared well informed, motivated, and able to follow post transplant requirements. Behavior was appropriate during interview. Has adequate income and insurance coverage. Adequate social support. No major contraindications noted for transplant. At this time, patient appears to understand the risks and benefits of transplant.     Elaine Read, SURY  SOT/BMT/CF Float    "

## 2022-11-03 ENCOUNTER — TELEPHONE (OUTPATIENT)
Dept: TRANSPLANT | Facility: CLINIC | Age: 27
End: 2022-11-03

## 2022-11-03 DIAGNOSIS — N28.9 RENAL LESION: Primary | ICD-10-CM

## 2022-11-03 DIAGNOSIS — Z76.82 ORGAN TRANSPLANT CANDIDATE: ICD-10-CM

## 2022-11-03 DIAGNOSIS — N18.6 ESRD (END STAGE RENAL DISEASE) (H): Primary | ICD-10-CM

## 2022-11-03 LAB
PROTOCOL CUTOFF: NORMAL
RADIOLOGIST FLAGS: NORMAL
SA 1 CELL: NORMAL
SA 1 TEST METHOD: NORMAL
SA 2 CELL: NORMAL
SA 2 TEST METHOD: NORMAL
SA1 HI RISK ABY: NORMAL
SA1 MOD RISK ABY: NORMAL
SA2 HI RISK ABY: NORMAL
SA2 MOD RISK ABY: NORMAL
UNACCEPTABLE ANTIGENS: NORMAL
UNOS CPRA: 24
ZZZSA 1  COMMENTS: NORMAL
ZZZSA 2 COMMENTS: NORMAL

## 2022-11-03 NOTE — TELEPHONE ENCOUNTER
General  Route to LPN    Reason for call: Nell called to report an urgent finding.     Call back needed? Yes  Return Call Needed  Same as documented in contacts section  When to return call?: Now: [place] RN first, if no answer Page

## 2022-11-03 NOTE — TELEPHONE ENCOUNTER
Returned Dawns call. Left VM with direct line for return call.     11/3/22 addendum: Received call back from Nell. Findings not urgent, wanted to report incidental, see below:     1.  Patient has a horseshoe kidney. Areas of scarring and atrophy of  the kidneys demonstrated with bilateral extrarenal pelves and mild  hydronephrosis with urothelial thickening. Lobulated appearance of the  right kidney makes assessment for any underlying lesions difficult.  The majority of these regions appear to be isoattenuating to renal  parenchyma rather than mass-like. Consider follow-up ultrasound to  further evaluate.  2.  Patient has also had ureteral reimplantation (possibly with an  ileal conduit) and Mitrofanoff procedure. There is fairly marked  asymmetric wall thickening of the right bladder which superiorly  appears to be related to the ureteral reimplantation and Mitrofanoff.  More inferiorly this is of uncertain etiology. There is also some  stranding in the adjacent perivesicular fat. Correlation with any  recent cystoscopy or prior CT imaging of this region if available  recommended. If the patient has not had a recent evaluation of this,  could consider direct visualization with cystoscopy.  3.  Peritoneal dialysis catheter coiled in the pelvis. Small amount of  abdominal and pelvic ascites likely related to dialysis. There is some  stranding in the peritoneum with small amount of ascites in the left  upper quadrant which may also be related to dialysis.  4.  Chronic interstitial lung disease partially visualized.     [Consider Follow Up: Asymmetric wall thickening of the right bladder  with perivesicular fat stranding as discussed above. Could consider  direct visualization if patient has not had recent cystoscopy.]    Already reviewed by neph ABDIAS, will get renal US. Also will check in if pt needs follow up with urology.

## 2022-11-03 NOTE — TELEPHONE ENCOUNTER
Called pts mom to update will be getting US due to CT incidental findings. Scheduling should be reaching out to arrange. Will check to see if urology follow up is needed.     11/3/22 addendum: Reviewed urology follow up will be needed for pre transplant work up. Pts mom has urology clinic number and will call them to arrange follow up visit.

## 2022-11-23 ENCOUNTER — ANCILLARY PROCEDURE (OUTPATIENT)
Dept: ULTRASOUND IMAGING | Facility: CLINIC | Age: 27
End: 2022-11-23
Attending: NURSE PRACTITIONER
Payer: MEDICARE

## 2022-11-23 DIAGNOSIS — Z76.82 ORGAN TRANSPLANT CANDIDATE: ICD-10-CM

## 2022-11-23 DIAGNOSIS — N28.9 RENAL LESION: ICD-10-CM

## 2022-11-23 PROCEDURE — 76770 US EXAM ABDO BACK WALL COMP: CPT | Mod: GC | Performed by: RADIOLOGY

## 2022-12-05 ENCOUNTER — OFFICE VISIT (OUTPATIENT)
Dept: UROLOGY | Facility: CLINIC | Age: 27
End: 2022-12-05
Attending: NURSE PRACTITIONER
Payer: MEDICARE

## 2022-12-05 VITALS
BODY MASS INDEX: 20.42 KG/M2 | HEIGHT: 60 IN | HEART RATE: 81 BPM | DIASTOLIC BLOOD PRESSURE: 61 MMHG | WEIGHT: 104 LBS | SYSTOLIC BLOOD PRESSURE: 108 MMHG

## 2022-12-05 DIAGNOSIS — N18.6 ESRD (END STAGE RENAL DISEASE) (H): Primary | ICD-10-CM

## 2022-12-05 DIAGNOSIS — Z76.82 ORGAN TRANSPLANT CANDIDATE: ICD-10-CM

## 2022-12-05 PROCEDURE — 99214 OFFICE O/P EST MOD 30 MIN: CPT | Mod: GC | Performed by: UROLOGY

## 2022-12-05 ASSESSMENT — PAIN SCALES - GENERAL: PAINLEVEL: NO PAIN (0)

## 2022-12-05 NOTE — PROGRESS NOTES
HPI:  Aidan Louie is a 26 year old male with history of Prune Belly, Dawood Reece syndrome, horseshoe kidney, who has prior megaureters and ureteral augment and left to right TUU who has managed with clean intermittent catheterization for years through a prolapsed Mitrofanoff. He was last seen in clinic April 2021 at which point no further workup was recommended to list for transplant.  Recent imaging revealed bladder wall thickening so he returns today for follow up. He is here with his mom and aunt today.    He is on the transplant list but not currently active, under review. Continues to perform CIC with 14 Fr straight tip soft catheter via prolapsed mitrofanoff, Dr Billy recommended against revision at last visit. Cr 6, now on nightly peritoneal dialysis since March. Urine output has decreased somewhat, but he is cathing and irrigating 3-4 times daily for reasonable volumes. No recent infections, continues to take TID cranberry tablets. Occasionally has urinary leakage from his stoma but only a few ccs at a time.    The following subcategories of the HISTORY were reviewed with the patient and updated accordingly. If no updates, this indicates no change since last visit:    Reviewed previous notes from Dr. Horta from transplant service at 11/1/22    Exam:  /61   Pulse 81   Ht 1.524 m (5')   Wt 47.2 kg (104 lb)   BMI 20.31 kg/m    General: age-appropriate appearing male in NAD sitting in an exam chair  HEENT: Head AT/NC, EOMI, CN Grossly intact. Tracheostomy, hearing aids in place  Resp: no respiratory distress  CV: heart rate regular  Lymph: No cervical, supraclavicular, inguinal or axillary lymphadenopathy  Abdomen: Degree of obesity is none. Abdomen is soft and nontender.  Neuro: grossly non focal. Normal reflexes  Skin: clear of rashes or ecchymoses. No sacral decubitus ulcer.    Review of Imaging:  The following imaging exams were independently viewed and interpreted by me and  discussed with patient:    11/23 ART: IMPRESSION:  1.  Located on the right portion of the horseshoe kidney are is an  avascular hypoechoic cystic lesion, favored to represent hemorrhagic  cyst. No additional follow-up indicated.  2. Horseshoe kidney with areas of cortical thinning/scarring and  bilateral extrarenal pelvises.    11/1 CT A/P: IMPRESSION:   1.  Patient has a horseshoe kidney. Areas of scarring and atrophy of  the kidneys demonstrated with bilateral extrarenal pelves and mild  hydronephrosis with urothelial thickening. Lobulated appearance of the  right kidney makes assessment for any underlying lesions difficult.  The majority of these regions appear to be isoattenuating to renal  parenchyma rather than mass-like. Consider follow-up ultrasound to  further evaluate.  2.  Patient has also had ureteral reimplantation (possibly with an  ileal conduit) and Mitrofanoff procedure. There is fairly marked  asymmetric wall thickening of the right bladder which superiorly  appears to be related to the ureteral reimplantation and Mitrofanoff.  More inferiorly this is of uncertain etiology. There is also some  stranding in the adjacent perivesicular fat. Correlation with any  recent cystoscopy or prior CT imaging of this region if available  recommended. If the patient has not had a recent evaluation of this,  could consider direct visualization with cystoscopy.  3.  Peritoneal dialysis catheter coiled in the pelvis. Small amount of  abdominal and pelvic ascites likely related to dialysis. There is some  stranding in the peritoneum with small amount of ascites in the left  upper quadrant which may also be related to dialysis.  4.  Chronic interstitial lung disease partially visualized.    Review of Labs:  The following labs were reviewed by me and discussed with the patient:    Cr 6.06  Urodynamics 11/30/20    Assessment & Plan   1. Prune belly - follow up yearly  2. Bilateral reflux - urodynamics 2020 showed  compliant bladder  3. Horseshoe kidney with cysts - contrast CT to be coordinated by Dr Horta's office  4. Prolapsed Mitrofanoff - continue to recommend no intervention. Discussed that the urinary leakage may not be related to the prolapse (particularly as this has been seen to increase with lower cath frequency). Continue CIC and bladder irrigations  5. Bladder wall thickening on CT - no further imaging recommended    Amy Feldman MD  PGY4 Urology    =======================================================  As the attending surgeon I, Tai Billy, interviewed and examined the patient. The plan was developed between me and the patient. My findings and plan are as stated by the resident.    Tai Billy MD        ==========================      Additional Coding Information:    Problems:  4 -- two or more stable chronic illnesses    Data Reviewed    Independent interpretation of a test performed by another physician/other qualified health care professional (not separately reported) - CT  Level of risk:  3 -- low risk (e.g., OTC medication or observation, minor surgery without risks)

## 2022-12-05 NOTE — LETTER
12/5/2022       RE: Aidan Louie  [address] [place] [address]     Dear Colleague,    Thank you for referring your patient, Aidan Louie, to the [facility] [place] at [facility]. Please see a copy of my visit note below.    HPI:  Aidan Louie is a 26 year old male with history of Prune Belly, Dawood Reece syndrome, horseshoe kidney, who has prior megaureters and ureteral augment and left to right TUU who has managed with clean intermittent catheterization for years through a prolapsed Mitrofanoff. He was last seen in clinic April 2021 at which point no further workup was recommended to list for transplant.  Recent imaging revealed bladder wall thickening so he returns today for follow up. He is here with his mom and aunt today.    He is on the transplant list but not currently active, under review. Continues to perform CIC with 14 Fr straight tip soft catheter via prolapsed mitrofanoff, Dr Billy recommended against revision at last visit. Cr 6, now on nightly peritoneal dialysis since March. Urine output has decreased somewhat, but he is cathing and irrigating 3-4 times daily for reasonable volumes. No recent infections, continues to take TID cranberry tablets. Occasionally has urinary leakage from his stoma but only a few ccs at a time.    The following subcategories of the HISTORY were reviewed with the patient and updated accordingly. If no updates, this indicates no change since last visit:    Reviewed previous notes from Dr. Horta from transplant service at 11/1/22    Exam:  /61   Pulse 81   Ht 1.524 m (5')   Wt 47.2 kg (104 lb)   BMI 20.31 kg/m    General: age-appropriate appearing male in NAD sitting in an exam chair  HEENT: Head AT/NC, EOMI, CN Grossly intact. Tracheostomy, hearing aids in place  Resp: no respiratory distress  CV: heart rate regular  Lymph: No cervical, supraclavicular,  inguinal or axillary lymphadenopathy  Abdomen: Degree of obesity is none. Abdomen is soft and nontender.  Neuro: grossly non focal. Normal reflexes  Skin: clear of rashes or ecchymoses. No sacral decubitus ulcer.    Review of Imaging:  The following imaging exams were independently viewed and interpreted by me and discussed with patient:    11/23 ART: IMPRESSION:  1.  Located on the right portion of the horseshoe kidney are is an  avascular hypoechoic cystic lesion, favored to represent hemorrhagic  cyst. No additional follow-up indicated.  2. Horseshoe kidney with areas of cortical thinning/scarring and  bilateral extrarenal pelvises.    11/1 CT A/P: IMPRESSION:   1.  Patient has a horseshoe kidney. Areas of scarring and atrophy of  the kidneys demonstrated with bilateral extrarenal pelves and mild  hydronephrosis with urothelial thickening. Lobulated appearance of the  right kidney makes assessment for any underlying lesions difficult.  The majority of these regions appear to be isoattenuating to renal  parenchyma rather than mass-like. Consider follow-up ultrasound to  further evaluate.  2.  Patient has also had ureteral reimplantation (possibly with an  ileal conduit) and Mitrofanoff procedure. There is fairly marked  asymmetric wall thickening of the right bladder which superiorly  appears to be related to the ureteral reimplantation and Mitrofanoff.  More inferiorly this is of uncertain etiology. There is also some  stranding in the adjacent perivesicular fat. Correlation with any  recent cystoscopy or prior CT imaging of this region if available  recommended. If the patient has not had a recent evaluation of this,  could consider direct visualization with cystoscopy.  3.  Peritoneal dialysis catheter coiled in the pelvis. Small amount of  abdominal and pelvic ascites likely related to dialysis. There is some  stranding in the peritoneum with small amount of ascites in the left  upper quadrant which may also  be related to dialysis.  4.  Chronic interstitial lung disease partially visualized.    Review of Labs:  The following labs were reviewed by me and discussed with the patient:    Cr 6.06  Urodynamics 11/30/20    Assessment & Plan   1. Prune belly - follow up yearly  2. Bilateral reflux - urodynamics 2020 showed compliant bladder  3. Horseshoe kidney with cysts - contrast CT to be coordinated by Dr Horta's office  4. Prolapsed Mitrofanoff - continue to recommend no intervention. Discussed that the urinary leakage may not be related to the prolapse (particularly as this has been seen to increase with lower cath frequency). Continue CIC and bladder irrigations  5. Bladder wall thickening on CT - no further imaging recommended    Amy Feldman MD  PGY4 Urology    =======================================================  As the attending surgeon I, Tai Billy, interviewed and examined the patient. The plan was developed between me and the patient. My findings and plan are as stated by the resident.    Tai Billy MD        ==========================      Additional Coding Information:    Problems:  4 -- two or more stable chronic illnesses    Data Reviewed    Independent interpretation of a test performed by another physician/other qualified health care professional (not separately reported) - CT  Level of risk:  3 -- low risk (e.g., OTC medication or observation, minor surgery without risks)

## 2022-12-05 NOTE — NURSING NOTE
Chief Complaint   Patient presents with     Follow Up     Discuss CT and US results       Blood pressure 108/61, pulse 81, height 1.524 m (5'), weight 47.2 kg (104 lb). Body mass index is 20.31 kg/m .    Patient Active Problem List   Diagnosis     Prune belly syndrome     HTN (hypertension)     CKD (chronic kidney disease) stage 4, GFR 15-29 ml/min (H)     Vitamin D deficiency     Recurrent UTI     Low bone density for age     Goiter     Short stature     High serum parathyroid hormone (PTH)     High serum follicle stimulating hormone (FSH)     On corticosteroid therapy     Tracheostomy granuloma (H)     Bilateral reflux nephropathy     Dawood Reece sequence     Tracheostomy dependence (H)     Restrictive lung disease     Scoliosis     Chronic respiratory failure with hypoxia (H)     History of tracheostomy     On prednisone therapy       Allergies   Allergen Reactions     Latex      Latex      Other reaction(s): Other (see comments)  Other reaction(s): Contact Dermatitis, Other (see comments)     Adhesive Tape Rash       Current Outpatient Medications   Medication Sig Dispense Refill     acetaminophen (TYLENOL) 500 MG tablet Take 500 mg by mouth every 8 hours as needed        albuterol (2.5 MG/3ML) 0.083% nebulizer solution Take 1 ampule by nebulization 4 times daily        Alum Hydroxide-Mag Trisilicate 80-20 MG CHEW Take 1,250 mg by mouth as needed       amLODIPine (NORVASC) 5 MG tablet TAKE 1 TABLET(5 MG) BY MOUTH TWICE DAILY 180 tablet 3     azithromycin (ZITHROMAX) 250 MG tablet Take 250 mg by mouth three times a week Mon, Wed, Fri       calcium carbonate (OS-SUSAN 500 MG [place]. CA) 500 MG tablet Take 1 tablet (500 mg) by mouth 3 times daily (with meals) (Patient taking differently: Take 500 mg by mouth 3 times daily (with meals) 1000 BEFORE DINNER) 90 tablet 6     captopril 0.1 mg/mL (CAPOTEN) 0.1 mg/mL SUSP Take 2.5 mg three times daily (2.5 mg/5ml) (Patient not taking: Reported on 8/30/2021) 460 mL 11      "cholecalciferol 2000 UNITS tablet Take 2,000 Units by mouth daily 30 tablet 11     cinacalcet (SENSIPAR) 30 MG tablet Take 30 mg by mouth daily       cloNIDine (CATAPRES) 0.1 MG tablet Take 1 tablet (0.1 mg) by mouth 2 times daily (Patient not taking: Reported on 11/1/2022) 180 tablet 11     Cranberry 500 MG TABS Take by mouth 3 times daily (with meals)       famotidine (PEPCID) 20 MG tablet Take 40 mg by mouth 2 times daily        ferrous gluconate (FERGON) 324 (38 Fe) MG tablet Take 1 tablet (324 mg) by mouth daily 90 tablet 11     ipratropium (ATROVENT) 0.02 % neb solution Take 0.5 mg by nebulization 4 times daily  225 mL      Melatonin 10 MG CAPS Take by mouth daily as needed       mupirocin (BACTROBAN) 2 % external ointment Apply topically daily as needed        Nutritional Supplements (NEPRO) LIQD Take 1 Can by mouth daily 30 each 11     order for DME Equipment being ordered: Medi-Vac, plastic tubing connector lara type, cat 361, [facility], 1 lara tip/month x 12 months. Use for tracheostomy suctioning 1 each 11     order for DME Equipment being ordered: DME    Adult Bivona Uncuffed Tracheostomy Tube-5.0    Patient will do weekly trach changes. 4 Units 11     order for DME Equipment being ordered: DME -    Deep suction kits - 12 fr 90 each 3     order for DME Tracheostomy Humidification Equipment  Equipment being ordered:     1) Air compressor  2) Nebulizer bottle  3) Aerosol tubing  4) Trach mask  5) Sterile water  6) Saline ampules (\"bullets\")  7) Heat Moisture Exchanger (HME) Also known by several other terms including: Thermal Humidifying Filters, Swedish nose, Artificial nose, Filter, Thermovent T.  8) Room/home humidifiers 1 each 11     order for DME Optifoam Basic   [id number] 30 each 11     order for DME Equipment being ordered: Humidifier (heated), humidifier attachment, saline lavages, humidivent mask 1 each 3     predniSONE (DELTASONE) 1 MG tablet Take 1 mg by mouth every other " day  (Patient not taking: Reported on 8/30/2021)  6     sodium bicarbonate 650 MG tablet TAKE 1 TABLET BY MOUTH THREE TIMES DAILY (Patient not taking: Reported on 11/1/2022) 270 tablet 1     sodium chloride 0.9%, bottle, 0.9 % irrigation Irrigate with as directed 3 times daily Bladder irrigation       tobramycin, PF, (DAX) 300 MG/5ML nebulizer solution Take 1 ampule by nebulization as needed       traZODone (DESYREL) 50 MG tablet Take 25 mg by mouth nightly as needed  (Patient not taking: Reported on 11/1/2022)         Social History     Tobacco Use     Smoking status: Never     Smokeless tobacco: Never   Substance Use Topics     Alcohol use: No     Alcohol/week: 0.0 standard drinks     Drug use: No       Renee Winkler  12/5/2022  2:15 PM

## 2022-12-07 NOTE — PROGRESS NOTES
Called HD center to check with neph is CT with contrast is acceptable for pt. Discussed with PD RN. CT with contrast is not recommended as contrast isn't removed the same way as hemodialysis. wiill review with providers.

## 2022-12-13 DIAGNOSIS — Z76.82 ORGAN TRANSPLANT CANDIDATE: ICD-10-CM

## 2022-12-13 DIAGNOSIS — N18.6 ESRD (END STAGE RENAL DISEASE) (H): Primary | ICD-10-CM

## 2022-12-19 ENCOUNTER — TELEPHONE (OUTPATIENT)
Dept: UROLOGY | Facility: CLINIC | Age: 27
End: 2022-12-19

## 2022-12-19 NOTE — TELEPHONE ENCOUNTER
Call returned. Valerie will call back with a list of supplies needed for Aidan. She states she is not feeling well at this time.    I offered to assist her with scheduling Aidan's CT, she declined stating she was not feeling well and that they would call her.    I notified her that she is due for follow up yearly with renal US.    Yaneth VuongPotter, JEFFREY  12/19/22  3:30 PM      ----- Message -----  From: Meera Hoyt RN  Sent: 12/13/2022  10:58 AM CST  To: Tai Billy MD, Makayla Shah LPN, #  Subject: prescription refills                             Topher Benavides and Belinda,     I spoke with Greg's mom, Valerie, today and she had mentioned she needed a few prescriptions filled by Dr. Billy but was unable to do so at her appt as she didn't have the name of where it should be sent. She said she uses [facility] for some medications but [facility] term system  in [place] for the flushes and sterile gloves. Would either of you be able to connect with her to get the supplies refilled?     Thank you!   Meera

## 2023-02-22 ENCOUNTER — TELEPHONE (OUTPATIENT)
Dept: TRANSPLANT | Facility: CLINIC | Age: 28
End: 2023-02-22

## 2023-02-22 NOTE — TELEPHONE ENCOUNTER
Patient's mother, Valerie, provided some dates that would work for getting Aidan's A/P CT done. Writer will call her back with a date/time.

## 2023-03-08 ENCOUNTER — ANCILLARY PROCEDURE (OUTPATIENT)
Dept: CT IMAGING | Facility: CLINIC | Age: 28
End: 2023-03-08
Attending: SURGERY
Payer: MEDICARE

## 2023-03-08 DIAGNOSIS — N18.6 ESRD (END STAGE RENAL DISEASE) (H): ICD-10-CM

## 2023-03-08 DIAGNOSIS — Z76.82 ORGAN TRANSPLANT CANDIDATE: ICD-10-CM

## 2023-03-08 PROCEDURE — 74177 CT ABD & PELVIS W/CONTRAST: CPT | Mod: MG | Performed by: RADIOLOGY

## 2023-03-08 PROCEDURE — G1010 CDSM STANSON: HCPCS | Performed by: RADIOLOGY

## 2023-03-08 RX ORDER — IOPAMIDOL 755 MG/ML
60 INJECTION, SOLUTION INTRAVASCULAR ONCE
Status: COMPLETED | OUTPATIENT
Start: 2023-03-08 | End: 2023-03-08

## 2023-03-08 RX ADMIN — IOPAMIDOL 60 ML: 755 INJECTION, SOLUTION INTRAVASCULAR at 12:30

## 2023-03-15 ENCOUNTER — TELEPHONE (OUTPATIENT)
Dept: TRANSPLANT | Facility: CLINIC | Age: 28
End: 2023-03-15
Payer: MEDICARE

## 2023-03-15 NOTE — TELEPHONE ENCOUNTER
Returned call to pts mom. Will have Dr. Horta and Dr. Canales review images. Pt's mom is reporting issues with his dialysis access. Reports it fills once and during the first drain and will not continue to drain after that. They have been working with dialysis center to manage and have a visit with his nephrologist on Friday. Reviewed pre transplant providers are not managing access and they will need to work with PD center or access surgeon. Requesting CT report be faxed to HD center. Will request it be sent today.

## 2023-03-15 NOTE — TELEPHONE ENCOUNTER
Mom calling to get the findings and impression from his CT done March 8th  Past week he is having problems with his dialysis runs

## 2023-03-20 ENCOUNTER — TELEPHONE (OUTPATIENT)
Dept: TRANSPLANT | Facility: CLINIC | Age: 28
End: 2023-03-20
Payer: MEDICARE

## 2023-03-20 NOTE — TELEPHONE ENCOUNTER
Called Dr. Baez's office to see if follow up was done 9/2022. Spoke with clinic RN, pt cancelled appt. Has follow up scheduled 4/6/23.

## 2023-03-22 ENCOUNTER — COMMITTEE REVIEW (OUTPATIENT)
Dept: TRANSPLANT | Facility: CLINIC | Age: 28
End: 2023-03-22
Payer: MEDICARE

## 2023-03-22 ENCOUNTER — DOCUMENTATION ONLY (OUTPATIENT)
Dept: TRANSPLANT | Facility: CLINIC | Age: 28
End: 2023-03-22

## 2023-03-22 ENCOUNTER — TELEPHONE (OUTPATIENT)
Dept: TRANSPLANT | Facility: CLINIC | Age: 28
End: 2023-03-22
Payer: MEDICARE

## 2023-03-22 DIAGNOSIS — N18.6 ESRD (END STAGE RENAL DISEASE) (H): ICD-10-CM

## 2023-03-22 DIAGNOSIS — Z76.82 ORGAN TRANSPLANT CANDIDATE: ICD-10-CM

## 2023-03-22 NOTE — COMMITTEE REVIEW
Abdominal Committee Review Note     Evaluation Date: 10/15/2020  Committee Review Date: 3/22/2023    Organ being evaluated for: Kidney    Transplant Phase: Waitlist  Transplant Status: Inactive    Transplant Coordinator: Meera Hoyt  Transplant Surgeon:       Referring Physician: Glen Abel    Primary Diagnosis: Prune Belly Syndrome  Secondary Diagnosis:     Committee Review Members:  Nephrology Jyoti Restrepo MD, Amadou Lai MD   Nutrition Norma Paez,    Pharmacy [facility]ll[facility]    - Clinical Polinajaimie Peacock, [facility], Meera Rosario, [facility]   Transplant THOMAS COTA, RN, Ana Bustillos, RN, April Giraldo, RN, Meera Hoyt, RN, Odilia Solis, RN, Janelle Hernandez, GUNNER, Yazmin Alvarado, GUNNER, Kayley Whaley, RN   Transplant Surgery Ana Horta MD, MD       Transplant Eligibility: Irreversible chronic kidney disease treated w/dialysis or expected need for dialysis    Committee Review Decision: Make Active    Relative Contraindications:     Absolute Contraindications:     Committee Chair Ana Horta MD verbally attested to the committee's decision.    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 Originally thought to be PE, was put on anticoag, improved after HD. PE and DVT work up came back negative so taken off.      Cards: Normal Echo 3/2021     Urology: Neurogenic bladder with recurrent UTI's. Straight caths with flushes, last visit with Dr. Canales 12/2022. Recurrent UTI in the setting of Prune Belly Syndrome and Dawood Reece sequence with resultant neurogenic bladder and bilateral megaureter, s/p ureteral and bladder augmentation, Mitrofanoff, ureteral reimplantation,  and left-to-right transureteroureterostomy. Horseshoe kidney, got CT a/p with contrast for visualization and review of cysts. Urology reviewed and stated they did not appear concerning and do not need to be followed. Bladder studies done previously with safe storage.     Pulm: Chronic respiratory failure/hypoxia, restritive lung disease requiring trach. Follows with Dr. Baez Due for visit in September but was cancelled, did not complete follow up. Is scheduled 4/6/23, mom reporting no concerns      Malnutrition: s/p G tube, weight appears to be stable.      Cognitive delay: Pts mom is guardian     Committee determined pt is acceptable for active status, he will need Renal Ultrasounds yearly to monitor cysts on the horseshoe kidney. Will verify insurance and update listing.

## 2023-03-28 ENCOUNTER — DOCUMENTATION ONLY (OUTPATIENT)
Dept: TRANSPLANT | Facility: CLINIC | Age: 28
End: 2023-03-28

## 2023-04-03 ENCOUNTER — LAB (OUTPATIENT)
Dept: LAB | Facility: CLINIC | Age: 28
End: 2023-04-03
Payer: MEDICARE

## 2023-04-03 DIAGNOSIS — Z76.82 ORGAN TRANSPLANT CANDIDATE: ICD-10-CM

## 2023-04-03 DIAGNOSIS — N18.6 ESRD (END STAGE RENAL DISEASE) (H): ICD-10-CM

## 2023-04-03 PROCEDURE — 86833 HLA CLASS II HIGH DEFIN QUAL: CPT

## 2023-04-03 PROCEDURE — 86832 HLA CLASS I HIGH DEFIN QUAL: CPT

## 2023-04-04 ENCOUNTER — TRANSFERRED RECORDS (OUTPATIENT)
Dept: HEALTH INFORMATION MANAGEMENT | Facility: CLINIC | Age: 28
End: 2023-04-04
Payer: MEDICARE

## 2023-04-06 ENCOUNTER — TELEPHONE (OUTPATIENT)
Dept: TRANSPLANT | Facility: CLINIC | Age: 28
End: 2023-04-06
Payer: MEDICARE

## 2023-04-06 ENCOUNTER — APPOINTMENT (OUTPATIENT)
Dept: CT IMAGING | Facility: CLINIC | Age: 28
DRG: 907 | End: 2023-04-06
Attending: EMERGENCY MEDICINE
Payer: MEDICARE

## 2023-04-06 ENCOUNTER — HOSPITAL ENCOUNTER (INPATIENT)
Facility: CLINIC | Age: 28
LOS: 16 days | Discharge: HOME | DRG: 907 | End: 2023-04-22
Attending: EMERGENCY MEDICINE | Admitting: STUDENT IN AN ORGANIZED HEALTH CARE EDUCATION/TRAINING PROGRAM
Payer: MEDICARE

## 2023-04-06 ENCOUNTER — DOCUMENTATION ONLY (OUTPATIENT)
Dept: TRANSPLANT | Facility: CLINIC | Age: 28
End: 2023-04-06

## 2023-04-06 ENCOUNTER — APPOINTMENT (OUTPATIENT)
Dept: GENERAL RADIOLOGY | Facility: CLINIC | Age: 28
DRG: 907 | End: 2023-04-06
Attending: EMERGENCY MEDICINE
Payer: MEDICARE

## 2023-04-06 DIAGNOSIS — N18.6 END STAGE RENAL DISEASE (H): ICD-10-CM

## 2023-04-06 DIAGNOSIS — Z99.2 ENCOUNTER FOR EXTRACORPOREAL DIALYSIS (H): ICD-10-CM

## 2023-04-06 DIAGNOSIS — R14.0 ABDOMINAL BLOATING: ICD-10-CM

## 2023-04-06 DIAGNOSIS — Z11.52 ENCOUNTER FOR SCREENING LABORATORY TESTING FOR SEVERE ACUTE RESPIRATORY SYNDROME CORONAVIRUS 2 (SARS-COV-2): ICD-10-CM

## 2023-04-06 DIAGNOSIS — T85.71XA INFECTION ASSOCIATED WITH PERITONEAL DIALYSIS CATHETER, INITIAL ENCOUNTER (H): ICD-10-CM

## 2023-04-06 DIAGNOSIS — K59.00 CONSTIPATION, UNSPECIFIED CONSTIPATION TYPE: ICD-10-CM
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
## 2023-04-06 DIAGNOSIS — K65.0 ACUTE SUPPURATIVE PERITONITIS (H): Primary | ICD-10-CM

## 2023-04-06 DIAGNOSIS — I15.1 HYPERTENSION SECONDARY TO OTHER RENAL DISORDERS: ICD-10-CM

## 2023-04-06 LAB
ALBUMIN UR-MCNC: 300 MG/DL
ANION GAP SERPL CALCULATED.3IONS-SCNC: 20 MMOL/L (ref 7–15)
APPEARANCE UR: ABNORMAL
BACTERIA #/AREA URNS HPF: ABNORMAL /HPF
BASOPHILS # BLD AUTO: 0.1 10E3/UL (ref 0–0.2)
BASOPHILS NFR BLD AUTO: 1 %
BILIRUB UR QL STRIP: NEGATIVE
BUN SERPL-MCNC: 115 MG/DL (ref 6–20)
CALCIUM SERPL-MCNC: 11 MG/DL (ref 8.6–10)
CHLORIDE SERPL-SCNC: 99 MMOL/L (ref 98–107)
COLOR UR AUTO: ABNORMAL
CREAT SERPL-MCNC: 8.52 MG/DL (ref 0.67–1.17)
CRP SERPL-MCNC: 453 MG/L
DEPRECATED HCO3 PLAS-SCNC: 15 MMOL/L (ref 22–29)
EOSINOPHIL # BLD AUTO: 0.1 10E3/UL (ref 0–0.7)
EOSINOPHIL NFR BLD AUTO: 0 %
ERYTHROCYTE [DISTWIDTH] IN BLOOD BY AUTOMATED COUNT: 14.2 % (ref 10–15)
ERYTHROCYTE [SEDIMENTATION RATE] IN BLOOD BY WESTERGREN METHOD: 66 MM/HR (ref 0–15)
GFR SERPL CREATININE-BSD FRML MDRD: 8 ML/MIN/1.73M2
GLUCOSE SERPL-MCNC: 85 MG/DL (ref 70–99)
GLUCOSE UR STRIP-MCNC: 30 MG/DL
HCT VFR BLD AUTO: 41.1 % (ref 40–53)
HGB BLD-MCNC: 13.1 G/DL (ref 13.3–17.7)
HGB UR QL STRIP: ABNORMAL
HOLD SPECIMEN: NORMAL
IMM GRANULOCYTES # BLD: 0.9 10E3/UL
IMM GRANULOCYTES NFR BLD: 4 %
KETONES UR STRIP-MCNC: ABNORMAL MG/DL
LACTATE SERPL-SCNC: 0.8 MMOL/L (ref 0.7–2)
LEUKOCYTE ESTERASE UR QL STRIP: ABNORMAL
LYMPHOCYTES # BLD AUTO: 1.3 10E3/UL (ref 0.8–5.3)
LYMPHOCYTES NFR BLD AUTO: 6 %
MCH RBC QN AUTO: 28.9 PG (ref 26.5–33)
MCHC RBC AUTO-ENTMCNC: 31.9 G/DL (ref 31.5–36.5)
MCV RBC AUTO: 91 FL (ref 78–100)
MONOCYTES # BLD AUTO: 0.8 10E3/UL (ref 0–1.3)
MONOCYTES NFR BLD AUTO: 4 %
MUCOUS THREADS #/AREA URNS LPF: PRESENT /LPF
NEUTROPHILS # BLD AUTO: 17.5 10E3/UL (ref 1.6–8.3)
NEUTROPHILS NFR BLD AUTO: 85 %
NITRATE UR QL: NEGATIVE
NRBC # BLD AUTO: 0 10E3/UL
NRBC BLD AUTO-RTO: 0 /100
PH UR STRIP: 6 [PH] (ref 5–7)
PLATELET # BLD AUTO: 552 10E3/UL (ref 150–450)
POTASSIUM SERPL-SCNC: 5.4 MMOL/L (ref 3.4–5.3)
RBC # BLD AUTO: 4.53 10E6/UL (ref 4.4–5.9)
RBC URINE: 3 /HPF
SARS-COV-2 RNA RESP QL NAA+PROBE: NEGATIVE
SODIUM SERPL-SCNC: 134 MMOL/L (ref 136–145)
SP GR UR STRIP: 1.01 (ref 1–1.03)
SQUAMOUS EPITHELIAL: 2 /HPF
UROBILINOGEN UR STRIP-MCNC: NORMAL MG/DL
VANCOMYCIN SERPL-MCNC: 19.2 UG/ML
WBC # BLD AUTO: 20.6 10E3/UL (ref 4–11)
WBC CLUMPS #/AREA URNS HPF: PRESENT /HPF
WBC URINE: 80 /HPF

## 2023-04-06 PROCEDURE — 85652 RBC SED RATE AUTOMATED: CPT | Performed by: EMERGENCY MEDICINE

## 2023-04-06 PROCEDURE — 87086 URINE CULTURE/COLONY COUNT: CPT | Performed by: EMERGENCY MEDICINE

## 2023-04-06 PROCEDURE — 74176 CT ABD & PELVIS W/O CONTRAST: CPT | Mod: 26 | Performed by: RADIOLOGY

## 2023-04-06 PROCEDURE — 83605 ASSAY OF LACTIC ACID: CPT | Performed by: EMERGENCY MEDICINE

## 2023-04-06 PROCEDURE — 99285 EMERGENCY DEPT VISIT HI MDM: CPT | Mod: 25 | Performed by: EMERGENCY MEDICINE

## 2023-04-06 PROCEDURE — 999N000090 HC STATISTIC LEVEL 2 EST PATIENT

## 2023-04-06 PROCEDURE — 83735 ASSAY OF MAGNESIUM: CPT

## 2023-04-06 PROCEDURE — 81001 URINALYSIS AUTO W/SCOPE: CPT | Performed by: EMERGENCY MEDICINE

## 2023-04-06 PROCEDURE — U0005 INFEC AGEN DETEC AMPLI PROBE: HCPCS

## 2023-04-06 PROCEDURE — 36415 COLL VENOUS BLD VENIPUNCTURE: CPT | Performed by: EMERGENCY MEDICINE

## 2023-04-06 PROCEDURE — C9803 HOPD COVID-19 SPEC COLLECT: HCPCS | Performed by: EMERGENCY MEDICINE

## 2023-04-06 PROCEDURE — 80202 ASSAY OF VANCOMYCIN: CPT | Performed by: STUDENT IN AN ORGANIZED HEALTH CARE EDUCATION/TRAINING PROGRAM

## 2023-04-06 PROCEDURE — 85025 COMPLETE CBC W/AUTO DIFF WBC: CPT | Performed by: EMERGENCY MEDICINE

## 2023-04-06 PROCEDURE — 99285 EMERGENCY DEPT VISIT HI MDM: CPT | Performed by: EMERGENCY MEDICINE

## 2023-04-06 PROCEDURE — 250N000011 HC RX IP 250 OP 636: Performed by: EMERGENCY MEDICINE

## 2023-04-06 PROCEDURE — 250N000011 HC RX IP 250 OP 636

## 2023-04-06 PROCEDURE — 99222 1ST HOSP IP/OBS MODERATE 55: CPT | Mod: AI | Performed by: STUDENT IN AN ORGANIZED HEALTH CARE EDUCATION/TRAINING PROGRAM

## 2023-04-06 PROCEDURE — 36415 COLL VENOUS BLD VENIPUNCTURE: CPT | Performed by: STUDENT IN AN ORGANIZED HEALTH CARE EDUCATION/TRAINING PROGRAM

## 2023-04-06 PROCEDURE — 80048 BASIC METABOLIC PNL TOTAL CA: CPT | Performed by: EMERGENCY MEDICINE

## 2023-04-06 PROCEDURE — 96374 THER/PROPH/DIAG INJ IV PUSH: CPT | Performed by: EMERGENCY MEDICINE

## 2023-04-06 PROCEDURE — 87486 CHLMYD PNEUM DNA AMP PROBE: CPT

## 2023-04-06 PROCEDURE — 120N000002 HC R&B MED SURG/OB UMMC

## 2023-04-06 PROCEDURE — 74176 CT ABD & PELVIS W/O CONTRAST: CPT | Mod: MA

## 2023-04-06 PROCEDURE — 86140 C-REACTIVE PROTEIN: CPT | Performed by: EMERGENCY MEDICINE

## 2023-04-06 PROCEDURE — 87040 BLOOD CULTURE FOR BACTERIA: CPT | Performed by: EMERGENCY MEDICINE

## 2023-04-06 PROCEDURE — 71046 X-RAY EXAM CHEST 2 VIEWS: CPT

## 2023-04-06 PROCEDURE — 71046 X-RAY EXAM CHEST 2 VIEWS: CPT | Mod: 26 | Performed by: RADIOLOGY

## 2023-04-06 RX ORDER — FLUCONAZOLE 2 MG/ML
200 INJECTION, SOLUTION INTRAVENOUS EVERY 24 HOURS
Status: DISCONTINUED | OUTPATIENT
Start: 2023-04-06 | End: 2023-04-12

## 2023-04-06 RX ORDER — AZITHROMYCIN 250 MG/1
250 TABLET, FILM COATED ORAL
Status: DISCONTINUED | OUTPATIENT
Start: 2023-04-07 | End: 2023-04-22 | Stop reason: HOSPADM

## 2023-04-06 RX ORDER — PROCHLORPERAZINE MALEATE 5 MG
10 TABLET ORAL EVERY 6 HOURS PRN
Status: DISCONTINUED | OUTPATIENT
Start: 2023-04-06 | End: 2023-04-22 | Stop reason: HOSPADM

## 2023-04-06 RX ORDER — AMOXICILLIN 250 MG
1 CAPSULE ORAL 2 TIMES DAILY PRN
Status: DISCONTINUED | OUTPATIENT
Start: 2023-04-06 | End: 2023-04-10

## 2023-04-06 RX ORDER — AMLODIPINE BESYLATE 5 MG/1
5 TABLET ORAL DAILY
Status: DISCONTINUED | OUTPATIENT
Start: 2023-04-07 | End: 2023-04-08

## 2023-04-06 RX ORDER — ACETAMINOPHEN 500 MG
500 TABLET ORAL EVERY 8 HOURS PRN
Status: DISCONTINUED | OUTPATIENT
Start: 2023-04-06 | End: 2023-04-08

## 2023-04-06 RX ORDER — PREDNISONE 1 MG/1
1 TABLET ORAL EVERY OTHER DAY
Status: CANCELLED | OUTPATIENT
Start: 2023-04-06

## 2023-04-06 RX ORDER — VITAMIN B COMPLEX
2000 TABLET ORAL DAILY
Status: DISCONTINUED | OUTPATIENT
Start: 2023-04-07 | End: 2023-04-11

## 2023-04-06 RX ORDER — CLONIDINE HYDROCHLORIDE 0.1 MG/1
0.1 TABLET ORAL 2 TIMES DAILY
Status: CANCELLED | OUTPATIENT
Start: 2023-04-06

## 2023-04-06 RX ORDER — ONDANSETRON 2 MG/ML
4 INJECTION INTRAMUSCULAR; INTRAVENOUS EVERY 6 HOURS PRN
Status: DISCONTINUED | OUTPATIENT
Start: 2023-04-06 | End: 2023-04-22 | Stop reason: HOSPADM

## 2023-04-06 RX ORDER — PROCHLORPERAZINE 25 MG
25 SUPPOSITORY, RECTAL RECTAL EVERY 12 HOURS PRN
Status: DISCONTINUED | OUTPATIENT
Start: 2023-04-06 | End: 2023-04-22 | Stop reason: HOSPADM

## 2023-04-06 RX ORDER — CEFEPIME HYDROCHLORIDE 1 G/1
1 INJECTION, POWDER, FOR SOLUTION INTRAMUSCULAR; INTRAVENOUS EVERY 24 HOURS
Status: DISCONTINUED | OUTPATIENT
Start: 2023-04-06 | End: 2023-04-11

## 2023-04-06 RX ORDER — SODIUM BICARBONATE 650 MG/1
650 TABLET ORAL 3 TIMES DAILY
Status: DISCONTINUED | OUTPATIENT
Start: 2023-04-07 | End: 2023-04-13

## 2023-04-06 RX ORDER — LIDOCAINE 40 MG/G
CREAM TOPICAL
Status: DISCONTINUED | OUTPATIENT
Start: 2023-04-06 | End: 2023-04-22 | Stop reason: HOSPADM

## 2023-04-06 RX ORDER — CINACALCET 30 MG/1
30 TABLET, FILM COATED ORAL DAILY
Status: DISCONTINUED | OUTPATIENT
Start: 2023-04-07 | End: 2023-04-22 | Stop reason: HOSPADM

## 2023-04-06 RX ORDER — METRONIDAZOLE 500 MG/100ML
500 INJECTION, SOLUTION INTRAVENOUS EVERY 12 HOURS
Status: DISCONTINUED | OUTPATIENT
Start: 2023-04-06 | End: 2023-04-11

## 2023-04-06 RX ORDER — ONDANSETRON 4 MG/1
4 TABLET, ORALLY DISINTEGRATING ORAL EVERY 6 HOURS PRN
Status: DISCONTINUED | OUTPATIENT
Start: 2023-04-06 | End: 2023-04-22 | Stop reason: HOSPADM

## 2023-04-06 RX ORDER — FAMOTIDINE 10 MG
40 TABLET ORAL DAILY
Status: DISCONTINUED | OUTPATIENT
Start: 2023-04-07 | End: 2023-04-07

## 2023-04-06 RX ORDER — AMOXICILLIN 250 MG
2 CAPSULE ORAL 2 TIMES DAILY PRN
Status: DISCONTINUED | OUTPATIENT
Start: 2023-04-06 | End: 2023-04-10

## 2023-04-06 RX ORDER — CALCIUM CARBONATE 500(1250)
500 TABLET ORAL
Status: DISCONTINUED | OUTPATIENT
Start: 2023-04-07 | End: 2023-04-06

## 2023-04-06 RX ORDER — SODIUM CHLORIDE, SODIUM LACTATE, POTASSIUM CHLORIDE, CALCIUM CHLORIDE 600; 310; 30; 20 MG/100ML; MG/100ML; MG/100ML; MG/100ML
INJECTION, SOLUTION INTRAVENOUS ONCE
Status: CANCELLED | OUTPATIENT
Start: 2023-04-06

## 2023-04-06 RX ORDER — ALBUTEROL SULFATE 0.83 MG/ML
2.5 SOLUTION RESPIRATORY (INHALATION) EVERY 6 HOURS PRN
Status: DISCONTINUED | OUTPATIENT
Start: 2023-04-06 | End: 2023-04-10

## 2023-04-06 RX ADMIN — METRONIDAZOLE 500 MG: 500 INJECTION, SOLUTION INTRAVENOUS at 23:17

## 2023-04-06 RX ADMIN — CEFEPIME HYDROCHLORIDE 1 G: 1 INJECTION, POWDER, FOR SOLUTION INTRAMUSCULAR; INTRAVENOUS at 22:31

## 2023-04-06 RX ADMIN — FLUCONAZOLE IN SODIUM CHLORIDE 200 MG: 2 INJECTION, SOLUTION INTRAVENOUS at 19:47

## 2023-04-06 ASSESSMENT — ACTIVITIES OF DAILY LIVING (ADL)
ADLS_ACUITY_SCORE: 35
ADLS_ACUITY_SCORE: 35
ADLS_ACUITY_SCORE: 33
ADLS_ACUITY_SCORE: 35

## 2023-04-06 NOTE — TELEPHONE ENCOUNTER
Reports pt has been sick with an infection for the past week and felt they could treat it at home. Just got preliminary results, reports its a fungal infection and needed to be treated. Reports he has not had PD in close to a Month. Preliminary results on culture came back positive for fungal infection, reports primary neph wants pt seen by [facility] and pts mom feels more comfortable at the . Will call AUDREY Penaloza RN to get details and provide number to give report. Valerie will bring pt to the ED, should arrive in about 90 minutes. Reviewed pt status change to inactive due to fungal infection, will need to be clear from infection prior to active status consideration. Status change letter will be sent. Pt has not been removed from the list, he is not able to receive  donor offers, he is still acruing time on the waitlist. Will review admission once pt is discharged.     Called [facility] to discuss pt. Left VM with direct line for return call.     Received return call from [facility]. 3/10/23 pt was having alarms that he was not filling fully, thought to be due to constipation. Took pt a few weeks to recover. This past week he has been getting less PD fluid in, dann cultures on Monday. He received one dose of Vancomycin, intraperitoneal and one dose of ceftazidime intraperitoneal. Preliminary results came back from with budding yeast cells. Dr. Mata recommended pt to be seen at the  of .     2023 Verified Kidney List status as INACTIVE in UNOS and Epic.  Ana Bustillos RN

## 2023-04-06 NOTE — ED PROVIDER NOTES
[facility] ([facility])    4/06/23      History     Chief Complaint   Patient presents with     Wound Infection     The history is provided by the patient, medical records and a parent.     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 Patient is here with his mother/legal guardian who gives the history. Patient was initially placed on hemodialysis in 12/2021, was switched to peritoneal dialysis in March of 2022. Patient has now not had a full run of dialysis since 3/9. Mother reports patient was only able to get about half a run on 3/10. She states dialysis nurses have been out every day trying to work on it. They have been doing manual dialysis only. Mother states a few days ago they took a sample of the drainage from his catheter and patient was found to have a fungal infection that has not yet been identified. Drainage from the catheter has now become more foamy/pus-like. Mother denies any fluid drainage from around the catheter, only into the bag. Dialysis nurse gave patient vancomycin and cefepime on Tuesday, 4/4, per mother. She notes there have been fibrins coming from his dialysis catheter. They called patient's transplant team today and mother was advised to bring patient in to the ED. He has been having constipation as well. Patient has not been eating, especially in the past 2 weeks. He will only eat applesauce which mother states is abnormal for him. He has not been drinking either,  mother has to give him water through his g-tube. His last BM was about 1 week ago. Patient still makes a small amount of urine, typically caths himself but mother has been doing this since patient has been feeling unwell. Patient is able to do all his own cares at baseline but mother notes she has to do this for him when he is unwell. Patient has been having intermittent fevers for the past 1.5 weeks, had fever up to 104 two days ago. He has also been having intermittent back pain. Mother notes last night patient slept poorly and had to get up several times throughout the night to cath which is unusual for him. Patient has had increased productive cough lately as well. No increased shortness of breath. No headaches. Patient gets vest treatments three times per day at baseline which help with his respiratory symptoms. Patient's mother reports she changed his trach 1 week ago and noted some irritation and redness in the area. Of note, patient was recently deactivated from transplant list due to current symptoms per mother.    Past Medical History  Past Medical History:   Diagnosis Date     Bilateral reflux nephropathy      CKD (chronic kidney disease) stage 4, GFR 15-29 ml/min (H)      End stage renal disease (H)      Hearing loss      Hypertension      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      Prune belly syndrome      Recurrent UTI      Respiratory bronchiolitis interstitial lung disease (H)      Restrictive lung disease      Scoliosis 2002     Thyroid disease      Tracheostomy dependence (H)      Past Surgical History:   Procedure Laterality Date     Bilateral ureteral reimplantation  5/16/2002    abdominoplasty, Mitrofanoff creation     CYSTOSCOPY  11/14/11     EXAM UNDER ANESTHESIA THROAT  6/10/2003    tonsillar hypertrophy     EXAM UNDER ANESTHESIA, RESTORATIONS, EXTRACTION(S) DENTAL COMPLEX, COMBINED  6/5/2006     GASTROSTOMY TUBE       GASTROSTOMY TUBE  3/16/2002    button change     HERNIORRHAPHY INGUINAL  BILATERAL  99    left ochiopexy     LARYNGOSCOPY, BRONCHOSCOPY, COMBINED  2002     LARYNGOSCOPY, BRONCHOSCOPY, COMBINED  2002    bilateral ear debridement     LARYNGOSCOPY, BRONCHOSCOPY, COMBINED  2003     LARYNGOSCOPY, BRONCHOSCOPY, COMBINED  2007    Failed Deflux injection     LARYNGOSCOPY, BRONCHOSCOPY, COMBINED  12      VESICOSTOMY       Redo right ureteral reimplantation  3/12/2004    Excision bladder diverticuli, Left to Right pyelopyelostomy     Replacement of trach  10/15/12     Takedown and revision of Mitrofanoff stoma  2006     TRACHEOSTOMY INFANT       acetaminophen (TYLENOL) 500 MG tablet  albuterol (2.5 MG/3ML) 0.083% nebulizer solution  Alum Hydroxide-Mag Trisilicate 80-20 MG CHEW  amLODIPine (NORVASC) 5 MG tablet  azithromycin (ZITHROMAX) 250 MG tablet  calcium carbonate (OS-SUSAN 500 MG [place]) 500 MG tablet  captopril 0.1 mg/mL (CAPOTEN) 0.1 mg/mL SUSP  cholecalciferol 2000 UNITS tablet  cinacalcet (SENSIPAR) 30 MG tablet  cloNIDine (CATAPRES) 0.1 MG tablet  Cranberry 500 MG TABS  famotidine (PEPCID) 20 MG tablet  ferrous gluconate (FERGON) 324 (38 Fe) MG tablet  ipratropium (ATROVENT) 0.02 % neb solution  Melatonin 10 MG CAPS  mupirocin (BACTROBAN) 2 % external ointment  Nutritional Supplements (NEPRO) LIQD  order for DME  order for DME  order for DME  order for DME  order for DME  order for DME  predniSONE (DELTASONE) 1 MG tablet  sodium bicarbonate 650 MG tablet  sodium chloride 0.9%, bottle, 0.9 % irrigation  tobramycin, PF, (DAX) 300 MG/5ML nebulizer solution  traZODone (DESYREL) 50 MG tablet      Allergies   Allergen Reactions     Latex      Latex      Other reaction(s): Other (see comments)  Other reaction(s): Contact Dermatitis, Other (see comments)     Adhesive Tape Rash     Family History  Family History   Problem Relation Age of Onset     Diabetes Type 2  Mother      Diabetes Type 2  Maternal Grandmother      Deep Vein Thrombosis (DVT) Maternal  "Grandmother      Diabetes Type 2  Maternal Grandfather      Diabetes Type 2  Paternal Grandmother      Alzheimer Disease Paternal Grandfather      Thyroid Disease Maternal Aunt         hashimoto     Anesthesia Reaction Maternal Aunt         patients mom reports her sister had an allergic reaction to something during a previous surgery     Thyroid Cancer Maternal Great-Grandmother      Social History   Social History     Tobacco Use     Smoking status: Never     Smokeless tobacco: Never   Substance Use Topics     Alcohol use: No     Alcohol/week: 0.0 standard drinks of alcohol     Drug use: No         A medically appropriate review of systems was performed with pertinent positives and negatives noted in the HPI, and all other systems negative.    Physical Exam   BP: 124/80  Pulse: 118  Temp: 97.4  F (36.3  C)  Resp: 18  Height: 167.6 cm (5' 6\")  Weight: 47.2 kg (104 lb)  SpO2: 97 %  Physical Exam  General: Awake alert no acute distress  HEENT: Patient has a trach noted, skin around the trach appears clean dry and intact the mom notes that she thinks that there is some increased irritation down into the trach.  Cardiovascular: Tachycardic rate no murmur  Lungs: Clear, no increased work of breathing.  Abdomen: Patient has left lower quadrant and left sided abdominal tenderness.  No right lower quadrant tenderness or right-sided tenderness.  No guarding or peritoneal signs.  PD catheter site appears clean dry and intact though drainage from the catheter appears thick, blood-tinged with sediment in the catheter line.  Extremities: No lower extremity swelling, redness, calf tenderness.  Skin: Warm, dry, well-perfused no abnormal rashes noted.    ED Course, Procedures, & Data       4:52 PM  The patient was seen and examined by Chung Gardiner MD in [facility].   Procedures              Results for orders placed or performed during the hospital encounter of 04/06/23   XR Chest 2 Views     Status: None    Narrative    EXAM: XR CHEST " 2 VIEWS  4/6/2023 5:38 PM     HISTORY:  SOB fever       COMPARISON:  Chest x-ray 10/15/2020. CT abdomen and pelvis 3/8/2023    FINDINGS:   PA and lateral views of the chest. Normal heart size. Moderate size  hiatal hernia. Percutaneous tracheostomy tube tip overlies the  midthoracic trachea. Suggestion of perihilar bronchial cuffing. No  airspace consolidation. No pleural effusion or pneumothorax. The  osseous structures are within normal limits.      Impression    IMPRESSION:   Suggestion of perihilar bronchial cuffing which can be seen in  reactive airway disease or viral illness. No focal airspace  consolidation.    I have personally reviewed the examination and initial interpretation  and I agree with the findings.    JONN MATA MD         SYSTEM ID:  [id number]   CBC with platelets and differential     Status: Abnormal   Result Value Ref Range    WBC Count 20.6 (H) 4.0 - 11.0 10e3/uL    RBC Count 4.53 4.40 - 5.90 10e6/uL    Hemoglobin 13.1 (L) 13.3 - 17.7 g/dL    Hematocrit 41.1 40.0 - 53.0 %    MCV 91 78 - 100 fL    MCH 28.9 26.5 - 33.0 pg    MCHC 31.9 31.5 - 36.5 g/dL    RDW 14.2 10.0 - 15.0 %    Platelet Count 552 (H) 150 - 450 10e3/uL    % Neutrophils 85 %    % Lymphocytes 6 %    % Monocytes 4 %    % Eosinophils 0 %    % Basophils 1 %    % Immature Granulocytes 4 %    NRBCs per 100 WBC 0 <1 /100    Absolute Neutrophils 17.5 (H) 1.6 - 8.3 10e3/uL    Absolute Lymphocytes 1.3 0.8 - 5.3 10e3/uL    Absolute Monocytes 0.8 0.0 - 1.3 10e3/uL    Absolute Eosinophils 0.1 0.0 - 0.7 10e3/uL    Absolute Basophils 0.1 0.0 - 0.2 10e3/uL    Absolute Immature Granulocytes 0.9 (H) <=0.4 10e3/uL    Absolute NRBCs 0.0 10e3/uL   Extra Tube ([place] Draw)     Status: None (In process)    Narrative    The following orders were created for panel order Extra Tube ([place] Draw).  Procedure                               Abnormality         Status                     ---------                               -----------          ------                     Extra Blue Top Tube[[id number]]                              In process                 Extra Red Top Tube[[id number]]                                                            Please view results for these tests on the individual orders.   CBC with platelets differential     Status: Abnormal    Narrative    The following orders were created for panel order CBC with platelets differential.  Procedure                               Abnormality         Status                     ---------                               -----------         ------                     CBC with platelets and d...[[id number]]  Abnormal            Final result                 Please view results for these tests on the individual orders.     Medications   fluconazole (DIFLUCAN) intermittent infusion 200 mg (has no administration in time range)     Labs Ordered and Resulted from Time of ED Arrival to Time of ED Departure   CBC WITH PLATELETS AND DIFFERENTIAL - Abnormal       Result Value    WBC Count 20.6 (*)     RBC Count 4.53      Hemoglobin 13.1 (*)     Hematocrit 41.1      MCV 91      MCH 28.9      MCHC 31.9      RDW 14.2      Platelet Count 552 (*)     % Neutrophils 85      % Lymphocytes 6      % Monocytes 4      % Eosinophils 0      % Basophils 1      % Immature Granulocytes 4      NRBCs per 100 WBC 0      Absolute Neutrophils 17.5 (*)     Absolute Lymphocytes 1.3      Absolute Monocytes 0.8      Absolute Eosinophils 0.1      Absolute Basophils 0.1      Absolute Immature Granulocytes 0.9 (*)     Absolute NRBCs 0.0     BASIC METABOLIC PANEL   CRP INFLAMMATION   ERYTHROCYTE SEDIMENTATION RATE AUTO   ROUTINE UA WITH MICROSCOPIC REFLEX TO CULTURE   LACTIC ACID WHOLE BLOOD   URINE CULTURE   BLOOD CULTURE   BLOOD CULTURE     XR Chest 2 Views   Final Result   IMPRESSION:    Suggestion of perihilar bronchial cuffing which can be seen in   reactive airway disease or viral illness. No focal airspace   consolidation.      I  have personally reviewed the examination and initial interpretation   and I agree with the findings.      JONN MATA MD            SYSTEM ID:  [id number]      CT Abdomen Pelvis without Contrast (stone protocol)    (Results Pending)          Critical care was not performed.     Medical Decision Making  The patient's presentation was of high complexity (an acute health issue posing potential threat to life or bodily function).    The patient's evaluation involved:  an assessment requiring an independent historian (see separate area of note for details)  review of external note(s) from 2 sources (see separate area of note for details)  ordering and/or review of 3+ test(s) in this encounter (see separate area of note for details)  discussion of management or test interpretation with another health professional (see separate area of note for details)    The patient's management necessitated moderate risk (prescription drug management including medications given in the ED) and high risk (a decision regarding hospitalization).      Assessment & Plan    Aidan is a 27-year-old male who presents to the emergency department on exam he is tachycardic and has left lower quadrant tenderness on exam but appears generally nontoxic with otherwise stable vital signs.    Differential include fungal peritonitis (as suggested by the mother though I am not able to confirm this in the medical record at this time) some other type of infection of the peritoneal fluid, infection from an alternative source, small bowel obstruction, pneumonia, other.     Initial evaluation will include laboratory studies.    Still awaiting outside records to review.  Patient be signed out to Dr. Garcias who will follow-up on the remainder of his labs.  Initially his white count did come back significantly elevated.  I did discuss the case with transplant nephrology.  They recommended starting fluconazole while we wait for records.  We will also add on  a CT abdomen pelvis to make sure not missing any other intra-abdominal pathology.    PD nurse was also consulted to get cultures and labs.  She has not come back at this time.  The transplant nephrologist will reach out to facilitate this..     Patient be admitted to the medicine service though will await electrolytes prior to ordering the bed given unclear if he is continue to monitor better or not.  Patient is under Dr. Garcias will take over patient's care.    I have reviewed the nursing notes. I have reviewed the findings, diagnosis, plan and need for follow up with the patient.    New Prescriptions    No medications on file       Final diagnoses:   Infection associated with peritoneal dialysis catheter, initial encounter (H)     I, Nelia Schwarz, am serving as a trained medical scribe to document services personally performed by Chung Willett MD, based on the provider's statements to me.      IChung MD, was physically present and have reviewed and verified the accuracy of this note documented by Nelia Schwarz.    Chung Willett MD  [facility] EMERGENCY DEPARTMENT  4/6/2023     Chung Willett MD  04/06/23 [id number]

## 2023-04-06 NOTE — ED TRIAGE NOTES
Pt presents with a month long history of infection near his PD catheter.  Testing in [place] showed it to be a yeast infection and referred to [facility] ED for additional evaluation and treatment.       Triage Assessment     Row Name 04/06/23 [id number]       Triage Assessment (Adult)    Airway WDL WDL       Respiratory WDL    Respiratory WDL WDL       Skin Circulation/Temperature WDL    Skin Circulation/Temperature WDL WDL;X       Cardiac WDL    Cardiac WDL WDL       Peripheral/Neurovascular WDL    Peripheral Neurovascular WDL WDL

## 2023-04-06 NOTE — TELEPHONE ENCOUNTER
Valerie mother stated that the Dialysis Nurse from [facility] told her that patient has a fungal infection and needing to go to the hospital, but the Nephrologist called mom  and [place]

## 2023-04-07 ENCOUNTER — ANESTHESIA EVENT (OUTPATIENT)
Dept: SURGERY | Facility: CLINIC | Age: 28
DRG: 907 | End: 2023-04-07
Payer: MEDICARE

## 2023-04-07 ENCOUNTER — ANESTHESIA (OUTPATIENT)
Dept: SURGERY | Facility: CLINIC | Age: 28
DRG: 907 | End: 2023-04-07
Payer: MEDICARE

## 2023-04-07 ENCOUNTER — APPOINTMENT (OUTPATIENT)
Dept: GENERAL RADIOLOGY | Facility: CLINIC | Age: 28
DRG: 907 | End: 2023-04-07
Payer: MEDICARE

## 2023-04-07 DIAGNOSIS — B99.9 INTRA-ABDOMINAL INFECTION: Primary | ICD-10-CM

## 2023-04-07 LAB
ANION GAP SERPL CALCULATED.3IONS-SCNC: 22 MMOL/L (ref 7–15)
ANION GAP SERPL CALCULATED.3IONS-SCNC: 23 MMOL/L (ref 7–15)
BASE EXCESS BLDV CALC-SCNC: -13.9 MMOL/L (ref -7.7–1.9)
BASE EXCESS BLDV CALC-SCNC: -15.3 MMOL/L (ref -7.7–1.9)
BUN SERPL-MCNC: 123 MG/DL (ref 6–20)
BUN SERPL-MCNC: 124 MG/DL (ref 6–20)
C PNEUM DNA SPEC QL NAA+PROBE: NOT DETECTED
CALCIUM SERPL-MCNC: 10.3 MG/DL (ref 8.6–10)
CALCIUM SERPL-MCNC: 8.7 MG/DL (ref 8.6–10)
CHLORIDE SERPL-SCNC: 101 MMOL/L (ref 98–107)
CHLORIDE SERPL-SCNC: 104 MMOL/L (ref 98–107)
CREAT SERPL-MCNC: 8.73 MG/DL (ref 0.67–1.17)
CREAT SERPL-MCNC: 8.83 MG/DL (ref 0.67–1.17)
CRP SERPL-MCNC: 374 MG/L
DEPRECATED HCO3 PLAS-SCNC: 10 MMOL/L (ref 22–29)
DEPRECATED HCO3 PLAS-SCNC: 11 MMOL/L (ref 22–29)
ERYTHROCYTE [DISTWIDTH] IN BLOOD BY AUTOMATED COUNT: 14.3 % (ref 10–15)
FLUAV H1 2009 PAND RNA SPEC QL NAA+PROBE: NOT DETECTED
FLUAV H1 RNA SPEC QL NAA+PROBE: NOT DETECTED
FLUAV H3 RNA SPEC QL NAA+PROBE: NOT DETECTED
FLUAV RNA SPEC QL NAA+PROBE: NOT DETECTED
FLUBV RNA SPEC QL NAA+PROBE: NOT DETECTED
GFR SERPL CREATININE-BSD FRML MDRD: 8 ML/MIN/1.73M2
GFR SERPL CREATININE-BSD FRML MDRD: 8 ML/MIN/1.73M2
GLUCOSE BLDC GLUCOMTR-MCNC: 72 MG/DL (ref 70–99)
GLUCOSE SERPL-MCNC: 69 MG/DL (ref 70–99)
GLUCOSE SERPL-MCNC: 69 MG/DL (ref 70–99)
HADV DNA SPEC QL NAA+PROBE: NOT DETECTED
HCO3 BLDV-SCNC: 12 MMOL/L (ref 21–28)
HCO3 BLDV-SCNC: 13 MMOL/L (ref 21–28)
HCOV PNL SPEC NAA+PROBE: NOT DETECTED
HCT VFR BLD AUTO: 38.5 % (ref 40–53)
HGB BLD-MCNC: 12.2 G/DL (ref 13.3–17.7)
HMPV RNA SPEC QL NAA+PROBE: NOT DETECTED
HPIV1 RNA SPEC QL NAA+PROBE: NOT DETECTED
HPIV2 RNA SPEC QL NAA+PROBE: NOT DETECTED
HPIV3 RNA SPEC QL NAA+PROBE: NOT DETECTED
HPIV4 RNA SPEC QL NAA+PROBE: NOT DETECTED
M PNEUMO DNA SPEC QL NAA+PROBE: NOT DETECTED
MAGNESIUM SERPL-MCNC: 2.5 MG/DL (ref 1.7–2.3)
MCH RBC QN AUTO: 29.2 PG (ref 26.5–33)
MCHC RBC AUTO-ENTMCNC: 31.7 G/DL (ref 31.5–36.5)
MCV RBC AUTO: 92 FL (ref 78–100)
MRSA DNA SPEC QL NAA+PROBE: NEGATIVE
O2/TOTAL GAS SETTING VFR VENT: 100 %
O2/TOTAL GAS SETTING VFR VENT: 21 %
PCO2 BLDV: 33 MM HG (ref 40–50)
PCO2 BLDV: 33 MM HG (ref 40–50)
PH BLDV: 7.17 [PH] (ref 7.32–7.43)
PH BLDV: 7.2 [PH] (ref 7.32–7.43)
PLATELET # BLD AUTO: 542 10E3/UL (ref 150–450)
PO2 BLDV: 49 MM HG (ref 25–47)
PO2 BLDV: 54 MM HG (ref 25–47)
POTASSIUM SERPL-SCNC: 5.3 MMOL/L (ref 3.4–5.3)
POTASSIUM SERPL-SCNC: 5.5 MMOL/L (ref 3.4–5.3)
RBC # BLD AUTO: 4.18 10E6/UL (ref 4.4–5.9)
RSV RNA SPEC QL NAA+PROBE: NOT DETECTED
RSV RNA SPEC QL NAA+PROBE: NOT DETECTED
RV+EV RNA SPEC QL NAA+PROBE: NOT DETECTED
SA TARGET DNA: POSITIVE
SODIUM SERPL-SCNC: 134 MMOL/L (ref 136–145)
SODIUM SERPL-SCNC: 137 MMOL/L (ref 136–145)
WBC # BLD AUTO: 20.8 10E3/UL (ref 4–11)

## 2023-04-07 PROCEDURE — 250N000013 HC RX MED GY IP 250 OP 250 PS 637

## 2023-04-07 PROCEDURE — 250N000009 HC RX 250: Performed by: NURSE ANESTHETIST, CERTIFIED REGISTERED

## 2023-04-07 PROCEDURE — 258N000003 HC RX IP 258 OP 636: Performed by: NURSE ANESTHETIST, CERTIFIED REGISTERED

## 2023-04-07 PROCEDURE — 250N000009 HC RX 250

## 2023-04-07 PROCEDURE — 80048 BASIC METABOLIC PNL TOTAL CA: CPT

## 2023-04-07 PROCEDURE — 250N000011 HC RX IP 250 OP 636: Performed by: NURSE ANESTHETIST, CERTIFIED REGISTERED

## 2023-04-07 PROCEDURE — 250N000011 HC RX IP 250 OP 636

## 2023-04-07 PROCEDURE — 370N000017 HC ANESTHESIA TECHNICAL FEE, PER MIN: Performed by: SURGERY

## 2023-04-07 PROCEDURE — 360N000075 HC SURGERY LEVEL 2, PER MIN: Performed by: SURGERY

## 2023-04-07 PROCEDURE — 120N000002 HC R&B MED SURG/OB UMMC

## 2023-04-07 PROCEDURE — 999N000215 HC STATISTIC HFNC ADULT NON-CPAP

## 2023-04-07 PROCEDURE — 87641 MR-STAPH DNA AMP PROBE: CPT

## 2023-04-07 PROCEDURE — 99223 1ST HOSP IP/OBS HIGH 75: CPT | Mod: GC | Performed by: INTERNAL MEDICINE

## 2023-04-07 PROCEDURE — 250N000025 HC SEVOFLURANE, PER MIN: Performed by: SURGERY

## 2023-04-07 PROCEDURE — 258N000003 HC RX IP 258 OP 636: Performed by: ANESTHESIOLOGY

## 2023-04-07 PROCEDURE — 94640 AIRWAY INHALATION TREATMENT: CPT | Mod: 76

## 2023-04-07 PROCEDURE — 272N000001 HC OR GENERAL SUPPLY STERILE: Performed by: SURGERY

## 2023-04-07 PROCEDURE — 250N000009 HC RX 250: Performed by: SURGERY

## 2023-04-07 PROCEDURE — 71045 X-RAY EXAM CHEST 1 VIEW: CPT | Mod: 26 | Performed by: RADIOLOGY

## 2023-04-07 PROCEDURE — 710N000010 HC RECOVERY PHASE 1, LEVEL 2, PER MIN: Performed by: SURGERY

## 2023-04-07 PROCEDURE — 82803 BLOOD GASES ANY COMBINATION: CPT

## 2023-04-07 PROCEDURE — 87070 CULTURE OTHR SPECIMN AEROBIC: CPT | Performed by: SURGERY

## 2023-04-07 PROCEDURE — 94640 AIRWAY INHALATION TREATMENT: CPT

## 2023-04-07 PROCEDURE — 999N000065 XR CHEST PORT 1 VIEW

## 2023-04-07 PROCEDURE — 999N000157 HC STATISTIC RCP TIME EA 10 MIN

## 2023-04-07 PROCEDURE — 999N000141 HC STATISTIC PRE-PROCEDURE NURSING ASSESSMENT: Performed by: SURGERY

## 2023-04-07 PROCEDURE — 87106 FUNGI IDENTIFICATION YEAST: CPT | Performed by: SURGERY

## 2023-04-07 PROCEDURE — 87186 SC STD MICRODIL/AGAR DIL: CPT | Performed by: SURGERY

## 2023-04-07 PROCEDURE — 86140 C-REACTIVE PROTEIN: CPT

## 2023-04-07 PROCEDURE — 36415 COLL VENOUS BLD VENIPUNCTURE: CPT

## 2023-04-07 PROCEDURE — 87102 FUNGUS ISOLATION CULTURE: CPT | Performed by: SURGERY

## 2023-04-07 PROCEDURE — 49422 REMOVE TUNNELED IP CATH: CPT | Performed by: SURGERY

## 2023-04-07 PROCEDURE — 85027 COMPLETE CBC AUTOMATED: CPT

## 2023-04-07 PROCEDURE — 0WPG03Z REMOVAL OF INFUSION DEVICE FROM PERITONEAL CAVITY, OPEN APPROACH: ICD-10-PCS | Performed by: SURGERY

## 2023-04-07 PROCEDURE — 87075 CULTR BACTERIA EXCEPT BLOOD: CPT | Performed by: SURGERY

## 2023-04-07 RX ORDER — ONDANSETRON 4 MG/1
4 TABLET, ORALLY DISINTEGRATING ORAL EVERY 30 MIN PRN
Status: DISCONTINUED | OUTPATIENT
Start: 2023-04-07 | End: 2023-04-07 | Stop reason: HOSPADM

## 2023-04-07 RX ORDER — LABETALOL HYDROCHLORIDE 5 MG/ML
10 INJECTION, SOLUTION INTRAVENOUS
Status: DISCONTINUED | OUTPATIENT
Start: 2023-04-07 | End: 2023-04-07 | Stop reason: HOSPADM

## 2023-04-07 RX ORDER — HALOPERIDOL 5 MG/ML
1 INJECTION INTRAMUSCULAR
Status: DISCONTINUED | OUTPATIENT
Start: 2023-04-07 | End: 2023-04-07 | Stop reason: HOSPADM

## 2023-04-07 RX ORDER — HYDROMORPHONE HCL IN WATER/PF 6 MG/30 ML
0.4 PATIENT CONTROLLED ANALGESIA SYRINGE INTRAVENOUS EVERY 5 MIN PRN
Status: DISCONTINUED | OUTPATIENT
Start: 2023-04-07 | End: 2023-04-07 | Stop reason: HOSPADM

## 2023-04-07 RX ORDER — DIPHENHYDRAMINE HYDROCHLORIDE 50 MG/ML
25 INJECTION INTRAMUSCULAR; INTRAVENOUS EVERY 6 HOURS PRN
Status: DISCONTINUED | OUTPATIENT
Start: 2023-04-07 | End: 2023-04-07 | Stop reason: HOSPADM

## 2023-04-07 RX ORDER — MEPERIDINE HYDROCHLORIDE 25 MG/ML
12.5 INJECTION INTRAMUSCULAR; INTRAVENOUS; SUBCUTANEOUS EVERY 5 MIN PRN
Status: DISCONTINUED | OUTPATIENT
Start: 2023-04-07 | End: 2023-04-07 | Stop reason: HOSPADM

## 2023-04-07 RX ORDER — LIDOCAINE HYDROCHLORIDE 20 MG/ML
INJECTION, SOLUTION INFILTRATION; PERINEURAL PRN
Status: DISCONTINUED | OUTPATIENT
Start: 2023-04-07 | End: 2023-04-07

## 2023-04-07 RX ORDER — DIAZEPAM 10 MG/2ML
2.5 INJECTION, SOLUTION INTRAMUSCULAR; INTRAVENOUS
Status: DISCONTINUED | OUTPATIENT
Start: 2023-04-07 | End: 2023-04-07 | Stop reason: HOSPADM

## 2023-04-07 RX ORDER — ONDANSETRON 2 MG/ML
INJECTION INTRAMUSCULAR; INTRAVENOUS PRN
Status: DISCONTINUED | OUTPATIENT
Start: 2023-04-07 | End: 2023-04-07

## 2023-04-07 RX ORDER — SODIUM CHLORIDE, SODIUM LACTATE, POTASSIUM CHLORIDE, CALCIUM CHLORIDE 600; 310; 30; 20 MG/100ML; MG/100ML; MG/100ML; MG/100ML
INJECTION, SOLUTION INTRAVENOUS CONTINUOUS PRN
Status: DISCONTINUED | OUTPATIENT
Start: 2023-04-07 | End: 2023-04-07

## 2023-04-07 RX ORDER — PHENYLEPHRINE HCL IN 0.9% NACL 50MG/250ML
.5-1.25 PLASTIC BAG, INJECTION (ML) INTRAVENOUS CONTINUOUS
Status: DISCONTINUED | OUTPATIENT
Start: 2023-04-07 | End: 2023-04-07

## 2023-04-07 RX ORDER — DIPHENHYDRAMINE HCL 25 MG
25 CAPSULE ORAL EVERY 6 HOURS PRN
Status: DISCONTINUED | OUTPATIENT
Start: 2023-04-07 | End: 2023-04-07 | Stop reason: HOSPADM

## 2023-04-07 RX ORDER — ONDANSETRON 2 MG/ML
4 INJECTION INTRAMUSCULAR; INTRAVENOUS EVERY 30 MIN PRN
Status: DISCONTINUED | OUTPATIENT
Start: 2023-04-07 | End: 2023-04-07 | Stop reason: HOSPADM

## 2023-04-07 RX ORDER — FENTANYL CITRATE 50 UG/ML
INJECTION, SOLUTION INTRAMUSCULAR; INTRAVENOUS PRN
Status: DISCONTINUED | OUTPATIENT
Start: 2023-04-07 | End: 2023-04-07

## 2023-04-07 RX ORDER — PROPOFOL 10 MG/ML
INJECTION, EMULSION INTRAVENOUS PRN
Status: DISCONTINUED | OUTPATIENT
Start: 2023-04-07 | End: 2023-04-07

## 2023-04-07 RX ORDER — HEPARIN SODIUM,PORCINE 10 UNIT/ML
5-20 VIAL (ML) INTRAVENOUS
Status: DISCONTINUED | OUTPATIENT
Start: 2023-04-07 | End: 2023-04-22 | Stop reason: HOSPADM

## 2023-04-07 RX ORDER — FENTANYL CITRATE 50 UG/ML
50 INJECTION, SOLUTION INTRAMUSCULAR; INTRAVENOUS EVERY 5 MIN PRN
Status: DISCONTINUED | OUTPATIENT
Start: 2023-04-07 | End: 2023-04-07 | Stop reason: HOSPADM

## 2023-04-07 RX ORDER — HYDROMORPHONE HCL IN WATER/PF 6 MG/30 ML
0.2 PATIENT CONTROLLED ANALGESIA SYRINGE INTRAVENOUS EVERY 5 MIN PRN
Status: DISCONTINUED | OUTPATIENT
Start: 2023-04-07 | End: 2023-04-07 | Stop reason: HOSPADM

## 2023-04-07 RX ORDER — FENTANYL CITRATE 50 UG/ML
25 INJECTION, SOLUTION INTRAMUSCULAR; INTRAVENOUS EVERY 5 MIN PRN
Status: DISCONTINUED | OUTPATIENT
Start: 2023-04-07 | End: 2023-04-07 | Stop reason: HOSPADM

## 2023-04-07 RX ORDER — BUPIVACAINE HYDROCHLORIDE 5 MG/ML
INJECTION, SOLUTION EPIDURAL; INTRACAUDAL PRN
Status: DISCONTINUED | OUTPATIENT
Start: 2023-04-07 | End: 2023-04-07 | Stop reason: HOSPADM

## 2023-04-07 RX ORDER — HYDRALAZINE HYDROCHLORIDE 20 MG/ML
2.5-5 INJECTION INTRAMUSCULAR; INTRAVENOUS EVERY 10 MIN PRN
Status: DISCONTINUED | OUTPATIENT
Start: 2023-04-07 | End: 2023-04-07 | Stop reason: HOSPADM

## 2023-04-07 RX ORDER — FAMOTIDINE 20 MG/1
20 TABLET, FILM COATED ORAL DAILY
Status: DISCONTINUED | OUTPATIENT
Start: 2023-04-08 | End: 2023-04-11

## 2023-04-07 RX ORDER — HEPARIN SODIUM,PORCINE 10 UNIT/ML
5-20 VIAL (ML) INTRAVENOUS EVERY 24 HOURS
Status: DISCONTINUED | OUTPATIENT
Start: 2023-04-07 | End: 2023-04-22 | Stop reason: HOSPADM

## 2023-04-07 RX ORDER — SODIUM CHLORIDE, SODIUM LACTATE, POTASSIUM CHLORIDE, CALCIUM CHLORIDE 600; 310; 30; 20 MG/100ML; MG/100ML; MG/100ML; MG/100ML
INJECTION, SOLUTION INTRAVENOUS CONTINUOUS
Status: DISCONTINUED | OUTPATIENT
Start: 2023-04-07 | End: 2023-04-07 | Stop reason: HOSPADM

## 2023-04-07 RX ADMIN — METRONIDAZOLE 500 MG: 500 INJECTION, SOLUTION INTRAVENOUS at 17:30

## 2023-04-07 RX ADMIN — ALBUTEROL SULFATE 2.5 MG: 2.5 SOLUTION RESPIRATORY (INHALATION) at 00:28

## 2023-04-07 RX ADMIN — FLUCONAZOLE IN SODIUM CHLORIDE 200 MG: 2 INJECTION, SOLUTION INTRAVENOUS at 18:37

## 2023-04-07 RX ADMIN — Medication 30 MG: at 09:53

## 2023-04-07 RX ADMIN — Medication 5 ML: at 22:06

## 2023-04-07 RX ADMIN — FENTANYL CITRATE 50 MCG: 50 INJECTION, SOLUTION INTRAMUSCULAR; INTRAVENOUS at 10:19

## 2023-04-07 RX ADMIN — SODIUM CHLORIDE, POTASSIUM CHLORIDE, SODIUM LACTATE AND CALCIUM CHLORIDE: 600; 310; 30; 20 INJECTION, SOLUTION INTRAVENOUS at 15:10

## 2023-04-07 RX ADMIN — MIDAZOLAM 2 MG: 1 INJECTION INTRAMUSCULAR; INTRAVENOUS at 09:30

## 2023-04-07 RX ADMIN — SODIUM CHLORIDE, POTASSIUM CHLORIDE, SODIUM LACTATE AND CALCIUM CHLORIDE: 600; 310; 30; 20 INJECTION, SOLUTION INTRAVENOUS at 09:30

## 2023-04-07 RX ADMIN — Medication 5 ML: at 22:05

## 2023-04-07 RX ADMIN — PROPOFOL 80 MG: 10 INJECTION, EMULSION INTRAVENOUS at 09:44

## 2023-04-07 RX ADMIN — ACETAMINOPHEN 500 MG: 500 TABLET ORAL at 23:34

## 2023-04-07 RX ADMIN — IPRATROPIUM BROMIDE 0.5 MG: 0.5 SOLUTION RESPIRATORY (INHALATION) at 00:28

## 2023-04-07 RX ADMIN — SODIUM BICARBONATE 650 MG: 650 TABLET ORAL at 20:31

## 2023-04-07 RX ADMIN — ALBUTEROL SULFATE 2.5 MG: 2.5 SOLUTION RESPIRATORY (INHALATION) at 08:23

## 2023-04-07 RX ADMIN — ACETAMINOPHEN 500 MG: 500 TABLET ORAL at 18:26

## 2023-04-07 RX ADMIN — FAMOTIDINE 40 MG: 10 TABLET, FILM COATED ORAL at 07:51

## 2023-04-07 RX ADMIN — SUGAMMADEX 100 MG: 100 INJECTION, SOLUTION INTRAVENOUS at 10:41

## 2023-04-07 RX ADMIN — LIDOCAINE HYDROCHLORIDE 80 MG: 20 INJECTION, SOLUTION INFILTRATION; PERINEURAL at 09:44

## 2023-04-07 RX ADMIN — ONDANSETRON 4 MG: 2 INJECTION INTRAMUSCULAR; INTRAVENOUS at 10:33

## 2023-04-07 RX ADMIN — SODIUM BICARBONATE 25 MEQ: 84 INJECTION INTRAVENOUS at 18:11

## 2023-04-07 RX ADMIN — CEFEPIME HYDROCHLORIDE 1 G: 1 INJECTION, POWDER, FOR SOLUTION INTRAMUSCULAR; INTRAVENOUS at 20:31

## 2023-04-07 RX ADMIN — SUGAMMADEX 100 MG: 100 INJECTION, SOLUTION INTRAVENOUS at 10:36

## 2023-04-07 RX ADMIN — CINACALCET 30 MG: 30 TABLET ORAL at 07:51

## 2023-04-07 ASSESSMENT — ACTIVITIES OF DAILY LIVING (ADL)
WERE_AUXILIARY_AIDS_OFFERED?: NO
TRANSFERRING: 1-->ASSISTANCE (EQUIPMENT/PERSON) NEEDED
DOING_ERRANDS_INDEPENDENTLY_DIFFICULTY: YES
TOILETING_ISSUES: NO
ADLS_ACUITY_SCORE: 37
USE_OF_HEARING_ASSISTIVE_DEVICES: BILATERAL HEARING AIDS
ADLS_ACUITY_SCORE: 37
ADLS_ACUITY_SCORE: 37
DIFFICULTY_COMMUNICATING: NO
ADLS_ACUITY_SCORE: 37
HEARING_DIFFICULTY_OR_DEAF: YES
DRESS: 0-->ASSISTANCE NEEDED (DEVELOPMENTALLY APPROPRIATE)
THE_FOLLOWING_AIDS_WERE_PROVIDED;: PATIENT DECLINED OFFER OF AUXILIARY AIDS
WALKING_OR_CLIMBING_STAIRS_DIFFICULTY: YES
TRANSFERRING: 0-->ASSISTANCE NEEDED (DEVELOPMENTALLY APPROPRIATE)
CHANGE_IN_FUNCTIONAL_STATUS_SINCE_ONSET_OF_CURRENT_ILLNESS/INJURY: YES
ADLS_ACUITY_SCORE: 37
ADLS_ACUITY_SCORE: 35
DESCRIBE_HEARING_LOSS: BILATERAL HEARING LOSS
FALL_HISTORY_WITHIN_LAST_SIX_MONTHS: NO
DIFFICULTY_EATING/SWALLOWING: NO
DRESS: 1-->ASSISTANCE (EQUIPMENT/PERSON) NEEDED
ADLS_ACUITY_SCORE: 35
DRESSING/BATHING_DIFFICULTY: YES
ADLS_ACUITY_SCORE: 35
ADLS_ACUITY_SCORE: 37
ADLS_ACUITY_SCORE: 35
PATIENT'S_PREFERRED_MEANS_OF_COMMUNICATION: VERBAL
WEAR_GLASSES_OR_BLIND: NO
CONCENTRATING,_REMEMBERING_OR_MAKING_DECISIONS_DIFFICULTY: NO
ADLS_ACUITY_SCORE: 35
DRESSING/BATHING: BATHING DIFFICULTY, ASSISTANCE 1 PERSON
ADLS_ACUITY_SCORE: 37
BATHING: 1-->ASSISTANCE NEEDED
WALKING_OR_CLIMBING_STAIRS: STAIR CLIMBING DIFFICULTY, ASSISTANCE 1 PERSON

## 2023-04-07 ASSESSMENT — LIFESTYLE VARIABLES: TOBACCO_USE: 0

## 2023-04-07 NOTE — PROGRESS NOTES
Spoke with patient's mother and [facility] nurse from home therapy. Culture was taken before any antibiotics given- it is currently in process and still being monitored. Nothing bacterial has grown as of 2000 today, budding yeast is shown.  Patient was given IP antibiotic coverage on Monday and Tuesday, unable to administer yesterday due to pain and intolerance of fluid instillation.  Patient has not had adequate dialysis therapy for a few weeks per the mother's report. The patient does not have a CVC or hemodialysis access in place.  Medicine, Renal and Surgery teams to review and develop plan for antibiotic coverage and dialysis therapy. PD RN at [facility] will call to check on culture status again tomorrow and update the teams.     GUNNER Allen with [facility] in [facility] can be contacted by cell at [phone].   The patient's PD home prescription when working well was 4 cycles of 1900ml over 8 hours with no day dwell.

## 2023-04-07 NOTE — PROVIDER NOTIFICATION
[facility] lab draw PH of 7.17.

## 2023-04-07 NOTE — PROGRESS NOTES
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    Today's updates/changes:  - Went to OR this morning for PD cath removal and subsequent temporary CVC for HD  - Monitoring acidosis and hyperkalemia, supporting medically until first HD run likely tomorrow unless more urgent    Acute Hospital Problems:    Sepsis secondary to peritonitis (fungal vs bacterial)  In setting of peritoneal dialysis, one month of difficult PD access, few days of \"foamy\" and possibly purulent PD cath discharge, fevers. Unable to tolerate peritoneal antibiotics prior to admission. Initially w/ tachycardia and leukocytosis. Peritoneal culture prior to admission growing budding yeast, no bacterial growth as of yet. Now s/p PD catheter removal . BCx, peritoneal fluid, and PD catheter tip cultures pending this admission. Overall hemodynamically and clinically stable on broad antimicrobial therapy.   Plan:  - IV Vanc, Cefepime, Flagyl, Fluconazole  - Monitoring BCx , UCx , PD catheter culture , and peritoneal fluid culture   - Monitoring peritoneal fluid culture from prior to admission (PD RN will check in with Josselin for updates)  - Pending culture growth and clinical course, will consider utility of ID " involvement  - Post-op pain regimen per surgery (remains in PACU at this time), will review when transferred to floor    Small bowel obstruction  With transition point closely associated to PD catheter which is now s/p removal. In context of poor PO, nausea, vomiting, and constipation at home prior to admission.  Plan:  - Gen surg continues to follow  - NPO for now  - Can decompress w/ G-tube periodically per surgery  - Symptomatic support: Zofran, Compazine, Haldol prn    ESRD w/ PD  Mild hyperkalemia  AGMA  Likely in setting of incomplete peritoneal dialysis leading up to admission. Hyperkalemia in 5-5.4 range. Bicarb initially 15, post-operatively now down to 10 w/ pH of 7.17. Now s/p PD cath removal in setting of peritonitis as above w/ temporary CVC placed for HD reinitiation this hospital stay. Nephrology is following, will coordinate timing of first HD session.  Plan:  - Nephro following, will alert them to progressing acidosis in event that expedited hemodialysis is indicated  - In the meantime, will support w/ bicarb and monitor acid/base status  - Mild hyperkalemia does not warrant intervention at least for now, will monitor periodically  - PTA Cinacalcet 30mg qday, PO NaHCO3 650mg TID    Restrictive lung disease  Chronic. Currently without tachypnea or hypoxia. Comfortable-appearing. Some bronchial cuffing on CXR, though not different than prior, some coarse breath sounds and increased secretions on history. Initial viral panel unremarkable. Suspect primary infection is peritonitis as above, uncertain if may be a component of viral vs bacterial pneumonia but is certainly covered for now from a bacterial pneumonia perspective.  Plan:  - Sputum culture if produces sputum  - See above for antibiotic regimen  - PTA Azithro MWF  - PTA Duonebs and vest therapy  - RT following for assistance    Chronic malnutrition  Has PEG but not tube feed dependent, tolerates PO.  Plan:  - NPO for now in context of SBO  -  Will continue to revisit nutritional status over the weekend    Neurogenic bladder s/p mitranoff urinary diversion  Still makes some degree of urine. Self-caths 3-4x daily w/ intermittent irrigation. Last urology visit in 2022.  Plan:  - Straight cath orders for ~q8h (at last three times daily)    HTN - holding PTA Amlodipine ( meds still listed as Captopril and Clonidine) while managing sepsis    GERD - PTA Famotidine      [facility] Checklist:  Diet: NPO, post-op IVF (set to  @ [place]), no TF for now  VTE ppx: None post-op, will reevaluate over the weekend  LDA: PIV, Right temporary CVC for HD, G-tube, Mitranoff diversion  Code: Full  Level of care: General Medicine, may consider IMC if requiring more frequent monitoring for hyperkalemia and/or acidosis overnight  Family: Mother, Valerie - [phone]     Discharge Planning:  Anticipate discharge in 3-4 days at earliest pending stability on hemodialysis and further infectious evaluation. Suspect will be discharging to prior living arrangement. Communicated via PINKY tab.      Staffed with attending physician, Dr. Weston.    Nahid Morrison MD    Internal Medicine PGY-2  [facility] Team 4    Pager #[phone]    [employer] Messaging  (Please see sign-in/sign-out for up-to-date physician coverage information)      Interval Events:   Overnight handoff and nursing reports reviewed. Off to OR this morning, seen in PACU following. Not seemingly distressed.    Objective:     Vitals:    23 1637   Weight: 47.2 kg (104 lb)     Vital Signs with Ranges  Temp:  [97.4  F (36.3  C)] 97.4  F (36.3  C)  Pulse:  [103-118] 110  Resp:  [16-18] 16  BP: (124-140)/(80-90) 140/90  FiO2 (%):  [21 %] 21 %  SpO2:  [95 %-99 %] 99 %  I/O last 3 completed shifts:  In: -   Out: 300 [Urine:300]    Exam:  General: No acute distress, sitting upright in bed in PACU  Neck: Right internal jugular CVC  CV: Tachycardic, regular rhythm, no new murmurs  Lungs: On room air, some scattered coarse  crackles bilaterally, no wheezing this mornin, no increased WOB  Abd: Soft, slightly-distended, seemingly non-tender, G-tube noted and Mitranoff stoma noted  Ext: Warm and well-perfused, no appreciable lower extremity edema  Neuro: Sleeping but wakes appropriately, opens eyes and tracks to speaker,  hands bilaterally when prompted    Medications       [Held by provider] amLODIPine  5 mg Oral Daily     azithromycin  250 mg Oral Once per day on Mon Wed Fri     ceFEPIme  1 g Intravenous Q24H     cinacalcet  30 mg Oral Daily     famotidine  40 mg Oral Daily     fluconazole  200 mg Intravenous Q24H     metroNIDAZOLE  500 mg Intravenous Q12H     sodium bicarbonate  650 mg Oral TID     sodium chloride (PF)  3 mL Intracatheter Q8H     vancomycin place sawyer - receiving intermittent dosing  1 each Intravenous See Admin Instructions     [Held by provider] Vitamin D3  2,000 Units Oral Daily       Data   Recent Labs   Lab 04/06/23  1831   WBC 20.6*   HGB 13.1*   MCV 91   *   *   POTASSIUM 5.4*   CHLORIDE 99   CO2 15*   .0*   CR 8.52*   ANIONGAP 20*   SUSAN 11.0*   GLC 85       Recent Results (from the past 24 hour(s))   XR Chest 2 Views    Narrative    EXAM: XR CHEST 2 VIEWS  4/6/2023 5:38 PM     HISTORY:  SOB fever       COMPARISON:  Chest x-ray 10/15/2020. CT abdomen and pelvis 3/8/2023    FINDINGS:   PA and lateral views of the chest. Normal heart size. Moderate size  hiatal hernia. Percutaneous tracheostomy tube tip overlies the  midthoracic trachea. Suggestion of perihilar bronchial cuffing. No  airspace consolidation. No pleural effusion or pneumothorax. The  osseous structures are within normal limits.      Impression    IMPRESSION:   Suggestion of perihilar bronchial cuffing which can be seen in  reactive airway disease or viral illness. No focal airspace  consolidation.    I have personally reviewed the examination and initial interpretation  and I agree with the findings.    JONN MATA MD          SYSTEM ID:  [id number]   CT Abdomen Pelvis without Contrast (stone protocol)    Narrative    CT of the Abdomen and Pelvis without contrast, 4/6/2023 9:21 PM.    Comparison: CT abdomen and pelvis 3/8/2023.    History: abd pain, dialysis pt.     Technique: Axial images of the  abdomen and pelvis were obtained  without contrast. Coronal reconstructions were provided. Images were  reviewed in bone, lung, and soft tissue windows.    Findings:  Left lower quadrant peritoneal dialysis catheter terminating within  the pelvis. Adjacent small and large bowel loops demonstrate wall  thickening and focal mesenteric stranding, with associated upstream  dilatation of small bowel loops measuring up to 4 cm in maximum  diameter. Trace pneumoperitoneum is likely related to indwelling  dialysis catheter. Small volume pelvic fluid. No pneumatosis or portal  venous gas. Changes of Mitrofanoff repair.    Lung bases:  Visualized lungs are hyperinflated with mosaic attenuation and diffuse  peribronchial thickening. Moderate hiatal hernia with fluid-filled  stomach.    Abdomen and Pelvis:   Liver is unremarkable. No intra or extrahepatic biliary duct dilation.  Gallbladder is unremarkable. No calcified stones, wall thickening,  pericholecystic fluid. Spleen size within normal limits. Small  splenule. Pancreas is unremarkable.     Adrenal glands are unremarkable. Horseshoe kidney configuration with  prominent extrarenal pelves and atrophy. No calcified stone, or  contour deforming mass. Antidependant air in the urinary bladder is  likely related to recent instrumentation.    Abdominal aorta and major branches are normal in caliber.    Redemonstrated multiple prominent central mesenteric nodes are favored  to be reactive.    Bones and Soft Tissues:   No suspicious osseous lesion. Diffuse sclerosis throughout the  visualized skeleton likely represents renal osteodystrophy. Chronic  sacral deformity.      Impression    Impression:     1.  Findings of bowel obstruction with small bowel loops dilated up to  4 cm in the left hemiabdomen. Transition point is likely located in  the pelvis along the peritoneal dialysis catheter, with appreciable  inflammatory changes about the small and large bowel at this site.     2. Moderate hiatal hernia with fluid-filled stomach.    3. Partially visualized lung bases demonstrates mosaic attenuation and  diffuse peribronchial thickening, which can be seen in the setting of  small airway disease.     Imaging findings were conveyed to Greg Wing by Dr. Greg Rey  at 10:40 PM.    I have personally reviewed the examination and initial interpretation  and I agree with the findings.    JONN MATA MD         SYSTEM ID:  [id number]

## 2023-04-07 NOTE — ANESTHESIA CARE TRANSFER NOTE
Patient: Aidan Louie    Procedure: Procedure(s):  REMOVAL, CATHETER, DIALYSIS, PERITONEAL; IJ TEMPORARY DIALYSIS CATHETER PLACED       Diagnosis: Intra-abdominal infection [B99.9]  Diagnosis Additional Information: No value filed.    Anesthesia Type:   General     Note:    Oropharynx: endotracheal tube in place and spontaneously breathing  Level of Consciousness: drowsy  Oxygen Supplementation: room air    Independent Airway: airway patency satisfactory and stable  Dentition: dentition changed  Vital Signs Stable: post-procedure vital signs reviewed and stable  Report to RN Given: handoff report given  Patient transferred to: PACU    Handoff Report: Identifed the Patient, Identified the Reponsible Provider, Reviewed the pertinent medical history, Discussed the surgical course, Reviewed Intra-OP anesthesia mangement and issues during anesthesia, Set expectations for post-procedure period and Allowed opportunity for questions and acknowledgement of understanding      Vitals:  Vitals Value Taken Time   /87 04/07/23 1058   Temp     Pulse 93 04/07/23 1100   Resp 19 04/07/23 1100   SpO2 100 % 04/07/23 1100   Vitals shown include unvalidated device data.    Electronically Signed By: Monica Lr CRNA, LIVAN MEYER  April 7, 2023  11:01 AM

## 2023-04-07 NOTE — ANESTHESIA PREPROCEDURE EVALUATION
Anesthesia Pre-Procedure Evaluation    Patient: Aidan Louie   MRN: [id number] : 1995        Procedure : Procedure(s):  REMOVAL, CATHETER, DIALYSIS, PERITONEAL + possible Dialysis catheter placement          Past Medical History:   Diagnosis Date     Bilateral reflux nephropathy      CKD (chronic kidney disease) stage 4, GFR 15-29 ml/min (H)      End stage renal disease (H)      Hearing loss      Hypertension      Kyphosis      Dawood Reece sequence      Prune belly syndrome      Recurrent UTI      Respiratory bronchiolitis interstitial lung disease (H)      Restrictive lung disease      Scoliosis      Thyroid disease      Tracheostomy dependence (H)       Past Surgical History:   Procedure Laterality Date     Bilateral ureteral reimplantation  2002    abdominoplasty, Mitrofanoff creation     CYSTOSCOPY  12     EXAM UNDER ANESTHESIA THROAT  6/10/2003    tonsillar hypertrophy     EXAM UNDER ANESTHESIA, RESTORATIONS, EXTRACTION(S) DENTAL COMPLEX, COMBINED  2006     GASTROSTOMY TUBE       GASTROSTOMY TUBE  3/16/2002    button change     HERNIORRHAPHY INGUINAL BILATERAL  99    left ochiopexy     LARYNGOSCOPY, BRONCHOSCOPY, COMBINED  2002     LARYNGOSCOPY, BRONCHOSCOPY, COMBINED  2002    bilateral ear debridement     LARYNGOSCOPY, BRONCHOSCOPY, COMBINED  2003     LARYNGOSCOPY, BRONCHOSCOPY, COMBINED  2007    Failed Deflux injection     LARYNGOSCOPY, BRONCHOSCOPY, COMBINED  12      VESICOSTOMY       Redo right ureteral reimplantation  3/12/2004    Excision bladder diverticuli, Left to Right pyelopyelostomy     Replacement of trach  10/15/12     Takedown and revision of Mitrofanoff stoma  2006     TRACHEOSTOMY INFANT        Allergies   Allergen Reactions     Latex      Latex      Other reaction(s): Other (see comments)  Other reaction(s): Contact Dermatitis, Other (see comments)     Adhesive Tape Rash      Social History     Tobacco Use      Smoking status: Never     Smokeless tobacco: Never   Vaping Use     Vaping status: Not on file   Substance Use Topics     Alcohol use: No     Alcohol/week: 0.0 standard drinks of alcohol      Wt Readings from Last 1 Encounters:   04/06/23 47.2 kg (104 lb)        Anesthesia Evaluation   Pt has had prior anesthetic. Type: General.    No history of anesthetic complications       ROS/MED HX  ENT/Pulmonary: Comment: Dawood Newell sequence- trach in place    Restrictive lung disease- uses 1-2 L O2 at night    COVID positive 10/2020- able to treat at home   (-) tobacco use   Neurologic:  - neg neurologic ROS     Cardiovascular:     (+) hypertension-----Previous cardiac testing   Echo: Date: 2008 Results:  See results in care everywhere. EF 61%, no significant valve abnormalities   Stress Test: Date: Results:    ECG Reviewed: Date: 7/2020 Results:  SR with short MD  Cath: Date: Results:   (-) taking anticoagulants/antiplatelets   METS/Exercise Tolerance: 3 - Able to walk 1-2 blocks without stopping Comment: Able to ascend a flight of stairs without SAPP, was a runner/  age [age]   Hematologic:     (+) anemia,  (-) history of blood transfusion   Musculoskeletal: Comment: Scoliosis/ kyphosis      GI/Hepatic: Comment: g tube in place, not currently being used    (+) GERD, Asymptomatic on medication,     Renal/Genitourinary: Comment: Prune Belly syndrome  Under evaluation for future kidney transplant    H/o recurrent UTI  Neurogenic bladder s/p mitrofinoff    (+) renal disease, type: CRI, Pt does not require dialysis,     Endo:     (+) Chronic steroid usage for     Psychiatric/Substance Use:  - neg psychiatric ROS     Infectious Disease:  - neg infectious disease ROS     Malignancy:  - neg malignancy ROS     Other:      (+) , other significant disability Disability list: hearing loss.         Physical Exam    Airway             Respiratory Devices and Support    TRACH:      Dental       (+) Modest Abnormalities -  crowns, retainers, 1 or 2 missing teeth      Cardiovascular   cardiovascular exam normal          Pulmonary   pulmonary exam normal                OUTSIDE LABS:  CBC:   Lab Results   Component Value Date    WBC 20.6 (H) 04/06/2023    WBC 7.6 08/30/2021    HGB 13.1 (L) 04/06/2023    HGB 9.9 (L) 08/30/2021    HCT 41.1 04/06/2023    HCT 30.8 (L) 08/30/2021     (H) 04/06/2023     08/30/2021     BMP:   Lab Results   Component Value Date     (L) 04/06/2023     10/19/2021    POTASSIUM 5.4 (H) 04/06/2023    POTASSIUM 5.0 10/19/2021    CHLORIDE 99 04/06/2023    CHLORIDE 114 (H) 10/19/2021    CO2 15 (L) 04/06/2023    CO2 20 10/19/2021    .0 (H) 04/06/2023    BUN 79 (H) 10/19/2021    CR 8.52 (H) 04/06/2023    CR 6.14 (H) 10/19/2021    GLC 85 04/06/2023    GLC 90 10/19/2021     COAGS:   Lab Results   Component Value Date    PTT 31 10/15/2020    INR 1.03 10/15/2020     POC: No results found for: BGM, HCG, HCGS  HEPATIC:   Lab Results   Component Value Date    ALBUMIN 3.7 08/30/2021    PROTTOTAL 7.4 10/15/2020    ALT 15 10/15/2020    AST 10 10/15/2020    ALKPHOS 94 10/15/2020    BILITOTAL 0.2 10/15/2020     OTHER:   Lab Results   Component Value Date    LACT 0.8 04/06/2023    SUSAN 11.0 (H) 04/06/2023    PHOS 6.0 (H) 08/30/2021    MAG 2.1 09/17/2015    TSH 1.51 06/05/2019    T4 0.8 06/05/2019    CRP <2.9 09/17/2015    SED 66 (H) 04/06/2023       Anesthesia Plan    ASA Status:  4   NPO Status:  NPO Appropriate    Anesthesia Type: General.     - Airway: Tracheostomy   Induction: Inhalation.   Maintenance: Balanced.        Consents    Anesthesia Plan(s) and associated risks, benefits, and realistic alternatives discussed. Questions answered and patient/representative(s) expressed understanding.    - Discussed:     - Discussed with:  Patient, Parent (Mother and/or Father)      - Extended Intubation/Ventilatory Support Discussed: No.      - Patient is DNR/DNI Status: No    Use of blood products  discussed: No .     Postoperative Care    Pain management: IV analgesics.   PONV prophylaxis: Ondansetron (or other 5HT-3), Dexamethasone or Solumedrol     Comments:                Carter Otero MD

## 2023-04-07 NOTE — BRIEF OP NOTE
[facility]    Brief Operative Note    Pre-operative diagnosis: Intra-abdominal infection [B99.9]  Post-operative diagnosis Same as pre-operative diagnosis    Procedure: Procedure(s):  REMOVAL, CATHETER, DIALYSIS, PERITONEAL; IJ TEMPORARY DIALYSIS CATHETER PLACED  Surgeon: Surgeon(s) and Role:     * Anurag Thompson MD - Primary     * Lisa Jackson MD - Resident - Assisting     * Tia Cruz MD - Resident - Assisting  Anesthesia: General   Estimated Blood Loss: 3 ml    Drains: None  Specimens:   ID Type Source Tests Collected by Time Destination   A : peritoneal fluid Peritoneal Fluid Peritoneum ANAEROBIC BACTERIAL CULTURE ROUTINE, FUNGAL OR YEAST CULTURE ROUTINE, AEROBIC BACTERIAL CULTURE ROUTINE Anurag Thompson MD 4/7/2023 10:32 AM    B : peritoneal catheter section for cultures Foreign Body Catheter tip ANAEROBIC BACTERIAL CULTURE ROUTINE, FUNGAL OR YEAST CULTURE ROUTINE, AEROBIC BACTERIAL CULTURE ROUTINE Anurag Thompson MD 4/7/2023 10:33 AM      Findings:   Turbid peritoneal fluid and peritoneal dialysis catheter tip sent for culture.   Complications: None.  Implants: * No implants in log *    Lisa Flores MD  General Surgery PGY-7

## 2023-04-07 NOTE — INTERVAL H&P NOTE
"I have reviewed the surgical (or preoperative) H&P that is linked to this encounter, and examined the patient. There are no significant changes    Clinical Conditions Present on Arrival:  Clinically Significant Risk Factors Present on Admission          # Hyperkalemia: Highest K = 5.4 mmol/L in last 2 days, will monitor as appropriate  # Hyponatremia: Lowest Na = 134 mmol/L in last 30 days, will monitor as appropriate  # Hypercalcemia: Highest Ca = 11 mg/dL in last 2 days, will monitor as appropriate    # Anion Gap Metabolic Acidosis: Highest Anion Gap = 20 mmol/L in last 2 days, will monitor and treat as appropriate      # Cachexia: Estimated body mass index is 16.79 kg/m  as calculated from the following:    Height as of this encounter: 1.676 m (5' 6\").    Weight as of this encounter: 47.2 kg (104 lb).       "

## 2023-04-07 NOTE — ANESTHESIA POSTPROCEDURE EVALUATION
Patient: Aidan Louie    Procedure: Procedure(s):  REMOVAL, CATHETER, DIALYSIS, PERITONEAL; IJ TEMPORARY DIALYSIS CATHETER PLACED       Anesthesia Type:  General    Note:  Disposition: Inpatient   Postop Pain Control: Uneventful            Sign Out: Well controlled pain   PONV: No   Neuro/Psych: Uneventful            Sign Out: Acceptable/Baseline neuro status   Airway/Respiratory: Uneventful            Sign Out: Acceptable/Baseline resp. status   CV/Hemodynamics: Uneventful            Sign Out: Acceptable CV status; No obvious hypovolemia; No obvious fluid overload   Other NRE: NONE   DID A NON-ROUTINE EVENT OCCUR?            Last vitals:  Vitals Value Taken Time   /88 04/07/23 1215   Temp 36.1  C (96.9  F) 04/07/23 1100   Pulse 95 04/07/23 1220   Resp 20 04/07/23 1220   SpO2 100 % 04/07/23 1220   Vitals shown include unvalidated device data.    Electronically Signed By: Jeffery Tejada Junior, MD  April 7, 2023  12:21 PM

## 2023-04-07 NOTE — CONSULTS
Nephrology Initial Consult  April 7, 2023      Aidan Louie MRN:[id number] YOB: 1995  Date of Admission:4/6/2023  Primary care provider: Yasmeen Irving  Requesting physician: Sandy James MD    ASSESSMENT AND RECOMMENDATIONS:     1. ESRD on PD  2. PD-related peritonitis with SBO  Per our PD RN (Delaney Gaitan), patient had cultures performed from [facility] before any antibiotics given- it is currently in process and still being monitored. Nothing bacterial has grown as of 4/6, budding yeast is shown. Patient was given IP antibiotic coverage on Monday and Tuesday, unable to administer yesterday due to pain and intolerance of fluid instillation. Patient has not had adequate dialysis therapy for a few weeks per the mother's report. The patient does not have a CVC or hemodialysis access in place.  - IV Vanc, Cefepime, Flagyl, Fluconazole  - F/up BCx, UCx, PD fluid Cx  -OR for PD cath removal  -Temporary CVC cath for now for HD treatment  -He will require long term access for dialysis treatment.   -He will unlikely go back to PD after fungal infection.  -Surgery currently following     2. Hyperkalemia  3. Hyponatremia   4. Hypercalcemia   5. AGMA  K 5.4 on admission, Na 134, Bicarb 15, Ca 11.    Recommendations were communicated to primary team via note    Seen and discussed with Dr. 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      Patient was initially placed on hemodialysis in 12/2021, was switched to peritoneal dialysis in March of 2022. Patient has not had a full run of dialysis since 3/9. Mother reports patient was only able to get about half a run on 3/10. She states dialysis nurses have been out every day trying to work on it. They have been doing manual dialysis only. Mother states a few days ago they took a sample of the drainage from his catheter and patient was found to have a fungal infection that has not yet been identified. Drainage from the catheter has now become more foamy/pus-like. Mother denies any fluid drainage from around the catheter, only into the bag. Dialysis nurse gave patient vancomycin and cefepime on Tuesday, 4/4, per mother. She notes there have been fibrins coming from his dialysis catheter. They called patient's transplant team today and mother was advised to bring patient in to the ED.     Currently, patient is hemodynamically stable. He was started on broad spectrum antibiotics including vancomycin, cefepime, and metronidazole.    PAST MEDICAL HISTORY:  Reviewed with patient on 04/07/2023   Past Medical History:   Diagnosis Date     Bilateral reflux nephropathy      CKD (chronic kidney disease) stage 4, GFR 15-29 ml/min (H)      End stage renal disease (H)      Hearing loss      Hypertension      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      Prune belly syndrome      Recurrent UTI      Respiratory bronchiolitis interstitial lung disease (H)      Restrictive lung disease      Scoliosis 2002     Thyroid disease      Tracheostomy dependence (H)        Past Surgical History:   Procedure Laterality Date     Bilateral ureteral reimplantation  5/16/2002    abdominoplasty, Mitrofanoff creation     CYSTOSCOPY   12     EXAM UNDER ANESTHESIA THROAT  6/10/2003    tonsillar hypertrophy     EXAM UNDER ANESTHESIA, RESTORATIONS, EXTRACTION(S) DENTAL COMPLEX, COMBINED  2006     GASTROSTOMY TUBE       GASTROSTOMY TUBE  3/16/2002    button change     HERNIORRHAPHY INGUINAL BILATERAL  89    left ochiopexy     LARYNGOSCOPY, BRONCHOSCOPY, COMBINED  2002     LARYNGOSCOPY, BRONCHOSCOPY, COMBINED  2002    bilateral ear debridement     LARYNGOSCOPY, BRONCHOSCOPY, COMBINED  2003     LARYNGOSCOPY, BRONCHOSCOPY, COMBINED  2007    Failed Deflux injection     LARYNGOSCOPY, BRONCHOSCOPY, COMBINED  12      VESICOSTOMY       Redo right ureteral reimplantation  3/12/2004    Excision bladder diverticuli, Left to Right pyelopyelostomy     Replacement of trach  10/15/12     Takedown and revision of Mitrofanoff stoma  2006     TRACHEOSTOMY INFANT          MEDICATIONS:  PTA Meds  Prior to Admission medications    Medication Sig Last Dose Taking? Auth Provider Long Term End Date   acetaminophen (TYLENOL) 500 MG tablet Take 500 mg by mouth every 8 hours as needed    Reported, Patient     albuterol (2.5 MG/3ML) 0.083% nebulizer solution Take 1 ampule by nebulization 4 times daily    Reported, Patient Yes    Alum Hydroxide-Mag Trisilicate 80-20 MG CHEW Take 1,250 mg by mouth as needed   Reported, Patient     amLODIPine (NORVASC) 5 MG tablet TAKE 1 TABLET(5 MG) BY MOUTH TWICE DAILY   Glen Abel MD Yes    azithromycin (ZITHROMAX) 250 MG tablet Take 250 mg by mouth three times a week Mon, Wed, Fri   Reported, Patient     calcium carbonate (OS-SUSAN 500 MG [place]. [place]) 500 MG tablet Take 1 tablet (500 mg) by mouth 3 times daily (with meals)  Patient taking differently: Take 500 mg by mouth 3 times daily (with meals) 1000 BEFORE DINNER   Junaid Hess MD     captopril 0.1 mg/mL (CAPOTEN) 0.1 mg/mL SUSP Take 2.5 mg three times daily (2.5 mg/5ml)  Patient not taking: Reported on 2021   Page  "Glen Babin MD Yes    cholecalciferol 2000 UNITS tablet Take 2,000 Units by mouth daily   Glen Abel MD     cinacalcet (SENSIPAR) 30 MG tablet Take 30 mg by mouth daily   Unknown, Entered By History Yes    cloNIDine (CATAPRES) 0.1 MG tablet Take 1 tablet (0.1 mg) by mouth 2 times daily  Patient not taking: Reported on 11/1/2022   Glen Abel MD Yes    Cranberry 500 MG TABS Take by mouth 3 times daily (with meals)   Reported, Patient     famotidine (PEPCID) 20 MG tablet Take 40 mg by mouth 2 times daily    Reported, Patient     ferrous gluconate (FERGON) 324 (38 Fe) MG tablet Take 1 tablet (324 mg) by mouth daily   Glen Abel MD     ipratropium (ATROVENT) 0.02 % neb solution Take 0.5 mg by nebulization 4 times daily    Sarah Hi MD Yes    Melatonin 10 MG CAPS Take by mouth daily as needed   Reported, Patient     mupirocin (BACTROBAN) 2 % external ointment Apply topically daily as needed    Reported, Patient     Nutritional Supplements (NEPRO) LIQD Take 1 Can by mouth daily   Glen Abel MD     order for DME Equipment being ordered: Medi-Vac, plastic tubing connector lara type, cat 361, [facility] [employer] [facility] LIQVID, 1 lara tip/month x 12 months. Use for tracheostomy suctioning   Bebe Dumas MD     order for DME Equipment being ordered: DME    Adult Bivona Uncuffed Tracheostomy Tube-5.0    Patient will do weekly trach changes.   Bebe Dumas MD     order for DME Equipment being ordered: DME -    Deep suction kits - 12 fr   Bebe Dumas MD     order for DME Tracheostomy Humidification Equipment  Equipment being ordered:     1) Air compressor  2) Nebulizer bottle  3) Aerosol tubing  4) Trach mask  5) Sterile water  6) Saline ampules (\"bullets\")  7) Heat Moisture Exchanger (HME) Also known by several other terms including: Thermal Humidifying Filters, Swedish nose, Artificial nose, Filter, Thermovent " T.  8) Room/home humidifiers   Bebe Dumas MD     order for DME Optifoam Basic   [id number]   Bebe Dumas MD     order for DME Equipment being ordered: Humidifier (heated), humidifier attachment, saline lavages, humidivent mask   Bebe Dumas MD     predniSONE (DELTASONE) 1 MG tablet Take 1 mg by mouth every other day   Patient not taking: Reported on 8/30/2021   Reported, Patient     sodium bicarbonate 650 MG tablet TAKE 1 TABLET BY MOUTH THREE TIMES DAILY  Patient not taking: Reported on 11/1/2022   Glen Abel MD     sodium chloride 0.9%, bottle, 0.9 % irrigation Irrigate with as directed 3 times daily Bladder irrigation   Reported, Patient     tobramycin, PF, (DAX) 300 MG/5ML nebulizer solution Take 1 ampule by nebulization as needed   Reported, Patient     traZODone (DESYREL) 50 MG tablet Take 25 mg by mouth nightly as needed   Patient not taking: Reported on 11/1/2022   Reported, Patient Yes       Current Meds    [Held by provider] amLODIPine  5 mg Oral Daily     azithromycin  250 mg Oral Once per day on Mon Wed Fri     ceFEPIme  1 g Intravenous Q24H     cinacalcet  30 mg Oral Daily     famotidine  40 mg Oral Daily     fluconazole  200 mg Intravenous Q24H     metroNIDAZOLE  500 mg Intravenous Q12H     sodium bicarbonate  650 mg Oral TID     sodium chloride (PF)  3 mL Intracatheter Q8H     vancomycin place sawyer - receiving intermittent dosing  1 each Intravenous See Admin Instructions     [Held by provider] Vitamin D3  2,000 Units Oral Daily     Infusion Meds      ALLERGIES:    Allergies   Allergen Reactions     Latex      Latex      Other reaction(s): Other (see comments)  Other reaction(s): Contact Dermatitis, Other (see comments)     Adhesive Tape Rash       REVIEW OF SYSTEMS:  A comprehensive of systems was negative except as noted above.    SOCIAL HISTORY:   Social History     Socioeconomic History     Marital status: Single     Spouse name: Not on  "file     Number of children: Not on file     Years of education: Not on file     Highest education level: Not on file   Occupational History     Not on file   Tobacco Use     Smoking status: Never     Smokeless tobacco: Never   Vaping Use     Vaping status: Not on file   Substance and Sexual Activity     Alcohol use: No     Alcohol/week: 0.0 standard drinks of alcohol     Drug use: No     Sexual activity: Not on file   Other Topics Concern     Parent/sibling w/ CABG, MI or angioplasty before 65F 55M? Not Asked   Social History Narrative     Not on file     Social Determinants of Health     Financial Resource Strain: Not on file   Food Insecurity: Not on file   Transportation Needs: Not on file   Physical Activity: Not on file   Stress: Not on file   Social Connections: Not on file   Intimate Partner Violence: Not on file   Housing Stability: Not on file       FAMILY MEDICAL HISTORY:   Family History   Problem Relation Age of Onset     Diabetes Type 2  Mother      Diabetes Type 2  Maternal Grandmother      Deep Vein Thrombosis (DVT) Maternal Grandmother      Diabetes Type 2  Maternal Grandfather      Diabetes Type 2  Paternal Grandmother      Alzheimer Disease Paternal Grandfather      Thyroid Disease Maternal Aunt         hashimoto     Anesthesia Reaction Maternal Aunt         patients mom reports her sister had an allergic reaction to something during a previous surgery     Thyroid Cancer Maternal Great-Grandmother        PHYSICAL EXAM:   Temp  Av.4  F (36.3  C)  Min: 97.4  F (36.3  C)  Max: 97.4  F (36.3  C)      Pulse  Av.8  Min: 103  Max: 118 Resp  Av  Min: 16  Max: 20  FiO2 (%)  Av %  Min: 21 %  Max: 21 %  SpO2  Av.5 %  Min: 95 %  Max: 99 %       BP (!) 139/97   Pulse 108   Temp 97.4  F (36.3  C) (Oral)   Resp 20   Ht 1.676 m (5' 6\")   Wt 47.2 kg (104 lb)   SpO2 99%   BMI 16.79 kg/m        Admit Weight: 47.2 kg (104 lb)     Exam deferred today due to patient taken to the " OR    LABS:   I have reviewed the following labs:  CMP  Recent Labs   Lab 04/06/23  1831   *   POTASSIUM 5.4*   CHLORIDE 99   CO2 15*   ANIONGAP 20*   GLC 85   .0*   CR 8.52*   GFRESTIMATED 8*   SUSAN 11.0*     CBC  Recent Labs   Lab 04/07/23  0745 04/06/23  1831   HGB 12.2* 13.1*   WBC 20.8* 20.6*   RBC 4.18* 4.53   HCT 38.5* 41.1   MCV 92 91   MCH 29.2 28.9   MCHC 31.7 31.9   RDW 14.3 14.2   * 552*     INRNo lab results found in last 7 days.  ABGNo lab results found in last 7 days.   URINE STUDIES  Recent Labs   Lab Test 04/06/23  1853 11/30/20  1132 07/11/17  1341 12/22/16  1328 09/17/15  1410   COLOR Light Yellow Yellow Yellow Straw Quantity not sufficient   APPEARANCE Slightly Cloudy* Clear Clear Slightly Cloudy Quantity not sufficient   URINEGLC 30* Negative Negative Negative Quantity not sufficient*   URINEBILI Negative Negative Negative Negative Quantity not sufficient*   URINEKETONE Trace* Negative Negative Negative Quantity not sufficient*   SG 1.015 1.020 1.005 1.006 Quantity not sufficient   UBLD Small* Small* Negative Trace* Quantity not sufficient*   URINEPH 6.0 8.5* 6.0 6.5 Quantity not sufficient   PROTEIN 300* >300* 100* 100* Quantity not sufficient*   UROBILINOGEN  --  0.2  --   --   --    NITRITE Negative Negative Positive* Negative Quantity not sufficient*   LEUKEST Large* Small* Small* Large* Quantity not sufficient*   RBCU 3*  --  <1 6* Quantity not sufficient   WBCU 80*  --  6* >182* Quantity not sufficient*     Recent Labs   Lab Test 08/30/21  1307 01/03/19  1435 07/11/17  1341 12/22/16  1328 02/05/15  1115   UTPG 4.92* 2.60* 3.04* 3.05* 5.28*     PTH  Recent Labs   Lab Test 08/30/21  1300 11/30/20  1415 12/09/19  1311 05/13/19  1027 01/03/19  1438 07/11/17  1337 12/22/16  1325 09/17/15  1205 02/05/15  1110   PTHI 1,178* 858* 331* 195* 177* 191* 98* 133* 303*     IRON STUDIES  Recent Labs   Lab Test 08/30/21  1300 11/30/20  1415 08/09/18  1056 08/09/18  1046 07/11/17  1337  12/22/16  1325 09/17/15  1205 02/05/15  1110   IRON 74 92 178  --  77 135 99 160    272 266  --  296 241 297 281   IRONSAT 29 34 67  --  26 56* 33 57*   JUVENAL 66 43  --  14 16* 56 27 34       IMAGING:  All imaging studies reviewed by me.     Le Urbina MD

## 2023-04-07 NOTE — H&P
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     #Sepsis secondary to fungal peritonitis vs entercolitis  Abdominal pain and difficult peritoneal access since March 10th. Purulent discharge from PD catheter for one week with culture growing budding yeast (reportedly no bacteria). Last BM one week prior. Not passing flatus \"for a couple of days.\" Abdomen with focal tenderness in LLQ surrounding PD catheter site. No rebound, guarding, or peritoneal signs. Leukocytosis to 20K, CRP >400, tachy to 120s. Afebrile. Lactate WNL and BP stable. CT AP with inflammation surrounding PD catheter with associated entercolitis and dilation of the small bowel. Suspect fungal peritonitis from PD led to enterocolitis and subsequent SBO. Plan to keep NPO, continue antifungals and ABX, follow up cultures. GS and Transplant nephrology consulted for PD catheter removal and HD. Holding off on fluids given oliguria and ESRD. Will need CVC prior to IVF administration.   -NPO   -Transplant Nephrology Consult, appreciate recs   -Fluconazole   -Temporary CVC catheter for HD    -Gen Surg consult for PD cath removal   -will require HD and unable to go on PD given " "fungal infection  -General Surgery consult re: Peritoneal Dialysis catheter removal and SBO, appreciate recs  -IV fluconazole  -IV Vancomycin, Cefepime, Metronidazole  -MRSA nares  -follow up blood cultures  -follow up peritoneal fluid culture and sensitivities collected from [facility]. (GUNNER Allen with [facility] in [facility] can be contacted by cell at [phone])  -trend white count, inflammatory markers    #SBO  Last BM one week prior. Has not passed flatus \"in a couple of days.\" No recent emesis. LLQ TTP without peritoneal signs. CT with enterocolitis and proximal small bowel dilation likely secondary to inflammation and infection.   -continue to hold off on IVF until HD access given oliguria and ESRD   -NPO  -Gen Surg consult as above, appreciate recs   -NPO   -May decompress prn with PEG if needed   -may trial suppositories    #ESRD on PD  #Mitrofanoff  #L to R Transureteroureterostomy  #Horseshoe Kidney  #Neurogenic Bladder  Initially placed on HD in 12/2021 and was switched PD in March 2022. Hasn't had full dialysis run since March 9th.   Per mother, the patient reportedly had made urine previously with little output during acute illness. Cr >8, BUN >100.   D/w Nephrology. No urgent indications for dialysis at this time. Will unlikely be able to go back on PD and will require temporary CVC for HD in setting of fungal infection.  -Transplant Nephrology consult as above  -CAPs consult for CVC in AM  -PTA Bicarb 650 mg TID  -PTA sensipar    #Hyperkalemia  Presented with K of 5.4. Baseline appears to be >5. Etiology secondary to ESRD without dialysis. No urgent indication for dialysis at this time.  -continue to monitor     #Hypercalcemia  Likely due to at home supplementation and ESRD.  -Hold supplementation  -Repeat in AM    #Restrictive Lung Disease s/p tracheostomy decannulated  Pt noting somewhat increased secretions without SOB. saturating well on room air. Coarse breath sounds bilaterally but overall good air " "movement and no evidence of respiratory distress. CXR with some bronchial cuffing and c/f viral infection. Plan to resume PTA home inhalers, obtain respiratory viral panel and sputum culture.   -Respiratory Viral Panel  -Sputum Culture, if able  -PTA azithromycin three times weekly  -PTA Duonebs QID  -PTA Vest therapy QID  -RT consult     #Malnutrition s/p G-tube  #Dawood Reece Sequence  #Prune Belly Syndrome  Per mother, pt has been tolerating PO intake and is not reliant on G tube for feeds or fluids.   -NPO for now  -consider swallow eval prior to PO    #HTN  BP stable on admit. Did not take home amlodipine 5mg daily on day of admit. No longer on clonidine, catopril per patient and mother.  -Hold PTA amlodipine in setting of sepsis.    #GERD  -PTA famotidine       Diet:   npo at midnight  DVT Prophylaxis: mechanical  Aguirre Catheter: Not present  Fluids: none  Lines: None     Cardiac Monitoring: None  Code Status: Full Code      Clinically Significant Risk Factors Present on Admission        # Hyperkalemia: Highest K = 5.4 mmol/L in last 2 days, will monitor as appropriate    # Hypercalcemia: Highest Ca = 11 mg/dL in last 2 days, will monitor as appropriate   # Anion Gap Metabolic Acidosis: Highest Anion Gap = 20 mmol/L in last 2 days, will monitor and treat as appropriate      # Hypertension: home medication list includes antihypertensive(s)      # Cachexia: Estimated body mass index is 16.79 kg/m  as calculated from the following:    Height as of this encounter: 1.676 m (5' 6\").    Weight as of this encounter: 47.2 kg (104 lb).           Disposition Plan      Expected Discharge Date: 04/08/2023                The patient's care was discussed with the Attending Physician, Dr. James.      Aidan Lopez MD  Medicine Service, [facility]  Securely message with [employer] (more info)  Text page via [employer] Paging/Directory   See signed in provider for up " "to date coverage information  ______________________________________________________________________    Chief Complaint   Abdominal Pain    History is obtained from the patient and his mother, Valerie.    History of Present Illness   Aidan Louie is a 27 year old male who has a history of ESRD(previously on HD, transitioned to PD),   horseshoe kidney,  s/p mitrofanoff procedure, Dawood Reece Sequence, Prune Belly Syndrome, and Restrictive Lung Disease s/p tracheostomy (decannulated in 2018),  who presented to the ED due to purulent discharge from the peritoneal catheter, abdominal pain, and fevers.    Per the patient's mother, it has been difficult to continue peritoneal dialysis runs since March 10th. At that time, she noted having difficulty getting return of the peritoneal fluid. His last full dialysis run was the day prior on March 9th. He then developed constipation and decreased PO intake. Last Thursday, he was noted to have foamy drainage and pus coming from the catheter, but he hasnt had any leakage around the catheter site. A peritoneal fluid culture from [facility] was obtained, and has been growing budding yeast. He received intraperitoneal vancomycin on 4/3, and intravenous cefepime on 4/3 and 4/4. Another attempt was made on 4/5 but it was too painful to tolerate.    He also reports persistent malaise with decreased PO intake (taking in some water and apple sauce), constipation (last BM one week prior) and intermittent fevers up to 104. Has not passed flatus in \"a couple of days\" He has had increased secretions, nausea, occasional non-bloody emesis (\"days ago\"), and decreased urine output. Per the patient's mother, he typically makes urine (~300cc/day) which has decreased. Denies chest pain, shortness of breath, overflow diarrhea, rhinorrhea, sore throat, leg swelling, and metallic taste in mouth.    ED:  Tachycardic to 118. BP 120s/80. RR 18. Afebrile. 97% on RA  Labs: Leukocytosis to 20K. Anion gap " 20. Bicarb 15. . K to 5.4, Ca to 11. Creatinine 8.52. UA with pyuria  Imaging: CT with enterocolitis, inflammation surrounding catheter site, and proximal small bowel dilation c/f SBO. CXR with perihilar bronchial cuffing c/f reactive airway diseaese vs viral illness.  Interventions: IV Fluconazole in ED x 1       Past Medical History    Past Medical History:   Diagnosis Date     Bilateral reflux nephropathy      CKD (chronic kidney disease) stage 4, GFR 15-29 ml/min (H)      End stage renal disease (H)      Hearing loss      Hypertension      Kyphosis      Dawood Reece sequence      Prune belly syndrome      Recurrent UTI      Respiratory bronchiolitis interstitial lung disease (H)      Restrictive lung disease      Scoliosis      Thyroid disease      Tracheostomy dependence (H)        Past Surgical History   Past Surgical History:   Procedure Laterality Date     Bilateral ureteral reimplantation  2002    abdominoplasty, Mitrofanoff creation     CYSTOSCOPY  12     EXAM UNDER ANESTHESIA THROAT  6/10/2003    tonsillar hypertrophy     EXAM UNDER ANESTHESIA, RESTORATIONS, EXTRACTION(S) DENTAL COMPLEX, COMBINED  2006     GASTROSTOMY TUBE       GASTROSTOMY TUBE  3/16/2002    button change     HERNIORRHAPHY INGUINAL BILATERAL  89    left ochiopexy     LARYNGOSCOPY, BRONCHOSCOPY, COMBINED  2002     LARYNGOSCOPY, BRONCHOSCOPY, COMBINED  2002    bilateral ear debridement     LARYNGOSCOPY, BRONCHOSCOPY, COMBINED  2003     LARYNGOSCOPY, BRONCHOSCOPY, COMBINED  2007    Failed Deflux injection     LARYNGOSCOPY, BRONCHOSCOPY, COMBINED  12      VESICOSTOMY       Redo right ureteral reimplantation  3/12/2004    Excision bladder diverticuli, Left to Right pyelopyelostomy     Replacement of trach  10/15/12     Takedown and revision of Mitrofanoff stoma  2006     TRACHEOSTOMY INFANT         Prior to Admission Medications   Prior to Admission Medications    Prescriptions Last Dose Informant Patient Reported? Taking?   Alum Hydroxide-Mag Trisilicate 80-20 MG CHEW   Yes No   Sig: Take 1,250 mg by mouth as needed   Cranberry 500 MG TABS   Yes No   Sig: Take by mouth 3 times daily (with meals)   Melatonin 10 MG CAPS   Yes No   Sig: Take by mouth daily as needed   Nutritional Supplements (NEPRO) LIQD   No No   Sig: Take 1 Can by mouth daily   acetaminophen (TYLENOL) 500 MG tablet   Yes No   Sig: Take 500 mg by mouth every 8 hours as needed    albuterol (2.5 MG/3ML) 0.083% nebulizer solution   Yes No   Sig: Take 1 ampule by nebulization 4 times daily    amLODIPine (NORVASC) 5 MG tablet   No No   Sig: TAKE 1 TABLET(5 MG) BY MOUTH TWICE DAILY   azithromycin (ZITHROMAX) 250 MG tablet   Yes No   Sig: Take 250 mg by mouth three times a week Mon, Wed, Fri   calcium carbonate (OS-SUSAN 500 MG [place]. CA) 500 MG tablet   No No   Sig: Take 1 tablet (500 mg) by mouth 3 times daily (with meals)   Patient taking differently: Take 500 mg by mouth 3 times daily (with meals) 1000 BEFORE DINNER   captopril 0.1 mg/mL (CAPOTEN) 0.1 mg/mL SUSP   No No   Sig: Take 2.5 mg three times daily (2.5 mg/5ml)   Patient not taking: Reported on 8/30/2021   cholecalciferol 2000 UNITS tablet   No No   Sig: Take 2,000 Units by mouth daily   cinacalcet (SENSIPAR) 30 MG tablet   Yes No   Sig: Take 30 mg by mouth daily   cloNIDine (CATAPRES) 0.1 MG tablet   No No   Sig: Take 1 tablet (0.1 mg) by mouth 2 times daily   Patient not taking: Reported on 11/1/2022   famotidine (PEPCID) 20 MG tablet  Self Yes No   Sig: Take 40 mg by mouth 2 times daily    ferrous gluconate (FERGON) 324 (38 Fe) MG tablet   No No   Sig: Take 1 tablet (324 mg) by mouth daily   ipratropium (ATROVENT) 0.02 % neb solution   Yes No   Sig: Take 0.5 mg by nebulization 4 times daily    mupirocin (BACTROBAN) 2 % external ointment   Yes No   Sig: Apply topically daily as needed    order for DME   No No   Sig: Equipment being ordered: Humidifier  "(heated), humidifier attachment, saline lavages, humidivent mask   order for DME   No No   Sig: Tracheostomy Humidification Equipment  Equipment being ordered:     1) Air compressor  2) Nebulizer bottle  3) Aerosol tubing  4) Trach mask  5) Sterile water  6) Saline ampules (\"bullets\")  7) Heat Moisture Exchanger (HME) Also known by several other terms including: Thermal Humidifying Filters, Swedish nose, Artificial nose, Filter, Thermovent T.  8) Room/home humidifiers   order for DME   No No   Sig: Optifoam Basic   [id number]   order for DME   No No   Sig: Equipment being ordered: DME -    Deep suction kits - 12 fr   order for DME   No No   Sig: Equipment being ordered: Medi-Vac, plastic tubing connector lara type, cat 361, FuelMiner, 1 lara tip/month x 12 months. Use for tracheostomy suctioning   order for DME   No No   Sig: Equipment being ordered: DME    Adult Bivona Uncuffed Tracheostomy Tube-5.0    Patient will do weekly trach changes.   predniSONE (DELTASONE) 1 MG tablet   Yes No   Sig: Take 1 mg by mouth every other day    Patient not taking: Reported on 8/30/2021   sodium bicarbonate 650 MG tablet   No No   Sig: TAKE 1 TABLET BY MOUTH THREE TIMES DAILY   Patient not taking: Reported on 11/1/2022   sodium chloride 0.9%, bottle, 0.9 % irrigation   Yes No   Sig: Irrigate with as directed 3 times daily Bladder irrigation   tobramycin, PF, (DAX) 300 MG/5ML nebulizer solution   Yes No   Sig: Take 1 ampule by nebulization as needed   traZODone (DESYREL) 50 MG tablet   Yes No   Sig: Take 25 mg by mouth nightly as needed    Patient not taking: Reported on 11/1/2022      Facility-Administered Medications: None        Review of Systems    The 10 point Review of Systems is negative other than noted in the HPI.  Physical Exam   Vital Signs: Temp: 97.4  F (36.3  C) Temp src: Oral BP: 124/80 Pulse: 118   Resp: 18 SpO2: 97 % O2 Device: None (Room air)    Weight: 104 lbs 0 oz    Constitutional: No " Acute Distress. Awake and alert  Eyes: anicteric, EOMI  ENT: oropharynx clear, no Cervical LAD. Trach without appreciable surrounding erythema or drainage.  Respiratory: some rhonci on right anteriorly. Normal respiratory effort. No wheeze or rales.  Cardiovascular: tachycardic, no murmurs  GI: tenderness at LLQ near PD catheter site, with catheter draining purulent material. No rebound or guarding. BS present.  : stoma at umbilicus s/p mitrofanoff. nontender  Skin: No rashes, or suspicious lesions  Musculoskeletal: No pedal edema  Neurologic: no focal neurologic deficits appreciated      Data     I have personally reviewed the following data over the past 24 hrs:    20.6 (H)  \   13.1 (L)   / 552 (H)     134 (L) 99 115.0 (H) /  85   5.4 (H) 15 (L) 8.52 (H) \       Procal: N/A CRP: 453.00 (H) Lactic Acid: 0.8         Imaging results reviewed over the past 24 hrs:   Recent Results (from the past 24 hour(s))   XR Chest 2 Views    Narrative    EXAM: XR CHEST 2 VIEWS  4/6/2023 5:38 PM     HISTORY:  SOB fever       COMPARISON:  Chest x-ray 10/15/2020. CT abdomen and pelvis 3/8/2023    FINDINGS:   PA and lateral views of the chest. Normal heart size. Moderate size  hiatal hernia. Percutaneous tracheostomy tube tip overlies the  midthoracic trachea. Suggestion of perihilar bronchial cuffing. No  airspace consolidation. No pleural effusion or pneumothorax. The  osseous structures are within normal limits.      Impression    IMPRESSION:   Suggestion of perihilar bronchial cuffing which can be seen in  reactive airway disease or viral illness. No focal airspace  consolidation.    I have personally reviewed the examination and initial interpretation  and I agree with the findings.    JONN MATA MD         SYSTEM ID:  [id number]   CT Abdomen Pelvis without Contrast (stone protocol)    Impression    RESIDENT PRELIMINARY INTERPRETATION  Impression:   1.  Inflammatory changes about the small and large bowel  immediately  adjacent to the peritoneal dialysis catheter within the pelvis  concerning for infectious/inflammatory enterocolitis. There is  associated obstruction and proximal small bowel dilation.  2.  Moderate hiatal hernia.  3.  Partially visualized lung bases demonstrates mosaic attenuation  which can be seen in the setting of small airway disease.

## 2023-04-07 NOTE — PROGRESS NOTES
General surgery Consult Note  04/07/2023    Reason for consult: SBO  Consult requested by: Medicine      ASSESSMENT: 27-year-old male with past medical history significant for prune-belly syndrome, Dawood Reece syndrome, end-stage renal disease on peritoneal dialysis (3/22) due to congenital abnormalities of the urinary tract, restrictive lung disease, surgical history of tracheostomy, PEG tube, Mitrofanoff procedure, multiple abdominal reconstructive surgeries presents to the ED with evidence of infection from peritoneal dialysis, most likely ileus 2/2 fungal infection.    RECOMMENDATIONS:    - G tube to gravity for decompression   - N.p.o.  - IV fluids, fluid status of the patient per primary team and nephrology   - will plan to place temporary HD line and remove PD cath      Discussed with chief resident who will discuss with attending    Darell Seaman PGY-1  Surgery      =======================    History of Present Illness:    Aidan Louie is a 27 year old male with a history of prune-belly syndrome, Dawood Reece syndrome, end-stage renal disease on peritoneal dialysis due to congenital abnormalities of urinary tract, restrictive lung disease, surgical history of tracheostomy, PEG tube, Mitrofanoff, multiple abdominal reconstructive surgeries presents to the ED with vague abdominal discomfort and constipation for the past week.  Pt here with Mother who is his caretaker and decision maker. Mother states that patient has always had intermittent fevers, most recently a couple of days ago with a Tmax of 104 which resolved with some Tylenol.  Patient has been taking Tylenol at home for abdominal pain.  Denies any nausea or vomiting.  Patient does suffer from constipation and has not had a bowel movement for the past 6 days.  No chest pain or shortness of breath. Mother noted thates a few days ago they took a sample of the catheter fluid and patient was found to have a fungal infection.    Past Medical  History:  Past Medical History:   Diagnosis Date     Bilateral reflux nephropathy      CKD (chronic kidney disease) stage 4, GFR 15-29 ml/min (H)      End stage renal disease (H)      Hearing loss      Hypertension      Kyphosis      Dawood Reece sequence      Prune belly syndrome      Recurrent UTI      Respiratory bronchiolitis interstitial lung disease (H)      Restrictive lung disease      Scoliosis 2002     Thyroid disease      Tracheostomy dependence (H)        Past Surgical History  Past Surgical History:   Procedure Laterality Date     Bilateral ureteral reimplantation  2002    abdominoplasty, Mitrofanoff creation     CYSTOSCOPY  12     EXAM UNDER ANESTHESIA THROAT  6/10/2003    tonsillar hypertrophy     EXAM UNDER ANESTHESIA, RESTORATIONS, EXTRACTION(S) DENTAL COMPLEX, COMBINED  2006     GASTROSTOMY TUBE       GASTROSTOMY TUBE  3/16/2002    button change     HERNIORRHAPHY INGUINAL BILATERAL  99    left ochiopexy     LARYNGOSCOPY, BRONCHOSCOPY, COMBINED  2002     LARYNGOSCOPY, BRONCHOSCOPY, COMBINED  2002    bilateral ear debridement     LARYNGOSCOPY, BRONCHOSCOPY, COMBINED  2003     LARYNGOSCOPY, BRONCHOSCOPY, COMBINED  2007    Failed Deflux injection     LARYNGOSCOPY, BRONCHOSCOPY, COMBINED  12      VESICOSTOMY       Redo right ureteral reimplantation  3/12/2004    Excision bladder diverticuli, Left to Right pyelopyelostomy     Replacement of trach  10/15/12     Takedown and revision of Mitrofanoff stoma  2006     TRACHEOSTOMY INFANT         Family History:  Family History   Problem Relation Age of Onset     Diabetes Type 2  Mother      Diabetes Type 2  Maternal Grandmother      Deep Vein Thrombosis (DVT) Maternal Grandmother      Diabetes Type 2  Maternal Grandfather      Diabetes Type 2  Paternal Grandmother      Alzheimer Disease Paternal Grandfather      Thyroid Disease Maternal Aunt         hashimoto     Anesthesia Reaction Maternal Aunt          patients mom reports her sister had an allergic reaction to something during a previous surgery     Thyroid Cancer Maternal Great-Grandmother        Social History:  Social History     Social History Narrative     Not on file        Medications:  Current Outpatient Medications   Medication Sig Dispense Refill     acetaminophen (TYLENOL) 500 MG tablet Take 500 mg by mouth every 8 hours as needed        albuterol (2.5 MG/3ML) 0.083% nebulizer solution Take 1 ampule by nebulization 4 times daily        Alum Hydroxide-Mag Trisilicate 80-20 MG CHEW Take 1,250 mg by mouth as needed       amLODIPine (NORVASC) 5 MG tablet TAKE 1 TABLET(5 MG) BY MOUTH TWICE DAILY 180 tablet 3     azithromycin (ZITHROMAX) 250 MG tablet Take 250 mg by mouth three times a week Mon, Wed, Fri       calcium carbonate (OS-SUSAN 500 MG [place]) 500 MG tablet Take 1 tablet (500 mg) by mouth 3 times daily (with meals) (Patient taking differently: Take 500 mg by mouth 3 times daily (with meals) 1000 BEFORE DINNER) 90 tablet 6     captopril 0.1 mg/mL (CAPOTEN) 0.1 mg/mL SUSP Take 2.5 mg three times daily (2.5 mg/5ml) (Patient not taking: Reported on 8/30/2021) 460 mL 11     cholecalciferol 2000 UNITS tablet Take 2,000 Units by mouth daily 30 tablet 11     cinacalcet (SENSIPAR) 30 MG tablet Take 30 mg by mouth daily       cloNIDine (CATAPRES) 0.1 MG tablet Take 1 tablet (0.1 mg) by mouth 2 times daily (Patient not taking: Reported on 11/1/2022) 180 tablet 11     Cranberry 500 MG TABS Take by mouth 3 times daily (with meals)       famotidine (PEPCID) 20 MG tablet Take 40 mg by mouth 2 times daily        ferrous gluconate (FERGON) 324 (38 Fe) MG tablet Take 1 tablet (324 mg) by mouth daily 90 tablet 11     ipratropium (ATROVENT) 0.02 % neb solution Take 0.5 mg by nebulization 4 times daily  225 mL      Melatonin 10 MG CAPS Take by mouth daily as needed       mupirocin (BACTROBAN) 2 % external ointment Apply topically daily as needed         "Nutritional Supplements (NEPRO) LIQD Take 1 Can by mouth daily 30 each 11     order for DME Equipment being ordered: Medi-Vac, plastic tubing connector lara type, cat 361, [facility] [place] [employer], 1 lara tip/month x 12 months. Use for tracheostomy suctioning 1 each 11     order for DME Equipment being ordered: DME    Adult Bivona Uncuffed Tracheostomy Tube-5.0    Patient will do weekly trach changes. 4 Units 11     order for DME Equipment being ordered: DME -    Deep suction kits - 12 fr 90 each 3     order for DME Tracheostomy Humidification Equipment  Equipment being ordered:     1) Air compressor  2) Nebulizer bottle  3) Aerosol tubing  4) Trach mask  5) Sterile water  6) Saline ampules (\"bullets\")  7) Heat Moisture Exchanger (HME) Also known by several other terms including: Thermal Humidifying Filters, Swedish nose, Artificial nose, Filter, Thermovent T.  8) Room/home humidifiers 1 each 11     order for DME Optifoam Basic   [id number] 30 each 11     order for DME Equipment being ordered: Humidifier (heated), humidifier attachment, saline lavages, humidivent mask 1 each 3     predniSONE (DELTASONE) 1 MG tablet Take 1 mg by mouth every other day  (Patient not taking: Reported on 8/30/2021)  6     sodium bicarbonate 650 MG tablet TAKE 1 TABLET BY MOUTH THREE TIMES DAILY (Patient not taking: Reported on 11/1/2022) 270 tablet 1     sodium chloride 0.9%, bottle, 0.9 % irrigation Irrigate with as directed 3 times daily Bladder irrigation       tobramycin, PF, (DAX) 300 MG/5ML nebulizer solution Take 1 ampule by nebulization as needed       traZODone (DESYREL) 50 MG tablet Take 25 mg by mouth nightly as needed  (Patient not taking: Reported on 11/1/2022)         Allergies:  Allergies   Allergen Reactions     Latex      Latex      Other reaction(s): Other (see comments)  Other reaction(s): Contact Dermatitis, Other (see comments)     Adhesive Tape Rash       Review of Symptoms:  A 10 point review of symptoms " has been conducted and is negative except for that mentioned in the above HPI.    Physical Exam:    Temp:  [97.4  F (36.3  C)] 97.4  F (36.3  C)  Pulse:  [103-118] 111  Resp:  [16-25] 25  BP: (124-140)/(80-97) 139/97  FiO2 (%):  [21 %] 21 %  SpO2:  [95 %-99 %] 97 %    Gen: NAD, resting comfortably  HEENT: NCAT, sclera anicteric  CV: RRR  Resp: nonlabored respirations, comfortable on room air, trach in place  Abd: soft, mild tenderness to palpation near peritoneal dialysis tube, no rebound guarding or rigidity.  No tenderness to palpation in the right abdomen, PEG tube in place, midline abdominal incision well-healed and scar, Mitrofanoff ostomy appears functional  Ext: WWP w/o edema  Neuro: no focal deficits  Skin: No rashes    Labs:  Recent Labs   Lab Test 04/07/23  0745 04/06/23  1831 10/19/21  1433 08/30/21  1300   WBC 20.8* 20.6*  --  7.6   HGB 12.2* 13.1*  --  9.9*   CR  --  8.52* 6.14* 6.12*          Imaging:    CT abdomen  Impression:      1. Findings of bowel obstruction with small bowel loops dilated up to  4 cm in the left hemiabdomen. Transition point is likely located in  the pelvis along the peritoneal dialysis catheter, with appreciable  inflammatory changes about the small and large bowel at this site.      2. Moderate hiatal hernia with fluid-filled stomach.     3. Partially visualized lung bases demonstrates mosaic attenuation and  diffuse peribronchial thickening, which can be seen in the setting of  small airway disease.

## 2023-04-07 NOTE — CONSULTS
General surgery Consult Note  04/07/2023    Reason for consult: SBO  Consult requested by: Medicine      ASSESSMENT: 27-year-old male with past medical history significant for prune-belly syndrome, Dawood Reece syndrome, end-stage renal disease on peritoneal dialysis (3/22) due to congenital abnormalities of the urinary tract, restrictive lung disease, surgical history of tracheostomy, PEG tube, Mitrofanoff procedure, multiple abdominal reconstructive surgeries presents to the ED with evidence of infection from peritoneal dialysis, SBO suspected based on CT findings for which general surgery was consulted    RECOMMENDATIONS:    - G tube to gravity for decompression   - N.p.o.  - IV fluids, fluid status of the patient per primary team and nephrology   - We will discuss Gastrografin in the upcoming days  - will plan to place temporary HD line and remove PD cath      Discussed with chief resident who will discuss with attending    Darell Seaman PGY-1  Surgery      =======================    History of Present Illness:    Aidan Louie is a 27 year old male with a history of prune-belly syndrome, Dawood Reece syndrome, end-stage renal disease on peritoneal dialysis due to congenital abnormalities of urinary tract, restrictive lung disease, surgical history of tracheostomy, PEG tube, Mitrofanoff, multiple abdominal reconstructive surgeries presents to the ED with vague abdominal discomfort and constipation for the past week.  Pt here with Mother who is his caretaker and decision maker. Mother states that patient has always had intermittent fevers, most recently a couple of days ago with a Tmax of 104 which resolved with some Tylenol.  Patient has been taking Tylenol at home for abdominal pain.  Denies any nausea or vomiting.  Patient does suffer from constipation and has not had a bowel movement for the past 6 days.  No chest pain or shortness of breath. Mother noted thates a few days ago they took a sample of the  catheter fluid and patient was found to have a fungal infection.    Past Medical History:  Past Medical History:   Diagnosis Date     Bilateral reflux nephropathy      CKD (chronic kidney disease) stage 4, GFR 15-29 ml/min (H)      End stage renal disease (H)      Hearing loss      Hypertension      Kyphosis      Dawood Reece sequence      Prune belly syndrome      Recurrent UTI      Respiratory bronchiolitis interstitial lung disease (H)      Restrictive lung disease      Scoliosis      Thyroid disease      Tracheostomy dependence (H)        Past Surgical History  Past Surgical History:   Procedure Laterality Date     Bilateral ureteral reimplantation  2002    abdominoplasty, Mitrofanoff creation     CYSTOSCOPY  12     EXAM UNDER ANESTHESIA THROAT  6/10/2003    tonsillar hypertrophy     EXAM UNDER ANESTHESIA, RESTORATIONS, EXTRACTION(S) DENTAL COMPLEX, COMBINED  2006     GASTROSTOMY TUBE       GASTROSTOMY TUBE  3/16/2002    button change     HERNIORRHAPHY INGUINAL BILATERAL  99    left ochiopexy     LARYNGOSCOPY, BRONCHOSCOPY, COMBINED  2002     LARYNGOSCOPY, BRONCHOSCOPY, COMBINED  2002    bilateral ear debridement     LARYNGOSCOPY, BRONCHOSCOPY, COMBINED  2003     LARYNGOSCOPY, BRONCHOSCOPY, COMBINED  2007    Failed Deflux injection     LARYNGOSCOPY, BRONCHOSCOPY, COMBINED  12      VESICOSTOMY       Redo right ureteral reimplantation  3/12/2004    Excision bladder diverticuli, Left to Right pyelopyelostomy     Replacement of trach  10/15/12     Takedown and revision of Mitrofanoff stoma  2006     TRACHEOSTOMY INFANT         Family History:  Family History   Problem Relation Age of Onset     Diabetes Type 2  Mother      Diabetes Type 2  Maternal Grandmother      Deep Vein Thrombosis (DVT) Maternal Grandmother      Diabetes Type 2  Maternal Grandfather      Diabetes Type 2  Paternal Grandmother      Alzheimer Disease Paternal Grandfather      Thyroid  Disease Maternal Aunt         hashimoto     Anesthesia Reaction Maternal Aunt         patients mom reports her sister had an allergic reaction to something during a previous surgery     Thyroid Cancer Maternal Great-Grandmother        Social History:  Social History     Social History Narrative     Not on file        Medications:  Current Outpatient Medications   Medication Sig Dispense Refill     acetaminophen (TYLENOL) 500 MG tablet Take 500 mg by mouth every 8 hours as needed        albuterol (2.5 MG/3ML) 0.083% nebulizer solution Take 1 ampule by nebulization 4 times daily        Alum Hydroxide-Mag Trisilicate 80-20 MG CHEW Take 1,250 mg by mouth as needed       amLODIPine (NORVASC) 5 MG tablet TAKE 1 TABLET(5 MG) BY MOUTH TWICE DAILY 180 tablet 3     azithromycin (ZITHROMAX) 250 MG tablet Take 250 mg by mouth three times a week Mon, Wed, Fri       calcium carbonate (OS-SUSAN 500 MG [place]. [place]) 500 MG tablet Take 1 tablet (500 mg) by mouth 3 times daily (with meals) (Patient taking differently: Take 500 mg by mouth 3 times daily (with meals) 1000 BEFORE DINNER) 90 tablet 6     captopril 0.1 mg/mL (CAPOTEN) 0.1 mg/mL SUSP Take 2.5 mg three times daily (2.5 mg/5ml) (Patient not taking: Reported on 8/30/2021) 460 mL 11     cholecalciferol 2000 UNITS tablet Take 2,000 Units by mouth daily 30 tablet 11     cinacalcet (SENSIPAR) 30 MG tablet Take 30 mg by mouth daily       cloNIDine (CATAPRES) 0.1 MG tablet Take 1 tablet (0.1 mg) by mouth 2 times daily (Patient not taking: Reported on 11/1/2022) 180 tablet 11     Cranberry 500 MG TABS Take by mouth 3 times daily (with meals)       famotidine (PEPCID) 20 MG tablet Take 40 mg by mouth 2 times daily        ferrous gluconate (FERGON) 324 (38 Fe) MG tablet Take 1 tablet (324 mg) by mouth daily 90 tablet 11     ipratropium (ATROVENT) 0.02 % neb solution Take 0.5 mg by nebulization 4 times daily  225 mL      Melatonin 10 MG CAPS Take by mouth daily as needed       mupirocin  "(BACTROBAN) 2 % external ointment Apply topically daily as needed        Nutritional Supplements (NEPRO) LIQD Take 1 Can by mouth daily 30 each 11     order for DME Equipment being ordered: Medi-Vac, plastic tubing connector lara type, cat 361, [employer] [employer], 1 lara tip/month x 12 months. Use for tracheostomy suctioning 1 each 11     order for DME Equipment being ordered: DME    Adult Bivona Uncuffed Tracheostomy Tube-5.0    Patient will do weekly trach changes. 4 Units 11     order for DME Equipment being ordered: DME -    Deep suction kits - 12 fr 90 each 3     order for DME Tracheostomy Humidification Equipment  Equipment being ordered:     1) Air compressor  2) Nebulizer bottle  3) Aerosol tubing  4) Trach mask  5) Sterile water  6) Saline ampules (\"bullets\")  7) Heat Moisture Exchanger (HME) Also known by several other terms including: Thermal Humidifying Filters, Swedish nose, Artificial nose, Filter, Thermovent T.  8) Room/home humidifiers 1 each 11     order for DME Optifoam Basic   [id number] 30 each 11     order for DME Equipment being ordered: Humidifier (heated), humidifier attachment, saline lavages, humidivent mask 1 each 3     predniSONE (DELTASONE) 1 MG tablet Take 1 mg by mouth every other day  (Patient not taking: Reported on 8/30/2021)  6     sodium bicarbonate 650 MG tablet TAKE 1 TABLET BY MOUTH THREE TIMES DAILY (Patient not taking: Reported on 11/1/2022) 270 tablet 1     sodium chloride 0.9%, bottle, 0.9 % irrigation Irrigate with as directed 3 times daily Bladder irrigation       tobramycin, PF, (DAX) 300 MG/5ML nebulizer solution Take 1 ampule by nebulization as needed       traZODone (DESYREL) 50 MG tablet Take 25 mg by mouth nightly as needed  (Patient not taking: Reported on 11/1/2022)         Allergies:  Allergies   Allergen Reactions     Latex      Latex      Other reaction(s): Other (see comments)  Other reaction(s): Contact Dermatitis, Other (see comments)     " Adhesive Tape Rash       Review of Symptoms:  A 10 point review of symptoms has been conducted and is negative except for that mentioned in the above HPI.    Physical Exam:    Temp:  [97.4  F (36.3  C)] 97.4  F (36.3  C)  Pulse:  [103-118] 111  Resp:  [16-25] 25  BP: (124-140)/(80-97) 139/97  FiO2 (%):  [21 %] 21 %  SpO2:  [95 %-99 %] 97 %    Gen: NAD, resting comfortably  HEENT: NCAT, sclera anicteric  CV: RRR  Resp: nonlabored respirations, comfortable on room air, trach in place  Abd: soft, mild tenderness to palpation near peritoneal dialysis tube, no rebound guarding or rigidity.  No tenderness to palpation in the right abdomen, PEG tube in place, midline abdominal incision well-healed and scar, Mitrofanoff ostomy appears functional  Ext: WWP w/o edema  Neuro: no focal deficits  Skin: No rashes    Labs:  Recent Labs   Lab Test 04/07/23  0745 04/06/23  1831 10/19/21  1433 08/30/21  1300   WBC 20.8* 20.6*  --  7.6   HGB 12.2* 13.1*  --  9.9*   CR  --  8.52* 6.14* 6.12*          Imaging:    CT abdomen  Impression:      1. Findings of bowel obstruction with small bowel loops dilated up to  4 cm in the left hemiabdomen. Transition point is likely located in  the pelvis along the peritoneal dialysis catheter, with appreciable  inflammatory changes about the small and large bowel at this site.      2. Moderate hiatal hernia with fluid-filled stomach.     3. Partially visualized lung bases demonstrates mosaic attenuation and  diffuse peribronchial thickening, which can be seen in the setting of  small airway disease.

## 2023-04-07 NOTE — PROGRESS NOTES
[facility] contacted patient's home dialysis unit. Per Ca (home unit [facility]), no bacterial growth present at this time. Message sent to following nephrologist.  [facility] will continue to call for updates on results.

## 2023-04-07 NOTE — OP NOTE
OPERATIVE REPORT    NAME: Aidan Louie  MRN: [id number]     DATE OF SURGERY: 4/7/2023       PREOPERATIVE DIAGNOSIS: infected peritoneal dialysis catheter      POSTOPERATIVE DIAGNOSIS: Same      PROCEDURE:   Peritoneal Dialysis Catheter Removal  Right internal jugular dialysis catheter placement (by anesthesia)      SURGEON: MORIS CASH      ASSISTANT(S):   Renetta Gonzalez MD PGY7 Resident  Tai Cruz MD PGY2 Resident    ANESTHESIA: General    EBL: 3 cc    SPECIMEN(S):  ID Type Source Tests Collected by Time Destination   A : peritoneal fluid Peritoneal Fluid Peritoneum ANAEROBIC BACTERIAL CULTURE ROUTINE, FUNGAL OR YEAST CULTURE ROUTINE, AEROBIC BACTERIAL CULTURE ROUTINE Moris Cash MD 4/7/2023 10:32 AM    B : peritoneal catheter section for cultures Foreign Body Catheter tip ANAEROBIC BACTERIAL CULTURE ROUTINE, FUNGAL OR YEAST CULTURE ROUTINE, AEROBIC BACTERIAL CULTURE ROUTINE Moris Cash MD 4/7/2023 10:33 AM         FINDING(S): Turbid peritoneal fluid and peritoneal dialysis catheter.    COMPLICATION(S): none    INDICATIONS:   Aidan Louie is a 27 year old male with a history of prune belly syndrome and ESRD on peritoneal dialysis who presents with evidence of infection from peritoneal dialysis. After a discussion of the risks, benefits, and alternatives with the patient's legal guardian, they elected to undergo surgery.     DESCRIPTION OF PROCEDURE:  After obtaining informed consent, the patient was brought to the operating room and general anesthesia was induced. Prior to catheter removal, anesthesia had placed a dialysis catheter line in the right internal jugular vein.     The patient was positioned supine and the abdomen was prepped and draped in the usual sterile fashion. Preop antibiotics had been given prior to surgery. SCDs were placed and turned on. A timeout was performed.      Abdomen and the dialysis catheter was inspected and there was  turbid peritoneal fluid in the dialysis catheter itself.  The catheter was grasped and the subcutaneous cuff was freed using a combination of blunt dissection and electrocautery.  Afterwards attention was turned to the fascial catheter cuff.  A small incision was made and dissection was carried using sharp and electrocautery until the catheter was identified.  The catheter was then grasped and the cuff was dissected until it was completely free.  We were met with turbid and purulent peritoneal fluid. The intra-abdominal portion of the peritoneal catheter was then cut and sent to pathology for cultures.  The external portion of the catheter was then removed through its original exit site.  The small fascial defect was then closed using a 3-0 Vicryl.  The wound was irrigated with saline.    The skin was closed using 3-0 vicryl and 4-0 Monocryl suture. Steri strips were applied. The previous exit site of the catheter was left open to heal by secondary intention. Surgical counts were reported as correct at the conclusion of the case x2. The patient was awakened from anesthesia and there were no immediate complications.     Dr. Thompson was present for all portions of the operation.    - - - - - - - - - - - - - - - - - -  Tai Cruz MD PGY2  General Surgery  See [facility] for on call information.        Attending addendum:  I was present for entirety of procedure, agree with note.

## 2023-04-07 NOTE — ANESTHESIA PROCEDURE NOTES
Airway       Patient location during procedure: OR       Procedure Start/Stop Times: 4/7/2023 9:46 AM  Staff -        CRNA: Monica Lr APRN CRNA       Performed By: CRNA  Consent for Airway        Urgency: elective  Indications and Patient Condition       Indications for airway management: miguel-procedural       Induction type:intravenous       Mask difficulty assessment: 0 - not attempted (uncuffed 5.0 trache removed and 6.0 reinforced ETT placed in stoma.)    Final Airway Details       Final airway type: trach/surgical airway    Trach/Surgical Airway Details        Type: Tracheostomy       Brand: ETT       Style: Cuffed       Size: 6      Post intubation assessment        Placement verified by: capnometry, equal breath sounds and chest rise        Number of attempts at approach: 1       Secured with: other (comment) (large tegaderms used to secure it)       Ease of procedure: easy       Dentition: Unchanged    Medication(s) Administered   Medication Administration Time: 4/7/2023 9:46 AM

## 2023-04-07 NOTE — PHARMACY-VANCOMYCIN DOSING SERVICE
Pharmacy Vancomycin Initial Note  Date of Service 2023  Patient's  1995  27 year old, male    Indication: Sepsis    Current estimated CrCl = Estimated Creatinine Clearance: 8.8 mL/min (A) (based on SCr of 8.52 mg/dL (H)).    Creatinine for last 3 days  2023:  6:31 PM Creatinine 8.52 mg/dL    Recent Vancomycin Level(s) for last 3 days  2023: 10:13 PM Vancomycin 19.2 ug/mL      Vancomycin IV Administrations (past 72 hours)      No vancomycin orders with administrations in past 72 hours.                Nephrotoxins and other renal medications (From now, onward)    Start     Dose/Rate Route Frequency Ordered Stop    23  vancomycin place sawyer - receiving intermittent dosing         1 each Intravenous SEE ADMIN INSTRUCTIONS 23            Contrast Orders - past 72 hours (72h ago, onward)    None                Plan:  1. Patient received vancomycin and cefepime on  per family report. Vancomycin level obtained today=19.2 micrograms/mL. Will not redose vancomycin tonight. Will continue to monitor level and redose pending levels/renal replacement plan.   2. Vancomycin monitoring method: Renal Replacement Therapy  3. Vancomycin therapeutic monitoring goal: 15-20 mg/L  4. Pharmacy will check vancomycin levels as appropriate in 1-3 Days.    5. Serum creatinine levels will be ordered daily for the first week of therapy and at least twice weekly for subsequent weeks.      Juan Jose Reyna, PharmD, BCPS

## 2023-04-07 NOTE — PROGRESS NOTES
Pt entered pt's room to ask about vest therapy. Per pt's mom, pt does vest at home but has never done it in a hospital while admitted. RT explained that vest therapy was ordered & we can start on it this AM. Pt's mom wanted to wait and see after pt had his surgery to begin therapy. Mom also asked about getting nebs before pt goes to surgery- nebs given.    RT will continue to follow and monitor.    Eduar Watson, RRT

## 2023-04-07 NOTE — CONSULTS
General surgery Consult Note  04/06/2023    Reason for consult: SBO  Consult requested by: Medicine      ASSESSMENT: 27-year-old male with past medical history significant for prune-belly syndrome, Dawood Reece syndrome, end-stage renal disease on peritoneal dialysis (3/22) due to congenital abnormalities of the urinary tract, restrictive lung disease, surgical history of tracheostomy, PEG tube, Mitrofanoff procedure, multiple abdominal reconstructive surgeries presents to the ED with evidence of infection from peritoneal dialysis, SBO suspected based on CT findings for which general surgery was consulted    RECOMMENDATIONS:    - G tube to gravity for decompression   - N.p.o.  - IV fluids, fluid status of the patient per primary team and nephrology   - We will discuss Gastrografin in the upcoming days  - Nephrology would like PD catheter removal and placement of CVC dialysis catheter, will discuss tomorrow regarding timing of removal in the upcoming days, not emergent   - Continue Abx   - Surgery will continue to follow      Discussed with chief resident who will discuss with attending    Emeterio Miller PGY2  Surgery      =======================    History of Present Illness:    Aidan Louie is a 27 year old male with a history of prune-belly syndrome, Dawood Reece syndrome, end-stage renal disease on peritoneal dialysis due to congenital abnormalities of urinary tract, restrictive lung disease, surgical history of tracheostomy, PEG tube, Mitrofanoff, multiple abdominal reconstructive surgeries presents to the ED with vague abdominal discomfort and constipation for the past week.  Pt here with Mother who is his caretaker and decision maker. Mother states that patient has always had intermittent fevers, most recently a couple of days ago with a Tmax of 104 which resolved with some Tylenol.  Patient has been taking Tylenol at home for abdominal pain.  Denies any nausea or vomiting.  Patient does suffer from  constipation and has not had a bowel movement for the past 6 days.  No chest pain or shortness of breath. Mother noted thates a few days ago they took a sample of the catheter fluid and patient was found to have a fungal infection.    Past Medical History:  Past Medical History:   Diagnosis Date     Bilateral reflux nephropathy      CKD (chronic kidney disease) stage 4, GFR 15-29 ml/min (H)      End stage renal disease (H)      Hearing loss      Hypertension      Kyphosis      Dawood Reece sequence      Prune belly syndrome      Recurrent UTI      Respiratory bronchiolitis interstitial lung disease (H)      Restrictive lung disease      Scoliosis      Thyroid disease      Tracheostomy dependence (H)        Past Surgical History  Past Surgical History:   Procedure Laterality Date     Bilateral ureteral reimplantation  2002    abdominoplasty, Mitrofanoff creation     CYSTOSCOPY  12     EXAM UNDER ANESTHESIA THROAT  6/10/2003    tonsillar hypertrophy     EXAM UNDER ANESTHESIA, RESTORATIONS, EXTRACTION(S) DENTAL COMPLEX, COMBINED  2006     GASTROSTOMY TUBE       GASTROSTOMY TUBE  3/16/2002    button change     HERNIORRHAPHY INGUINAL BILATERAL  89    left ochiopexy     LARYNGOSCOPY, BRONCHOSCOPY, COMBINED  2002     LARYNGOSCOPY, BRONCHOSCOPY, COMBINED  2002    bilateral ear debridement     LARYNGOSCOPY, BRONCHOSCOPY, COMBINED  2003     LARYNGOSCOPY, BRONCHOSCOPY, COMBINED  2007    Failed Deflux injection     LARYNGOSCOPY, BRONCHOSCOPY, COMBINED  13      VESICOSTOMY       Redo right ureteral reimplantation  3/12/2004    Excision bladder diverticuli, Left to Right pyelopyelostomy     Replacement of trach  10/15/12     Takedown and revision of Mitrofanoff stoma  2006     TRACHEOSTOMY INFANT         Family History:  Family History   Problem Relation Age of Onset     Diabetes Type 2  Mother      Diabetes Type 2  Maternal Grandmother      Deep Vein Thrombosis  (DVT) Maternal Grandmother      Diabetes Type 2  Maternal Grandfather      Diabetes Type 2  Paternal Grandmother      Alzheimer Disease Paternal Grandfather      Thyroid Disease Maternal Aunt         hashimoto     Anesthesia Reaction Maternal Aunt         patients mom reports her sister had an allergic reaction to something during a previous surgery     Thyroid Cancer Maternal Great-Grandmother        Social History:  Social History     Social History Narrative     Not on file        Medications:  Current Outpatient Medications   Medication Sig Dispense Refill     acetaminophen (TYLENOL) 500 MG tablet Take 500 mg by mouth every 8 hours as needed        albuterol (2.5 MG/3ML) 0.083% nebulizer solution Take 1 ampule by nebulization 4 times daily        Alum Hydroxide-Mag Trisilicate 80-20 MG CHEW Take 1,250 mg by mouth as needed       amLODIPine (NORVASC) 5 MG tablet TAKE 1 TABLET(5 MG) BY MOUTH TWICE DAILY 180 tablet 3     azithromycin (ZITHROMAX) 250 MG tablet Take 250 mg by mouth three times a week Mon, Wed, Fri       calcium carbonate (OS-SUSAN 500 MG [place]. [place]) 500 MG tablet Take 1 tablet (500 mg) by mouth 3 times daily (with meals) (Patient taking differently: Take 500 mg by mouth 3 times daily (with meals) 1000 BEFORE DINNER) 90 tablet 6     captopril 0.1 mg/mL (CAPOTEN) 0.1 mg/mL SUSP Take 2.5 mg three times daily (2.5 mg/5ml) (Patient not taking: Reported on 8/30/2021) 460 mL 11     cholecalciferol 2000 UNITS tablet Take 2,000 Units by mouth daily 30 tablet 11     cinacalcet (SENSIPAR) 30 MG tablet Take 30 mg by mouth daily       cloNIDine (CATAPRES) 0.1 MG tablet Take 1 tablet (0.1 mg) by mouth 2 times daily (Patient not taking: Reported on 11/1/2022) 180 tablet 11     Cranberry 500 MG TABS Take by mouth 3 times daily (with meals)       famotidine (PEPCID) 20 MG tablet Take 40 mg by mouth 2 times daily        ferrous gluconate (FERGON) 324 (38 Fe) MG tablet Take 1 tablet (324 mg) by mouth daily 90 tablet 11      "ipratropium (ATROVENT) 0.02 % neb solution Take 0.5 mg by nebulization 4 times daily  225 mL      Melatonin 10 MG CAPS Take by mouth daily as needed       mupirocin (BACTROBAN) 2 % external ointment Apply topically daily as needed        Nutritional Supplements (NEPRO) LIQD Take 1 Can by mouth daily 30 each 11     order for DME Equipment being ordered: Medi-Vac, plastic tubing connector laar type, cat 361, [employer] 1 lara tip/month x 12 months. Use for tracheostomy suctioning 1 each 11     order for DME Equipment being ordered: DME    Adult Bivona Uncuffed Tracheostomy Tube-5.0    Patient will do weekly trach changes. 4 Units 11     order for DME Equipment being ordered: DME -    Deep suction kits - 12 fr 90 each 3     order for DME Tracheostomy Humidification Equipment  Equipment being ordered:     1) Air compressor  2) Nebulizer bottle  3) Aerosol tubing  4) Trach mask  5) Sterile water  6) Saline ampules (\"bullets\")  7) Heat Moisture Exchanger (HME) Also known by several other terms including: Thermal Humidifying Filters, Swedish nose, Artificial nose, Filter, Thermovent T.  8) Room/home humidifiers 1 each 11     order for DME Optifoam Basic   [id number] 30 each 11     order for DME Equipment being ordered: Humidifier (heated), humidifier attachment, saline lavages, humidivent mask 1 each 3     predniSONE (DELTASONE) 1 MG tablet Take 1 mg by mouth every other day  (Patient not taking: Reported on 8/30/2021)  6     sodium bicarbonate 650 MG tablet TAKE 1 TABLET BY MOUTH THREE TIMES DAILY (Patient not taking: Reported on 11/1/2022) 270 tablet 1     sodium chloride 0.9%, bottle, 0.9 % irrigation Irrigate with as directed 3 times daily Bladder irrigation       tobramycin, PF, (DAX) 300 MG/5ML nebulizer solution Take 1 ampule by nebulization as needed       traZODone (DESYREL) 50 MG tablet Take 25 mg by mouth nightly as needed  (Patient not taking: Reported on 11/1/2022)   "       Allergies:  Allergies   Allergen Reactions     Latex      Latex      Other reaction(s): Other (see comments)  Other reaction(s): Contact Dermatitis, Other (see comments)     Adhesive Tape Rash       Review of Symptoms:  A 10 point review of symptoms has been conducted and is negative except for that mentioned in the above HPI.    Physical Exam:    Temp:  [97.4  F (36.3  C)] 97.4  F (36.3  C)  Pulse:  [118] 118  Resp:  [18] 18  BP: (124)/(80) 124/80  SpO2:  [97 %] 97 %    Gen: NAD, resting comfortably  HEENT: NCAT, sclera anicteric  CV: RRR  Resp: nonlabored respirations, comfortable on room air, trach in place  Abd: soft, mild tenderness to palpation near peritoneal dialysis tube, no rebound guarding or rigidity.  No tenderness to palpation in the right abdomen, PEG tube in place, midline abdominal incision well-healed and scar, Mitrofanoff ostomy appears functional  Ext: WWP w/o edema  Neuro: no focal deficits  Skin: No rashes    Labs:  Sodium 134  Potassium 5.4  Creatinine 8.5    WBC 20.6        Imaging:    CT abdomen  Impression:      1. Findings of bowel obstruction with small bowel loops dilated up to  4 cm in the left hemiabdomen. Transition point is likely located in  the pelvis along the peritoneal dialysis catheter, with appreciable  inflammatory changes about the small and large bowel at this site.      2. Moderate hiatal hernia with fluid-filled stomach.     3. Partially visualized lung bases demonstrates mosaic attenuation and  diffuse peribronchial thickening, which can be seen in the setting of  small airway disease.

## 2023-04-07 NOTE — ANESTHESIA PROCEDURE NOTES
Central Line/PA Catheter Placement    Pre-Procedure   Staff -        Anesthesiologist:  Carter Otero MD       Resident/Fellow: Jeffery Flores Jr., MD       Performed By: resident       Location: OR       Pre-Anesthestic Checklist: patient identified, IV checked, site marked, risks and benefits discussed, informed consent, monitors and equipment checked, pre-op evaluation and at physician/surgeon's request  Timeout:       Correct Patient: Yes        Correct Procedure: Yes        Correct Site: Yes        Correct Position: Yes        Correct Laterality: Yes   Line Placement:   This line was placed Post Induction    Procedure   Procedure: central line       Laterality: right       Insertion Site: internal jugular.       Patient Position: Trendelenburg  Sterile Prep        All elements of maximal sterile barrier technique followed       Patient Prep/Sterile Barriers: draped, hand hygiene, gloves , hat , mask , draped, gown, sterile gel and probe cover       Skin prep: Chloraprep  Insertion/Injection        Technique: ultrasound guided and Seldinger Technique        1. Ultrasound was used to evaluate the access site.       2. Vein evaluated via ultrasound for patency/adequacy.       3. Using real-time ultrasound the needle/catheter was observed entering the artery/vein.       First sheath size: 12 F.        Type: CVC (Dialysis catheter)       Catheter Length: 20       Number of Lumens: double lumen  Narrative         Secured by: suture       Tegaderm and Biopatch dressing used.       Complications: None apparent,        blood aspirated from all lumens,        All lumens flushed: Yes       Verification method: Ultrasound and Placement to be verified post-op

## 2023-04-07 NOTE — PROGRESS NOTES
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     Patient was initially placed on hemodialysis in 12/2021, was switched to peritoneal dialysis in March of 2022. Patient has not had a full run of dialysis since 3/9. Mother reports patient was only able to get about half a run on 3/10. She states dialysis nurses have been out every day trying to work on it. They have been doing manual dialysis only. Mother states a few days ago they took a sample of the drainage from his catheter and patient was found to have a fungal infection that has not yet been identified. Drainage from the catheter has now become more foamy/pus-like. Mother denies any fluid drainage from around the catheter, only into the bag. Dialysis nurse gave patient vancomycin and cefepime on Tuesday, 4/4, per mother. She notes there have been fibrins coming from his dialysis catheter. They called patient's transplant team today and mother was advised to bring patient in to the ED.    Currently, patient is hemodynamically stable. He was started on broad spectrum antibiotics including vancomycin, cefepime, and metronidazole.    Recommendation;  --start fluconazole  --consulting surgery for ASAP PD cath removal  --will need temporary CVC cath for now for HD treatment  --He will require long term access for dialysis treatment.   --He will unlikely go back to PD after fungal infection.     Recommendations is communicated with primary provider.      Case is discussed with Dr. Shabazz who agrees with the plan.

## 2023-04-08 LAB
ANION GAP SERPL CALCULATED.3IONS-SCNC: 19 MMOL/L (ref 7–15)
BACTERIA UR CULT: NORMAL
BACTERIA UR CULT: NORMAL
BASE EXCESS BLDV CALC-SCNC: -12.2 MMOL/L (ref -7.7–1.9)
BASE EXCESS BLDV CALC-SCNC: -13.5 MMOL/L (ref -7.7–1.9)
BUN SERPL-MCNC: 119 MG/DL (ref 6–20)
CALCIUM SERPL-MCNC: 9 MG/DL (ref 8.6–10)
CHLORIDE SERPL-SCNC: 104 MMOL/L (ref 98–107)
CREAT SERPL-MCNC: 8.23 MG/DL (ref 0.67–1.17)
DEPRECATED HCO3 PLAS-SCNC: 11 MMOL/L (ref 22–29)
ERYTHROCYTE [DISTWIDTH] IN BLOOD BY AUTOMATED COUNT: 14.1 % (ref 10–15)
GFR SERPL CREATININE-BSD FRML MDRD: 8 ML/MIN/1.73M2
GLUCOSE SERPL-MCNC: 69 MG/DL (ref 70–99)
HCO3 BLDV-SCNC: 13 MMOL/L (ref 21–28)
HCO3 BLDV-SCNC: 13 MMOL/L (ref 21–28)
HCT VFR BLD AUTO: 33.6 % (ref 40–53)
HGB BLD-MCNC: 10.8 G/DL (ref 13.3–17.7)
LACTATE SERPL-SCNC: 0.5 MMOL/L (ref 0.7–2)
LACTATE SERPL-SCNC: 0.8 MMOL/L (ref 0.7–2)
MCH RBC QN AUTO: 29.5 PG (ref 26.5–33)
MCHC RBC AUTO-ENTMCNC: 32.1 G/DL (ref 31.5–36.5)
MCV RBC AUTO: 92 FL (ref 78–100)
O2/TOTAL GAS SETTING VFR VENT: 0 %
O2/TOTAL GAS SETTING VFR VENT: 21 %
PCO2 BLDV: 28 MM HG (ref 40–50)
PCO2 BLDV: 32 MM HG (ref 40–50)
PH BLDV: 7.22 [PH] (ref 7.32–7.43)
PH BLDV: 7.28 [PH] (ref 7.32–7.43)
PLATELET # BLD AUTO: 501 10E3/UL (ref 150–450)
PO2 BLDV: 62 MM HG (ref 25–47)
PO2 BLDV: 72 MM HG (ref 25–47)
POTASSIUM SERPL-SCNC: 4.8 MMOL/L (ref 3.4–5.3)
RBC # BLD AUTO: 3.66 10E6/UL (ref 4.4–5.9)
SODIUM SERPL-SCNC: 134 MMOL/L (ref 136–145)
WBC # BLD AUTO: 18.6 10E3/UL (ref 4–11)

## 2023-04-08 PROCEDURE — 83605 ASSAY OF LACTIC ACID: CPT | Performed by: INTERNAL MEDICINE

## 2023-04-08 PROCEDURE — 86704 HEP B CORE ANTIBODY TOTAL: CPT

## 2023-04-08 PROCEDURE — 250N000011 HC RX IP 250 OP 636

## 2023-04-08 PROCEDURE — 86706 HEP B SURFACE ANTIBODY: CPT | Performed by: INTERNAL MEDICINE

## 2023-04-08 PROCEDURE — 80048 BASIC METABOLIC PNL TOTAL CA: CPT

## 2023-04-08 PROCEDURE — 87340 HEPATITIS B SURFACE AG IA: CPT | Performed by: INTERNAL MEDICINE

## 2023-04-08 PROCEDURE — 250N000013 HC RX MED GY IP 250 OP 250 PS 637

## 2023-04-08 PROCEDURE — 250N000013 HC RX MED GY IP 250 OP 250 PS 637: Performed by: INTERNAL MEDICINE

## 2023-04-08 PROCEDURE — 999N000157 HC STATISTIC RCP TIME EA 10 MIN

## 2023-04-08 PROCEDURE — 36415 COLL VENOUS BLD VENIPUNCTURE: CPT

## 2023-04-08 PROCEDURE — 82803 BLOOD GASES ANY COMBINATION: CPT

## 2023-04-08 PROCEDURE — 120N000002 HC R&B MED SURG/OB UMMC

## 2023-04-08 PROCEDURE — 250N000011 HC RX IP 250 OP 636: Performed by: INTERNAL MEDICINE

## 2023-04-08 PROCEDURE — 85014 HEMATOCRIT: CPT

## 2023-04-08 PROCEDURE — 90937 HEMODIALYSIS REPEATED EVAL: CPT

## 2023-04-08 PROCEDURE — 5A1D70Z PERFORMANCE OF URINARY FILTRATION, INTERMITTENT, LESS THAN 6 HOURS PER DAY: ICD-10-PCS | Performed by: INTERNAL MEDICINE

## 2023-04-08 PROCEDURE — 250N000009 HC RX 250

## 2023-04-08 PROCEDURE — 86481 TB AG RESPONSE T-CELL SUSP: CPT

## 2023-04-08 PROCEDURE — 36415 COLL VENOUS BLD VENIPUNCTURE: CPT | Performed by: INTERNAL MEDICINE

## 2023-04-08 PROCEDURE — 94669 MECHANICAL CHEST WALL OSCILL: CPT

## 2023-04-08 PROCEDURE — 94640 AIRWAY INHALATION TREATMENT: CPT

## 2023-04-08 PROCEDURE — 94640 AIRWAY INHALATION TREATMENT: CPT | Mod: 76

## 2023-04-08 PROCEDURE — 99222 1ST HOSP IP/OBS MODERATE 55: CPT | Performed by: INTERNAL MEDICINE

## 2023-04-08 PROCEDURE — 90935 HEMODIALYSIS ONE EVALUATION: CPT | Performed by: INTERNAL MEDICINE

## 2023-04-08 PROCEDURE — 258N000003 HC RX IP 258 OP 636: Performed by: INTERNAL MEDICINE

## 2023-04-08 RX ORDER — IPRATROPIUM BROMIDE AND ALBUTEROL SULFATE 2.5; .5 MG/3ML; MG/3ML
SOLUTION RESPIRATORY (INHALATION)
Status: COMPLETED
Start: 2023-04-08 | End: 2023-04-08

## 2023-04-08 RX ORDER — OXYCODONE HYDROCHLORIDE 5 MG/1
5 TABLET ORAL EVERY 4 HOURS PRN
Status: DISCONTINUED | OUTPATIENT
Start: 2023-04-08 | End: 2023-04-22 | Stop reason: HOSPADM

## 2023-04-08 RX ORDER — NALOXONE HYDROCHLORIDE 0.4 MG/ML
0.2 INJECTION, SOLUTION INTRAMUSCULAR; INTRAVENOUS; SUBCUTANEOUS
Status: DISCONTINUED | OUTPATIENT
Start: 2023-04-08 | End: 2023-04-22 | Stop reason: HOSPADM

## 2023-04-08 RX ORDER — NALOXONE HYDROCHLORIDE 0.4 MG/ML
0.4 INJECTION, SOLUTION INTRAMUSCULAR; INTRAVENOUS; SUBCUTANEOUS
Status: DISCONTINUED | OUTPATIENT
Start: 2023-04-08 | End: 2023-04-22 | Stop reason: HOSPADM

## 2023-04-08 RX ORDER — POLYETHYLENE GLYCOL 3350 17 G/17G
1 POWDER, FOR SOLUTION ORAL 2 TIMES DAILY
Status: ON HOLD | COMMUNITY
End: 2023-04-21

## 2023-04-08 RX ORDER — PREDNISONE 20 MG/1
20 TABLET ORAL DAILY PRN
COMMUNITY

## 2023-04-08 RX ORDER — LACTULOSE 20 G/30ML
20 SOLUTION ORAL 2 TIMES DAILY PRN
COMMUNITY

## 2023-04-08 RX ORDER — SENNOSIDES 8.6 MG
1 TABLET ORAL 2 TIMES DAILY
COMMUNITY

## 2023-04-08 RX ORDER — CALCIUM CARBONATE 500 MG/1
1000 TABLET, CHEWABLE ORAL DAILY
COMMUNITY

## 2023-04-08 RX ORDER — CALCIUM CARBONATE 500 MG/1
1000 TABLET, CHEWABLE ORAL AT BEDTIME
COMMUNITY
End: 2024-08-16

## 2023-04-08 RX ORDER — AMLODIPINE BESYLATE 5 MG/1
5 TABLET ORAL 2 TIMES DAILY
COMMUNITY

## 2023-04-08 RX ORDER — OXYCODONE HYDROCHLORIDE 5 MG/1
5 TABLET ORAL ONCE
Status: COMPLETED | OUTPATIENT
Start: 2023-04-08 | End: 2023-04-08

## 2023-04-08 RX ORDER — GENTAMICIN SULFATE 1 MG/G
CREAM TOPICAL DAILY
COMMUNITY

## 2023-04-08 RX ORDER — CALCIUM CARBONATE 500 MG/1
500 TABLET, CHEWABLE ORAL DAILY PRN
Status: DISCONTINUED | OUTPATIENT
Start: 2023-04-08 | End: 2023-04-11

## 2023-04-08 RX ORDER — NEOMYCIN/BACITRACIN/POLYMYXINB 3.5-400-5K
OINTMENT (GRAM) TOPICAL DAILY PRN
COMMUNITY

## 2023-04-08 RX ORDER — SIMETHICONE 40MG/0.6ML
40 SUSPENSION, DROPS(FINAL DOSAGE FORM)(ML) ORAL EVERY 6 HOURS PRN
Status: DISCONTINUED | OUTPATIENT
Start: 2023-04-08 | End: 2023-04-22 | Stop reason: HOSPADM

## 2023-04-08 RX ORDER — ACETAMINOPHEN 500 MG
1000 TABLET ORAL EVERY 8 HOURS PRN
Status: DISCONTINUED | OUTPATIENT
Start: 2023-04-08 | End: 2023-04-11

## 2023-04-08 RX ORDER — DOCUSATE SODIUM 100 MG/1
100 CAPSULE, LIQUID FILLED ORAL 2 TIMES DAILY
COMMUNITY

## 2023-04-08 RX ADMIN — SENNOSIDES AND DOCUSATE SODIUM 1 TABLET: 50; 8.6 TABLET ORAL at 23:32

## 2023-04-08 RX ADMIN — IPRATROPIUM BROMIDE AND ALBUTEROL SULFATE 3 ML: .5; 3 SOLUTION RESPIRATORY (INHALATION) at 10:48

## 2023-04-08 RX ADMIN — OXYCODONE HYDROCHLORIDE 5 MG: 5 TABLET ORAL at 01:00

## 2023-04-08 RX ADMIN — IPRATROPIUM BROMIDE 0.5 MG: 0.5 SOLUTION RESPIRATORY (INHALATION) at 20:05

## 2023-04-08 RX ADMIN — Medication 10 ML: at 22:00

## 2023-04-08 RX ADMIN — ONDANSETRON 4 MG: 2 INJECTION INTRAMUSCULAR; INTRAVENOUS at 13:06

## 2023-04-08 RX ADMIN — OXYCODONE HYDROCHLORIDE 5 MG: 5 TABLET ORAL at 23:32

## 2023-04-08 RX ADMIN — SODIUM BICARBONATE 650 MG: 650 TABLET ORAL at 20:44

## 2023-04-08 RX ADMIN — CEFEPIME HYDROCHLORIDE 1 G: 1 INJECTION, POWDER, FOR SOLUTION INTRAMUSCULAR; INTRAVENOUS at 20:48

## 2023-04-08 RX ADMIN — METRONIDAZOLE 500 MG: 500 INJECTION, SOLUTION INTRAVENOUS at 16:42

## 2023-04-08 RX ADMIN — Medication: at 13:18

## 2023-04-08 RX ADMIN — SODIUM BICARBONATE 650 MG: 650 TABLET ORAL at 10:04

## 2023-04-08 RX ADMIN — IPRATROPIUM BROMIDE AND ALBUTEROL SULFATE 3 ML: .5; 3 SOLUTION RESPIRATORY (INHALATION) at 17:37

## 2023-04-08 RX ADMIN — HEPARIN SODIUM 1300 UNITS: 1000 INJECTION INTRAVENOUS; SUBCUTANEOUS at 15:47

## 2023-04-08 RX ADMIN — SODIUM CHLORIDE 250 ML: 9 INJECTION, SOLUTION INTRAVENOUS at 13:18

## 2023-04-08 RX ADMIN — SODIUM CHLORIDE 300 ML: 9 INJECTION, SOLUTION INTRAVENOUS at 13:17

## 2023-04-08 RX ADMIN — METRONIDAZOLE 500 MG: 500 INJECTION, SOLUTION INTRAVENOUS at 05:40

## 2023-04-08 RX ADMIN — ALBUTEROL SULFATE 2.5 MG: 2.5 SOLUTION RESPIRATORY (INHALATION) at 00:16

## 2023-04-08 RX ADMIN — FAMOTIDINE 20 MG: 20 TABLET ORAL at 20:44

## 2023-04-08 RX ADMIN — Medication 1 MG: at 23:32

## 2023-04-08 RX ADMIN — FLUCONAZOLE IN SODIUM CHLORIDE 200 MG: 2 INJECTION, SOLUTION INTRAVENOUS at 18:52

## 2023-04-08 RX ADMIN — ALBUTEROL SULFATE 2.5 MG: 2.5 SOLUTION RESPIRATORY (INHALATION) at 20:05

## 2023-04-08 RX ADMIN — CINACALCET 30 MG: 30 TABLET ORAL at 10:04

## 2023-04-08 ASSESSMENT — ACTIVITIES OF DAILY LIVING (ADL)
ADLS_ACUITY_SCORE: 41
ADLS_ACUITY_SCORE: 41
ADLS_ACUITY_SCORE: 38
ADLS_ACUITY_SCORE: 41
ADLS_ACUITY_SCORE: 41
ADLS_ACUITY_SCORE: 38
ADLS_ACUITY_SCORE: 35
ADLS_ACUITY_SCORE: 41
ADLS_ACUITY_SCORE: 38
ADLS_ACUITY_SCORE: 41

## 2023-04-08 NOTE — PROGRESS NOTES
"Care Management Initial Consult    General Information  Assessment completed with: Patient, mother/guardian Valerie  Type of CM/SW Visit: Initial Assessment    Primary Care Provider verified and updated as needed:   yes    Communication Assessment  Patient's communication style: spoken language (English or Bilingual)    Hearing Difficulty or Deaf: yes (On hearing aid)   Wear Glasses or Blind: no    Cognitive  Cognitive/Neuro/Behavioral: Pt has developmental delay                    Living Environment:   People in home:     2  Current living Arrangements:   With mother in duplex     Able to return to prior arrangements:  Pending PT/OT assess       Family/Social Support:  Care provided by:  Mother, PCA           Description of Support System:    Pt lives with mother who provides some care.  She has limited mobility.       Current Resources:   Patient receiving home care services:  [facility] 2 hrs/day through \"[facility]\"       Lifestyle & Psychosocial Needs:  Social Determinants of Health     Tobacco Use: Low Risk  (4/7/2023)    Patient History      Smoking Tobacco Use: Never      Smokeless Tobacco Use: Never      Passive Exposure: Not on file   Alcohol Use: Not on file   Financial Resource Strain: Not on file   Food Insecurity: Not on file   Transportation Needs: Not on file   Physical Activity: Not on file   Stress: Not on file   Social Connections: Not on file   Intimate Partner Violence: Not on file   Depression: Not at risk (3/22/2021)    PHQ-2      PHQ-2 Score: 0   Housing Stability: Not on file       Functional Status:  Prior to admission patient needed assistance:    Pt's mother assists with medication          Additional Information:  Mother Valerie states she is legal guardian; no current paperwork on file, need to clarify    Additional Information:  Pt currently gets PD services through [facility] in [place], [place] location [phone].      Per nephrology note, \" He will require long term access for " "dialysis treatment.   -He will unlikely go back to PD after fungal infection..\"      CC confirmed with unit that pt is followed at the [facility] location and a new referral will need to be sent to admission for HD. Pt's mother states they would return to [facility] in [place].  They would prefer an PM m-w-f schedule.  In the past mother has been required to stay with pt for the duration of dialysis r/t his trach.    Faxed referral to [facility] admissions.  Did not include labs as still pending.    Assessment done with pt's mother Valerie. Pt has trach access, NG tube for fluids only (pt takes oral food).  Pt has had HD in the past but was doing PD prior to admit.  Pt ind with ADL's.  Pt's mom provides transportation.  Pt can independently use the city bus.      Familia Plaza, RN        "

## 2023-04-08 NOTE — CONSULTS
"  GENERAL INFECTIOUS DISEASES CONSULTATION     Patient:  Aidan Louie   Date of birth 1995, Medical record number [id number]  Date of Visit:  04/08/2023  Date of Admission: 4/6/2023  Consult Requester:Bandar Weston MD          Assessment and Recommendations:   ASSESSMENT:  1. Fungal peritonitis- C.albicans on [facility] Cx  2. Hx ESRD previously on HD (12/2021-3/2022) on peritoneal dialysis (3/2022-4/2023), now s/p removal of PD catheter on 4/7  3. Congenital abnormality of urinary tract, s/p Mitrofanoff  4. Ileus    DISCUSSION:   Aidan Louie (Alex) is a 27 year old male with history of prune belly syndrome, Dawood Reece syndrome, ESRD on peritoneal dialysis (3/2022) due to congenital abnormalities of urinary tract, s/p Mitrofanoff procedure, restrictive lung disease, s/p tracheostomy, PEG tube, and multiple abdominal reconstructive surgeries who presented to ED on 4/6/23 with concern for peritonitis related to PD catheter, report of \"budding yeast\" on outside culture. Now s/p removal of PD catheter on 4/7, placement of temporary HD line.     Issues with PD catheter and runs since 3/9 with intermittent abd pain and distention. Foamy purulent drainage at home cultured by dialysis nurse and growing budding yeast with no bacterial growth at time of admission, per report. Was started on cefepime and vancomycin at home through nephrology/dialysis center. Increased lethargy and has not been able to do all of his own ADLs and cares at home as he usually does. Fever to 104F at home right before admission, WBC 20.6K on arrival. Cultures from PD catheter removal at [facility] now with C.albicans. Skin would be most likely source, followed by intra-abdominal. Continue fluconazole, cefepime, and Flagyl for now. Would like to confirm that no bacteria growing on cultures collected by dialysis prior to admission.     RECOMMENDATION:  1. Continue fluconazole for C.albicans peritonitis  2. Continue cefepime and Flagyl " "for now  - Will need to confirm with dialysis nurse/outside lab on Monday that there is no bacterial growth, then can stop cefepime and Flagyl  3. Agree with discontinuing vancomycin order  4. Will add susceptibilities to C.albicans from peritoneal fluid    Thank you for this consult. ID will continue to follow.     Patient was discussed with Dr. Aggarwal.     Skye Smith PA-C  Infectious Disease  Pager # [phone]     ________________________________________________________________    Consult Question: 28yo male w/ sepsis 2/2 peritonitis from peritoneal dialysis, prior to hospital peritoneal cultures w/ budding yeast - on Cefepime, Flagyl, and Fluconazole - PD cath removed 4/7  Admission Diagnosis: Encounter for extracorporeal dialysis (H) [Z99.2]  End stage renal disease (H) [N18.6]  Miscellaneous cardiovascular devices assoc w incdt, NEC [Y71.8]  Infection associated with peritoneal dialysis catheter, initial encounter (H) [T85.71XA]  Encounter for screening laboratory testing for severe acute respiratory syndrome coronavirus 2 (SARS-CoV-2) [Z20.822]         History of Present Illness:     Aidan Louie is a 27 year old male with history of prune belly syndrome, Dawood Reece syndrome, ESRD on peritoneal dialysis (3/2022) due to congenital abnormalities of urinary tract, s/p Mitrofanoff procedure, restrictive lung disease, s/p tracheostomy, PEG tube, and multiple abdominal reconstructive surgeries who presented to ED on 4/6/23 with concern for peritonitis related to PD catheter, report of \"budding yeast\" on outside culture. Now s/p removal of PD catheter on 4/7.     History is obtained from Greg's mother Valerie at bedside. Greg is drowsy but opens eyes to voice and nods occasionally. Reported pain and difficulties with peritoneal dialysis since 3/10/23. Foamy purulent discharge from PD catheter for about a week. Dialysis nurse sent culture of fluid, per report growing budding yeast. Tenderness surrounding catheter " site and intermittent abdominal bloating and increased pain. Intermittent short lasting fevers to 101F at home, then had a temperature to 104F, which prompted admission along with growth on culture.  Increased lethargy and has not been able to do all of his own ADLs and cares at home as he usually does. Mother also notes that Greg had increased pain with last trach exchange a little over a week ago and has had increased secretions and neb needs since he's been sick. She reports that he has had increased respiratory secretions without pneumonia during other infections in the past. Additionally, not passing flatus or stools, CT with SBO vs ileus and enterocolitis. General surgery involved for PD catheter removal on 4/7 and ileus.     Antimicrobials  Current:  - cefepime (4/6-present)  - fluconazole (4/6-present)  - metronidazole (4/6-present)         Past Medical History:     Past Medical History:   Diagnosis Date     Bilateral reflux nephropathy      CKD (chronic kidney disease) stage 4, GFR 15-29 ml/min (H)      End stage renal disease (H)      Hearing loss      Hypertension      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      Prune belly syndrome      Recurrent UTI      Respiratory bronchiolitis interstitial lung disease (H)      Restrictive lung disease      Scoliosis 2002     Thyroid disease      Tracheostomy dependence (H)             Past Surgical History:     Past Surgical History:   Procedure Laterality Date     Bilateral ureteral reimplantation  5/16/2002    abdominoplasty, Mitrofanoff creation     CYSTOSCOPY  11/14/11     EXAM UNDER ANESTHESIA THROAT  6/10/2003    tonsillar hypertrophy     EXAM UNDER ANESTHESIA, RESTORATIONS, EXTRACTION(S) DENTAL COMPLEX, COMBINED  6/5/2006     GASTROSTOMY TUBE       GASTROSTOMY TUBE  3/16/2002    button change     HERNIORRHAPHY INGUINAL BILATERAL  7/23/99    left ochiopexy     LARYNGOSCOPY, BRONCHOSCOPY, COMBINED  6/21/2002     LARYNGOSCOPY, BRONCHOSCOPY, COMBINED  12/12/2002     bilateral ear debridement     LARYNGOSCOPY, BRONCHOSCOPY, COMBINED  2003     LARYNGOSCOPY, BRONCHOSCOPY, COMBINED  2007    Failed Deflux injection     LARYNGOSCOPY, BRONCHOSCOPY, COMBINED  12      VESICOSTOMY       Redo right ureteral reimplantation  3/12/2004    Excision bladder diverticuli, Left to Right pyelopyelostomy     Replacement of trach  10/15/12     Takedown and revision of Mitrofanoff stoma  2006     TRACHEOSTOMY INFANT              Family History:   Reviewed and non-contributory.   Family History   Problem Relation Age of Onset     Diabetes Type 2  Mother      Diabetes Type 2  Maternal Grandmother      Deep Vein Thrombosis (DVT) Maternal Grandmother      Diabetes Type 2  Maternal Grandfather      Diabetes Type 2  Paternal Grandmother      Alzheimer Disease Paternal Grandfather      Thyroid Disease Maternal Aunt         hashimoto     Anesthesia Reaction Maternal Aunt         patients mom reports her sister had an allergic reaction to something during a previous surgery     Thyroid Cancer Maternal Great-Grandmother             Social History:     Social History     Tobacco Use     Smoking status: Never     Smokeless tobacco: Never   Vaping Use     Vaping status: Not on file   Substance Use Topics     Alcohol use: No     Alcohol/week: 0.0 standard drinks of alcohol     History   Sexual Activity     Sexual activity: Not on file            Current Medications:       azithromycin  250 mg Oral Once per day on      ceFEPIme  1 g Intravenous Q24H     cinacalcet  30 mg Oral Daily     famotidine  20 mg Oral Daily     fluconazole  200 mg Intravenous Q24H     heparin lock flush  5-20 mL Intracatheter Q24H     metroNIDAZOLE  500 mg Intravenous Q12H     sodium bicarbonate  650 mg Oral TID     sodium chloride (PF)  10-40 mL Intracatheter Q8H     sodium chloride (PF)  3 mL Intracatheter Q8H     [Held by provider] Vitamin D3  2,000 Units Oral Daily            Allergies:      Allergies   Allergen Reactions     Latex      Latex      Other reaction(s): Other (see comments)  Other reaction(s): Contact Dermatitis, Other (see comments)     Adhesive Tape Rash            Physical Exam:   Vitals were reviewed  Patient Vitals for the past 24 hrs:   BP Temp Temp src Pulse Resp SpO2 Weight   04/08/23 1048 -- -- -- 98 19 -- --   04/08/23 1000 (!) 160/101 97.9  F (36.6  C) Oral 98 20 98 % --   04/08/23 0837 -- 98.2  F (36.8  C) Oral -- 18 97 % --   04/08/23 0400 -- -- -- 75 15 97 % 48.5 kg (107 lb)   04/08/23 0016 -- -- -- -- -- 98 % --   04/08/23 0000 -- -- -- -- 24 -- --   04/07/23 2300 (!) 154/110 98.2  F (36.8  C) Oral 101 28 97 % --   04/07/23 2200 (!) 145/81 -- -- 98 16 99 % --   04/07/23 2100 (!) 157/92 -- -- 98 -- 100 % --   04/07/23 2015 -- -- -- -- -- 99 % --   04/07/23 2000 130/84 97.9  F (36.6  C) Axillary 96 16 100 % --   04/07/23 1945 -- -- -- -- -- 98 % --   04/07/23 1930 (!) 140/90 -- -- 98 -- 100 % --   04/07/23 1915 -- -- -- -- -- 100 % --   04/07/23 1900 (!) 131/93 -- -- 96 -- 100 % --   04/07/23 1830 (!) 137/94 -- -- 101 -- 100 % --   04/07/23 1800 127/81 -- -- 101 16 100 % --   04/07/23 1700 (!) 141/85 -- -- 101 16 100 % --   04/07/23 1630 123/87 -- -- 95 17 100 % --   04/07/23 1600 132/83 -- -- 99 18 99 % --   04/07/23 1530 121/87 -- -- 93 18 100 % --   04/07/23 1500 123/89 -- -- 97 18 99 % --   04/07/23 1430 115/72 -- -- 88 17 97 % --   04/07/23 1400 138/84 -- -- -- 18 98 % --   04/07/23 1330 132/83 -- -- 94 16 97 % --   04/07/23 1300 135/86 -- -- 102 12 99 % --   04/07/23 1230 134/84 97.5  F (36.4  C) -- 96 17 100 % --   04/07/23 1215 133/88 96.9  F (36.1  C) Axillary 95 19 100 % --   04/07/23 1200 133/83 -- -- 91 22 100 % --   04/07/23 1145 122/78 -- -- 94 22 99 % --   04/07/23 1130 126/84 -- -- 90 18 100 % --   04/07/23 1115 128/83 -- -- 89 15 100 % --       Physical Examination:  GENERAL:  Drowsy, opens eyes to voice and nods in response to few questions.   HEENT:  Head  is atraumatic.  EYES:  Eyes have anicteric sclerae without conjunctival injection or stigmata of endocarditis.    ENT:  Oropharynx is moist. Hearing aids in place.   NECK:  Trach in place without drainage.   LUNGS:  Clear to anterior auscultation bilateral.   CARDIOVASCULAR:  Regular rate and rhythm, +S1/S2, no murmur appreciated. No peripheral edema.  ABDOMEN:  Soft, nondistended, +tender to light palpation, + guarding. +mitrofanoff in place. Dressings over former PD cath site are clean and dry.  SKIN:  No acute rashes.  R-internal jugular CVC in place  NEUROLOGIC:  Drowsy, no tremor.          Laboratory Data:     Inflammatory Markers    Recent Labs   Lab Test 04/07/23  0745 04/06/23 1831   SED  --  66*   CRPI 374.00* 453.00*       Hematology Studies    Recent Labs   Lab Test 04/07/23  0745 04/06/23  1831 08/30/21  1300 11/30/20  1415 10/15/20  1138 12/09/19  1311 12/22/16  1325 09/17/15  1205 02/05/15  1110   WBC 20.8* 20.6* 7.6 8.3 9.8 9.2   < > 8.7 7.7   ANEU  --   --   --   --  6.2  --   --  7.1 6.6   AEOS  --   --   --   --  1.0*  --   --  0.2 0.1   HGB 12.2* 13.1* 9.9* 10.3* 10.8* 12.1*   < > 13.0* 13.8   MCV 92 91 89 92 90 90   < > 88 87   * 552* 263 332 299 313   < > 326 281    < > = values in this interval not displayed.       Metabolic Studies     Recent Labs   Lab Test 04/08/23  0656 04/07/23  1728 04/07/23  0745 04/06/23  1831 10/19/21  1433   * 137 134* 134* 143   POTASSIUM 4.8 5.5* 5.3 5.4* 5.0   CHLORIDE 104 104 101 99 114*   CO2 11* 11* 10* 15* 20   .0* 124.0* 123.0* 115.0* 79*   CR 8.23* 8.83* 8.73* 8.52* 6.14*   GFRESTIMATED 8* 8* 8* 8* 12*       Hepatic Studies    Recent Labs   Lab Test 08/30/21  1300 11/30/20  1415 10/15/20  1138 12/09/19  1311 05/13/19  1027 01/03/19  1438 12/22/16  1325 09/17/15  1205 02/05/15  1110   BILITOTAL  --   --  0.2  --   --   --   --   --   --    ALKPHOS  --   --  94  --   --   --   --  80 87   ALBUMIN 3.7 3.7 3.9 3.6 3.2* 3.6   < > 3.8 3.5   AST   --   --  10  --   --   --   --   --   --    ALT  --   --  15  --   --   --   --   --   --     < > = values in this interval not displayed.       Microbiology:  Culture   Date Value Ref Range Status   04/07/2023 Culture in progress  Preliminary   04/07/2023 Culture in progress  Preliminary   04/06/2023 <10,000 CFU/mL Mixture of urogenital steven  Final   04/06/2023 <10,000 CFU/mL Mixture of urogenital steven  Final   04/06/2023 No growth after 1 day  Preliminary   04/06/2023 No growth after 1 day  Preliminary       Urine Studies    Recent Labs   Lab Test 04/06/23  1853 11/30/20  1132 07/11/17  1341 12/22/16  1328 09/17/15  1410 02/05/15  1115   LEUKEST Large* Small* Small* Large* Quantity not sufficient* Large*   WBCU 80*  --  6* >182* Quantity not sufficient* 15*       Vancomycin Levels    Recent Labs   Lab Test 04/06/23  2213   VANCOMYCIN 19.2       Hepatitis B Testing   Recent Labs   Lab Test 10/15/20  1138   HBCAB Nonreactive   HEPBANG Nonreactive     Hepatitis C Testing     Hepatitis C Antibody   Date Value Ref Range Status   10/15/2020 Nonreactive NR^Nonreactive Final     Comment:     Assay performance characteristics have not been established for newborns,   infants, and children       Respiratory Virus Testing    No results found for: RS, FLUAG          Imaging:     CXR (4/7/23)  IMPRESSION:  1. Right IJ central venous catheter with tip overlying the right  atrium. No pneumothorax.  2. No focal airspace opacity.    CT Abd/Pelvis w/o contrast (4/6/23)  Impression:   1. Findings of bowel obstruction with small bowel loops dilated up to  4 cm in the left hemiabdomen. Transition point is likely located in  the pelvis along the peritoneal dialysis catheter, with appreciable  inflammatory changes about the small and large bowel at this site.   2. Moderate hiatal hernia with fluid-filled stomach.  3. Partially visualized lung bases demonstrates mosaic attenuation and  diffuse peribronchial thickening, which can be  seen in the setting of  small airway disease.

## 2023-04-08 NOTE — PROGRESS NOTES
"[facility]  Inpatient Medicine Service - [place] Team 4  Daily Progress Note    Patient: Aidan Louie      : 1995      MRN: [id number]    Assessment/Plan:   iAdan Louie is a 27 year old male w/ pertinent PMHx of ESRD 2/2 reflux nephropathy on peritoneal dialysis, prune belly syndrome, horseshoe kidney, left to right transureteroureterostomy, neurogenic bladder s/p mitronoff urinary diversion, Dawood-Reece sequence, and restrictive lung disease w/ prior tracheostomy (decannulated ), and cognitive delay admitted since 23 for abdominal pain, n/v, constipation, and PD catheter purulence and malfunction who was found to have clinical sepsis likely from PD-related peritonitis w/ adjacent small bowel obstruction.    Today's updates/changes:  - Advancing diet as tolerates  - Nephrology planning hemodialysis, to go this afternoon  - ID formally consulted for when culture data begins to come back  - Vancomycin discontinued, continuing IV Cefepime, Flagyl, and Fluconazole    Acute Hospital Problems:    Sepsis secondary to peritonitis (likely fungal)  In setting of peritoneal dialysis, one month of difficult PD access, few days of \"foamy\" and possibly purulent PD cath discharge, fevers. Unable to tolerate peritoneal antibiotics prior to admission. Initially w/ tachycardia and leukocytosis. Peritoneal culture prior to admission growing budding yeast, no bacterial growth as of yet. Now s/p PD catheter removal . BCx, peritoneal fluid, and PD catheter tip cultures pending this ladmission. Overall hemodynamically and clinically stable on broad antimicrobial therapy.   Plan:  - ID formally consulted today as culture data begins to come back  - Monitoring BCx , UCx , PD catheter culture , and peritoneal fluid culture   - Monitoring peritoneal fluid culture from prior to admission (PD RN will check in with Josselin for updates)  - IV Cefepime, " Flagyl, Fluconazole (all since 4/6 - present) for now   - IV Vanc 4/6 - discontinued 4/8  - Post-op pain regimen: PRN Tylenol and Oxycodone available  - Post-op dressing recommendations: See surgery progress note dated 4/8/23    Small bowel obstruction vs infection-associated ileus  With transition point closely associated to PD catheter which is now s/p removal. In context of poor PO, nausea, vomiting, and constipation at home prior to admission.  Plan:  - Gen surg signed off as of 4/8  - Will trial slow advance of diet as tolerates this weekend, low threshold to return to NPO if not tolerating  - Can decompress w/ G-tube periodically if needed per surgery recs  - Symptomatic support: Zofran, Compazine prn    ESRD w/ PD  Mild hyperkalemia  AGMA  Likely in setting of incomplete peritoneal dialysis leading up to admission. Now s/p PD cath removal in setting of peritonitis as above w/ temporary CVC placed for HD reinitiation this hospital stay. Nephrology is following, will coordinate timing of first HD session. In the meantime, stable mild hyperkalemia (K<5.5) and AGMA (pH>7.2).  Plan:  - Nephro following, dialysis this afternoon  - PTA Cinacalcet 30mg qday, PO NaHCO3 650mg TID    Restrictive lung disease  Chronic. Currently without tachypnea or hypoxia. Comfortable-appearing. Some bronchial cuffing on CXR, though not different than prior, some coarse breath sounds and increased secretions on history. Initial viral panel unremarkable. Suspect primary infection is peritonitis as above, uncertain if may be a component of viral vs bacterial pneumonia but is certainly covered for now from a bacterial pneumonia perspective.  Plan:  - Sputum culture if produces sputum  - See above for antibiotic regimen  - PTA Azithro MWF  - PTA Duonebs and vest therapy  - RT following for assistance    Chronic malnutrition  Has PEG but not tube feed dependent, tolerates PO.  Plan:  - Advancing diet as above    Neurogenic bladder s/p  mitranoff urinary diversion  Still makes some degree of urine. Self-caths every ~4 hours daily w/ intermittent irrigation. Last urology visit in 2022.  Plan:  - Straight cath orders for ~q4h while awake    HTN - holding PTA Amlodipine ( meds still listed as Captopril and Clonidine) while managing sepsis    GERD - PTA Famotidine      [facility] Checklist:  Diet: Advancing diet as tolerated  VTE ppx: None post-op, will reevaluate over the weekend  LDA: PIV, Right temporary CVC for HD, G-tube, Mitranoff diversion  Code: Full  Level of care: General Medicine  Family: Valerie Kingston - [phone]     Discharge Planning:  Anticipate discharge in 3-4 days at earliest pending stability on hemodialysis and further infectious evaluation. Suspect will be discharging to prior living arrangement. Communicated via PINKY tab.      Staffed with attending physician, Dr. Weston.    Nahid Morrison MD    Internal Medicine PGY-2  [facility] Team 4    Pager #[phone]    CoSchedule Messaging  (Please see sign-in/sign-out for up-to-date physician coverage information)      Interval Events:   Overnight handoff and nursing reports reviewed. No significant interval events. Some abdominal pain this morning s/p PD removal and while treating peritonitis.     Objective:     Vitals:    23 1637 23 0400   Weight: 47.2 kg (104 lb) 48.5 kg (107 lb)     Vital Signs with Ranges  Temp:  [97.5  F (36.4  C)-98.2  F (36.8  C)] 97.5  F (36.4  C)  Pulse:  [] 89  Resp:  [12-28] 15  BP: (115-160)/() 139/92  FiO2 (%):  [21 %] 21 %  SpO2:  [97 %-100 %] 100 %  I/O last 3 completed shifts:  In: 500 [P.O.:200; I.V.:300]  Out: 753 [Urine:750; Blood:3]    Exam:  General: No acute distress, laying in bed, conversational with mother and with providers when hearing aids were put in  Neck: Right internal jugular CVC w/o bleeding  CV: Regular rate, regular rhythm, no new murmurs  Lungs: On room air, improved crackles this am, no wheezing this  mornin, no increased WOB  Abd: Soft, slightly-distended, mild diffuse tenderness to palpation, G-tube noted and Mitranoff stoma noted  Ext: Warm and well-perfused, no appreciable lower extremity edema    Medications       sodium chloride 0.9%  250 mL Intravenous Once in dialysis/CRRT     sodium chloride 0.9%  300 mL Hemodialysis Machine Once     azithromycin  250 mg Oral Once per day on Mon Wed Fri     ceFEPIme  1 g Intravenous Q24H     cinacalcet  30 mg Oral Daily     famotidine  20 mg Oral Daily     fluconazole  200 mg Intravenous Q24H     sodium chloride (PF) 0.9%  10 mL Intracatheter Once in dialysis/CRRT    Followed by     heparin  1.3-2.6 mL Intracatheter Once in dialysis/CRRT     sodium chloride (PF) 0.9%  10 mL Intracatheter Once in dialysis/CRRT    Followed by     heparin  1.3-2.6 mL Intracatheter Once in dialysis/CRRT     heparin lock flush  5-20 mL Intracatheter Q24H     metroNIDAZOLE  500 mg Intravenous Q12H     - MEDICATION INSTRUCTIONS -   Does not apply Once     sodium bicarbonate  650 mg Oral TID     sodium chloride (PF)  10-40 mL Intracatheter Q8H     sodium chloride (PF)  3 mL Intracatheter Q8H     sodium chloride (PF)  9 mL Intracatheter During Dialysis/CRRT (from stock)     sodium chloride (PF)  9 mL Intracatheter During Dialysis/CRRT (from stock)     [Held by provider] Vitamin D3  2,000 Units Oral Daily       Data   Recent Labs   Lab 04/08/23  1059 04/08/23  0656 04/07/23  1728 04/07/23  0858 04/07/23  0745 04/06/23  1831   WBC 18.6*  --   --   --  20.8* 20.6*   HGB 10.8*  --   --   --  12.2* 13.1*   MCV 92  --   --   --  92 91   *  --   --   --  542* 552*   NA  --  134* 137  --  134* 134*   POTASSIUM  --  4.8 5.5*  --  5.3 5.4*   CHLORIDE  --  104 104  --  101 99   CO2  --  11* 11*  --  10* 15*   BUN  --  119.0* 124.0*  --  123.0* 115.0*   CR  --  8.23* 8.83*  --  8.73* 8.52*   ANIONGAP  --  19* 22*  --  23* 20*   SUSAN  --  9.0 8.7  --  10.3* 11.0*   GLC  --  69* 69* 72 69* 85       No  results found for this or any previous visit (from the past 24 hour(s)).

## 2023-04-08 NOTE — PROGRESS NOTES
HEMODIALYSIS TREATMENT NOTE    Date: 4/8/2023  Time: 1:46 PM    Data:  Pre Wt: 48.5    Desired Wt: 47.2 kg   Post Wt:  47.2 kg  Weight change: 1.3 kg  Ultrafiltration - Post Run Net Total Removed (mL):  1300 ml  Vascular Access Status: patent  Dialyzer Rinse:    Total Blood Volume Processed: 4.23 L Liters  Total Dialysis (Treatment) Time: 27  Hours    Lab:   Yes: hep B antigen and antibody    Interventions/Assessment:  Received on bed, A/O, on trach, Spo2:98 % none room air  Pt. Dialyzed for 3 hrs via CVC, UF set for 2L  BFR:200,DFR:400, Dialysate bath K:2, CA:2.5  CVC is clean,dry and intact  Off 50 mins. Early due to clotted venous chamber, Jonathan CULLEN notified  CVC lumen locked with heparin and changed cap guard  Handoff reports given to PCN     Plan:    Per renal team

## 2023-04-08 NOTE — PROGRESS NOTES
"Emergency surgery progress note:    S:  No acute events overnight  Tolerated some clears and feels hungry this morning    O:  BP (!) 154/110 (BP Location: Left leg)   Pulse 75   Temp 98.2  F (36.8  C) (Oral)   Resp 18   Ht 1.676 m (5' 6\")   Wt 48.5 kg (107 lb)   SpO2 97%   BMI 17.27 kg/m    NAD  On trach  abd soft, mildly distended, appropriately tender over incisions which are covered with dressing    Cbc and gas pending  Bmp creatinine 8.23, na 134, co2 11    A/P:  27-year-old male with past medical history significant for prune-belly syndrome, Dawood Reece syndrome, end-stage renal disease on peritoneal dialysis (3/22) due to congenital abnormalities of the urinary tract, restrictive lung disease, surgical history of tracheostomy, PEG tube, Mitrofanoff procedure, multiple abdominal reconstructive surgeries presents to the ED with evidence of infection from peritoneal dialysis and imaging reading of SBO.    He is now s/p HD placement with anesthesia and PD catheter removal by EGS. It is most likely that he has a component of ileus with his intraabdominal infection rather then a true small bowel obstruction. He has tolerated clears without N/V.    - He is recovering from surgery appropriately  - For his ileus can advance diet slowly, if having nausea and/or emesis would make NPO until having return of bowel function.  - DRESSING:   - previous exit site of PD catheter will heal by secondary intention, okay to leave open to air or cover with gauze if having drainage   - midline abdominal incision has steristrips which will fall off in 2 weeks by themselves. Under are absorbable sutures   - primapore dressing can be taken off on POD2 (4/9)  - FOLLOW UP: no follow up is needed from EGS for this procedure. please call the [facility] Surgery [facility] at [phone] with any questions  - ACTIVITY: if it hurts don't do it. Okay to shower after POD2. Do not soak in bath tub or pool for 6 weeks after surgery.    - EGS " will sign off, call with questions or concerns    Seen with chief resident, discussed with staff    Tai Cruz MD PGY2  General Surgery

## 2023-04-08 NOTE — PLAN OF CARE
Received from PACU at 10:45pm post removal of infectedPeritoneal Dialysis site.  2 RN skin assessment done by Milton Novoa RN noted left lower quadrant surgical sites covered with dressing. No active bleeding noted. Umbilical stoma for urine noted. Blanchable redness on the sacral area.  Personal belongings including bilateral hearing aid and mobile phone kept with the patient.        Neuro/Musculoskeletal:  A&Ox4.  Cardiac:  SR to Sinus tachycardia. /110   Respiratory:  With Tracheostomy Bivona 6 cuffless with PMV. On room air. Resp sounds with bilateral lower lobe expiratory wheezes. PRN nebulization given. Suctioned tracheal secretions PRN.  GI/: Straight cath Q4 on waking hours, BM around 5 days ago. Abdomen is soft, tender on the surgical site.  Diet/Appetite:  Tolerating clear liquid diet.  Activity:  Assist of 1.  Pain:  Pain in the operative site. Given Oxycodone 5mg once.  Skin:    LLQ old PD site covered with dressing  Umbilical stoma for urine catheterization    LDAs + Drips/IVF:   MEHDI willett  Protocols/Labs:        Plan:  For HD, Continue POC, Notify provider for changes.           Goal Outcome Evaluation:      Plan of Care Reviewed With: patient, parent    Overall Patient Progress: improving

## 2023-04-09 LAB
ANION GAP SERPL CALCULATED.3IONS-SCNC: 17 MMOL/L (ref 7–15)
BACTERIA SPEC CULT: ABNORMAL
BACTERIA SPEC CULT: ABNORMAL
BUN SERPL-MCNC: 79.6 MG/DL (ref 6–20)
CALCIUM SERPL-MCNC: 9.3 MG/DL (ref 8.6–10)
CHLORIDE SERPL-SCNC: 101 MMOL/L (ref 98–107)
CREAT SERPL-MCNC: 6.3 MG/DL (ref 0.67–1.17)
CRP SERPL-MCNC: 233 MG/L
DEPRECATED HCO3 PLAS-SCNC: 16 MMOL/L (ref 22–29)
ERYTHROCYTE [DISTWIDTH] IN BLOOD BY AUTOMATED COUNT: 13.8 % (ref 10–15)
GAMMA INTERFERON BACKGROUND BLD IA-ACNC: 0.01 IU/ML
GFR SERPL CREATININE-BSD FRML MDRD: 12 ML/MIN/1.73M2
GLUCOSE SERPL-MCNC: 97 MG/DL (ref 70–99)
HCT VFR BLD AUTO: 32.5 % (ref 40–53)
HGB BLD-MCNC: 10.4 G/DL (ref 13.3–17.7)
LACTATE SERPL-SCNC: 0.5 MMOL/L (ref 0.7–2)
M TB IFN-G BLD-IMP: ABNORMAL
M TB IFN-G CD4+ BCKGRND COR BLD-ACNC: 0.19 IU/ML
MCH RBC QN AUTO: 28.8 PG (ref 26.5–33)
MCHC RBC AUTO-ENTMCNC: 32 G/DL (ref 31.5–36.5)
MCV RBC AUTO: 90 FL (ref 78–100)
MITOGEN IGNF BCKGRD COR BLD-ACNC: 0 IU/ML
MITOGEN IGNF BCKGRD COR BLD-ACNC: 0 IU/ML
PLATELET # BLD AUTO: 454 10E3/UL (ref 150–450)
POTASSIUM SERPL-SCNC: 4.4 MMOL/L (ref 3.4–5.3)
QUANTIFERON MITOGEN: 0.2 IU/ML
QUANTIFERON NIL TUBE: 0.01 IU/ML
QUANTIFERON TB1 TUBE: 0.01 IU/ML
QUANTIFERON TB2 TUBE: 0.01
RBC # BLD AUTO: 3.61 10E6/UL (ref 4.4–5.9)
SODIUM SERPL-SCNC: 134 MMOL/L (ref 136–145)
WBC # BLD AUTO: 16 10E3/UL (ref 4–11)

## 2023-04-09 PROCEDURE — 94669 MECHANICAL CHEST WALL OSCILL: CPT

## 2023-04-09 PROCEDURE — 90937 HEMODIALYSIS REPEATED EVAL: CPT

## 2023-04-09 PROCEDURE — 999N000157 HC STATISTIC RCP TIME EA 10 MIN

## 2023-04-09 PROCEDURE — 250N000011 HC RX IP 250 OP 636

## 2023-04-09 PROCEDURE — 250N000009 HC RX 250

## 2023-04-09 PROCEDURE — 86140 C-REACTIVE PROTEIN: CPT

## 2023-04-09 PROCEDURE — 120N000002 HC R&B MED SURG/OB UMMC

## 2023-04-09 PROCEDURE — 36415 COLL VENOUS BLD VENIPUNCTURE: CPT

## 2023-04-09 PROCEDURE — 80048 BASIC METABOLIC PNL TOTAL CA: CPT

## 2023-04-09 PROCEDURE — 258N000003 HC RX IP 258 OP 636: Performed by: INTERNAL MEDICINE

## 2023-04-09 PROCEDURE — 94640 AIRWAY INHALATION TREATMENT: CPT

## 2023-04-09 PROCEDURE — 250N000013 HC RX MED GY IP 250 OP 250 PS 637

## 2023-04-09 PROCEDURE — 250N000011 HC RX IP 250 OP 636: Performed by: INTERNAL MEDICINE

## 2023-04-09 PROCEDURE — 99233 SBSQ HOSP IP/OBS HIGH 50: CPT | Performed by: INTERNAL MEDICINE

## 2023-04-09 PROCEDURE — 94640 AIRWAY INHALATION TREATMENT: CPT | Mod: 76

## 2023-04-09 PROCEDURE — 36415 COLL VENOUS BLD VENIPUNCTURE: CPT | Performed by: INTERNAL MEDICINE

## 2023-04-09 PROCEDURE — 85014 HEMATOCRIT: CPT

## 2023-04-09 PROCEDURE — 250N000013 HC RX MED GY IP 250 OP 250 PS 637: Performed by: INTERNAL MEDICINE

## 2023-04-09 PROCEDURE — 83605 ASSAY OF LACTIC ACID: CPT | Performed by: INTERNAL MEDICINE

## 2023-04-09 RX ORDER — HEPARIN SODIUM 5000 [USP'U]/.5ML
5000 INJECTION, SOLUTION INTRAVENOUS; SUBCUTANEOUS EVERY 12 HOURS
Status: DISCONTINUED | OUTPATIENT
Start: 2023-04-09 | End: 2023-04-22 | Stop reason: HOSPADM

## 2023-04-09 RX ADMIN — SODIUM BICARBONATE 650 MG: 650 TABLET ORAL at 20:31

## 2023-04-09 RX ADMIN — METRONIDAZOLE 500 MG: 500 INJECTION, SOLUTION INTRAVENOUS at 05:36

## 2023-04-09 RX ADMIN — SENNOSIDES AND DOCUSATE SODIUM 2 TABLET: 50; 8.6 TABLET ORAL at 23:26

## 2023-04-09 RX ADMIN — SODIUM CHLORIDE 250 ML: 9 INJECTION, SOLUTION INTRAVENOUS at 14:32

## 2023-04-09 RX ADMIN — FLUCONAZOLE IN SODIUM CHLORIDE 200 MG: 2 INJECTION, SOLUTION INTRAVENOUS at 20:30

## 2023-04-09 RX ADMIN — SODIUM CHLORIDE 300 ML: 9 INJECTION, SOLUTION INTRAVENOUS at 14:32

## 2023-04-09 RX ADMIN — Medication: at 14:31

## 2023-04-09 RX ADMIN — IPRATROPIUM BROMIDE 0.5 MG: 0.5 SOLUTION RESPIRATORY (INHALATION) at 21:32

## 2023-04-09 RX ADMIN — HEPARIN SODIUM 1400 UNITS: 1000 INJECTION INTRAVENOUS; SUBCUTANEOUS at 14:39

## 2023-04-09 RX ADMIN — HEPARIN SODIUM 1400 UNITS: 1000 INJECTION INTRAVENOUS; SUBCUTANEOUS at 14:38

## 2023-04-09 RX ADMIN — HEPARIN SODIUM 5000 UNITS: 5000 INJECTION, SOLUTION INTRAVENOUS; SUBCUTANEOUS at 20:31

## 2023-04-09 RX ADMIN — SODIUM BICARBONATE 650 MG: 650 TABLET ORAL at 09:22

## 2023-04-09 RX ADMIN — ALBUTEROL SULFATE 2.5 MG: 2.5 SOLUTION RESPIRATORY (INHALATION) at 21:32

## 2023-04-09 RX ADMIN — IPRATROPIUM BROMIDE 0.5 MG: 0.5 SOLUTION RESPIRATORY (INHALATION) at 14:37

## 2023-04-09 RX ADMIN — ONDANSETRON 4 MG: 2 INJECTION INTRAMUSCULAR; INTRAVENOUS at 07:26

## 2023-04-09 RX ADMIN — CALCIUM CARBONATE (ANTACID) CHEW TAB 500 MG 500 MG: 500 CHEW TAB at 23:26

## 2023-04-09 RX ADMIN — METRONIDAZOLE 500 MG: 500 INJECTION, SOLUTION INTRAVENOUS at 17:34

## 2023-04-09 RX ADMIN — HEPARIN SODIUM 5000 UNITS: 5000 INJECTION, SOLUTION INTRAVENOUS; SUBCUTANEOUS at 10:29

## 2023-04-09 RX ADMIN — ALBUTEROL SULFATE 2.5 MG: 2.5 SOLUTION RESPIRATORY (INHALATION) at 14:37

## 2023-04-09 RX ADMIN — CINACALCET 30 MG: 30 TABLET ORAL at 09:23

## 2023-04-09 RX ADMIN — FAMOTIDINE 20 MG: 20 TABLET ORAL at 20:31

## 2023-04-09 RX ADMIN — CEFEPIME HYDROCHLORIDE 1 G: 1 INJECTION, POWDER, FOR SOLUTION INTRAMUSCULAR; INTRAVENOUS at 20:31

## 2023-04-09 RX ADMIN — SODIUM BICARBONATE 650 MG: 650 TABLET ORAL at 15:30

## 2023-04-09 RX ADMIN — OXYCODONE HYDROCHLORIDE 5 MG: 5 TABLET ORAL at 23:23

## 2023-04-09 ASSESSMENT — ACTIVITIES OF DAILY LIVING (ADL)
ADLS_ACUITY_SCORE: 38

## 2023-04-09 NOTE — PLAN OF CARE
"07:15 with episode of vomiting clear fluids. Given Zofran IV PRN    Neuro/Musculoskeletal:  A&Ox4.  Cardiac:  SR to Sinus tachycardia. BP Stable, Afebrile      Respiratory:  With Tracheostomy Bivona 6 cuffless with PMV. On room air. Resp sounds with bilateral lower lobe expiratory wheezes. PRN nebulization given. Suctioned tracheal secretions PRN.  GI/: Straight cath Q4 on waking hours, No BM. Abdomen is soft, tender on the surgical site.  Diet/Appetite:  Tolerating clear liquid diet. Advanced to full liquid diet. Patient was given Cheetos by his mother and ate ice cream then developed abdominal gas discomfort. Provider notified and ordered PRN Tums and Simethicone.   Activity:  Assist of 1.  Pain:  Pain in the operative site. Given Oxycodone 5mg once.  Skin:    LLQ old PD site covered with dressing  Umbilical stoma for urine catheterization     LDAs + Drips/IVF:   MEHDI PIV - salne locked  Tracheostomy Bivona 6 cuffless with PMV  PEG tube - clamped.    Protocols/Labs:          Plan:  For HD, Continue POC, Notify provider for changes.                     Problem: Plan of Care - These are the overarching goals to be used throughout the patient stay.    Goal: Plan of Care Review  Description: The Plan of Care Review/Shift note should be completed every shift.  The Outcome Evaluation is a brief statement about your assessment that the patient is improving, declining, or no change.  This information will be displayed automatically on your shift note.  Outcome: Progressing  Flowsheets (Taken 4/9/2023 0146)  Plan of Care Reviewed With:    patient    parent  Overall Patient Progress: improving  Goal: Patient-Specific Goal (Individualized)  Description: You can add care plan individualizations to a care plan. Examples of Individualization might be:  \"Parent requests to be called daily at 9am for status\", \"I have a hard time hearing out of my right ear\", or \"Do not touch me to wake me up as it startles me\".  Outcome: " Progressing  Goal: Absence of Hospital-Acquired Illness or Injury  Outcome: Progressing  Intervention: Identify and Manage Fall Risk  Recent Flowsheet Documentation  Taken 4/9/2023 0000 by Lord Bright Park RN  Safety Promotion/Fall Prevention: activity supervised  Taken 4/8/2023 2000 by Lord Bright Park RN  Safety Promotion/Fall Prevention: activity supervised  Intervention: Prevent Skin Injury  Recent Flowsheet Documentation  Taken 4/9/2023 0000 by Lord Bright Park RN  Body Position: position changed independently  Taken 4/8/2023 2000 by Lord Bright Park, RN  Body Position: position changed independently  Intervention: Prevent and Manage VTE (Venous Thromboembolism) Risk  Recent Flowsheet Documentation  Taken 4/9/2023 0000 by Lord Bright Prak, RN  VTE Prevention/Management: SCDs (sequential compression devices) on  Taken 4/8/2023 2000 by Lord Bright Park, RN  VTE Prevention/Management: SCDs (sequential compression devices) on  Goal: Optimal Comfort and Wellbeing  Outcome: Progressing  Intervention: Monitor Pain and Promote Comfort  Recent Flowsheet Documentation  Taken 4/9/2023 0020 by Lord Bright Park, RN  Pain Management Interventions:    care clustered    rest  Taken 4/8/2023 2000 by Lord Bright Park, RN  Pain Management Interventions:    rest    care clustered  Goal: Readiness for Transition of Care  Outcome: Progressing   Goal Outcome Evaluation:      Plan of Care Reviewed With: patient, parent    Overall Patient Progress: improvingOverall Patient Progress: improving

## 2023-04-09 NOTE — PROGRESS NOTES
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     Today's updates/changes:  - Continue to advance diet as tolerates  - short HD run yesterday, again today  - ID consult appreciated. Will f/u outside cultures tomorrow  - restarted heparin subcutaneous dvt ppx today as mobility limited and now two days out from intraoperative line removal and internal jugular line placement     Acute Hospital Problems:     Sepsis secondary to peritonitis (likely fungal)  In setting of peritoneal dialysis, one month of difficult PD access, few days of \"foamy\" and possibly purulent PD cath discharge, fevers. Unable to tolerate peritoneal antibiotics prior to admission. Initially w/ tachycardia and leukocytosis. Peritoneal culture prior to admission growing budding yeast, no bacterial growth as of yet. Now s/p PD catheter removal 4/7. BCx, peritoneal fluid, and PD catheter tip cultures pending this ladmission. Overall hemodynamically and clinically stable on broad antimicrobial therapy.   Plan:  - ID consult appreciated.   - Monitoring BCx 4/6, UCx 4/6, PD catheter culture 4/7, and peritoneal fluid culture 4/7  - Monitoring peritoneal fluid culture from prior to admission (PD RN will check in with Josselin for updates)  - IV Cefepime, Flagyl, " Fluconazole (all since 4/6 - present) for now and reassess 4/10 with outside cultures               - IV Vanc 4/6 - discontinued 4/8  - Post-op pain regimen: PRN Tylenol and Oxycodone available  - Post-op dressing recommendations: See surgery progress note dated 4/8/23     Small bowel obstruction vs infection-associated ileus  With transition point closely associated to PD catheter which is now s/p removal. In context of poor PO, nausea, vomiting, and constipation at home prior to admission.  Plan:  - Gen surg signed off as of 4/8  - Will trial advance of diet as tolerates this weekend, low threshold to return to NPO if not tolerating  - Can decompress w/ G-tube periodically if needed per surgery recs  - Symptomatic support: Zofran, Compazine prn     ESRD w/ PD  Mild hyperkalemia  AGMA  Likely in setting of incomplete peritoneal dialysis leading up to admission. Now s/p PD cath removal in setting of peritonitis as above w/ temporary CVC placed for HD reinitiation this hospital stay. Nephrology is following, will coordinate timing of first HD session. In the meantime, stable mild hyperkalemia (K<5.5) and AGMA (pH>7.2).  Plan:  - Nephro following, dialysis 4/8 and again 4/9  - PTA Cinacalcet 30mg qday, PO NaHCO3 650mg TID     Restrictive lung disease  Chronic. Currently without tachypnea or hypoxia. Comfortable-appearing. Some bronchial cuffing on CXR, though not different than prior, some coarse breath sounds and increased secretions on history. Initial viral panel unremarkable. Suspect primary infection is peritonitis as above, uncertain if may be a component of viral vs bacterial pneumonia but is certainly covered for now from a bacterial pneumonia perspective.  Plan:  - Sputum culture if produces sputum  - See above for antibiotic regimen  - PTA Azithro MWF  - PTA Duonebs and vest therapy  - RT following for assistance     Chronic malnutrition  Has PEG but not tube feed dependent, tolerates PO.  Plan:  - Advancing  "diet as above     Neurogenic bladder s/p mitranoff urinary diversion  Still makes some degree of urine. Self-caths every ~4 hours daily w/ intermittent irrigation. Last urology visit in 2022.  Plan:  - Straight cath orders for ~q4h while awake     HTN - holding PTA Amlodipine ( meds still listed as Captopril and Clonidine) while managing sepsis     GERD - PTA Famotidine        Hospital Checklist:  Diet: Advancing diet as tolerated  VTE ppx: None post-op, will reevaluate over the weekend  LDA: PIV, Right temporary CVC for HD, G-tube, Mitranoff diversion  Code: Full  Level of care: General Medicine  Family: Mother, Valerie - [phone]      Discharge Planning:  Anticipate discharge in 3-4 days at earliest pending stability on hemodialysis and further infectious evaluation. Suspect will be discharging to prior living arrangement. Communicated via PINKY tab.     Diet: Advance Diet as Tolerated: Clear Liquid Diet    DVT Prophylaxis: Heparin SQ  Aguirre Catheter: Not present  Lines: PRESENT      CVC Double Lumen Right Internal jugular-Site Assessment: WDL      Cardiac Monitoring: None  Code Status: Full Code      Clinically Significant Risk Factors        # Hyperkalemia: Highest K = 5.5 mmol/L in last 2 days, will monitor as appropriate      # Anion Gap Metabolic Acidosis: Highest Anion Gap = 22 mmol/L in last 2 days, will monitor and treat as appropriate            # Cachexia: Estimated body mass index is 17.27 kg/m  as calculated from the following:    Height as of this encounter: 1.676 m (5' 6\").    Weight as of this encounter: 48.5 kg (107 lb)., PRESENT ON ADMISSION         Disposition Plan     Expected Discharge Date: 2023      Destination: home with family  Discharge Comments: Sepsis w/ peritonitis. On IV antibiotics. Will be here into at least next week, likely home discharge but TBD. Will be restarting hemodialysis this admission and need outpt seat. Unsure if will require IV antibiotics too.   "        Bandar Weston MD  Medicine Service, [facility] TEAM 4  [facility]  Securely message with [employer] (more info)  Text page via [email or web address] Paging/Directory   See signed in provider for up to date coverage information  ______________________________________________________________________    Interval History   No events  Doing well  fatigued    Physical Exam   Vital Signs: Temp: 97.9  F (36.6  C) Temp src: Oral BP: (!) 160/64 Pulse: 73   Resp: 18 SpO2: 97 % O2 Device: None (Room air) Oxygen Delivery: 20 LPM  Weight: 107 lbs 0 oz    Constitutional: awake  Respiratory: No increased work of breathing, good air exchange, clear to auscultation bilaterally, no crackles or wheezing  Cardiovascular: regular rate and rhythm  GI: soft and non-tender    Medical Decision Making       30 MINUTES SPENT BY ME on the date of service doing chart review, history, exam, documentation & further activities per the note.      Data

## 2023-04-09 NOTE — PROGRESS NOTES
Pt refused AM vest due to nausea. RT will check back for 230 pm treatment.    Pt was doing dialysis at 230pm treatment so he also declined that treatment. However, he did do both PRN nebs and is planning to complete evening vest treatment.    RT will continue to monitor and follow.    Vance Cavanaugh,RRT.

## 2023-04-09 NOTE — PROGRESS NOTES
Diagnosis - ESKD requiring dialysis  This patient was seen and examined while on dialysis. Laboratory results and nurses' notes were reviewed.    -first HD performed today via L internal jugular temp catheter since PD catheter removal for fungal peritonitis  -tolerated gentle HD (with lower rates to avoid disequilibrium syndrome) today with 1.3 L UF  -venous chamber clotting a bit but avoiding heparin for now given recent surgery  -will plan on another HD run tomorrow  -appreciate ID and primary medical team's care of patient    Glen Abel MD   [phone]

## 2023-04-09 NOTE — PROGRESS NOTES
HEMODIALYSIS TREATMENT NOTE    Date: 4/9/2023  Time: 5:18 PM    Data:  Ultrafiltration - Post Run Net Total Removed (mL): 0 mL  Vascular Access Status: CVC  patent  Dialyzer Rinse: streaked  Total Blood Volume Processed: 54.3 L   Total Dialysis (Treatment) Time: 3 Hrs  Dialysate Bath: K 3, Ca 2.25  Heparin: None    Lab:   Yes  Lactic Acid    Interventions:  Dialysis done through Right internal jugular CVC   No UF (fluid removal) as per Dr. Glen Abel after assessing the patient today  -400,   Blood sample sent for STAT Lactic Acid  CVC lumens flushed with Saline, locked with Heparin (Arterial: 1.4 ml; Venous: 1.4 ml) and caps changed post HD  Report given to Agfu, PCN    Assessment:  A/O x4, calm and cooperative, denies pain  Lung sounds clear anterior and lateral BUL/BLL  Right internal jugular CVC intact, with clean and dry dressing     Plan:    Per Renal Team

## 2023-04-09 NOTE — PROGRESS NOTES
Nephrology Progress Note  04/09/2023         Assessment  Mr. 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  He is now/sp peritoneal dialysis catheter removal (4/7) and undergoing hemodialysis via a temp right internal jugular dialysis cathter.    1. ESKD: Previously on HD (12/2021-3/2022) then on PD (3/2022-4/2022).  Now back on HD (first run 4/8/2023) as PD had to be discontinued due to fungal peritonitis.    2. Peritonitis: Due to C. Albicans. PD catheter removed on 4/7/2023. Undergoing treatment with fluconazole (since 4/6).  Also was treated with broad spectrum antibiotics.  Currently, remains on cefepime and flagyl while we await final peritoneal cultures.    3. HTN: Amlodipine on hold.  4. Neurogenic bladder s/p Mitrofanoff: He does have some urine output. Self caths with intermittent irrigation.   5. Anemia of CKD: Hgb above goal upon admission with slight decline post operatively.    6. Secondary hyperparathyroidism: On sensipar (30 mg)  7. Metabolic acidosis: Due to ESKD.  Currently on sodium bicarbonate 650 mg TID.    8. Illeus    Renal Plan:  -HD via right internal jugular temp catheter performed on 4/8.  Will plan on HD again today and tomorrow.    -will not ultrafiltrate today as patient appears euvolemic  -Undergoing treatment with fluconazole.  Awaiting final cultures before discontinuing cefepime and flagyl.   -continue to hold amlodipine  -consider discontinuing sodium bicarbonate once patient is on a more regular dialysis schedule  -consider tunneled catheter placement later this week pending ID approval  -will attempt to obtain records from his outpatient dialysis unit  -will need to determine outpatient dialysis unit for in-center hemodialysis or consider home hemodialysis.  Of note, patient had to be on home hemodialysis dialysis  "in the past as no center would take him due to having a trache.    -appreciate ID and primary medical team's care of patient      Glen Abel MD   Division of Renal Disease and Hypertension    Interval History :   Nursing and provider notes from last 24 hours reviewed.  First HD since PD catheter removal performed yesterday with 1.3 L UF.  Patient tolerated the procedure well though did have some clotting in the venous chamber.  Amlodipine remains on hold.  He continues to have good urine output.  WBC and CRP trending down. He remains afebrile. He continues on fluconazole, cefepime and metronidazole. HD planned for later today.      Review of Systems:   A comprehensive ROS was obtained and unremarkable other than mentioned in the interval history.     Physical Exam:   I/O last 3 completed shifts:  In: 930 [P.O.:630; I.V.:300]  Out: 2950 [Urine:1650; Other:1300]   /73   Pulse 76   Temp 97.9  F (36.6  C) (Oral)   Resp 16   Ht 1.676 m (5' 6\")   Wt 48.5 kg (107 lb)   SpO2 97%   BMI 17.27 kg/m       GENERAL APPEARANCE: NAD  HEENT: no scleral icterus, trach  PULM: lungs CTA B  CV: regular rhythm, normal rate, no rub     -no JVD appreciated     -no LE edema  : Mitrofanoff   GI: soft, previous PD exit site dressed  INTEGUMENT: no concerning  NEURO:  no asterixis, no focal deficit   Access: right temp IJ    Labs:   All labs reviewed by me  Electrolytes/Renal - Recent Labs   Lab Test 04/09/23  0721 04/08/23  0656 04/07/23  1728 04/07/23  0745 04/06/23  1831 10/19/21  1433 08/30/21  1300 11/30/20  1415 05/26/20  0000 12/09/19  1311 12/22/16  1325 09/17/15  1205 02/05/15  1110   * 134* 137   < > 134*   < > 141 140   < > 137   < > 139 141   POTASSIUM 4.4 4.8 5.5*   < > 5.4*   < > 5.0 5.1   < > 4.1   < > 4.6 4.2   CHLORIDE 101 104 104   < > 99   < > 112* 111*   < > 106   < > 111* 111*   CO2 16* 11* 11*   < > 15*   < > 21 22   < > 25   < > 21 21   BUN 79.6* 119.0* 124.0*   < > 115.0*   < > 84* " 61*   < > 48*   < > 50* 40*   CR 6.30* 8.23* 8.83*   < > 8.52*   < > 6.12* 4.80*   < > 4.31*   < > 2.57* 2.07*   GLC 97 69* 69*   < > 85   < > 84 84   < > 91   < > 140* 118*   SUSAN 9.3 9.0 8.7   < > 11.0*   < > 8.5 9.3   < > 9.4   < > 8.7* 8.7*   MAG  --   --   --   --  2.5*  --   --   --   --   --   --  2.1 2.0   PHOS  --   --   --   --   --   --  6.0* 5.4*  --  4.2   < > 2.7* 3.5    < > = values in this interval not displayed.       CBC -   Recent Labs   Lab Test 04/09/23  0721 04/08/23  1059 04/07/23  0745   WBC 16.0* 18.6* 20.8*   HGB 10.4* 10.8* 12.2*   * 501* 542*       LFTs -   Recent Labs   Lab Test 08/30/21  1300 11/30/20  1415 10/15/20  1138 12/22/16  1325 09/17/15  1205 02/05/15  1110   ALKPHOS  --   --  94  --  80 87   BILITOTAL  --   --  0.2  --   --   --    ALT  --   --  15  --   --   --    AST  --   --  10  --   --   --    PROTTOTAL  --   --  7.4  --   --   --    ALBUMIN 3.7 3.7 3.9   < > 3.8 3.5    < > = values in this interval not displayed.       Iron Panel -   Recent Labs   Lab Test 08/30/21  1300 11/30/20  1415 08/09/18  1056   IRON 74 92 178   IRONSAT 29 34 67   JUVENAL 66 43  --          Imaging:  Abdominal CT (4/7/23):  Impression:      1. Findings of bowel obstruction with small bowel loops dilated up to  4 cm in the left hemiabdomen. Transition point is likely located in  the pelvis along the peritoneal dialysis catheter, with appreciable  inflammatory changes about the small and large bowel at this site.      2. Moderate hiatal hernia with fluid-filled stomach.     3. Partially visualized lung bases demonstrates mosaic attenuation and  diffuse peribronchial thickening, which can be seen in the setting of  small airway disease.   Current Medications:    sodium chloride 0.9%  250 mL Intravenous Once in dialysis/CRRT     sodium chloride 0.9%  300 mL Hemodialysis Machine Once     azithromycin  250 mg Oral Once per day on Mon Wed Fri     ceFEPIme  1 g Intravenous Q24H     cinacalcet  30 mg  Oral Daily     famotidine  20 mg Oral Daily     fluconazole  200 mg Intravenous Q24H     sodium chloride (PF) 0.9%  10 mL Intracatheter Once in dialysis/CRRT    Followed by     heparin  1.3-2.6 mL Intracatheter Once in dialysis/CRRT     sodium chloride (PF) 0.9%  10 mL Intracatheter Once in dialysis/CRRT    Followed by     heparin  1.3-2.6 mL Intracatheter Once in dialysis/CRRT     heparin ANTICOAGULANT  5,000 Units Subcutaneous Q12H     heparin lock flush  5-20 mL Intracatheter Q24H     metroNIDAZOLE  500 mg Intravenous Q12H     - MEDICATION INSTRUCTIONS -   Does not apply Once     sodium bicarbonate  650 mg Oral TID     sodium chloride (PF)  10-40 mL Intracatheter Q8H     sodium chloride (PF)  3 mL Intracatheter Q8H     sodium chloride (PF)  9 mL Intracatheter During Dialysis/CRRT (from stock)     sodium chloride (PF)  9 mL Intracatheter During Dialysis/CRRT (from stock)     [Held by provider] Vitamin D3  2,000 Units Oral Daily     All above labs and imaging reviewed by me.   Glen Abel MD

## 2023-04-10 LAB
ANION GAP SERPL CALCULATED.3IONS-SCNC: 16 MMOL/L (ref 7–15)
BACTERIA PRT CULT: ABNORMAL
BUN SERPL-MCNC: 42.9 MG/DL (ref 6–20)
CALCIUM SERPL-MCNC: 8.8 MG/DL (ref 8.6–10)
CHLORIDE SERPL-SCNC: 101 MMOL/L (ref 98–107)
CREAT SERPL-MCNC: 4.11 MG/DL (ref 0.67–1.17)
DEPRECATED HCO3 PLAS-SCNC: 18 MMOL/L (ref 22–29)
ERYTHROCYTE [DISTWIDTH] IN BLOOD BY AUTOMATED COUNT: 13.5 % (ref 10–15)
GFR SERPL CREATININE-BSD FRML MDRD: 20 ML/MIN/1.73M2
GLUCOSE SERPL-MCNC: 89 MG/DL (ref 70–99)
HBV CORE AB SERPL QL IA: NONREACTIVE
HBV SURFACE AB SERPL IA-ACNC: 22.78 M[IU]/ML
HBV SURFACE AB SERPL IA-ACNC: REACTIVE M[IU]/ML
HBV SURFACE AG SERPL QL IA: NONREACTIVE
HCT VFR BLD AUTO: 32.8 % (ref 40–53)
HGB BLD-MCNC: 10.4 G/DL (ref 13.3–17.7)
LACTATE SERPL-SCNC: 1.4 MMOL/L (ref 0.7–2)
MAGNESIUM SERPL-MCNC: 1.6 MG/DL (ref 1.7–2.3)
MCH RBC QN AUTO: 29 PG (ref 26.5–33)
MCHC RBC AUTO-ENTMCNC: 31.7 G/DL (ref 31.5–36.5)
MCV RBC AUTO: 91 FL (ref 78–100)
PHOSPHATE SERPL-MCNC: 5.4 MG/DL (ref 2.5–4.5)
PLATELET # BLD AUTO: 396 10E3/UL (ref 150–450)
POTASSIUM SERPL-SCNC: 4.5 MMOL/L (ref 3.4–5.3)
RBC # BLD AUTO: 3.59 10E6/UL (ref 4.4–5.9)
SODIUM SERPL-SCNC: 135 MMOL/L (ref 136–145)
WBC # BLD AUTO: 14.4 10E3/UL (ref 4–11)

## 2023-04-10 PROCEDURE — 99233 SBSQ HOSP IP/OBS HIGH 50: CPT | Performed by: PHYSICIAN ASSISTANT

## 2023-04-10 PROCEDURE — 84100 ASSAY OF PHOSPHORUS: CPT

## 2023-04-10 PROCEDURE — 250N000013 HC RX MED GY IP 250 OP 250 PS 637

## 2023-04-10 PROCEDURE — 999N000215 HC STATISTIC HFNC ADULT NON-CPAP

## 2023-04-10 PROCEDURE — 999N000157 HC STATISTIC RCP TIME EA 10 MIN

## 2023-04-10 PROCEDURE — 80048 BASIC METABOLIC PNL TOTAL CA: CPT

## 2023-04-10 PROCEDURE — 90937 HEMODIALYSIS REPEATED EVAL: CPT

## 2023-04-10 PROCEDURE — 94640 AIRWAY INHALATION TREATMENT: CPT | Mod: 76

## 2023-04-10 PROCEDURE — 83605 ASSAY OF LACTIC ACID: CPT | Performed by: INTERNAL MEDICINE

## 2023-04-10 PROCEDURE — 250N000011 HC RX IP 250 OP 636: Performed by: INTERNAL MEDICINE

## 2023-04-10 PROCEDURE — 250N000011 HC RX IP 250 OP 636

## 2023-04-10 PROCEDURE — 36415 COLL VENOUS BLD VENIPUNCTURE: CPT

## 2023-04-10 PROCEDURE — 85027 COMPLETE CBC AUTOMATED: CPT

## 2023-04-10 PROCEDURE — 83735 ASSAY OF MAGNESIUM: CPT

## 2023-04-10 PROCEDURE — 258N000003 HC RX IP 258 OP 636: Performed by: INTERNAL MEDICINE

## 2023-04-10 PROCEDURE — 250N000009 HC RX 250: Performed by: INTERNAL MEDICINE

## 2023-04-10 PROCEDURE — 94640 AIRWAY INHALATION TREATMENT: CPT

## 2023-04-10 PROCEDURE — 250N000013 HC RX MED GY IP 250 OP 250 PS 637: Performed by: INTERNAL MEDICINE

## 2023-04-10 PROCEDURE — 94669 MECHANICAL CHEST WALL OSCILL: CPT

## 2023-04-10 PROCEDURE — 99232 SBSQ HOSP IP/OBS MODERATE 35: CPT | Performed by: INTERNAL MEDICINE

## 2023-04-10 PROCEDURE — 250N000009 HC RX 250

## 2023-04-10 PROCEDURE — 120N000002 HC R&B MED SURG/OB UMMC

## 2023-04-10 PROCEDURE — 36415 COLL VENOUS BLD VENIPUNCTURE: CPT | Performed by: INTERNAL MEDICINE

## 2023-04-10 RX ORDER — AMOXICILLIN 250 MG
2 CAPSULE ORAL 2 TIMES DAILY
Status: DISCONTINUED | OUTPATIENT
Start: 2023-04-10 | End: 2023-04-22 | Stop reason: HOSPADM

## 2023-04-10 RX ORDER — IPRATROPIUM BROMIDE AND ALBUTEROL SULFATE 2.5; .5 MG/3ML; MG/3ML
3 SOLUTION RESPIRATORY (INHALATION) EVERY 6 HOURS PRN
Status: DISCONTINUED | OUTPATIENT
Start: 2023-04-10 | End: 2023-04-10

## 2023-04-10 RX ORDER — ALPRAZOLAM 0.25 MG
0.5 TABLET ORAL
Status: DISCONTINUED | OUTPATIENT
Start: 2023-04-10 | End: 2023-04-10

## 2023-04-10 RX ORDER — HEPARIN SODIUM 1000 [USP'U]/ML
500 INJECTION, SOLUTION INTRAVENOUS; SUBCUTANEOUS CONTINUOUS
Status: DISCONTINUED | OUTPATIENT
Start: 2023-04-10 | End: 2023-04-22 | Stop reason: HOSPADM

## 2023-04-10 RX ORDER — AMOXICILLIN 250 MG
1 CAPSULE ORAL 2 TIMES DAILY
Status: DISCONTINUED | OUTPATIENT
Start: 2023-04-10 | End: 2023-04-22 | Stop reason: HOSPADM

## 2023-04-10 RX ORDER — IPRATROPIUM BROMIDE AND ALBUTEROL SULFATE 2.5; .5 MG/3ML; MG/3ML
SOLUTION RESPIRATORY (INHALATION)
Status: COMPLETED
Start: 2023-04-10 | End: 2023-04-10

## 2023-04-10 RX ORDER — IPRATROPIUM BROMIDE AND ALBUTEROL SULFATE 2.5; .5 MG/3ML; MG/3ML
3 SOLUTION RESPIRATORY (INHALATION)
Status: DISCONTINUED | OUTPATIENT
Start: 2023-04-10 | End: 2023-04-16

## 2023-04-10 RX ORDER — ALPRAZOLAM 0.25 MG
0.25 TABLET ORAL
Status: COMPLETED | OUTPATIENT
Start: 2023-04-10 | End: 2023-04-13

## 2023-04-10 RX ADMIN — SENNOSIDES AND DOCUSATE SODIUM 2 TABLET: 50; 8.6 TABLET ORAL at 21:30

## 2023-04-10 RX ADMIN — CALCIUM CARBONATE (ANTACID) CHEW TAB 500 MG 500 MG: 500 CHEW TAB at 15:20

## 2023-04-10 RX ADMIN — ALBUTEROL SULFATE 2.5 MG: 2.5 SOLUTION RESPIRATORY (INHALATION) at 09:25

## 2023-04-10 RX ADMIN — HEPARIN SODIUM 5000 UNITS: 5000 INJECTION, SOLUTION INTRAVENOUS; SUBCUTANEOUS at 09:05

## 2023-04-10 RX ADMIN — FAMOTIDINE 20 MG: 20 TABLET ORAL at 21:31

## 2023-04-10 RX ADMIN — AZITHROMYCIN MONOHYDRATE 250 MG: 250 TABLET ORAL at 09:05

## 2023-04-10 RX ADMIN — CALCIUM CARBONATE (ANTACID) CHEW TAB 500 MG 500 MG: 500 CHEW TAB at 11:55

## 2023-04-10 RX ADMIN — CINACALCET 30 MG: 30 TABLET ORAL at 09:05

## 2023-04-10 RX ADMIN — CEFEPIME HYDROCHLORIDE 1 G: 1 INJECTION, POWDER, FOR SOLUTION INTRAMUSCULAR; INTRAVENOUS at 23:12

## 2023-04-10 RX ADMIN — METRONIDAZOLE 500 MG: 500 INJECTION, SOLUTION INTRAVENOUS at 05:04

## 2023-04-10 RX ADMIN — SODIUM CHLORIDE 250 ML: 9 INJECTION, SOLUTION INTRAVENOUS at 12:28

## 2023-04-10 RX ADMIN — SENNOSIDES AND DOCUSATE SODIUM 2 TABLET: 50; 8.6 TABLET ORAL at 09:15

## 2023-04-10 RX ADMIN — HEPARIN SODIUM 5000 UNITS: 5000 INJECTION, SOLUTION INTRAVENOUS; SUBCUTANEOUS at 21:31

## 2023-04-10 RX ADMIN — SODIUM BICARBONATE 650 MG: 650 TABLET ORAL at 13:27

## 2023-04-10 RX ADMIN — SODIUM CHLORIDE 300 ML: 9 INJECTION, SOLUTION INTRAVENOUS at 11:35

## 2023-04-10 RX ADMIN — IPRATROPIUM BROMIDE 0.5 MG: 0.5 SOLUTION RESPIRATORY (INHALATION) at 09:25

## 2023-04-10 RX ADMIN — METRONIDAZOLE 500 MG: 500 INJECTION, SOLUTION INTRAVENOUS at 17:34

## 2023-04-10 RX ADMIN — IPRATROPIUM BROMIDE AND ALBUTEROL SULFATE 3 ML: .5; 3 SOLUTION RESPIRATORY (INHALATION) at 20:53

## 2023-04-10 RX ADMIN — CALCIUM CARBONATE (ANTACID) CHEW TAB 500 MG 500 MG: 500 CHEW TAB at 19:07

## 2023-04-10 RX ADMIN — HEPARIN SODIUM 1600 UNITS: 1000 INJECTION, SOLUTION INTRAVENOUS; SUBCUTANEOUS at 17:20

## 2023-04-10 RX ADMIN — SODIUM BICARBONATE 650 MG: 650 TABLET ORAL at 09:05

## 2023-04-10 RX ADMIN — HEPARIN SODIUM 500 UNITS/HR: 1000 INJECTION INTRAVENOUS; SUBCUTANEOUS at 13:00

## 2023-04-10 RX ADMIN — SODIUM BICARBONATE 650 MG: 650 TABLET ORAL at 21:31

## 2023-04-10 RX ADMIN — FLUCONAZOLE IN SODIUM CHLORIDE 200 MG: 2 INJECTION, SOLUTION INTRAVENOUS at 21:27

## 2023-04-10 ASSESSMENT — ACTIVITIES OF DAILY LIVING (ADL)
ADLS_ACUITY_SCORE: 38

## 2023-04-10 NOTE — PROGRESS NOTES
Nephrology Progress Note  04/10/2023         Assessment  Mr. 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  He is now/sp peritoneal dialysis catheter removal (4/7) and undergoing hemodialysis via a temp right internal jugular dialysis cathter.    1. ESKD: Previously on HD (12/2021-3/2022) then on PD (3/2022-4/2022).  Now back on HD (first run 4/8/2023) as PD had to be discontinued due to fungal peritonitis. Nephrologist is Dr. Bandar Sharma.   - per pt's mother, she has been able to arrange for HD at [facility] on TTS schedule (they will accept pt in spite of trach)  - plan HD again tomorrow and continue TTS schedule  - using L forearm AVF (created in 2021) today; working well with one needle; plan to attempt 2 needles tomorrow; if successful, could like avoid a tunneled CVC.  - pt requires heparin to avoid clotting the HD system  - to have tunneled RIJ placed, pending ID's ok  - pt/pt's mother much prefer PD and mother says pt didn't tolerate HD well     2. Peritonitis: Due to C. Albicans. PD catheter removed on 4/7/2023. Undergoing treatment with fluconazole (since 4/6).  Also was treated with broad spectrum antibiotics.  Currently, remains on cefepime and flagyl while we await final peritoneal cultures.      3. HTN: Amlodipine on hold.    4. Neurogenic bladder s/p Mitrofanoff: He does have some urine output. Self caths with intermittent irrigation.     5. Anemia of CKD: Hgb above goal upon admission with slight decline post operatively.      6. Secondary hyperparathyroidism: On sensipar (30 mg)    7. Metabolic acidosis: Due to ESKD.  Currently on sodium bicarbonate 650 mg TID.   - will stop Na bicarb one pt is fully established on HD            GERARD Virgen   Division of Renal Disease and Hypertension  [phone]    Interval History :  "  Nursing and provider notes from last 24 hours reviewed.  Seen on dialysis, 3rd HD run today. Stable so far.   Mother has arranged for OP HD TTS schedule; she is very hopeful he can return to PD  Using 17 g in AVF today, works well. Will attempt 2 needles tomorrow, may be able to avoid tunneled CVC.  No n/v, CP, SOB, chills    Review of Systems:   A comprehensive ROS was obtained and unremarkable other than mentioned in the interval history.     Physical Exam:   I/O last 3 completed shifts:  In: 600 [P.O.:300; I.V.:300]  Out: 1000 [Urine:1000]   BP (!) 146/97   Pulse 86   Temp 98  F (36.7  C) (Oral)   Resp 11   Ht 1.676 m (5' 6\")   Wt 48.5 kg (107 lb)   SpO2 100%   BMI 17.27 kg/m       GENERAL APPEARANCE: NAD  HEENT: no scleral icterus, trach domed  PULM: lungs CTA    CV: regular rhythm, normal rate      -no LE edema  : Mitrofanoff   GI: soft    INTEGUMENT: no concerning  NEURO:  no asterixis, no focal deficit   Access: right temp IJ    Labs:   All labs reviewed by me  Electrolytes/Renal -   Recent Labs   Lab Test 04/10/23  0919 04/09/23  0721 04/08/23  0656 04/07/23  0745 04/06/23  1831 10/19/21  1433 08/30/21  1300 11/30/20  1415 12/22/16  1325 09/17/15  1205   * 134* 134*   < > 134*   < > 141 140   < > 139   POTASSIUM 4.5 4.4 4.8   < > 5.4*   < > 5.0 5.1   < > 4.6   CHLORIDE 101 101 104   < > 99   < > 112* 111*   < > 111*   CO2 18* 16* 11*   < > 15*   < > 21 22   < > 21   BUN 42.9* 79.6* 119.0*   < > 115.0*   < > 84* 61*   < > 50*   CR 4.11* 6.30* 8.23*   < > 8.52*   < > 6.12* 4.80*   < > 2.57*   GLC 89 97 69*   < > 85   < > 84 84   < > 140*   SUSAN 8.8 9.3 9.0   < > 11.0*   < > 8.5 9.3   < > 8.7*   MAG 1.6*  --   --   --  2.5*  --   --   --   --  2.1   PHOS 5.4*  --   --   --   --   --  6.0* 5.4*   < > 2.7*    < > = values in this interval not displayed.       CBC -   Recent Labs   Lab Test 04/10/23  0919 04/09/23  0721 04/08/23  1059   WBC 14.4* 16.0* 18.6*   HGB 10.4* 10.4* 10.8*    454* " 501*       LFTs -   Recent Labs   Lab Test 08/30/21  1300 11/30/20  1415 10/15/20  1138 12/22/16  1325 09/17/15  1205 02/05/15  1110   ALKPHOS  --   --  94  --  80 87   BILITOTAL  --   --  0.2  --   --   --    ALT  --   --  15  --   --   --    AST  --   --  10  --   --   --    PROTTOTAL  --   --  7.4  --   --   --    ALBUMIN 3.7 3.7 3.9   < > 3.8 3.5    < > = values in this interval not displayed.       Iron Panel -   Recent Labs   Lab Test 08/30/21  1300 11/30/20  1415 08/09/18  1056   IRON 74 92 178   IRONSAT 29 34 67   JUVENAL 66 43  --          Imaging:  Abdominal CT (4/7/23):  Impression:      1. Findings of bowel obstruction with small bowel loops dilated up to  4 cm in the left hemiabdomen. Transition point is likely located in  the pelvis along the peritoneal dialysis catheter, with appreciable  inflammatory changes about the small and large bowel at this site.      2. Moderate hiatal hernia with fluid-filled stomach.     3. Partially visualized lung bases demonstrates mosaic attenuation and  diffuse peribronchial thickening, which can be seen in the setting of  small airway disease.   Current Medications:    sodium chloride 0.9%  250 mL Intravenous Once in dialysis/CRRT     sodium chloride 0.9%  300 mL Hemodialysis Machine Once     azithromycin  250 mg Oral Once per day on Mon Wed Fri     ceFEPIme  1 g Intravenous Q24H     cinacalcet  30 mg Oral Daily     famotidine  20 mg Oral Daily     fluconazole  200 mg Intravenous Q24H     sodium chloride (PF) 0.9%  10 mL Intracatheter Once in dialysis/CRRT    Followed by     heparin  1.3-2.6 mL Intracatheter Once in dialysis/CRRT     sodium chloride (PF) 0.9%  10 mL Intracatheter Once in dialysis/CRRT    Followed by     heparin  1.3-2.6 mL Intracatheter Once in dialysis/CRRT     heparin ANTICOAGULANT  5,000 Units Subcutaneous Q12H     heparin lock flush  5-20 mL Intracatheter Q24H     metroNIDAZOLE  500 mg Intravenous Q12H     - MEDICATION INSTRUCTIONS -   Does not apply  Once     senna-docusate  1 tablet Oral BID    Or     senna-docusate  2 tablet Oral BID     sodium bicarbonate  650 mg Oral TID     sodium chloride (PF)  10-40 mL Intracatheter Q8H     sodium chloride (PF)  3 mL Intracatheter Q8H     sodium chloride (PF)  9 mL Intracatheter During Dialysis/CRRT (from stock)     sodium chloride (PF)  9 mL Intracatheter During Dialysis/CRRT (from stock)     [Held by provider] Vitamin D3  2,000 Units Oral Daily       heparin (porcine)     All above labs and imaging reviewed by me.   GERARD Virgen

## 2023-04-10 NOTE — PLAN OF CARE
Neuro/Musculoskeletal:  A&Ox4.  Cardiac:  SR to Sinus tachycardia. BP Stable, Afebrile      Respiratory:  With Tracheostomy Bivona 6 cuffless with PMV. On room air.   Suctioned tracheal secretions PRN.  GI/: Straight cath Q4 on waking hours, No BM. Abdomen is soft, tender on the surgical site.  Diet/Appetite: Able to tolerate soft foods. Good appetite.  Activity:  Standby assist x1. Able to ambulate to toilet and back to bed,  Pain:  Pain in the operative site. Given Oxycodone 5mg with good response.  Skin:    LLQ old PD site with steristrip  Umbilical stoma (Mitronoff) for urine  straight catheterization     LDAs + Drips/IVF:   MEHDI PIV - salne locked  Tracheostomy Bivona 6 cuffless with PMV  PEG tube - clamped.     Protocols/Labs:          Plan: Continue POC, Notify provider for changes.              Problem: Plan of Care - These are the overarching goals to be used throughout the patient stay.    Goal: Optimal Comfort and Wellbeing  Outcome: Progressing  Intervention: Monitor Pain and Promote Comfort  Recent Flowsheet Documentation  Taken 4/10/2023 0029 by Lord Bright Park, RN  Pain Management Interventions:    care clustered    rest  Taken 4/10/2023 0000 by Lord Bright [place], RN  Pain Management Interventions:    care clustered    rest  Taken 4/9/2023 2000 by Lord Bright [place], RN  Pain Management Interventions:    care clustered    rest     Problem: Infection  Goal: Absence of Infection Signs and Symptoms  Outcome: Progressing     Problem: Pulmonary Impairment  Goal: Improved Activity Tolerance  Outcome: Progressing  Goal: Effective Airway Clearance  Outcome: Progressing  Goal: Optimal Gas Exchange  Outcome: Progressing   Goal Outcome Evaluation:

## 2023-04-10 NOTE — PROGRESS NOTES
"[facility]  Inpatient Medicine Service - [place] Team 4  Daily Progress Note    Patient: Aidan Louie      : 1995      MRN: [id number]    Assessment/Plan:   Aidan Louie is a 27 year old male w/ pertinent PMHx of ESRD 2/2 reflux nephropathy on peritoneal dialysis, prune belly syndrome, horseshoe kidney, left to right transureteroureterostomy, neurogenic bladder s/p mitronoff urinary diversion, Dawood-Reece sequence, and restrictive lung disease w/ prior tracheostomy (decannulated ), and cognitive delay admitted since 23 for abdominal pain, n/v, constipation, and PD catheter purulence and malfunction who was found to have clinical sepsis likely from PD-related peritonitis w/ adjacent small bowel obstruction.    Today's updates/changes:  - Dialyzing again today - nephrology evaluating fistula function and whether or not will require tunneled line  - Mother working w/ SW/CM regarding outpt HD seat  - Will touch base w/ ID regarding narrowing abx regimen given lack of bacterial growth on cultures thus far    Acute Hospital Problems:     Peritonitis 2/2 candida albicans, peritoneal dialysis-related  Sepsis, resolved  In setting of peritoneal dialysis, one month of difficult PD access, few days of \"foamy\" and possibly purulent PD cath discharge, fevers. Unable to tolerate peritoneal antibiotics prior to admission. Initially w/ tachycardia and leukocytosis. Peritoneal culture prior to admission growing budding yeast, no bacterial growth as of yet. Peritoneal fluid and PD cath tip cultures from this admission also now growing candida albicans, pan-susceptible. Overall hemodynamically and clinically stable on broad antimicrobial therapy.   Plan:  - Will touch base w/ ID regarding narrowing abx regimen given lack of bacterial growth on cultures  - Continuing to monitor BCx , UCx , PD catheter culture , and peritoneal fluid culture   - " Monitoring peritoneal fluid culture from prior to admission (PD RN will check in with Josselin for updates)  - IV Cefepime, Flagyl, Fluconazole (all since 4/6 - present) in the meantime               - IV Vanc 4/6 - discontinued 4/8  - Post-op pain regimen: PRN Tylenol and Oxycodone available  - Post-op dressing recommendations: See surgery progress note dated 4/8/23     Small bowel obstruction vs infection-associated ileus, improving  With transition point closely associated to PD catheter which is now s/p removal. In context of poor PO, nausea, vomiting, and constipation at home prior to admission. Improving w/ sepsis treatment, passing stool, advancing diet.  Plan:  - Gen surg signed off as of 4/8  - Advance to regular diet  - Can decompress w/ G-tube periodically if needed per surgery recs (not been an issue lately)  - Symptomatic support: Zofran, Compazine prn     ESRD w/ PD  Mild hyperkalemia  AGMA  Likely in setting of incomplete peritoneal dialysis leading up to admission. Now s/p PD cath removal in setting of peritonitis as above w/ temporary CVC placed for HD reinitiation this hospital stay. Nephrology is following, now dialyzed at least three times as inpt.  Plan:  - Access: Non-tunneled internal jugular CVC for now, evaluating fistula for functionality, may not need tunneled line placed  - Working out outpt dialysis seat  - PTA Cinacalcet 30mg qday, PO NaHCO3 650mg TID     Restrictive lung disease  Chronic. Currently without tachypnea or hypoxia. Comfortable-appearing. Some bronchial cuffing on CXR, though not different than prior, some coarse breath sounds and increased secretions on history. Initial viral panel unremarkable. Suspect primary infection is peritonitis as above and these are chronic changes.  Plan:  - Sputum culture if produces sputum  - See above for antibiotic regimen  - PTA Azithro MWF  - PTA Duonebs and vest therapy  - RT following for assistance     Chronic malnutrition  Has PEG but not  tube feed dependent, tolerates PO. Advancing diet.     Neurogenic bladder s/p mitranoff urinary diversion  Still makes some degree of urine. Self-caths every ~4 hours daily w/ intermittent irrigation. Last urology visit in 2022.  Plan:  - Straight cath orders for ~q4h while awake     HTN - holding PTA Amlodipine ( meds still listed as Captopril and Clonidine) while managing sepsis     GERD - PTA Famotidine        Hospital Checklist:  Diet: Advancing diet as tolerated - up to regular diet  VTE ppx: SQH  LDA: PIV, Right temporary CVC for HD, G-tube, Mitranoff diversion  Code: Full  Level of care: General Medicine  Family: Mother, Valerie - [phone]      Discharge Planning:  Anticipate discharge in 2-3 days at earliest pending hemodialysis seat and transitioning to PO abx regimen. Suspect will be discharging to prior living arrangement. Communicated via PINKY tab.      Staffed with attending physician, Dr. Weston.    Nahid Morrison MD    Internal Medicine PGY-2  [facility] Team 4    Pager #[phone]    [employer] Messaging  (Please see sign-in/sign-out for up-to-date physician coverage information)      Interval Events:   Overnight handoff and nursing reports reviewed. No acute interval events. Some intermittent abdominal discomfort, managed w/ PO pain medications.    Objective:     Vitals:    23 1637 23 0400   Weight: 47.2 kg (104 lb) 48.5 kg (107 lb)     Vital Signs with Ranges  Temp:  [97.9  F (36.6  C)-98.9  F (37.2  C)] 98.2  F (36.8  C)  Pulse:  [] 87  Resp:  [11-31] 19  BP: (123-157)/() 144/93  SpO2:  [91 %-100 %] 98 %  I/O last 3 completed shifts:  In: 720 [P.O.:420; I.V.:300]  Out: 1100 [Urine:1100]    Exam:  General: No acute distress, laying in bed, conversational with mother primarily  Neck: Right internal jugular CVC w/o bleeding  CV: Regular rate, regular rhythm, no new murmurs  Lungs: On room air,  no wheezing this morning, no increased WOB  Abd: Soft, improvement of  distension and tenderness, G-tube noted and Mitranoff stoma noted  Ext: Warm and well-perfused, no appreciable lower extremity edema    Medications     heparin (porcine) 500 Units/hr (04/10/23 1300)       azithromycin  250 mg Oral Once per day on Mon Wed Fri     ceFEPIme  1 g Intravenous Q24H     cinacalcet  30 mg Oral Daily     famotidine  20 mg Oral Daily     fluconazole  200 mg Intravenous Q24H     sodium chloride (PF) 0.9%  10 mL Intracatheter Once in dialysis/CRRT    Followed by     heparin  1.3-2.6 mL Intracatheter Once in dialysis/CRRT     sodium chloride (PF) 0.9%  10 mL Intracatheter Once in dialysis/CRRT    Followed by     heparin  1.3-2.6 mL Intracatheter Once in dialysis/CRRT     heparin ANTICOAGULANT  5,000 Units Subcutaneous Q12H     heparin lock flush  5-20 mL Intracatheter Q24H     ipratropium - albuterol 0.5 mg/2.5 mg/3 mL  3 mL Nebulization Q6H     metroNIDAZOLE  500 mg Intravenous Q12H     - MEDICATION INSTRUCTIONS -   Does not apply Once     senna-docusate  1 tablet Oral BID    Or     senna-docusate  2 tablet Oral BID     sodium bicarbonate  650 mg Oral TID     sodium chloride (PF)  10-40 mL Intracatheter Q8H     sodium chloride (PF)  3 mL Intracatheter Q8H     sodium chloride (PF)  9 mL Intracatheter During Dialysis/CRRT (from stock)     sodium chloride (PF)  9 mL Intracatheter During Dialysis/CRRT (from stock)     [Held by provider] Vitamin D3  2,000 Units Oral Daily       Data   Recent Labs   Lab 04/10/23  0919 04/09/23  0721 04/08/23  1059 04/08/23  0656   WBC 14.4* 16.0* 18.6*  --    HGB 10.4* 10.4* 10.8*  --    MCV 91 90 92  --     454* 501*  --    * 134*  --  134*   POTASSIUM 4.5 4.4  --  4.8   CHLORIDE 101 101  --  104   CO2 18* 16*  --  11*   BUN 42.9* 79.6*  --  119.0*   CR 4.11* 6.30*  --  8.23*   ANIONGAP 16* 17*  --  19*   SUSAN 8.8 9.3  --  9.0   GLC 89 97  --  69*       No results found for this or any previous visit (from the past 24 hour(s)).

## 2023-04-10 NOTE — PROGRESS NOTES
Care Management Follow Up    Length of Stay (days): 4    Expected Discharge Date: 04/13/2023     Concerns to be Addressed: care coordination/care conferences  pt's mother has limited mobility  Patient plan of care discussed at interdisciplinary rounds: Yes    Anticipated Discharge Disposition: Home     Anticipated Discharge Services: PCA  Anticipated Discharge DME:      Patient/family educated on Medicare website which has current facility and service quality ratings:    Education Provided on the Discharge Plan:    Patient/Family in Agreement with the Plan: yes    Referrals Placed by CM/SW:    Private pay costs discussed: Not applicable    Additional Information:  RNCC discussed discharge planning with [facility] team. Patient is more likely med ready for discharge mid/end of week, abx plan is pending. Will need OP HD set up. Patient has old capped trach.    RNCC reached out to [facility] and they are able to accept trach patient as they have been trained now to take this patient for liability purposes. They are unsure of chair time. Writer [facility]. They are unsure that patient needs new labs or not, writer is faxing Hep B labs (surface antigen and core antibody), along with CXR results to [facility]. RNCC will need to follow up with [facility] in coming day(s).       [facility]  Ph: [phone]  Fax: [phone]    [place]  Ph: [phone]  Placement likely (they are able to accept a patient with a trach now), chair time is pending      DIAMANTE Mtz, BA, RN, CMSRN, RNCC  Covering Units 6D/OBS  Pager: [phone]  Phone: [phone]  6th floor Weekend/Holiday Pager: [phone]  Observation weekend/after hours: [phone]

## 2023-04-10 NOTE — PLAN OF CARE
Goal Outcome Evaluation:      Plan of Care Reviewed With: patient    Overall Patient Progress: improving    Outcome Evaluation: Patient alert and oriented, makes his needs known. Vital signs were stable. Patient had dialysis today. Q4 straight cath complete.

## 2023-04-10 NOTE — PROGRESS NOTES
"      General Infectious Disease Service - Yellow Team    Patient:  Aidan Louie, Date of birth 1995, Medical record number [id number]  Date of Admission: 4/6/2023  Date of Visit:  4/10/2023         Assessment and Recommendations:   Problem List:    1. Fungal peritonitis- C.albicans on [facility] Cx  2. Hx ESRD previously on HD (12/2021-3/2022) on peritoneal dialysis (3/2022-4/2023), now s/p removal of PD catheter on 4/7  3. Congenital abnormality of urinary tract, s/p Mitrofanoff  4. Ileus      Discussion:  Aidan Louie (Alex) is a 27 year old male with history of prune belly syndrome, Dawood Reece syndrome, ESRD on peritoneal dialysis (3/2022) due to congenital abnormalities of urinary tract, s/p Mitrofanoff procedure, restrictive lung disease, s/p tracheostomy, PEG tube, and multiple abdominal reconstructive surgeries who presented to ED on 4/6/23 with concern for peritonitis related to PD catheter, report of \"budding yeast\" on outside culture. Now s/p removal of PD catheter on 4/7, placement of temporary HD line.      Issues with PD catheter and runs since 3/9 with intermittent abd pain and distention. Foamy purulent drainage at home cultured by dialysis nurse and growing budding yeast with no bacterial growth at time of admission, per report. Was started on cefepime and vancomycin at home through nephrology/dialysis center. Increased lethargy and has not been able to do all of his own ADLs and cares at home as he usually does. Fever to 104F at home right before admission, WBC 20.6K on arrival. Cultures from PD catheter removal at [facility] now with C.albicans. Skin would be most likely source, followed by intra-abdominal. Continue fluconazole, cefepime, and Flagyl for now. Would like to confirm that no bacteria growing on cultures collected by dialysis prior to admission. White count today 14.4 (improved). CRP still elevated but trending down.      Recommendations:      1. Continue fluconazole for " "C.albicans peritonitis (adjusted to renal function)    2. Continue cefepime and Flagyl for now (adjusted to renal function)  - I called the with dialysis nurse/outside lab today. So far no bacterial growth per report. I asked for the result to be faxed. If no bacterial growth in the next 24h, I will plan to stop cefepime and flagyl tomorrow         The General ID team will continue to follow this patient. Please feel free to call with any questions.     NILDA SHARMA MD  Date of Service: 04/10/23  Pager: [phone]               Physical Exam:   /78   Pulse 99   Temp 97.6  F (36.4  C) (Oral)   Resp 22   Ht 1.676 m (5' 6\")   Wt 48.5 kg (107 lb)   SpO2 97%   BMI 17.27 kg/m         Exam:  GENERAL:  Not in acute distress.   HEAD: Normocephalic and atraumatic  LUNGS:  Breathing comfortably on room air         Laboratory Data:     Creatinine   Date Value Ref Range Status   04/10/2023 4.11 (H) 0.67 - 1.17 mg/dL Final   04/09/2023 6.30 (H) 0.67 - 1.17 mg/dL Final   04/08/2023 8.23 (H) 0.67 - 1.17 mg/dL Final   04/07/2023 8.83 (H) 0.67 - 1.17 mg/dL Final   04/07/2023 8.73 (H) 0.67 - 1.17 mg/dL Final   11/30/2020 4.80 (H) 0.66 - 1.25 mg/dL Final   10/15/2020 5.10 (H) 0.66 - 1.25 mg/dL Final   05/26/2020 4.3 mg/dL Final   12/09/2019 4.31 (H) 0.66 - 1.25 mg/dL Final   05/13/2019 3.25 (H) 0.66 - 1.25 mg/dL Final     WBC   Date Value Ref Range Status   11/30/2020 8.3 4.0 - 11.0 10e9/L Final   10/15/2020 9.8 4.0 - 11.0 10e9/L Final   12/09/2019 9.2 4.0 - 11.0 10e9/L Final   05/13/2019 10.0 4.0 - 11.0 10e9/L Final   07/11/2017 8.8 4.0 - 11.0 10e9/L Final     WBC Count   Date Value Ref Range Status   04/10/2023 14.4 (H) 4.0 - 11.0 10e3/uL Final   04/09/2023 16.0 (H) 4.0 - 11.0 10e3/uL Final   04/08/2023 18.6 (H) 4.0 - 11.0 10e3/uL Final   04/07/2023 20.8 (H) 4.0 - 11.0 10e3/uL Final   04/06/2023 20.6 (H) 4.0 - 11.0 10e3/uL Final     Hemoglobin   Date Value Ref Range Status   04/10/2023 10.4 (L) 13.3 - 17.7 " g/dL Final   11/30/2020 10.3 (L) 13.3 - 17.7 g/dL Final     Platelet Count   Date Value Ref Range Status   04/10/2023 396 150 - 450 10e3/uL Final   11/30/2020 332 150 - 450 10e9/L Final     Lab Results   Component Value Date     (L) 04/10/2023    BUN 42.9 (H) 04/10/2023    CO2 18 (L) 04/10/2023     CRP Inflammation   Date Value Ref Range Status   09/17/2015 <2.9 0.0 - 8.0 mg/L Final   02/05/2015 <2.9 0.0 - 8.0 mg/L Final   10/03/2013 38.1 (H) 0.0 - 8.0 mg/L Final   03/19/2009 <3.0 0.0 - 8.0 mg/L Final   04/26/2007 11.0 (H) 0.0 - 8.0 mg/L Final

## 2023-04-10 NOTE — PROGRESS NOTES
HEMODIALYSIS TREATMENT NOTE    Date: 4/10/2023  Time: 1:58 PM    Data:  Pre Wt:   48.5 kg est (taken 4/8/2023)  Desired Wt: 0 kg   Post Wt:  The same  Weight change:  No fluid removal   Ultrafiltration - Post Run Net Total Removed (mL): 0 mL  Vascular Access Status: patent  Dialyzer Rinse: moderately streaked   Total Blood Volume Processed:57.2  Liters  Total Dialysis (Treatment) Time: 3.5 Hours access: RIJ non-tunneled cvc for arterial and AVF left forearm for venous line     Bleeding time: venous site 5 mins  Lab:   No    Assessment/ Interventions:  - A & O x 4, calm cooperative  -  Able to make his needs known  - Denies pain and sob    Ran with K2CA2.5 bath via RI\J non-tunneled cvc at the start with BFR of 400 then used AVF for venous line as per ordered by GERARD Camejo few minutes after HD started with 17 g x 1 at the left forearm.     Completed 3.5 HD tx, infused heparin 500 units/ hr as ordered. Post rinse back done. Lumens locked with heparin, Arterial 1.6 ml, Venous 1.6 ml. .     See MAR and flowsheet for further details.    Handoff report given to Koko MARTINEZ RN.         Plan:    Per renal team

## 2023-04-11 LAB
ANION GAP SERPL CALCULATED.3IONS-SCNC: 14 MMOL/L (ref 7–15)
BACTERIA BLD CULT: NO GROWTH
BACTERIA BLD CULT: NO GROWTH
BUN SERPL-MCNC: 27.7 MG/DL (ref 6–20)
CALCIUM SERPL-MCNC: 8.7 MG/DL (ref 8.6–10)
CHLORIDE SERPL-SCNC: 102 MMOL/L (ref 98–107)
CREAT SERPL-MCNC: 3.25 MG/DL (ref 0.67–1.17)
CRP SERPL-MCNC: 85.9 MG/L
DEPRECATED HCO3 PLAS-SCNC: 22 MMOL/L (ref 22–29)
ERYTHROCYTE [DISTWIDTH] IN BLOOD BY AUTOMATED COUNT: 13.4 % (ref 10–15)
GFR SERPL CREATININE-BSD FRML MDRD: 26 ML/MIN/1.73M2
GLUCOSE SERPL-MCNC: 95 MG/DL (ref 70–99)
HCT VFR BLD AUTO: 30.4 % (ref 40–53)
HGB BLD-MCNC: 9.5 G/DL (ref 13.3–17.7)
HOLD SPECIMEN: NORMAL
MAGNESIUM SERPL-MCNC: 1.5 MG/DL (ref 1.7–2.3)
MCH RBC QN AUTO: 28.4 PG (ref 26.5–33)
MCHC RBC AUTO-ENTMCNC: 31.3 G/DL (ref 31.5–36.5)
MCV RBC AUTO: 91 FL (ref 78–100)
PLATELET # BLD AUTO: 387 10E3/UL (ref 150–450)
POTASSIUM SERPL-SCNC: 4.1 MMOL/L (ref 3.4–5.3)
PROTOCOL CUTOFF: NORMAL
RBC # BLD AUTO: 3.34 10E6/UL (ref 4.4–5.9)
SA 1 CELL: NORMAL
SA 1 TEST METHOD: NORMAL
SA 2 CELL: NORMAL
SA 2 TEST METHOD: NORMAL
SA1 HI RISK ABY: NORMAL
SA1 MOD RISK ABY: NORMAL
SA2 HI RISK ABY: NORMAL
SA2 MOD RISK ABY: NORMAL
SODIUM SERPL-SCNC: 138 MMOL/L (ref 136–145)
UNACCEPTABLE ANTIGENS: NORMAL
UNOS CPRA: 25
WBC # BLD AUTO: 14.9 10E3/UL (ref 4–11)
ZZZSA 1  COMMENTS: NORMAL
ZZZSA 2 COMMENTS: NORMAL

## 2023-04-11 PROCEDURE — 36415 COLL VENOUS BLD VENIPUNCTURE: CPT

## 2023-04-11 PROCEDURE — 85027 COMPLETE CBC AUTOMATED: CPT

## 2023-04-11 PROCEDURE — 90937 HEMODIALYSIS REPEATED EVAL: CPT

## 2023-04-11 PROCEDURE — 999N000157 HC STATISTIC RCP TIME EA 10 MIN

## 2023-04-11 PROCEDURE — 250N000013 HC RX MED GY IP 250 OP 250 PS 637

## 2023-04-11 PROCEDURE — 94640 AIRWAY INHALATION TREATMENT: CPT | Mod: 76

## 2023-04-11 PROCEDURE — 250N000013 HC RX MED GY IP 250 OP 250 PS 637: Performed by: STUDENT IN AN ORGANIZED HEALTH CARE EDUCATION/TRAINING PROGRAM

## 2023-04-11 PROCEDURE — 94640 AIRWAY INHALATION TREATMENT: CPT

## 2023-04-11 PROCEDURE — 258N000003 HC RX IP 258 OP 636: Performed by: INTERNAL MEDICINE

## 2023-04-11 PROCEDURE — 250N000011 HC RX IP 250 OP 636: Performed by: INTERNAL MEDICINE

## 2023-04-11 PROCEDURE — 99207 PR NO CHARGE FOLLOW UP PS: CPT | Performed by: INTERNAL MEDICINE

## 2023-04-11 PROCEDURE — 250N000011 HC RX IP 250 OP 636

## 2023-04-11 PROCEDURE — 86140 C-REACTIVE PROTEIN: CPT

## 2023-04-11 PROCEDURE — 120N000002 HC R&B MED SURG/OB UMMC

## 2023-04-11 PROCEDURE — 80048 BASIC METABOLIC PNL TOTAL CA: CPT

## 2023-04-11 PROCEDURE — 250N000009 HC RX 250: Performed by: INTERNAL MEDICINE

## 2023-04-11 PROCEDURE — 99233 SBSQ HOSP IP/OBS HIGH 50: CPT | Performed by: PHYSICIAN ASSISTANT

## 2023-04-11 PROCEDURE — 83735 ASSAY OF MAGNESIUM: CPT

## 2023-04-11 PROCEDURE — 99233 SBSQ HOSP IP/OBS HIGH 50: CPT | Mod: GC | Performed by: STUDENT IN AN ORGANIZED HEALTH CARE EDUCATION/TRAINING PROGRAM

## 2023-04-11 RX ORDER — HEPARIN SODIUM 1000 [USP'U]/ML
500 INJECTION, SOLUTION INTRAVENOUS; SUBCUTANEOUS CONTINUOUS
Status: DISCONTINUED | OUTPATIENT
Start: 2023-04-11 | End: 2023-04-11

## 2023-04-11 RX ORDER — CALCIUM CARBONATE 500 MG/1
1000 TABLET, CHEWABLE ORAL 4 TIMES DAILY
Status: DISCONTINUED | OUTPATIENT
Start: 2023-04-11 | End: 2023-04-22 | Stop reason: HOSPADM

## 2023-04-11 RX ORDER — VITAMIN B COMPLEX
1000 TABLET ORAL 2 TIMES DAILY
Status: DISCONTINUED | OUTPATIENT
Start: 2023-04-11 | End: 2023-04-22 | Stop reason: HOSPADM

## 2023-04-11 RX ORDER — CALCIUM CARBONATE 500 MG/1
1000 TABLET, CHEWABLE ORAL 4 TIMES DAILY
Status: DISCONTINUED | OUTPATIENT
Start: 2023-04-11 | End: 2023-04-11

## 2023-04-11 RX ORDER — ACETAMINOPHEN 500 MG
500-1000 TABLET ORAL EVERY 8 HOURS PRN
Status: DISCONTINUED | OUTPATIENT
Start: 2023-04-11 | End: 2023-04-22 | Stop reason: HOSPADM

## 2023-04-11 RX ORDER — FAMOTIDINE 20 MG/1
40 TABLET, FILM COATED ORAL DAILY
Status: DISCONTINUED | OUTPATIENT
Start: 2023-04-11 | End: 2023-04-22 | Stop reason: HOSPADM

## 2023-04-11 RX ADMIN — HEPARIN SODIUM 500 UNITS/HR: 1000 INJECTION INTRAVENOUS; SUBCUTANEOUS at 13:09

## 2023-04-11 RX ADMIN — LIDOCAINE HYDROCHLORIDE 0.5 ML: 10 INJECTION, SOLUTION EPIDURAL; INFILTRATION; INTRACAUDAL; PERINEURAL at 13:13

## 2023-04-11 RX ADMIN — CALCIUM CARBONATE (ANTACID) CHEW TAB 500 MG 500 MG: 500 CHEW TAB at 10:04

## 2023-04-11 RX ADMIN — SENNOSIDES AND DOCUSATE SODIUM 2 TABLET: 50; 8.6 TABLET ORAL at 09:47

## 2023-04-11 RX ADMIN — Medication 10 MG: at 22:27

## 2023-04-11 RX ADMIN — IPRATROPIUM BROMIDE AND ALBUTEROL SULFATE 3 ML: .5; 3 SOLUTION RESPIRATORY (INHALATION) at 20:45

## 2023-04-11 RX ADMIN — SODIUM CHLORIDE 250 ML: 9 INJECTION, SOLUTION INTRAVENOUS at 13:10

## 2023-04-11 RX ADMIN — SODIUM CHLORIDE 300 ML: 9 INJECTION, SOLUTION INTRAVENOUS at 13:09

## 2023-04-11 RX ADMIN — SODIUM BICARBONATE 650 MG: 650 TABLET ORAL at 09:47

## 2023-04-11 RX ADMIN — CALCIUM CARBONATE 1000 MG: 500 TABLET, CHEWABLE ORAL at 22:27

## 2023-04-11 RX ADMIN — SENNOSIDES AND DOCUSATE SODIUM 2 TABLET: 50; 8.6 TABLET ORAL at 20:15

## 2023-04-11 RX ADMIN — HEPARIN SODIUM 500 UNITS: 1000 INJECTION INTRAVENOUS; SUBCUTANEOUS at 13:10

## 2023-04-11 RX ADMIN — HEPARIN SODIUM 5000 UNITS: 5000 INJECTION, SOLUTION INTRAVENOUS; SUBCUTANEOUS at 20:15

## 2023-04-11 RX ADMIN — IPRATROPIUM BROMIDE AND ALBUTEROL SULFATE 3 ML: .5; 3 SOLUTION RESPIRATORY (INHALATION) at 08:50

## 2023-04-11 RX ADMIN — LIDOCAINE HYDROCHLORIDE 0.5 ML: 10 INJECTION, SOLUTION EPIDURAL; INFILTRATION; INTRACAUDAL; PERINEURAL at 13:12

## 2023-04-11 RX ADMIN — SODIUM BICARBONATE 650 MG: 650 TABLET ORAL at 16:51

## 2023-04-11 RX ADMIN — FAMOTIDINE 40 MG: 20 TABLET ORAL at 20:15

## 2023-04-11 RX ADMIN — HEPARIN SODIUM 5000 UNITS: 5000 INJECTION, SOLUTION INTRAVENOUS; SUBCUTANEOUS at 09:47

## 2023-04-11 RX ADMIN — Medication 1000 UNITS: at 20:21

## 2023-04-11 RX ADMIN — CINACALCET 30 MG: 30 TABLET ORAL at 09:47

## 2023-04-11 RX ADMIN — METRONIDAZOLE 500 MG: 500 INJECTION, SOLUTION INTRAVENOUS at 05:05

## 2023-04-11 RX ADMIN — FLUCONAZOLE IN SODIUM CHLORIDE 200 MG: 2 INJECTION, SOLUTION INTRAVENOUS at 20:11

## 2023-04-11 RX ADMIN — SODIUM BICARBONATE 650 MG: 650 TABLET ORAL at 20:15

## 2023-04-11 RX ADMIN — CALCIUM CARBONATE 1000 MG: 500 TABLET, CHEWABLE ORAL at 19:50

## 2023-04-11 ASSESSMENT — ACTIVITIES OF DAILY LIVING (ADL)
ADLS_ACUITY_SCORE: 38

## 2023-04-11 NOTE — PROGRESS NOTES
"    General Infectious Disease Service - Yellow Team - Chart Review Note    Patient:  Aidan Louie, Date of birth 1995, Medical record number [id number]  Date of Admission: 4/6/2023  Date of Visit:  4/11/2023         Assessment and Recommendations:   Problem List:    1. Fungal peritonitis- C.albicans on [facility] Cx  2. Hx ESRD previously on HD (12/2021-3/2022) on peritoneal dialysis (3/2022-4/2023), now s/p removal of PD catheter on 4/7  3. Congenital abnormality of urinary tract, s/p Mitrofanoff  4. Ileus        Discussion:  Aidan Louie (Alex) is a 27 year old male with history of prune belly syndrome, Dawood Reece syndrome, ESRD on peritoneal dialysis (3/2022) due to congenital abnormalities of urinary tract, s/p Mitrofanoff procedure, restrictive lung disease, s/p tracheostomy, PEG tube, and multiple abdominal reconstructive surgeries who presented to ED on 4/6/23 with concern for peritonitis related to PD catheter, report of \"budding yeast\" on outside culture. Now s/p removal of PD catheter on 4/7, placement of temporary HD line.      Issues with PD catheter and runs since 3/9 with intermittent abd pain and distention. Foamy purulent drainage at home cultured by dialysis nurse and growing budding yeast with no bacterial growth at time of admission, per report. Was started on cefepime and vancomycin at home through nephrology/dialysis center. Increased lethargy and has not been able to do all of his own ADLs and cares at home as he usually does. Fever to 104F at home right before admission, WBC 20.6K on arrival. Cultures from PD catheter removal and peritoneal fluid (4/7) at [facility] positive for C.albicans sensitive to fluconazole. Outside ([facility] dialysis center) peritoneal cultures were faxed yesterday and they are also positive for Candidal albicans sensitive to fluconazole (they were collected on 4/4). No bacterial growth in any of the cultures.      Recommendations:      1. Continue fluconazole for " C.albicans peritonitis (adjusted to renal function). Anticipate 2-4 weeks of treatment counting from the PD catheter removal date     2. Please stop cefepime and flagyl         The General ID team will continue to follow this patient. Please feel free to call with any questions.     NILDA SHARMA MD  Date of Service: 04/11/23  Pager: [phone]

## 2023-04-11 NOTE — PROGRESS NOTES
Care Management Follow Up    Length of Stay (days): 5    Expected Discharge Date: 04/13/2023     Concerns to be Addressed: care coordination/care conferences  pt's mother has limited mobility  Patient plan of care discussed at interdisciplinary rounds: Yes    Anticipated Discharge Disposition: Home     Anticipated Discharge Services: PCA  Anticipated Discharge DME:      Patient/family educated on Medicare website which has current facility and service quality ratings:    Education Provided on the Discharge Plan:    Patient/Family in Agreement with the Plan: yes    Referrals Placed by CM/SW:    Private pay costs discussed: Not applicable    Additional Information:  RNCC reached out to [facility], enquiring as to acceptance/chair time for new OP HD. [facility] will have coordinator Ronit reach out to writer today. Patient still awaiting final abx plan. RNCC continues to follow.    [facility]  Ph: [phone]  Fax: [phone]     [place], [place] ([place])  Ph: [phone]  Placement likely (they are able to accept a patient with a trach now), chair time is pending     Update: per Wanda at [facility], the patient will have a chair time at 2:45 p.m. whenever they are discharged, with 2:15-2:30 arrival for first appointment, TTS schedule per nephrology notes.      DIAMANTE Mtz, BA, RN, CMSRN, RNCC  Covering Units 6D/OBS  Pager: [phone]  Phone: [phone]  6th floor Weekend/Holiday Pager: [phone]  Observation weekend/after hours: [phone]

## 2023-04-11 NOTE — PLAN OF CARE
Goal Outcome Evaluation:      Plan of Care Reviewed With: patient    Overall Patient Progress: improving    Outcome Evaluation: Patient alert and oriented, makes his needs known. Vital signs were stable. Patient had dialysis today no output.

## 2023-04-11 NOTE — PROGRESS NOTES
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    Today's updates/changes:  - Dialyzing again today - nephrology evaluating fistula function and whether or not will require tunneled line  - Stopped Cefepime and Flagyl  - Tolerating PO, not quite had a bowel movement yet    Acute Hospital Problems:     Peritonitis 2/2 candida albicans, peritoneal dialysis-related  Sepsis, resolved  In setting of peritoneal dialysis, one month of difficult PD access, few days of \"foamy\" and possibly purulent PD cath discharge, fevers. Unable to tolerate peritoneal antibiotics prior to admission. Initially w/ tachycardia and leukocytosis. Peritoneal culture prior to admission growing budding yeast, no bacterial growth as of yet. Peritoneal fluid and PD cath tip cultures from this admission also now growing candida albicans, pan-susceptible. Overall hemodynamically and clinically stable on broad antimicrobial therapy.   Plan:  - Continuing to monitor BCx , UCx , PD catheter culture , and peritoneal fluid culture   - Monitoring peritoneal fluid culture from prior to admissio  - Antibiotics: Fluconazole IV (renally dosed)  - present  - Prior: IV Cefepime, Flagyl, Fluconazole (all since  - " 4/11), IV Vanc (4/6 - 4/8)  - Post-op pain regimen: PRN Tylenol and Oxycodone available  - Post-op dressing recommendations: See surgery progress note dated 4/8/23     Small bowel obstruction vs infection-associated ileus, improving  With transition point closely associated to PD catheter which is now s/p removal. In context of poor PO, nausea, vomiting, and constipation at home prior to admission. Improving w/ sepsis treatment, tolerating PO, still awaiting BM.  Plan:  - Gen surg signed off as of 4/8  - Regular diet  - Symptomatic support: Zofran, Compazine prn     ESRD w/ PD  Mild hyperkalemia  AGMA  Likely in setting of incomplete peritoneal dialysis leading up to admission. Now s/p PD cath removal in setting of peritonitis as above w/ temporary CVC placed for HD reinitiation this hospital stay. Nephrology is following, now dialyzed at least three times as inpt.  Plan:  - Access: Non-tunneled internal jugular CVC for now, evaluating fistula for functionality, may not need tunneled line placed (today trialing two needle access to fistula)  - Has outpt dialysis seat secured - TTS schedule  - PTA Cinacalcet 30mg qday, PO NaHCO3 650mg TID     Restrictive lung disease  Chronic. Currently without tachypnea or hypoxia. Comfortable-appearing. Some bronchial cuffing on CXR, though not different than prior, some coarse breath sounds and increased secretions on history. Initial viral panel unremarkable. Suspect primary infection is peritonitis as above and these are chronic changes.  Plan:  - PTA Azithro MWF held for now per mother's request, will restart when on a discharge antifungal regimen  - PTA Duonebs and vest therapy  - RT following for assistance     Chronic malnutrition  Has PEG but not tube feed dependent, tolerates PO.     Neurogenic bladder s/p mitranoff urinary diversion  Still makes some degree of urine. Self-caths every ~4 hours daily w/ intermittent irrigation. Last urology visit in December 2022.  Plan:  -  Straight cath orders for ~q4h while awake     HTN - holding PTA Amlodipine ( meds still listed as Captopril and Clonidine) while managing sepsis and restarting hemodialysis, can consider restart if needed prior to discharge     GERD - PTA Famotidine        Hospital Checklist:  Diet: Regular diet  VTE ppx: SQH  LDA: PIV, Right temporary CVC for HD, G-tube, Mitranoff diversion  Code: Full  Level of care: General Medicine  Family: Valerie Kingston - [phone]      Discharge Planning:  Anticipate discharge in 2-3 days pending transitioning to PO abx regimen. Suspect will be discharging to prior living arrangement. Has TTS dialysis seat now. Communicated via PINKY tab.      Staffed with attending physician, Dr. Ag.    Nahid Morrison MD    Internal Medicine PGY-2  [facility] Team 4    Pager #[phone]    [employer] Mess[employer]  (Please see sign-in/sign-out for up-to-date physician coverage information)      Interval Events:   Overnight handoff and nursing reports reviewed. No acute interval events. On phone this morning without acute distress. Tolerating PO. Will go for a walk soon with mother, working on bowel movement today if possible.    Objective:     Vitals:    23 1637 23 0400   Weight: 47.2 kg (104 lb) 48.5 kg (107 lb)     Vital Signs with Ranges  Temp:  [97.6  F (36.4  C)-99  F (37.2  C)] 98.5  F (36.9  C)  Pulse:  [73-99] 92  Resp:  [19-28] 19  BP: (122-144)/(75-96) 144/89  FiO2 (%):  [21 %] 21 %  SpO2:  [96 %-99 %] 99 %  I/O last 3 completed shifts:  In: 435 [P.O.:100; I.V.:335]  Out: 1200 [Urine:1200]    Exam:  General: No acute distress, laying in bed, quiet though on phone this morning  CV: Regular rate, regular rhythm, no new murmurs  Lungs: On room air,  no wheezing, no increased WOB  Abd: Soft, improvement of distension and tenderness, G-tube noted and Mitranoff stoma noted  Ext: Warm and well-perfused, no appreciable lower extremity edema    Medications     heparin (porcine) 500 Units/hr  (04/11/23 1309)     heparin (porcine) 500 Units/hr (04/10/23 1300)     - MEDICATION INSTRUCTIONS -         azithromycin  250 mg Oral Once per day on Mon Wed Fri     cinacalcet  30 mg Oral Daily     famotidine  20 mg Oral Daily     fluconazole  200 mg Intravenous Q24H     heparin ANTICOAGULANT  5,000 Units Subcutaneous Q12H     heparin lock flush  5-20 mL Intracatheter Q24H     ipratropium - albuterol 0.5 mg/2.5 mg/3 mL  3 mL Nebulization Q6H     senna-docusate  1 tablet Oral BID    Or     senna-docusate  2 tablet Oral BID     sodium bicarbonate  650 mg Oral TID     sodium chloride (PF)  10-40 mL Intracatheter Q8H     sodium chloride (PF)  3 mL Intracatheter Q8H     [Held by provider] Vitamin D3  2,000 Units Oral Daily       Data   Recent Labs   Lab 04/11/23  0747 04/10/23  0919 04/09/23  0721   WBC 14.9* 14.4* 16.0*   HGB 9.5* 10.4* 10.4*   MCV 91 91 90    396 454*    135* 134*   POTASSIUM 4.1 4.5 4.4   CHLORIDE 102 101 101   CO2 22 18* 16*   BUN 27.7* 42.9* 79.6*   CR 3.25* 4.11* 6.30*   ANIONGAP 14 16* 17*   SUSAN 8.7 8.8 9.3   GLC 95 89 97       No results found for this or any previous visit (from the past 24 hour(s)).

## 2023-04-11 NOTE — PROGRESS NOTES
HEMODIALYSIS TREATMENT NOTE    Date: 4/11/2023  Time: 9:09 AM    Data:  Pre Wt:   48.5 kg est   Desired Wt: 0  kg   Post Wt:  No removal of fluid  Weight change: 0  kg  Ultrafiltration - Post Run Net Total Removed (mL): 0 mL  Vascular Access Status: patent  Dialyzer Rinse: light Streaked  Total Blood Volume Processed: 56.4 Liters  Total Dialysis (Treatment) Time: 2.5 Hours  Access: AVF left forearm/ RIJ non-tunneled cvc  Needle size: 17 g x 2  Bleeding time: Arterial site 10 mins and venous 20 mins       Lab:   No    Assessment/ Interventions:  -  A & O x 4 calm  Cooperative  - denies pain and SOB   - able to make his needs known.     Ran with K3Ca2.25 bath for 2.5 hrs. Tried using AVF using 17 g x 2 , in the left forearm unfortunately venous site infiltrated during cannulation.  GERARD Camejo informed and let the AVF rest and will use it next time. Switched lines to RIJ non-tunneled cvc wiith  ml/min and kept the rate until the HD done.     Started with heparin 500 units bolus and 500 units/hr for BFR at 250 ml/min while on AVF and stopped heparin when switched to RIJ. Completed HD. Post rinse back done Lumens locked with NSS.     See MAR and flowsheet for further details.    Handoff report given to Koko MARTINEZ RN and sent back to his room stable.      Plan:    Per renal team

## 2023-04-11 NOTE — PLAN OF CARE
ICU End of Shift Summary. See flowsheets for vital signs and detailed assessment.    Changes this shift: Alert and oriented, VSS, Sinus rhythm, afebrile, -140's. [place] trach, patient performs trach care and suctioning himself. Straight cath on waking hours  4 hours through mitrofanoff, no BM this shift. SBA. LLQ old PD site with dressing.Mom at bedside, supportive.     Plan:  Continue with plan of care and notify provider with changes.     Goal Outcome Evaluation:      Plan of Care Reviewed With: patient, parent    Overall Patient Progress: improvingOverall Patient Progress: improving

## 2023-04-12 ENCOUNTER — APPOINTMENT (OUTPATIENT)
Dept: GENERAL RADIOLOGY | Facility: CLINIC | Age: 28
DRG: 907 | End: 2023-04-12
Payer: MEDICARE

## 2023-04-12 ENCOUNTER — COMMITTEE REVIEW (OUTPATIENT)
Dept: TRANSPLANT | Facility: CLINIC | Age: 28
End: 2023-04-12

## 2023-04-12 LAB
ALBUMIN SERPL BCG-MCNC: 2.5 G/DL (ref 3.5–5.2)
ALP SERPL-CCNC: 69 U/L (ref 40–129)
ALT SERPL W P-5'-P-CCNC: 11 U/L (ref 10–50)
ANION GAP SERPL CALCULATED.3IONS-SCNC: 13 MMOL/L (ref 7–15)
AST SERPL W P-5'-P-CCNC: 30 U/L (ref 10–50)
BILIRUB DIRECT SERPL-MCNC: <0.2 MG/DL (ref 0–0.3)
BILIRUB SERPL-MCNC: 0.2 MG/DL
BUN SERPL-MCNC: 21.8 MG/DL (ref 6–20)
CALCIUM SERPL-MCNC: 8.8 MG/DL (ref 8.6–10)
CHLORIDE SERPL-SCNC: 100 MMOL/L (ref 98–107)
CREAT SERPL-MCNC: 3.11 MG/DL (ref 0.67–1.17)
DEPRECATED HCO3 PLAS-SCNC: 23 MMOL/L (ref 22–29)
ERYTHROCYTE [DISTWIDTH] IN BLOOD BY AUTOMATED COUNT: 13.3 % (ref 10–15)
GFR SERPL CREATININE-BSD FRML MDRD: 27 ML/MIN/1.73M2
GLUCOSE SERPL-MCNC: 97 MG/DL (ref 70–99)
HCT VFR BLD AUTO: 29.3 % (ref 40–53)
HGB BLD-MCNC: 9.3 G/DL (ref 13.3–17.7)
INR PPP: 1.21 (ref 0.85–1.15)
MAGNESIUM SERPL-MCNC: 1.4 MG/DL (ref 1.7–2.3)
MCH RBC QN AUTO: 29.1 PG (ref 26.5–33)
MCHC RBC AUTO-ENTMCNC: 31.7 G/DL (ref 31.5–36.5)
MCV RBC AUTO: 92 FL (ref 78–100)
PHOSPHATE SERPL-MCNC: 4.1 MG/DL (ref 2.5–4.5)
PLATELET # BLD AUTO: 390 10E3/UL (ref 150–450)
POTASSIUM SERPL-SCNC: 4 MMOL/L (ref 3.4–5.3)
PROT SERPL-MCNC: 5.8 G/DL (ref 6.4–8.3)
RBC # BLD AUTO: 3.2 10E6/UL (ref 4.4–5.9)
SODIUM SERPL-SCNC: 136 MMOL/L (ref 136–145)
WBC # BLD AUTO: 14.9 10E3/UL (ref 4–11)

## 2023-04-12 PROCEDURE — 36415 COLL VENOUS BLD VENIPUNCTURE: CPT | Performed by: NURSE PRACTITIONER

## 2023-04-12 PROCEDURE — 99418 PROLNG IP/OBS E/M EA 15 MIN: CPT | Performed by: STUDENT IN AN ORGANIZED HEALTH CARE EDUCATION/TRAINING PROGRAM

## 2023-04-12 PROCEDURE — 85610 PROTHROMBIN TIME: CPT | Performed by: NURSE PRACTITIONER

## 2023-04-12 PROCEDURE — 999N000157 HC STATISTIC RCP TIME EA 10 MIN

## 2023-04-12 PROCEDURE — 85027 COMPLETE CBC AUTOMATED: CPT

## 2023-04-12 PROCEDURE — 74018 RADEX ABDOMEN 1 VIEW: CPT

## 2023-04-12 PROCEDURE — 94640 AIRWAY INHALATION TREATMENT: CPT | Mod: 76

## 2023-04-12 PROCEDURE — 74018 RADEX ABDOMEN 1 VIEW: CPT | Mod: 26 | Performed by: RADIOLOGY

## 2023-04-12 PROCEDURE — 84100 ASSAY OF PHOSPHORUS: CPT

## 2023-04-12 PROCEDURE — 94640 AIRWAY INHALATION TREATMENT: CPT

## 2023-04-12 PROCEDURE — 250N000009 HC RX 250: Performed by: INTERNAL MEDICINE

## 2023-04-12 PROCEDURE — 99232 SBSQ HOSP IP/OBS MODERATE 35: CPT | Performed by: INTERNAL MEDICINE

## 2023-04-12 PROCEDURE — 80053 COMPREHEN METABOLIC PANEL: CPT

## 2023-04-12 PROCEDURE — 250N000013 HC RX MED GY IP 250 OP 250 PS 637

## 2023-04-12 PROCEDURE — 82248 BILIRUBIN DIRECT: CPT | Performed by: INTERNAL MEDICINE

## 2023-04-12 PROCEDURE — 250N000013 HC RX MED GY IP 250 OP 250 PS 637: Performed by: STUDENT IN AN ORGANIZED HEALTH CARE EDUCATION/TRAINING PROGRAM

## 2023-04-12 PROCEDURE — 36415 COLL VENOUS BLD VENIPUNCTURE: CPT

## 2023-04-12 PROCEDURE — 99233 SBSQ HOSP IP/OBS HIGH 50: CPT | Mod: GC | Performed by: STUDENT IN AN ORGANIZED HEALTH CARE EDUCATION/TRAINING PROGRAM

## 2023-04-12 PROCEDURE — 272N000098

## 2023-04-12 PROCEDURE — 250N000011 HC RX IP 250 OP 636

## 2023-04-12 PROCEDURE — 250N000011 HC RX IP 250 OP 636: Performed by: INTERNAL MEDICINE

## 2023-04-12 PROCEDURE — 94669 MECHANICAL CHEST WALL OSCILL: CPT

## 2023-04-12 PROCEDURE — 83735 ASSAY OF MAGNESIUM: CPT

## 2023-04-12 PROCEDURE — 120N000002 HC R&B MED SURG/OB UMMC

## 2023-04-12 PROCEDURE — 250N000011 HC RX IP 250 OP 636: Performed by: STUDENT IN AN ORGANIZED HEALTH CARE EDUCATION/TRAINING PROGRAM

## 2023-04-12 RX ORDER — FLUCONAZOLE 200 MG/1
200 TABLET ORAL DAILY
Status: DISCONTINUED | OUTPATIENT
Start: 2023-04-13 | End: 2023-04-14

## 2023-04-12 RX ORDER — BISACODYL 10 MG
10 SUPPOSITORY, RECTAL RECTAL DAILY PRN
Status: DISCONTINUED | OUTPATIENT
Start: 2023-04-12 | End: 2023-04-17

## 2023-04-12 RX ORDER — LACTULOSE 10 G/15ML
20 SOLUTION ORAL 2 TIMES DAILY PRN
Status: DISCONTINUED | OUTPATIENT
Start: 2023-04-12 | End: 2023-04-22 | Stop reason: HOSPADM

## 2023-04-12 RX ORDER — BISACODYL 5 MG
5 TABLET, DELAYED RELEASE (ENTERIC COATED) ORAL DAILY PRN
Status: DISCONTINUED | OUTPATIENT
Start: 2023-04-12 | End: 2023-04-17

## 2023-04-12 RX ORDER — LACTULOSE 10 G/15ML
200 SOLUTION ORAL 2 TIMES DAILY PRN
Status: DISCONTINUED | OUTPATIENT
Start: 2023-04-12 | End: 2023-04-22 | Stop reason: HOSPADM

## 2023-04-12 RX ORDER — FLUCONAZOLE 2 MG/ML
200 INJECTION, SOLUTION INTRAVENOUS EVERY 24 HOURS
Status: COMPLETED | OUTPATIENT
Start: 2023-04-12 | End: 2023-04-12

## 2023-04-12 RX ORDER — MAGNESIUM SULFATE HEPTAHYDRATE 40 MG/ML
4 INJECTION, SOLUTION INTRAVENOUS ONCE
Status: COMPLETED | OUTPATIENT
Start: 2023-04-12 | End: 2023-04-12

## 2023-04-12 RX ORDER — POLYETHYLENE GLYCOL 3350 17 G/17G
17 POWDER, FOR SOLUTION ORAL DAILY
Status: DISCONTINUED | OUTPATIENT
Start: 2023-04-12 | End: 2023-04-12

## 2023-04-12 RX ORDER — POLYETHYLENE GLYCOL 3350 17 G/17G
17 POWDER, FOR SOLUTION ORAL 2 TIMES DAILY
Status: DISCONTINUED | OUTPATIENT
Start: 2023-04-12 | End: 2023-04-22 | Stop reason: HOSPADM

## 2023-04-12 RX ADMIN — AZITHROMYCIN MONOHYDRATE 250 MG: 250 TABLET ORAL at 08:42

## 2023-04-12 RX ADMIN — FLUCONAZOLE IN SODIUM CHLORIDE 200 MG: 2 INJECTION, SOLUTION INTRAVENOUS at 16:41

## 2023-04-12 RX ADMIN — POLYETHYLENE GLYCOL 3350 17 G: 17 POWDER, FOR SOLUTION ORAL at 09:03

## 2023-04-12 RX ADMIN — CALCIUM CARBONATE 1000 MG: 500 TABLET, CHEWABLE ORAL at 22:24

## 2023-04-12 RX ADMIN — POLYETHYLENE GLYCOL 3350 17 G: 17 POWDER, FOR SOLUTION ORAL at 20:11

## 2023-04-12 RX ADMIN — CINACALCET 30 MG: 30 TABLET ORAL at 08:42

## 2023-04-12 RX ADMIN — HEPARIN SODIUM 5000 UNITS: 5000 INJECTION, SOLUTION INTRAVENOUS; SUBCUTANEOUS at 08:36

## 2023-04-12 RX ADMIN — Medication 10 ML: at 22:24

## 2023-04-12 RX ADMIN — FAMOTIDINE 40 MG: 20 TABLET ORAL at 20:10

## 2023-04-12 RX ADMIN — CALCIUM CARBONATE 1000 MG: 500 TABLET, CHEWABLE ORAL at 08:36

## 2023-04-12 RX ADMIN — CALCIUM CARBONATE 1000 MG: 500 TABLET, CHEWABLE ORAL at 16:41

## 2023-04-12 RX ADMIN — Medication 1000 UNITS: at 20:11

## 2023-04-12 RX ADMIN — HEPARIN SODIUM 5000 UNITS: 5000 INJECTION, SOLUTION INTRAVENOUS; SUBCUTANEOUS at 20:10

## 2023-04-12 RX ADMIN — SODIUM BICARBONATE 650 MG: 650 TABLET ORAL at 08:36

## 2023-04-12 RX ADMIN — SENNOSIDES AND DOCUSATE SODIUM 2 TABLET: 50; 8.6 TABLET ORAL at 20:09

## 2023-04-12 RX ADMIN — Medication 1000 UNITS: at 08:36

## 2023-04-12 RX ADMIN — Medication 10 MG: at 22:24

## 2023-04-12 RX ADMIN — MAGNESIUM SULFATE IN WATER 4 G: 40 INJECTION, SOLUTION INTRAVENOUS at 08:36

## 2023-04-12 RX ADMIN — IPRATROPIUM BROMIDE AND ALBUTEROL SULFATE 3 ML: .5; 3 SOLUTION RESPIRATORY (INHALATION) at 14:30

## 2023-04-12 RX ADMIN — IPRATROPIUM BROMIDE AND ALBUTEROL SULFATE 3 ML: .5; 3 SOLUTION RESPIRATORY (INHALATION) at 20:24

## 2023-04-12 RX ADMIN — SODIUM BICARBONATE 650 MG: 650 TABLET ORAL at 15:19

## 2023-04-12 RX ADMIN — Medication 226 ML: at 22:26

## 2023-04-12 RX ADMIN — SENNOSIDES AND DOCUSATE SODIUM 2 TABLET: 50; 8.6 TABLET ORAL at 08:36

## 2023-04-12 RX ADMIN — SODIUM BICARBONATE 650 MG: 650 TABLET ORAL at 20:10

## 2023-04-12 RX ADMIN — ONDANSETRON 4 MG: 2 INJECTION INTRAMUSCULAR; INTRAVENOUS at 11:34

## 2023-04-12 RX ADMIN — IPRATROPIUM BROMIDE AND ALBUTEROL SULFATE 3 ML: .5; 3 SOLUTION RESPIRATORY (INHALATION) at 09:08

## 2023-04-12 ASSESSMENT — ACTIVITIES OF DAILY LIVING (ADL)
ADLS_ACUITY_SCORE: 38

## 2023-04-12 NOTE — PROGRESS NOTES
Brief Care Coordination Note  Per providers, pt's AV fistula currently unreliable for dialysis access. Will re-evaluate fistula tomorrow. If access is successful, pt will be ready to discharge on Friday, if not pt will need tunneled line. In this case tunneled line would be placed on Friday and pt likely discharge Saturday.     Pt will discharge with oral antibiotics.     @ 6592- Writer spoke with Tarah Luis of [facility]. Pt's referral was not placed correctly (placed as resumption rather than new care). Tarah faxed a referral form for writer that is specific to higher acuity pt's. Form completed and faxed to Tarah. Tarah will f/u to verify that placement for HD is secure.     [facility] Admissions  Ph: [phone]  Fax: [phone]     [place], [place] ([place])  Ph: [phone]  Placement likely (they are able to accept a patient with a trach now), chair time is pending     Update: per Wanda at [facility], the patient will have a chair time at 2:45 p.m. whenever they are discharged, with 2:15-2:30 arrival for first appointment, TTS schedule per nephrology notes.    Ned Adams RNCC  Covering for 6D  Phone [phone]    SEARCHABLE in [facility] - search CARE COORDINATOR    [place] ([phone]) Saturday & Sunday; ([phone]) [facility] Recognized Holidays    Units: 4A, 4C, 4E, 5A & 5B   Pager: [phone]    Units: 6A & 6B    Pager: [phone]    Units: 6C & 6D   Pager: [phone]    Units: 7A, 7B, 7C, 7D & 5C    Pager: [phone]    Units: [facility] ED, 5 Ortho, 5 Med/Surg, 6 Med/Surg, 8A & 10 ICU

## 2023-04-12 NOTE — PROGRESS NOTES
Nephrology Progress Note  04/11/2023         Assessment  Mr. 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  He is now/sp peritoneal dialysis catheter removal (4/7) and undergoing hemodialysis via a temp right internal jugular dialysis cathter.    1. ESKD: Previously on HD (12/2021-3/2022) then on PD (3/2022-4/2022).  Now back on HD (first run 4/8/2023) as PD had to be discontinued due to fungal peritonitis. Nephrologist is Dr. Bandar Sharma.   - per pt's mother, she has been able to arrange for HD at [facility] on TTS schedule (they will accept pt in spite of trach)  - plan HD per TTS schedule  - started using AVF with both needles today, however pt moved arm before needle was fully secured causing infiltration; therefore had to use CVC today. Will re-attempt AVF on Thursday, however will likely require tunneled CVC prior to discharge as AVF is not yet fully reliable.  - pt requires heparin to avoid clotting the HD system  - to have tunneled RIJ placed, pending ID's ok  - pt/pt's mother much prefer PD and mother says pt didn't tolerate HD well     2. Peritonitis: Due to C. Albicans. PD catheter removed on 4/7/2023. Undergoing treatment with fluconazole (since 4/6). Also was treated with broad spectrum antibiotics, now stopped     3. HTN: Amlodipine on hold.    4. Neurogenic bladder s/p Mitrofanoff: He does have some urine output. Self caths with intermittent irrigation.     5. Anemia of CKD: Hgb above goal upon admission with slight decline post operatively.      6. Secondary hyperparathyroidism: On sensipar (30 mg)    7. Metabolic acidosis: Due to ESKD.  Currently on sodium bicarbonate 650 mg TID.   - will stop Na bicarb one pt is fully established on HD            GERARD Virgen   Division of Renal Disease and Hypertension  P 634  "[id number]    Interval History :   Seen on dialysis, attempted both needles in fistula but unfortunately pt jerked arm up before needle was secure causing infiltration. Will reattempt Thursday. No n/v, CP, SOB, chills    Review of Systems:   A comprehensive ROS was obtained and unremarkable other than mentioned in the interval history.     Physical Exam:   I/O last 3 completed shifts:  In: 435 [P.O.:100; I.V.:335]  Out: 1200 [Urine:1200]   /77 (BP Location: Left leg)   Pulse 90   Temp 98.1  F (36.7  C) (Oral)   Resp 18   Ht 1.676 m (5' 6\")   Wt 48.5 kg (107 lb)   SpO2 98%   BMI 17.27 kg/m       GENERAL APPEARANCE: NAD  HEENT: no scleral icterus, trach domed  PULM: lungs CTA    CV: regular rhythm, normal rate      -no LE edema  : Mitrofanoff   GI: soft    INTEGUMENT: no concerning  NEURO:  no asterixis, no focal deficit   Access: right temp internal jugular, L forearm AVF    Labs:   All labs reviewed by me  Electrolytes/Renal -   Recent Labs   Lab Test 04/11/23  0747 04/10/23  0919 04/09/23  0721 04/07/23  0745 04/06/23  1831 10/19/21  1433 08/30/21  1300 11/30/20  1415    135* 134*   < > 134*   < > 141 140   POTASSIUM 4.1 4.5 4.4   < > 5.4*   < > 5.0 5.1   CHLORIDE 102 101 101   < > 99   < > 112* 111*   CO2 22 18* 16*   < > 15*   < > 21 22   BUN 27.7* 42.9* 79.6*   < > 115.0*   < > 84* 61*   CR 3.25* 4.11* 6.30*   < > 8.52*   < > 6.12* 4.80*   GLC 95 89 97   < > 85   < > 84 84   SUSAN 8.7 8.8 9.3   < > 11.0*   < > 8.5 9.3   MAG 1.5* 1.6*  --   --  2.5*  --   --   --    PHOS  --  5.4*  --   --   --   --  6.0* 5.4*    < > = values in this interval not displayed.       CBC -   Recent Labs   Lab Test 04/11/23  0747 04/10/23  0919 04/09/23  0721   WBC 14.9* 14.4* 16.0*   HGB 9.5* 10.4* 10.4*    396 454*       LFTs -   Recent Labs   Lab Test 08/30/21  1300 11/30/20  1415 10/15/20  1138 12/22/16  1325 09/17/15  1205 02/05/15  1110   ALKPHOS  --   --  94  --  80 87   BILITOTAL  --   --  0.2  --   --   -- "    ALT  --   --  15  --   --   --    AST  --   --  10  --   --   --    PROTTOTAL  --   --  7.4  --   --   --    ALBUMIN 3.7 3.7 3.9   < > 3.8 3.5    < > = values in this interval not displayed.       Iron Panel -   Recent Labs   Lab Test 08/30/21  1300 11/30/20  1415 08/09/18  1056   IRON 74 92 178   IRONSAT 29 34 67   JUVENAL 66 43  --          Imaging:  Abdominal CT (4/7/23):  Impression:      1. Findings of bowel obstruction with small bowel loops dilated up to  4 cm in the left hemiabdomen. Transition point is likely located in  the pelvis along the peritoneal dialysis catheter, with appreciable  inflammatory changes about the small and large bowel at this site.      2. Moderate hiatal hernia with fluid-filled stomach.     3. Partially visualized lung bases demonstrates mosaic attenuation and  diffuse peribronchial thickening, which can be seen in the setting of  small airway disease.   Current Medications:    azithromycin  250 mg Oral Once per day on Mon Wed Fri     calcium carbonate  1,000 mg Oral 4x Daily     cinacalcet  30 mg Oral Daily     famotidine  40 mg Oral Daily     fluconazole  200 mg Intravenous Q24H     heparin ANTICOAGULANT  5,000 Units Subcutaneous Q12H     heparin lock flush  5-20 mL Intracatheter Q24H     ipratropium - albuterol 0.5 mg/2.5 mg/3 mL  3 mL Nebulization Q6H     melatonin  10 mg Oral At Bedtime     senna-docusate  1 tablet Oral BID    Or     senna-docusate  2 tablet Oral BID     sodium bicarbonate  650 mg Oral TID     sodium chloride (PF)  10-40 mL Intracatheter Q8H     sodium chloride (PF)  3 mL Intracatheter Q8H     Vitamin D3  1,000 Units Oral BID       heparin (porcine) 500 Units/hr (04/10/23 1300)   All above labs and imaging reviewed by me.   GERARD Virgen

## 2023-04-12 NOTE — PROGRESS NOTES
ICU End of Shift Summary. See flowsheets for vital signs and detailed assessment.    Changes this shift:   No acute changes overnight, sinus rhythm no ectopy. No BM overnight, mother is worried about constipation not resolving- may add a 2nd bowel regimen medication. Straight cath done once overnight.     Plan: Continue to follow plan of care

## 2023-04-12 NOTE — PROGRESS NOTES
"      General Infectious Disease Service - Yellow Team - Sign Off Note    Patient:  Aidan Louie, Date of birth 1995, Medical record number [id number]  Date of Admission: 4/6/2023  Date of Visit:  4/12/2023         Assessment and Recommendations:   Problem List:    1. Fungal peritonitis- C.albicans on [facility] Cx  2. Hx ESRD previously on HD (12/2021-3/2022) on peritoneal dialysis (3/2022-4/2023), now s/p removal of PD catheter on 4/7  3. Congenital abnormality of urinary tract, s/p Mitrofanoff  4. Ileus        Discussion:  Aidan Louie (Alex) is a 27 year old male with history of prune belly syndrome, Dawood Reece syndrome, ESRD on peritoneal dialysis (3/2022) due to congenital abnormalities of urinary tract, s/p Mitrofanoff procedure, restrictive lung disease, s/p tracheostomy, PEG tube, and multiple abdominal reconstructive surgeries who presented to ED on 4/6/23 with concern for peritonitis related to PD catheter, report of \"budding yeast\" on outside culture. Now s/p removal of PD catheter on 4/7, placement of temporary HD line.      Issues with PD catheter and runs since 3/9 with intermittent abd pain and distention. Foamy purulent drainage at home cultured by dialysis nurse and growing budding yeast with no bacterial growth at time of admission, per report. Was started on cefepime and vancomycin at home through nephrology/dialysis center. Increased lethargy and has not been able to do all of his own ADLs and cares at home as he usually does. Fever to 104F at home right before admission, WBC 20.6K on arrival. Cultures from PD catheter removal and peritoneal fluid (4/7) at [facility] positive for C.albicans sensitive to fluconazole. Outside ([facility] dialysis center) peritoneal cultures were faxed yesterday and they are also positive for Candidal albicans sensitive to fluconazole (they were collected on 4/4). No bacterial growth in any of the cultures.  Cefepime and metronidazole stopped on 4/11. Patient is " "afebrile. White count and CRP trending down. Blood cultures negative.      Recommendations:        1. Continue fluconazole for C.albicans peritonitis (adjusted to renal function). Literature recommends 2-4 weeks of treatment counting from the PD catheter removal date. Since there is future plan to place a new PD catheter I will recommend to treat for 4 weeks -> from 4/7/23-5/5/23        - Fluconazole can be transitioned to oral route               2. I do not anticipate need for follow up in ID clinic    3. Patient needs continued follow up with nephrology and PCP    4.  I will sign off at this moment. Please see sign off recommendations above. Please call us back if any question or need.       NILDA SHARMA MD  Date of Service: 04/12/23  Pager: [phone]           Physical Exam:   BP (P) 138/80 (BP Location: Right leg)   Pulse 73   Temp (P) 98.2  F (36.8  C) (Axillary)   Resp 19   Ht 1.676 m (5' 6\")   Wt 48.5 kg (107 lb)   SpO2 96%   BMI 17.27 kg/m         Exam:  GENERAL:  He was sleeping at the time I came to examine. I spoke with his mother. Not in distress   HEAD: Normocephalic and atraumatic  EYES:  Eyes grossly normal to inspection, PERRL and conjunctivae and sclerae normal   LUNGS: Breathing comfortably on room air           Laboratory Data:     Creatinine   Date Value Ref Range Status   04/12/2023 3.11 (H) 0.67 - 1.17 mg/dL Final   04/11/2023 3.25 (H) 0.67 - 1.17 mg/dL Final   04/10/2023 4.11 (H) 0.67 - 1.17 mg/dL Final   04/09/2023 6.30 (H) 0.67 - 1.17 mg/dL Final   04/08/2023 8.23 (H) 0.67 - 1.17 mg/dL Final   11/30/2020 4.80 (H) 0.66 - 1.25 mg/dL Final   10/15/2020 5.10 (H) 0.66 - 1.25 mg/dL Final   05/26/2020 4.3 mg/dL Final   12/09/2019 4.31 (H) 0.66 - 1.25 mg/dL Final   05/13/2019 3.25 (H) 0.66 - 1.25 mg/dL Final     WBC   Date Value Ref Range Status   11/30/2020 8.3 4.0 - 11.0 10e9/L Final   10/15/2020 9.8 4.0 - 11.0 10e9/L Final   12/09/2019 9.2 4.0 - 11.0 10e9/L Final   05/13/2019 " 10.0 4.0 - 11.0 10e9/L Final   07/11/2017 8.8 4.0 - 11.0 10e9/L Final     WBC Count   Date Value Ref Range Status   04/12/2023 14.9 (H) 4.0 - 11.0 10e3/uL Final   04/11/2023 14.9 (H) 4.0 - 11.0 10e3/uL Final   04/10/2023 14.4 (H) 4.0 - 11.0 10e3/uL Final   04/09/2023 16.0 (H) 4.0 - 11.0 10e3/uL Final   04/08/2023 18.6 (H) 4.0 - 11.0 10e3/uL Final     Hemoglobin   Date Value Ref Range Status   04/12/2023 9.3 (L) 13.3 - 17.7 g/dL Final   11/30/2020 10.3 (L) 13.3 - 17.7 g/dL Final     Platelet Count   Date Value Ref Range Status   04/12/2023 390 150 - 450 10e3/uL Final   11/30/2020 332 150 - 450 10e9/L Final     Lab Results   Component Value Date     04/12/2023    BUN 21.8 (H) 04/12/2023    CO2 23 04/12/2023     CRP Inflammation   Date Value Ref Range Status   09/17/2015 <2.9 0.0 - 8.0 mg/L Final   02/05/2015 <2.9 0.0 - 8.0 mg/L Final   10/03/2013 38.1 (H) 0.0 - 8.0 mg/L Final   03/19/2009 <3.0 0.0 - 8.0 mg/L Final   04/26/2007 11.0 (H) 0.0 - 8.0 mg/L Final           Pertinent Recent Microbiology Data:   No results for input(s): CULT, SDES in the last 168 hours.         Imaging:     Recent Results (from the past 48 hour(s))   XR Abdomen Port 1 View    Narrative    EXAM: Abdominal radiograph 4/12/2023 11:17 AM    HISTORY: 27 years Male constipation.    COMPARISON: CT abdomen and pelvis 4/6/2023.    TECHNIQUE: Frontal supine view(s) of the abdomen.    FINDINGS:  Gastrostomy tube projects over the stomach. Multiple air-filled loops  of small bowel with the largest diameter measuring 3.4 cm within the  right abdomen. Large colonic stool burden. No pneumatosis or portal  venous gas. Bony structures are intact. Nonspecific calcifications  within the pelvis likely to phleboliths. Lung bases clear.       Impression    IMPRESSION:  1. Nonspecific bowel gas pattern with air filled loops of small bowel  measuring 3.4 cm greatest diameter, likely representing resolution of  SBO described on CT dated 4/6/2023.   2. Large  colonic stool burden.    I have personally reviewed the examination and initial interpretation  and I agree with the findings.    MARCOS GUERRERO MD         SYSTEM ID:  [id number]

## 2023-04-12 NOTE — COMMITTEE REVIEW
Abdominal Patient Discussion Note Transplant Coordinator: Meera Hoyt  Transplant Surgeon:       Referring Physician: Glen Abel    Committee Review Members:  Nephrology Haider Tejada MD, Amadou Lai MD   Nutrition Norma Paez, VERONICA   Pharmacist Amy Gonzalez, [facility]    - Clinical Polinajaimie Peacock, [facility], Meera Rosario, [facility]   Transplant THOMAS COTA, RN, Ana Bustillos, RN, April Giraldo, RN, Elizabeth Rodriguez, GUNNER, Odilia Solis, GUNNER, Janelle Hernandez, GUNNER, Yazmin Avlarado, GUNNER, Geneva Demarco MD   Transplant Surgery Geneva Demarco MD       Additional Discussion Notes and Findings: Pt to remain inactive due to fungal infection. Will need to review hospitalization to determine further work up once pt is discharged.

## 2023-04-12 NOTE — PLAN OF CARE
Goal Outcome Evaluation:      Plan of Care Reviewed With: patient    Overall Patient Progress: improving    Outcome Evaluation: Patient alert and oriented, makes his needs known. Vital signs were stable. Complain nausea I give him anti-nausea medication. Patient is gonna get enema tonight. Patient lacked appite today and the MD are aware. NPO at Midnight.

## 2023-04-12 NOTE — PROGRESS NOTES
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    Today's updates/changes:  - Some worsening nausea and still no bowel movement, returning to bowel rest and venting G-tube prn  - Abdominal x-ray  - IR consult placed for tunneled HD line on  if AVF is not able to be used reliably - will try one more time via AVF tomorrow    Acute Hospital Problems:     Peritonitis 2/2 candida albicans, peritoneal dialysis-related  Sepsis, resolved  In setting of peritoneal dialysis, one month of difficult PD access, few days of \"foamy\" and possibly purulent PD cath discharge, fevers. Unable to tolerate peritoneal antibiotics prior to admission. Initially w/ tachycardia and leukocytosis. Peritoneal culture prior to admission growing budding yeast, no bacterial growth as of yet. Peritoneal fluid and PD cath tip cultures from this admission also now growing candida albicans, pan-susceptible. Overall hemodynamically and clinically stable on broad antimicrobial therapy.   Plan:  - Continuing to monitor BCx , UCx , PD catheter culture , and peritoneal fluid culture   - Monitoring peritoneal fluid culture from prior to admissio  - Antibiotics: Fluconazole IV (renally dosed)  - present, " anticipate 2-4 week total duration and can discharge on PO  - Prior: IV Cefepime, Flagyl, Fluconazole (all since 4/6 - 4/11), IV Vanc (4/6 - 4/8)  - Post-op pain regimen: PRN Tylenol and Oxycodone available  - Post-op dressing recommendations: See surgery progress note dated 4/8/23     Small bowel obstruction vs infection-associated ileus, improving  With transition point closely associated to PD catheter which is now s/p removal. In context of poor PO, nausea, vomiting, and constipation at home prior to admission. Was improving w/ sepsis treatment, tolerating PO, but into 4/12 had more nausea, still no BM.  Plan:  - Gen surg signed off as of 4/8  - AXR repeat today  - Bowel rest as needed  - Can continue to vent G-tube prn (mother assists with this)  - Symptomatic support: Zofran, Compazine prn     ESRD w/ PD  Mild hyperkalemia  AGMA  Likely in setting of incomplete peritoneal dialysis leading up to admission. Now s/p PD cath removal in setting of peritonitis as above w/ temporary CVC placed for HD reinitiation this hospital stay. Nephrology is following, now dialyzed at least three times as inpt.  Plan:  - Access: Non-tunneled internal jugular CVC for now, AVF not able to be reliably accessed so far but will try one more time tomorrow 4/13, tentatively is scheduled for tunneled line placement on 4/14 by IR in the meantime in the event AVF is not functional  - Has outpt dialysis seat secured - TTS schedule  - PTA Cinacalcet 30mg qday, PO NaHCO3 650mg TID     Restrictive lung disease  Chronic. Currently without tachypnea or hypoxia. Comfortable-appearing. Some bronchial cuffing on CXR, though not different than prior, some coarse breath sounds and increased secretions on history. Initial viral panel unremarkable. Suspect primary infection is peritonitis as above and these are chronic changes.  Plan:  - PTA Azithro MWF held for now per mother's request, will restart when on a discharge antifungal regimen  - PTA  Duonebs and vest therapy  - RT following for assistance     Chronic malnutrition  Has PEG but not tube feed dependent, tolerates PO.     Neurogenic bladder s/p mitranoff urinary diversion  Still makes some degree of urine. Self-caths every ~4 hours daily w/ intermittent irrigation. Last urology visit in 2022.  Plan:  - Straight cath orders for ~q4h while awake     HTN - holding PTA Amlodipine ( meds still listed as Captopril and Clonidine) while managing sepsis and restarting hemodialysis, can consider restart if needed prior to discharge     GERD - PTA Famotidine        [facility] Checklist:  Diet: Regular diet  VTE ppx: SQH  LDA: PIV, Right temporary CVC for HD, G-tube, Mitranoff diversion  Code: Full  Level of care: General Medicine  Family: Mother, Valerie - [phone]      Discharge Planning:  Anticipate discharge in 2-3 days pending transitioning to PO abx regimen and establishing dialysis access (likely tunneled line being placed ). Suspect will be discharging to prior living arrangement. Has TTS dialysis seat now. Communicated via PINKY tab.      Staffed with attending physician, Dr. Ag.    Nahid Morrison MD    Internal Medicine PGY-2  [facility] Team 4    Pager #[phone]    [employer] Messaging  (Please see sign-in/sign-out for up-to-date physician coverage information)      Interval Events:   Overnight handoff and nursing reports reviewed. No acute overnight events other than some nausea and a bit more fatigued this morning. Mother does not think he rested very well overnight. He is quiet this morning, watching tv but does not converse much with provider team or mother. Still has had no BM.    Later in afternoon, began experiencing bit more nausea and abdominal discomfort with trialing Miralax through g-tube.    Objective:     Vitals:    23 1637 23 0400   Weight: 47.2 kg (104 lb) 48.5 kg (107 lb)     Vital Signs with Ranges  Temp:  [97.8  F (36.6  C)-99.1  F (37.3  C)] 99.1  F (37.3   C)  Pulse:  [78-96] 80  Resp:  [17-25] 25  BP: (114-144)/(67-96) 116/67  FiO2 (%):  [21 %] 21 %  SpO2:  [96 %-100 %] 96 %  I/O last 3 completed shifts:  In: 155 [I.V.:155]  Out: 1050 [Urine:1050]    Exam:  General: No acute distress, laying in bed, quiet watching tv this morning  CV: Regular rate, regular rhythm, no new murmurs  Lungs: On room air,  no wheezing or crackles this am, no increased WOB  Abd: Soft, mildly distended, not particularly tender to palpation  Ext: Warm and well-perfused, no appreciable lower extremity edema    Medications     heparin (porcine) 500 Units/hr (04/10/23 1300)       azithromycin  250 mg Oral Once per day on Mon Wed Fri     calcium carbonate  1,000 mg Oral 4x Daily     cinacalcet  30 mg Oral Daily     famotidine  40 mg Oral Daily     fluconazole  200 mg Intravenous Q24H     heparin ANTICOAGULANT  5,000 Units Subcutaneous Q12H     heparin lock flush  5-20 mL Intracatheter Q24H     ipratropium - albuterol 0.5 mg/2.5 mg/3 mL  3 mL Nebulization Q6H     magnesium sulfate  4 g Intravenous Once     melatonin  10 mg Oral At Bedtime     senna-docusate  1 tablet Oral BID    Or     senna-docusate  2 tablet Oral BID     sodium bicarbonate  650 mg Oral TID     sodium chloride (PF)  10-40 mL Intracatheter Q8H     sodium chloride (PF)  3 mL Intracatheter Q8H     Vitamin D3  1,000 Units Oral BID       Data   Recent Labs   Lab 04/12/23  0605 04/11/23  0747 04/10/23  0919   WBC 14.9* 14.9* 14.4*   HGB 9.3* 9.5* 10.4*   MCV 92 91 91    387 396    138 135*   POTASSIUM 4.0 4.1 4.5   CHLORIDE 100 102 101   CO2 23 22 18*   BUN 21.8* 27.7* 42.9*   CR 3.11* 3.25* 4.11*   ANIONGAP 13 14 16*   SUSAN 8.8 8.7 8.8   GLC 97 95 89       No results found for this or any previous visit (from the past 24 hour(s)).

## 2023-04-13 LAB
ANION GAP SERPL CALCULATED.3IONS-SCNC: 13 MMOL/L (ref 7–15)
BUN SERPL-MCNC: 29.3 MG/DL (ref 6–20)
CALCIUM SERPL-MCNC: 9 MG/DL (ref 8.6–10)
CHLORIDE SERPL-SCNC: 98 MMOL/L (ref 98–107)
CREAT SERPL-MCNC: 4.82 MG/DL (ref 0.67–1.17)
DEPRECATED HCO3 PLAS-SCNC: 24 MMOL/L (ref 22–29)
ERYTHROCYTE [DISTWIDTH] IN BLOOD BY AUTOMATED COUNT: 13.2 % (ref 10–15)
FERRITIN SERPL-MCNC: 1092 NG/ML (ref 31–409)
GFR SERPL CREATININE-BSD FRML MDRD: 16 ML/MIN/1.73M2
GLUCOSE SERPL-MCNC: 107 MG/DL (ref 70–99)
HCT VFR BLD AUTO: 29.4 % (ref 40–53)
HGB BLD-MCNC: 9.2 G/DL (ref 13.3–17.7)
IRON BINDING CAPACITY (ROCHE): 112 UG/DL (ref 240–430)
IRON SATN MFR SERPL: 21 % (ref 15–46)
IRON SERPL-MCNC: 24 UG/DL (ref 61–157)
MAGNESIUM SERPL-MCNC: 2.9 MG/DL (ref 1.7–2.3)
MCH RBC QN AUTO: 29.1 PG (ref 26.5–33)
MCHC RBC AUTO-ENTMCNC: 31.3 G/DL (ref 31.5–36.5)
MCV RBC AUTO: 93 FL (ref 78–100)
PLATELET # BLD AUTO: 461 10E3/UL (ref 150–450)
POTASSIUM SERPL-SCNC: 4.5 MMOL/L (ref 3.4–5.3)
RBC # BLD AUTO: 3.16 10E6/UL (ref 4.4–5.9)
SODIUM SERPL-SCNC: 135 MMOL/L (ref 136–145)
WBC # BLD AUTO: 18 10E3/UL (ref 4–11)

## 2023-04-13 PROCEDURE — 94669 MECHANICAL CHEST WALL OSCILL: CPT

## 2023-04-13 PROCEDURE — 250N000011 HC RX IP 250 OP 636: Performed by: INTERNAL MEDICINE

## 2023-04-13 PROCEDURE — 94640 AIRWAY INHALATION TREATMENT: CPT | Mod: 76

## 2023-04-13 PROCEDURE — 94640 AIRWAY INHALATION TREATMENT: CPT

## 2023-04-13 PROCEDURE — 250N000011 HC RX IP 250 OP 636

## 2023-04-13 PROCEDURE — 250N000013 HC RX MED GY IP 250 OP 250 PS 637

## 2023-04-13 PROCEDURE — 250N000009 HC RX 250: Performed by: INTERNAL MEDICINE

## 2023-04-13 PROCEDURE — 85027 COMPLETE CBC AUTOMATED: CPT

## 2023-04-13 PROCEDURE — 99233 SBSQ HOSP IP/OBS HIGH 50: CPT | Mod: GC | Performed by: STUDENT IN AN ORGANIZED HEALTH CARE EDUCATION/TRAINING PROGRAM

## 2023-04-13 PROCEDURE — 999N000157 HC STATISTIC RCP TIME EA 10 MIN

## 2023-04-13 PROCEDURE — 250N000009 HC RX 250: Performed by: STUDENT IN AN ORGANIZED HEALTH CARE EDUCATION/TRAINING PROGRAM

## 2023-04-13 PROCEDURE — 82728 ASSAY OF FERRITIN: CPT | Performed by: STUDENT IN AN ORGANIZED HEALTH CARE EDUCATION/TRAINING PROGRAM

## 2023-04-13 PROCEDURE — 250N000013 HC RX MED GY IP 250 OP 250 PS 637: Performed by: STUDENT IN AN ORGANIZED HEALTH CARE EDUCATION/TRAINING PROGRAM

## 2023-04-13 PROCEDURE — 250N000011 HC RX IP 250 OP 636: Performed by: STUDENT IN AN ORGANIZED HEALTH CARE EDUCATION/TRAINING PROGRAM

## 2023-04-13 PROCEDURE — 83550 IRON BINDING TEST: CPT | Performed by: STUDENT IN AN ORGANIZED HEALTH CARE EDUCATION/TRAINING PROGRAM

## 2023-04-13 PROCEDURE — 99233 SBSQ HOSP IP/OBS HIGH 50: CPT | Performed by: PHYSICIAN ASSISTANT

## 2023-04-13 PROCEDURE — 999N000215 HC STATISTIC HFNC ADULT NON-CPAP

## 2023-04-13 PROCEDURE — 90937 HEMODIALYSIS REPEATED EVAL: CPT

## 2023-04-13 PROCEDURE — 999N000043 HC STATISTIC CTO2 CONT OXYGEN TECH TIME EA 90 MIN

## 2023-04-13 PROCEDURE — 258N000003 HC RX IP 258 OP 636: Performed by: STUDENT IN AN ORGANIZED HEALTH CARE EDUCATION/TRAINING PROGRAM

## 2023-04-13 PROCEDURE — 80048 BASIC METABOLIC PNL TOTAL CA: CPT

## 2023-04-13 PROCEDURE — 120N000002 HC R&B MED SURG/OB UMMC

## 2023-04-13 PROCEDURE — 83735 ASSAY OF MAGNESIUM: CPT

## 2023-04-13 RX ORDER — HEPARIN SODIUM 1000 [USP'U]/ML
500 INJECTION, SOLUTION INTRAVENOUS; SUBCUTANEOUS CONTINUOUS
Status: DISCONTINUED | OUTPATIENT
Start: 2023-04-13 | End: 2023-04-13

## 2023-04-13 RX ADMIN — POLYETHYLENE GLYCOL 3350 17 G: 17 POWDER, FOR SOLUTION ORAL at 19:56

## 2023-04-13 RX ADMIN — BISACODYL 5 MG: 5 TABLET, COATED ORAL at 13:59

## 2023-04-13 RX ADMIN — SENNOSIDES AND DOCUSATE SODIUM 2 TABLET: 50; 8.6 TABLET ORAL at 19:54

## 2023-04-13 RX ADMIN — SENNOSIDES AND DOCUSATE SODIUM 1 TABLET: 8.6; 5 TABLET ORAL at 07:56

## 2023-04-13 RX ADMIN — IPRATROPIUM BROMIDE AND ALBUTEROL SULFATE 3 ML: .5; 3 SOLUTION RESPIRATORY (INHALATION) at 20:23

## 2023-04-13 RX ADMIN — ONDANSETRON 4 MG: 4 TABLET, ORALLY DISINTEGRATING ORAL at 13:59

## 2023-04-13 RX ADMIN — HEPARIN SODIUM 5000 UNITS: 5000 INJECTION, SOLUTION INTRAVENOUS; SUBCUTANEOUS at 19:56

## 2023-04-13 RX ADMIN — Medication 1000 UNITS: at 19:56

## 2023-04-13 RX ADMIN — CALCIUM CARBONATE 1000 MG: 500 TABLET, CHEWABLE ORAL at 07:56

## 2023-04-13 RX ADMIN — CALCIUM CARBONATE 1000 MG: 500 TABLET, CHEWABLE ORAL at 17:40

## 2023-04-13 RX ADMIN — HEPARIN SODIUM 500 UNITS: 1000 INJECTION INTRAVENOUS; SUBCUTANEOUS at 08:44

## 2023-04-13 RX ADMIN — SODIUM BICARBONATE 650 MG: 650 TABLET ORAL at 07:56

## 2023-04-13 RX ADMIN — BISACODYL 10 MG RECTAL SUPPOSITORY 10 MG: at 19:56

## 2023-04-13 RX ADMIN — Medication 1000 UNITS: at 07:56

## 2023-04-13 RX ADMIN — FAMOTIDINE 40 MG: 20 TABLET ORAL at 19:56

## 2023-04-13 RX ADMIN — SODIUM CHLORIDE 300 ML: 9 INJECTION, SOLUTION INTRAVENOUS at 08:43

## 2023-04-13 RX ADMIN — Medication 10 MG: at 22:06

## 2023-04-13 RX ADMIN — LIDOCAINE HYDROCHLORIDE 0.5 ML: 10 INJECTION, SOLUTION EPIDURAL; INFILTRATION; INTRACAUDAL; PERINEURAL at 08:44

## 2023-04-13 RX ADMIN — Medication: at 08:45

## 2023-04-13 RX ADMIN — CINACALCET 30 MG: 30 TABLET ORAL at 07:56

## 2023-04-13 RX ADMIN — POLYETHYLENE GLYCOL 3350 17 G: 17 POWDER, FOR SOLUTION ORAL at 13:59

## 2023-04-13 RX ADMIN — IPRATROPIUM BROMIDE AND ALBUTEROL SULFATE 3 ML: .5; 3 SOLUTION RESPIRATORY (INHALATION) at 14:08

## 2023-04-13 RX ADMIN — HEPARIN SODIUM 500 UNITS/HR: 1000 INJECTION INTRAVENOUS; SUBCUTANEOUS at 08:44

## 2023-04-13 RX ADMIN — SODIUM CHLORIDE 250 ML: 9 INJECTION, SOLUTION INTRAVENOUS at 08:44

## 2023-04-13 RX ADMIN — CALCIUM CARBONATE 1000 MG: 500 TABLET, CHEWABLE ORAL at 22:06

## 2023-04-13 RX ADMIN — FLUCONAZOLE 200 MG: 200 TABLET ORAL at 13:59

## 2023-04-13 RX ADMIN — ALPRAZOLAM 0.25 MG: 0.25 TABLET ORAL at 07:56

## 2023-04-13 RX ADMIN — CALCIUM CARBONATE 1000 MG: 500 TABLET, CHEWABLE ORAL at 13:59

## 2023-04-13 ASSESSMENT — ACTIVITIES OF DAILY LIVING (ADL)
ADLS_ACUITY_SCORE: 38
ADLS_ACUITY_SCORE: 38
ADLS_ACUITY_SCORE: 40
ADLS_ACUITY_SCORE: 38
ADLS_ACUITY_SCORE: 38
ADLS_ACUITY_SCORE: 40
ADLS_ACUITY_SCORE: 38
ADLS_ACUITY_SCORE: 38

## 2023-04-13 NOTE — PROGRESS NOTES
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    Today's updates/changes:  - Dialyzed today per TTS schedule, AVF worked ok but will need time to mature  - NPO @ [facility] for tunneled HD line tomorrow    Acute Hospital Problems:     Peritonitis 2/2 candida albicans, peritoneal dialysis-related  Sepsis, resolved  In setting of peritoneal dialysis, one month of difficult PD access, few days of \"foamy\" and possibly purulent PD cath discharge, fevers. Unable to tolerate peritoneal antibiotics prior to admission. Initially w/ tachycardia and leukocytosis. Peritoneal culture prior to admission growing budding yeast, no bacterial growth as of yet. Peritoneal fluid and PD cath tip cultures from this admission also growing candida albicans, pan-susceptible. Overall hemodynamically and clinically stable on antifungal therapy.   Plan:  - Antibiotics: Fluconazole IV (renally dosed)  - present, plan for 4 week course (EOT 23) and no ID follow-up needed  - Post-op pain regimen: PRN Tylenol and Oxycodone available  - Post-op dressing recommendations: See surgery progress note dated 23     Small bowel obstruction vs infection-associated ileus, improving  With transition point closely associated " to PD catheter which is now s/p removal. In context of poor PO, nausea, vomiting, and constipation at home prior to admission. Was improving w/ sepsis treatment, tolerating PO. Some worsening abd pain on , AXR reassuring, passed bowel w/ enema.   Plan:  - Regular diet ok as able to tolerate  - Bowel regimen - BID Senna/Docusate and BID Miralax  - Symptomatic support: Zofran, Compazine prn     ESRD w/ PD  Mild hyperkalemia  AGMA  Likely in setting of incomplete peritoneal dialysis leading up to admission. Now s/p PD cath removal in setting of peritonitis as above w/ temporary CVC placed for HD reinitiation this hospital stay. Nephrology is following, now dialyzed at least three times as inpt.  Plan:  - Access: Non-tunneled internal jugular CVC for now, AVF functional today but will need time to mature, will have tunneled line placed tomorrow  by IR  - Has outpt dialysis seat secured - TTS schedule  - PTA Cinacalcet 30mg qday, PO NaHCO3 650mg TID     Restrictive lung disease  Chronic. Currently without tachypnea or hypoxia. Comfortable-appearing. Some bronchial cuffing on CXR, though not different than prior, some coarse breath sounds and increased secretions on history. Initial viral panel unremarkable. Suspect primary infection is peritonitis as above and these are chronic changes.  Plan:  - PTA Azithro MWF held for now per mother's request, will restart on discharge  - PTA Duonebs and vest therapy  - RT following for assistance     Chronic malnutrition  Has PEG but not tube feed dependent, tolerates PO.     Neurogenic bladder s/p mitranoff urinary diversion  Still makes some degree of urine. Self-caths every ~4 hours daily w/ intermittent irrigation. Last urology visit in 2022.  Plan:  - Straight cath orders for ~q4h while awake     HTN - holding PTA Amlodipine ( meds still listed as Captopril and Clonidine) while managing sepsis and restarting hemodialysis, can consider restart if needed  prior to discharge     GERD - PTA Famotidine        [facility] Checklist:  Diet: Regular diet  VTE ppx: SQH  LDA: PIV, Right temporary CVC for HD, G-tube, Mitranoff diversion  Code: Full  Level of care: General Medicine  Family: Valerie Kingston - [phone]      Discharge Planning:  Anticipate discharge on 4/15 after dialysis. Will be discharging to prior living arrangement. Has TTS dialysis seat now. Communicated via PINKY tab.      Staffed with attending physician, Dr. Ag.    Nahid Morrison MD    Internal Medicine PGY-2  [facility] Team 4    Pager #[phone]    [employer] Messaging  (Please see sign-in/sign-out for up-to-date physician coverage information)      Interval Events:   Overnight handoff and nursing reports reviewed. No acute overnight events. Seen in dialysis this morning. No dyspnea. No abdomiinal pain. No n/v. Had bowel movement last night. No new complaints.    Objective:     Vitals:    04/06/23 1637 04/08/23 0400 04/13/23 0800   Weight: 47.2 kg (104 lb) 48.5 kg (107 lb) 42.3 kg (93 lb 4.8 oz)     Vital Signs with Ranges  Temp:  [97.5  F (36.4  C)-99.3  F (37.4  C)] 97.5  F (36.4  C)  Pulse:  [] 90  Resp:  [12-24] 20  BP: (107-145)/() 129/87  SpO2:  [89 %-100 %] 100 %  I/O last 3 completed shifts:  In: 215 [I.V.:215]  Out: 1050 [Urine:1050]    Exam:  General: No acute distress, laying in bed during dialysis, watching an rosalva on phone  CV: Regular rate, regular rhythm, no new murmurs  Lungs: On room air,  no wheezing or crackles this am, no increased WOB  Abd: Soft, non-distended, non-tender  Ext: Warm and well-perfused, no appreciable lower extremity edema    Medications     heparin (porcine) 500 Units/hr (04/10/23 1300)       azithromycin  250 mg Oral Once per day on Mon Wed Fri     calcium carbonate  1,000 mg Oral 4x Daily     cinacalcet  30 mg Oral Daily     famotidine  40 mg Oral Daily     fluconazole  200 mg Oral Daily     heparin ANTICOAGULANT  5,000 Units Subcutaneous Q12H     heparin lock  flush  5-20 mL Intracatheter Q24H     ipratropium - albuterol 0.5 mg/2.5 mg/3 mL  3 mL Nebulization Q6H     melatonin  10 mg Oral At Bedtime     polyethylene glycol  17 g Oral or Feeding Tube BID     senna-docusate  1 tablet Oral BID    Or     senna-docusate  2 tablet Oral BID     sodium chloride (PF)  10-40 mL Intracatheter Q8H     sodium chloride (PF)  3 mL Intracatheter Q8H     Vitamin D3  1,000 Units Oral BID       Data   Recent Labs   Lab 04/13/23  0749 04/12/23  1628 04/12/23  0605 04/11/23  0747   WBC 18.0*  --  14.9* 14.9*   HGB 9.2*  --  9.3* 9.5*   MCV 93  --  92 91   *  --  390 387   INR  --  1.21*  --   --    *  --  136 138   POTASSIUM 4.5  --  4.0 4.1   CHLORIDE 98  --  100 102   CO2 24  --  23 22   BUN 29.3*  --  21.8* 27.7*   CR 4.82*  --  3.11* 3.25*   ANIONGAP 13  --  13 14   SUSAN 9.0  --  8.8 8.7   *  --  97 95   ALBUMIN  --   --  2.5*  --    PROTTOTAL  --   --  5.8*  --    BILITOTAL  --   --  0.2  --    ALKPHOS  --   --  69  --    ALT  --   --  11  --    AST  --   --  30  --        No results found for this or any previous visit (from the past 24 hour(s)).

## 2023-04-13 NOTE — PROGRESS NOTES
Nephrology Progress Note  04/13/2023         Assessment  Mr. 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  He is now/sp peritoneal dialysis catheter removal (4/7) and undergoing hemodialysis via a temp right internal jugular dialysis cathter.    1. ESKD: Previously on HD (12/2021-3/2022) then on PD (3/2022-4/2022).  Now back on HD (first run 4/8/2023) as PD had to be discontinued due to fungal peritonitis. Nephrologist is Dr. Bandar Sharma.   -   HD at [facility] on TTS schedule (they will accept pt in spite of trach)  - plan HD per TTS schedule  - AVF is excellent however it is newly being used and will take a bit of time to be fully reliable; will need tunneled CVC.  - pt requires heparin to avoid clotting the HD system  - to have tunneled RIJ placed, pending ID's ok  - pt/pt's mother much prefer PD and mother says pt didn't tolerate HD well     2. Peritonitis: Due to C. Albicans. PD catheter removed on 4/7/2023. Undergoing treatment with fluconazole (since 4/6). Also was treated with broad spectrum antibiotics, now stopped   - per ID, plan for fluconazole through 5/5/23 given likely future plan to have new PD catheter placed    3. BP/volume: Amlodipine on hold. BP's fairly well controlled  - signficant UOP, 1050 ml yesterday  - no UF today  - pts mother reports when previously on HD, no UF due to hypotension and ongoing UOP    4. Neurogenic bladder s/p Mitrofanoff: He does have some urine output. Self caths with intermittent irrigation.     5. Anemia of CKD: Hgb above goal upon admission with slight decline post operatively.    - next HD will start epogen 1000 units per HD  - ordered iron studies    6. Secondary hyperparathyroidism: On sensipar (30 mg)    7. Acid/base:    - stopped PO bicarb now that pt is on HD            Nell Wong,  "PA   Division of Renal Disease and Hypertension  [phone]    Interval History :   Seen on dialysis, using one needle today and one CVC port. Going well. Will need tunneled CVC for now to allow time for AVF to develop fully. No n/v, CP, SOB, chills    Review of Systems:   4 point ROS neg other than as noted above    Physical Exam:   I/O last 3 completed shifts:  In: 215 [I.V.:215]  Out: 1050 [Urine:1050]   /83   Pulse 98   Temp 98.1  F (36.7  C) (Oral)   Resp 20   Ht 1.676 m (5' 6\")   Wt 42.3 kg (93 lb 4.8 oz)   SpO2 100%   BMI 15.06 kg/m       GENERAL APPEARANCE: NAD  HEENT: no scleral icterus, trach domed  PULM: lungs CTA    CV: regular rhythm, normal rate      -no LE edema  : Mitrofanoff   GI: soft    INTEGUMENT: no concerning  NEURO:  no asterixis, no focal deficit   Access: right temp internal jugular, L forearm AVF    Labs:   All labs reviewed by me  Electrolytes/Renal -   Recent Labs   Lab Test 04/13/23  0749 04/12/23  0605 04/11/23  0747 04/10/23  0919 10/19/21  1433 08/30/21  1300   * 136 138 135*   < > 141   POTASSIUM 4.5 4.0 4.1 4.5   < > 5.0   CHLORIDE 98 100 102 101   < > 112*   CO2 24 23 22 18*   < > 21   BUN 29.3* 21.8* 27.7* 42.9*   < > 84*   CR 4.82* 3.11* 3.25* 4.11*   < > 6.12*   * 97 95 89   < > 84   SUSAN 9.0 8.8 8.7 8.8   < > 8.5   MAG 2.9* 1.4* 1.5* 1.6*   < >  --    PHOS  --  4.1  --  5.4*  --  6.0*    < > = values in this interval not displayed.       CBC -   Recent Labs   Lab Test 04/13/23  0749 04/12/23  0605 04/11/23  0747   WBC 18.0* 14.9* 14.9*   HGB 9.2* 9.3* 9.5*   * 390 387       LFTs -   Recent Labs   Lab Test 04/12/23  0605 08/30/21  1300 11/30/20  1415 10/15/20  1138 12/22/16  1325 09/17/15  1205   ALKPHOS 69  --   --  94  --  80   BILITOTAL 0.2  --   --  0.2  --   --    ALT 11  --   --  15  --   --    AST 30  --   --  10  --   --    PROTTOTAL 5.8*  --   --  7.4  --   --    ALBUMIN 2.5* 3.7 3.7 3.9   < > 3.8    < > = values in this interval not " displayed.       Iron Panel -   Recent Labs   Lab Test 08/30/21  1300 11/30/20  1415 08/09/18  1056   IRON 74 92 178   IRONSAT 29 34 67   JUVENAL 66 43  --          Imaging:  Abdominal CT (4/7/23):  Impression:      1. Findings of bowel obstruction with small bowel loops dilated up to  4 cm in the left hemiabdomen. Transition point is likely located in  the pelvis along the peritoneal dialysis catheter, with appreciable  inflammatory changes about the small and large bowel at this site.      2. Moderate hiatal hernia with fluid-filled stomach.     3. Partially visualized lung bases demonstrates mosaic attenuation and  diffuse peribronchial thickening, which can be seen in the setting of  small airway disease.   Current Medications:    azithromycin  250 mg Oral Once per day on Mon Wed Fri     calcium carbonate  1,000 mg Oral 4x Daily     cinacalcet  30 mg Oral Daily     famotidine  40 mg Oral Daily     fluconazole  200 mg Oral Daily     heparin ANTICOAGULANT  5,000 Units Subcutaneous Q12H     heparin lock flush  5-20 mL Intracatheter Q24H     ipratropium - albuterol 0.5 mg/2.5 mg/3 mL  3 mL Nebulization Q6H     melatonin  10 mg Oral At Bedtime     polyethylene glycol  17 g Oral or Feeding Tube BID     senna-docusate  1 tablet Oral BID    Or     senna-docusate  2 tablet Oral BID     sodium bicarbonate  650 mg Oral TID     sodium chloride (PF)  10-40 mL Intracatheter Q8H     sodium chloride (PF)  3 mL Intracatheter Q8H     Vitamin D3  1,000 Units Oral BID       heparin (porcine) 500 Units/hr (04/13/23 0844)     heparin (porcine) 500 Units/hr (04/10/23 1300)     - MEDICATION INSTRUCTIONS -     All above labs and imaging reviewed by me.   GERARD Virgen

## 2023-04-13 NOTE — PLAN OF CARE
Pain: Denies.  Neuro: A/Ox4.  CV: NSR. VSS. Afebrile.  Resp: RA during day. Trach dome at night.  GI: Enema given d/t constipation. Large BM followed.  : Pt caths self w/ assistance.  Skin: No changes.    Plan: Continue POC. IR on Friday 4/14 for perm cath.

## 2023-04-13 NOTE — PLAN OF CARE
Goal Outcome Evaluation:      Plan of Care Reviewed With: patient, family    Overall Patient Progress: no changeOverall Patient Progress: no change    Outcome Evaluation: PTA pt taking PO. Pt has G tube but was not using it for fluids or nutrition PTA. Difficulty w/ nausea and constipation. Pt had enema and large BM today per EMR. Mom reports venting G tube PRN when pt is nauseated. Pt and mom prefer to continue w/ meals and no ONS desired at this time. See RD note 4/13 for details.    Skye Abdi RDN, LD  6D RD pager: [phone]  Weekend/Holiday RD pager: [phone]

## 2023-04-13 NOTE — PROGRESS NOTES
HEMODIALYSIS TREATMENT NOTE    Date: 4/13/2023  Time: 10:00 AM    Data:  Pre Wt: 42.3 kg    Desired Wt: 42.3 kg   Post Wt:  42.3 kg   Weight change:  0 kg  Ultrafiltration - Post Run Net Total Removed (mL): 0 mL  Vascular Access Status: patent  Dialyzer Rinse: Light, Streaked  Total Blood Volume Processed: 46 Liters  Total Dialysis (Treatment) Time: 3.5 Hours    Lab:   Yes    Interventions/Assessment:  Received on bed, A/O, SPO2:100 %  Generalized edema noted  Pt. dilayzed for 3.3 hrs, No UF  BFR:250 due to new use of AVF, DFR:600  Used CVC for arterial line and AVF for venous.  10:00am: No water in the system, changed to portable RO  Handoff report given to PCN     Plan:    Per renal team

## 2023-04-13 NOTE — PROGRESS NOTES
"CLINICAL NUTRITION SERVICES - ASSESSMENT NOTE     Nutrition Prescription    RECOMMENDATIONS FOR MDs/PROVIDERS TO ORDER:  None currently    Malnutrition Status:    Moderate malnutrition in the context of acute on chronic illness    Recommendations already ordered by Registered Dietitian (RD):    Patient to order meals as tolerated; supplements offered but not desired by patient/family at this time    Future/Additional Recommendations:    Monitor PO, nausea, constipation, wt     REASON FOR ASSESSMENT  Aidan Louie is a/an 27 year old male assessed by the dietitian for LOS    Patient admitted for abdominal pain, n/v, constipation, and PD catheter purulence and malfunction who was found to have clinical sepsis likely from PD-related peritonitis w/ adjacent small bowel obstruction.    MEDICAL HISTORY  ESRD 2/2 reflux nephropathy on peritoneal dialysis, prune belly syndrome, horseshoe kidney, left to right transureteroureterostomy, neurogenic bladder s/p mitronoff urinary diversion, Dawood-Reece sequence, and restrictive lung disease w/ prior tracheostomy (decannulated 2018), and cognitive delay    NUTRITION HISTORY  PTA pt taking PO. Pt has G tube but was not using it for fluids or nutrition PTA. Family reports pt was doing well with eating and working with [facility] on diet changes when necessary. Family reports pt's serum protein was improving and K and P were decreasing. Pt was not using protein supplements recently d/t improved labs and PO. Family reports it has been \"one thing after the next\" since March which has sometimes impacted pt's PO recently.     CURRENT NUTRITION ORDERS  Diet: Regular    Intake/Tolerance: Patient consuming 100 % of 2-3 meal(s) daily. per [employer] and I/O.  Family reports pt has been having one good day of PO/nausea then has a worse day of PO/nausea and that has been ongoing since admission. Using G tube to vent as needed when nauseated. Also struggling with constipation, pt given " "enema followed by large BM this AM per EMR.         LABS   04/10/23 09:19 04/10/23 13:57 04/11/23 07:47 04/12/23 06:05   Sodium 135 (L)  138 136   Potassium 4.5  4.1 4.0   Chloride 101  102 100   Carbon Dioxide (CO2) 18 (L)  22 23   Urea Nitrogen 42.9 (H)  27.7 (H) 21.8 (H)   Creatinine 4.11 (H)  3.25 (H) 3.11 (H)   GFR Estimate 20 (L)  26 (L) 27 (L)   Calcium 8.8  8.7 8.8   Anion Gap 16 (H)  14 13   Magnesium 1.6 (L)  1.5 (L) 1.4 (L)   Phosphorus 5.4 (H)   4.1   Albumin    2.5 (L)   Protein Total    5.8 (L)   Alkaline Phosphatase    69   ALT    11   AST    30   Bilirubin Direct    <0.20   Bilirubin Total    0.2   CRP Inflammation   85.90 (H)    Glucose 89  95 97   Lactic Acid  1.4         MEDICATIONS    1000 mg calcium carbonate (TUMS) QID    Miralax BID, senna BID, suppository PRN    Sodium bicarb TID    Vitamin D3 1000 units BID    ANTHROPOMETRICS  Height: 167.6 cm (5' 6\")  Most Recent Weight: 42.3 kg (93 lb 4.8 oz)    IBW: 64.5 kg  Body mass index is 15.06 kg/m . BMI Category: Underweight BMI <18.5  Weight History: 4.9 kg (10.3%) weight loss over 4 months. Chronic low weight. Was on PD, cannot rule out fluid status confounding weight loss.   Wt Readings from Last 15 Encounters:   04/13/23 42.3 kg (93 lb 4.8 oz)   12/05/22 47.2 kg (104 lb)   11/01/22 45.2 kg (99 lb 10.4 oz)   11/29/21 45.2 kg (99 lb 9.6 oz)   10/19/21 44.8 kg (98 lb 11.2 oz)   08/30/21 46.4 kg (102 lb 6.4 oz)   06/07/21 45.3 kg (99 lb 13.9 oz)   03/22/21 48.1 kg (106 lb)   10/15/20 47.4 kg (104 lb 6.4 oz)   06/29/20 44.3 kg (97 lb 11.2 oz)   06/25/20 47.6 kg (105 lb)   12/09/19 47.6 kg (105 lb)   12/09/19 48 kg (105 lb 14.4 oz)   10/07/19 48.5 kg (107 lb)   09/09/19 48.5 kg (107 lb)         ASSESSED NUTRITION NEEDS  Dosing Weight: 64.5 kg (IBW)   Estimated Energy Needs: 1155-6475 kcals/day (20 - 25 kcals/kg)  Justification: Maintenance  Estimated Protein Needs: 65-77 grams protein/day (1 - 1.2 grams of pro/kg)  Justification: Dialysis  Estimated " Fluid Needs: UOP + 500-1000 mL/day   Justification: Dialysis and Per provider pending fluid status    PHYSICAL FINDINGS  See malnutrition section below.    MALNUTRITION  % Intake: < 75% for > 7 days (moderate)  % Weight Loss: > 10% in 6 months (severe malnutrition) -- cannot rule out confounded by fluid status  Subcutaneous Fat Loss: Upper arm:  mild and Lower arm:  mild   Muscle Loss: Thoracic region (clavicle, acromium bone, deltoid, trapezius, pectoral):  moderate, Upper arm (bicep, tricep):  moderate, Lower arm  (forearm):  severe and Dorsal hand:  mild  Fluid Accumulation/Edema: None noted  Malnutrition Diagnosis: Moderate malnutrition in the context of acute on chronic illness    NUTRITION DIAGNOSIS  Inadequate oral intake related to nausea, constipation as evidenced by patient/family report, weight loss.       INTERVENTIONS  Implementation  Nutrition Education: will be provided if nutrition education needs arise.      Goals  Patient to consume % of nutritionally adequate meal trays TID, or the equivalent with supplements/snacks.        Monitoring/Evaluation  Progress toward goals will be monitored and evaluated per protocol.    Skye Abdi RDN, LD  6D RD pager: [phone]  Weekend/Holiday RD pager: [phone]

## 2023-04-13 NOTE — PLAN OF CARE
Major Shift Events: A&Ox4. PERRLA. Follows commands. VSS stable on RA. Sinus/sinus tach. SBP<18-0 goal met w/o intervention. Trach Mindy 5. Pt does own trach cares and suction w/ assistance. Reg diet w/ slight increase in intake. Pt will be NPO @0000. Constipation - gave scheduled and PRN bowel meds. No BM. Self straight caths to void w/ assistance. HD run today.    Plan: IR procedure schedule for 4/14. Encourage PO intake. Encourage activity. Continue POC. Will continue to monitor pt status and will notify the [facility] 4 team w/ any concerns or changes.    For vital signs and complete assessments, please see documentation flowsheets.   Plan of Care Reviewed With: patient, parent  Overall Patient Progress: no change

## 2023-04-13 NOTE — PROGRESS NOTES
Care Management Follow Up    Length of Stay (days): 7    Expected Discharge Date: 04/15/2023     Concerns to be Addressed: care coordination/care conferences  pt's mother has limited mobility  Patient plan of care discussed at interdisciplinary rounds: Yes    Anticipated Discharge Disposition: Home     Anticipated Discharge Services: PCA  Anticipated Discharge DME:      Patient/family educated on Medicare website which has current facility and service quality ratings:  yes  Education Provided on the Discharge Plan:  yes  Patient/Family in Agreement with the Plan: yes    Referrals Placed by CM/SW:  dialysis  Private pay costs discussed: Not applicable     Additional Information:  Per team, pt's AV fistula continues to be unreliable and as pt will need outpt HD, a tunneled line will be placed 4/14 and pt will then discharge 4/15 after dialysis. Writer called [facility] to verify pt's chair and inform clinic that pt will be arriving for first run on 4/18. Per Ailyn @ [facility], chair is secure and pt is expected 4/18 2:15pm. Pt's schedule is TTS at 2:45pm (30 min early arrival for first run).     [facility] ([facility])  Ph: [phone]  they are able to accept a patient with a trach now    [facility]  Ph: [phone]  Fax: [phone]     Ned Adams RNCC  Covering for 6D  Phone [phone]    SEARCHABLE in [employer] - search CARE COORDINATOR    [place] [facility] [place] ([phone]) Saturday & Sunday; ([phone]) [facility] Recognized Holidays    Units: 4A, 4C, 4E, 5A & 5B   Pager: [phone]    Units: 6A & 6B    Pager: [phone]    Units: 6C & 6D   Pager: [phone]    Units: 7A, 7B, 7C, 7D & 5C    Pager: [phone]    Units: [facility] ED, 5 Ortho, 5 Med/Surg, 6 Med/Surg, 8A & 10 ICU

## 2023-04-14 ENCOUNTER — APPOINTMENT (OUTPATIENT)
Dept: INTERVENTIONAL RADIOLOGY/VASCULAR | Facility: CLINIC | Age: 28
DRG: 907 | End: 2023-04-14
Attending: NURSE PRACTITIONER
Payer: MEDICARE

## 2023-04-14 ENCOUNTER — APPOINTMENT (OUTPATIENT)
Dept: CT IMAGING | Facility: CLINIC | Age: 28
DRG: 907 | End: 2023-04-14
Payer: MEDICARE

## 2023-04-14 LAB
BACTERIA PRT CULT: NORMAL
BACTERIA SPEC CULT: NORMAL
RADIOLOGIST FLAGS: ABNORMAL

## 2023-04-14 PROCEDURE — 87075 CULTR BACTERIA EXCEPT BLOOD: CPT

## 2023-04-14 PROCEDURE — 87106 FUNGI IDENTIFICATION YEAST: CPT

## 2023-04-14 PROCEDURE — 94640 AIRWAY INHALATION TREATMENT: CPT | Mod: 76

## 2023-04-14 PROCEDURE — C1750 CATH, HEMODIALYSIS,LONG-TERM: HCPCS

## 2023-04-14 PROCEDURE — 250N000013 HC RX MED GY IP 250 OP 250 PS 637

## 2023-04-14 PROCEDURE — 250N000013 HC RX MED GY IP 250 OP 250 PS 637: Performed by: STUDENT IN AN ORGANIZED HEALTH CARE EDUCATION/TRAINING PROGRAM

## 2023-04-14 PROCEDURE — 999N000157 HC STATISTIC RCP TIME EA 10 MIN

## 2023-04-14 PROCEDURE — 258N000003 HC RX IP 258 OP 636: Performed by: STUDENT IN AN ORGANIZED HEALTH CARE EDUCATION/TRAINING PROGRAM

## 2023-04-14 PROCEDURE — 02PA33Z REMOVAL OF INFUSION DEVICE FROM HEART, PERCUTANEOUS APPROACH: ICD-10-PCS | Performed by: PHYSICIAN ASSISTANT

## 2023-04-14 PROCEDURE — 250N000011 HC RX IP 250 OP 636: Performed by: RADIOLOGY

## 2023-04-14 PROCEDURE — 76937 US GUIDE VASCULAR ACCESS: CPT | Mod: 26 | Performed by: PHYSICIAN ASSISTANT

## 2023-04-14 PROCEDURE — 77001 FLUOROGUIDE FOR VEIN DEVICE: CPT | Mod: 26 | Performed by: PHYSICIAN ASSISTANT

## 2023-04-14 PROCEDURE — 02H633Z INSERTION OF INFUSION DEVICE INTO RIGHT ATRIUM, PERCUTANEOUS APPROACH: ICD-10-PCS | Performed by: PHYSICIAN ASSISTANT

## 2023-04-14 PROCEDURE — 250N000009 HC RX 250: Performed by: INTERNAL MEDICINE

## 2023-04-14 PROCEDURE — 250N000011 HC RX IP 250 OP 636: Performed by: INTERNAL MEDICINE

## 2023-04-14 PROCEDURE — 94669 MECHANICAL CHEST WALL OSCILL: CPT

## 2023-04-14 PROCEDURE — G1010 CDSM STANSON: HCPCS

## 2023-04-14 PROCEDURE — 87040 BLOOD CULTURE FOR BACTERIA: CPT

## 2023-04-14 PROCEDURE — 87070 CULTURE OTHR SPECIMN AEROBIC: CPT

## 2023-04-14 PROCEDURE — 250N000011 HC RX IP 250 OP 636: Performed by: STUDENT IN AN ORGANIZED HEALTH CARE EDUCATION/TRAINING PROGRAM

## 2023-04-14 PROCEDURE — 250N000011 HC RX IP 250 OP 636

## 2023-04-14 PROCEDURE — 99152 MOD SED SAME PHYS/QHP 5/>YRS: CPT | Performed by: PHYSICIAN ASSISTANT

## 2023-04-14 PROCEDURE — C1769 GUIDE WIRE: HCPCS

## 2023-04-14 PROCEDURE — 120N000002 HC R&B MED SURG/OB UMMC

## 2023-04-14 PROCEDURE — 74177 CT ABD & PELVIS W/CONTRAST: CPT | Mod: 26 | Performed by: RADIOLOGY

## 2023-04-14 PROCEDURE — 99222 1ST HOSP IP/OBS MODERATE 55: CPT | Mod: 25 | Performed by: SURGERY

## 2023-04-14 PROCEDURE — 36415 COLL VENOUS BLD VENIPUNCTURE: CPT

## 2023-04-14 PROCEDURE — 99233 SBSQ HOSP IP/OBS HIGH 50: CPT | Mod: GC | Performed by: STUDENT IN AN ORGANIZED HEALTH CARE EDUCATION/TRAINING PROGRAM

## 2023-04-14 PROCEDURE — 99233 SBSQ HOSP IP/OBS HIGH 50: CPT | Performed by: INTERNAL MEDICINE

## 2023-04-14 PROCEDURE — 0JH63XZ INSERTION OF TUNNELED VASCULAR ACCESS DEVICE INTO CHEST SUBCUTANEOUS TISSUE AND FASCIA, PERCUTANEOUS APPROACH: ICD-10-PCS | Performed by: PHYSICIAN ASSISTANT

## 2023-04-14 PROCEDURE — 250N000009 HC RX 250: Performed by: STUDENT IN AN ORGANIZED HEALTH CARE EDUCATION/TRAINING PROGRAM

## 2023-04-14 PROCEDURE — 94640 AIRWAY INHALATION TREATMENT: CPT

## 2023-04-14 PROCEDURE — 36558 INSERT TUNNELED CV CATH: CPT

## 2023-04-14 PROCEDURE — 36558 INSERT TUNNELED CV CATH: CPT | Performed by: PHYSICIAN ASSISTANT

## 2023-04-14 PROCEDURE — G1010 CDSM STANSON: HCPCS | Mod: GC | Performed by: RADIOLOGY

## 2023-04-14 PROCEDURE — 250N000011 HC RX IP 250 OP 636: Performed by: NURSE PRACTITIONER

## 2023-04-14 PROCEDURE — 99152 MOD SED SAME PHYS/QHP 5/>YRS: CPT

## 2023-04-14 PROCEDURE — 10140 I&D HMTMA SEROMA/FLUID COLLJ: CPT | Performed by: SURGERY

## 2023-04-14 PROCEDURE — 272N000504 HC NEEDLE CR4

## 2023-04-14 PROCEDURE — 250N000009 HC RX 250

## 2023-04-14 RX ORDER — FLUMAZENIL 0.1 MG/ML
0.2 INJECTION, SOLUTION INTRAVENOUS
Status: ACTIVE | OUTPATIENT
Start: 2023-04-14 | End: 2023-04-14

## 2023-04-14 RX ORDER — PIPERACILLIN SODIUM, TAZOBACTAM SODIUM 2; .25 G/10ML; G/10ML
2.25 INJECTION, POWDER, LYOPHILIZED, FOR SOLUTION INTRAVENOUS EVERY 8 HOURS
Status: DISCONTINUED | OUTPATIENT
Start: 2023-04-14 | End: 2023-04-17

## 2023-04-14 RX ORDER — FLUCONAZOLE 2 MG/ML
200 INJECTION, SOLUTION INTRAVENOUS EVERY 24 HOURS
Status: DISCONTINUED | OUTPATIENT
Start: 2023-04-15 | End: 2023-04-22 | Stop reason: HOSPADM

## 2023-04-14 RX ORDER — FENTANYL CITRATE 50 UG/ML
25-50 INJECTION, SOLUTION INTRAMUSCULAR; INTRAVENOUS EVERY 5 MIN PRN
Status: DISCONTINUED | OUTPATIENT
Start: 2023-04-14 | End: 2023-04-14

## 2023-04-14 RX ORDER — LIDOCAINE HYDROCHLORIDE 10 MG/ML
5 INJECTION, SOLUTION EPIDURAL; INFILTRATION; INTRACAUDAL; PERINEURAL ONCE
Status: COMPLETED | OUTPATIENT
Start: 2023-04-14 | End: 2023-04-14

## 2023-04-14 RX ORDER — HYDROMORPHONE HYDROCHLORIDE 1 MG/ML
0.5 INJECTION, SOLUTION INTRAMUSCULAR; INTRAVENOUS; SUBCUTANEOUS ONCE
Status: COMPLETED | OUTPATIENT
Start: 2023-04-14 | End: 2023-04-14

## 2023-04-14 RX ORDER — NALOXONE HYDROCHLORIDE 0.4 MG/ML
0.2 INJECTION, SOLUTION INTRAMUSCULAR; INTRAVENOUS; SUBCUTANEOUS
Status: DISCONTINUED | OUTPATIENT
Start: 2023-04-14 | End: 2023-04-14

## 2023-04-14 RX ORDER — NALOXONE HYDROCHLORIDE 0.4 MG/ML
0.4 INJECTION, SOLUTION INTRAMUSCULAR; INTRAVENOUS; SUBCUTANEOUS
Status: DISCONTINUED | OUTPATIENT
Start: 2023-04-14 | End: 2023-04-14

## 2023-04-14 RX ORDER — CEFAZOLIN SODIUM 2 G/100ML
2 INJECTION, SOLUTION INTRAVENOUS
Status: COMPLETED | OUTPATIENT
Start: 2023-04-14 | End: 2023-04-14

## 2023-04-14 RX ORDER — HEPARIN SODIUM (PORCINE) LOCK FLUSH IV SOLN 100 UNIT/ML 100 UNIT/ML
3 SOLUTION INTRAVENOUS
Status: DISCONTINUED | OUTPATIENT
Start: 2023-04-14 | End: 2023-04-22 | Stop reason: HOSPADM

## 2023-04-14 RX ORDER — IOPAMIDOL 755 MG/ML
58 INJECTION, SOLUTION INTRAVASCULAR ONCE
Status: COMPLETED | OUTPATIENT
Start: 2023-04-14 | End: 2023-04-14

## 2023-04-14 RX ADMIN — HEPARIN SODIUM 5000 UNITS: 5000 INJECTION, SOLUTION INTRAVENOUS; SUBCUTANEOUS at 20:07

## 2023-04-14 RX ADMIN — VANCOMYCIN HYDROCHLORIDE 750 MG: 1 INJECTION, POWDER, LYOPHILIZED, FOR SOLUTION INTRAVENOUS at 18:05

## 2023-04-14 RX ADMIN — CALCIUM CARBONATE 1000 MG: 500 TABLET, CHEWABLE ORAL at 17:35

## 2023-04-14 RX ADMIN — Medication 1000 UNITS: at 12:07

## 2023-04-14 RX ADMIN — SENNOSIDES AND DOCUSATE SODIUM 1 TABLET: 8.6; 5 TABLET ORAL at 12:06

## 2023-04-14 RX ADMIN — FAMOTIDINE 40 MG: 20 TABLET ORAL at 20:03

## 2023-04-14 RX ADMIN — CEFAZOLIN SODIUM 2 G: 2 INJECTION, SOLUTION INTRAVENOUS at 09:50

## 2023-04-14 RX ADMIN — CINACALCET 30 MG: 30 TABLET ORAL at 12:07

## 2023-04-14 RX ADMIN — MIDAZOLAM 0.5 MG: 1 INJECTION INTRAMUSCULAR; INTRAVENOUS at 10:40

## 2023-04-14 RX ADMIN — Medication 10 MG: at 23:22

## 2023-04-14 RX ADMIN — PIPERACILLIN AND TAZOBACTAM 2.25 G: 2; .25 INJECTION, POWDER, FOR SOLUTION INTRAVENOUS at 16:38

## 2023-04-14 RX ADMIN — IPRATROPIUM BROMIDE AND ALBUTEROL SULFATE 3 ML: .5; 3 SOLUTION RESPIRATORY (INHALATION) at 20:18

## 2023-04-14 RX ADMIN — SENNOSIDES AND DOCUSATE SODIUM 2 TABLET: 50; 8.6 TABLET ORAL at 20:04

## 2023-04-14 RX ADMIN — FENTANYL CITRATE 25 MCG: 50 INJECTION, SOLUTION INTRAMUSCULAR; INTRAVENOUS at 10:00

## 2023-04-14 RX ADMIN — IOPAMIDOL 58 ML: 755 INJECTION, SOLUTION INTRAVENOUS at 14:56

## 2023-04-14 RX ADMIN — Medication 3 ML: at 11:14

## 2023-04-14 RX ADMIN — CALCIUM CARBONATE 1000 MG: 500 TABLET, CHEWABLE ORAL at 12:05

## 2023-04-14 RX ADMIN — MIDAZOLAM 0.5 MG: 1 INJECTION INTRAMUSCULAR; INTRAVENOUS at 10:00

## 2023-04-14 RX ADMIN — HEPARIN SODIUM 5000 UNITS: 5000 INJECTION, SOLUTION INTRAVENOUS; SUBCUTANEOUS at 12:14

## 2023-04-14 RX ADMIN — SODIUM CHLORIDE, PRESERVATIVE FREE 65 ML: 5 INJECTION INTRAVENOUS at 14:56

## 2023-04-14 RX ADMIN — LIDOCAINE HYDROCHLORIDE 5 ML: 10 INJECTION, SOLUTION EPIDURAL; INFILTRATION; INTRACAUDAL; PERINEURAL at 16:40

## 2023-04-14 RX ADMIN — FENTANYL CITRATE 25 MCG: 50 INJECTION, SOLUTION INTRAMUSCULAR; INTRAVENOUS at 10:40

## 2023-04-14 RX ADMIN — POLYETHYLENE GLYCOL 3350 17 G: 17 POWDER, FOR SOLUTION ORAL at 12:08

## 2023-04-14 RX ADMIN — FLUCONAZOLE 200 MG: 200 TABLET ORAL at 12:05

## 2023-04-14 RX ADMIN — IPRATROPIUM BROMIDE AND ALBUTEROL SULFATE 3 ML: .5; 3 SOLUTION RESPIRATORY (INHALATION) at 13:41

## 2023-04-14 RX ADMIN — HYDROMORPHONE HYDROCHLORIDE 0.5 MG: 1 INJECTION, SOLUTION INTRAMUSCULAR; INTRAVENOUS; SUBCUTANEOUS at 16:38

## 2023-04-14 RX ADMIN — Medication 1000 UNITS: at 20:03

## 2023-04-14 RX ADMIN — CALCIUM CARBONATE 1000 MG: 500 TABLET, CHEWABLE ORAL at 23:22

## 2023-04-14 RX ADMIN — POLYETHYLENE GLYCOL 3350 17 G: 17 POWDER, FOR SOLUTION ORAL at 20:07

## 2023-04-14 RX ADMIN — MIDAZOLAM 0.5 MG: 1 INJECTION INTRAMUSCULAR; INTRAVENOUS at 10:59

## 2023-04-14 RX ADMIN — PIPERACILLIN AND TAZOBACTAM 2.25 G: 2; .25 INJECTION, POWDER, FOR SOLUTION INTRAVENOUS at 23:43

## 2023-04-14 RX ADMIN — AZITHROMYCIN MONOHYDRATE 250 MG: 250 TABLET ORAL at 12:07

## 2023-04-14 RX ADMIN — FENTANYL CITRATE 25 MCG: 50 INJECTION, SOLUTION INTRAMUSCULAR; INTRAVENOUS at 10:59

## 2023-04-14 ASSESSMENT — ACTIVITIES OF DAILY LIVING (ADL)
ADLS_ACUITY_SCORE: 40

## 2023-04-14 NOTE — CONSULTS
"    [facility]    Consult Note - EGS Service  Date of Admission:  4/6/2023  Consult Requested by: Phoebe Ag  Reason for Consult: Abdominal wall abscess    Assessment & Plan: Surgery   27-year-old male with past medical history significant for prune-belly syndrome, Dawood Reece syndrome, end-stage renal disease on peritoneal dialysis (3/22) due to congenital abnormalities of the urinary tract, restrictive lung disease, surgical history of tracheostomy, PEG tube, Mitrofanoff procedure, multiple abdominal reconstructive surgeries presented to the ED on 4/6 with evidence of infection from peritoneal dialysis. Surgery re consulted 4/14 for CT showing abdominal abscess at site of prior PD catheter removal.    CT notes possible communication with bowel; bedside InD shows no feculent material in drainage from abscess, which is reassuring against fistula formation with bowel.    - agree w abx per primary team; they plan to narrow once culture data returns  - bedside InD performed  - no feculence noted, lowering concern for bowel communication  - site packed with moistened gauze; surgery will change tomorrow 4/15       Drains: PRESENT         - 1 Biliary Drain(s)   Code Status: Full Code      Clinically Significant Risk Factors              # Hypoalbuminemia: Lowest albumin = 2.5 g/dL at 4/12/2023  6:05 AM, will monitor as appropriate           # Cachexia: Estimated body mass index is 15.4 kg/m  as calculated from the following:    Height as of this encounter: 1.676 m (5' 6\").    Weight as of this encounter: 43.3 kg (95 lb 6.4 oz).   # Moderate Malnutrition: based on nutrition assessment      The patient's care was discussed with the staff physician, Ran Fuentes, DO Srini Seaman MD  [facility]  Non-urgent messages: Securely message with [employer] (more info)  Text page via [employer] Paging/Directory "     ______________________________________________________________________    Chief Complaint   Abdominal abscess    History is obtained from the patient's parent(s)    History of Present Illness   27-year-old male with past medical history significant for prune-belly syndrome, Dawood Reece syndrome, end-stage renal disease on peritoneal dialysis (3/22) due to congenital abnormalities of the urinary tract, restrictive lung disease, surgical history of tracheostomy, PEG tube, Mitrofanoff procedure, multiple abdominal reconstructive surgeries presented to the ED on 4/6 with evidence of infection from peritoneal dialysis. Medicine team noticed area of induration and tenderness at prior PD cath site.    Surgery re consulted 4/14 for CT showing abdominal abscess at site of prior PD catheter removal.    CT read shows:   IMPRESSION:   1. Multiple irregular and largely contiguous abscesses extending from  the abdominal wall and subcutaneous tissues into the peritoneum along  the trajectory of the prior peritoneal dialysis catheter tract. There  is significant wall thickening and associated inflammation of the  sigmoid colon and rectum. It would be difficult to exclude an early  fistula to the surrounding small or large bowel with all of the  neighboring bowel loops and significant inflammatory change.  2. 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  There is periureteral and perivesicular fat stranding indicative of  infection or inflammation.  3. Borderline distended loop of small bowel in the right upper  quadrant and parasagittal abdomen with transition point in the low  central abdomen near the fluid collection detailed below, similar to  the prior transition point likely reflecting some degree of partial  residual obstruction.       Past Medical History    Past Medical History:   Diagnosis Date     Bilateral reflux nephropathy       CKD (chronic kidney disease) stage 4, GFR 15-29 ml/min (H)      End stage renal disease (H)      Hearing loss      Hypertension      Kyphosis      Dawood Reece sequence      Prune belly syndrome      Recurrent UTI      Respiratory bronchiolitis interstitial lung disease (H)      Restrictive lung disease      Scoliosis      Thyroid disease      Tracheostomy dependence (H)        Past Surgical History   Past Surgical History:   Procedure Laterality Date     Bilateral ureteral reimplantation  2002    abdominoplasty, Mitrofanoff creation     CYSTOSCOPY  12     EXAM UNDER ANESTHESIA THROAT  6/10/2003    tonsillar hypertrophy     EXAM UNDER ANESTHESIA, RESTORATIONS, EXTRACTION(S) DENTAL COMPLEX, COMBINED  2006     GASTROSTOMY TUBE       GASTROSTOMY TUBE  3/16/2002    button change     HERNIORRHAPHY INGUINAL BILATERAL  99    left ochiopexy     LARYNGOSCOPY, BRONCHOSCOPY, COMBINED  2002     LARYNGOSCOPY, BRONCHOSCOPY, COMBINED  2002    bilateral ear debridement     LARYNGOSCOPY, BRONCHOSCOPY, COMBINED  2003     LARYNGOSCOPY, BRONCHOSCOPY, COMBINED  2007    Failed Deflux injection     LARYNGOSCOPY, BRONCHOSCOPY, COMBINED  12      VESICOSTOMY       Redo right ureteral reimplantation  3/12/2004    Excision bladder diverticuli, Left to Right pyelopyelostomy     REMOVE CATHETER PERITONEAL N/A 2023    Procedure: REMOVAL, CATHETER, DIALYSIS, PERITONEAL; IJ TEMPORARY DIALYSIS CATHETER PLACED;  Surgeon: Anurag Thompson MD;  Location: [facility] OR     Replacement of trach  10/15/12     Takedown and revision of Mitrofanoff stoma  2006     TRACHEOSTOMY INFANT         Prior to Admission Medications   I have reviewed this patient's current medications       Review of Systems    The 10 point Review of Systems is negative other than noted in the HPI or here.      Physical Exam   Vital Signs: Temp: 98.2  F (36.8  C) Temp src: Oral BP: 138/87 Pulse: 84   Resp: 16  SpO2: 97 % O2 Device: None (Room air)    Weight: 95 lbs 6.4 oz    Intake/Output Summary (Last 24 hours) at 4/14/2023 1603  Last data filed at 4/14/2023 0900  Gross per 24 hour   Intake 200 ml   Output 475 ml   Net -275 ml     Gen: NAD, resting comfortably  HEENT: NCAT, sclera anicteric  CV: RRR  Resp: nonlabored respirations, comfortable on room air, trach in place  Abd:area of induration and tenderness to palpation near site of peritoneal dialysis tube removal. no rebound guarding or rigidity in remainder of abdomen.  No tenderness to palpation in the right abdomen, PEG tube in place, midline abdominal incision well-healed and scar, Mitrofanoff ostomy appears functional  Ext: WWP w/o edema  Neuro: no focal deficits  Skin: No rashes      Data

## 2023-04-14 NOTE — PHARMACY-VANCOMYCIN DOSING SERVICE
"Pharmacy Vancomycin Initial Note  Date of Service 2023  Patient's  1995  27 year old, male    Indication: Abscess    Current estimated CrCl = Estimated Creatinine Clearance: 14.2 mL/min (A) (based on SCr of 4.82 mg/dL (H)).    Creatinine for last 3 days  2023:  6:05 AM Creatinine 3.11 mg/dL  2023:  7:49 AM Creatinine 4.82 mg/dL    Recent Vancomycin Level(s) for last 3 days  No results found for requested labs within last 3 days.      Vancomycin IV Administrations (past 72 hours)      No vancomycin orders with administrations in past 72 hours.                Nephrotoxins and other renal medications (From now, onward)    Start     Dose/Rate Route Frequency Ordered Stop    23 1700  vancomycin (VANCOCIN) 750 mg in sodium chloride 0.9 % 250 mL intermittent infusion         750 mg  over 90 Minutes Intravenous ONCE 23 1617      23 1630  piperacillin-tazobactam (ZOSYN) 2.25 g vial to attach to  ml bag        Note to Pharmacy: For [facility], [facility] and [facility]: For Zosyn-naive patients, use the \"Zosyn initial dose + extended infusion\" order panel.    2.25 g  over 30 Minutes Intravenous EVERY 8 HOURS 23 1603      23 1617  vancomycin place sawyer - receiving intermittent dosing         1 each Intravenous SEE ADMIN INSTRUCTIONS 23 1617            Contrast Orders - past 72 hours (72h ago, onward)    Start     Dose/Rate Route Frequency Stop    23 1500  iopamidol (ISOVUE-370) solution 58 mL         58 mL Intravenous ONCE 23 1456          Plan:  1. Start vancomycin  750 mg IV once then PRN based on levels.   2. Vancomycin monitoring method: Renal Replacement Therapy  3. Vancomycin therapeutic monitoring goal: 15-20 mg/L  4. Pharmacy will check vancomycin levels with AM labs on 4/15.  5. Serum creatinine levels will be ordered every 48 hours.      Luna Mustafa, Pharm.D., [facility], [facility]  Pager [phone]    "

## 2023-04-14 NOTE — PROGRESS NOTES
Patient Name: Aidan Louie  Medical Record Number: [id number]  Today's Date: 4/14/2023    Procedure: Image guided percutaneous double lumen large bore tunneled central line placement and removal of non tunneled cental line  Proceduralist: Junito Hu PAC  Pathology present: NA    Procedure Start: 1040  Procedure end: 1119  Sedation medications administered: Fentanyl 75mcg and Versed 1.5mg     Report given to: GUNNER Mejia  : GURU    Other Notes: Pt arrived to IR room 4 from 6D. Consent reviewed. Pt denies any questions or concerns regarding procedure. Pt positioned supine and monitored per protocol. Pt tolerated procedure without any noted complications. Pt transferred back to 6D.

## 2023-04-14 NOTE — PROGRESS NOTES
Patient abdomen old peritoneal dialysis site still red, swollen and tender. Blood cultures drawn. Surgery was bedside to I&D today. Wound cultures sent. New antibiotics started today as well. Wound packed by surgery post I&D. RN may change outer dressing if necessary for drainage.

## 2023-04-14 NOTE — PROGRESS NOTES
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    Today's updates/changes:  - Tunneled HD line placement today  - CT A/P for worsening tenderness and induration to left abdominal prior PD cath site    Acute Hospital Problems:     Peritonitis 2/2 candida albicans, peritoneal dialysis-related  Sepsis, resolved  In setting of peritoneal dialysis, one month of difficult PD access, few days of \"foamy\" and possibly purulent PD cath discharge, fevers. Unable to tolerate peritoneal antibiotics prior to admission. Initially w/ tachycardia and leukocytosis. Peritoneal culture prior to admission growing budding yeast, no bacterial growth as of yet. Peritoneal fluid and PD cath tip cultures from this admission also growing candida albicans, pan-susceptible. Overall hemodynamically and clinically stable on antifungal therapy. Some concern  into  for worsening tenderness and nduration to left abdominal PD cath site, possible scarring vs soft tissue infection vs abscess.  Plan:  - Antibiotics: Fluconazole IV (renally dosed)  - present, plan for 4 week course (EOT 23) and no ID follow-up needed  - CT A/P today to evaluate prior PD cath site  - Post-op pain regimen: PRN Tylenol and " Oxycodone available  - Post-op dressing recommendations: See surgery progress note dated 4/8/23     Small bowel obstruction vs infection-associated ileus, improving  With transition point closely associated to PD catheter which is now s/p removal. In context of poor PO, nausea, vomiting, and constipation at home prior to admission. Was improving w/ sepsis treatment, tolerating PO. Some worsening abd pain on 4/12, AXR reassuring, passed bowel w/ enema and subsequently having daily bowel movements.  Plan:  - Regular diet ok as able to tolerate  - Bowel regimen - BID Senna/Docusate and BID Miralax  - Symptomatic support: Zofran, Compazine prn     ESRD w/ PD  Mild hyperkalemia  AGMA  Likely in setting of incomplete peritoneal dialysis leading up to admission. Now s/p PD cath removal in setting of peritonitis as above w/ temporary CVC placed for HD reinitiation this hospital stay. Nephrology is following, now dialyzed at least three times as inpt.  Plan:  - Access: AVF functional but since new needs additional access, tunneled line placement today  - Has outpt dialysis seat secured - TTS schedule  - PTA Cinacalcet 30mg qday, PO NaHCO3 650mg TID     Restrictive lung disease  Chronic. Currently without tachypnea or hypoxia. Comfortable-appearing. Some bronchial cuffing on CXR, though not different than prior, some coarse breath sounds and increased secretions on history. Initial viral panel unremarkable. Suspect primary infection is peritonitis as above and these are chronic changes.  Plan:  - PTA Azithro MWF held for now per mother's request, will restart on discharge  - PTA Duonebs and vest therapy  - RT following for assistance     Chronic malnutrition  Has PEG but not tube feed dependent, tolerates PO.     Neurogenic bladder s/p mitranoff urinary diversion  Still makes some degree of urine. Self-caths every ~4 hours daily w/ intermittent irrigation. Last urology visit in December 2022.  Plan:  - Straight cath orders  for ~q4h while awake     HTN - holding PTA Amlodipine ( meds still listed as Captopril and Clonidine) while managing sepsis and restarting hemodialysis, can consider restart if needed prior to discharge     GERD - PTA Famotidine        Hospital Checklist:  Diet: Regular diet following proedure today  VTE ppx: SQH  LDA: PIV, Right temporary CVC for HD, G-tube, Mitranoff diversion  Code: Full  Level of care: General Medicine  Family: Mother, Valerie - [phone]      Discharge Planning:  Anticipate discharge on 4/15 after dialysis or on . Will be discharging to prior living arrangement. Has TTS dialysis seat now. Communicated via PINKY tab.      Staffed with attending physician, Dr. Ag.    Nahid Morrison MD    Internal Medicine PGY-2  [facility] Team 4    Pager #[phone]    [employer]  (Please see sign-in/sign-out for up-to-date physician coverage information)      Interval Events:   Overnight handoff and nursing reports reviewed. No significant events. Some concern for worsening pain to skin of left abdomen around site of prior PD cath removal. Passing small bowel movements. Tolerating limited PO. Seen after tunneled cath placement earlier this morning. Mother at bedside.    Objective:     Vitals:    23 0400 23 0800 23 0400   Weight: 48.5 kg (107 lb) 42.3 kg (93 lb 4.8 oz) 43.3 kg (95 lb 6.4 oz)     Vital Signs with Ranges  Temp:  [97.5  F (36.4  C)-99.2  F (37.3  C)] 99.2  F (37.3  C)  Pulse:  [] 86  Resp:  [13-40] 22  BP: (107-145)/() 119/61  SpO2:  [89 %-100 %] 96 %  I/O last 3 completed shifts:  In: 650 [P.O.:650]  Out: 400 [Urine:400]    Exam:  General: No acute distress, laying in bed, watching tv  CV: Regular rate, regular rhythm, no new murmurs  Lungs: On room air,  no wheezing or crackles this am, no increased WOB  Abd: Soft, non-distended, non-tender with exception of area of swelling, induration, and tenderness even to light palpation to the left abdomen at site  of prior PD cath removal  Ext: Warm and well-perfused, no appreciable lower extremity edema    Medications     heparin (porcine) 500 Units/hr (04/10/23 1300)       azithromycin  250 mg Oral Once per day on Mon Wed Fri     calcium carbonate  1,000 mg Oral 4x Daily     cinacalcet  30 mg Oral Daily     famotidine  40 mg Oral Daily     fluconazole  200 mg Oral Daily     heparin ANTICOAGULANT  5,000 Units Subcutaneous Q12H     heparin lock flush  5-20 mL Intracatheter Q24H     ipratropium - albuterol 0.5 mg/2.5 mg/3 mL  3 mL Nebulization Q6H     melatonin  10 mg Oral At Bedtime     polyethylene glycol  17 g Oral or Feeding Tube BID     senna-docusate  1 tablet Oral BID    Or     senna-docusate  2 tablet Oral BID     sodium chloride (PF)  10-40 mL Intracatheter Q8H     sodium chloride (PF)  3 mL Intracatheter Q8H     Vitamin D3  1,000 Units Oral BID       Data   Recent Labs   Lab 04/13/23  0749 04/12/23  1628 04/12/23  0605 04/11/23  0747   WBC 18.0*  --  14.9* 14.9*   HGB 9.2*  --  9.3* 9.5*   MCV 93  --  92 91   *  --  390 387   INR  --  1.21*  --   --    *  --  136 138   POTASSIUM 4.5  --  4.0 4.1   CHLORIDE 98  --  100 102   CO2 24  --  23 22   BUN 29.3*  --  21.8* 27.7*   CR 4.82*  --  3.11* 3.25*   ANIONGAP 13  --  13 14   SUSAN 9.0  --  8.8 8.7   *  --  97 95   ALBUMIN  --   --  2.5*  --    PROTTOTAL  --   --  5.8*  --    BILITOTAL  --   --  0.2  --    ALKPHOS  --   --  69  --    ALT  --   --  11  --    AST  --   --  30  --        No results found for this or any previous visit (from the past 24 hour(s)).

## 2023-04-14 NOTE — PLAN OF CARE
Pain: Complains of pain when pressure to PD site. Otherwise denies pain.  Neuro: A/Ox4.  CV: NSR. VSS. Afebrile.  Resp: RA during day. Trach dome at night.  GI: Poor appetite. PRN suppository given d/t constipation. BM overnight. Peritoneal dialysis site firm/hard and pain w/ pressure.   : Pt caths self w/ assistance.  Skin: No changes.     Plan: Continue POC. IR today for perm cath. Possible discharge home after procedure and dialysis.

## 2023-04-14 NOTE — PROGRESS NOTES
Updated plan of care:  Called by radiology. CT of the abdomen showing extensive abdominal wall abscesses including some component of intraperitoneal involvement.    - Broadened to Vanc and Zosyn empirically given recent surgical site involvement to the skin as well as possible intraperitoneal involvement  - Updated ID w/ these findings and broadened antimicrobial regimen  - Updated general surgery regarding findings. They will come evaluate pt, consider superficial I&D, and further review imaging for further interventions that may be warranted.    Pt and mother were updated.    Nahid Morrison MD  [address]

## 2023-04-14 NOTE — BRIEF OP NOTE
[facility]    Brief Operative Note    Pre-operative diagnosis: Intra-abdominal infection [B99.9]  Post-operative diagnosis Same as pre-operative diagnosis    Srini Molina MD    Procedure: Incision and drainage  Anesthesia: Local  Estimated Blood Loss: 3 mL      Drains: None  Specimens: swab for culture  Findings:   Incision and drainage as planned; noted purulent discharge without any feculence.  Complications: None.  Implants: * No implants in log *      Positioning on a procedure table was accomplished.    The affected abdominal LLQ was prepared with sterile prep.    1% lidocaine, 5 cc, was infiltrated in the skin overlying the lesion.    A skin incision, 1 cm in length, was made.    Dissection was performed.    Findings are as outlined above.    Fluid from the abscess was collected for culture.    Hemostasis was obtained with pressure.    The skin was left open.    A sterile dressing was applied.

## 2023-04-14 NOTE — PROGRESS NOTES
"      General Infectious Disease Service - Yellow Team    Patient:  Aidan Louie, Date of birth 1995, Medical record number [id number]  Date of Admission: 4/6/2023  Date of Visit:  4/14/2023         Assessment and Recommendations:   Problem List:    1. CT abdomen (4/14) findings of multiple irregular and largely contiguous abscesses extending from  the abdominal wall and subcutaneous tissues into the peritoneum along the trajectory of the prior peritoneal dialysis catheter tract    2. Fungal peritonitis- C.albicans on [facility] peritoneal Cx and outside peritoneal cultures from [facility] dialysis center    3. Hx ESRD previously on HD (12/2021-3/2022) on peritoneal dialysis (3/2022-4/2023), now s/p removal of PD catheter on 4/7    4. Congenital abnormality of urinary tract, s/p Mitrofanoff    5. Ileus        Discussion:  Mr. Aidan Louie (Alex) is a 27 year old male with history of prune belly syndrome, Dawood Reece syndrome, ESRD on peritoneal dialysis (3/2022) due to congenital abnormalities of urinary tract, s/p Mitrofanoff procedure, restrictive lung disease, s/p tracheostomy, PEG tube, and multiple abdominal reconstructive surgeries who presented to ED on 4/6/23 with concern for peritonitis related to PD catheter, report of \"budding yeast\" on outside culture. Now s/p removal of PD catheter on 4/7, placement of temporary HD line.      Issues with PD catheter and runs since 3/9 with intermittent abd pain and distention. Foamy purulent drainage at home cultured by dialysis nurse and growing budding yeast with no bacterial growth at time of admission, per report. Was started on cefepime and vancomycin at home through nephrology/dialysis center. Increased lethargy and has not been able to do all of his own ADLs and cares at home as he usually does. Fever to 104F at home right before admission, WBC 20.6K on arrival. Cultures from PD catheter removal and peritoneal fluid (4/7) at [facility] positive " "for C.albicans sensitive to fluconazole. Outside ([facility] dialysis [place]) peritoneal cultures were faxed yesterday and they are also positive for Candidal albicans sensitive to fluconazole (they were collected on 4/4). No bacterial growth in any of the cultures.  Cefepime and metronidazole stopped on 4/11. Patient is afebrile. White count and CRP trending down. Blood cultures negative.     We were re-consulted today (4/14) because the CT abdomen/pelvis from 4/14 is now showing  findings of multiple irregular and largely contiguous abscesses extending from the abdominal wall and subcutaneous tissues into the peritoneum along the trajectory of the prior peritoneal dialysis catheter tract. This finding is new compared to CT abdomen from 4/6/23.  Medicine team started empiric antibiotic coverage with vancomycin and zosyn and switched fluconazole to IV. Surgery was consulted and performed bedside I&D with report of purulent drainage. Specimen sent to cultures.      Recommendations:        1. I agree with initiation of IV vancomycin and zosyn and I also agree with transition of fluconazole to IV       - I appreciate care and management from medicine     2. I appreciate surgery consultation and I&D performance      3. We will follow abdominal abscess cultures       Recommendations discussed with medicine team    The General ID team will continue to follow this patient. Please feel free to call with any questions.     NILDA SHARMA MD  Date of Service: 04/14/23  Pager: [phone]           Physical Exam:   /87 (BP Location: Right leg)   Pulse 84   Temp 98.2  F (36.8  C) (Oral)   Resp 16   Ht 1.676 m (5' 6\")   Wt 43.3 kg (95 lb 6.4 oz)   SpO2 97%   BMI 15.40 kg/m         Exam:  GENERAL:  Not in acute distress.   HEAD: Normocephalic and atraumatic  ENT:  No hearing impairment, oral mucous membranes moist  EYES:  Eyes grossly normal to inspection  LUNGS:  Clear to auscultation - no rales, rhonchi or " wheezes  CARDIOVASCULAR:  Regular rate and rhythm, normal S1 S2,no murmur  ABDOMEN:  PEG tube present in the mid abdomen. I&D site on the left lower abdomen (covered with dressings).            Laboratory Data:     Creatinine   Date Value Ref Range Status   04/13/2023 4.82 (H) 0.67 - 1.17 mg/dL Final   04/12/2023 3.11 (H) 0.67 - 1.17 mg/dL Final   04/11/2023 3.25 (H) 0.67 - 1.17 mg/dL Final   04/10/2023 4.11 (H) 0.67 - 1.17 mg/dL Final   04/09/2023 6.30 (H) 0.67 - 1.17 mg/dL Final   11/30/2020 4.80 (H) 0.66 - 1.25 mg/dL Final   10/15/2020 5.10 (H) 0.66 - 1.25 mg/dL Final   05/26/2020 4.3 mg/dL Final   12/09/2019 4.31 (H) 0.66 - 1.25 mg/dL Final   05/13/2019 3.25 (H) 0.66 - 1.25 mg/dL Final     WBC   Date Value Ref Range Status   11/30/2020 8.3 4.0 - 11.0 10e9/L Final   10/15/2020 9.8 4.0 - 11.0 10e9/L Final   12/09/2019 9.2 4.0 - 11.0 10e9/L Final   05/13/2019 10.0 4.0 - 11.0 10e9/L Final   07/11/2017 8.8 4.0 - 11.0 10e9/L Final     WBC Count   Date Value Ref Range Status   04/13/2023 18.0 (H) 4.0 - 11.0 10e3/uL Final   04/12/2023 14.9 (H) 4.0 - 11.0 10e3/uL Final   04/11/2023 14.9 (H) 4.0 - 11.0 10e3/uL Final   04/10/2023 14.4 (H) 4.0 - 11.0 10e3/uL Final   04/09/2023 16.0 (H) 4.0 - 11.0 10e3/uL Final     Hemoglobin   Date Value Ref Range Status   04/13/2023 9.2 (L) 13.3 - 17.7 g/dL Final   11/30/2020 10.3 (L) 13.3 - 17.7 g/dL Final     Platelet Count   Date Value Ref Range Status   04/13/2023 461 (H) 150 - 450 10e3/uL Final   11/30/2020 332 150 - 450 10e9/L Final     Lab Results   Component Value Date     (L) 04/13/2023    BUN 29.3 (H) 04/13/2023    CO2 24 04/13/2023     CRP Inflammation   Date Value Ref Range Status   09/17/2015 <2.9 0.0 - 8.0 mg/L Final   02/05/2015 <2.9 0.0 - 8.0 mg/L Final   10/03/2013 38.1 (H) 0.0 - 8.0 mg/L Final   03/19/2009 <3.0 0.0 - 8.0 mg/L Final   04/26/2007 11.0 (H) 0.0 - 8.0 mg/L Final           Pertinent Recent Microbiology Data:   No results for input(s): CULT, SDES in the  last 168 hours.         Imaging:     Recent Results (from the past 48 hour(s))   CT Abdomen Pelvis w Contrast   Result Value    Radiologist flags Abscesses (Urgent)    Narrative    EXAMINATION: CT ABDOMEN PELVIS W CONTRAST, 4/14/2023 3:18 PM    TECHNIQUE:  Helical CT images from the lung bases through the  symphysis pubis were obtained with contrast.  Coronal and sagittal  reformatted images were generated at a workstation for further  assessment.    CONTRAST:  58 ml Isovue 370.    COMPARISON: CT abdomen and pelvis 4/6/2023    HISTORY: 28yo male w/ recently removed PD catheter due to fungal  peritonitis now w/ acute induration and tenderness at site of left  abdominal PD catheter removal site, evaluating for abscess vs  soft-tissue infection    FINDINGS:    Lines and tubes: Gastrostomy tube in appropriate position.    Lower thorax: Hyperinflated lungs with similar mosaic attenuation and  peribronchial cuffing. No convincing acute airspace disease. No  cardiomegaly or significant pericardial effusion. Moderate hiatal  hernia.    Liver: Focal fatty deposition along the falciform ligament. No  suspicious lesions. Portal veins appear patent.  Gallbladder: No cholelithiasis or evidence of acute cholecystitis. No  intra- or extra-hepatic biliary ductal dilatation.  Spleen: Normal parenchyma. Small splenule near the hilum.  Pancreas: Unremarkable.  Adrenal glands: No discrete nodules.  Kidneys: Horseshoe configuration with bilateral moiety  benign-appearing cysts with moderate to severe hydroureteronephrosis  and periureteral/peripelvic fat stranding.  Bladder / Pelvic organs: Bladder is irregular in shape with scattered  areas of wall thickening, similar to prior. Perivesicular fat  stranding. Mitrofanoff. No pelvic mass.  Bowel: Borderline distended loop of small bowel in the right upper  quadrant and parasagittal abdomen with transition point in the low  central abdomen near the fluid collection detailed below, similar  to  the prior transition point. Circumferential thickening and associated  fat stranding about the sigmoid colon and rectum. No pneumatosis.  Lymph nodes: Scattered mesenteric and retroperitoneal lymphadenopathy,  likely reactive.  Peritoneum / Retroperitoneum: No pneumoperitoneum. Multiple irregular  and largely contiguous peripherally enhancing fluid collections in the  lower abdomen and pelvis geographically associated with the prior  peritoneal dialysis catheter tract through the abdominal wall, the  largest components measuring 3.1 x 2.7 cm in the anterior parasagittal  abdominal wall (series 4 image 142) and 5.1 x 2.9 cm in the central  pelvis (series 4 image 135) with internal air foci. Significant  inflammatory stranding throughout the lower abdomen and pelvis  associated with these collections and the neighboring distal bowel as  above. Small volume pelvic free fluid.  Abdominal vasculature: Major vascular structures of the abdomen are  patent and normal in caliber.    Bones and soft tissues: Diffuse bony sclerosis in keeping with renal  osteodystrophy. No acute osseous abnormalities or suspicious bony  lesions. Mild anasarca. Chronic sacral deformity. Intramuscular left  obturator internus lipoma.      Impression    IMPRESSION:   1. Multiple irregular and largely contiguous abscesses extending from  the abdominal wall and subcutaneous tissues into the peritoneum along  the trajectory of the prior peritoneal dialysis catheter tract. There  is significant wall thickening and associated inflammation of the  sigmoid colon and rectum. It would be difficult to exclude an early  fistula to the surrounding small or large bowel with all of the  neighboring bowel loops and significant inflammatory change.  2. 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  There is periureteral and perivesicular fat stranding indicative  of  infection or inflammation.  3. Borderline distended loop of small bowel in the right upper  quadrant and parasagittal abdomen with transition point in the low  central abdomen near the fluid collection detailed below, similar to  the prior transition point likely reflecting some degree of partial  residual obstruction.       [Urgent Result: Abscesses]    Finding was identified on 4/14/2023 3:30 PM.     Dr. Morrison was contacted by Dr. Friedman at 4/14/2023 at 3:50 PM and  verbalized understanding of the urgent finding.     I have personally reviewed the examination and initial interpretation  and I agree with the findings.    KATARINA GONZALEZ MD         SYSTEM ID:  [id number]

## 2023-04-14 NOTE — PRE-PROCEDURE
GENERAL PRE-PROCEDURE:   Date/Time:  4/14/2023 9:42 AM    Expected level of sedation:  Moderate  Appropriately NPO:  Yes  ASA Class:  3  Mallampati  :  N/A- Alternate secured airway  Lungs:  Lungs clear with good breath sounds bilaterally  Heart:  Normal heart sounds and rate  History & Physical reviewed:  History and physical reviewed and no updates needed  Statement of review:  I have reviewed the lab findings, diagnostic data, medications, and the plan for sedation

## 2023-04-14 NOTE — PROCEDURES
[facility]    Procedure: IR Procedure Note    Date/Time: 4/14/2023 11:24 AM    Performed by: Jaden Hu PA-C  Authorized by: Jaden Hu PA-C  IR Fellow Physician:  Other(s) attending procedure: Assist: TAMIE Ricci; IR Technologist Lavonne      UNIVERSAL PROTOCOL   Site Marked: NA  Prior Images Obtained and Reviewed:  Yes  Required items: Required blood products, implants, devices and special equipment available    Patient identity confirmed:  Verbally with patient, arm band, provided demographic data and hospital-assigned identification number  Patient was reevaluated immediately before administering moderate or deep sedation or anesthesia  Confirmation Checklist:  Patient's identity using two indicators, relevant allergies, procedure was appropriate and matched the consent or emergent situation and correct equipment/implants were available  Time out: Immediately prior to the procedure a time out was called    Universal Protocol: the Joint Commission Universal Protocol was followed    Preparation: Patient was prepped and draped in usual sterile fashion    ESBL (mL):  5     ANESTHESIA    Anesthesia: Local infiltration  Local Anesthetic:  Lidocaine 1% without epinephrine  Anesthetic Total (mL):  10      SEDATION  Patient Sedated: Yes    Sedation Type:  Moderate (conscious) sedation  Sedation:  Fentanyl and midazolam  Vital signs: Vital signs monitored during sedation    See dictated procedure note for full details.  Findings: 1. Removal of R internal jugular non-tunneled CVC intact and without difficulty.  2. Image guided placement of R internal jugular 14.5 Fr, 23cm dual lumen tunneled CVC. Ready for immediate use.    Specimens: none    Complications: None    Condition: Stable    Plan: Follow up per primary team. Return to IR for removal when indicated.      PROCEDURE    Patient Tolerance:  Patient tolerated the procedure well with no  immediate complications  Length of time physician/provider present for 1:1 monitoring during sedation: 30

## 2023-04-15 LAB
ANION GAP SERPL CALCULATED.3IONS-SCNC: 15 MMOL/L (ref 7–15)
BUN SERPL-MCNC: 37.7 MG/DL (ref 6–20)
CALCIUM SERPL-MCNC: 8.6 MG/DL (ref 8.6–10)
CHLORIDE SERPL-SCNC: 97 MMOL/L (ref 98–107)
CREAT SERPL-MCNC: 5.29 MG/DL (ref 0.67–1.17)
DEPRECATED HCO3 PLAS-SCNC: 20 MMOL/L (ref 22–29)
ERYTHROCYTE [DISTWIDTH] IN BLOOD BY AUTOMATED COUNT: 13.2 % (ref 10–15)
GFR SERPL CREATININE-BSD FRML MDRD: 14 ML/MIN/1.73M2
GLUCOSE SERPL-MCNC: 92 MG/DL (ref 70–99)
HCT VFR BLD AUTO: 26.9 % (ref 40–53)
HGB BLD-MCNC: 8.3 G/DL (ref 13.3–17.7)
MAGNESIUM SERPL-MCNC: 2.1 MG/DL (ref 1.7–2.3)
MCH RBC QN AUTO: 28.7 PG (ref 26.5–33)
MCHC RBC AUTO-ENTMCNC: 30.9 G/DL (ref 31.5–36.5)
MCV RBC AUTO: 93 FL (ref 78–100)
PLATELET # BLD AUTO: 453 10E3/UL (ref 150–450)
POTASSIUM SERPL-SCNC: 4.7 MMOL/L (ref 3.4–5.3)
RBC # BLD AUTO: 2.89 10E6/UL (ref 4.4–5.9)
SODIUM SERPL-SCNC: 132 MMOL/L (ref 136–145)
VANCOMYCIN SERPL-MCNC: 20.5 UG/ML
WBC # BLD AUTO: 10.8 10E3/UL (ref 4–11)

## 2023-04-15 PROCEDURE — 250N000011 HC RX IP 250 OP 636

## 2023-04-15 PROCEDURE — 250N000013 HC RX MED GY IP 250 OP 250 PS 637: Performed by: STUDENT IN AN ORGANIZED HEALTH CARE EDUCATION/TRAINING PROGRAM

## 2023-04-15 PROCEDURE — 94640 AIRWAY INHALATION TREATMENT: CPT | Mod: 76

## 2023-04-15 PROCEDURE — 250N000013 HC RX MED GY IP 250 OP 250 PS 637

## 2023-04-15 PROCEDURE — 99233 SBSQ HOSP IP/OBS HIGH 50: CPT | Performed by: STUDENT IN AN ORGANIZED HEALTH CARE EDUCATION/TRAINING PROGRAM

## 2023-04-15 PROCEDURE — 999N000157 HC STATISTIC RCP TIME EA 10 MIN

## 2023-04-15 PROCEDURE — 634N000001 HC RX 634: Performed by: STUDENT IN AN ORGANIZED HEALTH CARE EDUCATION/TRAINING PROGRAM

## 2023-04-15 PROCEDURE — 83735 ASSAY OF MAGNESIUM: CPT

## 2023-04-15 PROCEDURE — 36415 COLL VENOUS BLD VENIPUNCTURE: CPT | Performed by: STUDENT IN AN ORGANIZED HEALTH CARE EDUCATION/TRAINING PROGRAM

## 2023-04-15 PROCEDURE — 90937 HEMODIALYSIS REPEATED EVAL: CPT

## 2023-04-15 PROCEDURE — 94669 MECHANICAL CHEST WALL OSCILL: CPT

## 2023-04-15 PROCEDURE — 90935 HEMODIALYSIS ONE EVALUATION: CPT | Performed by: STUDENT IN AN ORGANIZED HEALTH CARE EDUCATION/TRAINING PROGRAM

## 2023-04-15 PROCEDURE — 250N000011 HC RX IP 250 OP 636: Performed by: INTERNAL MEDICINE

## 2023-04-15 PROCEDURE — 250N000011 HC RX IP 250 OP 636: Performed by: STUDENT IN AN ORGANIZED HEALTH CARE EDUCATION/TRAINING PROGRAM

## 2023-04-15 PROCEDURE — 80202 ASSAY OF VANCOMYCIN: CPT | Performed by: STUDENT IN AN ORGANIZED HEALTH CARE EDUCATION/TRAINING PROGRAM

## 2023-04-15 PROCEDURE — 250N000009 HC RX 250: Performed by: INTERNAL MEDICINE

## 2023-04-15 PROCEDURE — 250N000009 HC RX 250: Performed by: STUDENT IN AN ORGANIZED HEALTH CARE EDUCATION/TRAINING PROGRAM

## 2023-04-15 PROCEDURE — 80048 BASIC METABOLIC PNL TOTAL CA: CPT

## 2023-04-15 PROCEDURE — 258N000003 HC RX IP 258 OP 636: Performed by: STUDENT IN AN ORGANIZED HEALTH CARE EDUCATION/TRAINING PROGRAM

## 2023-04-15 PROCEDURE — 120N000002 HC R&B MED SURG/OB UMMC

## 2023-04-15 PROCEDURE — 85027 COMPLETE CBC AUTOMATED: CPT

## 2023-04-15 RX ORDER — VANCOMYCIN HYDROCHLORIDE 500 MG/10ML
500 INJECTION, POWDER, LYOPHILIZED, FOR SOLUTION INTRAVENOUS ONCE
Status: COMPLETED | OUTPATIENT
Start: 2023-04-15 | End: 2023-04-15

## 2023-04-15 RX ADMIN — POLYETHYLENE GLYCOL 3350 17 G: 17 POWDER, FOR SOLUTION ORAL at 19:43

## 2023-04-15 RX ADMIN — OXYCODONE HYDROCHLORIDE 5 MG: 5 TABLET ORAL at 22:49

## 2023-04-15 RX ADMIN — Medication 1000 UNITS: at 13:09

## 2023-04-15 RX ADMIN — VANCOMYCIN HYDROCHLORIDE 500 MG: 500 INJECTION, POWDER, LYOPHILIZED, FOR SOLUTION INTRAVENOUS at 18:26

## 2023-04-15 RX ADMIN — SENNOSIDES AND DOCUSATE SODIUM 1 TABLET: 8.6; 5 TABLET ORAL at 13:08

## 2023-04-15 RX ADMIN — Medication 1000 UNITS: at 19:43

## 2023-04-15 RX ADMIN — SODIUM CHLORIDE 300 ML: 9 INJECTION, SOLUTION INTRAVENOUS at 08:36

## 2023-04-15 RX ADMIN — HEPARIN SODIUM 5000 UNITS: 5000 INJECTION, SOLUTION INTRAVENOUS; SUBCUTANEOUS at 13:08

## 2023-04-15 RX ADMIN — CALCIUM CARBONATE 1000 MG: 500 TABLET, CHEWABLE ORAL at 18:26

## 2023-04-15 RX ADMIN — LIDOCAINE HYDROCHLORIDE 0.5 ML: 10 INJECTION, SOLUTION EPIDURAL; INFILTRATION; INTRACAUDAL; PERINEURAL at 08:42

## 2023-04-15 RX ADMIN — IPRATROPIUM BROMIDE AND ALBUTEROL SULFATE 3 ML: .5; 3 SOLUTION RESPIRATORY (INHALATION) at 13:27

## 2023-04-15 RX ADMIN — EPOETIN ALFA-EPBX 1000 UNITS: 10000 INJECTION, SOLUTION INTRAVENOUS; SUBCUTANEOUS at 09:22

## 2023-04-15 RX ADMIN — SODIUM CHLORIDE 250 ML: 9 INJECTION, SOLUTION INTRAVENOUS at 08:37

## 2023-04-15 RX ADMIN — CALCIUM CARBONATE 1000 MG: 500 TABLET, CHEWABLE ORAL at 22:48

## 2023-04-15 RX ADMIN — FLUCONAZOLE IN SODIUM CHLORIDE 200 MG: 2 INJECTION, SOLUTION INTRAVENOUS at 13:08

## 2023-04-15 RX ADMIN — CALCIUM CARBONATE 1000 MG: 500 TABLET, CHEWABLE ORAL at 13:07

## 2023-04-15 RX ADMIN — SENNOSIDES AND DOCUSATE SODIUM 1 TABLET: 8.6; 5 TABLET ORAL at 19:42

## 2023-04-15 RX ADMIN — Medication: at 08:38

## 2023-04-15 RX ADMIN — Medication 10 MG: at 22:48

## 2023-04-15 RX ADMIN — HEPARIN SODIUM 5000 UNITS: 5000 INJECTION, SOLUTION INTRAVENOUS; SUBCUTANEOUS at 19:42

## 2023-04-15 RX ADMIN — IPRATROPIUM BROMIDE AND ALBUTEROL SULFATE 3 ML: .5; 3 SOLUTION RESPIRATORY (INHALATION) at 20:52

## 2023-04-15 RX ADMIN — CINACALCET 30 MG: 30 TABLET ORAL at 13:08

## 2023-04-15 RX ADMIN — FAMOTIDINE 40 MG: 20 TABLET ORAL at 19:43

## 2023-04-15 RX ADMIN — POLYETHYLENE GLYCOL 3350 17 G: 17 POWDER, FOR SOLUTION ORAL at 13:07

## 2023-04-15 RX ADMIN — PIPERACILLIN AND TAZOBACTAM 2.25 G: 2; .25 INJECTION, POWDER, FOR SOLUTION INTRAVENOUS at 13:07

## 2023-04-15 ASSESSMENT — ACTIVITIES OF DAILY LIVING (ADL)
ADLS_ACUITY_SCORE: 40

## 2023-04-15 NOTE — PROGRESS NOTES
Nephrology Dialysis Note    This patient was seen and examined while on dialysis. Laboratory results and nurses' notes were reviewed.    No changes to management of volume, anemia, BMD, acidosis, or electrolytes except as follows.     Diagnosis - ESRD on HD    Plan - HD today on TTS schedule. Next HD Tuesday unless otherwise indicated sooner.    Rj Valentino MD  Nephrology  [id number]

## 2023-04-15 NOTE — PHARMACY-VANCOMYCIN DOSING SERVICE
"Pharmacy Vancomycin Note  Date of Service April 15, 2023  Patient's  1995   27 year old, male    Indication: Abscess  Day of Therapy: 2  Current vancomycin regimen:  Intermittent dosing  Current vancomycin monitoring method: Trough (Method 2 = manual dose calculation)  Current vancomycin therapeutic monitoring goal: Pre-HD level > 15    Current estimated CrCl = hemodialysis     Creatinine for last 3 days  2023:  7:49 AM Creatinine 4.82 mg/dL  4/15/2023:  7:26 AM Creatinine 5.29 mg/dL    Recent Vancomycin Levels (past 3 days)  4/15/2023:  7:26 AM Vancomycin 20.5 ug/mL    Vancomycin IV Administrations (past 72 hours)                   vancomycin (VANCOCIN) 750 mg in sodium chloride 0.9 % 250 mL intermittent infusion (mg) 750 mg New Bag 23 1805                Nephrotoxins and other renal medications (From now, onward)    Start     Dose/Rate Route Frequency Ordered Stop    23 1630  piperacillin-tazobactam (ZOSYN) 2.25 g vial to attach to  ml bag        Note to Pharmacy: For [facility], [facility] and [facility]: For Zosyn-naive patients, use the \"Zosyn initial dose + extended infusion\" order panel.    2.25 g  over 30 Minutes Intravenous EVERY 8 HOURS 23 1603      23 1617  vancomycin place sawyer - receiving intermittent dosing         1 each Intravenous SEE ADMIN INSTRUCTIONS 23 1617               Contrast Orders - past 72 hours (72h ago, onward)    Start     Dose/Rate Route Frequency Stop    23 1500  iopamidol (ISOVUE-370) solution 58 mL         58 mL Intravenous ONCE 23 1456          Interpretation of levels and current regimen:  Vancomycin level is reflective of therapeutic level, however expect level to be 14-17 post-dialysis    Has serum creatinine changed greater than 50% in last 72 hours: Yes    Urine output:  diminished urine output    Renal Function: ESRD on Dialysis    Plan:  1. Plan on vancomycin 500 mg IV x 1 after dialysis, then re-dose based on pre-HD " levels  2. Vancomycin monitoring method: Renal Replacement Therapy  3. Vancomycin therapeutic monitoring goal: Pre-HD level > 15  4. Pharmacy will check vancomycin levels on 4/18/23 with AM labs.  5. Serum creatinine levels will be ordered every 48 hours.    Emeterio Hurley, PharmD  Pager: [phone]

## 2023-04-15 NOTE — PROGRESS NOTES
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    Today's updates/changes:  - s/p I&D for abdominal abscesses seen on CT scan, general surgery consulted   - Continue zosyn, vanc, IV fluconazole pending abscess cultures   - so far growing yeast  - ID following   - planning for HD on     Acute Hospital Problems:     Peritonitis 2/2 candida albicans, peritoneal dialysis-related  Sepsis, resolved  Intraabdominal abscesses along PD catheter track s/p I&D   In setting of peritoneal dialysis, one month of difficult PD access, few days of \"foamy\" and possibly purulent PD cath discharge, fevers. Unable to tolerate peritoneal antibiotics prior to admission. Initially w/ tachycardia and leukocytosis. Peritoneal culture prior to admission growing budding yeast, no bacterial growth. Peritoneal fluid and PD cath tip cultures from this admission also growing candida albicans, pan-susceptible. Overall hemodynamically and clinically stable on antifungal therapy. CT scan  showing abscesses along track, s/p I&D on   Plan:  - Antibiotics: Fluconazole IV (renally dosed)  - present, plan for 4 week course (EOT 23), Zosyn  ( - **), Vanc ( - **)  - ID consulted, appreciate " assistance.  - Abscess cultures pending  - General surgery consulted, appreciate assistance.    - Follow up for wound check ~4/28. Please call the General Surgery Clinic at [phone] with any questions.  - Post-op pain regimen: PRN Tylenol and Oxycodone available  - Post-op dressing recommendations: See surgery progress note dated 4/8/23     Small bowel obstruction vs infection-associated ileus, improving  With transition point closely associated to PD catheter which is now s/p removal. In context of poor PO, nausea, vomiting, and constipation at home prior to admission. Was improving w/ sepsis treatment, tolerating PO. Some worsening abd pain on 4/12, AXR reassuring, passed bowel w/ enema and subsequently having daily bowel movements. CT 4/14 with mild distension concern for either resolving or restarting mild obstruction at side of absccesses  Plan:  - Regular diet ok as able to tolerate  - Continue to monitor stool output   - Bowel regimen - BID Senna/Docusate and BID Miralax  - Symptomatic support: Zofran, Compazine prn     ESRD w/ PD  Mild hyperkalemia  AGMA  Likely in setting of incomplete peritoneal dialysis leading up to admission. Now s/p PD cath removal in setting of peritonitis as above w/ temporary CVC placed for HD reinitiation this hospital stay. Nephrology is following, now dialyzed at least three times as inpt.  Plan:  - Access: AVF functional but since new needs additional access, tunneled line placement today  - Has outpt dialysis seat secured - TTS schedule  - PTA Cinacalcet 30mg qday, PO NaHCO3 650mg TID     Restrictive lung disease  Chronic. Currently without tachypnea or hypoxia. Comfortable-appearing. Some bronchial cuffing on CXR, though not different than prior, some coarse breath sounds and increased secretions on history. Initial viral panel unremarkable. Suspect primary infection is peritonitis as above and these are chronic changes.  Plan:  - PTA Azithro MWF held for now per mother's  request, will restart on discharge  - PTA Duonebs and vest therapy  - RT following for assistance     Chronic malnutrition  Has PEG but not tube feed dependent, tolerates PO.     Neurogenic bladder s/p mitranoff urinary diversion  Still makes some degree of urine. Self-caths every ~4 hours daily w/ intermittent irrigation. Last urology visit in 2022.  Plan:  - Straight cath orders for ~q4h while awake     HTN - holding PTA Amlodipine ( meds still listed as Captopril and Clonidine) while managing sepsis and restarting hemodialysis, can consider restart if needed prior to discharge     GERD - PTA Famotidine        Hospital Checklist:  Diet: Regular diet   VTE ppx: SQH  LDA: PIV, Right temporary CVC for HD, G-tube, Mitranoff diversion  Code: Full  Level of care: General Medicine  Family: , Valerie - [phone]      Discharge Planning:  Pending antimicrobial plan for abscesses, likely - to home with mom, may need IV as outpatient     Phoebe Ag MD  Internal Medicine Hospitalist    (Please see sign-in/sign-out for up-to-date physician coverage information)      Medical Decision Making       65 MINUTES SPENT BY ME on the date of service doing chart review, history, exam, documentation & further activities per the note.  with >50% of time spent at the bedside counseling patient and mother about clinical plan.       Interval Events:   Overnight handoff and nursing reports reviewed. No significant events. Gen surg saw him last night and did bedside I&D - he says that went well but hurt when they were squeezing. Much more chatty today. Denies ongoing pain. Seen in HD. Updated mom separately at bedside.     Objective:     Vitals:    23 0400 23 0800 23 0400   Weight: 48.5 kg (107 lb) 42.3 kg (93 lb 4.8 oz) 43.3 kg (95 lb 6.4 oz)     Vital Signs with Ranges  Temp:  [98  F (36.7  C)-98.2  F (36.8  C)] 98  F (36.7  C)  Pulse:  [73-90] 73  Resp:  [16-42] 16  BP: (118-141)/(66-94)  132/66  FiO2 (%):  [21 %] 21 %  SpO2:  [92 %-100 %] 97 %  I/O last 3 completed shifts:  In: 300 [P.O.:300]  Out: 525 [Urine:525]    Exam:  General: No acute distress, laying in bed in HD  CV: Regular rate, regular rhythm, no new murmurs  Lungs: On room air,  no wheezing or crackles this am, no increased WOB  Abd: Soft, non-distended, area around PD site covered with bandage with small amount of yellowish discharge/   Ext: Warm and well-perfused, no appreciable lower extremity edema    Medications     heparin (porcine) 500 Units/hr (04/10/23 1300)     - MEDICATION INSTRUCTIONS -         sodium chloride 0.9%  250 mL Intravenous Once in dialysis/CRRT     sodium chloride 0.9%  300 mL Hemodialysis Machine Once     azithromycin  250 mg Oral Once per day on Mon Wed Fri     calcium carbonate  1,000 mg Oral 4x Daily     cinacalcet  30 mg Oral Daily     epoetin felipa-epbx  1,000 Units Intravenous Once in dialysis/CRRT     famotidine  40 mg Oral Daily     fluconazole  200 mg Intravenous Q24H     sodium chloride (PF) 0.9%  1.3-2.6 mL Intracatheter Once    Followed by     heparin  3 mL Intracatheter Once     sodium chloride (PF) 0.9%  1.3-2.6 mL Intracatheter Once    Followed by     heparin  3 mL Intracatheter Once     heparin ANTICOAGULANT  5,000 Units Subcutaneous Q12H     heparin lock flush  5-20 mL Intracatheter Q24H     ipratropium - albuterol 0.5 mg/2.5 mg/3 mL  3 mL Nebulization Q6H     melatonin  10 mg Oral At Bedtime     - MEDICATION INSTRUCTIONS -   Does not apply Once     piperacillin-tazobactam  2.25 g Intravenous Q8H     polyethylene glycol  17 g Oral or Feeding Tube BID     senna-docusate  1 tablet Oral BID    Or     senna-docusate  2 tablet Oral BID     sodium chloride (PF)  10-40 mL Intracatheter Q8H     sodium chloride (PF)  3 mL Intracatheter Q8H     vancomycin place sawyer - receiving intermittent dosing  1 each Intravenous See Admin Instructions     Vitamin D3  1,000 Units Oral BID       Data   Recent Labs    Lab 04/15/23  0726 04/13/23  0749 04/12/23  1628 04/12/23  0605 04/11/23  0747   WBC 10.8 18.0*  --  14.9* 14.9*   HGB 8.3* 9.2*  --  9.3* 9.5*   MCV 93 93  --  92 91   * 461*  --  390 387   INR  --   --  1.21*  --   --    NA  --  135*  --  136 138   POTASSIUM  --  4.5  --  4.0 4.1   CHLORIDE  --  98  --  100 102   CO2  --  24  --  23 22   BUN  --  29.3*  --  21.8* 27.7*   CR  --  4.82*  --  3.11* 3.25*   ANIONGAP  --  13  --  13 14   SSUAN  --  9.0  --  8.8 8.7   GLC  --  107*  --  97 95   ALBUMIN  --   --   --  2.5*  --    PROTTOTAL  --   --   --  5.8*  --    BILITOTAL  --   --   --  0.2  --    ALKPHOS  --   --   --  69  --    ALT  --   --   --  11  --    AST  --   --   --  30  --        Recent Results (from the past 24 hour(s))   CT Abdomen Pelvis w Contrast   Result Value    Radiologist flags Abscesses (Urgent)    Narrative    EXAMINATION: CT ABDOMEN PELVIS W CONTRAST, 4/14/2023 3:18 PM    TECHNIQUE:  Helical CT images from the lung bases through the  symphysis pubis were obtained with contrast.  Coronal and sagittal  reformatted images were generated at a workstation for further  assessment.    CONTRAST:  58 ml Isovue 370.    COMPARISON: CT abdomen and pelvis 4/6/2023    HISTORY: 28yo male w/ recently removed PD catheter due to fungal  peritonitis now w/ acute induration and tenderness at site of left  abdominal PD catheter removal site, evaluating for abscess vs  soft-tissue infection    FINDINGS:    Lines and tubes: Gastrostomy tube in appropriate position.    Lower thorax: Hyperinflated lungs with similar mosaic attenuation and  peribronchial cuffing. No convincing acute airspace disease. No  cardiomegaly or significant pericardial effusion. Moderate hiatal  hernia.    Liver: Focal fatty deposition along the falciform ligament. No  suspicious lesions. Portal veins appear patent.  Gallbladder: No cholelithiasis or evidence of acute cholecystitis. No  intra- or extra-hepatic biliary ductal  dilatation.  Spleen: Normal parenchyma. Small splenule near the hilum.  Pancreas: Unremarkable.  Adrenal glands: No discrete nodules.  Kidneys: Horseshoe configuration with bilateral moiety  benign-appearing cysts with moderate to severe hydroureteronephrosis  and periureteral/peripelvic fat stranding.  Bladder / Pelvic organs: Bladder is irregular in shape with scattered  areas of wall thickening, similar to prior. Perivesicular fat  stranding. Mitrofanoff. No pelvic mass.  Bowel: Borderline distended loop of small bowel in the right upper  quadrant and parasagittal abdomen with transition point in the low  central abdomen near the fluid collection detailed below, similar to  the prior transition point. Circumferential thickening and associated  fat stranding about the sigmoid colon and rectum. No pneumatosis.  Lymph nodes: Scattered mesenteric and retroperitoneal lymphadenopathy,  likely reactive.  Peritoneum / Retroperitoneum: No pneumoperitoneum. Multiple irregular  and largely contiguous peripherally enhancing fluid collections in the  lower abdomen and pelvis geographically associated with the prior  peritoneal dialysis catheter tract through the abdominal wall, the  largest components measuring 3.1 x 2.7 cm in the anterior parasagittal  abdominal wall (series 4 image 142) and 5.1 x 2.9 cm in the central  pelvis (series 4 image 135) with internal air foci. Significant  inflammatory stranding throughout the lower abdomen and pelvis  associated with these collections and the neighboring distal bowel as  above. Small volume pelvic free fluid.  Abdominal vasculature: Major vascular structures of the abdomen are  patent and normal in caliber.    Bones and soft tissues: Diffuse bony sclerosis in keeping with renal  osteodystrophy. No acute osseous abnormalities or suspicious bony  lesions. Mild anasarca. Chronic sacral deformity. Intramuscular left  obturator internus lipoma.      Impression    IMPRESSION:   1.  Multiple irregular and largely contiguous abscesses extending from  the abdominal wall and subcutaneous tissues into the peritoneum along  the trajectory of the prior peritoneal dialysis catheter tract. There  is significant wall thickening and associated inflammation of the  sigmoid colon and rectum. It would be difficult to exclude an early  fistula to the surrounding small or large bowel with all of the  neighboring bowel loops and significant inflammatory change.  2. 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  There is periureteral and perivesicular fat stranding indicative of  infection or inflammation.  3. Borderline distended loop of small bowel in the right upper  quadrant and parasagittal abdomen with transition point in the low  central abdomen near the fluid collection detailed below, similar to  the prior transition point likely reflecting some degree of partial  residual obstruction.       [Urgent Result: Abscesses]    Finding was identified on 4/14/2023 3:30 PM.     Dr. Morrison was contacted by Dr. Friedman at 4/14/2023 at 3:50 PM and  verbalized understanding of the urgent finding.     I have personally reviewed the examination and initial interpretation  and I agree with the findings.    KATARINA GONZALEZ MD         SYSTEM ID:  [id number]

## 2023-04-15 NOTE — PROGRESS NOTES
Shift summary: 1900-0730    Patient remains A&O x 4, denies pain. VSS overnight. Slept throughout the night. Trach cares done independently, trach dome in place overnight 21% and 20 lpm.  Straight cath done for 200 mL, independently. Dressing CDI to left lower abdomen.     Delaney Palacio RN on 4/15/2023 at 6:35 AM

## 2023-04-15 NOTE — PROGRESS NOTES
GENERAL SURGERY PROGRESS NOTE  04/15/2023    Patient: Aidan Louie  MRN: [id number]    Subjective  No acute overnight events.       Objective  Temp:  [98  F (36.7  C)-98.4  F (36.9  C)] 98.4  F (36.9  C)  Pulse:  [71-90] 71  Resp:  [12-42] 12  BP: (118-148)/(66-97) 144/80  FiO2 (%):  [21 %] 21 %  SpO2:  [92 %-100 %] 100 %    General: AAOx4, NAD, lying comfortably in bed  CV: regular rate, warm, well-perfused  Pulm: no dyspnea, breathing comfortably on RA  Abd: soft, non-distended, non-tender  Wound: clean base, no purulence, no cellulitis of surrounding skin  Extremities: no edema  Neuro: moving all extremities spontaneously without apparent deficit    I/O last 3 completed shifts:  In: 300 [P.O.:300]  Out: 525 [Urine:525]    Labs:  Wbc 10    Imaging:  None new      Assessment & Plan  27-year-old male with previous history significant for for prune-belly syndrome, Dawood Reece syndrome, end-stage renal disease on peritoneal dialysis (3/22) due to congenital abnormalities of the urinary tract, restrictive lung disease, surgical history of tracheostomy, PEG tube, Mitrofanoff procedure, multiple abdominal reconstructive surgeries and recent removal of infected PD catheter    Now POD1 from I&D of abdominal abscess     He is also recently s/p HD placement with anesthesia and PD catheter removal by EGS. It is most likely that he has a component of ileus with his intraabdominal infection rather then a true small bowel obstruction. He has tolerated clears without N/V.    Overall, the patient is recovering from surgery appropriately.    - DRESSING:              - previous exit site of PD catheter will heal by secondary intention, okay to leave open to air or cover with gauze if having drainage              - midline abdominal incision has steristrips which will fall off in 2 weeks by themselves. Under are absorbable sutures  - FOLLOW UP: Follow up for wound check in 1-2 weeks. Please call the General Surgery Clinic at  [phone] with any questions.  - ACTIVITY: shower daily, allow water/soap to run over incision but don't soak or scrub. Dressing change daily after showering.     Dispo: patient may discharge any time from general surgery standpoint as long as patient/family are comfortable with dressing changes or have help at home. We will sign off, please call with questions.    Patient discussed with staff Dr. Quinonez.    Brett Burns MD  Surgery chief resident [id number]

## 2023-04-16 LAB
ANION GAP SERPL CALCULATED.3IONS-SCNC: 11 MMOL/L (ref 7–15)
BACTERIA ABSC ANAEROBE+AEROBE CULT: ABNORMAL
BUN SERPL-MCNC: 26.4 MG/DL (ref 6–20)
CALCIUM SERPL-MCNC: 8.6 MG/DL (ref 8.6–10)
CHLORIDE SERPL-SCNC: 103 MMOL/L (ref 98–107)
CREAT SERPL-MCNC: 4.03 MG/DL (ref 0.67–1.17)
CRP SERPL-MCNC: 71 MG/L
DEPRECATED HCO3 PLAS-SCNC: 24 MMOL/L (ref 22–29)
ERYTHROCYTE [DISTWIDTH] IN BLOOD BY AUTOMATED COUNT: 13.2 % (ref 10–15)
GFR SERPL CREATININE-BSD FRML MDRD: 20 ML/MIN/1.73M2
GLUCOSE SERPL-MCNC: 86 MG/DL (ref 70–99)
GRAM STAIN RESULT: ABNORMAL
GRAM STAIN RESULT: ABNORMAL
HCT VFR BLD AUTO: 25.2 % (ref 40–53)
HGB BLD-MCNC: 7.5 G/DL (ref 13.3–17.7)
MCH RBC QN AUTO: 28.3 PG (ref 26.5–33)
MCHC RBC AUTO-ENTMCNC: 29.8 G/DL (ref 31.5–36.5)
MCV RBC AUTO: 95 FL (ref 78–100)
PLATELET # BLD AUTO: 441 10E3/UL (ref 150–450)
POTASSIUM SERPL-SCNC: 5 MMOL/L (ref 3.4–5.3)
RBC # BLD AUTO: 2.65 10E6/UL (ref 4.4–5.9)
SODIUM SERPL-SCNC: 138 MMOL/L (ref 136–145)
WBC # BLD AUTO: 9 10E3/UL (ref 4–11)

## 2023-04-16 PROCEDURE — 250N000013 HC RX MED GY IP 250 OP 250 PS 637

## 2023-04-16 PROCEDURE — 999N000128 HC STATISTIC PERIPHERAL IV START W/O US GUIDANCE

## 2023-04-16 PROCEDURE — 250N000011 HC RX IP 250 OP 636: Performed by: INTERNAL MEDICINE

## 2023-04-16 PROCEDURE — 999N000157 HC STATISTIC RCP TIME EA 10 MIN

## 2023-04-16 PROCEDURE — 250N000013 HC RX MED GY IP 250 OP 250 PS 637: Performed by: STUDENT IN AN ORGANIZED HEALTH CARE EDUCATION/TRAINING PROGRAM

## 2023-04-16 PROCEDURE — 85027 COMPLETE CBC AUTOMATED: CPT | Performed by: STUDENT IN AN ORGANIZED HEALTH CARE EDUCATION/TRAINING PROGRAM

## 2023-04-16 PROCEDURE — 80048 BASIC METABOLIC PNL TOTAL CA: CPT | Performed by: STUDENT IN AN ORGANIZED HEALTH CARE EDUCATION/TRAINING PROGRAM

## 2023-04-16 PROCEDURE — 86140 C-REACTIVE PROTEIN: CPT | Performed by: STUDENT IN AN ORGANIZED HEALTH CARE EDUCATION/TRAINING PROGRAM

## 2023-04-16 PROCEDURE — 250N000011 HC RX IP 250 OP 636

## 2023-04-16 PROCEDURE — 36415 COLL VENOUS BLD VENIPUNCTURE: CPT | Performed by: STUDENT IN AN ORGANIZED HEALTH CARE EDUCATION/TRAINING PROGRAM

## 2023-04-16 PROCEDURE — 99233 SBSQ HOSP IP/OBS HIGH 50: CPT | Performed by: STUDENT IN AN ORGANIZED HEALTH CARE EDUCATION/TRAINING PROGRAM

## 2023-04-16 PROCEDURE — 94640 AIRWAY INHALATION TREATMENT: CPT | Mod: 76

## 2023-04-16 PROCEDURE — 94640 AIRWAY INHALATION TREATMENT: CPT

## 2023-04-16 PROCEDURE — 120N000002 HC R&B MED SURG/OB UMMC

## 2023-04-16 PROCEDURE — 94669 MECHANICAL CHEST WALL OSCILL: CPT

## 2023-04-16 PROCEDURE — 250N000009 HC RX 250: Performed by: INTERNAL MEDICINE

## 2023-04-16 RX ORDER — IPRATROPIUM BROMIDE AND ALBUTEROL SULFATE 2.5; .5 MG/3ML; MG/3ML
3 SOLUTION RESPIRATORY (INHALATION) 3 TIMES DAILY
Status: DISCONTINUED | OUTPATIENT
Start: 2023-04-17 | End: 2023-04-17

## 2023-04-16 RX ADMIN — POLYETHYLENE GLYCOL 3350 17 G: 17 POWDER, FOR SOLUTION ORAL at 08:49

## 2023-04-16 RX ADMIN — IPRATROPIUM BROMIDE AND ALBUTEROL SULFATE 3 ML: .5; 3 SOLUTION RESPIRATORY (INHALATION) at 14:30

## 2023-04-16 RX ADMIN — Medication 1000 UNITS: at 08:49

## 2023-04-16 RX ADMIN — PIPERACILLIN AND TAZOBACTAM 2.25 G: 2; .25 INJECTION, POWDER, FOR SOLUTION INTRAVENOUS at 22:05

## 2023-04-16 RX ADMIN — CALCIUM CARBONATE 1000 MG: 500 TABLET, CHEWABLE ORAL at 08:49

## 2023-04-16 RX ADMIN — FLUCONAZOLE IN SODIUM CHLORIDE 200 MG: 2 INJECTION, SOLUTION INTRAVENOUS at 11:49

## 2023-04-16 RX ADMIN — FAMOTIDINE 40 MG: 20 TABLET ORAL at 22:06

## 2023-04-16 RX ADMIN — HEPARIN SODIUM 5000 UNITS: 5000 INJECTION, SOLUTION INTRAVENOUS; SUBCUTANEOUS at 22:12

## 2023-04-16 RX ADMIN — Medication 10 MG: at 22:10

## 2023-04-16 RX ADMIN — Medication 1000 UNITS: at 22:06

## 2023-04-16 RX ADMIN — PIPERACILLIN AND TAZOBACTAM 2.25 G: 2; .25 INJECTION, POWDER, FOR SOLUTION INTRAVENOUS at 13:36

## 2023-04-16 RX ADMIN — PIPERACILLIN AND TAZOBACTAM 2.25 G: 2; .25 INJECTION, POWDER, FOR SOLUTION INTRAVENOUS at 05:01

## 2023-04-16 RX ADMIN — IPRATROPIUM BROMIDE AND ALBUTEROL SULFATE 3 ML: .5; 3 SOLUTION RESPIRATORY (INHALATION) at 08:56

## 2023-04-16 RX ADMIN — CALCIUM CARBONATE 1000 MG: 500 TABLET, CHEWABLE ORAL at 22:05

## 2023-04-16 RX ADMIN — CALCIUM CARBONATE 1000 MG: 500 TABLET, CHEWABLE ORAL at 17:09

## 2023-04-16 RX ADMIN — OXYCODONE HYDROCHLORIDE 5 MG: 5 TABLET ORAL at 23:29

## 2023-04-16 RX ADMIN — HEPARIN SODIUM 5000 UNITS: 5000 INJECTION, SOLUTION INTRAVENOUS; SUBCUTANEOUS at 08:49

## 2023-04-16 RX ADMIN — SENNOSIDES AND DOCUSATE SODIUM 1 TABLET: 8.6; 5 TABLET ORAL at 22:09

## 2023-04-16 RX ADMIN — SENNOSIDES AND DOCUSATE SODIUM 2 TABLET: 50; 8.6 TABLET ORAL at 08:49

## 2023-04-16 RX ADMIN — IPRATROPIUM BROMIDE AND ALBUTEROL SULFATE 3 ML: .5; 3 SOLUTION RESPIRATORY (INHALATION) at 20:40

## 2023-04-16 RX ADMIN — CALCIUM CARBONATE 1000 MG: 500 TABLET, CHEWABLE ORAL at 11:49

## 2023-04-16 RX ADMIN — CINACALCET 30 MG: 30 TABLET ORAL at 08:48

## 2023-04-16 ASSESSMENT — ACTIVITIES OF DAILY LIVING (ADL)
ADLS_ACUITY_SCORE: 38

## 2023-04-16 NOTE — PROGRESS NOTES
Shift summary: 1900-0730     Patient remains A&O x 4, received oxycodone x 1 for pain following vest treatment. VSS overnight. Slept throughout the night. Trach cares done independently, trach dome in place overnight 21% and 20 lpm.  Straight cath done for 300 mL, independently w/ minimal assistance. Dressing CDI to left lower abdomen. New PIV placed this shift. Mother bedside.      Delaney Palacio RN on 4/16/2023 at 6:30 AM

## 2023-04-16 NOTE — PROGRESS NOTES
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    Today's updates/changes:  - s/p I&D for abdominal abscesses seen on CT scan, general surgery consulted   - Continue zosyn, vanc, IV fluconazole pending abscess cultures   - so far growing yeast but culture spending pending. Discussed with ID, will continue all antimicrobials today, possible narrowing   - ID following   - planning for HD on   - Working on BM, increase bowel reg today, eating better   - Will need to discuss iron replacement with nephrology, will holdoff on oral supplementation currently due to constipation      Acute Hospital Problems:     Peritonitis 2/2 candida albicans, peritoneal dialysis-related  Sepsis, resolved  Intraabdominal abscesses along PD catheter track s/p I&D   In setting of peritoneal dialysis, one month of difficult PD access, few days of \"foamy\" and possibly purulent PD cath discharge, fevers. Unable to tolerate peritoneal antibiotics prior to admission. Initially w/ tachycardia and leukocytosis. Peritoneal culture prior to admission growing budding yeast, no bacterial growth. Peritoneal fluid and PD cath tip cultures from this admission also growing candida albicans, pan-susceptible. Overall " hemodynamically and clinically stable on antifungal therapy. CT scan 4/14 showing abscesses along track, s/p I&D on 4/14  Plan:  - Antibiotics: Fluconazole IV (renally dosed) 4/6 - present, plan for 4 week course (EOT 5/5/23), Zosyn  (4/14 - **), Vanc (4/14 - **)  - ID consulted, appreciate assistance.  - Abscess cultures pending  - General surgery consulted, appreciate assistance.    - Follow up for wound check ~4/28. Please call the General Surgery Clinic at [phone] with any questions.  - Post-op pain regimen: PRN Tylenol and Oxycodone available  - Post-op dressing recommendations: See surgery progress note dated 4/8/23     Small bowel obstruction vs infection-associated ileus, improving  With transition point closely associated to PD catheter which is now s/p removal. In context of poor PO, nausea, vomiting, and constipation at home prior to admission. Was improving w/ sepsis treatment, tolerating PO. Some worsening abd pain on 4/12, AXR reassuring, passed bowel w/ enema and subsequently having daily bowel movements. CT 4/14 with mild distension concern for either resolving or restarting mild obstruction at side of absccesses  Plan:  - Regular diet ok as able to tolerate  - Continue to monitor stool output   - Bowel regimen - BID Senna/Docusate and BID Miralax, enemas PRN  - Symptomatic support: Zofran, Compazine prn     ESRD w/ PD  Mild hyperkalemia  AGMA  Likely in setting of incomplete peritoneal dialysis leading up to admission. Now s/p PD cath removal in setting of peritonitis as above w/ temporary CVC placed for HD reinitiation this hospital stay. Nephrology is following, now dialyzed at least three times as inpt.  Plan:  - Access: AVF functional but since new needs additional access, tunneled line placement today  - Has outpt dialysis seat secured - TTS schedule  - PTA Cinacalcet 30mg qday, PO NaHCO3 650mg TID     Restrictive lung disease  Chronic. Currently without tachypnea or hypoxia.  Comfortable-appearing. Some bronchial cuffing on CXR, though not different than prior, some coarse breath sounds and increased secretions on history. Initial viral panel unremarkable. Suspect primary infection is peritonitis as above and these are chronic changes.  Plan:  - PTA Azithro MWF held for now per mother's request, will restart on discharge  - PTA Duonebs and vest therapy  - RT following for assistance     Chronic malnutrition  Has PEG but not tube feed dependent, tolerates PO.     Neurogenic bladder s/p mitranoff urinary diversion  Still makes some degree of urine. Self-caths every ~4 hours daily w/ intermittent irrigation. Last urology visit in 2022.  Plan:  - Straight cath orders for ~q4h while awake     HTN - holding PTA Amlodipine ( meds still listed as Captopril and Clonidine) while managing sepsis and restarting hemodialysis, can consider restart if needed prior to discharge     GERD - PTA Famotidine        Hospital Checklist:  Diet: Regular diet   VTE ppx: SQH  LDA: PIV, Right temporary CVC for HD, G-tube, Mitranoff diversion  Code: Full  Level of care: General Medicine  Family: Valerie Kingston - [phone]      Discharge Planning:  Pending antimicrobial plan for abscesses, likely - to home with mom, may need IV as outpatient pending ID plan.     Phoebe Ag MD  Internal Medicine Hospitalist    (Please see sign-in/sign-out for up-to-date physician coverage information)      Medical Decision Making       55 MINUTES SPENT BY ME on the date of service doing chart review, history, exam, documentation & further activities per the note.  with >50% of time spent at the bedside counseling patient and mother about clinical plan.       Interval Events:   Overnight handoff and nursing reports reviewed. No significant events. Much more chatty and interactive today. Eating better- stronger appetite. Had some abdominal pain last night - he thinks it might have been gas. Does have bowel  sounds. Hoping to get up for a walk today.     Objective:     Vitals:    04/13/23 0800 04/14/23 0400 04/16/23 0300   Weight: 42.3 kg (93 lb 4.8 oz) 43.3 kg (95 lb 6.4 oz) 44.1 kg (97 lb 3.2 oz)     Vital Signs with Ranges  Temp:  [98.2  F (36.8  C)-98.8  F (37.1  C)] 98.5  F (36.9  C)  Pulse:  [66-97] 74  Resp:  [11-28] 18  BP: (121-148)/(68-97) 144/80  FiO2 (%):  [21 %] 21 %  SpO2:  [93 %-100 %] 96 %  I/O last 3 completed shifts:  In: 240 [P.O.:240]  Out: 1125 [Urine:1125]    Exam:  General: No acute distress, laying in bed in HD  CV: Regular rate, regular rhythm, no new murmurs  Lungs: On room air,  no wheezing or crackles this am, no increased WOB  Abd: Soft, non-distended, area around PD site covered with bandage - clean and dry  Ext: Warm and well-perfused, no appreciable lower extremity edema    Medications     heparin (porcine) 500 Units/hr (04/10/23 1300)       [Held by provider] azithromycin  250 mg Oral Once per day on Mon Wed Fri     calcium carbonate  1,000 mg Oral 4x Daily     cinacalcet  30 mg Oral Daily     famotidine  40 mg Oral Daily     fluconazole  200 mg Intravenous Q24H     sodium chloride (PF) 0.9%  1.3-2.6 mL Intracatheter Once    Followed by     heparin  3 mL Intracatheter Once     sodium chloride (PF) 0.9%  1.3-2.6 mL Intracatheter Once    Followed by     heparin  3 mL Intracatheter Once     heparin ANTICOAGULANT  5,000 Units Subcutaneous Q12H     heparin lock flush  5-20 mL Intracatheter Q24H     ipratropium - albuterol 0.5 mg/2.5 mg/3 mL  3 mL Nebulization Q6H     melatonin  10 mg Oral At Bedtime     piperacillin-tazobactam  2.25 g Intravenous Q8H     polyethylene glycol  17 g Oral or Feeding Tube BID     senna-docusate  1 tablet Oral BID    Or     senna-docusate  2 tablet Oral BID     sodium chloride (PF)  10-40 mL Intracatheter Q8H     sodium chloride (PF)  3 mL Intracatheter Q8H     vancomycin place sawyer - receiving intermittent dosing  1 each Intravenous See Admin Instructions      Vitamin D3  1,000 Units Oral BID       Data   Recent Labs   Lab 04/16/23  0706 04/15/23  0726 04/13/23  0749 04/12/23  1628 04/12/23  0605   WBC 9.0 10.8 18.0*  --  14.9*   HGB 7.5* 8.3* 9.2*  --  9.3*   MCV 95 93 93  --  92    453* 461*  --  390   INR  --   --   --  1.21*  --    NA  --  132* 135*  --  136   POTASSIUM  --  4.7 4.5  --  4.0   CHLORIDE  --  97* 98  --  100   CO2  --  20* 24  --  23   BUN  --  37.7* 29.3*  --  21.8*   CR  --  5.29* 4.82*  --  3.11*   ANIONGAP  --  15 13  --  13   SUSAN  --  8.6 9.0  --  8.8   GLC  --  92 107*  --  97   ALBUMIN  --   --   --   --  2.5*   PROTTOTAL  --   --   --   --  5.8*   BILITOTAL  --   --   --   --  0.2   ALKPHOS  --   --   --   --  69   ALT  --   --   --   --  11   AST  --   --   --   --  30       No results found for this or any previous visit (from the past 24 hour(s)).

## 2023-04-17 LAB
ANION GAP SERPL CALCULATED.3IONS-SCNC: 12 MMOL/L (ref 7–15)
BUN SERPL-MCNC: 40 MG/DL (ref 6–20)
CALCIUM SERPL-MCNC: 8.9 MG/DL (ref 8.6–10)
CHLORIDE SERPL-SCNC: 104 MMOL/L (ref 98–107)
CREAT SERPL-MCNC: 5.61 MG/DL (ref 0.67–1.17)
CRP SERPL-MCNC: 57.9 MG/L
DEPRECATED HCO3 PLAS-SCNC: 23 MMOL/L (ref 22–29)
ERYTHROCYTE [DISTWIDTH] IN BLOOD BY AUTOMATED COUNT: 13.2 % (ref 10–15)
GFR SERPL CREATININE-BSD FRML MDRD: 13 ML/MIN/1.73M2
GLUCOSE SERPL-MCNC: 85 MG/DL (ref 70–99)
HCT VFR BLD AUTO: 24.7 % (ref 40–53)
HGB BLD-MCNC: 7.3 G/DL (ref 13.3–17.7)
MCH RBC QN AUTO: 28.4 PG (ref 26.5–33)
MCHC RBC AUTO-ENTMCNC: 29.6 G/DL (ref 31.5–36.5)
MCV RBC AUTO: 96 FL (ref 78–100)
PLATELET # BLD AUTO: 502 10E3/UL (ref 150–450)
POTASSIUM SERPL-SCNC: 5.1 MMOL/L (ref 3.4–5.3)
RBC # BLD AUTO: 2.57 10E6/UL (ref 4.4–5.9)
SODIUM SERPL-SCNC: 139 MMOL/L (ref 136–145)
WBC # BLD AUTO: 9 10E3/UL (ref 4–11)

## 2023-04-17 PROCEDURE — 99232 SBSQ HOSP IP/OBS MODERATE 35: CPT | Mod: 24 | Performed by: INTERNAL MEDICINE

## 2023-04-17 PROCEDURE — 250N000013 HC RX MED GY IP 250 OP 250 PS 637: Performed by: STUDENT IN AN ORGANIZED HEALTH CARE EDUCATION/TRAINING PROGRAM

## 2023-04-17 PROCEDURE — 999N000157 HC STATISTIC RCP TIME EA 10 MIN

## 2023-04-17 PROCEDURE — 99233 SBSQ HOSP IP/OBS HIGH 50: CPT | Mod: GC | Performed by: STUDENT IN AN ORGANIZED HEALTH CARE EDUCATION/TRAINING PROGRAM

## 2023-04-17 PROCEDURE — 36415 COLL VENOUS BLD VENIPUNCTURE: CPT | Performed by: STUDENT IN AN ORGANIZED HEALTH CARE EDUCATION/TRAINING PROGRAM

## 2023-04-17 PROCEDURE — 999N000009 HC STATISTIC AIRWAY CARE

## 2023-04-17 PROCEDURE — 250N000013 HC RX MED GY IP 250 OP 250 PS 637

## 2023-04-17 PROCEDURE — 250N000009 HC RX 250: Performed by: INTERNAL MEDICINE

## 2023-04-17 PROCEDURE — 80048 BASIC METABOLIC PNL TOTAL CA: CPT | Performed by: STUDENT IN AN ORGANIZED HEALTH CARE EDUCATION/TRAINING PROGRAM

## 2023-04-17 PROCEDURE — 250N000011 HC RX IP 250 OP 636: Performed by: INTERNAL MEDICINE

## 2023-04-17 PROCEDURE — 999N000177 HC STATISTIC SUB-AMBIENT O2 THERAPY

## 2023-04-17 PROCEDURE — 250N000009 HC RX 250

## 2023-04-17 PROCEDURE — 94640 AIRWAY INHALATION TREATMENT: CPT | Mod: 76

## 2023-04-17 PROCEDURE — 94640 AIRWAY INHALATION TREATMENT: CPT

## 2023-04-17 PROCEDURE — 86140 C-REACTIVE PROTEIN: CPT | Performed by: STUDENT IN AN ORGANIZED HEALTH CARE EDUCATION/TRAINING PROGRAM

## 2023-04-17 PROCEDURE — 94669 MECHANICAL CHEST WALL OSCILL: CPT

## 2023-04-17 PROCEDURE — 250N000011 HC RX IP 250 OP 636

## 2023-04-17 PROCEDURE — 85027 COMPLETE CBC AUTOMATED: CPT | Performed by: STUDENT IN AN ORGANIZED HEALTH CARE EDUCATION/TRAINING PROGRAM

## 2023-04-17 PROCEDURE — 120N000002 HC R&B MED SURG/OB UMMC

## 2023-04-17 RX ORDER — BISACODYL 10 MG
10 SUPPOSITORY, RECTAL RECTAL DAILY
Status: DISCONTINUED | OUTPATIENT
Start: 2023-04-18 | End: 2023-04-22 | Stop reason: HOSPADM

## 2023-04-17 RX ORDER — BISACODYL 5 MG
5 TABLET, DELAYED RELEASE (ENTERIC COATED) ORAL DAILY
Status: DISCONTINUED | OUTPATIENT
Start: 2023-04-18 | End: 2023-04-22 | Stop reason: HOSPADM

## 2023-04-17 RX ORDER — IPRATROPIUM BROMIDE AND ALBUTEROL SULFATE 2.5; .5 MG/3ML; MG/3ML
3 SOLUTION RESPIRATORY (INHALATION) 4 TIMES DAILY
Status: DISCONTINUED | OUTPATIENT
Start: 2023-04-17 | End: 2023-04-21

## 2023-04-17 RX ORDER — AMLODIPINE BESYLATE 5 MG/1
5 TABLET ORAL 2 TIMES DAILY
Status: DISCONTINUED | OUTPATIENT
Start: 2023-04-17 | End: 2023-04-22 | Stop reason: HOSPADM

## 2023-04-17 RX ADMIN — POLYETHYLENE GLYCOL 3350 17 G: 17 POWDER, FOR SOLUTION ORAL at 20:28

## 2023-04-17 RX ADMIN — AMLODIPINE BESYLATE 5 MG: 5 TABLET ORAL at 11:41

## 2023-04-17 RX ADMIN — IPRATROPIUM BROMIDE AND ALBUTEROL SULFATE 3 ML: .5; 3 SOLUTION RESPIRATORY (INHALATION) at 21:04

## 2023-04-17 RX ADMIN — IPRATROPIUM BROMIDE AND ALBUTEROL SULFATE 3 ML: .5; 3 SOLUTION RESPIRATORY (INHALATION) at 16:07

## 2023-04-17 RX ADMIN — HEPARIN SODIUM 5000 UNITS: 5000 INJECTION, SOLUTION INTRAVENOUS; SUBCUTANEOUS at 09:46

## 2023-04-17 RX ADMIN — Medication 1000 UNITS: at 20:29

## 2023-04-17 RX ADMIN — CALCIUM CARBONATE 1000 MG: 500 TABLET, CHEWABLE ORAL at 16:50

## 2023-04-17 RX ADMIN — SENNOSIDES AND DOCUSATE SODIUM 1 TABLET: 8.6; 5 TABLET ORAL at 20:30

## 2023-04-17 RX ADMIN — Medication 10 MG: at 23:01

## 2023-04-17 RX ADMIN — AMLODIPINE BESYLATE 5 MG: 5 TABLET ORAL at 20:29

## 2023-04-17 RX ADMIN — CALCIUM CARBONATE 1000 MG: 500 TABLET, CHEWABLE ORAL at 11:41

## 2023-04-17 RX ADMIN — FLUCONAZOLE IN SODIUM CHLORIDE 200 MG: 2 INJECTION, SOLUTION INTRAVENOUS at 14:55

## 2023-04-17 RX ADMIN — IPRATROPIUM BROMIDE AND ALBUTEROL SULFATE 3 ML: .5; 3 SOLUTION RESPIRATORY (INHALATION) at 13:15

## 2023-04-17 RX ADMIN — CALCIUM CARBONATE 1000 MG: 500 TABLET, CHEWABLE ORAL at 20:28

## 2023-04-17 RX ADMIN — IPRATROPIUM BROMIDE AND ALBUTEROL SULFATE 3 ML: .5; 3 SOLUTION RESPIRATORY (INHALATION) at 08:46

## 2023-04-17 RX ADMIN — Medication 1000 UNITS: at 09:46

## 2023-04-17 RX ADMIN — PIPERACILLIN AND TAZOBACTAM 2.25 G: 2; .25 INJECTION, POWDER, FOR SOLUTION INTRAVENOUS at 13:38

## 2023-04-17 RX ADMIN — FAMOTIDINE 40 MG: 20 TABLET ORAL at 20:28

## 2023-04-17 RX ADMIN — CINACALCET 30 MG: 30 TABLET ORAL at 09:49

## 2023-04-17 RX ADMIN — ACETAMINOPHEN 650 MG: 325 TABLET ORAL at 20:44

## 2023-04-17 RX ADMIN — PIPERACILLIN AND TAZOBACTAM 2.25 G: 2; .25 INJECTION, POWDER, FOR SOLUTION INTRAVENOUS at 05:30

## 2023-04-17 RX ADMIN — HEPARIN SODIUM 5000 UNITS: 5000 INJECTION, SOLUTION INTRAVENOUS; SUBCUTANEOUS at 20:29

## 2023-04-17 ASSESSMENT — ACTIVITIES OF DAILY LIVING (ADL)
ADLS_ACUITY_SCORE: 38
ADLS_ACUITY_SCORE: 41
ADLS_ACUITY_SCORE: 38
ADLS_ACUITY_SCORE: 41
ADLS_ACUITY_SCORE: 38
ADLS_ACUITY_SCORE: 41
ADLS_ACUITY_SCORE: 38

## 2023-04-17 NOTE — PROGRESS NOTES
Surgery Progress Note    Aidan Louie  [id number]    Medicine requested surgery to examine InD site iso increased pain o/n and somewhat firm skin around incision site.    Temp:  [98.5  F (36.9  C)-98.8  F (37.1  C)] 98.5  F (36.9  C)  Pulse:  [77-97] 77  Resp:  [16-20] 20  BP: (133-160)/(84-97) 147/91  FiO2 (%):  [30 %] 30 %  SpO2:  [83 %-98 %] 96 %    Intake/Output Summary (Last 24 hours) at 4/17/2023 1125  Last data filed at 4/17/2023 1000  Gross per 24 hour   Intake 760 ml   Output 1775 ml   Net -1015 ml       NAD, resting comfortably  regular rate and rhythm  non-labored breathing  soft, mildly distended, InD site covered by bandage, with granulation tissue, no purulence, no erythema / edema. Mildly tender around wound. Skin edges slightly firm.  warm and well perfused   alert, oriented    27 year old male POD 3 s/p bedside InD.    Medicine requested surgery to examine InD site iso increased pain o/n and somewhat firm skin around incision site. Incisional area tender. Given vital signs all wnl, no fever, nl WBC, would defer imaging at this time; if pain continues to increase or if vitals / labs point to recurrent abscess can consider reimaging.    - reassured against wound infection given vitally normal  - if pain continues to increase or if wound becomes cellulitic or if pts vitals begin to point toward infection, may consider reimaging    Seen with staff and chief resident    Darell Seaman MD   Surgery PGY-1

## 2023-04-17 NOTE — PROGRESS NOTES
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    Today's updates/changes:  - Continuing IV Fluconazole, Vanc, Zosyn - will touch base w/ ID regarding narrowing now that abscess cultures from  are finalized w/ only fungal growth  - Surgery will reexamine abdomen I&D site given concerns for worsening pain  - Restarted PTA Amlodipine 5mg BID  - Scheduled Bisacodyl in addition to Miralax and Senna-Docusate  - Next HD tomorrow     Acute Hospital Problems:     Peritonitis 2/2 candida albicans, peritoneal dialysis-related  Sepsis, resolved  Intraabdominal abscesses along PD catheter track s/p I&D   In setting of peritoneal dialysis, one month of difficult PD access, few days of \"foamy\" and possibly purulent PD cath discharge, fevers. Unable to tolerate peritoneal antibiotics prior to admission. Initially w/ tachycardia and leukocytosis. Peritoneal culture prior to admission growing budding yeast, no bacterial growth. Peritoneal fluid and PD cath tip cultures from this admission also growing candida albicans, pan-susceptible. Overall hemodynamically and clinically stable on antifungal therapy. CT scan  showing abscesses along track, s/p I&D on . HD and clinically stable " since, some complaints of worsening pain at prior I&D site as of 4/17 (some serous drainage, no purulence).  Plan:  - Antibiotics: Fluconazole IV (renally dosed) 4/6 - present, plan for 4 week course (EOT 5/5/23), Zosyn  (4/14 - **), Vanc (4/14 - **)  - ID continues to follow, will touch base today  - Abscess cultures 4/14 pending - so far only candida albicans growth  - General surgery has signed off. Will touch base today regarding worsening pain at prior I&D site.  - Follow up for wound check ~4/28. Please call the General Surgery Clinic at [phone] with any questions.  - Post-op pain regimen: PRN Tylenol and Oxycodone available  - Post-op dressing recommendations: See surgery progress note dated 4/14/23     Small bowel obstruction vs infection-associated ileus, improving  With transition point closely associated to PD catheter which is now s/p removal. In context of poor PO, nausea, vomiting, and constipation at home prior to admission. Was improving w/ sepsis treatment, tolerating PO. Some worsening abd pain on 4/12, AXR reassuring, passed bowel w/ enema and subsequently having daily bowel movements. CT 4/14 with mild distension concern for either resolving or restarting mild obstruction at side of abscesses  Plan:  - Regular diet ok as able to tolerate  - Continue to monitor stool output   - Bowel regimen - BID Senna/Docusate and BID Miralax, add qday Bisacodyl 4/17, enemas PRN  - Symptomatic support: Zofran, Compazine prn     ESRD w/ PD  Mild hyperkalemia  AGMA  Likely in setting of incomplete peritoneal dialysis leading up to admission. Now s/p PD cath removal in setting of peritonitis as above w/ temporary CVC placed for HD reinitiation this hospital stay. Nephrology is following, now dialyzed at least three times as inpt.  Plan:  - Access: AVF functional but since new needs additional access, tunneled line placement today  - Has outpt dialysis seat secured - TTS schedule  - PTA Cinacalcet 30mg qday, PO  NaHCO3 650mg TID  - Will touch base today re: ZAHRAA on  labs     Restrictive lung disease  Chronic. Currently without tachypnea or hypoxia. Comfortable-appearing. Some bronchial cuffing on CXR, though not different than prior, some coarse breath sounds and increased secretions on history. Initial viral panel unremarkable. Suspect primary infection is peritonitis as above and these are chronic changes.  Plan:  - PTA Azithro MWF held for now per mother's request, will restart on discharge  - PTA Duonebs and vest therapy  - RT following for assistance     Chronic malnutrition  Has PEG but not tube feed dependent, tolerates PO.     Neurogenic bladder s/p mitranoff urinary diversion  Still makes some degree of urine. Self-caths every ~4 hours daily w/ intermittent irrigation. Last urology visit in 2022.  Plan:  - Straight cath orders for ~q4h while awake     HTN - holding PTA Amlodipine ( meds still listed as Captopril and Clonidine) while managing sepsis and restarting hemodialysis, can consider restart if needed prior to discharge     GERD - PTA Famotidine        Hospital Checklist:  Diet: Regular diet   VTE ppx: SQH  LDA: PIV, Right temporary CVC for HD, G-tube, Mitranoff diversion  Code: Full  Level of care: General Medicine  Family: Mother, Valerie - [phone]      Discharge Planning:  Pending antimicrobial plan for abscesses, likely - to home with mom. Has outpt HD seat already. Suspect will not need IV abx.    Nahid Morrison MD  Internal Medicine PGY-2  [place] [address] Medicine  (Please see sign-in/sign-out for up-to-date physician coverage information)      Interval Events:   Overnight handoff and nursing reports reviewed. Per mother, rough night overnight with some abdominal pain and not sleeping well. Sleeping better this morning on evaluation. One small bowel movement yesterday. Low appetite. Walked around unit yesterday.    Objective:     Vitals:    23 0800 23 0400 23 0300    Weight: 42.3 kg (93 lb 4.8 oz) 43.3 kg (95 lb 6.4 oz) 44.1 kg (97 lb 3.2 oz)     Vital Signs with Ranges  Temp:  [98.5  F (36.9  C)-98.8  F (37.1  C)] 98.5  F (36.9  C)  Pulse:  [83-97] 86  Resp:  [16-18] 16  BP: (133-160)/(84-97) 147/91  FiO2 (%):  [30 %] 30 %  SpO2:  [83 %-98 %] 96 %  I/O last 3 completed shifts:  In: 760 [P.O.:360; I.V.:400]  Out: 1750 [Urine:1750]    Exam:   General: No acute distress, laying in bed on right side initially asleep  Lungs: No increased WOB, on 2L while sleeping  Abd: Soft, non-distended, area around PD site covered with bandage w/ small amount of serous drainage, firm and tender to palpation slightly moreso than prior exams  Ext: Warm and well-perfused, no appreciable lower extremity edema    Medications     heparin (porcine) 500 Units/hr (04/10/23 1300)       [Held by provider] azithromycin  250 mg Oral Once per day on Mon Wed Fri     calcium carbonate  1,000 mg Oral 4x Daily     cinacalcet  30 mg Oral Daily     famotidine  40 mg Oral Daily     fluconazole  200 mg Intravenous Q24H     sodium chloride (PF) 0.9%  1.3-2.6 mL Intracatheter Once    Followed by     heparin  3 mL Intracatheter Once     sodium chloride (PF) 0.9%  1.3-2.6 mL Intracatheter Once    Followed by     heparin  3 mL Intracatheter Once     heparin ANTICOAGULANT  5,000 Units Subcutaneous Q12H     heparin lock flush  5-20 mL Intracatheter Q24H     ipratropium - albuterol 0.5 mg/2.5 mg/3 mL  3 mL Nebulization TID     melatonin  10 mg Oral At Bedtime     piperacillin-tazobactam  2.25 g Intravenous Q8H     polyethylene glycol  17 g Oral or Feeding Tube BID     senna-docusate  1 tablet Oral BID    Or     senna-docusate  2 tablet Oral BID     sodium chloride (PF)  10-40 mL Intracatheter Q8H     sodium chloride (PF)  3 mL Intracatheter Q8H     vancomycin place sawyer - receiving intermittent dosing  1 each Intravenous See Admin Instructions     Vitamin D3  1,000 Units Oral BID       Data   Recent Labs   Lab  04/16/23  0706 04/15/23  0726 04/13/23  0749 04/12/23  1628 04/12/23  0605   WBC 9.0 10.8 18.0*  --  14.9*   HGB 7.5* 8.3* 9.2*  --  9.3*   MCV 95 93 93  --  92    453* 461*  --  390   INR  --   --   --  1.21*  --     132* 135*  --  136   POTASSIUM 5.0 4.7 4.5  --  4.0   CHLORIDE 103 97* 98  --  100   CO2 24 20* 24  --  23   BUN 26.4* 37.7* 29.3*  --  21.8*   CR 4.03* 5.29* 4.82*  --  3.11*   ANIONGAP 11 15 13  --  13   SUSAN 8.6 8.6 9.0  --  8.8   GLC 86 92 107*  --  97   ALBUMIN  --   --   --   --  2.5*   PROTTOTAL  --   --   --   --  5.8*   BILITOTAL  --   --   --   --  0.2   ALKPHOS  --   --   --   --  69   ALT  --   --   --   --  11   AST  --   --   --   --  30       No results found for this or any previous visit (from the past 24 hour(s)).

## 2023-04-17 NOTE — PLAN OF CARE
Neuro: A&Ox4, withdrawn. Follows commands, LEVIN, able to make needs known.   Cardiac: No tele. BP stable. Afebrile.   Respiratory: Satting well on RA. Bivona 5.0 in place.   GI/: Adequate urine output, self cath x2 this shift. BM X2, both small and loose/soft.   Diet/appetite: Tolerating regular diet. Eating well.  Activity:  SBA. Ambulated in hallway x2 this shift.   Pain: Denies.  Skin: No new deficits noted.  LDA's: R PIV, saline locked. R chest tunneled line.     Plan: Continue with POC. Notify primary team with changes.

## 2023-04-17 NOTE — PROGRESS NOTES
[facility] PROGRESS NOTE    Patient:  Aidan Louie   YOB: 1995, MRN: [id number]  Date of Visit: 04/17/2023  Date of Admission: 4/6/2023  Consult Requester: Phoebe Ag MD          Assessment and Recommendations:   ID Problem List:  1. C albicans PD-catheter associated peritonitis w/ soft tissue abscesses  - S/p removal of PD catheter 4/7/23  - S/p bedside debridement of abscesses 4/14/23  2. H/o ESRD recently on PD (3/2022 - 4/2023)  3. 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 Ileus - improving    Mr. Louie remains hemodynamically stable with minimal residual pain in the lower abdomen. His cultures from 4/14 debridement have all grown C albicans, no bacterial organisms. Agree with narrowing back to fluconazole. I suspect it was an issue of a lack of source control rather than inappropriate coverage. He has some residual pain at the site with some tissue firmness that I suspect is scar tissue related to prior PD catheter. Would de-escalate back to fluconazole monotherapy and continue to monitor clinical exam. Given the extent of the abscesses, would favor a longer treatment course. Recommend 4 weeks of fluconazole from debridement on 4/14.     Recommendations:  1. Discontinue vancomycin and pip/tazo  2. Continue IV fluconazole in the hospital - transition to PO at discharge  3. Recommend total of 4 weeks following debridement (4/14 - 5/12).    Thank you for the consult. ID will sign off at this time. Please page with questions/concerns.     Dior Su MD   Infectious Diseases  Pager: [phone]  04/17/2023         Interval History and Events:   - Afebrile and HDS. Had some increased pain overnight in the abdomen per mother, but pt denies this am. No significant tenderness on exam, mild purulent drainage on dressing.   - Pt is having some bowel movements, though they are small.          History summarized from prior ID notes:   27 year old  "male with history of prune belly syndrome, Dawood Reece syndrome, ESRD on peritoneal dialysis (3/2022) due to congenital abnormalities of urinary tract, s/p Mitrofanoff procedure, restrictive lung disease, s/p tracheostomy, PEG tube, and multiple abdominal reconstructive surgeries who presented to ED on 4/6/23 with concern for peritonitis related to PD catheter, report of \"budding yeast\" on outside culture. Now s/p removal of PD catheter on 4/7, placement of temporary HD line.      Issues with PD catheter and runs since 3/9 with intermittent abd pain and distention. Foamy purulent drainage at home cultured by dialysis nurse and growing budding yeast with no bacterial growth at time of admission, per report. Was started on cefepime and vancomycin at home through nephrology/dialysis center. Increased lethargy and has not been able to do all of his own ADLs and cares at home as he usually does. Fever to 104F at home right before admission, WBC 20.6K on arrival. Cultures from PD catheter removal and peritoneal fluid (4/7) at [facility] positive for C.albicans sensitive to fluconazole. Outside ([facility] dialysis center) peritoneal cultures were faxed yesterday and they are also positive for Candidal albicans sensitive to fluconazole (they were collected on 4/4). No bacterial growth in any of the cultures.  Cefepime and metronidazole stopped on 4/11. Patient is afebrile. White count and CRP trending down. Blood cultures negative.      We were re-consulted today (4/14) because the CT abdomen/pelvis from 4/14 is now showing  findings of multiple irregular and largely contiguous abscesses extending from the abdominal wall and subcutaneous tissues into the peritoneum along the trajectory of the prior peritoneal dialysis catheter tract. This finding is new compared to CT abdomen from 4/6/23.  Medicine team started empiric antibiotic coverage with vancomycin and zosyn and switched fluconazole to IV. Surgery was consulted and performed " bedside I&D with report of purulent drainage. Specimen sent to cultures.          Relevant Microbiology:   4/14/23 Abscess cultures X2: C albicans   Bacterial cultures: NGTD  4/14/23 Blood cultures X2: NGTD         Antimicrobial Treatment:   Fluconazole 4/6 - present     Pip/tazo 4/14 - present   Vancomycin 4/14 - 4/15         Review of Systems:   Targeted 4 point ROS was completed with pertinent positives and negatives are detailed above.         Physical Examination:   Temp:  [98.5  F (36.9  C)-98.8  F (37.1  C)] 98.5  F (36.9  C)  Pulse:  [77-97] 77  Resp:  [16-20] 20  BP: (133-160)/(84-97) 147/91  FiO2 (%):  [30 %] 30 %  SpO2:  [83 %-98 %] 96 %    Constitutional: Pleasant and cooperative male seen sitting up in bed, in NAD. Awake, alert, interactive.  HEENT: EOMI, sclera clear, conjunctiva normal, OP with MMM  Respiratory: No increased work of breathing, CTAB, no crackles or wheezing.  Cardiovascular:  No peripheral edema.  GI: Soft, non-distended and non-tender.  Skin: Warm, dry, well-perfused.   Incision site: scant purulent drainage on dressing, slightly firm tissue surrounding, though non-tender, with no erythema or fluctuance.   Musculoskeletal: Extremities grossly normal, non-tender, no edema. Good strength and ROM in bed.   Neurologic: A&O. Answers questions appropriately, speech normal. Moves all extremities spontaneously.  Neuropsychiatric: Calm. Affect appropriate to situation.         Medications:       [Held by provider] azithromycin  250 mg Oral Once per day on Mon Wed Fri     calcium carbonate  1,000 mg Oral 4x Daily     cinacalcet  30 mg Oral Daily     famotidine  40 mg Oral Daily     fluconazole  200 mg Intravenous Q24H     sodium chloride (PF) 0.9%  1.3-2.6 mL Intracatheter Once    Followed by     heparin  3 mL Intracatheter Once     sodium chloride (PF) 0.9%  1.3-2.6 mL Intracatheter Once    Followed by     heparin  3 mL Intracatheter Once     heparin ANTICOAGULANT  5,000 Units Subcutaneous  "Q12H     heparin lock flush  5-20 mL Intracatheter Q24H     ipratropium - albuterol 0.5 mg/2.5 mg/3 mL  3 mL Nebulization TID     melatonin  10 mg Oral At Bedtime     piperacillin-tazobactam  2.25 g Intravenous Q8H     polyethylene glycol  17 g Oral or Feeding Tube BID     senna-docusate  1 tablet Oral BID    Or     senna-docusate  2 tablet Oral BID     sodium chloride (PF)  10-40 mL Intracatheter Q8H     sodium chloride (PF)  3 mL Intracatheter Q8H     vancomycin place sawyer - receiving intermittent dosing  1 each Intravenous See Admin Instructions     Vitamin D3  1,000 Units Oral BID       Antiinfectives:  Anti-infectives (From now, onward)    Start     Dose/Rate Route Frequency Ordered Stop    04/15/23 1200  fluconazole (DIFLUCAN) intermittent infusion 200 mg         200 mg  100 mL/hr over 60 Minutes Intravenous EVERY 24 HOURS 04/14/23 1604      04/14/23 1630  piperacillin-tazobactam (ZOSYN) 2.25 g vial to attach to  ml bag        Note to Pharmacy: For [facility], [facility] and [facility]: For Zosyn-naive patients, use the \"Zosyn initial dose + extended infusion\" order panel.    2.25 g  over 30 Minutes Intravenous EVERY 8 HOURS 04/14/23 1603      04/14/23 1617  vancomycin place sawyer - receiving intermittent dosing         1 each Intravenous SEE ADMIN INSTRUCTIONS 04/14/23 1617      04/07/23 0900  [Held by provider]  azithromycin (ZITHROMAX) tablet 250 mg        (Held by provider since Sat 4/15/2023 at 1044 by Phoebe Ag MD.Hold Reason: Other)   Note to Pharmacy: PTA Sig:Take 250 mg by mouth three times a week Mon, Wed, Fri      250 mg Oral Once per day on Mon Wed Fri 04/06/23 2235                 Laboratory Data:   Inflammatory Markers    Recent Labs   Lab Test 04/06/23  1831 09/17/15  1205 02/05/15  1110   SED 66*  --   --    CRP  --  <2.9 <2.9       Metabolic Studies     Recent Labs   Lab Test 04/16/23  0706 04/15/23  0726 04/13/23  0749 04/12/23  0605 04/11/23  0747 04/10/23  1357    132* 135* 136   < >  --  "   POTASSIUM 5.0 4.7 4.5 4.0   < >  --    CHLORIDE 103 97* 98 100   < >  --    CO2 24 20* 24 23   < >  --    ANIONGAP 11 15 13 13   < >  --    BUN 26.4* 37.7* 29.3* 21.8*   < >  --    CR 4.03* 5.29* 4.82* 3.11*   < >  --    GFRESTIMATED 20* 14* 16* 27*   < >  --    GLC 86 92 107* 97   < >  --    SUSAN 8.6 8.6 9.0 8.8   < >  --    PHOS  --   --   --  4.1  --   --    MAG  --  2.1 2.9* 1.4*   < >  --    LACT  --   --   --   --   --  1.4    < > = values in this interval not displayed.       Hepatic Studies    Recent Labs   Lab Test 23  0605 21  1300 20  1415 10/15/20  1138 16  1325 09/17/15  1205   BILITOTAL 0.2  --   --  0.2  --   --    ALKPHOS 69  --   --  94  --  80   ALBUMIN 2.5* 3.7 3.7 3.9   < > 3.8   AST 30  --   --  10  --   --    ALT 11  --   --  15  --   --     < > = values in this interval not displayed.     Hematology Studies      Recent Labs   Lab Test 23  0706 04/15/23  0726 23  0749 20  1415 10/15/20  1138   WBC 9.0 10.8 18.0*   < > 9.8   ANEU  --   --   --   --  6.2   ALYM  --   --   --   --  2.0   DEVANTE  --   --   --   --  0.5   AEOS  --   --   --   --  1.0*   HGB 7.5* 8.3* 9.2*   < > 10.8*   HCT 25.2* 26.9* 29.4*   < > 34.0*    453* 461*   < > 299    < > = values in this interval not displayed.          Imagin23 CT Abd/pelvis   IMPRESSION:   1. Multiple irregular and largely contiguous abscesses extending from  the abdominal wall and subcutaneous tissues into the peritoneum along  the trajectory of the prior peritoneal dialysis catheter tract. There  is significant wall thickening and associated inflammation of the  sigmoid colon and rectum. It would be difficult to exclude an early  fistula to the surrounding small or large bowel with all of the  neighboring bowel loops and significant inflammatory change.  2. 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  There  is periureteral and perivesicular fat stranding indicative of  infection or inflammation.  3. Borderline distended loop of small bowel in the right upper  quadrant and parasagittal abdomen with transition point in the low  central abdomen near the fluid collection detailed below, similar to  the prior transition point likely reflecting some degree of partial  residual obstruction.

## 2023-04-17 NOTE — PROGRESS NOTES
Pt complaint of pain and discomfort in the upper right chest at picc line location. Vest therapy discontinued early due to pt discomfort and pain. Mother present at bedside and insisted on not continuing with vest therapy. Will continue to monitor pt.    Devorah Rhoades, RT

## 2023-04-17 NOTE — PROGRESS NOTES
Shift summary: 1900-0730     Patient remains A&O x 4, received oxycodone x 1 for pain following vest treatment. VSS overnight, BP on the hypertensive side -160. Slept intermittently throughout the night. Trach cares done independently, trach dome in place overnight 30% and 20 lpm.  Straight cath done x2 independently w/ minimal assistance. Dressing changed/new to left lower abdomen. Ambulated in the bright before bed.  Mother bedside.     Writer called into room by mother stating he was agitated/restless during the night, she was worried he was in pain. Upon visualization patient was resting comfortably. Will continue to monitor.     Delaney Palacio RN on 4/17/2023 at 5:58 AM

## 2023-04-18 LAB
ANION GAP SERPL CALCULATED.3IONS-SCNC: 13 MMOL/L (ref 7–15)
BUN SERPL-MCNC: 43.3 MG/DL (ref 6–20)
CALCIUM SERPL-MCNC: 9 MG/DL (ref 8.6–10)
CHLORIDE SERPL-SCNC: 104 MMOL/L (ref 98–107)
CREAT SERPL-MCNC: 5.94 MG/DL (ref 0.67–1.17)
DEPRECATED HCO3 PLAS-SCNC: 22 MMOL/L (ref 22–29)
ERYTHROCYTE [DISTWIDTH] IN BLOOD BY AUTOMATED COUNT: 13.3 % (ref 10–15)
GFR SERPL CREATININE-BSD FRML MDRD: 13 ML/MIN/1.73M2
GLUCOSE SERPL-MCNC: 84 MG/DL (ref 70–99)
HCT VFR BLD AUTO: 25.2 % (ref 40–53)
HGB BLD-MCNC: 7.7 G/DL (ref 13.3–17.7)
MAGNESIUM SERPL-MCNC: 1.8 MG/DL (ref 1.7–2.3)
MCH RBC QN AUTO: 29.4 PG (ref 26.5–33)
MCHC RBC AUTO-ENTMCNC: 30.6 G/DL (ref 31.5–36.5)
MCV RBC AUTO: 96 FL (ref 78–100)
PLATELET # BLD AUTO: 496 10E3/UL (ref 150–450)
POTASSIUM SERPL-SCNC: 6 MMOL/L (ref 3.4–5.3)
RBC # BLD AUTO: 2.62 10E6/UL (ref 4.4–5.9)
SODIUM SERPL-SCNC: 139 MMOL/L (ref 136–145)
WBC # BLD AUTO: 9 10E3/UL (ref 4–11)

## 2023-04-18 PROCEDURE — 82310 ASSAY OF CALCIUM: CPT

## 2023-04-18 PROCEDURE — 99233 SBSQ HOSP IP/OBS HIGH 50: CPT | Performed by: PHYSICIAN ASSISTANT

## 2023-04-18 PROCEDURE — 99233 SBSQ HOSP IP/OBS HIGH 50: CPT | Mod: GC | Performed by: INTERNAL MEDICINE

## 2023-04-18 PROCEDURE — 94640 AIRWAY INHALATION TREATMENT: CPT

## 2023-04-18 PROCEDURE — 83735 ASSAY OF MAGNESIUM: CPT

## 2023-04-18 PROCEDURE — 999N000157 HC STATISTIC RCP TIME EA 10 MIN

## 2023-04-18 PROCEDURE — 258N000003 HC RX IP 258 OP 636: Performed by: STUDENT IN AN ORGANIZED HEALTH CARE EDUCATION/TRAINING PROGRAM

## 2023-04-18 PROCEDURE — 250N000013 HC RX MED GY IP 250 OP 250 PS 637

## 2023-04-18 PROCEDURE — 120N000002 HC R&B MED SURG/OB UMMC

## 2023-04-18 PROCEDURE — 250N000009 HC RX 250

## 2023-04-18 PROCEDURE — 94669 MECHANICAL CHEST WALL OSCILL: CPT

## 2023-04-18 PROCEDURE — 90937 HEMODIALYSIS REPEATED EVAL: CPT

## 2023-04-18 PROCEDURE — 94640 AIRWAY INHALATION TREATMENT: CPT | Mod: 76

## 2023-04-18 PROCEDURE — 999N000177 HC STATISTIC SUB-AMBIENT O2 THERAPY

## 2023-04-18 PROCEDURE — 36415 COLL VENOUS BLD VENIPUNCTURE: CPT

## 2023-04-18 PROCEDURE — 250N000013 HC RX MED GY IP 250 OP 250 PS 637: Performed by: STUDENT IN AN ORGANIZED HEALTH CARE EDUCATION/TRAINING PROGRAM

## 2023-04-18 PROCEDURE — 250N000009 HC RX 250: Performed by: STUDENT IN AN ORGANIZED HEALTH CARE EDUCATION/TRAINING PROGRAM

## 2023-04-18 PROCEDURE — 634N000001 HC RX 634: Performed by: STUDENT IN AN ORGANIZED HEALTH CARE EDUCATION/TRAINING PROGRAM

## 2023-04-18 PROCEDURE — 250N000011 HC RX IP 250 OP 636

## 2023-04-18 PROCEDURE — 250N000011 HC RX IP 250 OP 636: Performed by: INTERNAL MEDICINE

## 2023-04-18 PROCEDURE — 85027 COMPLETE CBC AUTOMATED: CPT

## 2023-04-18 RX ORDER — CLONIDINE HYDROCHLORIDE 0.1 MG/1
0.1 TABLET ORAL 2 TIMES DAILY
Status: DISCONTINUED | OUTPATIENT
Start: 2023-04-18 | End: 2023-04-22 | Stop reason: HOSPADM

## 2023-04-18 RX ADMIN — CALCIUM CARBONATE 1000 MG: 500 TABLET, CHEWABLE ORAL at 17:24

## 2023-04-18 RX ADMIN — Medication 1000 UNITS: at 19:59

## 2023-04-18 RX ADMIN — EPOETIN ALFA-EPBX 1000 UNITS: 10000 INJECTION, SOLUTION INTRAVENOUS; SUBCUTANEOUS at 09:26

## 2023-04-18 RX ADMIN — SENNOSIDES AND DOCUSATE SODIUM 1 TABLET: 8.6; 5 TABLET ORAL at 19:59

## 2023-04-18 RX ADMIN — CLONIDINE HYDROCHLORIDE 0.1 MG: 0.1 TABLET ORAL at 19:59

## 2023-04-18 RX ADMIN — CALCIUM CARBONATE 1000 MG: 500 TABLET, CHEWABLE ORAL at 20:00

## 2023-04-18 RX ADMIN — Medication: at 09:29

## 2023-04-18 RX ADMIN — SODIUM CHLORIDE 250 ML: 9 INJECTION, SOLUTION INTRAVENOUS at 09:28

## 2023-04-18 RX ADMIN — HEPARIN SODIUM 5000 UNITS: 5000 INJECTION, SOLUTION INTRAVENOUS; SUBCUTANEOUS at 19:59

## 2023-04-18 RX ADMIN — IPRATROPIUM BROMIDE AND ALBUTEROL SULFATE 3 ML: .5; 3 SOLUTION RESPIRATORY (INHALATION) at 17:59

## 2023-04-18 RX ADMIN — BISACODYL 5 MG: 5 TABLET, COATED ORAL at 08:09

## 2023-04-18 RX ADMIN — IPRATROPIUM BROMIDE AND ALBUTEROL SULFATE 3 ML: .5; 3 SOLUTION RESPIRATORY (INHALATION) at 22:00

## 2023-04-18 RX ADMIN — CINACALCET 30 MG: 30 TABLET ORAL at 08:10

## 2023-04-18 RX ADMIN — SIMETHICONE 40 MG: 20 EMULSION ORAL at 04:29

## 2023-04-18 RX ADMIN — POLYETHYLENE GLYCOL 3350 17 G: 17 POWDER, FOR SOLUTION ORAL at 19:59

## 2023-04-18 RX ADMIN — SENNOSIDES AND DOCUSATE SODIUM 2 TABLET: 50; 8.6 TABLET ORAL at 08:08

## 2023-04-18 RX ADMIN — AMLODIPINE BESYLATE 5 MG: 5 TABLET ORAL at 18:16

## 2023-04-18 RX ADMIN — IPRATROPIUM BROMIDE AND ALBUTEROL SULFATE 3 ML: .5; 3 SOLUTION RESPIRATORY (INHALATION) at 09:03

## 2023-04-18 RX ADMIN — FLUCONAZOLE IN SODIUM CHLORIDE 200 MG: 2 INJECTION, SOLUTION INTRAVENOUS at 13:39

## 2023-04-18 RX ADMIN — HEPARIN SODIUM 5000 UNITS: 5000 INJECTION, SOLUTION INTRAVENOUS; SUBCUTANEOUS at 08:16

## 2023-04-18 RX ADMIN — Medication 10 MG: at 23:13

## 2023-04-18 RX ADMIN — Medication 1000 UNITS: at 13:39

## 2023-04-18 RX ADMIN — LIDOCAINE HYDROCHLORIDE 0.5 ML: 10 INJECTION, SOLUTION EPIDURAL; INFILTRATION; INTRACAUDAL; PERINEURAL at 09:29

## 2023-04-18 RX ADMIN — FAMOTIDINE 40 MG: 20 TABLET ORAL at 19:59

## 2023-04-18 RX ADMIN — IPRATROPIUM BROMIDE AND ALBUTEROL SULFATE 3 ML: .5; 3 SOLUTION RESPIRATORY (INHALATION) at 13:00

## 2023-04-18 RX ADMIN — CALCIUM CARBONATE 1000 MG: 500 TABLET, CHEWABLE ORAL at 08:08

## 2023-04-18 RX ADMIN — CALCIUM CARBONATE 1000 MG: 500 TABLET, CHEWABLE ORAL at 13:38

## 2023-04-18 RX ADMIN — SODIUM CHLORIDE 300 ML: 9 INJECTION, SOLUTION INTRAVENOUS at 09:28

## 2023-04-18 ASSESSMENT — ACTIVITIES OF DAILY LIVING (ADL)
ADLS_ACUITY_SCORE: 41

## 2023-04-18 NOTE — PROGRESS NOTES
Neuro: A&Ox4.   Cardiac: HR stable; No chest discomfort. Strong radial and popliteal pulses    Respiratory: Unremarkable.   GI/: Adequate urine output. Self- cath twice during the shift. BM X 2 - loose, brown, small and large amount  Diet/appetite: Tolerating diet. Eating well.  Activity:  Ambulated to the bathroom and tolerated.   Pain: No pain.  Skin: Noted increased wound length LLQ. Refer to skin assessment for more details. Wound care provided. MD notified.   LDA's: PICC line is patent. Dressing is clean, dry and intact. Dressing in AV fistula left hand and HD cath are CDI.   Plan: Repeat BMP and Mg tomorrow. Possible discharge date on 4/20/2023, Thursday.  Continue with POC. Notify primary team with changes.

## 2023-04-18 NOTE — PROGRESS NOTES
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    Today's updates/changes:  - Continuing IV Fluconazole, will transition to PO at discharge  - Dialyzed today per TTS  - Scheduled Bisacodyl in addition to Miralax and Senna-Docusate  - Planning for discharge in next 1-2 days    Acute Hospital Problems:     Peritonitis 2/2 candida albicans, peritoneal dialysis-related  Sepsis, resolved  Intraabdominal abscesses along PD catheter track s/p I&D   In setting of peritoneal dialysis, one month of difficult PD access, few days of \"foamy\" and possibly purulent PD cath discharge, fevers. Unable to tolerate peritoneal antibiotics prior to admission. Initially w/ tachycardia and leukocytosis. Peritoneal culture prior to admission growing budding yeast, no bacterial growth. Peritoneal fluid and PD cath tip cultures from this admission also growing candida albicans, pan-susceptible. Overall hemodynamically and clinically stable on antifungal therapy. CT scan  showing abscesses along track, s/p I&D on . HD and clinically stable since.  Plan:  - Antibiotics: Fluconazole IV (renally dosed)  - present, plan for 4 week course (EOT 23)  - No ID follow-up needed  - General surgery has " signed off  - Follow up for wound check ~4/28. Please call the General Surgery Clinic at [phone] with any questions.  - Post-op pain regimen: PRN Tylenol and Oxycodone available  - Post-op dressing recommendations: See surgery progress note dated 4/14/23     Small bowel obstruction vs infection-associated ileus, improving  With transition point closely associated to PD catheter which is now s/p removal. In context of poor PO, nausea, vomiting, and constipation at home prior to admission. Was improving w/ sepsis treatment, tolerating PO. Some worsening abd pain on 4/12, AXR reassuring, passed bowel w/ enema and subsequently having daily bowel movements. CT 4/14 with mild distension concern for either resolving or restarting mild obstruction at side of abscesses. As of 4/18, appetite is picking up, passing quite loose stools but mother concerned not formed enough stool.  Plan:  - Regular diet ok as able to tolerate  - Continue to monitor stool output   - Bowel regimen - BID Senna/Docusate and BID Miralax, add qday Bisacodyl 4/17, enemas PRN; will consider bowel prep tomorrow if still not passing much but reassured mother today that passing gas and at least some form of stools is reassuring w/ a benign abdominal exam and tolerating PO  - Symptomatic support: Zofran, Compazine prn     ESRD w/ PD  Mild hyperkalemia  AGMA  Likely in setting of incomplete peritoneal dialysis leading up to admission. Now s/p PD cath removal in setting of peritonitis as above w/ temporary CVC placed for HD reinitiation this hospital stay. Nephrology is following, now dialyzed at least three times as inpt.  Plan:  - Access: AVF functional but since new needs additional access, tunneled line placed this admission  - Has outpt dialysis seat secured - TTS schedule  - PTA Cinacalcet 30mg qday, PO NaHCO3 650mg TID  - Will touch base today re: ZAHRAA on 4/14 labs     Restrictive lung disease  Chronic. Currently without tachypnea or hypoxia.  Comfortable-appearing. Some bronchial cuffing on CXR, though not different than prior, some coarse breath sounds and increased secretions on history. Initial viral panel unremarkable. Suspect primary infection is peritonitis as above and these are chronic changes.  Plan:  - PTA Azithro MWF held for now per mother's request, will restart on discharge  - PTA Duonebs and vest therapy  - RT following for assistance     Chronic malnutrition  Has PEG but not tube feed dependent, tolerates PO.     Neurogenic bladder s/p mitranoff urinary diversion  Still makes some degree of urine. Self-caths every ~4 hours daily w/ intermittent irrigation. Last urology visit in 2022.  Plan:  - Straight cath orders for ~q4h while awake     HTN - Back on PTA Amlodipine and Clonidine (Clonidine was  but verified still taking at home, Captopril also  but verified he is not taking this)     GERD - PTA Famotidine        Hospital Checklist:  Diet: Regular diet   VTE ppx: SQH  LDA: PIV, Tunneled ij, G-tube, Mitranoff diversion  Code: Full  Level of care: General Medicine  Family: Valerie Kingston - [phone]      Discharge Planning:  Pending better bowel movement control, likely - to home with mom. Has outpt HD seat already. Will not need IV abx.    Staffed w/ attending physician, Dr. Morales.    Nahid Morrison MD  Internal Medicine PGY-2  [place] [id number] Medicine  (Please see sign-in/sign-out for up-to-date physician coverage information)      Interval Events:   Overnight handoff and nursing reports reviewed. No acute interval events. Per mother, more comfortable last night into today. No significant abdomianl pain. PO intake is better.Several loose bowel movements yesterday. Seen after dialysis, no complaints that Greg reports other than being tired after dialysis and wanting to nap.    Objective:     Vitals:    23 0400 23 0300 23 0500   Weight: 43.3 kg (95 lb 6.4 oz) 44.1 kg (97 lb 3.2 oz) 44.7 kg (98  lb 8 oz)     Vital Signs with Ranges  Temp:  [97.8  F (36.6  C)-98.5  F (36.9  C)] 97.8  F (36.6  C)  Pulse:  [] 105  Resp:  [13-24] 24  BP: (132-164)/() 132/80  FiO2 (%):  [30 %] 30 %  SpO2:  [94 %-97 %] 94 %  I/O last 3 completed shifts:  In: 1090 [P.O.:860; I.V.:230]  Out: 1425 [Urine:1425]    Exam:   General: No acute distress, laying in bed on left side initially asleep  Lungs: No increased WOB, on room air  Abd: Soft, non-distended, area around PD site covered with clean bandage, not firm, much less tender than prior examinations  Ext: Warm and well-perfused, no appreciable lower extremity edema    Medications     heparin (porcine) 500 Units/hr (04/10/23 1300)       amLODIPine  5 mg Oral BID     [Held by provider] azithromycin  250 mg Oral Once per day on Mon Wed Fri     bisacodyl  5 mg Oral Daily    Or     bisacodyl  10 mg Rectal Daily     calcium carbonate  1,000 mg Oral 4x Daily     cinacalcet  30 mg Oral Daily     cloNIDine  0.1 mg Oral BID     famotidine  40 mg Oral Daily     fluconazole  200 mg Intravenous Q24H     sodium chloride (PF) 0.9%  1.3-2.6 mL Intracatheter Once    Followed by     heparin  3 mL Intracatheter Once     sodium chloride (PF) 0.9%  1.3-2.6 mL Intracatheter Once    Followed by     heparin  3 mL Intracatheter Once     heparin ANTICOAGULANT  5,000 Units Subcutaneous Q12H     heparin lock flush  5-20 mL Intracatheter Q24H     ipratropium - albuterol 0.5 mg/2.5 mg/3 mL  3 mL Nebulization 4x Daily     melatonin  10 mg Oral At Bedtime     polyethylene glycol  17 g Oral or Feeding Tube BID     senna-docusate  1 tablet Oral BID    Or     senna-docusate  2 tablet Oral BID     sodium chloride (PF)  10-40 mL Intracatheter Q8H     sodium chloride (PF)  3 mL Intracatheter Q8H     Vitamin D3  1,000 Units Oral BID       Data   Recent Labs   Lab 04/18/23  0705 04/17/23  0840 04/16/23  0706 04/13/23  0749 04/12/23  1628 04/12/23  0605   WBC 9.0 9.0 9.0   < >  --  14.9*   HGB 7.7* 7.3* 7.5*    < >  --  9.3*   MCV 96 96 95   < >  --  92   * 502* 441   < >  --  390   INR  --   --   --   --  1.21*  --     139 138   < >  --  136   POTASSIUM 6.0* 5.1 5.0   < >  --  4.0   CHLORIDE 104 104 103   < >  --  100   CO2 22 23 24   < >  --  23   BUN 43.3* 40.0* 26.4*   < >  --  21.8*   CR 5.94* 5.61* 4.03*   < >  --  3.11*   ANIONGAP 13 12 11   < >  --  13   SUSAN 9.0 8.9 8.6   < >  --  8.8   GLC 84 85 86   < >  --  97   ALBUMIN  --   --   --   --   --  2.5*   PROTTOTAL  --   --   --   --   --  5.8*   BILITOTAL  --   --   --   --   --  0.2   ALKPHOS  --   --   --   --   --  69   ALT  --   --   --   --   --  11   AST  --   --   --   --   --  30    < > = values in this interval not displayed.       No results found for this or any previous visit (from the past 24 hour(s)).

## 2023-04-18 NOTE — PROGRESS NOTES
HEMODIALYSIS TREATMENT NOTE    Date: 4/18/2023  Time: 11:12 AM    Data:  Pre Wt:   44.7 kg  Desired Wt: 44.7  kg   Post Wt:  44.7 ( estimated )  Weight change:  0  kg  Ultrafiltration - Post Run Net Total Removed (mL): 0 mL  Vascular Access Status: patent  Dialyzer Rinse: Clear  Total Blood Volume Processed: 49.14 L Liters  Total Dialysis (Treatment) Time: 3.0 Hours  Blood flow rate 200-350  Dialysate flow rate  600  K+2, Ca++ 2.5  No heparin    Lab:   no    Interventions:  Left lower arm AVF, Bruit/thrill present,  Lidocaine intradermal admin for needle placement,  accessed with 16 ga needles  350 blood flow rate achieved, lowered to 250 - 300 due to arterial spasming  Epo admin per orders, Breakfast tray given, ate 100 %  Vital signs monitoring every 15 min and PRN  Goal adjustment per critline and hemodynamics,  Net fluid removed 0 ml, rinsed back successfully,   Needles pulled easily, pressure applied  Homeostasis achieved in 10 min, dressing applied and secured with tape.  See HD flow sheet for all data, report given to primary RN    Assessment:  Alert and oriented x 4, no respiratory distress, calm and cooperative, denies pain, was hemodynamically  stable throughout hemodialysis     Plan:    Per renal Team

## 2023-04-18 NOTE — PROGRESS NOTES
Nephrology Progress Note  04/18/2023         Assessment  Mr. 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  He is now/sp peritoneal dialysis catheter removal (4/7) and undergoing hemodialysis via a temp right internal jugular dialysis cathter.    1. ESKD: Previously on HD (12/2021-3/2022) then on PD (3/2022-4/2022). Now back on HD (first run 4/8/2023) as PD had to be discontinued due to fungal peritonitis. Nephrologist is Dr. Bandar Sharma.   -   HD at [facility] on TTS schedule (they will accept pt in spite of trach)  - plan HD per TTS schedule  - AVF is excellent however it is newly being used and will take a bit of time to be fully reliable; tunneled CVC placed. AVF working well today with two 16 g needles  - pt requires heparin to avoid clotting the HD system  - pt/pt's mother much prefer PD and mother says pt didn't tolerate HD well     2. Peritonitis c/b PD catheter site infection: Due to C. Albicans.   - PD catheter removed on 4/7/2023.  - I&D 4/14  - per ID, plan for fluconazole through 5/5/23 given likely future plan to have new PD catheter placed    3. BP/volume: 140-160's, coming down into 130's on HD  - continued on amlodipine 5 mg bid, consider adding low dose losartan; all BP meds should be held before HD  - signficant UOP (1875 ml yesterday)  - no UF today  - pts mother reports when previously on HD, no UF due to hypotension and ongoing UOP    4. Neurogenic bladder s/p Mitrofanoff: He does have some urine output. Self caths with intermittent irrigation.     5. Anemia of CKD: Hgb above goal upon admission with slight decline post operatively. Iron studies ferritin 1092, iron 24, IS 21  - next HD will start epogen 1000 units per HD  - will start IV venofer 50 mg Thurs    6. Secondary hyperparathyroidism: On sensipar (30 mg)    7.  "Acid/base:    - stopped PO bicarb now that pt is on HD            GERARD Virgen   Division of Renal Disease and Hypertension  [phone]    Interval History :   Seen on dialysis, using two 16 g needles, working well. No n/v, CP, SOB, chills    Review of Systems:   4 point ROS neg other than as noted above    Physical Exam:   I/O last 3 completed shifts:  In: 1525 [P.O.:1300; I.V.:225]  Out: 2225 [Urine:2225]   BP (!) 164/91   Pulse 96   Temp 98.3  F (36.8  C) (Oral)   Resp 16   Ht 1.676 m (5' 6\")   Wt 44.7 kg (98 lb 8 oz)   SpO2 94%   BMI 15.90 kg/m       GENERAL APPEARANCE: NAD  HEENT: no scleral icterus, trach domed  PULM: lungs CTA    CV: regular rhythm, normal rate      -no LE edema  : Mitrofanoff   GI: soft    INTEGUMENT: no concerning  NEURO:  no asterixis, no focal deficit   Access: tunneled RIJ, L forearm AVF    Labs:   All labs reviewed by me  Electrolytes/Renal -   Recent Labs   Lab Test 04/18/23  0705 04/17/23  0840 04/16/23  0706 04/15/23  0726 04/13/23  0749 04/12/23  0605 04/11/23  0747 04/10/23  0919 10/19/21  1433 08/30/21  1300    139 138 132* 135* 136   < > 135*   < > 141   POTASSIUM 6.0* 5.1 5.0 4.7 4.5 4.0   < > 4.5   < > 5.0   CHLORIDE 104 104 103 97* 98 100   < > 101   < > 112*   CO2 22 23 24 20* 24 23   < > 18*   < > 21   BUN 43.3* 40.0* 26.4* 37.7* 29.3* 21.8*   < > 42.9*   < > 84*   CR 5.94* 5.61* 4.03* 5.29* 4.82* 3.11*   < > 4.11*   < > 6.12*   GLC 84 85 86 92 107* 97   < > 89   < > 84   SUSAN 9.0 8.9 8.6 8.6 9.0 8.8   < > 8.8   < > 8.5   MAG 1.8  --   --  2.1 2.9* 1.4*   < > 1.6*   < >  --    PHOS  --   --   --   --   --  4.1  --  5.4*  --  6.0*    < > = values in this interval not displayed.       CBC -   Recent Labs   Lab Test 04/18/23  0705 04/17/23  0840 04/16/23  0706   WBC 9.0 9.0 9.0   HGB 7.7* 7.3* 7.5*   * 502* 441       LFTs -   Recent Labs   Lab Test 04/12/23  0605 08/30/21  1300 11/30/20  1415 10/15/20  1138 12/22/16  1325 09/17/15  1205   ALKPHOS 69 "  --   --  94  --  80   BILITOTAL 0.2  --   --  0.2  --   --    ALT 11  --   --  15  --   --    AST 30  --   --  10  --   --    PROTTOTAL 5.8*  --   --  7.4  --   --    ALBUMIN 2.5* 3.7 3.7 3.9   < > 3.8    < > = values in this interval not displayed.       Iron Panel -   Recent Labs   Lab Test 04/13/23  0749 08/30/21  1300 08/30/21  1300 11/30/20  1415   IRON 24*  --  74 92   IRONSAT 21  --  29 34   JUVENAL 1,092*   < > 66 43    < > = values in this interval not displayed.         Imaging:  Abdominal CT (4/7/23):  Impression:      1. Findings of bowel obstruction with small bowel loops dilated up to  4 cm in the left hemiabdomen. Transition point is likely located in  the pelvis along the peritoneal dialysis catheter, with appreciable  inflammatory changes about the small and large bowel at this site.      2. Moderate hiatal hernia with fluid-filled stomach.     3. Partially visualized lung bases demonstrates mosaic attenuation and  diffuse peribronchial thickening, which can be seen in the setting of  small airway disease.   Current Medications:    amLODIPine  5 mg Oral BID     [Held by provider] azithromycin  250 mg Oral Once per day on Mon Wed Fri     bisacodyl  5 mg Oral Daily    Or     bisacodyl  10 mg Rectal Daily     calcium carbonate  1,000 mg Oral 4x Daily     cinacalcet  30 mg Oral Daily     famotidine  40 mg Oral Daily     fluconazole  200 mg Intravenous Q24H     sodium chloride (PF) 0.9%  1.3-2.6 mL Intracatheter Once    Followed by     heparin  3 mL Intracatheter Once     sodium chloride (PF) 0.9%  1.3-2.6 mL Intracatheter Once    Followed by     heparin  3 mL Intracatheter Once     heparin ANTICOAGULANT  5,000 Units Subcutaneous Q12H     heparin lock flush  5-20 mL Intracatheter Q24H     ipratropium - albuterol 0.5 mg/2.5 mg/3 mL  3 mL Nebulization 4x Daily     melatonin  10 mg Oral At Bedtime     polyethylene glycol  17 g Oral or Feeding Tube BID     senna-docusate  1 tablet Oral BID    Or      senna-docusate  2 tablet Oral BID     sodium chloride (PF)  10-40 mL Intracatheter Q8H     sodium chloride (PF)  3 mL Intracatheter Q8H     Vitamin D3  1,000 Units Oral BID       heparin (porcine) 500 Units/hr (04/10/23 1300)     - MEDICATION INSTRUCTIONS -     All above labs and imaging reviewed by me.   Nell Wong, PA

## 2023-04-18 NOTE — PROGRESS NOTES
Surgery Progress Note    Aidan Louie  [id number]    Medicine requested surgery to examine InD site iso increased pain o/n and somewhat firm skin around incision site.    Temp:  [98.3  F (36.8  C)-98.8  F (37.1  C)] 98.3  F (36.8  C)  Pulse:  [] 101  Resp:  [15-24] 24  BP: (133-162)/() 153/83  FiO2 (%):  [30 %] 30 %  SpO2:  [94 %-98 %] 97 %    Intake/Output Summary (Last 24 hours) at 4/17/2023 1125  Last data filed at 4/17/2023 1000  Gross per 24 hour   Intake 760 ml   Output 1775 ml   Net -1015 ml       NAD, resting comfortably  regular rate and rhythm  non-labored breathing  soft, mildly distended, InD site covered by bandage, with granulation tissue, no purulence, no erythema / edema. Mildly tender around wound. Skin edges slightly firm. No fluctuance or crepitus.  warm and well perfused   alert, oriented    27 year old male POD 3 s/p bedside InD.    Medicine requested surgery to examine InD site iso increased pain o/n and somewhat firm skin around incision site. Incisional area tender. Given vital signs all wnl, no fever, nl WBC, would defer imaging at this time; if pain continues to increase or if vitals / labs point to recurrent abscess can consider reimaging.    - reassured against wound infection given vitally normal  - if pain continues to increase or if wound becomes cellulitic or if pts vitals begin to point toward infection, may consider reimaging  - surgery to sign off at this time    Seen with staff and chief resident    Darell Seaman MD   Surgery PGY-1

## 2023-04-19 LAB
ANION GAP SERPL CALCULATED.3IONS-SCNC: 12 MMOL/L (ref 7–15)
BACTERIA BLD CULT: NO GROWTH
BACTERIA BLD CULT: NO GROWTH
BUN SERPL-MCNC: 36.3 MG/DL (ref 6–20)
CALCIUM SERPL-MCNC: 9.2 MG/DL (ref 8.6–10)
CHLORIDE SERPL-SCNC: 104 MMOL/L (ref 98–107)
CREAT SERPL-MCNC: 4.53 MG/DL (ref 0.67–1.17)
DEPRECATED HCO3 PLAS-SCNC: 22 MMOL/L (ref 22–29)
GFR SERPL CREATININE-BSD FRML MDRD: 17 ML/MIN/1.73M2
GLUCOSE SERPL-MCNC: 91 MG/DL (ref 70–99)
HOLD SPECIMEN: NORMAL
MAGNESIUM SERPL-MCNC: 1.6 MG/DL (ref 1.7–2.3)
POTASSIUM SERPL-SCNC: 5.4 MMOL/L (ref 3.4–5.3)
SODIUM SERPL-SCNC: 138 MMOL/L (ref 136–145)

## 2023-04-19 PROCEDURE — 250N000011 HC RX IP 250 OP 636

## 2023-04-19 PROCEDURE — 999N000157 HC STATISTIC RCP TIME EA 10 MIN

## 2023-04-19 PROCEDURE — 120N000002 HC R&B MED SURG/OB UMMC

## 2023-04-19 PROCEDURE — 80048 BASIC METABOLIC PNL TOTAL CA: CPT

## 2023-04-19 PROCEDURE — 99233 SBSQ HOSP IP/OBS HIGH 50: CPT | Mod: GC | Performed by: INTERNAL MEDICINE

## 2023-04-19 PROCEDURE — 250N000011 HC RX IP 250 OP 636: Performed by: INTERNAL MEDICINE

## 2023-04-19 PROCEDURE — 94640 AIRWAY INHALATION TREATMENT: CPT | Mod: 76

## 2023-04-19 PROCEDURE — 94640 AIRWAY INHALATION TREATMENT: CPT

## 2023-04-19 PROCEDURE — 83735 ASSAY OF MAGNESIUM: CPT

## 2023-04-19 PROCEDURE — 250N000013 HC RX MED GY IP 250 OP 250 PS 637: Performed by: STUDENT IN AN ORGANIZED HEALTH CARE EDUCATION/TRAINING PROGRAM

## 2023-04-19 PROCEDURE — 250N000009 HC RX 250

## 2023-04-19 PROCEDURE — 250N000013 HC RX MED GY IP 250 OP 250 PS 637

## 2023-04-19 PROCEDURE — 94669 MECHANICAL CHEST WALL OSCILL: CPT

## 2023-04-19 PROCEDURE — 36415 COLL VENOUS BLD VENIPUNCTURE: CPT

## 2023-04-19 RX ADMIN — CALCIUM CARBONATE 1000 MG: 500 TABLET, CHEWABLE ORAL at 11:47

## 2023-04-19 RX ADMIN — IPRATROPIUM BROMIDE AND ALBUTEROL SULFATE 3 ML: .5; 3 SOLUTION RESPIRATORY (INHALATION) at 12:25

## 2023-04-19 RX ADMIN — CALCIUM CARBONATE 1000 MG: 500 TABLET, CHEWABLE ORAL at 09:16

## 2023-04-19 RX ADMIN — POLYETHYLENE GLYCOL 3350 17 G: 17 POWDER, FOR SOLUTION ORAL at 09:16

## 2023-04-19 RX ADMIN — IPRATROPIUM BROMIDE AND ALBUTEROL SULFATE 3 ML: .5; 3 SOLUTION RESPIRATORY (INHALATION) at 16:31

## 2023-04-19 RX ADMIN — FAMOTIDINE 40 MG: 20 TABLET ORAL at 20:05

## 2023-04-19 RX ADMIN — HEPARIN SODIUM 5000 UNITS: 5000 INJECTION, SOLUTION INTRAVENOUS; SUBCUTANEOUS at 09:16

## 2023-04-19 RX ADMIN — CLONIDINE HYDROCHLORIDE 0.1 MG: 0.1 TABLET ORAL at 09:19

## 2023-04-19 RX ADMIN — AMLODIPINE BESYLATE 5 MG: 5 TABLET ORAL at 09:17

## 2023-04-19 RX ADMIN — CINACALCET 30 MG: 30 TABLET ORAL at 09:16

## 2023-04-19 RX ADMIN — Medication 10 MG: at 23:01

## 2023-04-19 RX ADMIN — CALCIUM CARBONATE 1000 MG: 500 TABLET, CHEWABLE ORAL at 17:04

## 2023-04-19 RX ADMIN — AMLODIPINE BESYLATE 5 MG: 5 TABLET ORAL at 17:04

## 2023-04-19 RX ADMIN — SENNOSIDES AND DOCUSATE SODIUM 1 TABLET: 8.6; 5 TABLET ORAL at 20:05

## 2023-04-19 RX ADMIN — HEPARIN SODIUM 5000 UNITS: 5000 INJECTION, SOLUTION INTRAVENOUS; SUBCUTANEOUS at 20:05

## 2023-04-19 RX ADMIN — CALCIUM CARBONATE 1000 MG: 500 TABLET, CHEWABLE ORAL at 20:05

## 2023-04-19 RX ADMIN — Medication 1000 UNITS: at 20:05

## 2023-04-19 RX ADMIN — SENNOSIDES AND DOCUSATE SODIUM 1 TABLET: 8.6; 5 TABLET ORAL at 09:16

## 2023-04-19 RX ADMIN — POLYETHYLENE GLYCOL 3350 17 G: 17 POWDER, FOR SOLUTION ORAL at 20:04

## 2023-04-19 RX ADMIN — IPRATROPIUM BROMIDE AND ALBUTEROL SULFATE 3 ML: .5; 3 SOLUTION RESPIRATORY (INHALATION) at 22:29

## 2023-04-19 RX ADMIN — Medication 1000 UNITS: at 09:19

## 2023-04-19 RX ADMIN — BISACODYL 5 MG: 5 TABLET, COATED ORAL at 09:17

## 2023-04-19 RX ADMIN — CLONIDINE HYDROCHLORIDE 0.1 MG: 0.1 TABLET ORAL at 20:05

## 2023-04-19 RX ADMIN — ACETAMINOPHEN 1000 MG: 325 TABLET ORAL at 20:05

## 2023-04-19 RX ADMIN — FLUCONAZOLE IN SODIUM CHLORIDE 200 MG: 2 INJECTION, SOLUTION INTRAVENOUS at 11:47

## 2023-04-19 ASSESSMENT — ACTIVITIES OF DAILY LIVING (ADL)
ADLS_ACUITY_SCORE: 40
ADLS_ACUITY_SCORE: 41
ADLS_ACUITY_SCORE: 39
ADLS_ACUITY_SCORE: 41
ADLS_ACUITY_SCORE: 41
ADLS_ACUITY_SCORE: 39
ADLS_ACUITY_SCORE: 39
ADLS_ACUITY_SCORE: 41
ADLS_ACUITY_SCORE: 39
ADLS_ACUITY_SCORE: 39
ADLS_ACUITY_SCORE: 41
ADLS_ACUITY_SCORE: 39

## 2023-04-19 NOTE — PLAN OF CARE
Major Shift Events:  Pt up and ambulating in room. BM x1 this shift, straight cath independently by self x1 this shift. VSS. Trach capped w/ speaking valve on today. Denies pain. Mom at the bedside; SW able to assist mom with a discharge planning needs. Adequate intake. Medically stable to discharge.    Plan: HD tomorrow AM @ 0740 AM then discharge     For vital signs and complete assessments, please see documentation flowsheets.    Problem: Infection  Goal: Absence of Infection Signs and Symptoms  Outcome: Progressing        Remains on fluconazole IV. Will transition to PO for discharge.

## 2023-04-19 NOTE — PLAN OF CARE
Goal Outcome Evaluation:      Plan of Care Reviewed With: patient, family    Overall Patient Progress: improvingOverall Patient Progress: improving    Outcome Evaluation: Patient consuming 100 % of 3 meal(s) daily. PO improved since last assessment. Pt and mom agree PO has improved. No questions/concerns from pt or mom for writer about nutrition today. See RD note 4/19 for details.    Skye Abdi RDN, LD  6D RD pager: [phone]  Weekend/Holiday RD pager: [phone]

## 2023-04-19 NOTE — PROGRESS NOTES
"[facility]  Inpatient Medicine Service - [place] Team 4  Daily Progress Note    Patient: Aidan Louie      : 1995      MRN: [id number]    Assessment/Plan:   Aidan Louie is a 27 year old male w/ pertinent PMHx of ESRD 2/2 reflux nephropathy on peritoneal dialysis, prune belly syndrome, horseshoe kidney, left to right transureteroureterostomy, neurogenic bladder s/p mitronoff urinary diversion, Dawood-Reece sequence, and restrictive lung disease w/ prior tracheostomy (decannulated ), and cognitive delay admitted since 23 for abdominal pain, n/v, constipation, and PD catheter purulence and malfunction who was found to have clinical sepsis likely from PD-related peritonitis w/ adjacent small bowel obstruction.    Today's updates/changes:  - Continuing IV Fluconazole, will transition to PO at discharge  - Tolerating PO, passing stool, going out for at least one walk per day w/ PT  - Plan for discharge home tomorrow after dialysis    Acute Hospital Problems:     Fungal peritonitis 2/2 candida albicans, peritoneal dialysis-related  Sepsis, resolved  Fungal intraabdominal abscesses along PD catheter track s/p I&D   In setting of peritoneal dialysis, one month of difficult PD access, few days of \"foamy\" and possibly purulent PD cath discharge, fevers. Unable to tolerate peritoneal antibiotics prior to admission. Initially w/ tachycardia and leukocytosis. Peritoneal culture prior to admission growing budding yeast, no bacterial growth. Peritoneal fluid and PD cath tip cultures from this admission also growing candida albicans, pan-susceptible. Overall hemodynamically and clinically stable on antifungal therapy. CT scan  showing abscesses along track, s/p I&D on . HD and clinically stable since.  Plan:  - Antibiotics: Fluconazole IV (renally dosed)  - present, will transition to PO at discharge, plan for 4 week course (EOT 23)  - No " ID follow-up needed  - General surgery has signed off  - Follow up for wound check ~4/28. Please call the General Surgery Clinic at [phone] with any questions.  - Post-op pain regimen: PRN Tylenol and Oxycodone available  - Post-op dressing recommendations: See surgery progress note dated 4/14/23     Small bowel obstruction vs infection-associated ileus, improving  With transition point closely associated to PD catheter which is now s/p removal. In context of poor PO, nausea, vomiting, and constipation at home prior to admission. Was improving w/ sepsis treatment, tolerating PO. Some worsening abd pain on 4/12, AXR reassuring, passed bowel w/ enema and subsequently having daily bowel movements. CT 4/14 with mild distension concern for either resolving or restarting mild obstruction at side of abscesses. As of 4/19, appetite is picking up, passing more frequent stools though loose.  Plan:  - Regular diet ok as able to tolerate  - Continue to monitor stool output   - Bowel regimen - BID Senna/Docusate and BID Miralax, add qday Bisacodyl 4/17, enemas PRN  - Symptomatic support: Zofran, Compazine prn     ESRD w/ PD  Mild hyperkalemia  AGMA  Likely in setting of incomplete peritoneal dialysis leading up to admission. Now s/p PD cath removal in setting of peritonitis as above w/ temporary CVC placed for HD reinitiation this hospital stay. Nephrology is following, now dialyzed at least three times as inpt.  Plan:  - Access: AVF functional but since new needs additional access, tunneled line placed this admission  - Has outpt dialysis seat secured - TTS schedule  - PTA Cinacalcet 30mg qday, PO NaHCO3 650mg TID  - Will touch base today re: ZAHRAA on 4/14 labs     Restrictive lung disease  Chronic. Currently without tachypnea or hypoxia. Comfortable-appearing. Some bronchial cuffing on CXR, though not different than prior, some coarse breath sounds and increased secretions on history. Initial viral panel unremarkable.  Suspect primary infection is peritonitis as above and these are chronic changes.  Plan:  - PTA Azithro MWF held for now per mother's request, will restart on discharge  - PTA Duonebs and vest therapy  - RT following for assistance     Chronic malnutrition  Has PEG but not tube feed dependent, tolerates PO.     Neurogenic bladder s/p mitranoff urinary diversion  Still makes some degree of urine. Self-caths every ~4 hours daily w/ intermittent irrigation. Last urology visit in 2022.  Plan:  - Straight cath orders for ~q4h while awake     HTN - Back on PTA Amlodipine and Clonidine (Clonidine was  but verified still taking at home, Captopril also  but verified he is not taking this)     GERD - PTA [facility] Checklist:  Diet: Regular diet   VTE ppx: SQH  LDA: PIV, Tunneled ij, G-tube, Mitranoff diversion  Code: Full  Level of care: General Medicine  Family: , Valerie - [phone]      Discharge Planning:  Discharge home tomorrow  after dialysis. No IV abx. Has outpt HD seat already set up.    Staffed w/ attending physician, Dr. Morales.    Nahid Morrison MD  Internal Medicine PGY-2  [place] [address] Medicine  (Please see sign-in/sign-out for up-to-date physician coverage information)      Interval Events:   No acute interval events. Seen this afternoon during vest therapy. Denies any new concerns, otherwise watching a movie and resting comfortably. Passed several large stools yesterday though mostly loose. Tolerating PO. No new abdominal pain, n/v. Walked w/ PT yesterday, doing this more regularly.    Objective:     Vitals:    23 0400 23 0300 23 0500   Weight: 43.3 kg (95 lb 6.4 oz) 44.1 kg (97 lb 3.2 oz) 44.7 kg (98 lb 8 oz)     Vital Signs with Ranges  Temp:  [98.4  F (36.9  C)-98.8  F (37.1  C)] 98.4  F (36.9  C)  Pulse:  [72-95] 92  Resp:  [14-24] 14  BP: (111-131)/(60-69) 112/65  FiO2 (%):  [21 %] 21 %  SpO2:  [93 %-98 %] 98 %  I/O last 3 completed  shifts:  In: 220 [P.O.:120; I.V.:100]  Out: 950 [Urine:950]    Exam:   General: No acute distress, sitting upright in bed w/ vest therapy watching movie  Lungs: No increased WOB, CTA b/l, on room air  Abd: Soft, non-distended, area around PD site covered with clean bandage, not firm, much less tender than prior examinations  Ext: Warm and well-perfused, no appreciable lower extremity edema    Medications     heparin (porcine) 500 Units/hr (04/10/23 1300)       amLODIPine  5 mg Oral BID     [Held by provider] azithromycin  250 mg Oral Once per day on Mon Wed Fri     bisacodyl  5 mg Oral Daily    Or     bisacodyl  10 mg Rectal Daily     calcium carbonate  1,000 mg Oral 4x Daily     cinacalcet  30 mg Oral Daily     cloNIDine  0.1 mg Oral BID     famotidine  40 mg Oral Daily     fluconazole  200 mg Intravenous Q24H     sodium chloride (PF) 0.9%  1.3-2.6 mL Intracatheter Once    Followed by     heparin  3 mL Intracatheter Once     sodium chloride (PF) 0.9%  1.3-2.6 mL Intracatheter Once    Followed by     heparin  3 mL Intracatheter Once     heparin ANTICOAGULANT  5,000 Units Subcutaneous Q12H     heparin lock flush  5-20 mL Intracatheter Q24H     ipratropium - albuterol 0.5 mg/2.5 mg/3 mL  3 mL Nebulization 4x Daily     melatonin  10 mg Oral At Bedtime     polyethylene glycol  17 g Oral or Feeding Tube BID     senna-docusate  1 tablet Oral BID    Or     senna-docusate  2 tablet Oral BID     sodium chloride (PF)  10-40 mL Intracatheter Q8H     sodium chloride (PF)  3 mL Intracatheter Q8H     Vitamin D3  1,000 Units Oral BID       Data   Recent Labs   Lab 04/19/23  0759 04/18/23  0705 04/17/23  0840 04/16/23  0706   WBC  --  9.0 9.0 9.0   HGB  --  7.7* 7.3* 7.5*   MCV  --  96 96 95   PLT  --  496* 502* 441    139 139 138   POTASSIUM 5.4* 6.0* 5.1 5.0   CHLORIDE 104 104 104 103   CO2 22 22 23 24   BUN 36.3* 43.3* 40.0* 26.4*   CR 4.53* 5.94* 5.61* 4.03*   ANIONGAP 12 13 12 11   SUSAN 9.2 9.0 8.9 8.6   GLC 91 84 85 86        No results found for this or any previous visit (from the past 24 hour(s)).

## 2023-04-19 NOTE — PROGRESS NOTES
Care Management Follow Up    Length of Stay (days): 13    Expected Discharge Date: 04/19/2023     Concerns to be Addressed: care coordination/care conferences  pt's mother has limited mobility  Patient plan of care discussed at interdisciplinary rounds: Yes    Anticipated Discharge Disposition: Home     Anticipated Discharge Services: PCA  Anticipated Discharge DME:      Patient/family educated on Medicare website which has current facility and service quality ratings:    Education Provided on the Discharge Plan:    Patient/Family in Agreement with the Plan: yes    Referrals Placed by CM/SW:    Private pay costs discussed: Not applicable    Additional Information:  RNCC discussed discharge plan with charge nurse, SW and provider. Patient appropriate for discharge today/tomorrow. Will start with [facility], likely Saturday. RNCC spoke with Blayne Cameron, ALAN Jones. RNCC updated that facility is aware of trach and has/is receiving education on it (his trach is capped). Also will need to fax dialysis order over at discharge time. RNCC obtained [facility] fax number. RNCC continues to follow.      [facility] Admissions  Ph: [phone]  Fax: [phone]     [facility] [place] (Jose Herrera)  Ph: [phone]  Fax: [phone]  Chair time 2:45 p.m., arrive at 2:15 for first appointment, Tue/Thur/Sat schedule        DIAMANTE Mtz, BA, RN, CMSRN, RNCC  Covering Units 6D/OBS  Pager: [phone]  Phone: [phone]  6th floor Weekend/Holiday Pager: [phone]  Observation weekend/after hours: [phone]

## 2023-04-19 NOTE — PROGRESS NOTES
CLINICAL NUTRITION SERVICES - REASSESSMENT NOTE     Nutrition Prescription    RECOMMENDATIONS FOR MDs/PROVIDERS TO ORDER:  None currently    Malnutrition Status:    Moderate malnutrition in the context of chronic illness    Recommendations already ordered by Registered Dietitian (RD):    Patient to order meals as desired    Future/Additional Recommendations:    Monitor PO, wt, stooling     EVALUATION OF THE PROGRESS TOWARD GOALS   Diet: Regular    Intake/Tolerance: Patient consuming 100 % of 3 meal(s) daily.     NEW FINDINGS   PO improved since last assessment. Pt and mom agree PO has improved. No questions/concerns from pt or mom for writer about nutrition today.     GI:    Last BM: 04/17/23    Was constipated now having loose stools per EMR     On scheduled bowel med(s)    Weight:    Most Recent Weight: 44.7 kg (98 lb 8 oz)  on 4/18/23 via Standing scale    Body mass index is 15.9 kg/m .    Wt down 2.5 kg from admission. - 9.3 L from admission per I/O.     Meds:    Daily suppository; miralax BID; senna BID    TUMS    cinacalcet    Vitamin D3 1000 units BID    Renal:    Tues, Th, Sat hemodialysis    Labs:    Last phos 4.1 (WNL) 4/12 04/16/23 07:06 04/17/23 08:40 04/18/23 07:05 04/19/23 07:59   Sodium 138 139 139 138   Potassium 5.0 5.1 6.0 (H) 5.4 (H)   Chloride 103 104 104 104   Carbon Dioxide (CO2) 24 23 22 22   Urea Nitrogen 26.4 (H) 40.0 (H) 43.3 (H) 36.3 (H)   Creatinine 4.03 (H) 5.61 (H) 5.94 (H) 4.53 (H)   GFR Estimate 20 (L) 13 (L) 13 (L) 17 (L)   Calcium 8.6 8.9 9.0 9.2   Anion Gap 11 12 13 12   Magnesium   1.8 1.6 (L)   CRP Inflammation 71.00 (H) 57.90 (H)     Glucose 86 85 84 91       MALNUTRITION  % Intake: No decreased intake noted  % Weight Loss: > 10% in 6 months (severe malnutrition) -- cannot rule out confounded by fluid status  Subcutaneous Fat Loss: Upper arm:  mild and Lower arm:  mild   Muscle Loss: Thoracic region (clavicle, acromium bone, deltoid, trapezius, pectoral):  moderate, Upper arm  (bicep, tricep):  mild, Lower arm  (forearm):  mild and Posterior calf:  moderate  Fluid Accumulation/Edema: None noted  Malnutrition Diagnosis: Moderate malnutrition in the context of chronic illness    Previous Goals   Patient to consume % of nutritionally adequate meal trays TID, or the equivalent with supplements/snacks.  Evaluation: Met    Previous Nutrition Diagnosis  Inadequate oral intake related to nausea, constipation as evidenced by patient/family report, weight loss.     Evaluation: Improving    CURRENT NUTRITION DIAGNOSIS  Increased nutrient needs  related to hemodialysis as evidenced by increased protein and calorie needs.       INTERVENTIONS  Implementation  Modify composition of meals/snacks     Goals  Patient to consume % of nutritionally adequate meal trays TID, or the equivalent with supplements/snacks.    Monitoring/Evaluation  Progress toward goals will be monitored and evaluated per protocol.      Skye Abdi RDN, LD  6D RD pager: [phone]  Weekend/Holiday RD pager: [phone]

## 2023-04-20 LAB
ANION GAP SERPL CALCULATED.3IONS-SCNC: 14 MMOL/L (ref 7–15)
BUN SERPL-MCNC: 48.8 MG/DL (ref 6–20)
CALCIUM SERPL-MCNC: 8.7 MG/DL (ref 8.6–10)
CHLORIDE SERPL-SCNC: 104 MMOL/L (ref 98–107)
CREAT SERPL-MCNC: 5.1 MG/DL (ref 0.67–1.17)
DEPRECATED HCO3 PLAS-SCNC: 17 MMOL/L (ref 22–29)
GFR SERPL CREATININE-BSD FRML MDRD: 15 ML/MIN/1.73M2
GLUCOSE SERPL-MCNC: 126 MG/DL (ref 70–99)
PHOSPHATE SERPL-MCNC: 6.1 MG/DL (ref 2.5–4.5)
POTASSIUM SERPL-SCNC: 5.3 MMOL/L (ref 3.4–5.3)
SODIUM SERPL-SCNC: 135 MMOL/L (ref 136–145)

## 2023-04-20 PROCEDURE — 258N000003 HC RX IP 258 OP 636: Performed by: INTERNAL MEDICINE

## 2023-04-20 PROCEDURE — 94669 MECHANICAL CHEST WALL OSCILL: CPT

## 2023-04-20 PROCEDURE — 99233 SBSQ HOSP IP/OBS HIGH 50: CPT | Mod: GC | Performed by: INTERNAL MEDICINE

## 2023-04-20 PROCEDURE — 94640 AIRWAY INHALATION TREATMENT: CPT | Mod: 76

## 2023-04-20 PROCEDURE — 250N000009 HC RX 250

## 2023-04-20 PROCEDURE — 99233 SBSQ HOSP IP/OBS HIGH 50: CPT | Performed by: PHYSICIAN ASSISTANT

## 2023-04-20 PROCEDURE — 120N000002 HC R&B MED SURG/OB UMMC

## 2023-04-20 PROCEDURE — 250N000011 HC RX IP 250 OP 636: Performed by: INTERNAL MEDICINE

## 2023-04-20 PROCEDURE — 250N000013 HC RX MED GY IP 250 OP 250 PS 637

## 2023-04-20 PROCEDURE — 999N000157 HC STATISTIC RCP TIME EA 10 MIN

## 2023-04-20 PROCEDURE — 80048 BASIC METABOLIC PNL TOTAL CA: CPT | Performed by: INTERNAL MEDICINE

## 2023-04-20 PROCEDURE — 84100 ASSAY OF PHOSPHORUS: CPT | Performed by: INTERNAL MEDICINE

## 2023-04-20 PROCEDURE — 36415 COLL VENOUS BLD VENIPUNCTURE: CPT | Performed by: INTERNAL MEDICINE

## 2023-04-20 PROCEDURE — 94640 AIRWAY INHALATION TREATMENT: CPT

## 2023-04-20 PROCEDURE — 250N000011 HC RX IP 250 OP 636

## 2023-04-20 PROCEDURE — 250N000013 HC RX MED GY IP 250 OP 250 PS 637: Performed by: STUDENT IN AN ORGANIZED HEALTH CARE EDUCATION/TRAINING PROGRAM

## 2023-04-20 PROCEDURE — 634N000001 HC RX 634: Performed by: INTERNAL MEDICINE

## 2023-04-20 PROCEDURE — 90937 HEMODIALYSIS REPEATED EVAL: CPT

## 2023-04-20 RX ORDER — FLUCONAZOLE 2 MG/ML
200 INJECTION, SOLUTION INTRAVENOUS EVERY 24 HOURS
Qty: 2200 ML | Refills: 0 | Status: SHIPPED | OUTPATIENT
Start: 2023-04-20 | End: 2023-04-20

## 2023-04-20 RX ORDER — FLUCONAZOLE 200 MG/1
200 TABLET ORAL DAILY
Qty: 22 TABLET | Refills: 0 | Status: SHIPPED | OUTPATIENT
Start: 2023-04-20 | End: 2023-04-21

## 2023-04-20 RX ORDER — BISACODYL 5 MG
5 TABLET, DELAYED RELEASE (ENTERIC COATED) ORAL DAILY
Qty: 30 TABLET | Refills: 1 | Status: SHIPPED | OUTPATIENT
Start: 2023-04-21

## 2023-04-20 RX ORDER — HEPARIN SODIUM 1000 [USP'U]/ML
500 INJECTION, SOLUTION INTRAVENOUS; SUBCUTANEOUS CONTINUOUS
Status: DISCONTINUED | OUTPATIENT
Start: 2023-04-20 | End: 2023-04-20

## 2023-04-20 RX ORDER — SIMETHICONE 40MG/0.6ML
40 SUSPENSION, DROPS(FINAL DOSAGE FORM)(ML) ORAL EVERY 6 HOURS PRN
Qty: 45 ML | Refills: 0 | Status: SHIPPED | OUTPATIENT
Start: 2023-04-20

## 2023-04-20 RX ORDER — CLONIDINE HYDROCHLORIDE 0.1 MG/1
0.1 TABLET ORAL 2 TIMES DAILY
Qty: 60 TABLET | Refills: 3 | Status: SHIPPED | OUTPATIENT
Start: 2023-04-20

## 2023-04-20 RX ADMIN — SODIUM CHLORIDE 300 ML: 9 INJECTION, SOLUTION INTRAVENOUS at 08:50

## 2023-04-20 RX ADMIN — HEPARIN SODIUM 5000 UNITS: 5000 INJECTION, SOLUTION INTRAVENOUS; SUBCUTANEOUS at 21:18

## 2023-04-20 RX ADMIN — HEPARIN SODIUM 5000 UNITS: 5000 INJECTION, SOLUTION INTRAVENOUS; SUBCUTANEOUS at 08:05

## 2023-04-20 RX ADMIN — IPRATROPIUM BROMIDE AND ALBUTEROL SULFATE 3 ML: .5; 3 SOLUTION RESPIRATORY (INHALATION) at 16:23

## 2023-04-20 RX ADMIN — CALCIUM CARBONATE 1000 MG: 500 TABLET, CHEWABLE ORAL at 08:06

## 2023-04-20 RX ADMIN — SENNOSIDES AND DOCUSATE SODIUM 1 TABLET: 8.6; 5 TABLET ORAL at 21:18

## 2023-04-20 RX ADMIN — HEPARIN SODIUM 500 UNITS/HR: 1000 INJECTION INTRAVENOUS; SUBCUTANEOUS at 08:51

## 2023-04-20 RX ADMIN — CINACALCET 30 MG: 30 TABLET ORAL at 08:04

## 2023-04-20 RX ADMIN — AMLODIPINE BESYLATE 5 MG: 5 TABLET ORAL at 17:48

## 2023-04-20 RX ADMIN — HEPARIN SODIUM 500 UNITS: 1000 INJECTION INTRAVENOUS; SUBCUTANEOUS at 08:50

## 2023-04-20 RX ADMIN — BISACODYL 5 MG: 5 TABLET, COATED ORAL at 08:05

## 2023-04-20 RX ADMIN — EPOETIN ALFA-EPBX 1000 UNITS: 10000 INJECTION, SOLUTION INTRAVENOUS; SUBCUTANEOUS at 10:13

## 2023-04-20 RX ADMIN — CLONIDINE HYDROCHLORIDE 0.1 MG: 0.1 TABLET ORAL at 21:18

## 2023-04-20 RX ADMIN — FAMOTIDINE 40 MG: 20 TABLET ORAL at 21:17

## 2023-04-20 RX ADMIN — IPRATROPIUM BROMIDE AND ALBUTEROL SULFATE 3 ML: .5; 3 SOLUTION RESPIRATORY (INHALATION) at 09:04

## 2023-04-20 RX ADMIN — SODIUM CHLORIDE 250 ML: 9 INJECTION, SOLUTION INTRAVENOUS at 08:50

## 2023-04-20 RX ADMIN — IPRATROPIUM BROMIDE AND ALBUTEROL SULFATE 3 ML: .5; 3 SOLUTION RESPIRATORY (INHALATION) at 20:51

## 2023-04-20 RX ADMIN — Medication 1000 UNITS: at 21:18

## 2023-04-20 RX ADMIN — Medication 10 MG: at 23:33

## 2023-04-20 RX ADMIN — IPRATROPIUM BROMIDE AND ALBUTEROL SULFATE 3 ML: .5; 3 SOLUTION RESPIRATORY (INHALATION) at 13:12

## 2023-04-20 RX ADMIN — FLUCONAZOLE IN SODIUM CHLORIDE 200 MG: 2 INJECTION, SOLUTION INTRAVENOUS at 12:53

## 2023-04-20 RX ADMIN — CALCIUM CARBONATE 1000 MG: 500 TABLET, CHEWABLE ORAL at 22:14

## 2023-04-20 RX ADMIN — Medication 1000 UNITS: at 08:05

## 2023-04-20 RX ADMIN — POLYETHYLENE GLYCOL 3350 17 G: 17 POWDER, FOR SOLUTION ORAL at 08:05

## 2023-04-20 RX ADMIN — SENNOSIDES AND DOCUSATE SODIUM 1 TABLET: 8.6; 5 TABLET ORAL at 08:04

## 2023-04-20 RX ADMIN — CALCIUM CARBONATE 1000 MG: 500 TABLET, CHEWABLE ORAL at 17:48

## 2023-04-20 ASSESSMENT — ACTIVITIES OF DAILY LIVING (ADL)
ADLS_ACUITY_SCORE: 35
ADLS_ACUITY_SCORE: 39
ADLS_ACUITY_SCORE: 35
ADLS_ACUITY_SCORE: 39
ADLS_ACUITY_SCORE: 39
ADLS_ACUITY_SCORE: 35
ADLS_ACUITY_SCORE: 39
ADLS_ACUITY_SCORE: 35

## 2023-04-20 NOTE — PROGRESS NOTES
Taken for HD via bed. Pt took meds prior to transfer. Straight cath completed, [id number] out. No pain. KERI

## 2023-04-20 NOTE — PROGRESS NOTES
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 Unable to tolerate peritoneal antibiotics prior to admission. Initially w/ tachycardia and leukocytosis. Peritoneal culture prior to admission growing budding yeast, no bacterial growth. Peritoneal fluid and PD cath tip cultures from this admission also growing candida albicans, pan-susceptible. Overall hemodynamically and clinically stable on antifungal therapy. CT scan  showing abscesses along track, s/p I&D on . HD and clinically stable since.  Plan:  - Antibiotics: Fluconazole IV (renally dosed)  - present, will " transition to PO at discharge, plan for 4 week course (EOT 5/5/23)  - No ID follow-up needed  - General surgery has signed off  - Follow up for wound check ~4/28. Please call the General Surgery Clinic at [phone] with any questions.  - Post-op pain regimen: PRN Tylenol and Oxycodone available  - Post-op dressing recommendations: See surgery progress note dated 4/14/23     Small bowel obstruction vs infection-associated ileus, improved/resolved  With transition point closely associated to PD catheter which is now s/p removal. In context of poor PO, nausea, vomiting, and constipation at home prior to admission. Was improving w/ sepsis treatment, tolerating PO. Some worsening abd pain on 4/12, AXR reassuring, passed bowel w/ enema and subsequently having daily bowel movements. CT 4/14 with mild distension concern for either resolving or restarting mild obstruction at side of abscesses. As of 4/19, appetite is picking up, passing more frequent stools though loose.  Plan:  - Regular diet  - Bowel regimen - BID Senna/Docusate and BID Miralax, add qday Bisacodyl 4/17, enemas PRN  - Symptomatic support: Zofran, Compazine prn     ESRD w/ PD  Mild hyperkalemia  AGMA  Likely in setting of incomplete peritoneal dialysis leading up to admission. Now s/p PD cath removal in setting of peritonitis as above w/ temporary CVC placed for HD reinitiation this hospital stay. Nephrology is following, now dialyzed at least three times as inpt.  Plan:  - Access: AVF functional but since new needs additional access, tunneled line placed this admission  - Has outpt dialysis seat secured - TTS schedule  - PTA Cinacalcet 30mg qday, PO NaHCO3 650mg TID  - Will touch base today re: ZAHRAA on 4/14 labs     Restrictive lung disease  Chronic. Currently without tachypnea or hypoxia. Comfortable-appearing. Some bronchial cuffing on CXR, though not different than prior, some coarse breath sounds and increased secretions on history. Initial viral  panel unremarkable. Suspect primary infection is peritonitis as above and these are chronic changes.  Plan:  - PTA Azithro MWF held for now per mother's request, will restart on discharge  - PTA Duonebs and vest therapy  - RT following for assistance     Chronic malnutrition  Has PEG but not tube feed dependent, tolerates PO.     Neurogenic bladder s/p mitranoff urinary diversion  Still makes some degree of urine. Self-caths every ~4 hours daily w/ intermittent irrigation. Last urology visit in 2022.  Plan:  - Straight cath orders for ~q4h while awake     HTN - Back on PTA Amlodipine and Clonidine (Clonidine was  but verified still taking at home, Captopril also  but verified he is not taking this)     GERD - PTA Famotidine        [facility] Checklist:  Diet: Regular diet   VTE ppx: SQH  LDA: PIV, Tunneled ij, G-tube, Mitranoff diversion  Code: Full  Level of care: General Medicine  Family: , Valerie - [phone]      Discharge Planning:  Discharge home  after dialysis (home [facility] center cannot accept him until the following dialysis session on ). No IV abx.     Staffed w/ attending physician, Dr. Morales.    Nahid Morrison MD  Internal Medicine PGY-2  [place] [address] Medicine  (Please see sign-in/sign-out for up-to-date physician coverage information)      Interval Events:   No acute interval events. Seen this afternoon around lunchtime. Sitting in bed watching tv. Mother at bedside. No new concerns from their point of view. Passing stool, tolerating PO, abdominal discomfort continuing to improve. Dialyzed this morning, AVF was used again.    Objective:     Vitals:    23 0400 23 0300 23 0500   Weight: 43.3 kg (95 lb 6.4 oz) 44.1 kg (97 lb 3.2 oz) 44.7 kg (98 lb 8 oz)     Vital Signs with Ranges  Temp:  [97.7  F (36.5  C)-98.9  F (37.2  C)] 98.9  F (37.2  C)  Pulse:  [] 92  Resp:  [14-18] 16  BP: (109-143)/(47-90) 136/80  Cuff Mean (mmHg):  [92] 92  FiO2  (%):  [21 %] 21 %  SpO2:  [94 %-100 %] 95 %  I/O last 3 completed shifts:  In: 593 [P.O.:590; I.V.:3]  Out: 1225 [Urine:1225]    Exam:   General: No acute distress, sitting upright in bed watching tv  CV: RRR, no new murmurs  Lungs: No increased WOB, CTA b/l, on room air  Abd: not examined this afternoon  Ext: Warm and well-perfused, no appreciable lower extremity edema    Medications    heparin (porcine) 500 Units/hr (04/10/23 1300)      amLODIPine  5 mg Oral BID    [Held by provider] azithromycin  250 mg Oral Once per day on Mon Wed Fri    bisacodyl  5 mg Oral Daily    Or    bisacodyl  10 mg Rectal Daily    calcium carbonate  1,000 mg Oral 4x Daily    cinacalcet  30 mg Oral Daily    cloNIDine  0.1 mg Oral BID    famotidine  40 mg Oral Daily    fluconazole  200 mg Intravenous Q24H    sodium chloride (PF) 0.9%  1.3-2.6 mL Intracatheter Once    Followed by    heparin  3 mL Intracatheter Once    sodium chloride (PF) 0.9%  1.3-2.6 mL Intracatheter Once    Followed by    heparin  3 mL Intracatheter Once    heparin ANTICOAGULANT  5,000 Units Subcutaneous Q12H    heparin lock flush  5-20 mL Intracatheter Q24H    ipratropium - albuterol 0.5 mg/2.5 mg/3 mL  3 mL Nebulization 4x Daily    melatonin  10 mg Oral At Bedtime    polyethylene glycol  17 g Oral or Feeding Tube BID    senna-docusate  1 tablet Oral BID    Or    senna-docusate  2 tablet Oral BID    sodium chloride (PF)  10-40 mL Intracatheter Q8H    sodium chloride (PF)  3 mL Intracatheter Q8H    Vitamin D3  1,000 Units Oral BID       Data   Recent Labs   Lab 04/20/23  0839 04/19/23  0759 04/18/23  0705 04/17/23  0840 04/16/23  0706   WBC  --   --  9.0 9.0 9.0   HGB  --   --  7.7* 7.3* 7.5*   MCV  --   --  96 96 95   PLT  --   --  496* 502* 441   * 138 139 139 138   POTASSIUM 5.3 5.4* 6.0* 5.1 5.0   CHLORIDE 104 104 104 104 103   CO2 17* 22 22 23 24   BUN 48.8* 36.3* 43.3* 40.0* 26.4*   CR 5.10* 4.53* 5.94* 5.61* 4.03*   ANIONGAP 14 12 13 12 11   SUSAN 8.7 9.2 9.0  8.9 8.6   * 91 84 85 86       No results found for this or any previous visit (from the past 24 hour(s)).

## 2023-04-20 NOTE — PLAN OF CARE
"  Problem: Plan of Care - These are the overarching goals to be used throughout the patient stay.    Goal: Plan of Care Review  Description: The Plan of Care Review/Shift note should be completed every shift.  The Outcome Evaluation is a brief statement about your assessment that the patient is improving, declining, or no change.  This information will be displayed automatically on your shift note.  Outcome: Adequate for Care Transition  Goal: Patient-Specific Goal (Individualized)  Description: You can add care plan individualizations to a care plan. Examples of Individualization might be:  \"Parent requests to be called daily at 9am for status\", \"I have a hard time hearing out of my right ear\", or \"Do not touch me to wake me up as it startles me\".  Outcome: Adequate for Care Transition  Goal: Absence of Hospital-Acquired Illness or Injury  Outcome: Adequate for Care Transition  Intervention: Identify and Manage Fall Risk  Recent Flowsheet Documentation  Taken 4/20/2023 1745 by Luz Marina Barros RN  Safety Promotion/Fall Prevention:   activity supervised   assistive device/personal items within reach   clutter free environment maintained   increased rounding and observation  Taken 4/20/2023 1230 by Luz Marina Barros RN  Safety Promotion/Fall Prevention:   activity supervised   assistive device/personal items within reach   clutter free environment maintained   increased rounding and observation  Taken 4/20/2023 0800 by Luz Marina Barros RN  Safety Promotion/Fall Prevention:   activity supervised   assistive device/personal items within reach   clutter free environment maintained   increased rounding and observation  Intervention: Prevent Skin Injury  Recent Flowsheet Documentation  Taken 4/20/2023 1745 by Luz Marina Barros RN  Body Position:   position changed independently   weight shifting  Taken 4/20/2023 1230 by Luz Marina Barros RN  Body Position:   position changed independently   weight shifting  Taken 4/20/2023 " 0800 by Luz Marina Barros RN  Body Position:   position changed independently   weight shifting  Intervention: Prevent and Manage VTE (Venous Thromboembolism) Risk  Recent Flowsheet Documentation  Taken 4/20/2023 1745 by Luz Marina Barros RN  VTE Prevention/Management:   patient refused intervention   SCDs (sequential compression devices) off  Taken 4/20/2023 1230 by Luz Marina Barros RN  VTE Prevention/Management:   patient refused intervention   SCDs (sequential compression devices) off  Taken 4/20/2023 0800 by Luz Marina Barros RN  VTE Prevention/Management:   patient refused intervention   SCDs (sequential compression devices) off  Intervention: Prevent Infection  Recent Flowsheet Documentation  Taken 4/20/2023 1745 by Luz Marina Barros RN  Infection Prevention:   hand hygiene promoted   equipment surfaces disinfected   environmental surveillance performed  Taken 4/20/2023 1230 by Luz Marina Barros RN  Infection Prevention:   hand hygiene promoted   equipment surfaces disinfected   environmental surveillance performed  Taken 4/20/2023 0800 by Luz Marina Barros RN  Infection Prevention:   hand hygiene promoted   equipment surfaces disinfected   environmental surveillance performed  Goal: Optimal Comfort and Wellbeing  Outcome: Adequate for Care Transition  Intervention: Provide Person-Centered Care  Recent Flowsheet Documentation  Taken 4/20/2023 1745 by Luz Marina Barros RN  Trust Relationship/Rapport:   care explained   choices provided   emotional support provided   empathic listening provided   questions answered   questions encouraged   reassurance provided   thoughts/feelings acknowledged  Taken 4/20/2023 1230 by Luz Marina Barros RN  Trust Relationship/Rapport:   care explained   choices provided   emotional support provided   empathic listening provided   questions answered   questions encouraged   reassurance provided   thoughts/feelings acknowledged  Taken 4/20/2023 0800 by Luz Marina Barros RN  Trust  Relationship/Rapport:   care explained   choices provided   emotional support provided   empathic listening provided   questions answered   questions encouraged   reassurance provided   thoughts/feelings acknowledged  Goal: Readiness for Transition of Care  Outcome: Adequate for Care Transition     Problem: Infection  Goal: Absence of Infection Signs and Symptoms  Outcome: Adequate for Care Transition     Problem: Pulmonary Impairment  Goal: Improved Activity Tolerance  Outcome: Adequate for Care Transition  Goal: Effective Airway Clearance  Outcome: Adequate for Care Transition  Goal: Optimal Gas Exchange  Outcome: Adequate for Care Transition     Problem: Hemodialysis  Goal: Safe, Effective Therapy Delivery  Outcome: Adequate for Care Transition  Intervention: Optimize Device Care and Function  Recent Flowsheet Documentation  Taken 4/20/2023 1745 by Luz Marina Barros RN  Medication Review/Management: medications reviewed  Taken 4/20/2023 1230 by Luz Marina Barros RN  Medication Review/Management: medications reviewed  Taken 4/20/2023 0800 by Luz Marina Barros RN  Medication Review/Management: medications reviewed  Goal: Effective Tissue Perfusion  Outcome: Adequate for Care Transition  Goal: Absence of Infection Signs and Symptoms  Outcome: Adequate for Care Transition  Intervention: Prevent or Manage Infection  Recent Flowsheet Documentation  Taken 4/20/2023 1745 by Luz Marina Barros RN  Infection Prevention:   hand hygiene promoted   equipment surfaces disinfected   environmental surveillance performed  Taken 4/20/2023 1230 by Luz Marina Barros RN  Infection Prevention:   hand hygiene promoted   equipment surfaces disinfected   environmental surveillance performed  Taken 4/20/2023 0800 by Luz Marina Barros RN  Infection Prevention:   hand hygiene promoted   equipment surfaces disinfected   environmental surveillance performed   Goal Outcome Evaluation:       Patient doing very well, cooperative in cares. Plan for  Discharge today was sidetracked due to dialysis outpatient being unable to take new patients on a Saturday. Tentative plan for discharge Saturday pending dialysis here.   No new changes, dialysis resulted in only ultrafiltration, no fluid removed. Patient tolerated well. Self-cathed X2 today, self-suctioned and BMX1. KERI

## 2023-04-20 NOTE — DISCHARGE SUMMARY
Dialysis Discharge Summary Brief    [facility]  Division of Nephrology  Nephrology Discharge Dialysis Orders  Ph: [phone]  Fax: [phone]    Aidan Louie  MRN: [id number]  YOB: 1995    [facility] Dialysis Unit: [place]  Primary Nephrologist: Dr. Sharma    Date of Admission: 4/6/2023  Date of Discharge: 4/20/2023    Please page me at  with any questions. Thanks much. Nell Wong PA-C    Mr. 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  He is now/sp peritoneal dialysis catheter removal (4/7) and ongoing tunnel infection s/p I&D 4/14, and undergoing hemodialysis via AVF with tunneled CVC as back up.     ESKD: Previously on HD (12/2021-3/2022) then on PD (3/2022-4/2022). Now back on HD (first run 4/8/2023) as PD had to be discontinued due to fungal peritonitis. Nephrologist is Dr. Bandar Sharma.   -  plan is for HD at [facility] on TTS schedule    - HD per TTS schedule  - AVF working well with two 16 g needles (pt requests lidocaine); tunneled CVC in place as well  - pt requires heparin to avoid clotting the HD system        Peritonitis c/b PD catheter exit site and tunnel infection: Due to C. Albicans.   - PD catheter removed on 4/7/2023.  - I&D 4/14  - per ID, plan for PO fluconazole through 5/5/23        BP/volume: BP fairly well controlled  - continued on amlodipine 5 mg bid   - signficant UOP (4198-7395 ml)  - no UF thus far, standing wt post HD 45 kg (4/20)  - pts mother reports when previously on HD, no UF due to hypotension and ongoing UOP        Anemia of CKD: Hgb above goal upon admission, post op decline. Iron studies ferritin 1092, iron 24, IS 21  - will need POORNIMA/venofer per OP protocols      BMD: Ca 8's, On sensipar (30 mg), tums 1000 mg tid WM             Discharge Diagnosis:    ICD-10-CM    1. Acute suppurative peritonitis (H)  K65.0 fluconazole (DIFLUCAN) 200 MG tablet     DISCONTINUED: fluconazole (DIFLUCAN) 200-0.9 MG/100ML-% intermittent infusion      2. Infection associated with peritoneal dialysis catheter, initial encounter (H)  T85.71XA       3. Miscellaneous cardiovascular devices assoc w incdt, NEC  Y71.8       4. End stage renal disease (H)  N18.6       5. Encounter for extracorporeal dialysis (H)  Z99.2       6. Encounter for screening laboratory testing for severe acute respiratory syndrome coronavirus 2 (SARS-CoV-2)  Z20.822       7. Hypertension secondary to other renal disorders  I15.1 cloNIDine (CATAPRES) 0.1 MG tablet    N28.89       8. Constipation, unspecified constipation type  K59.00 simethicone (MYLICON) 40 MG/0.6ML suspension     bisacodyl (DULCOLAX) 5 MG EC tablet          [x] New initiation, new dialysis orders will be faxed.         New Orders (if not applicable put NA):  Estimated Dry Weight ~ 45 kg    Dialysis Duration 3 hrs   Dialysis Access L AVF using 16 g needles for now with lidocaine; has tunneled RIJ as well   Antibiotics (dose per dialysis, end date)            Labs to be drawn at dialysis Per ESKD protocol   Other major changes to dialysis prescription (e.g. Dialysate bath, heparin, blood flow rate, etc)   K2, Ca 2.5, bicarb 38, Na 138  requires heparin  16 g needles, /   Medication changes (also fax the unit a copy of the discharge summary)         POORNIMA/venofer/Vit D per protocols     Name of physician completing this form: GERARD Virgen

## 2023-04-20 NOTE — PROGRESS NOTES
Nephrology Progress Note  04/20/2023         Assessment  Mr. 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  He is now/sp peritoneal dialysis catheter removal (4/7) and I&D 4/14, and undergoing hemodialysis via AVF with tunneled CVC as back up.    ESKD: Previously on HD (12/2021-3/2022) then on PD (3/2022-4/2022). Now back on HD (first run 4/8/2023) as PD had to be discontinued due to fungal peritonitis. Nephrologist is Dr. Bandar Sharam.   -   HD at [facility] on TTS schedule (they will accept pt in spite of trach)  - HD per TTS schedule  - AVF working well with two 16 g needles (pt requests lidocaine); tunneled CVC in place as well  - pt requires heparin to avoid clotting the HD system       Peritonitis c/b PD catheter exit site and tunnel infection: Due to C. Albicans.   - PD catheter removed on 4/7/2023.  - I&D 4/14  - per ID, plan for fluconazole through 5/5/23      BP/volume: BP fairly well controlled  - continued on amlodipine 5 mg bid   - signficant UOP (7287-4991 ml)  - no UF thus far  - pts mother reports when previously on HD, no UF due to hypotension and ongoing UOP        Anemia of CKD: Hgb above goal upon admission, post op decline. Iron studies ferritin 1092, iron 24, IS 21  - will need POORNIMA/venofer per OP protocols     BMD: Ca 8's, On sensipar (30 mg), tums 1000 mg tid WM  - recheck phos (ordered)         GERARD Virgen   Division of Renal Disease and Hypertension  [phone]    Interval History :   Seen on dialysis, using two 16 g needles, working well. No UF, BP's stable.  No n/v, CP, SOB, chills    Review of Systems:   4 point ROS neg other than as noted above    Physical Exam:   I/O last 3 completed shifts:  In: 360 [P.O.:260; I.V.:100]  Out: 1100 [Urine:1100]   /76   Pulse 88   Temp 97.7  F (36.5  C) (Oral)    "Resp 16   Ht 1.676 m (5' 6\")   Wt 44.7 kg (98 lb 8 oz)   SpO2 99%   BMI 15.90 kg/m       GENERAL APPEARANCE: NAD  HEENT: no scleral icterus, trach domed  PULM: lungs CTA    CV: regular rhythm, normal rate      -no LE edema  : Mitrofanoff   GI: soft    NEURO: no asterixis   Access: tunneled RIJ, L forearm AVF    Labs:   All labs reviewed by me  Electrolytes/Renal -   Recent Labs   Lab Test 04/20/23  0839 04/19/23  0759 04/18/23  0705 04/16/23  0706 04/15/23  0726 04/13/23  0749 04/12/23  0605 04/11/23  0747 04/10/23  0919 10/19/21  1433 08/30/21  1300   * 138 139   < > 132*   < > 136   < > 135*   < > 141   POTASSIUM 5.3 5.4* 6.0*   < > 4.7   < > 4.0   < > 4.5   < > 5.0   CHLORIDE 104 104 104   < > 97*   < > 100   < > 101   < > 112*   CO2 17* 22 22   < > 20*   < > 23   < > 18*   < > 21   BUN 48.8* 36.3* 43.3*   < > 37.7*   < > 21.8*   < > 42.9*   < > 84*   CR 5.10* 4.53* 5.94*   < > 5.29*   < > 3.11*   < > 4.11*   < > 6.12*   * 91 84   < > 92   < > 97   < > 89   < > 84   SUSAN 8.7 9.2 9.0   < > 8.6   < > 8.8   < > 8.8   < > 8.5   MAG  --  1.6* 1.8  --  2.1   < > 1.4*   < > 1.6*   < >  --    PHOS  --   --   --   --   --   --  4.1  --  5.4*  --  6.0*    < > = values in this interval not displayed.       CBC -   Recent Labs   Lab Test 04/18/23  0705 04/17/23  0840 04/16/23  0706   WBC 9.0 9.0 9.0   HGB 7.7* 7.3* 7.5*   * 502* 441       LFTs -   Recent Labs   Lab Test 04/12/23  0605 08/30/21  1300 11/30/20  1415 10/15/20  1138 12/22/16  1325 09/17/15  1205   ALKPHOS 69  --   --  94  --  80   BILITOTAL 0.2  --   --  0.2  --   --    ALT 11  --   --  15  --   --    AST 30  --   --  10  --   --    PROTTOTAL 5.8*  --   --  7.4  --   --    ALBUMIN 2.5* 3.7 3.7 3.9   < > 3.8    < > = values in this interval not displayed.       Iron Panel -   Recent Labs   Lab Test 04/13/23  0749 08/30/21  1300 08/30/21  1300 11/30/20  1415   IRON 24*  --  74 92   IRONSAT 21  --  29 34   JUVENAL 1,092*   < > 66 43    < > = " values in this interval not displayed.         Imaging:  Reviewed    Current Medications:    amLODIPine  5 mg Oral BID     [Held by provider] azithromycin  250 mg Oral Once per day on Mon Wed Fri     bisacodyl  5 mg Oral Daily    Or     bisacodyl  10 mg Rectal Daily     calcium carbonate  1,000 mg Oral 4x Daily     cinacalcet  30 mg Oral Daily     cloNIDine  0.1 mg Oral BID     famotidine  40 mg Oral Daily     fluconazole  200 mg Intravenous Q24H     sodium chloride (PF) 0.9%  1.3-2.6 mL Intracatheter Once    Followed by     heparin  3 mL Intracatheter Once     sodium chloride (PF) 0.9%  1.3-2.6 mL Intracatheter Once    Followed by     heparin  3 mL Intracatheter Once     heparin ANTICOAGULANT  5,000 Units Subcutaneous Q12H     heparin lock flush  5-20 mL Intracatheter Q24H     ipratropium - albuterol 0.5 mg/2.5 mg/3 mL  3 mL Nebulization 4x Daily     melatonin  10 mg Oral At Bedtime     polyethylene glycol  17 g Oral or Feeding Tube BID     senna-docusate  1 tablet Oral BID    Or     senna-docusate  2 tablet Oral BID     sodium chloride (PF)  10-40 mL Intracatheter Q8H     sodium chloride (PF)  3 mL Intracatheter Q8H     Vitamin D3  1,000 Units Oral BID       heparin (porcine) 500 Units/hr (04/20/23 0851)     heparin (porcine) 500 Units/hr (04/10/23 1300)     - MEDICATION INSTRUCTIONS -     All above labs and imaging reviewed by me.   GERARD Virgen

## 2023-04-20 NOTE — PROGRESS NOTES
HEMODIALYSIS TREATMENT NOTE    Date: 4/20/2023  Time: 12:00 PM    Data:  Pre Wt: 45.1  Desired Wt: 45.1 kg   Post Wt: 45.1 kg ( standing wt.)  Weight change: 0  kg  Ultrafiltration - Post Run Net Total Removed (mL): 0 mL  Vascular Access Status: patent  Dialyzer Rinse: Clear  Total Blood Volume Processed: 58.97 L Liters  Total Dialysis (Treatment) Time: 3 Hours    Lab:   Yes    Interventions & Assessment:  Received patient from [facility]aro via bed.  A/O x4. Denies SOB, denies pain, denies N/V/D.  Started iHD via Left AVF arm. Left AVF (+) bruit and thrill.  Cannulation done with gauge 16 needle. Needling successful. Dialysate bath K2 /Ca 2.5 with  / .  Pt was hemodynamically stable on HD. Uneventful HD treatment.  Blood rinse back successful, needle removed and hemostasis achieved in 10 mins.   Due meds given.  Report given to AIDA Raza.  See MAR, Flow Sheet and MD orders for further details.      Plan:    Per Renal Team

## 2023-04-20 NOTE — PROGRESS NOTES
Care Management Discharge Note    Discharge Date: 04/20/2023       Discharge Disposition: Home    Discharge Services: PCA    Discharge DME:      Discharge Transportation: family or friend will provide    Private pay costs discussed: Not applicable    PAS Confirmation Code:  n/a  Patient/family educated on Medicare website which has current facility and service quality ratings:  no    Education Provided on the Discharge Plan:  yes  Persons Notified of Discharge Plans: patient/mother  Patient/Family in Agreement with the Plan: yes    Handoff Referral Completed: Yes    Additional Information:  Patient discharge today, return home after hospital stay, start on OP HD. RNCC contacted Blayne Flores/Jose Herrera, updated on discharge, faxed dialysis orders over. Dialysis time listed in discharge instructions on AVS, was set up by patient's mother. RNCC available as needed for further planning needs.    BOB Garrido (Jose Herrera)  Ph: [phone]  Fax: [phone]  Chair time 2:45 p.m., arrive at 2:15 for first appointment, Tue/Thur/Sat schedule    Update: pt will stay until Saturday after dialysis as is unable to start at center on w/e. Will start next Tuesday.     DIAMANTE Mtz, BA, RN, CMSRN, RNCC  Covering Units 6D/OBS  Pager: [phone]  Phone: [phone]  6th floor Weekend/Holiday Pager: [phone]  Observation weekend/after hours: [phone]

## 2023-04-20 NOTE — DISCHARGE INSTRUCTIONS
Dialysis Outpatient  BOB Garrido (Jose Herrera)  Ph: [phone]  Fax: [phone]  Chair time 2:45 p.m., arrive at 2:15 for first appointment, Tue/Thur/Sat schedule

## 2023-04-21 LAB
BACTERIA ABSC ANAEROBE+AEROBE CULT: NORMAL
HOLD SPECIMEN: NORMAL
MAGNESIUM SERPL-MCNC: 1.6 MG/DL (ref 1.7–2.3)

## 2023-04-21 PROCEDURE — 94669 MECHANICAL CHEST WALL OSCILL: CPT

## 2023-04-21 PROCEDURE — 83735 ASSAY OF MAGNESIUM: CPT

## 2023-04-21 PROCEDURE — 99233 SBSQ HOSP IP/OBS HIGH 50: CPT | Mod: GC | Performed by: INTERNAL MEDICINE

## 2023-04-21 PROCEDURE — 250N000013 HC RX MED GY IP 250 OP 250 PS 637: Performed by: STUDENT IN AN ORGANIZED HEALTH CARE EDUCATION/TRAINING PROGRAM

## 2023-04-21 PROCEDURE — 999N000215 HC STATISTIC HFNC ADULT NON-CPAP

## 2023-04-21 PROCEDURE — 250N000009 HC RX 250: Performed by: INTERNAL MEDICINE

## 2023-04-21 PROCEDURE — 250N000013 HC RX MED GY IP 250 OP 250 PS 637

## 2023-04-21 PROCEDURE — 999N000157 HC STATISTIC RCP TIME EA 10 MIN

## 2023-04-21 PROCEDURE — 36415 COLL VENOUS BLD VENIPUNCTURE: CPT

## 2023-04-21 PROCEDURE — 94640 AIRWAY INHALATION TREATMENT: CPT | Mod: 76

## 2023-04-21 PROCEDURE — 250N000009 HC RX 250

## 2023-04-21 PROCEDURE — 999N000043 HC STATISTIC CTO2 CONT OXYGEN TECH TIME EA 90 MIN

## 2023-04-21 PROCEDURE — 250N000011 HC RX IP 250 OP 636: Performed by: INTERNAL MEDICINE

## 2023-04-21 PROCEDURE — 250N000011 HC RX IP 250 OP 636

## 2023-04-21 PROCEDURE — 94640 AIRWAY INHALATION TREATMENT: CPT

## 2023-04-21 PROCEDURE — 120N000002 HC R&B MED SURG/OB UMMC

## 2023-04-21 RX ORDER — SIMETHICONE 80 MG
80 TABLET,CHEWABLE ORAL 4 TIMES DAILY PRN
Status: DISCONTINUED | OUTPATIENT
Start: 2023-04-21 | End: 2023-04-22 | Stop reason: HOSPADM

## 2023-04-21 RX ORDER — POLYETHYLENE GLYCOL 3350 17 G/17G
17 POWDER, FOR SOLUTION ORAL DAILY PRN
Qty: 510 G | Refills: 0 | Status: SHIPPED | OUTPATIENT
Start: 2023-04-21

## 2023-04-21 RX ORDER — FLUCONAZOLE 200 MG/1
200 TABLET ORAL DAILY
Qty: 20 TABLET | Refills: 0 | Status: SHIPPED | OUTPATIENT
Start: 2023-04-23 | End: 2023-05-13

## 2023-04-21 RX ORDER — SIMETHICONE 80 MG
80 TABLET,CHEWABLE ORAL 4 TIMES DAILY PRN
Qty: 120 TABLET | Refills: 0 | Status: SHIPPED | OUTPATIENT
Start: 2023-04-21 | End: 2023-04-21

## 2023-04-21 RX ORDER — IPRATROPIUM BROMIDE AND ALBUTEROL SULFATE 2.5; .5 MG/3ML; MG/3ML
3 SOLUTION RESPIRATORY (INHALATION) 3 TIMES DAILY
Status: DISCONTINUED | OUTPATIENT
Start: 2023-04-21 | End: 2023-04-22 | Stop reason: HOSPADM

## 2023-04-21 RX ADMIN — SENNOSIDES AND DOCUSATE SODIUM 1 TABLET: 8.6; 5 TABLET ORAL at 20:38

## 2023-04-21 RX ADMIN — AMLODIPINE BESYLATE 5 MG: 5 TABLET ORAL at 17:50

## 2023-04-21 RX ADMIN — CALCIUM CARBONATE 1000 MG: 500 TABLET, CHEWABLE ORAL at 21:34

## 2023-04-21 RX ADMIN — SIMETHICONE 40 MG: 20 EMULSION ORAL at 12:28

## 2023-04-21 RX ADMIN — AMLODIPINE BESYLATE 5 MG: 5 TABLET ORAL at 08:58

## 2023-04-21 RX ADMIN — Medication 1000 UNITS: at 08:58

## 2023-04-21 RX ADMIN — IPRATROPIUM BROMIDE AND ALBUTEROL SULFATE 3 ML: .5; 3 SOLUTION RESPIRATORY (INHALATION) at 20:10

## 2023-04-21 RX ADMIN — POLYETHYLENE GLYCOL 3350 17 G: 17 POWDER, FOR SOLUTION ORAL at 08:59

## 2023-04-21 RX ADMIN — HEPARIN SODIUM 5000 UNITS: 5000 INJECTION, SOLUTION INTRAVENOUS; SUBCUTANEOUS at 20:39

## 2023-04-21 RX ADMIN — Medication 10 MG: at 22:51

## 2023-04-21 RX ADMIN — IPRATROPIUM BROMIDE AND ALBUTEROL SULFATE 3 ML: .5; 3 SOLUTION RESPIRATORY (INHALATION) at 12:33

## 2023-04-21 RX ADMIN — CLONIDINE HYDROCHLORIDE 0.1 MG: 0.1 TABLET ORAL at 08:58

## 2023-04-21 RX ADMIN — Medication 1000 UNITS: at 20:38

## 2023-04-21 RX ADMIN — CLONIDINE HYDROCHLORIDE 0.1 MG: 0.1 TABLET ORAL at 20:38

## 2023-04-21 RX ADMIN — FAMOTIDINE 40 MG: 20 TABLET ORAL at 20:38

## 2023-04-21 RX ADMIN — BISACODYL 5 MG: 5 TABLET, COATED ORAL at 08:58

## 2023-04-21 RX ADMIN — FLUCONAZOLE IN SODIUM CHLORIDE 200 MG: 2 INJECTION, SOLUTION INTRAVENOUS at 12:27

## 2023-04-21 RX ADMIN — CALCIUM CARBONATE 1000 MG: 500 TABLET, CHEWABLE ORAL at 12:28

## 2023-04-21 RX ADMIN — CALCIUM CARBONATE 1000 MG: 500 TABLET, CHEWABLE ORAL at 17:50

## 2023-04-21 RX ADMIN — IPRATROPIUM BROMIDE AND ALBUTEROL SULFATE 3 ML: .5; 3 SOLUTION RESPIRATORY (INHALATION) at 08:46

## 2023-04-21 RX ADMIN — CALCIUM CARBONATE 1000 MG: 500 TABLET, CHEWABLE ORAL at 08:58

## 2023-04-21 RX ADMIN — SENNOSIDES AND DOCUSATE SODIUM 1 TABLET: 8.6; 5 TABLET ORAL at 08:59

## 2023-04-21 RX ADMIN — CINACALCET 30 MG: 30 TABLET ORAL at 09:00

## 2023-04-21 RX ADMIN — HEPARIN SODIUM 5000 UNITS: 5000 INJECTION, SOLUTION INTRAVENOUS; SUBCUTANEOUS at 08:58

## 2023-04-21 ASSESSMENT — ACTIVITIES OF DAILY LIVING (ADL)
ADLS_ACUITY_SCORE: 35

## 2023-04-21 NOTE — PROGRESS NOTES
SPIRITUAL HEALTH SERVICES Progress Note  [facility] ([place]) 6D    I met with Greg per length of stay to assess SHS needs. He did not identify any at this time. His mother Valerie was present during the visit and shared that she and Greg were hoping to discharge but will have to stay until Saturday. She also spoke about a distressing event in her finances during Greg's last hospitalization. Valerie is appreciative of [facility] support.     William Cannon  Chaplain Resident  Pager [phone]    * [facility] remains available 24/7 for emergent requests/referrals, either by having the switchboard page the on-call  or by entering an ASAP/STAT consult in Epic (this will also page the on-call ). Routine Epic consults receive an initial response within 24 hours.*

## 2023-04-21 NOTE — PROGRESS NOTES
Patient and mother sleeping soundly. Will check back later    Was unable to [place] back and check with patient due to heavy tx load and CF. Will get on track with next vest time.

## 2023-04-21 NOTE — PROGRESS NOTES
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 Unable to tolerate peritoneal antibiotics prior to admission. Initially w/ tachycardia and leukocytosis. Peritoneal culture prior to admission growing budding yeast, no bacterial growth. Peritoneal fluid and PD cath tip cultures from this admission also growing candida albicans, pan-susceptible. Overall hemodynamically and clinically stable on antifungal therapy. CT scan  showing abscesses along track, s/p I&D on . HD " and clinically stable since.  Plan:  - Antibiotics: Fluconazole IV (renally dosed) 4/6 - present, will transition to PO at discharge, plan for 4 week course (EOT 5/5/23)  - No ID follow-up needed  - General surgery has signed off  - Follow up for wound check ~4/28. Please call the General Surgery Clinic at [phone] with any questions.  - Post-op pain regimen: PRN Tylenol and Oxycodone available  - Post-op dressing recommendations: See surgery progress note dated 4/14/23     Small bowel obstruction vs infection-associated ileus, improved/resolved  With transition point closely associated to PD catheter which is now s/p removal. In context of poor PO, nausea, vomiting, and constipation at home prior to admission. Was improving w/ sepsis treatment, tolerating PO. Some worsening abd pain on 4/12, AXR reassuring, passed bowel w/ enema and subsequently having daily bowel movements. CT 4/14 with mild distension concern for either resolving or restarting mild obstruction at side of abscesses. As of 4/19, appetite is picking up, passing more frequent stools though loose.  Plan:  - Regular diet  - Bowel regimen - BID Senna/Docusate and BID Miralax, add qday Bisacodyl 4/17, enemas PRN  - Symptomatic support: Zofran, Compazine prn     ESRD w/ PD  Mild hyperkalemia  AGMA  Likely in setting of incomplete peritoneal dialysis leading up to admission. Now s/p PD cath removal in setting of peritonitis as above w/ temporary CVC placed for HD reinitiation this hospital stay. Nephrology is following, now dialyzed at least three times as inpt.  Plan:  - Access: AVF functional but since new needs additional access, tunneled line placed this admission  - Has outpt dialysis seat secured - TTS schedule  - PTA Cinacalcet 30mg qday, PO NaHCO3 650mg TID  - Will touch base today re: ZAHRAA on 4/14 labs     Restrictive lung disease  Chronic. Currently without tachypnea or hypoxia. Comfortable-appearing. Some bronchial cuffing on CXR, though not  different than prior, some coarse breath sounds and increased secretions on history. Initial viral panel unremarkable. Suspect primary infection is peritonitis as above and these are chronic changes.  Plan:  - PTA Azithro MWF held for now per mother's request, will restart on discharge  - PTA Duonebs and vest therapy  - RT following for assistance     Chronic malnutrition  Has PEG but not tube feed dependent, tolerates PO.     Neurogenic bladder s/p mitranoff urinary diversion  Still makes some degree of urine. Self-caths every ~4 hours daily w/ intermittent irrigation. Last urology visit in 2022.  Plan:  - Straight cath orders for ~q4h while awake     HTN - Back on PTA Amlodipine and Clonidine (Clonidine was  but verified still taking at home, Captopril also  but verified he is not taking this)     GERD - PTA Famotidine        [facility] Checklist:  Diet: Regular diet   VTE ppx: SQH  LDA: PIV, Tunneled ij, G-tube, Mitranoff diversion  Code: Full  Level of care: General Medicine  Family: Mother, Valerie - [phone]      Discharge Planning:  Discharge home  after dialysis (home [facility] center cannot accept him until the following dialysis session on ). No IV abx.     Staffed w/ attending physician, Dr. Morales.    Deni Jurado MD  Internal Medicine PGY-2  [place] [id number] Medicine  (Please see sign-in/sign-out for up-to-date physician coverage information)      Interval Events:   No acute interval events, had some loose BM and Miralax was held. Feeling well this AM aside from some abdominal bloating for which he requests simethicone. Mother updated at bedside as well.    Objective:     Vitals:    23 0400 23 0300 23 0500   Weight: 43.3 kg (95 lb 6.4 oz) 44.1 kg (97 lb 3.2 oz) 44.7 kg (98 lb 8 oz)     Vital Signs with Ranges  Temp:  [98.4  F (36.9  C)-99.5  F (37.5  C)] 98.4  F (36.9  C)  Pulse:  [] 95  Resp:  [16-18] 18  BP: (123-136)/(63-80) 131/65  Cuff Mean  (mmHg):  [86-92] 86  FiO2 (%):  [21 %] 21 %  SpO2:  [93 %-98 %] 94 %  I/O last 3 completed shifts:  In: 453 [P.O.:450; I.V.:3]  Out: 1325 [Urine:1325]    Exam:   General: No acute distress, sitting upright in bed watching tv  CV: RRR, no new murmurs  Lungs: No increased WOB, CTA b/l, on room air. Trach  Abd: mild tenderness, no rebound or guarding.   Ext: Warm and well-perfused, no appreciable lower extremity edema    Medications     heparin (porcine) 500 Units/hr (04/10/23 1300)       amLODIPine  5 mg Oral BID     [Held by provider] azithromycin  250 mg Oral Once per day on Mon Wed Fri     bisacodyl  5 mg Oral Daily    Or     bisacodyl  10 mg Rectal Daily     calcium carbonate  1,000 mg Oral 4x Daily     cinacalcet  30 mg Oral Daily     cloNIDine  0.1 mg Oral BID     famotidine  40 mg Oral Daily     fluconazole  200 mg Intravenous Q24H     sodium chloride (PF) 0.9%  1.3-2.6 mL Intracatheter Once    Followed by     heparin  3 mL Intracatheter Once     sodium chloride (PF) 0.9%  1.3-2.6 mL Intracatheter Once    Followed by     heparin  3 mL Intracatheter Once     heparin ANTICOAGULANT  5,000 Units Subcutaneous Q12H     heparin lock flush  5-20 mL Intracatheter Q24H     ipratropium - albuterol 0.5 mg/2.5 mg/3 mL  3 mL Nebulization TID     melatonin  10 mg Oral At Bedtime     polyethylene glycol  17 g Oral or Feeding Tube BID     senna-docusate  1 tablet Oral BID    Or     senna-docusate  2 tablet Oral BID     sodium chloride (PF)  10-40 mL Intracatheter Q8H     sodium chloride (PF)  3 mL Intracatheter Q8H     Vitamin D3  1,000 Units Oral BID       Data   Recent Labs   Lab 04/20/23  0839 04/19/23  0759 04/18/23  0705 04/17/23  0840 04/16/23  0706   WBC  --   --  9.0 9.0 9.0   HGB  --   --  7.7* 7.3* 7.5*   MCV  --   --  96 96 95   PLT  --   --  496* 502* 441   * 138 139 139 138   POTASSIUM 5.3 5.4* 6.0* 5.1 5.0   CHLORIDE 104 104 104 104 103   CO2 17* 22 22 23 24   BUN 48.8* 36.3* 43.3* 40.0* 26.4*   CR 5.10*  4.53* 5.94* 5.61* 4.03*   ANIONGAP 14 12 13 12 11   SUSAN 8.7 9.2 9.0 8.9 8.6   * 91 84 85 86       No results found for this or any previous visit (from the past 24 hour(s)).

## 2023-04-21 NOTE — PLAN OF CARE
NURSING PROGRESS NOTE  Shift Summary      Date: April 21, 2023     Neuro/Musculoskeletal:  A&Ox4.  Cardiac:  SR.  VSS was slightly febrile.   Respiratory:  Sating in the 90s on RA when awake and trach dome when sleeping.  GI/:  Adequate urine output.  LBM: 4/20  Diet/Appetite:  Tolerating regular diet.  Activity:  SBA  Pain:  Denies.  Skin:  No new deficits noted.  LDAs + Drips/IVF: RFA PIV SL.  Protocols/Labs: Mg 1.6, noc provider paged, orders for morning draw received, to be followed up and replaced appropriately.    Pertinent Shift Updates: Patient reported 3 loose BM  On 4/20, miralax held.      Plan: Follow up on temperature, continue current POC and possible discontinue on 4/22.      Laurence Avelar RN  .................................................... April 21, 2023    [facility] ([facility]): [facility] ICU (Unit 6D)   Goal Outcome Evaluation:

## 2023-04-22 VITALS
RESPIRATION RATE: 20 BRPM | HEART RATE: 97 BPM | OXYGEN SATURATION: 94 % | HEIGHT: 66 IN | DIASTOLIC BLOOD PRESSURE: 76 MMHG | BODY MASS INDEX: 16.29 KG/M2 | TEMPERATURE: 98 F | SYSTOLIC BLOOD PRESSURE: 132 MMHG | WEIGHT: 101.4 LBS

## 2023-04-22 LAB
ANION GAP SERPL CALCULATED.3IONS-SCNC: 15 MMOL/L (ref 7–15)
BUN SERPL-MCNC: 56.5 MG/DL (ref 6–20)
CALCIUM SERPL-MCNC: 9 MG/DL (ref 8.6–10)
CHLORIDE SERPL-SCNC: 101 MMOL/L (ref 98–107)
CREAT SERPL-MCNC: 4.93 MG/DL (ref 0.67–1.17)
DEPRECATED HCO3 PLAS-SCNC: 20 MMOL/L (ref 22–29)
GFR SERPL CREATININE-BSD FRML MDRD: 16 ML/MIN/1.73M2
GLUCOSE SERPL-MCNC: 87 MG/DL (ref 70–99)
MAGNESIUM SERPL-MCNC: 1.5 MG/DL (ref 1.7–2.3)
PHOSPHATE SERPL-MCNC: 6.3 MG/DL (ref 2.5–4.5)
POTASSIUM SERPL-SCNC: 5.7 MMOL/L (ref 3.4–5.3)
SODIUM SERPL-SCNC: 136 MMOL/L (ref 136–145)

## 2023-04-22 PROCEDURE — 250N000013 HC RX MED GY IP 250 OP 250 PS 637: Performed by: INTERNAL MEDICINE

## 2023-04-22 PROCEDURE — 250N000011 HC RX IP 250 OP 636: Performed by: INTERNAL MEDICINE

## 2023-04-22 PROCEDURE — 90935 HEMODIALYSIS ONE EVALUATION: CPT | Performed by: INTERNAL MEDICINE

## 2023-04-22 PROCEDURE — 90937 HEMODIALYSIS REPEATED EVAL: CPT

## 2023-04-22 PROCEDURE — 99239 HOSP IP/OBS DSCHRG MGMT >30: CPT | Performed by: INTERNAL MEDICINE

## 2023-04-22 PROCEDURE — 94669 MECHANICAL CHEST WALL OSCILL: CPT

## 2023-04-22 PROCEDURE — 250N000009 HC RX 250: Performed by: INTERNAL MEDICINE

## 2023-04-22 PROCEDURE — 250N000013 HC RX MED GY IP 250 OP 250 PS 637: Performed by: STUDENT IN AN ORGANIZED HEALTH CARE EDUCATION/TRAINING PROGRAM

## 2023-04-22 PROCEDURE — 80048 BASIC METABOLIC PNL TOTAL CA: CPT | Performed by: INTERNAL MEDICINE

## 2023-04-22 PROCEDURE — 250N000013 HC RX MED GY IP 250 OP 250 PS 637

## 2023-04-22 PROCEDURE — 258N000003 HC RX IP 258 OP 636: Performed by: INTERNAL MEDICINE

## 2023-04-22 PROCEDURE — 83735 ASSAY OF MAGNESIUM: CPT | Performed by: INTERNAL MEDICINE

## 2023-04-22 PROCEDURE — 250N000011 HC RX IP 250 OP 636

## 2023-04-22 PROCEDURE — 84100 ASSAY OF PHOSPHORUS: CPT | Performed by: INTERNAL MEDICINE

## 2023-04-22 RX ORDER — MAGNESIUM OXIDE 400 MG/1
400 TABLET ORAL ONCE
Status: COMPLETED | OUTPATIENT
Start: 2023-04-22 | End: 2023-04-22

## 2023-04-22 RX ORDER — HEPARIN SODIUM 1000 [USP'U]/ML
500 INJECTION, SOLUTION INTRAVENOUS; SUBCUTANEOUS CONTINUOUS
Status: DISCONTINUED | OUTPATIENT
Start: 2023-04-22 | End: 2023-04-22

## 2023-04-22 RX ADMIN — SODIUM CHLORIDE 300 ML: 9 INJECTION, SOLUTION INTRAVENOUS at 08:09

## 2023-04-22 RX ADMIN — AMLODIPINE BESYLATE 5 MG: 5 TABLET ORAL at 12:04

## 2023-04-22 RX ADMIN — CLONIDINE HYDROCHLORIDE 0.1 MG: 0.1 TABLET ORAL at 12:03

## 2023-04-22 RX ADMIN — SODIUM CHLORIDE 250 ML: 9 INJECTION, SOLUTION INTRAVENOUS at 08:09

## 2023-04-22 RX ADMIN — HEPARIN SODIUM 500 UNITS/HR: 1000 INJECTION INTRAVENOUS; SUBCUTANEOUS at 08:09

## 2023-04-22 RX ADMIN — CINACALCET 30 MG: 30 TABLET ORAL at 12:02

## 2023-04-22 RX ADMIN — IPRATROPIUM BROMIDE AND ALBUTEROL SULFATE 3 ML: .5; 3 SOLUTION RESPIRATORY (INHALATION) at 13:54

## 2023-04-22 RX ADMIN — HEPARIN SODIUM 500 UNITS: 1000 INJECTION INTRAVENOUS; SUBCUTANEOUS at 08:09

## 2023-04-22 RX ADMIN — CALCIUM CARBONATE 1000 MG: 500 TABLET, CHEWABLE ORAL at 08:51

## 2023-04-22 RX ADMIN — CALCIUM CARBONATE 1000 MG: 500 TABLET, CHEWABLE ORAL at 12:02

## 2023-04-22 RX ADMIN — Medication 400 MG: at 12:28

## 2023-04-22 RX ADMIN — LIDOCAINE HYDROCHLORIDE 0.5 ML: 10 INJECTION, SOLUTION EPIDURAL; INFILTRATION; INTRACAUDAL; PERINEURAL at 08:10

## 2023-04-22 RX ADMIN — Medication 1000 UNITS: at 12:03

## 2023-04-22 RX ADMIN — FLUCONAZOLE IN SODIUM CHLORIDE 200 MG: 2 INJECTION, SOLUTION INTRAVENOUS at 12:00

## 2023-04-22 RX ADMIN — SENNOSIDES AND DOCUSATE SODIUM 1 TABLET: 8.6; 5 TABLET ORAL at 12:29

## 2023-04-22 ASSESSMENT — ACTIVITIES OF DAILY LIVING (ADL)
ADLS_ACUITY_SCORE: 35

## 2023-04-22 NOTE — PROGRESS NOTES
Nephrology  Progress note- dialysis visit  04/22/2023     This patient was seen and examined while on hemo- dialysis.  Professional oversight of the patient's dialysis care, access care and dialysis related co-morbidities were addressed as necessary with the patient and / or staff.   Tolerating run, no UF, no symptoms of hypervolemia.    Laboratory results and nurses' notes were reviewed.   No changes to management of volume, anemia, BMD, acidosis, or electrolytes.   Diagnosis - ESRD      Kayley Hutson MD  [facility]  Department of Medicine  Division of Renal Disease and Hypertension  [facility]  darien Liu Web Console

## 2023-04-22 NOTE — PROGRESS NOTES
Care Management Follow Up    Length of Stay (days): 16    Expected Discharge Date: 04/22/2023     Concerns to be Addressed: Discharge Planning    Private pay costs discussed: Not applicable    Additional Information:  Pt has completed his dialysis run today and is now medically ready for discharge.     RNCC faxed OP HD orders.     BOB Garrido (Jose Herrera)  Ph: [phone]  Fax: [phone]  ** instructions provided on the discharge instructions    No other discharge known at this time.     Billie Chase RN  RNCC Ned

## 2023-04-22 NOTE — PROGRESS NOTES
HEMODIALYSIS TREATMENT NOTE    Date: 4/22/2023  Time: 10:03 AM    Data:  Pre Wt:  46 kg   Desired Wt: 46 kg   Post Wt:  46 kg  Weight change:  0 kg  Ultrafiltration - Post Run Net Total Removed (mL): 0 mL  Vascular Access Status: patent  Dialyzer Rinse: Clear  Total Blood Volume Processed: 45 Liters  Total Dialysis (Treatment) Time: 3 Hours    Lab:   Yes    Interventions/Assessment:  Received pt.on bed, SPO2:97%  Pt.dialyzed for 3 hrs via AVF (+) Thrill/Bruit  BFR:300,DFR:600, Dialysate bath K:2, CA:2.25  Cannulation done but c/o pain on venous so used the CVC as venous line.   Pt. Tolerated HD well  Hemostatic achieved for 10 mins.   Handoff report given to PCN     Plan:    Per renal team

## 2023-04-22 NOTE — DISCHARGE SUMMARY
[facility]  Hospitalist Discharge Summary      Date of Admission:  4/6/2023  Date of Discharge:  4/22/2023  Discharging Provider: Brianna Morales MD  Discharge Service: Medicine Service, BENSON TEAM 4    Discharge Diagnoses   See below    Follow-ups Needed After Discharge   Follow-up Appointments     Follow Up ([facility])      General Surgery Follow Up:  -  Follow-up in General Surgery clinic in 1-2 weeks. If your surgeon is   not available in this time-frame you might see one of their partners. If   you are not contacted about this appointment please call RN care   coordinator:  Nell Butts [phone]    If you are receiving home care please inform your home care nurse of our   contact number.    You may also call the Surgery Call-Center to speak with a Triage Nurse or   to make an appointment: [phone].    Appointments on [place] and/or [facility] (with [facility] or [facility]   provider or service). Call [phone] if you haven't heard regarding   these appointments within 7 days of discharge.         Additional follow-up instructions/to-do's for PCP:  - Please ensure patient follows up with surgery as planned  - Please ensure patient goes to dialysis as planned  - Monitor magnesium intermittently    Unresulted Labs Ordered in the Past 30 Days of this Admission     Date and Time Order Name Status Description    4/14/2023  4:21 PM Fungal or Yeast Culture Routine Preliminary     4/7/2023 10:33 AM Fungal or Yeast Culture Routine Preliminary       These results will be followed up by hospitalist pool.     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 Unable to tolerate peritoneal antibiotics prior to admission. Initially had tachycardia and leukocytosis. Peritoneal culture prior to admission growing budding yeast, no bacterial growth. PD catheter removed on 4/7/2023. Peritoneal fluid and PD cath tip cultures from this admission also growing candida albicans, pan-susceptible. ID was consulted, who recommended treatment with IV fluconazole.    On 4/14, patient developed abdominal pain around site of PD catheter removal. CT A/P obtained on 4/14 showed extensive abdominal wall abscesses including some component of intraperitoneal involvement. Surgery was consulted, and he had I&D on 4/14 of abscesses. His antibiotics were broadened to add vanc and zosyn due to abscesses. Culture from I&D also grew candida albicans, therefore, vanc and zosyn were discontinued on 4/17 and he was continued on fluconazole only.     He remained clinically stable with improvement in abdominal pain at site of abscesses during remainder of admission.     He was discharged to continue PO fluconazole (end date 5/12/2023 - total of 4 weeks of therapy). No ID follow up needed, but he is scheduled to follow up with surgery on 5/3/2023.      Small bowel obstruction vs infection-associated ileus, improved/resolved  At admission, patient's last BM was " noted to be one week prior to presentation. He also had abdominal pain. Imaging showed transition point closely associated to PD catheter. He was initially improving w/ sepsis treatment, tolerating PO, but did have some worsening abd pain on 4/12. AXR at that time was reassuring, and he was able to have bowel movement w/ enema and subsequently was having daily bowel movements. CT 4/14 with mild distension concern for either resolving or restarting mild obstruction at side of abscesses. At time of discharge, he was tolerating PO and having regular bowel movements with help of bowel regimen. He was discharged to continue bowel regimen per medication list below.     ESRD w/ PD  Mild hyperkalemia  AGMA  During admission, PD catheter was removed on 4/7/2023 due to infection and decision was made to transition to HD. His AVF was noted to be functional, but since it is new he was required to have additional access for HD. He had temporary CVC placed for HD reinitiation this hospital stay, which was subsequently replaced by tunneled HD line on 4/14/2023. Nephrology was consulted during admission and he was established on a TTS HD schedule. Outpatient HD was arranged prior to discharge, he will start at outpatient unit on Tuesday 4/25/2023. He was continued on PTA Cinacalcet 30mg qday.    Per mom's request, message sent to patient's transplant coordinator, Meera Hoyt, to notify her of patient's discharge.      Restrictive lung disease  Respiratory status remained stable throughout admission. He remained on PTA duonebs and vest therapy. PTA Azithro MWF held for now per mother's request, will restart on discharge.     Chronic malnutrition  Has PEG but not tube feed dependent, tolerates PO. His PO intake improved during admission.      Neurogenic bladder s/p mitranoff urinary diversion  He continued straight caths per outpatient routine during admission.      HTN   Initially PTA amlodipine and clonidine were held, but these  medications were restarted during admission as tolerated and were continued at discharge.      GERD   He was continued on PTA Famotidine during admission.     Consultations This Hospital Stay   PHARMACY TO DOSE VANCO  NEPHROLOGY KIDNEY/PANCREAS TRANSPLANT ADULT IP CONSULT  SURGERY GENERAL ADULT IP CONSULT  INTERNAL MEDICINE PROCEDURE TEAM ADULT IP CONSULT [facility] - DIALYSIS NON TUNNELED CENTRAL LINE PLACEMENT  RESPIRATORY CARE IP CONSULT  INFECTIOUS DISEASE GENERAL ADULT IP CONSULT  INTERVENTIONAL RADIOLOGY ADULT/PEDS IP CONSULT  PHARMACY TO DOSE VANCO  IV TEAM IP CONSULT  IV TEAM IP CONSULT  NURSING TO CONSULT FOR VASCULAR ACCESS CARE IP CONSULT    Code Status   Full Code    Time Spent on this Encounter   I, Brianna Morales MD, personally saw the patient today and spent greater than 30 minutes discharging this patient.       Brianna Morales MD  [facility]D INTERMEDIATE CARE  [address] [facility] [address]  Phone: [phone]  Fax: [phone]  ______________________________________________________________________    Physical Exam   Vital Signs: Temp: 98  F (36.7  C) Temp src: Oral BP: 132/76 Pulse: 97   Resp: 20 SpO2: 94 % O2 Device: None (Room air) (with PMV) Oxygen Delivery: 20 LPM  Weight: 101 lbs 6.4 oz  Physical Exam  Constitutional:       General: He is not in acute distress.     Appearance: Normal appearance. He is not toxic-appearing.   HENT:      Head: Normocephalic and atraumatic.      Right Ear: External ear normal.      Left Ear: External ear normal.      Nose: Nose normal.      Mouth/Throat:      Mouth: Mucous membranes are moist.   Eyes:      Extraocular Movements: Extraocular movements intact.      Conjunctiva/sclera: Conjunctivae normal.   Cardiovascular:      Rate and Rhythm: Normal rate and regular rhythm.      Pulses: Normal pulses.      Heart sounds: Normal heart sounds.   Pulmonary:      Effort: Pulmonary effort is normal.      Breath sounds: Normal breath  sounds.      Comments: Trach present and capped  Abdominal:      General: Bowel sounds are normal. There is no distension.      Tenderness: There is no abdominal tenderness.      Comments: Surgical I&D site covered in dressing, no fluctuance or pain noted   Musculoskeletal:         General: Normal range of motion.      Cervical back: Normal range of motion.   Skin:     General: Skin is warm and dry.   Neurological:      Mental Status: He is alert. Mental status is at baseline.       Primary Care Physician   [facility]    Discharge Orders      Follow Up ([facility])    General Surgery Follow Up:  -  Follow-up in General Surgery clinic in 1-2 weeks. If your surgeon is not available in this time-frame you might see one of their partners. If you are not contacted about this appointment please call RN care coordinator:  Nell Butts [phone]    If you are receiving home care please inform your home care nurse of our contact number.    You may also call the Surgery Call-Center to speak with a Triage Nurse or to make an appointment: [phone].    Appointments on [place] and/or [facility] (with [facility] or [facility] provider or service). Call [phone] if you haven't heard regarding these appointments within 7 days of discharge.     Activity    Your activity upon discharge: activity as tolerated     Wound care and dressings    For Greg's abdominal wound that is healing:  - DRESSING:              - previous exit site of PD catheter will heal by itself. Okay to leave open to air or cover with gauze if having                 drainage              - midline abdominal incision has steristrips which will fall off in 2 weeks by themselves. Under are absorbable                 Sutures. No sutures need to be removed.  - FOLLOW UP: Follow up for wound check in 1-2 weeks. Please call the General Surgery Clinic at [phone] with any questions (a referral is in place for this).  - ACTIVITY: shower daily, allow  water/soap to run over incision but don't soak or scrub. Dressing change daily after showering.     Reason for your hospital stay    You were admitted to the hospital for a fungal infection related to your peritoneal dialysis catheter. This was removed and we started you on hemodialysis (hopefully temporarily). We also treated your infection with a powerful antifungal as well as you had our surgeons clean out part of the abdominal skin to make sure there were no more abscesses.    Below are some very important things your doctors want you to keep in mind as you transition home from the hospital.    Medication Changes: All medications are at the [facility] in [place] on [address]  1. You will take Fluconazole 200mg daily through 23 to complete a full course of antifungal treatment.  2. I have also provided a prescription for Bisacodyl daily to help with constipation as well as Simethicone and Miralax as needed for abdominal cramping as your bowels continue to heal.  3. I have also renewed your prior Clonidine prescription as this was .  Otherwise, continue taking remainder of your medications as you were prior to your hospitalization    Follow-up Appointments/Referrals:  1. Resume going to [facility] dialysis as scheduled on 23  2. You have follow-up with the general surgery clinic here at the [facility] on 5/3/23 at 8am to reassess your abdominal wounds that are healing.  3. You have follow-up with your primary care doctor on 23 at 1:30pm.    If you experience any new or worsening fevers, severe abdominal pain, vomiting that will not stop, or new pus coming from one of your abdominal wound sites, you should go to your nearest emergency room or call -. For non-urgent medical questions, you should call your primary care provider's office to be connected with their on-call nurse for further help.     Diet    Follow this diet upon discharge: Orders Placed This  Encounter      Regular Diet Adult       Significant Results and Procedures   Most Recent 3 CBC's:Recent Labs   Lab Test 04/18/23  0705 04/17/23  0840 04/16/23  0706   WBC 9.0 9.0 9.0   HGB 7.7* 7.3* 7.5*   MCV 96 96 95   * 502* 441     Most Recent 3 BMP's:Recent Labs   Lab Test 04/22/23  0735 04/20/23  0839 04/19/23  0759    135* 138   POTASSIUM 5.7* 5.3 5.4*   CHLORIDE 101 104 104   CO2 20* 17* 22   BUN 56.5* 48.8* 36.3*   CR 4.93* 5.10* 4.53*   ANIONGAP 15 14 12   SUSAN 9.0 8.7 9.2   GLC 87 126* 91     Most Recent 2 LFT's:Recent Labs   Lab Test 04/12/23  0605 10/15/20  1138   AST 30 10   ALT 11 15   ALKPHOS 69 94   BILITOTAL 0.2 0.2   ,   Results for orders placed or performed during the hospital encounter of 04/06/23   XR Chest 2 Views    Narrative    EXAM: XR CHEST 2 VIEWS  4/6/2023 5:38 PM     HISTORY:  SOB fever       COMPARISON:  Chest x-ray 10/15/2020. CT abdomen and pelvis 3/8/2023    FINDINGS:   PA and lateral views of the chest. Normal heart size. Moderate size  hiatal hernia. Percutaneous tracheostomy tube tip overlies the  midthoracic trachea. Suggestion of perihilar bronchial cuffing. No  airspace consolidation. No pleural effusion or pneumothorax. The  osseous structures are within normal limits.      Impression    IMPRESSION:   Suggestion of perihilar bronchial cuffing which can be seen in  reactive airway disease or viral illness. No focal airspace  consolidation.    I have personally reviewed the examination and initial interpretation  and I agree with the findings.    JONN MATA MD         SYSTEM ID:  [id number]   CT Abdomen Pelvis without Contrast (stone protocol)    Narrative    CT of the Abdomen and Pelvis without contrast, 4/6/2023 9:21 PM.    Comparison: CT abdomen and pelvis 3/8/2023.    History: abd pain, dialysis pt.     Technique: Axial images of the  abdomen and pelvis were obtained  without contrast. Coronal reconstructions were provided. Images were  reviewed in bone,  lung, and soft tissue windows.    Findings:  Left lower quadrant peritoneal dialysis catheter terminating within  the pelvis. Adjacent small and large bowel loops demonstrate wall  thickening and focal mesenteric stranding, with associated upstream  dilatation of small bowel loops measuring up to 4 cm in maximum  diameter. Trace pneumoperitoneum is likely related to indwelling  dialysis catheter. Small volume pelvic fluid. No pneumatosis or portal  venous gas. Changes of Mitrofanoff repair.    Lung bases:  Visualized lungs are hyperinflated with mosaic attenuation and diffuse  peribronchial thickening. Moderate hiatal hernia with fluid-filled  stomach.    Abdomen and Pelvis:   Liver is unremarkable. No intra or extrahepatic biliary duct dilation.  Gallbladder is unremarkable. No calcified stones, wall thickening,  pericholecystic fluid. Spleen size within normal limits. Small  splenule. Pancreas is unremarkable.     Adrenal glands are unremarkable. Horseshoe kidney configuration with  prominent extrarenal pelves and atrophy. No calcified stone, or  contour deforming mass. Antidependant air in the urinary bladder is  likely related to recent instrumentation.    Abdominal aorta and major branches are normal in caliber.    Redemonstrated multiple prominent central mesenteric nodes are favored  to be reactive.    Bones and Soft Tissues:   No suspicious osseous lesion. Diffuse sclerosis throughout the  visualized skeleton likely represents renal osteodystrophy. Chronic  sacral deformity.      Impression    Impression:     1. Findings of bowel obstruction with small bowel loops dilated up to  4 cm in the left hemiabdomen. Transition point is likely located in  the pelvis along the peritoneal dialysis catheter, with appreciable  inflammatory changes about the small and large bowel at this site.     2. Moderate hiatal hernia with fluid-filled stomach.    3. Partially visualized lung bases demonstrates mosaic attenuation  and  diffuse peribronchial thickening, which can be seen in the setting of  small airway disease.     Imaging findings were conveyed to Greg Wing by Dr. Greg Rey  at 10:40 PM.    I have personally reviewed the examination and initial interpretation  and I agree with the findings.    JONN MATA MD         SYSTEM ID:  [id number]   XR Chest Port 1 View    Narrative    EXAM: XR CHEST PORT 1 VIEW  4/7/2023 12:21 PM     HISTORY:  Check dialysis catheter       COMPARISON:  Chest x-ray 4/6/2023    FINDINGS: AP radiograph of the chest. New right IJ central venous  catheter with tip overlying the right atrium. Tracheostomy tube  projects over the high thoracic trachea. Partially visualized  percutaneous gastrostomy tube.    Stable cardiomediastinal silhouette. No pneumothorax or significant  pleural effusion. No focal airspace opacity. Hiatal hernia. No acute  osseous abnormality.      Impression    IMPRESSION:  1. Right IJ central venous catheter with tip overlying the right  atrium. No pneumothorax.  2. No focal airspace opacity.    I have personally reviewed the examination and initial interpretation  and I agree with the findings.    JONN MATA MD         SYSTEM ID:  [id number]   XR Abdomen Port 1 View    Narrative    EXAM: Abdominal radiograph 4/12/2023 11:17 AM    HISTORY: 27 years Male constipation.    COMPARISON: CT abdomen and pelvis 4/6/2023.    TECHNIQUE: Frontal supine view(s) of the abdomen.    FINDINGS:  Gastrostomy tube projects over the stomach. Multiple air-filled loops  of small bowel with the largest diameter measuring 3.4 cm within the  right abdomen. Large colonic stool burden. No pneumatosis or portal  venous gas. Bony structures are intact. Nonspecific calcifications  within the pelvis likely to phleboliths. Lung bases clear.       Impression    IMPRESSION:  1. Nonspecific bowel gas pattern with air filled loops of small bowel  measuring 3.4 cm greatest diameter, likely representing  resolution of  SBO described on CT dated 4/6/2023.   2. Large colonic stool burden.    I have personally reviewed the examination and initial interpretation  and I agree with the findings.    MARCOS GUERRERO MD         SYSTEM ID:  [id number]   IR CVC Non Tunnel Line Removal    Narrative    This procedure was previously dictated on report with accession number  [id number].    GAYLA HU PA-C         SYSTEM ID:  [id number]   IR CVC Tunnel Placement > 5 Yrs of Age    Narrative    Procedure date: 4/14/2023.    History: Patient with history of renal failure, with existing right IJ  nontunneled central venous catheter in place. Patient presents to IR  for right IJ nontunneled catheter removal and placement of new right  IJ tunneled central venous catheter for expected long-term  hemodialysis.     Procedure: Right IJ nontunneled central venous catheter removal. Image  guided placement of right IJ, 14.5 Bengali, 23 cm, double lumen,  tunneled central venous catheter.    Preop diagnosis: Renal failure.    Postop diagnosis: Same.    : Junito Hu PA-C.    Assist: IR technologist, TAMIE Munguia.    Medications: Fentanyl 75 mcg IV, Versed 1.5 mg IV, 1% lidocaine 10 ml  local anesthesia, heparin 4.0 ml of 1000 units/ml/lumen.    Nursing: The patient's vital signs and oxygen saturation were  continuously monitored by  IR nursing  staff under the supervision of  the attending physician throughout the procedure, and remained stable.    Face to face sedation time: 39 minutes.    Fluoroscopy time: 0.6 minutes.     IV contrast: None.    Consent: Informed written and verbal consent was obtained.    Procedure/Findings: The patient was placed supine on the fluoroscopy  table. Attention was first directed toward right IJ nontunneled  central venous catheter removal. Removal performed by IR technologist.  The right neck nontunneled catheter site was exposed, and retention  sutures were removed. The existing right IJ  dual-lumen nontunneled  central venous catheter was removed intact and without difficulty  while the patient was supine. Pressure was maintained over the right  neck venotomy site for hemostasis.  Attention was then directed toward the new right IJ tunneled central  venous catheter placement. Ultrasound was used to identify a patent  right internal jugular vein, and an image was saved.  The patient's  right neck and chest were prepped and draped in the usual sterile  fashion. The dermatotomy site was anesthetized with 1% lidocaine. A 5  mm incision was made with a # 11 blade, and bluntly dissected. Under  ultrasound guidance, a .018 micropuncture needle was advanced into the  right internal jugular vein. Under fluoroscopic visualization, a 0.018  soft tipped wire was advanced into the right atrium. Micropuncture  needle was exchanged over a wire for a 3-5 Danish dilator. Under  fluoroscopy, the wire was used to measure from the patient's right  atrium to the skin. The wire and inner 3 Danish dilator were removed,  and under fluoroscopic guidance, a 0.035 Bentson wire advanced through  the right atrium and into the inferior vena cava and secured. The  chest was anesthetized with lidocaine, a 5 mm incision was made with a  # 11 blade, a tunnel was created with a tunneling device, and the  catheter was pulled through. The dilator was exchanged over wire for a  16 Danish dilator/peel-away sheath. The wire and dilator were removed,  and the 14.5 Danish, 23 cm, double lumen, catheter was advanced  through the peel-away sheath into the vessel and the peel-away sheath  removed. Fluoroscopy revealed the catheter tip to lie at the  mid  right atrium.  Of note, there is a soft tissue density projecting  midline, which expands and contracts with respirations. This is  consistent with known retrocardiac hiatal hernia. The catheter flushed  and aspirated freely and was flushed with 2.0-2.0 ml of heparin,  100  units/ml/lumen. The  catheter was secured with one 2-0 Ethilon  retention suture and the site was cleansed and dressed. The neck  incision was cleansed and the skin edges were approximated and glued.  The prior right IJ nontunneled central venous catheter site was closed  with skin glue. Images were saved throughout the procedure. The  procedure was well tolerated, with no immediate complications.    Estimate blood loss: 5 cc.    Specimens: None.      Impression    Impression: 1. Existing right IJ nontunneled central venous catheter  removed intact and without difficulty. Removal performed by IR  technologist. Image guided placement of right IJ, 14.5 Moroccan, 23 cm,  double lumen, tunneled central venous catheter. Catheter is ready for  immediate use. Fluoroscopy noticeable for retrocardiac expanding and  juan r density consistent with hiatal hernia.    Plan: Patient transferred to post care unit in stable condition. The  glued neck incision does not require dressings.  If the glue is still  adhered to the neck incision in 10 days, it may be gently removed.   The chest incision should be kept clean, dry, and covered, with daily  dressing changes, for 3 days.  The catheter exit site should not be  submerged, and should be covered when showering/bathing. The catheter  retention suture may be removed in 3 weeks.    Attestation: The physician assistant (PA) who performed this procedure  and signed the above report is licensed to practice in the state [facility] pursuant to [place] Statute 147A.09.  This includes meeting the  Statute and [place] Board of Medical [employer] requirement of an  active Delegation Agreement, which documents delegation of services by  primary and alternate supervising physicians. All services rendered  are performed under a collaborative agreement with Dr. Nena Doan, Director of Interventional Radiology, [facility].    GAYLA SHEPPARD PA-C         SYSTEM ID:  [id number]   CT Abdomen  Pelvis w Contrast     Value    Radiologist flags Abscesses (Urgent)    Narrative    EXAMINATION: CT ABDOMEN PELVIS W CONTRAST, 4/14/2023 3:18 PM    TECHNIQUE:  Helical CT images from the lung bases through the  symphysis pubis were obtained with contrast.  Coronal and sagittal  reformatted images were generated at a workstation for further  assessment.    CONTRAST:  58 ml Isovue 370.    COMPARISON: CT abdomen and pelvis 4/6/2023    HISTORY: 26yo male w/ recently removed PD catheter due to fungal  peritonitis now w/ acute induration and tenderness at site of left  abdominal PD catheter removal site, evaluating for abscess vs  soft-tissue infection    FINDINGS:    Lines and tubes: Gastrostomy tube in appropriate position.    Lower thorax: Hyperinflated lungs with similar mosaic attenuation and  peribronchial cuffing. No convincing acute airspace disease. No  cardiomegaly or significant pericardial effusion. Moderate hiatal  hernia.    Liver: Focal fatty deposition along the falciform ligament. No  suspicious lesions. Portal veins appear patent.  Gallbladder: No cholelithiasis or evidence of acute cholecystitis. No  intra- or extra-hepatic biliary ductal dilatation.  Spleen: Normal parenchyma. Small splenule near the hilum.  Pancreas: Unremarkable.  Adrenal glands: No discrete nodules.  Kidneys: Horseshoe configuration with bilateral moiety  benign-appearing cysts with moderate to severe hydroureteronephrosis  and periureteral/peripelvic fat stranding.  Bladder / Pelvic organs: Bladder is irregular in shape with scattered  areas of wall thickening, similar to prior. Perivesicular fat  stranding. Mitrofanoff. No pelvic mass.  Bowel: Borderline distended loop of small bowel in the right upper  quadrant and parasagittal abdomen with transition point in the low  central abdomen near the fluid collection detailed below, similar to  the prior transition point. Circumferential thickening and associated  fat stranding about  the sigmoid colon and rectum. No pneumatosis.  Lymph nodes: Scattered mesenteric and retroperitoneal lymphadenopathy,  likely reactive.  Peritoneum / Retroperitoneum: No pneumoperitoneum. Multiple irregular  and largely contiguous peripherally enhancing fluid collections in the  lower abdomen and pelvis geographically associated with the prior  peritoneal dialysis catheter tract through the abdominal wall, the  largest components measuring 3.1 x 2.7 cm in the anterior parasagittal  abdominal wall (series 4 image 142) and 5.1 x 2.9 cm in the central  pelvis (series 4 image 135) with internal air foci. Significant  inflammatory stranding throughout the lower abdomen and pelvis  associated with these collections and the neighboring distal bowel as  above. Small volume pelvic free fluid.  Abdominal vasculature: Major vascular structures of the abdomen are  patent and normal in caliber.    Bones and soft tissues: Diffuse bony sclerosis in keeping with renal  osteodystrophy. No acute osseous abnormalities or suspicious bony  lesions. Mild anasarca. Chronic sacral deformity. Intramuscular left  obturator internus lipoma.      Impression    IMPRESSION:   1. Multiple irregular and largely contiguous abscesses extending from  the abdominal wall and subcutaneous tissues into the peritoneum along  the trajectory of the prior peritoneal dialysis catheter tract. There  is significant wall thickening and associated inflammation of the  sigmoid colon and rectum. It would be difficult to exclude an early  fistula to the surrounding small or large bowel with all of the  neighboring bowel loops and significant inflammatory change.  2. 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  There is periureteral and perivesicular fat stranding indicative of  infection or inflammation.  3. Borderline distended loop of small bowel in the right upper  quadrant  and parasagittal abdomen with transition point in the low  central abdomen near the fluid collection detailed below, similar to  the prior transition point likely reflecting some degree of partial  residual obstruction.       [Urgent Result: Abscesses]    Finding was identified on 4/14/2023 3:30 PM.     Dr. Morrison was contacted by Dr. Friedman at 4/14/2023 at 3:50 PM and  verbalized understanding of the urgent finding.     I have personally reviewed the examination and initial interpretation  and I agree with the findings.    KATARINA GONZALEZ MD         SYSTEM ID:  [id number]       Discharge Medications   Current Discharge Medication List      START taking these medications    Details   bisacodyl (DULCOLAX) 5 MG EC tablet Take 1 tablet (5 mg) by mouth daily  Qty: 30 tablet, Refills: 1    Associated Diagnoses: Constipation, unspecified constipation type      cloNIDine (CATAPRES) 0.1 MG tablet Take 1 tablet (0.1 mg) by mouth 2 times daily  Qty: 60 tablet, Refills: 3    Associated Diagnoses: Hypertension secondary to other renal disorders      fluconazole (DIFLUCAN) 200 MG tablet Take 1 tablet (200 mg) by mouth daily for 20 days  Qty: 20 tablet, Refills: 0    Associated Diagnoses: Acute suppurative peritonitis (H)      simethicone (MYLICON) 40 MG/0.6ML suspension Take 0.6 mLs (40 mg) by mouth every 6 hours as needed for cramping  Qty: 45 mL, Refills: 0    Associated Diagnoses: Constipation, unspecified constipation type         CONTINUE these medications which have CHANGED    Details   polyethylene glycol (MIRALAX) 17 GM/Dose powder Take 17 g by mouth daily as needed  Qty: 510 g, Refills: 0    Associated Diagnoses: Constipation, unspecified constipation type         CONTINUE these medications which have NOT CHANGED    Details   acetaminophen (TYLENOL) 500 MG tablet Take 500 mg by mouth every 8 hours as needed       albuterol (2.5 MG/3ML) 0.083% nebulizer solution Take 1 ampule by nebulization 4 times daily      Associated Diagnoses: CKD (chronic kidney disease) stage 3, GFR 30-59 ml/min (H); HTN (hypertension); Prune belly syndrome      amLODIPine (NORVASC) 5 MG tablet Take 5 mg by mouth 2 times daily      azithromycin (ZITHROMAX) 250 MG tablet Take 250 mg by mouth three times a week Mon, Wed, Fri    Associated Diagnoses: CKD (chronic kidney disease) stage 3, GFR 30-59 ml/min (H)      !! calcium carbonate (TUMS) 500 MG chewable tablet Take 1,000 mg by mouth 3 times daily      !! calcium carbonate (TUMS) 500 MG chewable tablet Take 1,000 mg by mouth At Bedtime      cholecalciferol (VITAMIN D3) 25 mcg (1000 units) capsule Take 1 capsule by mouth 2 times daily      cinacalcet (SENSIPAR) 30 MG tablet Take 30 mg by mouth daily      Cranberry 500 MG TABS Take by mouth 3 times daily (with meals)      docusate sodium (COLACE) 100 MG capsule Take 100 mg by mouth 2 times daily      famotidine (PEPCID) 40 MG tablet Take 40 mg by mouth 2 times daily       ferrous gluconate (FERGON) 324 (38 Fe) MG tablet Take 1 tablet (324 mg) by mouth daily  Qty: 90 tablet, Refills: 11    Associated Diagnoses: History of iron deficiency anemia; History of anemia due to CKD      gentamicin (GARAMYCIN) 0.1 % external cream Apply topically daily Peritoneal dialysis site      ipratropium (ATROVENT) 0.02 % neb solution Take 0.5 mg by nebulization 4 times daily   Qty: 225 mL      lactulose 20 GM/30ML solution Take 20 g by mouth 2 times daily as needed for constipation      Melatonin 10 MG TABS tablet Take 10 mg by mouth nightly as needed      mupirocin (BACTROBAN) 2 % external ointment Apply topically daily as needed (around stoma)      neomycin-bacitracin-polymyxin (NEOSPORIN) 5-400-5000 ointment Apply topically daily as needed (Around Stoma - uses EITHER neosporin OR Bactroban)      predniSONE (DELTASONE) 20 MG tablet Take 20 mg by mouth daily as needed Take only when in Red Zone      sennosides (SENOKOT) 8.6 MG tablet Take 1 tablet by mouth 2 times daily  "     sodium chloride 0.9%, bottle, 0.9 % irrigation Irrigate with as directed 3 times daily Bladder irrigation      tobramycin, PF, (DAX) 300 MG/5ML nebulizer solution Take 1 ampule by nebulization as needed Take only when in Red Zone    Associated Diagnoses: CKD (chronic kidney disease) stage 3, GFR 30-59 ml/min (H); HTN (hypertension); Prune belly syndrome      traZODone (DESYREL) 50 MG tablet Take 25 mg by mouth nightly as needed      Nutritional Supplements (NEPRO) LIQD Take 1 Can by mouth daily  Qty: 30 each, Refills: 11    Associated Diagnoses: Moderate protein-calorie malnutrition (H); CKD (chronic kidney disease) stage 4, GFR 15-29 ml/min (H)      !! order for DME Equipment being ordered: Medi-Vac, plastic tubing connector lara type, cat 361, Seeonic, 1 lara tip/month x 12 months. Use for tracheostomy suctioning  Qty: 1 each, Refills: 11    Associated Diagnoses: Tracheostomy care (H)      !! order for DME Equipment being ordered: DME    Adult Bivona Uncuffed Tracheostomy Tube-5.0    Patient will do weekly trach changes.  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 order for DME Equipment being ordered: DME -    Deep suction kits - 12 fr  Qty: 90 each, Refills: 3    Associated Diagnoses: Tracheostomy in place (H)      !! order for DME Tracheostomy Humidification Equipment  Equipment being ordered:     1) Air compressor  2) Nebulizer bottle  3) Aerosol tubing  4) Trach mask  5) Sterile water  6) Saline ampules (\"bullets\")  7) Heat Moisture Exchanger (HME) Also known by several other terms including: Thermal Humidifying Filters, Swedish nose, Artificial nose, Filter, Thermovent T.  8) Room/home humidifiers  Qty: 1 each, Refills: 11    Associated Diagnoses: Tracheostomy in place (H)      !! order for DME Optifoam Basic   [id number]  Qty: 30 each, Refills: 11    Associated Diagnoses: Granulation tissue " of site of tracheostomy; Tracheostomy in place (H); Tracheostomy granuloma (H)      !! order for DME Equipment being ordered: Humidifier (heated), humidifier attachment, saline lavages, humidivent mask  Qty: 1 each, Refills: 3    Associated Diagnoses: Tracheostomy in place (H)       !! - Potential duplicate medications found. Please discuss with provider.      STOP taking these medications       cholecalciferol 2000 UNITS tablet Comments:   Reason for Stopping:             Allergies   Allergies   Allergen Reactions     Latex      Latex      Other reaction(s): Other (see comments)  Other reaction(s): Contact Dermatitis, Other (see comments)     Adhesive Tape Rash

## 2023-04-22 NOTE — PROGRESS NOTES
DISCHARGE                        4/22/23 @ 1500  ----------------------------------------------------------------------------  Discharged to: Home  Via: private transportation  Accompanied by: Family  Discharge Instructions: Regular diet, activity as tolerated, medications, follow up appointments, when to call the MD, aftercare instructions.  Prescriptions: To be filled by [facility] pharmacy in [place]; medication list reviewed & sent with pt  Follow Up Appointments: arranged; information given  Belongings: All sent with pt  IV: d/c'd  Telemetry: d/c'd  Pt exhibits understanding of above discharge instructions; all questions answered.    Discharge Paperwork: Signed, copied, and sent home with patient.

## 2023-04-22 NOTE — PLAN OF CARE
Problem: Plan of Care - These are the overarching goals to be used throughout the patient stay.    Goal: Absence of Hospital-Acquired Illness or Injury  Intervention: Identify and Manage Fall Risk  Recent Flowsheet Documentation  Taken 4/21/2023 1300 by Luz Marina Barros RN  Safety Promotion/Fall Prevention:   assistive device/personal items within reach   activity supervised  Taken 4/21/2023 0800 by Luz Marina Barros RN  Safety Promotion/Fall Prevention:   assistive device/personal items within reach   activity supervised  Intervention: Prevent Skin Injury  Recent Flowsheet Documentation  Taken 4/21/2023 1300 by Luz Marina Barros RN  Body Position: position changed independently  Taken 4/21/2023 0800 by Luz Marina Barros RN  Body Position: position changed independently  Intervention: Prevent Infection  Recent Flowsheet Documentation  Taken 4/21/2023 1300 by Luz Marina Barros RN  Infection Prevention: hand hygiene promoted  Taken 4/21/2023 0800 by Luz Marina Barros RN  Infection Prevention: hand hygiene promoted  Goal: Optimal Comfort and Wellbeing  Intervention: Provide Person-Centered Care  Recent Flowsheet Documentation  Taken 4/21/2023 1300 by Luz Marina Barros RN  Trust Relationship/Rapport:   care explained   choices provided  Taken 4/21/2023 0800 by Luz Marina Barros RN  Trust Relationship/Rapport:   care explained   choices provided   Goal Outcome Evaluation:    NO Major events today. Patient walked X1 with RN. Straight Cath X2.   Please see flowsheets for more info.   Plan for HD at 0740 tomorrow and possible discharge home. KERI

## 2023-04-22 NOTE — PLAN OF CARE
NURSING PROGRESS NOTE  Shift Summary      Date: April 22, 2023     Neuro/Musculoskeletal:  A&Ox4.  Cardiac:  HR.  VSS was slightly febrile.   Respiratory:  Sating in the 90s on RA when awake and trach dome when sleeping at 20 L 25%.  GI/:  Adequate urine output.  LBM: 4/21  Diet/Appetite: Tolerating regular diet.  Activity:  SBA  Pain:  Denies.  Skin:  No new deficits noted.  LDAs + Drips/IVF: RFA PIV SL.  Protocols/Labs:   Pertinent Shift Updates: Patient reported loose BM  yesterday, miralax held.      Plan: Dialysis in AM then DC.      Laurence Avelar RN  .................................................... April 22, 2023    [facility] ([facility]): [place]  Stepdown ICU (Unit 6D)

## 2023-04-24 ENCOUNTER — TELEPHONE (OUTPATIENT)
Dept: TRANSPLANT | Facility: CLINIC | Age: 28
End: 2023-04-24
Payer: MEDICARE

## 2023-04-24 NOTE — TELEPHONE ENCOUNTER
Called pt's mom Valerie, to check in after hospital discharge. Pt needs fluconazole through 5/12/23. Will update Valerie if any further testing is needed after reviewed with transplant neph. Left VM with direct line for return call.

## 2023-04-25 NOTE — PROGRESS NOTES
RNCC received call from [facility], requesting update of pt's discharge date from hospital which RNCC provided.     [facility]  Ph: [phone]  Fax: [phone]    JUAN MANUEL MtzN, BA, RN, CMSRN, RNCC  Covering Units 6D/OBS  Pager: [phone]  Phone: [phone]  6th floor Weekend/Holiday Pager: [phone]  Observation weekend/after hours: [phone]

## 2023-05-05 LAB — BACTERIA PRT CULT: ABNORMAL

## 2023-05-12 LAB — BACTERIA ABSC ANAEROBE+AEROBE CULT: ABNORMAL

## 2023-05-16 ENCOUNTER — PATIENT OUTREACH (OUTPATIENT)
Dept: SURGERY | Facility: CLINIC | Age: 28
End: 2023-05-16
Payer: MEDICARE

## 2023-05-16 NOTE — PROGRESS NOTES
Patient Telephone Reminder Call    Date of call:  05/16/23  Phone numbers:  Home number on file [phone] (home)    Reached patient/confirmed appointment:  Yes  Appointment with:   Dr. Bill Hartmann  Reason for visit:  Wound check  Remind patient that this visit is a consultation only, NO procedure:  No   Can this visit be changed to a video visit:  No

## 2023-05-17 ENCOUNTER — OFFICE VISIT (OUTPATIENT)
Dept: SURGERY | Facility: CLINIC | Age: 28
End: 2023-05-17
Payer: MEDICARE

## 2023-05-17 VITALS
HEART RATE: 113 BPM | WEIGHT: 101.7 LBS | DIASTOLIC BLOOD PRESSURE: 80 MMHG | BODY MASS INDEX: 19.97 KG/M2 | OXYGEN SATURATION: 96 % | HEIGHT: 60 IN | SYSTOLIC BLOOD PRESSURE: 134 MMHG

## 2023-05-17 DIAGNOSIS — Z98.890 POST-OPERATIVE STATE: Primary | ICD-10-CM

## 2023-05-17 PROCEDURE — 99024 POSTOP FOLLOW-UP VISIT: CPT | Mod: GC | Performed by: SURGERY

## 2023-05-17 ASSESSMENT — PAIN SCALES - GENERAL: PAINLEVEL: NO PAIN (0)

## 2023-05-17 NOTE — LETTER
2023       RE: Aidan Louie  [address] [place] [address]       Dear Colleague,    Thank you for referring your patient, Aidan Louie, to the [facility] GENERAL SURGERY [facility] [place] at [facility]. Please see a copy of my visit note below.    General Surgery Postop Clinic Note    RE: Aidan Louie  MR#: [id number]  : 1995  VISIT DATE: May 17, 2023    CHIEF COMPLAINT: Follow up     HISTORY OF PRESENT ILLNESS:  27yoM s/p removal of PD catheter on 23.  Returns today for a wound check.      He reports one episode of swelling of the incision, but has otherwise had no issues with it.  No drainage, no erythema, no fevers, and has completed antifungals.      PHYSICAL EXAMINATION:  /80 (BP Location: Right arm, Patient Position: Sitting, Cuff Size: Adult Regular)   Pulse 113   Ht 1.524 m (5')   Wt 46.1 kg (101 lb 11.2 oz)   SpO2 96%   BMI 19.86 kg/m     Body mass index is 19.86 kg/m .   Gen: NAD  HEENT: Normocephalic, atraumatic  Abdomen: soft, nontender, nondistended, no guarding, no rebound, incision c/d/i, no erythema  Ext: WWP  Skin: No erythema  Neuro: cranial nerves II-XII grossly intact  Psych: Alert, appropriate      ASSESSMENT AND PLAN:    Aidan Louie is a 27 year old male with a h/o infected PD catheter removal who returns for wound check.    His incisions are well healed at this point with no obvious complications. Should follow up as needed.    Gilberto Limon MD  PGY5 General Surgery Resident    Seen with Dr. Hartmann.     Attestation signed by Bill Hartmann MD at 2023  9:47 AM:  I saw and evaluated the patient. Clinically improved without infectious signs. Wound is healed from the PD catheter site.    Follow-up as needed          Again, thank you for allowing me to participate in the care of your patient.      Sincerely,    Bill Hartmann MD

## 2023-05-17 NOTE — PATIENT INSTRUCTIONS
You met with Dr. Bill Hartmann.      Today's visit instructions:    Return to the Surgery Clinic on an as needed basis.        If you have questions please contact Nell RN or Ling RN during regular clinic hours, Monday through Friday 7:30 AM - 4:00 PM, or you can contact us via [employer] at anytime.       If you have urgent needs after-hours, weekends, or holidays please call the hospital at [phone] and ask to speak with our on-call General Surgery Team.    Appointment [phone]  Nurse Advice (Nell or Ling): [phone]   Surgery Scheduler (Janice): [phone]  Fax: [phone]

## 2023-05-17 NOTE — PROGRESS NOTES
General Surgery Postop Clinic Note    RE: Aidan Louie  MR#: [id number]  : 1995  VISIT DATE: May 17, 2023    CHIEF COMPLAINT: Follow up     HISTORY OF PRESENT ILLNESS:  27yoM s/p removal of PD catheter on 23.  Returns today for a wound check.      He reports one episode of swelling of the incision, but has otherwise had no issues with it.  No drainage, no erythema, no fevers, and has completed antifungals.      PHYSICAL EXAMINATION:  /80 (BP Location: Right arm, Patient Position: Sitting, Cuff Size: Adult Regular)   Pulse 113   Ht 1.524 m (5')   Wt 46.1 kg (101 lb 11.2 oz)   SpO2 96%   BMI 19.86 kg/m     Body mass index is 19.86 kg/m .   Gen: NAD  HEENT: Normocephalic, atraumatic  Abdomen: soft, nontender, nondistended, no guarding, no rebound, incision c/d/i, no erythema  Ext: WWP  Skin: No erythema  Neuro: cranial nerves II-XII grossly intact  Psych: Alert, appropriate      ASSESSMENT AND PLAN:    Aidan Louie is a 27 year old male with a h/o infected PD catheter removal who returns for wound check.    His incisions are well healed at this point with no obvious complications. Should follow up as needed.    Gilberto Limon MD  PGY5 General Surgery Resident    Seen with Dr. Hartmann.

## 2023-06-19 ENCOUNTER — DOCUMENTATION ONLY (OUTPATIENT)
Dept: OTOLARYNGOLOGY | Facility: CLINIC | Age: 28
End: 2023-06-19

## 2023-06-19 ENCOUNTER — OFFICE VISIT (OUTPATIENT)
Dept: OTOLARYNGOLOGY | Facility: CLINIC | Age: 28
End: 2023-06-19
Payer: MEDICARE

## 2023-06-19 VITALS
WEIGHT: 102 LBS | HEIGHT: 66 IN | SYSTOLIC BLOOD PRESSURE: 138 MMHG | BODY MASS INDEX: 16.39 KG/M2 | DIASTOLIC BLOOD PRESSURE: 82 MMHG | HEART RATE: 103 BPM

## 2023-06-19 DIAGNOSIS — Z93.0 TRACHEOSTOMY IN PLACE (H): Primary | ICD-10-CM

## 2023-06-19 PROCEDURE — 99214 OFFICE O/P EST MOD 30 MIN: CPT | Mod: 25 | Performed by: OTOLARYNGOLOGY

## 2023-06-19 PROCEDURE — 31615 TRCHEOBRNCHSC EST TRACHS INC: CPT | Performed by: OTOLARYNGOLOGY

## 2023-06-19 RX ORDER — BACITRACIN ZINC, NEOMYCIN SULFATE, AND POLYMYXIN B SULFATE 400; 3.5; 5 [IU]/G; MG/G; [IU]/G
OINTMENT TOPICAL PRN
Qty: 14 G | Refills: 4 | Status: SHIPPED | OUTPATIENT
Start: 2023-06-19

## 2023-06-19 ASSESSMENT — PAIN SCALES - GENERAL: PAINLEVEL: NO PAIN (0)

## 2023-06-19 NOTE — PATIENT INSTRUCTIONS
1.  You were seen in the ENT Clinic today by . If you have any questions or concerns after your appointment, please call [phone]. Press option #1 for scheduling related needs. Press option #3 for Nurse advice.    2. Plan is to return to clinic in one year      Stephanie Han LPN  [phone]  [facility] - Otolaryngology

## 2023-06-19 NOTE — LETTER
6/19/2023       RE: Aidan Louie  [address] [place] [address]     Dear Colleague,    Thank you for referring your patient, Aidan Louie, to the [facility] [place] at [facility]. Please see a copy of my visit note below.    Dear Colleague:    Aidan Louie recently returned for follow-up at the [facility]. My clinic note from our visit is enclosed below.  Speech recognition software may have been used in the documentation below; input is reviewed before signature to the best of my ability.     I appreciate the ongoing opportunity to participate in this patient's care.    Please feel free to contact me with any questions.    Sincerely yours,      Bebe Dumas M.D., M.P.H.  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    He was a longtime patient of Dr. Ballesteros, and has had a trach for many years because of the mandibular hypoplasia. Per report, a number of attempts toward decannulation had been made, but ultimately the decision was made to keep the trach in place due to concerns about his airway. He went to the operating room with Dr. Jose in August 2017 for flexible and rigid laryngoscopy/bronchoscopy. Per the operative note, this showed moderate trismus and base of tongue collapse, redundant, distorted, floppy epiglottis, with a grade 4 laryngoscopy. A small granuloma was  seen on the medial left arytenoid. Vocal fold mobility and subglottis were normal. The 5-0 pediatric Bivona tube was noted to be in good position. Distal airway was unremarkable. It was observed that he would be a difficult intubation. With a Rothman blade under the epiglottis he was thought possibly intubatable but this was felt to be difficult even in a best case situation.    As of 2020:   He currently has a 5-0 Bivona in place, and has a vertically tall stomal opening. 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  With good wound care, antibiotics, steroid injections, and silver nitrate, as well as clipping of synechium, his trach site significantly improved.    Also saw KERVIN Dodson in 2021.    Today's updates:  - He has developed kidney failure and is on the transplant list.  - His trach has been doing well overall, and responded well to frequent trach changes.  - The patient touching the trach has continued to be a challenge at times.  - He currently has a 6 Bivona in place, and needs updated prescriptions.      MEDICATIONS:     Current Outpatient Medications   Medication Sig Dispense Refill    acetaminophen (TYLENOL) 500 MG tablet Take 500 mg by mouth every 8 hours as needed       albuterol (2.5 MG/3ML) 0.083% nebulizer solution Take 1 ampule by nebulization 4 times daily       amLODIPine (NORVASC) 5 MG tablet Take 5 mg by mouth 2 times daily      azithromycin (ZITHROMAX) 250 MG tablet Take 250 mg by mouth three times a week Mon, Wed, Fri      bisacodyl (DULCOLAX) 5 MG EC tablet Take 1 tablet (5 mg) by mouth daily 30 tablet 1    calcium carbonate (TUMS) 500 MG chewable tablet Take 1,000 mg by mouth 3 times daily      calcium carbonate (TUMS) 500 MG chewable tablet Take 1,000 mg by mouth At Bedtime      cholecalciferol (VITAMIN  "D3) 25 mcg (1000 units) capsule Take 1 capsule by mouth 2 times daily      cinacalcet (SENSIPAR) 30 MG tablet Take 30 mg by mouth daily      cloNIDine (CATAPRES) 0.1 MG tablet Take 1 tablet (0.1 mg) by mouth 2 times daily 60 tablet 3    Cranberry 500 MG TABS Take by mouth 3 times daily (with meals)      docusate sodium (COLACE) 100 MG capsule Take 100 mg by mouth 2 times daily      famotidine (PEPCID) 40 MG tablet Take 40 mg by mouth 2 times daily       ferrous gluconate (FERGON) 324 (38 Fe) MG tablet Take 1 tablet (324 mg) by mouth daily 90 tablet 11    gentamicin (GARAMYCIN) 0.1 % external cream Apply topically daily Peritoneal dialysis site      ipratropium (ATROVENT) 0.02 % neb solution Take 0.5 mg by nebulization 4 times daily  225 mL     lactulose 20 GM/30ML solution Take 20 g by mouth 2 times daily as needed for constipation      Melatonin 10 MG TABS tablet Take 10 mg by mouth nightly as needed      mupirocin (BACTROBAN) 2 % external ointment Apply topically daily as needed (around stoma)      neomycin-bacitracin-polymyxin (NEOSPORIN) 5-400-5000 ointment Apply topically daily as needed (Around Stoma - uses EITHER neosporin OR Bactroban)      Nutritional Supplements (NEPRO) LIQD Take 1 Can by mouth daily 30 each 11    order for DME Equipment being ordered: Medi-Vac, plastic tubing connector lara type, cat 361, [facility], 1 lara tip/month x 12 months. Use for tracheostomy suctioning 1 each 11    order for DME Equipment being ordered: DME    Adult Bivona Uncuffed Tracheostomy Tube-5.0    Patient will do weekly trach changes. 4 Units 11    order for DME Equipment being ordered: DME -    Deep suction kits - 12 fr 90 each 3    order for DME Tracheostomy Humidification Equipment  Equipment being ordered:     1) Air compressor  2) Nebulizer bottle  3) Aerosol tubing  4) Trach mask  5) Sterile water  6) Saline ampules (\"bullets\")  7) Heat Moisture Exchanger (HME) Also known by several other " terms including: Thermal Humidifying Filters, Swedish nose, Artificial nose, Filter, Thermovent T.  8) Room/home humidifiers 1 each 11    order for DME Optifoam Basic   [id number] 30 each 11    order for DME Equipment being ordered: Humidifier (heated), humidifier attachment, saline lavages, humidivent mask 1 each 3    polyethylene glycol (MIRALAX) 17 GM/Dose powder Take 17 g by mouth daily as needed 510 g 0    predniSONE (DELTASONE) 20 MG tablet Take 20 mg by mouth daily as needed Take only when in Red Zone      sennosides (SENOKOT) 8.6 MG tablet Take 1 tablet by mouth 2 times daily      simethicone (MYLICON) 40 MG/0.6ML suspension Take 0.6 mLs (40 mg) by mouth every 6 hours as needed for cramping 45 mL 0    sodium chloride 0.9%, bottle, 0.9 % irrigation Irrigate with as directed 3 times daily Bladder irrigation      tobramycin, PF, (DAX) 300 MG/5ML nebulizer solution Take 1 ampule by nebulization as needed Take only when in Red Zone      traZODone (DESYREL) 50 MG tablet Take 25 mg by mouth nightly as needed         ALLERGIES:    Allergies   Allergen Reactions    Latex     Latex      Other reaction(s): Other (see comments)  Other reaction(s): Contact Dermatitis, Other (see comments)    Adhesive Tape Rash       NEW PMH/PSH: As above    REVIEW OF SYSTEMS:  The patient completed a comprehensive 11 point review of systems (below), which was reviewed. Positives are as noted below.  Patient Supplied Answers to Review of Systems      6/10/2019    12:31 PM   UC ENT ROS   Constitutional Unexplained fatigue   Cardiopulmonary Cough   Musculoskeletal Sore or stiff joints    Back pain       PHYSICAL EXAM:  General: The patient was alert and conversant, and in no acute distress. Patient accompanied by his mother.  Neck: 6-O Bivona trach in place, mild to moderate persistent bilateral dryness granulation bilaterally.  Resp: Breathing comfortably, no stridor or stertor.  Neuro: Symmetric facial function. Other cranial nerve  function as documented above.  Psych: Normal affect, pleasant and cooperative.  Voice/speech: No significant dysphonia.      PROCEDURE:  Flexible tracheobronchoscopy through established trach incision  Indications: The procedure was warranted to assess the position and/or condition of the tracheostomy tube and trachea  Description: After written and verbal consent were obtained, the endoscope was passed through the patient's tracheostomy. This allowed visualization of the trachea down to the raul and mainstem bronchi.  The trach was also pulled back to allow evaluation of the trach tract, and then was replaced and secured.  Findings: Trach tube in good position within the airway. Trachea clear to raul, which was sharp. Unremarkable takeoffs of mainstem bronchi. Trach tract unremarkable.                   IMPRESSION AND PLAN:   Aidan Louie returns for a routine trach check and renewals of prescriptions for trach supplies etc. It does appear that the straps tend to pull the trach higher than it sat during his childhood, but the stoma site has not changed significantly.     We will plan a one year follow up with either me or KERVIN Dodson as per their schedule.  I appreciate the opportunity to participate in the care of this pleasant patient.     I spent a total of 32 minutes on 6/19/2023 in chart review, review of tests, patient visit, documentation, care coordination, and/or discussion with other providers about the issues documented above, separate from any documented procedure(s).      Again, thank you for allowing me to participate in the care of your patient.      Sincerely,    Bebe Dumas MD

## 2023-06-19 NOTE — PROGRESS NOTES
Dear Colleague:    Aidan Louie recently returned for follow-up at the [facility]. My clinic note from our visit is enclosed below.  Speech recognition software may have been used in the documentation below; input is reviewed before signature to the best of my ability.     I appreciate the ongoing opportunity to participate in this patient's care.    Please feel free to contact me with any questions.    Sincerely yours,      Bebe Dumas M.D., M.P.H.  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    He was a longtime patient of Dr. Reid's, and has had a trach for many years because of the mandibular hypoplasia. Per report, a number of attempts toward decannulation had been made, but ultimately the decision was made to keep the trach in place due to concerns about his airway. He went to the operating room with Dr. Jose in August 2017 for flexible and rigid laryngoscopy/bronchoscopy. Per the operative note, this showed moderate trismus and base of tongue collapse, redundant, distorted, floppy epiglottis, with a grade 4 laryngoscopy. A small granuloma was seen on the medial left arytenoid. Vocal fold mobility and subglottis were normal. The 5-0 pediatric Bivona tube was noted to be in good position. Distal airway was unremarkable. It was observed that he would be a difficult intubation. With a Rothman blade under the epiglottis he was thought possibly intubatable but this was felt  to be difficult even in a best case situation.    As of 2020:   He currently has a 5-0 Bivona in place, and has a vertically tall stomal opening. 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  With good wound care, antibiotics, steroid injections, and silver nitrate, as well as clipping of synechium, his trach site significantly improved.    Also saw KERVIN Dodson in 2021.    Today's updates:  - He has developed kidney failure and is on the transplant list.  - His trach has been doing well overall, and responded well to frequent trach changes.  - The patient touching the trach has continued to be a challenge at times.  - He currently has a 6 Bivona in place, and needs updated prescriptions.      MEDICATIONS:     Current Outpatient Medications   Medication Sig Dispense Refill     acetaminophen (TYLENOL) 500 MG tablet Take 500 mg by mouth every 8 hours as needed        albuterol (2.5 MG/3ML) 0.083% nebulizer solution Take 1 ampule by nebulization 4 times daily        amLODIPine (NORVASC) 5 MG tablet Take 5 mg by mouth 2 times daily       azithromycin (ZITHROMAX) 250 MG tablet Take 250 mg by mouth three times a week Mon, Wed, Fri       bisacodyl (DULCOLAX) 5 MG EC tablet Take 1 tablet (5 mg) by mouth daily 30 tablet 1     calcium carbonate (TUMS) 500 MG chewable tablet Take 1,000 mg by mouth 3 times daily       calcium carbonate (TUMS) 500 MG chewable tablet Take 1,000 mg by mouth At Bedtime       cholecalciferol (VITAMIN D3) 25 mcg (1000 units) capsule Take 1 capsule by mouth 2 times daily       cinacalcet (SENSIPAR) 30 MG tablet Take 30 mg by mouth daily       cloNIDine (CATAPRES) 0.1 MG tablet Take 1 tablet (0.1 mg) by mouth 2 times daily 60 tablet 3     Cranberry 500 MG TABS Take by mouth 3 times daily (with meals)       docusate  "sodium (COLACE) 100 MG capsule Take 100 mg by mouth 2 times daily       famotidine (PEPCID) 40 MG tablet Take 40 mg by mouth 2 times daily        ferrous gluconate (FERGON) 324 (38 Fe) MG tablet Take 1 tablet (324 mg) by mouth daily 90 tablet 11     gentamicin (GARAMYCIN) 0.1 % external cream Apply topically daily Peritoneal dialysis site       ipratropium (ATROVENT) 0.02 % neb solution Take 0.5 mg by nebulization 4 times daily  225 mL      lactulose 20 GM/30ML solution Take 20 g by mouth 2 times daily as needed for constipation       Melatonin 10 MG TABS tablet Take 10 mg by mouth nightly as needed       mupirocin (BACTROBAN) 2 % external ointment Apply topically daily as needed (around stoma)       neomycin-bacitracin-polymyxin (NEOSPORIN) 5-400-5000 ointment Apply topically daily as needed (Around Stoma - uses EITHER neosporin OR Bactroban)       Nutritional Supplements (NEPRO) LIQD Take 1 Can by mouth daily 30 each 11     order for DME Equipment being ordered: Medi-Vac, plastic tubing connector lara type, cat 361, [employer], 1 lara tip/month x 12 months. Use for tracheostomy suctioning 1 each 11     order for DME Equipment being ordered: DME    Adult Bivona Uncuffed Tracheostomy Tube-5.0    Patient will do weekly trach changes. 4 Units 11     order for DME Equipment being ordered: DME -    Deep suction kits - 12 fr 90 each 3     order for DME Tracheostomy Humidification Equipment  Equipment being ordered:     1) Air compressor  2) Nebulizer bottle  3) Aerosol tubing  4) Trach mask  5) Sterile water  6) Saline ampules (\"bullets\")  7) Heat Moisture Exchanger (HME) Also known by several other terms including: Thermal Humidifying Filters, Swedish nose, Artificial nose, Filter, Thermovent T.  8) Room/home humidifiers 1 each 11     order for DME Optifoam Basic   [id number] 30 each 11     order for DME Equipment being ordered: Humidifier (heated), humidifier attachment, saline lavages, humidivent " mask 1 each 3     polyethylene glycol (MIRALAX) 17 GM/Dose powder Take 17 g by mouth daily as needed 510 g 0     predniSONE (DELTASONE) 20 MG tablet Take 20 mg by mouth daily as needed Take only when in Red Zone       sennosides (SENOKOT) 8.6 MG tablet Take 1 tablet by mouth 2 times daily       simethicone (MYLICON) 40 MG/0.6ML suspension Take 0.6 mLs (40 mg) by mouth every 6 hours as needed for cramping 45 mL 0     sodium chloride 0.9%, bottle, 0.9 % irrigation Irrigate with as directed 3 times daily Bladder irrigation       tobramycin, PF, (DAX) 300 MG/5ML nebulizer solution Take 1 ampule by nebulization as needed Take only when in Red Zone       traZODone (DESYREL) 50 MG tablet Take 25 mg by mouth nightly as needed         ALLERGIES:    Allergies   Allergen Reactions     Latex      Latex      Other reaction(s): Other (see comments)  Other reaction(s): Contact Dermatitis, Other (see comments)     Adhesive Tape Rash       NEW PMH/PSH: As above    REVIEW OF SYSTEMS:  The patient completed a comprehensive 11 point review of systems (below), which was reviewed. Positives are as noted below.  Patient Supplied Answers to Review of Systems      6/10/2019    12:31 PM   UC ENT ROS   Constitutional Unexplained fatigue   Cardiopulmonary Cough   Musculoskeletal Sore or stiff joints    Back pain       PHYSICAL EXAM:  General: The patient was alert and conversant, and in no acute distress. Patient accompanied by his mother.  Neck: 6-O Bivona trach in place, mild to moderate persistent bilateral dryness granulation bilaterally.  Resp: Breathing comfortably, no stridor or stertor.  Neuro: Symmetric facial function. Other cranial nerve function as documented above.  Psych: Normal affect, pleasant and cooperative.  Voice/speech: No significant dysphonia.      PROCEDURE:  Flexible tracheobronchoscopy through established trach incision  Indications: The procedure was warranted to assess the position and/or condition of the  tracheostomy tube and trachea  Description: After written and verbal consent were obtained, the endoscope was passed through the patient's tracheostomy. This allowed visualization of the trachea down to the raul and mainstem bronchi.  The trach was also pulled back to allow evaluation of the trach tract, and then was replaced and secured.  Findings: Trach tube in good position within the airway. Trachea clear to raul, which was sharp. Unremarkable takeoffs of mainstem bronchi. Trach tract unremarkable.                   IMPRESSION AND PLAN:   Aidan Louie returns for a routine trach check and renewals of prescriptions for trach supplies etc. It does appear that the straps tend to pull the trach higher than it sat during his childhood, but the stoma site has not changed significantly.     We will plan a one year follow up with either me or KERVIN Dodson as per their schedule.  I appreciate the opportunity to participate in the care of this pleasant patient.     I spent a total of 32 minutes on 6/19/2023 in chart review, review of tests, patient visit, documentation, care coordination, and/or discussion with other providers about the issues documented above, separate from any documented procedure(s).

## 2023-06-28 ENCOUNTER — COMMITTEE REVIEW (OUTPATIENT)
Dept: TRANSPLANT | Facility: CLINIC | Age: 28
End: 2023-06-28
Payer: MEDICARE

## 2023-06-28 NOTE — COMMITTEE REVIEW
Abdominal Committee Review Note     Evaluation Date: 10/15/2020  Committee Review Date: 6/28/2023    Organ being evaluated for: Kidney    Transplant Phase: Waitlist  Transplant Status: Inactive    Transplant Coordinator: Meera Hoyt  Transplant Surgeon:       Referring Physician: Glen Abel    Primary Diagnosis: Prune Belly Syndrome  Secondary Diagnosis:     Committee Review Members:  Nephrology Cheyenne Fountain PA-C, Kulwinder Schuler, APRN CNP, Amadou Lai MD   Nutrition Norma Paez,    Pharmacist Jaden Short, [facility], Nelia Cantu, [facility]   Pharmacy Ede Arellano, [facility]    - Clinical Polina Peacock, [facility], Meera Rosario, [facility]   Transplant THOMAS COTA, RN, Ana Bustillos, GUNNER, Dann Thomson MD, April Giraldo, RN, Meera Hoyt, RN, Elizabeth Restrepo, NP, Elizabeth Rodriguez, RN, Janelle Hernandez, RN, Yazmin Alvarado, RN       Transplant Eligibility: Irreversible chronic kidney disease treated w/dialysis or expected need for dialysis    Committee Review Decision: Make Active    Relative Contraindications:     Absolute Contraindications:     Committee Chair Dann Thomson MD verbally attested to the committee's decision.    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 Culture PTA no bacterial growth but budding yeast. Abdominal wall abscesses s/p I&D (cultures came back positive for candida albicans. Finished fluconazole 5/12/23. PD catheter was removed and pt is now on HD. Follow up with surgeon on 5/17/23 incisions are healed. Doing well.     Committee determined that pt is acceptable for active status. Will verify insurance and update listing.

## 2023-07-11 ENCOUNTER — TELEPHONE (OUTPATIENT)
Dept: TRANSPLANT | Facility: CLINIC | Age: 28
End: 2023-07-11
Payer: MEDICARE

## 2023-07-11 NOTE — TELEPHONE ENCOUNTER
Called pts mom to update insurance needs to update contract with transplant center. May be a longer wait for PA. Will update Valerie once insurance approved. Pt verbalized understanding of information and has no further questions. Encouraged to reach out if questions arise.

## 2023-08-16 ENCOUNTER — TELEPHONE (OUTPATIENT)
Dept: TRANSPLANT | Facility: CLINIC | Age: 28
End: 2023-08-16
Payer: MEDICARE

## 2023-08-16 NOTE — TELEPHONE ENCOUNTER
Called patient to inform them of status change to ACTIVE  on 23. Status change letter and PRA orders to be mailed. Patient is in agreement with listing ACTIVE status.      Discussed:   - Pt is eligible for  donor offers and pt can receive calls 24 hours a day, 7 days a week, and on call staff need to be able to reach pt or one of emergency contacts within 30 minutes or they might skip over to next recipient on list.   -Please make sure your phone is charged at all times and your voicemail is not full   -Your transplant on call coordinator will give you directions about when and where you will need to come to the hospital for transplant  -The transplant coordinator will call you to discuss your current health status, the offer, and plans to move forward.  Some organ offer calls will require you to come to the hospital immediately; some may not be as time sensitive.  It may be possible for you to come to the hospital and, for various reasons, the transplant could be cancelled.  This is often called a  dry run .  - Reviewed the right to accept or decline offer, without penalty  - Expected length of surgical procedure is about 3-4 hours, expected inpatient length of stay post transplant is 3-4 days, and potential to stay locally post transplant. Offered resources for lodging in the area  - Verified contact information as documented in Epic. Confirmed emergency contact information  -Instructed patient about importance of having PRAs drawn every 3 months via: ([facility]). Encouraged them to set reminder in their preferred calendar to stay on top of their quarterly labs  -Reminded pt to stay up on health maintenance and to call with any updates on their health status, insurance or contact information.   -Reminded pt to inform RNCC as soon as possible if they test positive for COVID.    Pt mom to draw PRA and send in this week      -Provided name and contact information for additional questions or  concerns.  Pt expressed excellent understanding of all and was in good agreement with the plan.

## 2023-08-21 ENCOUNTER — DOCUMENTATION ONLY (OUTPATIENT)
Dept: TRANSPLANT | Facility: CLINIC | Age: 28
End: 2023-08-21

## 2023-08-29 ENCOUNTER — LAB (OUTPATIENT)
Dept: LAB | Facility: CLINIC | Age: 28
End: 2023-08-29
Payer: MEDICARE

## 2023-08-29 DIAGNOSIS — N18.6 ESRD (END STAGE RENAL DISEASE) (H): ICD-10-CM

## 2023-08-29 DIAGNOSIS — Z76.82 ORGAN TRANSPLANT CANDIDATE: ICD-10-CM

## 2023-08-29 PROCEDURE — 86833 HLA CLASS II HIGH DEFIN QUAL: CPT | Performed by: SURGERY

## 2023-08-29 PROCEDURE — 86832 HLA CLASS I HIGH DEFIN QUAL: CPT | Performed by: SURGERY

## 2023-08-31 ENCOUNTER — DOCUMENTATION ONLY (OUTPATIENT)
Dept: TRANSPLANT | Facility: CLINIC | Age: 28
End: 2023-08-31

## 2023-09-07 LAB
PROTOCOL CUTOFF: NORMAL
SA 1 CELL: NORMAL
SA 1 TEST METHOD: NORMAL
SA 2 CELL: NORMAL
SA 2 TEST METHOD: NORMAL
SA1 HI RISK ABY: NORMAL
SA1 MOD RISK ABY: NORMAL
SA2 HI RISK ABY: NORMAL
SA2 MOD RISK ABY: NORMAL
UNACCEPTABLE ANTIGENS: NORMAL
UNOS CPRA: 25
ZZZSA 1  COMMENTS: NORMAL
ZZZSA 2 COMMENTS: NORMAL

## 2023-09-18 ENCOUNTER — DOCUMENTATION ONLY (OUTPATIENT)
Dept: OTOLARYNGOLOGY | Facility: CLINIC | Age: 28
End: 2023-09-18
Payer: MEDICARE

## 2023-09-18 NOTE — PROGRESS NOTES
Signed order for trach supplies faxed to [facility], [address]. Fax number [phone]. 2 pgs faxed.

## 2023-10-12 ENCOUNTER — LAB (OUTPATIENT)
Dept: LAB | Facility: CLINIC | Age: 28
End: 2023-10-12
Payer: MEDICARE

## 2023-10-12 DIAGNOSIS — N18.6 ESRD (END STAGE RENAL DISEASE) (H): ICD-10-CM

## 2023-10-12 DIAGNOSIS — Z76.82 ORGAN TRANSPLANT CANDIDATE: ICD-10-CM

## 2023-10-12 PROCEDURE — 86833 HLA CLASS II HIGH DEFIN QUAL: CPT

## 2023-10-12 PROCEDURE — 86832 HLA CLASS I HIGH DEFIN QUAL: CPT

## 2023-10-19 ENCOUNTER — TELEPHONE (OUTPATIENT)
Dept: TRANSPLANT | Facility: CLINIC | Age: 28
End: 2023-10-19

## 2023-10-19 DIAGNOSIS — N28.1 RENAL CYST: ICD-10-CM

## 2023-10-19 DIAGNOSIS — Z76.82 ORGAN TRANSPLANT CANDIDATE: ICD-10-CM

## 2023-10-19 DIAGNOSIS — N18.6 ESRD (END STAGE RENAL DISEASE) (H): Primary | ICD-10-CM

## 2023-10-19 NOTE — TELEPHONE ENCOUNTER
Left message with pt's Mom; returning her call about PRA supplies and/or orders?  Provided direct phone number to call.

## 2023-10-19 NOTE — PROGRESS NOTES
Called pt mom to update due for RWL. Orders placed for Neph and renal US. Scheduling will be reaching out to arrange. Reports HD center refusing to draw PRA level as orders are over a year old. Appears orders were sent on 8/31/23, reports they do not have. Will request new orders to be sent. Pt verbalized understanding of information and has no further questions. Encouraged to reach out if questions arise.

## 2023-10-23 ENCOUNTER — TELEPHONE (OUTPATIENT)
Dept: TRANSPLANT | Facility: CLINIC | Age: 28
End: 2023-10-23
Payer: MEDICARE

## 2023-10-23 NOTE — TELEPHONE ENCOUNTER
[facility] Call Center    Phone Message    May a detailed message be left on voicemail: yes     Reason for Call: Other: Call was placed to mother regarding nephrology waitlist appointment. Per mother patient is at dialysis all day Tuesday and Thursday. [facility] are only seeing patients on Tuesday and Thursday for waitlist, please advise how to move forward with scheduling.     Action Taken: Message routed to:  Other: RNCC    Travel Screening: Not Applicable

## 2023-10-27 ENCOUNTER — TELEPHONE (OUTPATIENT)
Dept: TRANSPLANT | Facility: CLINIC | Age: 28
End: 2023-10-27
Payer: MEDICARE

## 2023-10-27 NOTE — TELEPHONE ENCOUNTER
Called pts mom to see if they can move HD for pt to come in for RWL appts on Tuesday or Thursday. Pts mom report she has been told she can only talk to the nephrologist about that and wont see him until middle of November. Writer will contact HD center and have scheduling call to set up appt. Valerie will notify HD clinic when appt is scheduled to see if they can rearrange. Pt verbalized understanding of information and has no further questions. Encouraged to reach out if questions arise.     Called HD center to request they work with the pt to arrange HD on MWF when he has appt with transplant. Pts mom will let them know when appt is scheduled. They took a note down and will work with the pt when the visit is scheduled.

## 2023-11-13 NOTE — PROGRESS NOTES
TRANSPLANT NEPHROLOGY WAITLIST VISIT    Assessment and Plan:  # Kidney Transplant Wait List Evaluation: Patient is a good candidate overall. Patient should remain active on the wait list.     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    Currently cathing 3-4 times a day. Had one UTI this year in 2023, otherwise stable. Needs bladder irrigation prescription renewed with urology or local neph  as he stopped a few months ago.    # Cardiac Risk:  ECHO with normal LVEF and no valvular abnormalities.     # Chronic respiratory failure/hypoxia/restrictive lung disease requiring tracheostomy: follows with Dr. Baez and noted to be doing well from a respiratory standpoint at last visit in 2023.      # H/o Malnutrition and Poor Oral Intake, s/p G-Tube Placement: Patient appears to be doing better with this with stable weight.      # Health Maintenance: Dental: Should be up to date     Discussed the risks and benefits of a transplant, including the risk of surgery and immunosuppression medications.    KDPI: We discussed approximate remaining wait time and how that is influenced by issues such as blood type and sensitization (PRA) and access to a living donor. I contrasted potential waiting time for living vs  donor kidneys from  normal (0-85%) or higher (%) kidney donor profile index (KDPI) donors and their associated outcomes. I would not recommend Mr. Louie to consider kidneys from high KDPI donors. The reason for this decision is best summarized as: improved long term graft survival.    Patient presently  appears to be enough of an acceptable kidney transplant recipient candidate to have any potential kidney donors start the evaluation process.  Patient s overall re-evaluation may require further discussion in the Transplant Program s multidisciplinary selection committee for a final recommendation on the patient s suitability for transplant.     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            Interim Events:   Admitted in  4/2023 with fungal peritonitis/ sepsis with a fungal intraabdominal abscess. Received vanc and zosyn, later discharged home on fluconazole. Since on HD with prior AVF which is working well. No other infections, admits or new medical concerns.                      Kidney Disease:        Kidney Disease Dx: obstructive nephropathy         On Dialysis: Yes, Date initiated: 12/2021 and Dialysis Type: D, AVF working OK. BPs stable, still making urine.        Primary Nephrologist: Dr. Sharma          Cardiac/Vascular Disease History:       Known CAD: No       Known PAD/Claudication Symptoms: No       Known Heart Failure: No       Arrhythmia:  No       Pulmonary Hypertension: No        Valvular Disease: No       Other: None       New Cardiac/Vascular Events: No          Functional Capacity/Frailty:        Going for walks outside or goes to the [place] or the [place] with his caregivers on occasion. Has stairs down to his basement he has to go up and down, reports no chest pains or shortness of breath.       Allergy Testing Questions:   Medication that caused a reaction None   Antibiotics used that didn't give an allergic reaction?  None    COVID Vaccination Up To Date: Yes,         Other Pertinent Transplant Surgical Issues:  Recent Blood Transfusion: No  Previous Abdominal Transplant: No  Bladder Dysfunction: Yes; see above   Chronic/Recurrent Infections: Yes; UTIs, last in 4/2023  Chronic Anticoagulation: No  Jehovah s Witness: No       Active Problem List:   Patient Active Problem List   Diagnosis    Prune belly syndrome    HTN (hypertension)    CKD (chronic kidney disease) stage 4, GFR 15-29 ml/min (H)    Vitamin D deficiency    Recurrent UTI    Low bone density for age    Goiter    Short stature    High serum parathyroid hormone (PTH)    High serum follicle stimulating hormone (FSH)    On corticosteroid therapy    Tracheostomy granuloma (H)    Bilateral reflux nephropathy    Dawood Reece sequence    Tracheostomy dependence (H)    Restrictive lung disease    Scoliosis    Chronic respiratory failure with hypoxia (H)    History of tracheostomy    On prednisone therapy    Infection associated with peritoneal dialysis catheter, initial encounter (H24)       Personal History:  Nonsmoker      Allergies:  Allergies   Allergen Reactions    Latex     Latex      Other reaction(s): Other (see comments)  Other reaction(s): Contact Dermatitis, Other (see comments)    Adhesive Tape Rash        Medications:  Current Outpatient Medications   Medication Sig    acetaminophen (TYLENOL) 500 MG tablet Take 500 mg by mouth every 8 hours as needed     albuterol (2.5 MG/3ML) 0.083% nebulizer solution Take 1 ampule by nebulization 4 times daily     amLODIPine (NORVASC) 5 MG tablet Take 5 mg by mouth 2  times daily    azithromycin (ZITHROMAX) 250 MG tablet Take 250 mg by mouth three times a week Mon, Wed, Fri    bisacodyl (DULCOLAX) 5 MG EC tablet Take 1 tablet (5 mg) by mouth daily    calcium carbonate (TUMS) 500 MG chewable tablet Take 1,000 mg by mouth 3 times daily    calcium carbonate (TUMS) 500 MG chewable tablet Take 1,000 mg by mouth At Bedtime    cholecalciferol (VITAMIN D3) 25 mcg (1000 units) capsule Take 1 capsule by mouth 2 times daily    cinacalcet (SENSIPAR) 30 MG tablet Take 30 mg by mouth daily    cloNIDine (CATAPRES) 0.1 MG tablet Take 1 tablet (0.1 mg) by mouth 2 times daily    Cranberry 500 MG TABS Take by mouth 3 times daily (with meals)    docusate sodium (COLACE) 100 MG capsule Take 100 mg by mouth 2 times daily    famotidine (PEPCID) 40 MG tablet Take 40 mg by mouth 2 times daily     ferrous gluconate (FERGON) 324 (38 Fe) MG tablet Take 1 tablet (324 mg) by mouth daily    gentamicin (GARAMYCIN) 0.1 % external cream Apply topically daily Peritoneal dialysis site    ipratropium (ATROVENT) 0.02 % neb solution Take 0.5 mg by nebulization 4 times daily     lactulose 20 GM/30ML solution Take 20 g by mouth 2 times daily as needed for constipation    Melatonin 10 MG TABS tablet Take 10 mg by mouth nightly as needed    mupirocin (BACTROBAN) 2 % external ointment Apply topically daily as needed (around stoma)    Neomycin-Bacitracin-Polymyxin (NEOSPORIN ORIGINAL) 3.5-400-5000 OINT Externally apply topically as needed (apply as needed for stoma care)    neomycin-bacitracin-polymyxin (NEOSPORIN) 5-400-5000 ointment Apply topically daily as needed (Around Stoma - uses EITHER neosporin OR Bactroban)    Nutritional Supplements (NEPRO) LIQD Take 1 Can by mouth daily    order for DME Equipment being ordered: Medi-Vac, plastic tubing connector lara type, cat 361, [facility], 1 lara tip/month x 12 months. Use for tracheostomy suctioning    order for DME Equipment being ordered:  "DME    Adult Bivona Uncuffed Tracheostomy Tube-5.0    Patient will do weekly trach changes.    order for DME Equipment being ordered: DME -    Deep suction kits - 12 fr    order for DME Tracheostomy Humidification Equipment  Equipment being ordered:     1) Air compressor  2) Nebulizer bottle  3) Aerosol tubing  4) Trach mask  5) Sterile water  6) Saline ampules (\"bullets\")  7) Heat Moisture Exchanger (HME) Also known by several other terms including: Thermal Humidifying Filters, Swedish nose, Artificial nose, Filter, Thermovent T.  8) Room/home humidifiers    order for DME Optifoam Basic   [id number]    order for DME Equipment being ordered: Humidifier (heated), humidifier attachment, saline lavages, humidivent mask    polyethylene glycol (MIRALAX) 17 GM/Dose powder Take 17 g by mouth daily as needed    predniSONE (DELTASONE) 20 MG tablet Take 20 mg by mouth daily as needed Take only when in Red Zone    sennosides (SENOKOT) 8.6 MG tablet Take 1 tablet by mouth 2 times daily    simethicone (MYLICON) 40 MG/0.6ML suspension Take 0.6 mLs (40 mg) by mouth every 6 hours as needed for cramping    sodium chloride 0.9%, bottle, 0.9 % irrigation Irrigate with as directed 3 times daily Bladder irrigation    tobramycin, PF, (DAX) 300 MG/5ML nebulizer solution Take 1 ampule by nebulization as needed Take only when in Red Zone    traZODone (DESYREL) 50 MG tablet Take 25 mg by mouth nightly as needed     No current facility-administered medications for this visit.        Vitals:  There were no vitals taken for this visit.     Exam:  GENERAL APPEARANCE: alert and no distress  HENT: mouth without ulcers or lesions  LYMPHATICS: no cervical or supraclavicular nodes  RESP: lungs clear to auscultation - no rales, rhonchi or wheezes  CV: regular rhythm, normal rate, no rub, no murmur  FEMORAL PULSES: normal  EDEMA: no LE edema bilaterally  ABDOMEN: soft, nondistended, nontender, bowel sounds normal  MS: extremities normal - no gross " deformities noted, no evidence of inflammation in joints, no muscle tenderness  SKIN: no rash

## 2023-11-14 ENCOUNTER — OFFICE VISIT (OUTPATIENT)
Dept: TRANSPLANT | Facility: CLINIC | Age: 28
End: 2023-11-14
Attending: NURSE PRACTITIONER
Payer: MEDICARE

## 2023-11-14 ENCOUNTER — ANCILLARY PROCEDURE (OUTPATIENT)
Dept: ULTRASOUND IMAGING | Facility: CLINIC | Age: 28
End: 2023-11-14
Attending: NURSE PRACTITIONER
Payer: MEDICARE

## 2023-11-14 VITALS
DIASTOLIC BLOOD PRESSURE: 83 MMHG | OXYGEN SATURATION: 97 % | BODY MASS INDEX: 17.1 KG/M2 | WEIGHT: 106.4 LBS | HEART RATE: 81 BPM | HEIGHT: 66 IN | SYSTOLIC BLOOD PRESSURE: 130 MMHG

## 2023-11-14 DIAGNOSIS — Z76.82 ORGAN TRANSPLANT CANDIDATE: ICD-10-CM

## 2023-11-14 DIAGNOSIS — N18.6 ESRD (END STAGE RENAL DISEASE) (H): ICD-10-CM

## 2023-11-14 DIAGNOSIS — N28.1 RENAL CYST: ICD-10-CM

## 2023-11-14 PROCEDURE — G0463 HOSPITAL OUTPT CLINIC VISIT: HCPCS | Performed by: NURSE PRACTITIONER

## 2023-11-14 PROCEDURE — 76770 US EXAM ABDO BACK WALL COMP: CPT | Mod: GC | Performed by: RADIOLOGY

## 2023-11-14 PROCEDURE — 99214 OFFICE O/P EST MOD 30 MIN: CPT | Performed by: NURSE PRACTITIONER

## 2023-11-14 NOTE — LETTER
2023         RE: Aidan Louie  [address] [place] [address]        Dear Colleague,    Thank you for referring your patient, Aidan Louie, to the [facility]. Please see a copy of my visit note below.    TRANSPLANT NEPHROLOGY WAITLIST VISIT    Assessment and Plan:  # Kidney Transplant Wait List Evaluation: Patient is a good candidate overall. Patient should remain active on the wait list.     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    Currently cathing 3-4 times a day. Had one UTI this year in 2023, otherwise stable. Needs bladder irrigation prescription renewed with urology or local neph  as he stopped a few months ago.    # Cardiac Risk:  ECHO with normal LVEF and no valvular abnormalities.     # Chronic respiratory failure/hypoxia/restrictive lung disease requiring tracheostomy: follows with Dr. Baez and noted to be doing well from a respiratory standpoint at last visit in 2023.      # H/o Malnutrition and Poor Oral Intake, s/p G-Tube Placement: Patient appears to be doing better with this with stable weight.      # Health Maintenance: Dental: Should be up to date     Discussed the risks and benefits of a transplant, including the risk of surgery and immunosuppression medications.    KDPI: We discussed approximate remaining wait time and how that is influenced by issues such as blood type and sensitization (PRA) and access to a living donor. I contrasted potential waiting time for living vs  donor kidneys from  normal (0-85%) or higher (%)  kidney donor profile index (KDPI) donors and their associated outcomes. I would not recommend Mr. Louie to consider kidneys from high KDPI donors. The reason for this decision is best summarized as: improved long term graft survival.    Patient presently appears to be enough of an acceptable kidney transplant recipient candidate to have any potential kidney donors start the evaluation process.  Patient s overall re-evaluation may require further discussion in the Transplant Program s multidisciplinary selection committee for a final recommendation on the patient s suitability for transplant.     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            Interim Events:   Admitted in  4/2023 with fungal peritonitis/ sepsis with a fungal intraabdominal abscess. Received vanc and zosyn, later discharged home on fluconazole. Since on HD with prior AVF which is working well. No other infections, admits or new medical concerns.                      Kidney Disease:        Kidney Disease Dx: obstructive nephropathy         On Dialysis: Yes, Date initiated: 12/2021 and Dialysis Type: D, AVF working OK. BPs stable, still making urine.        Primary Nephrologist: Dr. Sharma           Cardiac/Vascular Disease History:       Known CAD: No       Known PAD/Claudication Symptoms: No       Known Heart Failure: No       Arrhythmia:  No       Pulmonary Hypertension: No        Valvular Disease: No       Other: None       New Cardiac/Vascular Events: No         Functional Capacity/Frailty:        Going for walks outside or goes to the mall or the [employer] with his caregivers on occasion. Has stairs down to his basement he has to go up and down, reports no chest pains or shortness of breath.       Allergy Testing Questions:   Medication that caused a reaction None   Antibiotics used that didn't give an allergic reaction?  None    COVID Vaccination Up To Date: Yes,         Other Pertinent Transplant Surgical Issues:  Recent Blood Transfusion: No  Previous Abdominal Transplant: No  Bladder Dysfunction: Yes; see above   Chronic/Recurrent Infections: Yes; UTIs, last in 4/2023  Chronic Anticoagulation: No  Jehovah s Witness: No       Active Problem List:   Patient Active Problem List   Diagnosis    Prune belly syndrome    HTN (hypertension)    CKD (chronic kidney disease) stage 4, GFR 15-29 ml/min (H)    Vitamin D deficiency    Recurrent UTI    Low bone density for age    Goiter    Short stature    High serum parathyroid hormone (PTH)    High serum follicle stimulating hormone (FSH)    On corticosteroid therapy    Tracheostomy granuloma (H)    Bilateral reflux nephropathy    Dawood Reece sequence    Tracheostomy dependence (H)    Restrictive lung disease    Scoliosis    Chronic respiratory failure with hypoxia (H)    History of tracheostomy    On prednisone therapy    Infection associated with peritoneal dialysis catheter, initial encounter (H24)       Personal History:  Nonsmoker      Allergies:  Allergies   Allergen Reactions    Latex     Latex      Other reaction(s): Other (see comments)  Other reaction(s): Contact Dermatitis, Other (see comments)    Adhesive Tape Rash        Medications:  Current Outpatient  Medications   Medication Sig    acetaminophen (TYLENOL) 500 MG tablet Take 500 mg by mouth every 8 hours as needed     albuterol (2.5 MG/3ML) 0.083% nebulizer solution Take 1 ampule by nebulization 4 times daily     amLODIPine (NORVASC) 5 MG tablet Take 5 mg by mouth 2 times daily    azithromycin (ZITHROMAX) 250 MG tablet Take 250 mg by mouth three times a week Mon, Wed, Fri    bisacodyl (DULCOLAX) 5 MG EC tablet Take 1 tablet (5 mg) by mouth daily    calcium carbonate (TUMS) 500 MG chewable tablet Take 1,000 mg by mouth 3 times daily    calcium carbonate (TUMS) 500 MG chewable tablet Take 1,000 mg by mouth At Bedtime    cholecalciferol (VITAMIN D3) 25 mcg (1000 units) capsule Take 1 capsule by mouth 2 times daily    cinacalcet (SENSIPAR) 30 MG tablet Take 30 mg by mouth daily    cloNIDine (CATAPRES) 0.1 MG tablet Take 1 tablet (0.1 mg) by mouth 2 times daily    Cranberry 500 MG TABS Take by mouth 3 times daily (with meals)    docusate sodium (COLACE) 100 MG capsule Take 100 mg by mouth 2 times daily    famotidine (PEPCID) 40 MG tablet Take 40 mg by mouth 2 times daily     ferrous gluconate (FERGON) 324 (38 Fe) MG tablet Take 1 tablet (324 mg) by mouth daily    gentamicin (GARAMYCIN) 0.1 % external cream Apply topically daily Peritoneal dialysis site    ipratropium (ATROVENT) 0.02 % neb solution Take 0.5 mg by nebulization 4 times daily     lactulose 20 GM/30ML solution Take 20 g by mouth 2 times daily as needed for constipation    Melatonin 10 MG TABS tablet Take 10 mg by mouth nightly as needed    mupirocin (BACTROBAN) 2 % external ointment Apply topically daily as needed (around stoma)    Neomycin-Bacitracin-Polymyxin (NEOSPORIN ORIGINAL) 3.5-400-5000 OINT Externally apply topically as needed (apply as needed for stoma care)    neomycin-bacitracin-polymyxin (NEOSPORIN) 5-400-5000 ointment Apply topically daily as needed (Around Stoma - uses EITHER neosporin OR Bactroban)    Nutritional Supplements (NEPRO) LIQD  "Take 1 Can by mouth daily    order for DME Equipment being ordered: Medi-Vac, plastic tubing connector lara type, cat 361, [facility], 1 lara tip/month x 12 months. Use for tracheostomy suctioning    order for DME Equipment being ordered: DME    Adult Bivona Uncuffed Tracheostomy Tube-5.0    Patient will do weekly trach changes.    order for DME Equipment being ordered: DME -    Deep suction kits - 12 fr    order for DME Tracheostomy Humidification Equipment  Equipment being ordered:     1) Air compressor  2) Nebulizer bottle  3) Aerosol tubing  4) Trach mask  5) Sterile water  6) Saline ampules (\"bullets\")  7) Heat Moisture Exchanger (HME) Also known by several other terms including: Thermal Humidifying Filters, Swedish nose, Artificial nose, Filter, Thermovent T.  8) Room/home humidifiers    order for DME Optifoam Basic   [id number]    order for DME Equipment being ordered: Humidifier (heated), humidifier attachment, saline lavages, humidivent mask    polyethylene glycol (MIRALAX) 17 GM/Dose powder Take 17 g by mouth daily as needed    predniSONE (DELTASONE) 20 MG tablet Take 20 mg by mouth daily as needed Take only when in Red Zone    sennosides (SENOKOT) 8.6 MG tablet Take 1 tablet by mouth 2 times daily    simethicone (MYLICON) 40 MG/0.6ML suspension Take 0.6 mLs (40 mg) by mouth every 6 hours as needed for cramping    sodium chloride 0.9%, bottle, 0.9 % irrigation Irrigate with as directed 3 times daily Bladder irrigation    tobramycin, PF, (DAX) 300 MG/5ML nebulizer solution Take 1 ampule by nebulization as needed Take only when in Red Zone    traZODone (DESYREL) 50 MG tablet Take 25 mg by mouth nightly as needed     No current facility-administered medications for this visit.        Vitals:  There were no vitals taken for this visit.     Exam:  GENERAL APPEARANCE: alert and no distress  HENT: mouth without ulcers or lesions  LYMPHATICS: no cervical or supraclavicular nodes  RESP: lungs " clear to auscultation - no rales, rhonchi or wheezes  CV: regular rhythm, normal rate, no rub, no murmur  FEMORAL PULSES: normal  EDEMA: no LE edema bilaterally  ABDOMEN: soft, nondistended, nontender, bowel sounds normal  MS: extremities normal - no gross deformities noted, no evidence of inflammation in joints, no muscle tenderness  SKIN: no rash      LIVAN Martinez CNP

## 2023-11-15 ENCOUNTER — TELEPHONE (OUTPATIENT)
Dept: TRANSPLANT | Facility: CLINIC | Age: 28
End: 2023-11-15
Payer: MEDICARE

## 2023-12-05 ENCOUNTER — LAB (OUTPATIENT)
Dept: LAB | Facility: CLINIC | Age: 28
End: 2023-12-05
Payer: MEDICARE

## 2023-12-05 DIAGNOSIS — N18.6 ESRD (END STAGE RENAL DISEASE) (H): ICD-10-CM

## 2023-12-05 DIAGNOSIS — Z76.82 ORGAN TRANSPLANT CANDIDATE: ICD-10-CM

## 2023-12-05 PROCEDURE — 86832 HLA CLASS I HIGH DEFIN QUAL: CPT

## 2023-12-05 PROCEDURE — 86833 HLA CLASS II HIGH DEFIN QUAL: CPT

## 2023-12-11 DIAGNOSIS — N18.6 ESRD (END STAGE RENAL DISEASE) (H): Primary | ICD-10-CM

## 2023-12-11 DIAGNOSIS — Z76.82 ORGAN TRANSPLANT CANDIDATE: ICD-10-CM

## 2023-12-29 NOTE — PROGRESS NOTES
PT currently has catheter supplies needed. No further supplies needed at this time.      Sridevi Parker RN

## 2024-01-16 ENCOUNTER — DOCUMENTATION ONLY (OUTPATIENT)
Dept: OTOLARYNGOLOGY | Facility: CLINIC | Age: 29
End: 2024-01-16
Payer: MEDICARE

## 2024-01-16 NOTE — PROGRESS NOTES
Face sheet and most recent office note from June 2023 faxed to Rubin WISEMAN at [facility], as requested in order to dispense suction machine to patient. Fax number [phone]. [facility] faxed.

## 2024-01-29 ENCOUNTER — PATIENT OUTREACH (OUTPATIENT)
Dept: OTOLARYNGOLOGY | Facility: CLINIC | Age: 29
End: 2024-01-29
Payer: MEDICARE

## 2024-01-29 NOTE — PROGRESS NOTES
Faxed medical supply order to [facility] per letter. Candace Matt RN on 1/29/2024 at 1:50 PM

## 2024-02-02 ENCOUNTER — DOCUMENTATION ONLY (OUTPATIENT)
Dept: OTOLARYNGOLOGY | Facility: CLINIC | Age: 29
End: 2024-02-02
Payer: MEDICARE

## 2024-02-02 NOTE — PROGRESS NOTES
Spoke to representative at [facility] in regards to order for home oxygen they were asking to have provider sign. Advised representative that provider is not the ordering physician for the patients oxygen and order would need to be signed by provider who ordered or is managing patiens oxygen needs, as provider in ENT only manages patients trach. Representative stated he would make note of this. No further action taken at this time.

## 2024-02-15 ENCOUNTER — TELEPHONE (OUTPATIENT)
Dept: OTOLARYNGOLOGY | Facility: CLINIC | Age: 29
End: 2024-02-15
Payer: MEDICARE

## 2024-02-15 NOTE — TELEPHONE ENCOUNTER
[facility] Call Center    Phone Message    May a detailed message be left on voicemail: yes     Reason for Call: Other: Please fax a new script for trach supplies to [phone] as he has no new supplies. Thank you     Action Taken: Message routed to:  [facility] ([facility] ENT    Travel Screening: Not Applicable

## 2024-02-16 DIAGNOSIS — Z93.0 TRACHEOSTOMY IN PLACE (H): Primary | ICD-10-CM

## 2024-02-16 NOTE — TELEPHONE ENCOUNTER
DME supplier calling because script is not descriptive enough in need of more details please either call or fax a signed scripts for trach supplies as the patient has not received any for quite some time.

## 2024-02-19 NOTE — TELEPHONE ENCOUNTER
Called patient's RT as orders were not what they were expecting. LVM and requested call back at direct number. Candace Matt RN on 2/19/2024 at 7:39 AM

## 2024-03-01 ENCOUNTER — TELEPHONE (OUTPATIENT)
Dept: TRANSPLANT | Facility: CLINIC | Age: 29
End: 2024-03-01
Payer: MEDICARE

## 2024-03-01 NOTE — TELEPHONE ENCOUNTER
Called pts mom to touch base regarding hep C consent. Currently getting ready to go to the doctor so doesn't have time to discuss. Wants call back on Tuesday. Will connect then.

## 2024-03-04 NOTE — TELEPHONE ENCOUNTER
[facility]ore[facility] called back again didn't get message that you had called. Would like if you could reach out again so that we can get this resolved. Thank you

## 2024-03-05 ENCOUNTER — PATIENT OUTREACH (OUTPATIENT)
Dept: OTOLARYNGOLOGY | Facility: CLINIC | Age: 29
End: 2024-03-05
Payer: MEDICARE

## 2024-03-05 NOTE — PROGRESS NOTES
Called [facility] per telephone encounter, writer let Florencio know that the clinic has not seen patient in almost 5 years so we are not able to write new prescriptions, let Florencio know there has been some orders coming from [facility] so they may be able to help with supplies as they may have seen the patient earlier. [facility]ore[facility] was agreeable and verbalized understanding of the situation. Candace Matt RN on 3/5/2024 at 10:39 AM

## 2024-03-06 ENCOUNTER — TELEPHONE (OUTPATIENT)
Dept: TRANSPLANT | Facility: CLINIC | Age: 29
End: 2024-03-06
Payer: MEDICARE

## 2024-03-06 NOTE — TELEPHONE ENCOUNTER
Hepatitis C Donor Acceptance      -Called pt in regards to the possibility of accepting a  or living donor kidney that has known Hepatitis C infection (past or current).  Informed pt due to shortage of organs, the transplant team is always looking for safe alternatives to increase chances of transplant. One means of increasing chance of transplant is to accept an organ that may have been exposed to hepatitis C.  Even if the organ accepted has preliminary evidence of hepatitis C infection, there is a possibility that pt may not contract hepatitis C from it.  If hepatitis C is contracted, it is treatable.     -Reviewed that Hepatitis C (HCV) is an infection caused by the Hepatitis C virus. Hepatitis C may be spread from one person to another through contact with the blood of an infected person.  This virus can attack the liver and cause injury. If not treated, this virus can lead to liver injury.     -Reviewed that all  donor offer organs are checked for hepatitis C infection. A small number of organs test clearly positive for active hepatitis C infection. A few organs have testing that can indicate the donor may have been infected with hepatitis C but it is not clear whether the organ is currently infected with the virus or may have been infected in the past.     -If the donor has been exposed to hepatitis C but DOES NOT have active virus in the bloodstream, it is very unlikely that pt will develop hepatitis C infection. If the donor DOES have active virus in the bloodstream, there is a very high likelihood (almost 100%) that hep C will develop. Regardless, pt will be monitored closely by your post transplant team for any signs of Hepatitis C infection and treated appropriately.     -Reviewed there are several medications available to treat hepatitis C, and the treatments are more effective and have fewer side effects than previous treatment options. These medications are taken by mouth daily for 4 to  24 weeks.  When taken as prescribed, these medications have cure rates of 90% to 100% based on currently available data from large studies.  They are safe to take after transplant.     -Reviewed that if pt chooses not to accept a hepatitis C exposed donor, it will not affect their status on the transplant list.  Donor organs that are hepatitis C-exposed will not be offered to you. We will continue to consider non-hepatitis C -exposed donor offers for you. Pt care will not be negatively affected in any way.      -Answered patient questions regarding Hep C donor acceptance     -Pt declines to be listed for Hep C positive donors .  Pt has declined, will make sure UNOS and EPIC chart reflect this option

## 2024-03-07 ENCOUNTER — LAB (OUTPATIENT)
Dept: LAB | Facility: CLINIC | Age: 29
End: 2024-03-07
Payer: MEDICARE

## 2024-03-07 DIAGNOSIS — Z76.82 ORGAN TRANSPLANT CANDIDATE: ICD-10-CM

## 2024-03-07 DIAGNOSIS — N18.6 ESRD (END STAGE RENAL DISEASE) (H): ICD-10-CM

## 2024-03-07 PROCEDURE — 86832 HLA CLASS I HIGH DEFIN QUAL: CPT

## 2024-03-07 PROCEDURE — 86833 HLA CLASS II HIGH DEFIN QUAL: CPT

## 2024-06-11 ENCOUNTER — LAB (OUTPATIENT)
Dept: LAB | Facility: CLINIC | Age: 29
End: 2024-06-11
Payer: MEDICARE

## 2024-06-11 DIAGNOSIS — N18.6 ESRD (END STAGE RENAL DISEASE) (H): ICD-10-CM

## 2024-06-11 DIAGNOSIS — Z76.82 ORGAN TRANSPLANT CANDIDATE: ICD-10-CM

## 2024-06-11 PROCEDURE — 86832 HLA CLASS I HIGH DEFIN QUAL: CPT

## 2024-06-11 PROCEDURE — 86833 HLA CLASS II HIGH DEFIN QUAL: CPT

## 2024-06-17 ENCOUNTER — OFFICE VISIT (OUTPATIENT)
Dept: OTOLARYNGOLOGY | Facility: CLINIC | Age: 29
End: 2024-06-17
Payer: MEDICARE

## 2024-06-17 VITALS
HEIGHT: 66 IN | DIASTOLIC BLOOD PRESSURE: 72 MMHG | HEART RATE: 87 BPM | SYSTOLIC BLOOD PRESSURE: 124 MMHG | WEIGHT: 107 LBS | BODY MASS INDEX: 17.19 KG/M2

## 2024-06-17 DIAGNOSIS — Z98.1 HISTORY OF FUSION OF CERVICAL SPINE: ICD-10-CM

## 2024-06-17 DIAGNOSIS — Z93.0 TRACHEOSTOMY IN PLACE (H): Primary | ICD-10-CM

## 2024-06-17 DIAGNOSIS — Q87.0 PIERRE ROBIN SEQUENCE: ICD-10-CM

## 2024-06-17 DIAGNOSIS — N18.4 CKD (CHRONIC KIDNEY DISEASE) STAGE 4, GFR 15-29 ML/MIN (H): ICD-10-CM

## 2024-06-17 PROCEDURE — 31615 TRCHEOBRNCHSC EST TRACHS INC: CPT | Performed by: OTOLARYNGOLOGY

## 2024-06-17 PROCEDURE — 99213 OFFICE O/P EST LOW 20 MIN: CPT | Mod: 25 | Performed by: OTOLARYNGOLOGY

## 2024-06-17 RX ORDER — LORATADINE 10 MG/1
1 TABLET ORAL DAILY
COMMUNITY

## 2024-06-17 RX ORDER — SEVELAMER CARBONATE 800 MG/1
TABLET, FILM COATED ORAL
COMMUNITY
Start: 2024-06-14

## 2024-06-17 NOTE — PATIENT INSTRUCTIONS
1.  You were seen in the ENT Clinic today by . If you have any questions or concerns after your appointment, please call [phone]. Press option #1 for scheduling related needs. Press option #3 for Nurse advice.    2.   has recommended the following:   - DME order for supplies will be mailed to your home   - perform monthly trach changes    3.  Plan is to return to clinic in one year      Stephanie Han LPN  [phone]  [facility] - Otolaryngology

## 2024-06-17 NOTE — LETTER
6/17/2024      RE: Aidan Louie  [address] [place] [address]       Dear Colleague:  Aidan Louie recently returned for follow-up at the [facility]. My clinic note from our visit is enclosed below.  Speech recognition software may have been used in the documentation below; input is reviewed before signature to the best of my ability.     I appreciate the ongoing opportunity to participate in this patient's care.    Please feel free to contact me with any questions.      Sincerely yours,  Bebe Dumas M.D., M.P.H.  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    He was a longtime patient of Dr. Reid's, and has had a trach for many years because of the mandibular hypoplasia. Per report, a number of attempts toward decannulation had been made, but ultimately the decision was made to keep the trach in place due to concerns about his airway. He went to the operating room with Dr. Jose in August 2017 for flexible and rigid laryngoscopy/bronchoscopy. Per the operative note, this showed moderate trismus and base of tongue collapse, redundant, distorted, floppy epiglottis, with a grade 4 laryngoscopy. A small granuloma was seen on the medial left arytenoid. Vocal fold mobility and subglottis were normal. The 5-0 pediatric Bivona tube was noted to be in good position. Distal airway was unremarkable. It was observed that he would be a difficult intubation. With a Rothman  blade under the epiglottis he was thought possibly intubatable but this was felt to be difficult even in a best case situation.    As of 2020:   He currently has a 5-0 Bivona in place, and has a vertically tall stomal opening. 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  With good wound care, antibiotics, steroid injections, and silver nitrate, as well as clipping of synechium, his trach site significantly improved.  Generally his trach site does well if trach changes occur regularly. Also saw KERVIN Dodson in 2021.      Today's updates:  - is off prednisone and azithromycin  - still having granulation at the trach site; they have had some trouble with getting supplies/dme filled  - breathing fine  - voice and swallowing are fine  - still on the list for kidney transplant; doing hemodialysis which he does not like very much because it makes him feel funny      MEDICATIONS:     Current Outpatient Medications   Medication Sig Dispense Refill     acetaminophen (TYLENOL) 500 MG tablet Take 500 mg by mouth every 8 hours as needed        albuterol (2.5 MG/3ML) 0.083% nebulizer solution Take 1 ampule by nebulization 4 times daily        amLODIPine (NORVASC) 5 MG tablet Take 5 mg by mouth 2 times daily       bisacodyl (DULCOLAX) 5 MG EC tablet Take 1 tablet (5 mg) by mouth daily 30 tablet 1     calcium carbonate (TUMS) 500 MG chewable tablet Take 1,000 mg by mouth 3 times daily       calcium carbonate (TUMS) 500 MG chewable tablet Take 1,000 mg by mouth At Bedtime       cholecalciferol (VITAMIN D3) 25 mcg (1000 units) capsule Take 1 capsule by mouth 2 times daily       cinacalcet (SENSIPAR) 30 MG tablet Take 30 mg by mouth daily       Cranberry 500 MG TABS Take by mouth 3 times daily (with meals)       docusate sodium (COLACE) 100 MG capsule  "Take 100 mg by mouth 2 times daily       famotidine (PEPCID) 40 MG tablet Take 40 mg by mouth 2 times daily        ferrous gluconate (FERGON) 324 (38 Fe) MG tablet Take 1 tablet (324 mg) by mouth daily 90 tablet 11     ipratropium (ATROVENT) 0.02 % neb solution Take 0.5 mg by nebulization 4 times daily  225 mL      lactulose 20 GM/30ML solution Take 20 g by mouth 2 times daily as needed for constipation       loratadine (CLARITIN) 10 MG tablet Take 1 tablet by mouth daily       Melatonin 10 MG TABS tablet Take 10 mg by mouth nightly as needed       mupirocin (BACTROBAN) 2 % external ointment Apply topically daily as needed (around stoma)       Neomycin-Bacitracin-Polymyxin (NEOSPORIN ORIGINAL) 3.5-400-5000 OINT Externally apply topically as needed (apply as needed for stoma care) 14 g 4     neomycin-bacitracin-polymyxin (NEOSPORIN) 5-400-5000 ointment Apply topically daily as needed (Around Stoma - uses EITHER neosporin OR Bactroban)       Nutritional Supplements (NEPRO) LIQD Take 1 Can by mouth daily 30 each 11     order for DME Equipment being ordered: Medi-Vac, plastic tubing connector lara type, cat 361, [employer] [employer], 1 lara tip/month x 12 months. Use for tracheostomy suctioning 1 each 11     order for DME Equipment being ordered: DME    Adult Bivona Uncuffed Tracheostomy Tube-5.0    Patient will do weekly trach changes. 4 Units 11     order for DME Equipment being ordered: DME -    Deep suction kits - 12 fr 90 each 3     order for DME Tracheostomy Humidification Equipment  Equipment being ordered:     1) Air compressor  2) Nebulizer bottle  3) Aerosol tubing  4) Trach mask  5) Sterile water  6) Saline ampules (\"bullets\")  7) Heat Moisture Exchanger (HME) Also known by several other terms including: Thermal Humidifying Filters, Swedish nose, Artificial nose, Filter, Thermovent T.  8) Room/home humidifiers 1 each 11     order for DME Optifoam Basic   [id number] 30 each 11     order for DME " Equipment being ordered: Humidifier (heated), humidifier attachment, saline lavages, humidivent mask 1 each 3     polyethylene glycol (MIRALAX) 17 GM/Dose powder Take 17 g by mouth daily as needed 510 g 0     predniSONE (DELTASONE) 20 MG tablet Take 20 mg by mouth daily as needed Take only when in Red Zone       sennosides (SENOKOT) 8.6 MG tablet Take 1 tablet by mouth 2 times daily       sevelamer carbonate (RENVELA) 800 MG tablet TAKE 1 TABLET BY MOUTH THREE TIMES DAILY WITH FOOD WITH MEALS       simethicone (MYLICON) 40 MG/0.6ML suspension Take 0.6 mLs (40 mg) by mouth every 6 hours as needed for cramping 45 mL 0     sodium chloride 0.9%, bottle, 0.9 % irrigation Irrigate with as directed 3 times daily Bladder irrigation       tobramycin, PF, (DAX) 300 MG/5ML nebulizer solution Take 1 ampule by nebulization as needed Take only when in Red Zone       azithromycin (ZITHROMAX) 250 MG tablet Take 250 mg by mouth three times a week Mon, Wed, Fri (Patient not taking: Reported on 11/14/2023)       cloNIDine (CATAPRES) 0.1 MG tablet Take 1 tablet (0.1 mg) by mouth 2 times daily (Patient not taking: Reported on 11/14/2023) 60 tablet 3     gentamicin (GARAMYCIN) 0.1 % external cream Apply topically daily Peritoneal dialysis site (Patient not taking: Reported on 11/14/2023)       traZODone (DESYREL) 50 MG tablet Take 25 mg by mouth nightly as needed (Patient not taking: Reported on 11/14/2023)         ALLERGIES:    Allergies   Allergen Reactions     Latex      Latex      Other reaction(s): Other (see comments)  Other reaction(s): Contact Dermatitis, Other (see comments)     Adhesive Tape Rash       NEW PMH/PSH: None    REVIEW OF SYSTEMS:  The patient completed a comprehensive 11 point review of systems (below), which was reviewed. Positives are as noted below.  Patient Supplied Answers to Review of Systems noncontributory      PHYSICAL EXAM:  General: The patient was alert and conversant, and in no acute distress. Patient  accompanied by his mother.  Neck: No palpable cervical lymphadenopathy, 5-O Bivona trach in good position overall, although it appears to be lifted somewhat by the strap position. Moderate dry granulation bilaterally.  Resp: Breathing comfortably, no stridor or stertor.  Neuro: Symmetric facial function. Other cranial nerve function as documented above.  Psych: Normal affect, pleasant and cooperative.  Voice/speech: No significant dysphonia.    Procedure:   1) Procedure: Flexible tracheobronchoscopy through established trach incision  Indications: The procedure was warranted to assess the position and/or condition of the tracheostomy tube and trachea  Description: After written and verbal consent were obtained, the endoscope was passed through the patient's tracheostomy. This allowed visualization of the trachea down to the raul and mainstem bronchi.  The trach was also pulled back to allow evaluation of the trach tract, and then was replaced and secured.  Findings: Trach tube in good position within the airway. Trachea clear to raul, which was sharp. Unremarkable takeoffs of mainstem bronchi. Trach tract unremarkable.                 2) Procedure:  Tracheostomy tube change  Indication: This procedure was warranted as part of routine trach care, and was subsequently requested by the patient/caregiver due to the delay in having his prescriptions filled, which has resulted in him having the same trach tube in place for 4 months.  Description of procedure:  The existing tracheostomy tube was removed and a new identical tube was placed using the introducer. The introducer was withdrawn and replaced with the inner cannula, and the neck ties were secured. Flexible fiberoptic endoscopy through the tube confirmed good position.   Moderate bilateral stoma granulation was treated with silver nitrate; although mostly the right side was treated, due to coughing and patient movement, both sides were coated to some extent.  Excess was removed with a cotton swab.  The patient tolerated the procedure without difficulty.            IMPRESSION AND PLAN:   Aidan Louie returns with recurrent moderate stoma granulation around his trach. This consistently worsens when he is unable to have monthly trach changes. We did complete a trach change today along with granulation tissue treatment with silver nitrate.    1) Try again to submit prescriptions for DME  2) Resume monthly trach changes; this are important to reduce the granulation so that he does not require a minor surgical procedure to address it, as has been required in the past  3) RTC ~ 1 year, with our PA or with me; if there are issues in the meantime the patient's mother will contact us.    I spent a total of 24 minutes on 6/17/2024 in chart review, review of tests, patient visit, documentation, care coordination, and/or discussion with other providers about the issues documented above, separate from any documented procedure(s).      Bebe Dumas MD

## 2024-06-17 NOTE — PROGRESS NOTES
Dear Colleague:  Aidan Louie recently returned for follow-up at the [facility]. My clinic note from our visit is enclosed below.  Speech recognition software may have been used in the documentation below; input is reviewed before signature to the best of my ability.     I appreciate the ongoing opportunity to participate in this patient's care.    Please feel free to contact me with any questions.      Sincerely yours,  Bebe Dumas M.D., M.P.H.  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    He was a longtime patient of Dr. Reid's, and has had a trach for many years because of the mandibular hypoplasia. Per report, a number of attempts toward decannulation had been made, but ultimately the decision was made to keep the trach in place due to concerns about his airway. He went to the operating room with Dr. Jose in August 2017 for flexible and rigid laryngoscopy/bronchoscopy. Per the operative note, this showed moderate trismus and base of tongue collapse, redundant, distorted, floppy epiglottis, with a grade 4 laryngoscopy. A small granuloma was seen on the medial left arytenoid. Vocal fold mobility and subglottis were normal. The 5-0 pediatric Bivona tube was noted to be in good position. Distal airway was unremarkable. It was observed that he would be a difficult intubation. With a Rothman blade under the epiglottis he was thought possibly intubatable but this was felt to  be difficult even in a best case situation.    As of 2020:   He currently has a 5-0 Bivona in place, and has a vertically tall stomal opening. 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  With good wound care, antibiotics, steroid injections, and silver nitrate, as well as clipping of synechium, his trach site significantly improved.  Generally his trach site does well if trach changes occur regularly. Also saw KERVIN Dodson in 2021.      Today's updates:  - is off prednisone and azithromycin  - still having granulation at the trach site; they have had some trouble with getting supplies/dme filled  - breathing fine  - voice and swallowing are fine  - still on the list for kidney transplant; doing hemodialysis which he does not like very much because it makes him feel funny      MEDICATIONS:     Current Outpatient Medications   Medication Sig Dispense Refill    acetaminophen (TYLENOL) 500 MG tablet Take 500 mg by mouth every 8 hours as needed       albuterol (2.5 MG/3ML) 0.083% nebulizer solution Take 1 ampule by nebulization 4 times daily       amLODIPine (NORVASC) 5 MG tablet Take 5 mg by mouth 2 times daily      bisacodyl (DULCOLAX) 5 MG EC tablet Take 1 tablet (5 mg) by mouth daily 30 tablet 1    calcium carbonate (TUMS) 500 MG chewable tablet Take 1,000 mg by mouth 3 times daily      calcium carbonate (TUMS) 500 MG chewable tablet Take 1,000 mg by mouth At Bedtime      cholecalciferol (VITAMIN D3) 25 mcg (1000 units) capsule Take 1 capsule by mouth 2 times daily      cinacalcet (SENSIPAR) 30 MG tablet Take 30 mg by mouth daily      Cranberry 500 MG TABS Take by mouth 3 times daily (with meals)      docusate sodium (COLACE) 100 MG capsule Take 100 mg by mouth 2 times daily      famotidine (PEPCID) 40 MG tablet Take 40 mg by mouth 2  "times daily       ferrous gluconate (FERGON) 324 (38 Fe) MG tablet Take 1 tablet (324 mg) by mouth daily 90 tablet 11    ipratropium (ATROVENT) 0.02 % neb solution Take 0.5 mg by nebulization 4 times daily  225 mL     lactulose 20 GM/30ML solution Take 20 g by mouth 2 times daily as needed for constipation      loratadine (CLARITIN) 10 MG tablet Take 1 tablet by mouth daily      Melatonin 10 MG TABS tablet Take 10 mg by mouth nightly as needed      mupirocin (BACTROBAN) 2 % external ointment Apply topically daily as needed (around stoma)      Neomycin-Bacitracin-Polymyxin (NEOSPORIN ORIGINAL) 3.5-400-5000 OINT Externally apply topically as needed (apply as needed for stoma care) 14 g 4    neomycin-bacitracin-polymyxin (NEOSPORIN) 5-400-5000 ointment Apply topically daily as needed (Around Stoma - uses EITHER neosporin OR Bactroban)      Nutritional Supplements (NEPRO) LIQD Take 1 Can by mouth daily 30 each 11    order for DME Equipment being ordered: Medi-Vac, plastic tubing connector lara type, cat 361, [employer] [facility], 1 lara tip/month x 12 months. Use for tracheostomy suctioning 1 each 11    order for DME Equipment being ordered: DME    Adult Bivona Uncuffed Tracheostomy Tube-5.0    Patient will do weekly trach changes. 4 Units 11    order for DME Equipment being ordered: DME -    Deep suction kits - 12 fr 90 each 3    order for DME Tracheostomy Humidification Equipment  Equipment being ordered:     1) Air compressor  2) Nebulizer bottle  3) Aerosol tubing  4) Trach mask  5) Sterile water  6) Saline ampules (\"bullets\")  7) Heat Moisture Exchanger (HME) Also known by several other terms including: Thermal Humidifying Filters, Swedish nose, Artificial nose, Filter, Thermovent T.  8) Room/home humidifiers 1 each 11    order for DME Optifoam Basic   [id number] 30 each 11    order for DME Equipment being ordered: Humidifier (heated), humidifier attachment, saline lavages, humidivent mask 1 each 3    " polyethylene glycol (MIRALAX) 17 GM/Dose powder Take 17 g by mouth daily as needed 510 g 0    predniSONE (DELTASONE) 20 MG tablet Take 20 mg by mouth daily as needed Take only when in Red Zone      sennosides (SENOKOT) 8.6 MG tablet Take 1 tablet by mouth 2 times daily      sevelamer carbonate (RENVELA) 800 MG tablet TAKE 1 TABLET BY MOUTH THREE TIMES DAILY WITH FOOD WITH MEALS      simethicone (MYLICON) 40 MG/0.6ML suspension Take 0.6 mLs (40 mg) by mouth every 6 hours as needed for cramping 45 mL 0    sodium chloride 0.9%, bottle, 0.9 % irrigation Irrigate with as directed 3 times daily Bladder irrigation      tobramycin, PF, (DAX) 300 MG/5ML nebulizer solution Take 1 ampule by nebulization as needed Take only when in Red Zone      azithromycin (ZITHROMAX) 250 MG tablet Take 250 mg by mouth three times a week Mon, Wed, Fri (Patient not taking: Reported on 11/14/2023)      cloNIDine (CATAPRES) 0.1 MG tablet Take 1 tablet (0.1 mg) by mouth 2 times daily (Patient not taking: Reported on 11/14/2023) 60 tablet 3    gentamicin (GARAMYCIN) 0.1 % external cream Apply topically daily Peritoneal dialysis site (Patient not taking: Reported on 11/14/2023)      traZODone (DESYREL) 50 MG tablet Take 25 mg by mouth nightly as needed (Patient not taking: Reported on 11/14/2023)         ALLERGIES:    Allergies   Allergen Reactions    Latex     Latex      Other reaction(s): Other (see comments)  Other reaction(s): Contact Dermatitis, Other (see comments)    Adhesive Tape Rash       NEW PMH/PSH: None    REVIEW OF SYSTEMS:  The patient completed a comprehensive 11 point review of systems (below), which was reviewed. Positives are as noted below.  Patient Supplied Answers to Review of Systems noncontributory      PHYSICAL EXAM:  General: The patient was alert and conversant, and in no acute distress. Patient accompanied by his mother.  Neck: No palpable cervical lymphadenopathy, 5-O Bivona trach in good position overall, although it  appears to be lifted somewhat by the strap position. Moderate dry granulation bilaterally.  Resp: Breathing comfortably, no stridor or stertor.  Neuro: Symmetric facial function. Other cranial nerve function as documented above.  Psych: Normal affect, pleasant and cooperative.  Voice/speech: No significant dysphonia.    Procedure:   1) Procedure: Flexible tracheobronchoscopy through established trach incision  Indications: The procedure was warranted to assess the position and/or condition of the tracheostomy tube and trachea  Description: After written and verbal consent were obtained, the endoscope was passed through the patient's tracheostomy. This allowed visualization of the trachea down to the raul and mainstem bronchi.  The trach was also pulled back to allow evaluation of the trach tract, and then was replaced and secured.  Findings: Trach tube in good position within the airway. Trachea clear to raul, which was sharp. Unremarkable takeoffs of mainstem bronchi. Trach tract unremarkable.                 2) Procedure:  Tracheostomy tube change  Indication: This procedure was warranted as part of routine trach care, and was subsequently requested by the patient/caregiver due to the delay in having his prescriptions filled, which has resulted in him having the same trach tube in place for 4 months.  Description of procedure:  The existing tracheostomy tube was removed and a new identical tube was placed using the introducer. The introducer was withdrawn and replaced with the inner cannula, and the neck ties were secured. Flexible fiberoptic endoscopy through the tube confirmed good position.   Moderate bilateral stoma granulation was treated with silver nitrate; although mostly the right side was treated, due to coughing and patient movement, both sides were coated to some extent. Excess was removed with a cotton swab.  The patient tolerated the procedure without difficulty.            IMPRESSION AND PLAN:    Aidan Louie returns with recurrent moderate stoma granulation around his trach. This consistently worsens when he is unable to have monthly trach changes. We did complete a trach change today along with granulation tissue treatment with silver nitrate.    1) Try again to submit prescriptions for DME  2) Resume monthly trach changes; this are important to reduce the granulation so that he does not require a minor surgical procedure to address it, as has been required in the past  3) RTC ~ 1 year, with our PA or with me; if there are issues in the meantime the patient's mother will contact us.    I spent a total of 24 minutes on 6/17/2024 in chart review, review of tests, patient visit, documentation, care coordination, and/or discussion with other providers about the issues documented above, separate from any documented procedure(s).

## 2024-06-19 DIAGNOSIS — Z93.0 TRACHEOSTOMY IN PLACE (H): Primary | ICD-10-CM

## 2024-06-25 LAB
PROTOCOL CUTOFF: NORMAL
SA 1  COMMENTS: NORMAL
SA 1 CELL: NORMAL
SA 1 TEST METHOD: NORMAL
SA 2 CELL: NORMAL
SA 2 COMMENTS: NORMAL
SA 2 TEST METHOD: NORMAL
SA1 HI RISK ABY: NORMAL
SA1 MOD RISK ABY: NORMAL
SA2 HI RISK ABY: NORMAL
SA2 MOD RISK ABY: NORMAL
UNACCEPTABLE ANTIGENS: NORMAL
UNOS CPRA: 25

## 2024-07-31 ENCOUNTER — PATIENT OUTREACH (OUTPATIENT)
Dept: UROLOGY | Facility: CLINIC | Age: 29
End: 2024-07-31
Payer: MEDICARE

## 2024-07-31 NOTE — PROGRESS NOTES
"Call placed to patient's mother is response to a message on my direct line.  She reports that Greg stopped flushing his mitrofanoff about a year ago and is now having issues with doing his self catheterizing. She reports he is having \"sloughing thick white mucus and catheter keeps plugging up\".  She also thinks Greg is having pain at times due to constipation. She wants to schedule an appointment with Dr Canales to talk about this and to get Greg's DME orders renewed.     Thank you,  Delma Harper RN, BSN Urology Triage    "

## 2024-08-01 ENCOUNTER — TELEPHONE (OUTPATIENT)
Dept: UROLOGY | Facility: CLINIC | Age: 29
End: 2024-08-01
Payer: MEDICARE

## 2024-08-01 NOTE — TELEPHONE ENCOUNTER
Patient confirmed scheduled appointment:  Date: Aug 16th  Time: 11:30am  Visit type: Return   Provider: Valerie Bermudez   [place] [facility] [place]  Additional notes: Per staff message - Diagnosis: SPT issues, hx prune belly.  Per Sridevi Bermudez would be the appropriate provider

## 2024-08-02 ENCOUNTER — PRE VISIT (OUTPATIENT)
Dept: UROLOGY | Facility: CLINIC | Age: 29
End: 2024-08-02
Payer: MEDICARE

## 2024-08-02 NOTE — TELEPHONE ENCOUNTER
Reason for visit: SPT issues     Relevant information: Hx prune belly, neurogenic bladder, ESRD.    Records/imaging/labs/orders: All records available    Pt called: No need for a call    At Rooming: Standard procedure    Jazzy Back  8/2/2024  10:36 AM

## 2024-08-16 ENCOUNTER — OFFICE VISIT (OUTPATIENT)
Dept: UROLOGY | Facility: CLINIC | Age: 29
End: 2024-08-16
Payer: MEDICARE

## 2024-08-16 VITALS
OXYGEN SATURATION: 97 % | HEART RATE: 92 BPM | SYSTOLIC BLOOD PRESSURE: 109 MMHG | DIASTOLIC BLOOD PRESSURE: 72 MMHG | BODY MASS INDEX: 16.46 KG/M2 | WEIGHT: 102 LBS

## 2024-08-16 DIAGNOSIS — N18.6 ESRD (END STAGE RENAL DISEASE) (H): ICD-10-CM

## 2024-08-16 DIAGNOSIS — N31.9 NEUROGENIC BLADDER: ICD-10-CM

## 2024-08-16 DIAGNOSIS — Q79.4 PRUNE BELLY SYNDROME: Primary | ICD-10-CM

## 2024-08-16 PROCEDURE — 99215 OFFICE O/P EST HI 40 MIN: CPT

## 2024-08-16 PROCEDURE — 99417 PROLNG OP E/M EACH 15 MIN: CPT

## 2024-08-16 RX ORDER — DIPHENHYDRAMINE HCL 25 MG
25 CAPSULE ORAL EVERY 6 HOURS PRN
COMMUNITY

## 2024-08-16 ASSESSMENT — PAIN SCALES - GENERAL: PAINLEVEL: NO PAIN (0)

## 2024-08-16 NOTE — NURSING NOTE
Chief Complaint   Patient presents with    Consult     Here for SPT troubles       There were no vitals taken for this visit. There is no height or weight on file to calculate BMI.    Patient Active Problem List   Diagnosis    Prune belly syndrome    HTN (hypertension)    CKD (chronic kidney disease) stage 4, GFR 15-29 ml/min (H)    Vitamin D deficiency    Recurrent UTI    Low bone density for age    Goiter    Short stature    High serum parathyroid hormone (PTH)    High serum follicle stimulating hormone (FSH)    On corticosteroid therapy    Tracheostomy granuloma (H)    Bilateral reflux nephropathy    Dawood Reece sequence    Tracheostomy dependence (H)    Restrictive lung disease    Scoliosis    Chronic respiratory failure with hypoxia (H)    History of tracheostomy    On prednisone therapy    Infection associated with peritoneal dialysis catheter, initial encounter (H24)       Allergies   Allergen Reactions    Latex     Latex      Other reaction(s): Other (see comments)  Other reaction(s): Contact Dermatitis, Other (see comments)    Adhesive Tape Rash       Current Outpatient Medications   Medication Sig Dispense Refill    acetaminophen (TYLENOL) 500 MG tablet Take 500 mg by mouth every 8 hours as needed       albuterol (2.5 MG/3ML) 0.083% nebulizer solution Take 1 ampule by nebulization 4 times daily       amLODIPine (NORVASC) 5 MG tablet Take 5 mg by mouth 2 times daily      bisacodyl (DULCOLAX) 5 MG EC tablet Take 1 tablet (5 mg) by mouth daily 30 tablet 1    calcium carbonate (TUMS) 500 MG chewable tablet Take 1,000 mg by mouth daily      cholecalciferol (VITAMIN D3) 25 mcg (1000 units) capsule Take 1 capsule by mouth 2 times daily      cinacalcet (SENSIPAR) 30 MG tablet Take 30 mg by mouth daily      Cranberry 500 MG TABS Take by mouth 3 times daily (with meals)      docusate sodium (COLACE) 100 MG capsule Take 100 mg by mouth 2 times daily      famotidine (PEPCID) 40 MG tablet Take 40 mg by mouth 2 times  "daily       ferrous gluconate (FERGON) 324 (38 Fe) MG tablet Take 1 tablet (324 mg) by mouth daily 90 tablet 11    gentamicin (GARAMYCIN) 0.1 % external cream Apply topically daily Peritoneal dialysis site      ipratropium (ATROVENT) 0.02 % neb solution Take 0.5 mg by nebulization 4 times daily  225 mL     lactulose 20 GM/30ML solution Take 20 g by mouth 2 times daily as needed for constipation      loratadine (CLARITIN) 10 MG tablet Take 1 tablet by mouth daily      Melatonin 10 MG TABS tablet Take 10 mg by mouth nightly as needed      mupirocin (BACTROBAN) 2 % external ointment Apply topically daily as needed (around stoma)      Neomycin-Bacitracin-Polymyxin (NEOSPORIN ORIGINAL) 3.5-400-5000 OINT Externally apply topically as needed (apply as needed for stoma care) 14 g 4    neomycin-bacitracin-polymyxin (NEOSPORIN) 5-400-5000 ointment Apply topically daily as needed (Around Stoma - uses EITHER neosporin OR Bactroban)      order for DME Equipment being ordered: Medi-Vac, plastic tubing connector lara type, cat 361, [employer] [facility], 1 lara tip/month x 12 months. Use for tracheostomy suctioning 1 each 11    order for DME Equipment being ordered: DME    Adult Bivona Uncuffed Tracheostomy Tube-5.0    Patient will do weekly trach changes. 4 Units 11    order for DME Equipment being ordered: DME -    Deep suction kits - 12 fr 90 each 3    order for DME Tracheostomy Humidification Equipment  Equipment being ordered:     1) Air compressor  2) Nebulizer bottle  3) Aerosol tubing  4) Trach mask  5) Sterile water  6) Saline ampules (\"bullets\")  7) Heat Moisture Exchanger (HME) Also known by several other terms including: Thermal Humidifying Filters, Swedish nose, Artificial nose, Filter, Thermovent T.  8) Room/home humidifiers 1 each 11    order for DME Optifoam Basic   [id number] 30 each 11    order for DME Equipment being ordered: Humidifier (heated), humidifier attachment, saline lavages, humidivent mask " 1 each 3    polyethylene glycol (MIRALAX) 17 GM/Dose powder Take 17 g by mouth daily as needed 510 g 0    predniSONE (DELTASONE) 20 MG tablet Take 20 mg by mouth daily as needed Take only when in Red Zone      sennosides (SENOKOT) 8.6 MG tablet Take 1 tablet by mouth 2 times daily      sevelamer carbonate (RENVELA) 800 MG tablet TAKE 1 TABLET BY MOUTH THREE TIMES DAILY WITH FOOD WITH MEALS      simethicone (MYLICON) 40 MG/0.6ML suspension Take 0.6 mLs (40 mg) by mouth every 6 hours as needed for cramping 45 mL 0    sodium chloride 0.9%, bottle, 0.9 % irrigation Irrigate with as directed 3 times daily Bladder irrigation      tobramycin, PF, (DAX) 300 MG/5ML nebulizer solution Take 1 ampule by nebulization as needed Take only when in Red Zone      azithromycin (ZITHROMAX) 250 MG tablet Take 250 mg by mouth three times a week Mon, Wed, Fri (Patient not taking: Reported on 11/14/2023)      cloNIDine (CATAPRES) 0.1 MG tablet Take 1 tablet (0.1 mg) by mouth 2 times daily (Patient not taking: Reported on 11/14/2023) 60 tablet 3    Nutritional Supplements (NEPRO) LIQD Take 1 Can by mouth daily (Patient not taking: Reported on 8/16/2024) 30 each 11    traZODone (DESYREL) 50 MG tablet Take 25 mg by mouth nightly as needed (Patient not taking: Reported on 11/14/2023)       Pt is concerned about spt tubes and food intake    Social History     Tobacco Use    Smoking status: Never    Smokeless tobacco: Never   Substance Use Topics    Alcohol use: No     Alcohol/week: 0.0 standard drinks of alcohol    Drug use: No       Edin Morales  8/16/2024  11:18 AM

## 2024-08-16 NOTE — PROGRESS NOTES
HPI:  Aidan Louie is a 28 year old male w/ history of Prune Belly, Dawood Reece syndrome, horseshoe kidney, who has prior megaureters and ureteral augment and left to right TUU  managed with CIC per a prolapsed Mitrofanoff, ESRD with dialysis     accompanied by his mother    MedSurg history-  1999-bilateral inguinal hernia repair  2002-bilateral ureteral reimplantation and Mitrofanoff  2004-right ureteral reimplantation  2006-takedown and revision of Mitrofanoff  2022 - insertion of PD catheter  4/2023 - PD catheter removal    Today-  - Constipation - on colace TID, miralax not effective, will restart senna  - Stopped irrigation a year ago, now has extra mucus buildup  - on the list for kidney transplant  - started a few months ago, intermittent flank pain/lower back pain and bladder pain - tylenol not effective, lidocaine patch works. CIC 3x/night, helps relieve pain  - ~12 lb loss in the past month  - dialysis was done yesterday   - PD catheter okay to replace  - no UTIs in the past year, increased cranberry supp if notice any sxs    - would like to get ART - concern for stone due to elevated serum calcium        Exam:  /72   Pulse 92   Wt 46.3 kg (102 lb)   SpO2 97%   BMI 16.46 kg/m    General: age-appropriate appearing male in NAD sitting in a wheelchair  Back: bony spine is non-tender, flanks are non-tender.  Abdomen: Degree of obesity is none. Abdomen is soft and nontender. No organomegaly. Surgical scars include g tube and mitrofanoff.    Review of Imaging:  The following imaging exams were independently viewed and interpreted by me and discussed with patient:  ART 11/14/23  IMPRESSION:  1. Grossly stable cystic focus along the anterior aspect of the right  renal moiety, compatible with a dilated calyx when correlating with CT  4/14/2023. There appears to be some new debris within this dilated  calyx.  2. Unchanged severe hydronephrosis in the horseshoe kidney.    Review of Labs:  The  following labs were reviewed by me and discussed with the patient:  No results found for this or any previous visit (from the past 720 hour(s)).      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  - catheter 14 Fr soft straight tip male (Referral 114) 200/month  Patient needs to perform bladder irrigation with 250 mL saline QID and PRN for UTI symptoms to prevent Chanel, mucus buildup, and bladder stone formation.  - large gloves non latex no powder 4 boxes/month  - Saline 1L bottle, 30 bottles/month (total 30,000mL each month) Please dispense 3 month supply each time  - lure slip 60cc syringes 200/month      - letter was handed to mom for health expense coverage - highly recommended baby wipes for mitrofanoff before and after CIC, g tube, trach stoma care    - 2 copies of visit note, supply orders, and ART report will be mailed to Mom.      Josiane Bermudez NP  [facility] [place]    ==========================      Additional Coding Information:    Problems:  4- 2 chronic stable disease  Tests ordered: ART  Level of risk:  3 -- low risk (e.g., OTC medication or observation, minor surgery without risks)    Time spent:  I spent a total of 70 minutes on the day of the visit.   Time spent by me doing chart review, history and exam, documentation and further activities per the note

## 2024-08-16 NOTE — LETTER
8/16/2024       RE: Aidan Louie  [address] [place] [address]     Dear Colleague,    Thank you for referring your patient, Aidan Louie, to the [facility] [place] [facility]. Please see a copy of my visit note below.    HPI:  Aidan Louie is a 28 year old male w/ history of Prune Belly, Dawood Reece syndrome, horseshoe kidney, who has prior megaureters and ureteral augment and left to right TUU  managed with CIC per a prolapsed Mitrofanoff, ESRD with dialysis     accompanied by his mother    MedSurg history-  1999-bilateral inguinal hernia repair  2002-bilateral ureteral reimplantation and Mitrofanoff  2004-right ureteral reimplantation  2006-takedown and revision of Mitrofanoff  2022 - insertion of PD catheter  4/2023 - PD catheter removal    Today-  - Constipation - on colace TID, miralax not effective, will restart senna  - Stopped irrigation a year ago, now has extra mucus buildup  - on the list for kidney transplant  - started a few months ago, intermittent flank pain/lower back pain and bladder pain - tylenol not effective, lidocaine patch works. CIC 3x/night, helps relieve pain  - ~12 lb loss in the past month  - dialysis was done yesterday   - PD catheter okay to replace  - no UTIs in the past year, increased cranberry supp if notice any sxs    - would like to get ART - concern for stone due to elevated serum calcium        Exam:  /72   Pulse 92   Wt 46.3 kg (102 lb)   SpO2 97%   BMI 16.46 kg/m    General: age-appropriate appearing male in NAD sitting in a wheelchair  Back: bony spine is non-tender, flanks are non-tender.  Abdomen: Degree of obesity is none. Abdomen is soft and nontender. No organomegaly. Surgical scars include g tube and mitrofanoff.    Review of Imaging:  The following imaging exams were independently viewed and interpreted by me and discussed with patient:  ART  11/14/23  IMPRESSION:  1. Grossly stable cystic focus along the anterior aspect of the right  renal moiety, compatible with a dilated calyx when correlating with CT  4/14/2023. There appears to be some new debris within this dilated  calyx.  2. Unchanged severe hydronephrosis in the horseshoe kidney.    Review of Labs:  The following labs were reviewed by me and discussed with the patient:  No results found for this or any previous visit (from the past 720 hour(s)).      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  - catheter 14 Fr soft straight tip male (Referral 114) 200/month  Patient needs to perform bladder irrigation with 250 mL saline QID and PRN for UTI symptoms to prevent Chanel.  - large gloves non latex no powder 4 boxes/month  - Saline 1L bottle, 30 bottles/month (total 30,000mL each month) Please dispense 3 month supply each time  - lure slip 60cc syringes 200/month      - letter was handed to mom for health expense coverage - highly recommended baby wipes for mitrofanoff before and after CIC, g tube, trach stoma care    - 2 copies of visit note, supply orders, and ART report will be mailed to Mom.      Josiane Bermudez NP  [facility] [place]    ==========================      Additional Coding Information:    Problems:  4- 2 chronic stable disease  Tests ordered: ART  Level of risk:  3 -- low risk (e.g., OTC medication or observation, minor surgery without risks)    Time spent:  I spent a total of 70 minutes on the day of the visit.   Time spent by me doing chart review, history and exam, documentation and further activities per the note              Again,  thank you for allowing me to participate in the care of your patient.      Sincerely,    Josiane Bermudez NP

## 2024-08-16 NOTE — LETTER
8/16/2024      Aidan REMY Liban  [address] [place] [address]          To whom it may concern,            I saw this patient in the clinic today for neurogenic bladder.    It is highly recommended that he uses baby wipes to clean his mitrofanoff before and after catheterization, to clean his gtube before and after using it, and to clean his trach stoma.                  Valerie Bermudez NP  Department of Urology  August 16, 2024

## 2024-08-20 RX ORDER — MAGNESIUM HYDROXIDE 1200 MG/15ML
250 LIQUID ORAL 4 TIMES DAILY
Qty: 90000 ML | Refills: 3 | Status: SHIPPED
Start: 2024-08-20

## 2024-08-21 ENCOUNTER — ANCILLARY PROCEDURE (OUTPATIENT)
Dept: ULTRASOUND IMAGING | Facility: CLINIC | Age: 29
End: 2024-08-21
Payer: MEDICARE

## 2024-08-21 DIAGNOSIS — Q79.4 PRUNE BELLY SYNDROME: ICD-10-CM

## 2024-08-21 PROCEDURE — 76770 US EXAM ABDO BACK WALL COMP: CPT | Performed by: RADIOLOGY

## 2024-08-23 ENCOUNTER — DOCUMENTATION ONLY (OUTPATIENT)
Dept: UROLOGY | Facility: CLINIC | Age: 29
End: 2024-08-23
Payer: MEDICARE

## 2024-08-23 NOTE — PROGRESS NOTES
Action 08/23/24  8:56 AM    Action Taken  8/16 visit notes, US imaging report, and DME orders for catheters, gloves, syringes, and saline (2 copies of each) mailed to patient's home address.

## 2024-09-13 ENCOUNTER — DOCUMENTATION ONLY (OUTPATIENT)
Dept: TRANSPLANT | Facility: CLINIC | Age: 29
End: 2024-09-13
Payer: MEDICARE

## 2024-10-01 ENCOUNTER — DOCUMENTATION ONLY (OUTPATIENT)
Dept: TRANSPLANT | Facility: CLINIC | Age: 29
End: 2024-10-01
Payer: MEDICARE

## 2024-10-15 ENCOUNTER — DOCUMENTATION ONLY (OUTPATIENT)
Dept: TRANSPLANT | Facility: CLINIC | Age: 29
End: 2024-10-15
Payer: MEDICARE

## 2024-10-17 ENCOUNTER — LAB (OUTPATIENT)
Dept: LAB | Facility: CLINIC | Age: 29
End: 2024-10-17
Payer: MEDICARE

## 2024-10-17 DIAGNOSIS — Z76.82 ORGAN TRANSPLANT CANDIDATE: ICD-10-CM

## 2024-10-17 DIAGNOSIS — N18.6 ESRD (END STAGE RENAL DISEASE) (H): ICD-10-CM

## 2024-10-17 PROCEDURE — 86832 HLA CLASS I HIGH DEFIN QUAL: CPT

## 2024-10-17 PROCEDURE — 86833 HLA CLASS II HIGH DEFIN QUAL: CPT

## 2024-10-23 ENCOUNTER — TELEPHONE (OUTPATIENT)
Dept: TRANSPLANT | Facility: CLINIC | Age: 29
End: 2024-10-23
Payer: MEDICARE

## 2024-10-23 DIAGNOSIS — N18.6 ESRD (END STAGE RENAL DISEASE) (H): Primary | ICD-10-CM

## 2024-10-23 DIAGNOSIS — Z76.82 ORGAN TRANSPLANT CANDIDATE: ICD-10-CM

## 2024-10-23 DIAGNOSIS — Q63.1 HORSESHOE KIDNEY: ICD-10-CM

## 2024-10-23 DIAGNOSIS — Z01.810 PRE-OPERATIVE CARDIOVASCULAR EXAMINATION: ICD-10-CM

## 2024-10-23 NOTE — TELEPHONE ENCOUNTER
Called pts mom to update orders placed for [facility] visits: neph, surgeon, SW, renal US and Echo. Scheduling will be reaching out to arrange. Pt verbalized understanding of information and has no further questions. Encouraged to reach out if questions arise.

## 2024-10-25 ENCOUNTER — DOCUMENTATION ONLY (OUTPATIENT)
Dept: TRANSPLANT | Facility: CLINIC | Age: 29
End: 2024-10-25
Payer: MEDICARE

## 2024-10-25 DIAGNOSIS — Z76.82 ORGAN TRANSPLANT CANDIDATE: ICD-10-CM

## 2024-10-25 DIAGNOSIS — N18.6 ESRD (END STAGE RENAL DISEASE) (H): Primary | ICD-10-CM

## 2024-11-05 ENCOUNTER — TELEPHONE (OUTPATIENT)
Dept: TRANSPLANT | Facility: CLINIC | Age: 29
End: 2024-11-05
Payer: MEDICARE

## 2024-11-13 ENCOUNTER — DOCUMENTATION ONLY (OUTPATIENT)
Dept: TRANSPLANT | Facility: CLINIC | Age: 29
End: 2024-11-13
Payer: MEDICARE

## 2024-11-18 ENCOUNTER — ANCILLARY PROCEDURE (OUTPATIENT)
Dept: ULTRASOUND IMAGING | Facility: CLINIC | Age: 29
End: 2024-11-18
Attending: NURSE PRACTITIONER
Payer: MEDICARE

## 2024-11-18 ENCOUNTER — ANCILLARY PROCEDURE (OUTPATIENT)
Dept: CARDIOLOGY | Facility: CLINIC | Age: 29
End: 2024-11-18
Attending: NURSE PRACTITIONER
Payer: MEDICARE

## 2024-11-18 DIAGNOSIS — N18.6 ESRD (END STAGE RENAL DISEASE) (H): ICD-10-CM

## 2024-11-18 DIAGNOSIS — Q63.1 HORSESHOE KIDNEY: ICD-10-CM

## 2024-11-18 DIAGNOSIS — Z76.82 ORGAN TRANSPLANT CANDIDATE: ICD-10-CM

## 2024-11-18 DIAGNOSIS — Z01.810 PRE-OPERATIVE CARDIOVASCULAR EXAMINATION: ICD-10-CM

## 2024-11-18 LAB — LVEF ECHO: NORMAL

## 2024-11-18 PROCEDURE — 93306 TTE W/DOPPLER COMPLETE: CPT | Performed by: INTERNAL MEDICINE

## 2024-11-19 ENCOUNTER — TELEPHONE (OUTPATIENT)
Dept: TRANSPLANT | Facility: CLINIC | Age: 29
End: 2024-11-19
Payer: MEDICARE

## 2024-11-19 ENCOUNTER — DOCUMENTATION ONLY (OUTPATIENT)
Dept: TRANSPLANT | Facility: CLINIC | Age: 29
End: 2024-11-19
Payer: MEDICARE

## 2024-11-19 NOTE — TELEPHONE ENCOUNTER
"Called pts mom to discuss recommendation after renal US completed yesterday. Requested report be mailed to pt per pt guardian request. Pt should get a UA with PCP for \"Nonspecific echogenic debris within the urinary bladder.\" Requested call back to confirm PCP and if pt makes urine. Left VM with direct line for return call.     Received call back immediately from pts mom. Reviewed pt does still make some urine and has been having issues getting catheter supplies. While discussing catheter supply issue, she was receiving a call from dialysis center. She will call writer back at a better time.     "

## 2024-11-19 NOTE — LETTER
"  Aidan Louie  [address] [facility] [address] [facility] [address]                2024    To whom it may concern:     Aidan Louie, : 1995, completed a Renal Ultrasound on 24. The report is attached to this letter. The transplant team is recommending completing a UA/UC with the pts primary care provider to follow up on \"nonspecific echogenic debris within the urinary bladder.\" Please work with the patient to complete this and any follow up needed.     Thank you,   Meera Hoyt RN, BSN  Kidney Transplant Waitlist Coordinator   [phone]    Attachment included: 24 renal ultrasound report    CC'd: Dr. Mora, primary care  [facility]      "

## 2024-11-21 DIAGNOSIS — R33.9 URINE RETENTION: ICD-10-CM

## 2024-11-21 DIAGNOSIS — Q79.4 PRUNE BELLY SYNDROME: Primary | ICD-10-CM

## 2024-11-21 RX ORDER — MAGNESIUM HYDROXIDE 1200 MG/15ML
LIQUID ORAL 4 TIMES DAILY
Status: DISCONTINUED | OUTPATIENT
Start: 2024-11-21 | End: 2024-11-21 | Stop reason: HOSPADM

## 2024-11-21 NOTE — PROGRESS NOTES
Call placed to patient's mother. She reports that she has been unable to get Greg's cath DME order filled. Will rewrite and sent to [employer] per her request. Will also send NS order to the [facility] in [place].      Thank you,  Delma Harper RN, BSN   Urology Triage Nurse

## 2024-11-21 NOTE — TELEPHONE ENCOUNTER
Received a voicemail from pts mom. Pts PCP is Dr. Bernie Mora, clinic number is [phone].     Returned call to pts mom. She is a little concerned about going to PCP for UA, does not feel the provider is thorough. She set up an appt next week for UA/UC. Will have renal US report faxed to PCP. They are looking for a different PCP. Pts mom expressed frustrations with trying to get flushes and cath supplies. Has been trying to get them over a year and still hasn't received them. Will send message to urology to follow up with pt.

## 2024-11-25 ENCOUNTER — DOCUMENTATION ONLY (OUTPATIENT)
Dept: OTOLARYNGOLOGY | Facility: CLINIC | Age: 29
End: 2024-11-25
Payer: MEDICARE

## 2024-11-25 NOTE — PROGRESS NOTES
DME order for suction machine faxed to Dorothea at [facility]. Fax [phone]. 3 pgs faxed.

## 2024-11-26 ENCOUNTER — TELEPHONE (OUTPATIENT)
Dept: UROLOGY | Facility: CLINIC | Age: 29
End: 2024-11-26
Payer: MEDICARE

## 2024-11-26 NOTE — TELEPHONE ENCOUNTER
Request for DME orders to be sent to [facility], call placed to [facility] and they no longer take insurance and only accept self pay. Discussed with Valerie, Patient mother that we could send her DME orders to [address] [place] [address] for her and start the process of getting DME coverage through them or another supplier. Valerie was appreciative and approved [facility] for supplies. Sent via fax DME orders to [address] [place] [address] at [phone].     Sydnie Chowdhury  RN[facility] Urology  Phone: [phone]

## 2024-12-02 ENCOUNTER — DOCUMENTATION ONLY (OUTPATIENT)
Dept: UROLOGY | Facility: CLINIC | Age: 29
End: 2024-12-02
Payer: MEDICARE

## 2024-12-02 ENCOUNTER — TELEPHONE (OUTPATIENT)
Dept: TRANSPLANT | Facility: CLINIC | Age: 29
End: 2024-12-02
Payer: MEDICARE

## 2024-12-02 ENCOUNTER — MEDICAL CORRESPONDENCE (OUTPATIENT)
Dept: HEALTH INFORMATION MANAGEMENT | Facility: CLINIC | Age: 29
End: 2024-12-02
Payer: MEDICARE

## 2024-12-02 NOTE — TELEPHONE ENCOUNTER
Returned call to Valerie. She is looking for Urology number to discuss orders sent. Provided urology clinic number. Pt verbalized understanding of information and has no further questions. Encouraged to reach out if questions arise.

## 2024-12-02 NOTE — TELEPHONE ENCOUNTER
Maged Eve Padilla called this AM;  Her knee's went out this weekend and would like to reschedule Aidan's appts 12/02/2024 to a different day.  Transferred to scheduling line.

## 2024-12-02 NOTE — TELEPHONE ENCOUNTER
[facility]    Phone Message    May a detailed message be left on voicemail: yes     Reason for Call: Other: Patients mother Valerie wanted to let Meera know that the patient is rescheduled to 12/30. She was also wondering if she could get a call from Meera to ask a questions. Please call patients mom to discuss.     Action Taken: Message routed to:  [facility]): Meera DEE RNCC    Travel Screening: Not Applicable

## 2024-12-02 NOTE — PROGRESS NOTES
Type of Form Received: Order (lubricant, catheter, syringe, gloves)    Form Received (Date) 12/2/24   Form Filled out Yes, date 12/2/24   Placed in provider folder Yes       Received Completed forms Yes   Faxed Forms Faxed To: [address] [place] [address]  Fax Number: [phone]   Sent to HIM (Date) 12/3/24

## 2025-01-05 ENCOUNTER — ORGAN (OUTPATIENT)
Dept: TRANSPLANT | Facility: CLINIC | Age: 30
End: 2025-01-05
Payer: MEDICARE

## 2025-01-05 DIAGNOSIS — Z76.82 AWAITING ORGAN TRANSPLANT: Primary | ICD-10-CM

## 2025-01-05 NOTE — TELEPHONE ENCOUNTER
Organ Offer Encounter Information    Organ Offer Information  Organ offer date & time: 1/5/2025 11:38 AM  Coordinator/Fellow/Attending name: Gilda Rodriguez RN   Organ(s):  Organ UNOS ID Match Run ID Comment Organ Laterality   Kidney [id number] MNOP         Recent infections?: Yes (Comment: mild cough, starting to get sick) Recent IV antibiotics?: Neg     New medications?: No Recent pregnancy?: No     Angicoagulation medications?: No Recent vaccinations?: No     Recent blood transfusions?: No Recent hospitalizations?: No   Has your insurance changed in the last 6-12 months?: Neg    Patient last dialyzed: 1/4/2025  4:00 PM  Dialysis type: Hemo  Discussed organ offer with: Patient  Patient/Caregiver name: Valerie  Discussed risk category with Patient/Other: PHS Risk Criteria Met  Patient/Other asked to speak to a surgeon?: No  Discussed program-specific outcomes: Did not have questions regarding SRTR  Right to decline organ offer without penalty, Patient/Other: Aware of option to decline without penalty  Organ offer decision status Patient/Other: Accepted Offer  Organ disposition: Case Cancelled - Recipient medical condition  Additional Comments: 1/5/2025 11:39 AM  Kidney: Local, primary  MD: Dr. Tariq  OPO Contact: [facility]  VXM Results: Compatible w some remote DSA to cw7  XM Plan (FXM must be done with serum no older than 10 days from transplant): Will admit pt for FXM if kidney looks good   Donor/Recip HCV Status: Negatie/Negative  Is this an ABO A2 donor to ABO B Recipient:No  Plan (Admission, NPO, Donor OR): Donor OR set for 1200. Called pt to discuss offer including elevated Cr & risk criteria d/t IVDU with mom. Mother expressed financial concerns and wasn't sure about the plan for post-transplant, Greg said he doesn't feel ready and is very scared. Due to pt's cough and financial concerns Dr. Tariq said we will decline this offer and wait for the pt and mother to be more prepared and for  Greg to be healthy.   - - -   COVID Screening  In the past month, have you:  Or anyone close to you had a positive COVID test or suspected to have COVID: No   Had any COVID symptoms (Fever, Cough, Short of Breath, Loss of Taste/Smell, Rash): No      Attestation I have discussed all of the above with the Patient/Legal Guardian/Caregiver regarding this organ offer.: Yes  Coordinator/Fellow/Attending name: Gilda Rodriguez RN

## 2025-01-05 NOTE — TELEPHONE ENCOUNTER
Some donors have risk factors or behaviors that increase their chance of having an infection such as human immunodeficiency virus (HIV), hepatitis B virus (HBV), and/or hepatitis C virus (HCV). This donor has met one or more of the risk criteria in the last 30 days for these infections.  This donor was tested for HIV, HBV, and HCV, which resulted as negative, but as with every test, there is a small chance that the test result was negative even if the virus is present.     A negative test might happen if the donor had a very recent infection.  For this reason, we identify and discuss donors who have potential exposures for HIV, HBV, and/or HCV in the last month that might be missed by testing. The risk for undetected infections is very low but not zero.      Studies show that transplant candidates may have a higher chance of survival by accepting organs from donors with risk factors for these infections compared to waiting for an organ from a donor without these risk factors.      All transplant recipients are tested for HIV, HCV, and HBV after transplant.  In the very rare event that transmission would occur, there are effective therapies available, your medical team would collaborate with a team of infectious disease experts to treat you accordingly.    List of Risk Criteria:    Person who has injected drugs for non-medical reasons in the preceding 30 days

## 2025-01-17 NOTE — PROGRESS NOTES
TRANSPLANT NEPHROLOGY WAITLIST VISIT      Assessment and Plan:  # Kidney Transplant Wait List Evaluation: Patient is a good candidate overall. 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 Has not been noncompliant with bladder irrigations. Mother states she needs to follow up with urology and get more supplies. Patient self-caths a few times per day. Reports no UTIs over the past year using crannberry supplements.     # Cardiac Risk: 2024 ECHO with normal LVEF and no valvular abnormalities.     # Chronic respiratory failure/hypoxia/restrictive lung disease requiring tracheostomy: weaned off chronic prednisone a few years ago. Follows with pulmonary (Dr. Baez) and noted to be doing well from a respiratory standpoint at last visit in 2024.     # H/o Malnutrition and Poor Oral Intake, s/p G-Tube Placement: Patient appears to be doing better with this with stable weight.      # Health Maintenance: Dental: Should be up to date     Discussed the risks and benefits of a transplant, including the risk of surgery and immunosuppression medications.    KDPI: We discussed approximate remaining wait time and how that is influenced by issues such as blood type and sensitization (PRA) and access to a living donor. I contrasted potential waiting time for living vs  donor kidneys from  normal (0-85%) or higher (%) kidney  donor profile index (KDPI) donors and their associated outcomes. I would not recommend Mr. Louie to consider kidneys from high KDPI donors. The reason for this decision is best summarized as: improved long term graft survival.    Patient presently appears to be enough of an acceptable kidney transplant recipient candidate to have any potential kidney donors start the evaluation process.  Patient s overall re-evaluation may require further discussion in the Transplant Program s multidisciplinary selection committee for a final recommendation on the patient s suitability for transplant.     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          Interim Events: since he was last seen in 11/2023 he denies any admits or ED visits.  He received an offer last week but declined due to some mild respiratory symptoms but also did not have local accommodations in the [facility] figured out. His mother still needs to talk to local family and figure out where she can stay after transplant.          Kidney Disease:        Kidney Disease Dx: obstructive nephropathy         On Dialysis: Yes, Date initiated: 12/2021 and Dialysis Type: D, AVF working OK. BPs stable, still making urine.        Primary Nephrologist: Dr. Sharma           Cardiac/Vascular Disease History:       Known CAD: No       Known PAD/Claudication Symptoms: No       Known Heart Failure: No       Arrhythmia:  No       Pulmonary Hypertension: No        Valvular Disease: No       Other: None       New Cardiac/Vascular Events: No         Functional Capacity/Frailty:        Going to the [facility] with his caregivers on occasion for activity. Has stairs down to his basement he has to go up and down, reports no chest pains or shortness of breath.         Allergy Testing Questions:   Medication that caused a reaction None   Antibiotics used that didn't give an allergic reaction?  None    COVID Vaccination Up To Date: Yes,         Other Pertinent Transplant Surgical Issues:  Recent Blood Transfusion: No  Previous Abdominal Transplant: No  Bladder Dysfunction: Yes; see above   Chronic/Recurrent Infections: Yes; UTIs, last in 4/2023  Chronic Anticoagulation: No  Jehovah s Witness: No       Active Problem List:   Patient Active Problem List   Diagnosis    Prune belly syndrome    HTN (hypertension)    CKD (chronic kidney disease) stage 4, GFR 15-29 ml/min (H)    Vitamin D deficiency    Recurrent UTI    Low bone density for age    Goiter    Short stature    High serum parathyroid hormone (PTH)    High serum follicle stimulating hormone (FSH)    On corticosteroid therapy    Tracheostomy granuloma (H)    Bilateral reflux nephropathy    Dawood Reece sequence    Tracheostomy dependence (H)    Restrictive lung disease    Scoliosis    Chronic respiratory failure with hypoxia (H)    History of tracheostomy    On prednisone therapy    Infection associated with peritoneal dialysis catheter, initial encounter       Personal History:  Nonsmoker      Allergies:  Allergies   Allergen Reactions    Latex     Latex      Other reaction(s): Other (see comments)  Other reaction(s): Contact Dermatitis, Other (see comments)    Adhesive Tape Rash        Medications:  Current Outpatient Medications   Medication Sig  Dispense Refill    acetaminophen (TYLENOL) 500 MG tablet Take 500 mg by mouth every 8 hours as needed       albuterol (2.5 MG/3ML) 0.083% nebulizer solution Take 1 ampule by nebulization 4 times daily       amLODIPine (NORVASC) 5 MG tablet Take 5 mg by mouth 2 times daily      azithromycin (ZITHROMAX) 250 MG tablet Take 250 mg by mouth three times a week Mon, Wed, Fri (Patient not taking: Reported on 11/14/2023)      bisacodyl (DULCOLAX) 5 MG EC tablet Take 1 tablet (5 mg) by mouth daily 30 tablet 1    calcium carbonate (TUMS) 500 MG chewable tablet Take 1,000 mg by mouth daily      cholecalciferol (VITAMIN D3) 25 mcg (1000 units) capsule Take 1 capsule by mouth 2 times daily      cinacalcet (SENSIPAR) 30 MG tablet Take 30 mg by mouth daily      cloNIDine (CATAPRES) 0.1 MG tablet Take 1 tablet (0.1 mg) by mouth 2 times daily (Patient not taking: Reported on 11/14/2023) 60 tablet 3    Cranberry 500 MG TABS Take by mouth 3 times daily (with meals)      diphenhydrAMINE (BENADRYL) 25 MG capsule Take 25 mg by mouth every 6 hours as needed for itching or allergies      docusate sodium (COLACE) 100 MG capsule Take 100 mg by mouth 2 times daily      famotidine (PEPCID) 40 MG tablet Take 40 mg by mouth 2 times daily       ferrous gluconate (FERGON) 324 (38 Fe) MG tablet Take 1 tablet (324 mg) by mouth daily 90 tablet 11    gentamicin (GARAMYCIN) 0.1 % external cream Apply topically daily Peritoneal dialysis site      ipratropium (ATROVENT) 0.02 % neb solution Take 0.5 mg by nebulization 4 times daily  225 mL     lactulose 20 GM/30ML solution Take 20 g by mouth 2 times daily as needed for constipation      loratadine (CLARITIN) 10 MG tablet Take 1 tablet by mouth daily      Melatonin 10 MG TABS tablet Take 10 mg by mouth nightly as needed      mupirocin (BACTROBAN) 2 % external ointment Apply topically daily as needed (around stoma)      Neomycin-Bacitracin-Polymyxin (NEOSPORIN ORIGINAL) 3.5-400-5000 OINT Externally apply  "topically as needed (apply as needed for stoma care) 14 g 4    neomycin-bacitracin-polymyxin (NEOSPORIN) 5-400-5000 ointment Apply topically daily as needed (Around Stoma - uses EITHER neosporin OR Bactroban)      Nutritional Supplements (NEPRO) LIQD Take 1 Can by mouth daily (Patient not taking: Reported on 8/16/2024) 30 each 11    order for DME Equipment being ordered: Medi-Vac, plastic tubing connector lara type, cat 361, [employer], 1 lara tip/month x 12 months. Use for tracheostomy suctioning 1 each 11    order for DME Equipment being ordered: DME    Adult Bivona Uncuffed Tracheostomy Tube-5.0    Patient will do weekly trach changes. 4 Units 11    order for DME Equipment being ordered: DME -    Deep suction kits - 12 fr 90 each 3    order for DME Tracheostomy Humidification Equipment  Equipment being ordered:     1) Air compressor  2) Nebulizer bottle  3) Aerosol tubing  4) Trach mask  5) Sterile water  6) Saline ampules (\"bullets\")  7) Heat Moisture Exchanger (HME) Also known by several other terms including: Thermal Humidifying Filters, Swedish nose, Artificial nose, Filter, Thermovent T.  8) Room/home humidifiers 1 each 11    order for DME Optifoam Basic   [id number] 30 each 11    order for DME Equipment being ordered: Humidifier (heated), humidifier attachment, saline lavages, humidivent mask 1 each 3    polyethylene glycol (MIRALAX) 17 GM/Dose powder Take 17 g by mouth daily as needed 510 g 0    predniSONE (DELTASONE) 20 MG tablet Take 20 mg by mouth daily as needed Take only when in Red Zone      sennosides (SENOKOT) 8.6 MG tablet Take 1 tablet by mouth 2 times daily      sevelamer carbonate (RENVELA) 800 MG tablet TAKE 1 TABLET BY MOUTH THREE TIMES DAILY WITH FOOD WITH MEALS      simethicone (MYLICON) 40 MG/0.6ML suspension Take 0.6 mLs (40 mg) by mouth every 6 hours as needed for cramping 45 mL 0    sodium chloride 0.9%, bottle, 0.9 % irrigation Irrigate with 250 mLs as directed 4 " times daily 53080 mL 3    sodium chloride 0.9%, bottle, 0.9 % irrigation Irrigate with as directed 3 times daily Bladder irrigation      Syringe Luer Slip 60 ML MISC 1 Syringe 4 times daily 200 each 11    tobramycin, PF, (DAX) 300 MG/5ML nebulizer solution Take 1 ampule by nebulization as needed Take only when in Red Zone      traZODone (DESYREL) 50 MG tablet Take 25 mg by mouth nightly as needed (Patient not taking: Reported on 11/14/2023)       No current facility-administered medications for this visit.        Vitals:  There were no vitals taken for this visit.     Exam:  GENERAL APPEARANCE: alert and no distress  HENT: mouth without ulcers or lesions  LYMPHATICS: no cervical or supraclavicular nodes  RESP: lungs clear to auscultation - no rales, rhonchi or wheezes  CV: regular rhythm, normal rate, no rub, no murmur  FEMORAL PULSES: normal  EDEMA: no LE edema bilaterally  ABDOMEN: soft, nondistended, nontender, bowel sounds normal  MS: extremities normal - no gross deformities noted, no evidence of inflammation in joints, no muscle tenderness  SKIN: no rash

## 2025-01-20 ENCOUNTER — OFFICE VISIT (OUTPATIENT)
Dept: TRANSPLANT | Facility: CLINIC | Age: 30
End: 2025-01-20
Attending: NURSE PRACTITIONER
Payer: MEDICARE

## 2025-01-20 VITALS — DIASTOLIC BLOOD PRESSURE: 85 MMHG | OXYGEN SATURATION: 96 % | SYSTOLIC BLOOD PRESSURE: 134 MMHG | HEART RATE: 98 BPM

## 2025-01-20 DIAGNOSIS — Z76.82 ORGAN TRANSPLANT CANDIDATE: ICD-10-CM

## 2025-01-20 DIAGNOSIS — N18.6 ESRD (END STAGE RENAL DISEASE) (H): ICD-10-CM

## 2025-01-20 DIAGNOSIS — Z76.82 AWAITING ORGAN TRANSPLANT: Primary | ICD-10-CM

## 2025-01-20 DIAGNOSIS — N18.6 ESRD (END STAGE RENAL DISEASE) (H): Primary | ICD-10-CM

## 2025-01-20 PROCEDURE — 3079F DIAST BP 80-89 MM HG: CPT | Performed by: SURGERY

## 2025-01-20 PROCEDURE — 3075F SYST BP GE 130 - 139MM HG: CPT | Performed by: SURGERY

## 2025-01-20 PROCEDURE — 99214 OFFICE O/P EST MOD 30 MIN: CPT | Performed by: SURGERY

## 2025-01-20 PROCEDURE — G0463 HOSPITAL OUTPT CLINIC VISIT: HCPCS | Performed by: SURGERY

## 2025-01-20 NOTE — Clinical Note
1/20/2025      Aidan Louie  [address]  [place] [address]      Dear Colleague,    Thank you for referring your patient, Aidan Louie, to the [facility] TR[facility]PLANT [facility]. Please see a copy of my visit note below.    No notes on file    Again, thank you for allowing me to participate in the care of your patient.        Sincerely,        Jose Tariq MD    Electronically signed

## 2025-01-20 NOTE — LETTER
1/20/2025      Aidan Louie  [address] [place] [place] [address]      Dear Colleague,    Thank you for referring your patient, Aidan Louie, to the [facility] TRANSPLANT [facility]. Please see a copy of my visit note below.    TRANSPLANT NEPHROLOGY WAITLIST VISIT      Assessment and Plan:  # Kidney Transplant Wait List Evaluation: Patient is a good candidate overall. 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 Has not been noncompliant with bladder irrigations. Mother states she needs to follow up with urology and get more supplies. Patient self-caths a few times per day. Reports no UTIs over the past year using crannberry supplements.     # Cardiac Risk: 11/2024 ECHO with normal LVEF and no valvular abnormalities.     # Chronic respiratory failure/hypoxia/restrictive lung disease requiring tracheostomy: weaned off chronic prednisone a few years ago. Follows with pulmonary (Dr. Baez) and noted to be doing well from a respiratory standpoint at last visit in 9/2024.     # H/o Malnutrition and Poor Oral Intake, s/p G-Tube Placement: Patient appears to be doing better with this with stable weight.      # Health Maintenance: Dental: Should be up to date     Discussed the risks and benefits of a transplant, including the risk of surgery and immunosuppression medications.    KDPI: We discussed approximate remaining  wait time and how that is influenced by issues such as blood type and sensitization (PRA) and access to a living donor. I contrasted potential waiting time for living vs  donor kidneys from  normal (0-85%) or higher (%) kidney donor profile index (KDPI) donors and their associated outcomes. I would not recommend Mr. Louie to consider kidneys from high KDPI donors. The reason for this decision is best summarized as: improved long term graft survival.    Patient presently appears to be enough of an acceptable kidney transplant recipient candidate to have any potential kidney donors start the evaluation process.  Patient s overall re-evaluation may require further discussion in the Transplant Program s multidisciplinary selection committee for a final recommendation on the patient s suitability for transplant.     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          Interim Events: since he was last seen in 2023 he denies any admits or ED visits.  He received an offer last week but declined due to some mild respiratory symptoms but also did not have local accommodations in the [facility] figured out. His mother still needs to talk to local family and figure out where she can stay after transplant.          Kidney  Disease:        Kidney Disease Dx: obstructive nephropathy         On Dialysis: Yes, Date initiated: 12/2021 and Dialysis Type: D, AVF working OK. BPs stable, still making urine.        Primary Nephrologist: Dr. Sharma          Cardiac/Vascular Disease History:       Known CAD: No       Known PAD/Claudication Symptoms: No       Known Heart Failure: No       Arrhythmia:  No       Pulmonary Hypertension: No        Valvular Disease: No       Other: None       New Cardiac/Vascular Events: No         Functional Capacity/Frailty:        Going to the [facility] with his caregivers on occasion for activity. Has stairs down to his basement he has to go up and down, reports no chest pains or shortness of breath.         Allergy Testing Questions:   Medication that caused a reaction None   Antibiotics used that didn't give an allergic reaction?  None    COVID Vaccination Up To Date: Yes,         Other Pertinent Transplant Surgical Issues:  Recent Blood Transfusion: No  Previous Abdominal Transplant: No  Bladder Dysfunction: Yes; see above   Chronic/Recurrent Infections: Yes; UTIs, last in 4/2023  Chronic Anticoagulation: No  Jehovah s Witness: No       Active Problem List:   Patient Active Problem List   Diagnosis     Prune belly syndrome     HTN (hypertension)     CKD (chronic kidney disease) stage 4, GFR 15-29 ml/min (H)     Vitamin D deficiency     Recurrent UTI     Low bone density for age     Goiter     Short stature     High serum parathyroid hormone (PTH)     High serum follicle stimulating hormone (FSH)     On corticosteroid therapy     Tracheostomy granuloma (H)     Bilateral reflux nephropathy     Dawood Reece sequence     Tracheostomy dependence (H)     Restrictive lung disease     Scoliosis     Chronic respiratory failure with hypoxia (H)     History of tracheostomy     On prednisone therapy     Infection associated with peritoneal dialysis catheter, initial encounter       Personal History:  Nonsmoker       Allergies:  Allergies   Allergen Reactions     Latex      Latex      Other reaction(s): Other (see comments)  Other reaction(s): Contact Dermatitis, Other (see comments)     Adhesive Tape Rash        Medications:  Current Outpatient Medications   Medication Sig Dispense Refill     acetaminophen (TYLENOL) 500 MG tablet Take 500 mg by mouth every 8 hours as needed        albuterol (2.5 MG/3ML) 0.083% nebulizer solution Take 1 ampule by nebulization 4 times daily        amLODIPine (NORVASC) 5 MG tablet Take 5 mg by mouth 2 times daily       azithromycin (ZITHROMAX) 250 MG tablet Take 250 mg by mouth three times a week Mon, Wed, Fri (Patient not taking: Reported on 11/14/2023)       bisacodyl (DULCOLAX) 5 MG EC tablet Take 1 tablet (5 mg) by mouth daily 30 tablet 1     calcium carbonate (TUMS) 500 MG chewable tablet Take 1,000 mg by mouth daily       cholecalciferol (VITAMIN D3) 25 mcg (1000 units) capsule Take 1 capsule by mouth 2 times daily       cinacalcet (SENSIPAR) 30 MG tablet Take 30 mg by mouth daily       cloNIDine (CATAPRES) 0.1 MG tablet Take 1 tablet (0.1 mg) by mouth 2 times daily (Patient not taking: Reported on 11/14/2023) 60 tablet 3     Cranberry 500 MG TABS Take by mouth 3 times daily (with meals)       diphenhydrAMINE (BENADRYL) 25 MG capsule Take 25 mg by mouth every 6 hours as needed for itching or allergies       docusate sodium (COLACE) 100 MG capsule Take 100 mg by mouth 2 times daily       famotidine (PEPCID) 40 MG tablet Take 40 mg by mouth 2 times daily        ferrous gluconate (FERGON) 324 (38 Fe) MG tablet Take 1 tablet (324 mg) by mouth daily 90 tablet 11     gentamicin (GARAMYCIN) 0.1 % external cream Apply topically daily Peritoneal dialysis site       ipratropium (ATROVENT) 0.02 % neb solution Take 0.5 mg by nebulization 4 times daily  225 mL      lactulose 20 GM/30ML solution Take 20 g by mouth 2 times daily as needed for constipation       loratadine (CLARITIN) 10 MG tablet Take 1  "tablet by mouth daily       Melatonin 10 MG TABS tablet Take 10 mg by mouth nightly as needed       mupirocin (BACTROBAN) 2 % external ointment Apply topically daily as needed (around stoma)       Neomycin-Bacitracin-Polymyxin (NEOSPORIN ORIGINAL) 3.5-400-5000 OINT Externally apply topically as needed (apply as needed for stoma care) 14 g 4     neomycin-bacitracin-polymyxin (NEOSPORIN) 5-400-5000 ointment Apply topically daily as needed (Around Stoma - uses EITHER neosporin OR Bactroban)       Nutritional Supplements (NEPRO) LIQD Take 1 Can by mouth daily (Patient not taking: Reported on 8/16/2024) 30 each 11     order for DME Equipment being ordered: Medi-Vac, plastic tubing connector lara type, cat 361, [employer], 1 lara tip/month x 12 months. Use for tracheostomy suctioning 1 each 11     order for DME Equipment being ordered: DME    Adult Bivona Uncuffed Tracheostomy Tube-5.0    Patient will do weekly trach changes. 4 Units 11     order for DME Equipment being ordered: DME -    Deep suction kits - 12 fr 90 each 3     order for DME Tracheostomy Humidification Equipment  Equipment being ordered:     1) Air compressor  2) Nebulizer bottle  3) Aerosol tubing  4) Trach mask  5) Sterile water  6) Saline ampules (\"bullets\")  7) Heat Moisture Exchanger (HME) Also known by several other terms including: Thermal Humidifying Filters, Swedish nose, Artificial nose, Filter, Thermovent T.  8) Room/home humidifiers 1 each 11     order for DME Optifoam Basic   [id number] 30 each 11     order for DME Equipment being ordered: Humidifier (heated), humidifier attachment, saline lavages, humidivent mask 1 each 3     polyethylene glycol (MIRALAX) 17 GM/Dose powder Take 17 g by mouth daily as needed 510 g 0     predniSONE (DELTASONE) 20 MG tablet Take 20 mg by mouth daily as needed Take only when in Red Zone       sennosides (SENOKOT) 8.6 MG tablet Take 1 tablet by mouth 2 times daily       sevelamer carbonate " (RENVELA) 800 MG tablet TAKE 1 TABLET BY MOUTH THREE TIMES DAILY WITH FOOD WITH MEALS       simethicone (MYLICON) 40 MG/0.6ML suspension Take 0.6 mLs (40 mg) by mouth every 6 hours as needed for cramping 45 mL 0     sodium chloride 0.9%, bottle, 0.9 % irrigation Irrigate with 250 mLs as directed 4 times daily 96706 mL 3     sodium chloride 0.9%, bottle, 0.9 % irrigation Irrigate with as directed 3 times daily Bladder irrigation       Syringe Luer Slip 60 ML MISC 1 Syringe 4 times daily 200 each 11     tobramycin, PF, (DAX) 300 MG/5ML nebulizer solution Take 1 ampule by nebulization as needed Take only when in Red Zone       traZODone (DESYREL) 50 MG tablet Take 25 mg by mouth nightly as needed (Patient not taking: Reported on 11/14/2023)       No current facility-administered medications for this visit.        Vitals:  There were no vitals taken for this visit.     Exam:  GENERAL APPEARANCE: alert and no distress  HENT: mouth without ulcers or lesions  LYMPHATICS: no cervical or supraclavicular nodes  RESP: lungs clear to auscultation - no rales, rhonchi or wheezes  CV: regular rhythm, normal rate, no rub, no murmur  FEMORAL PULSES: normal  EDEMA: no LE edema bilaterally  ABDOMEN: soft, nondistended, nontender, bowel sounds normal  MS: extremities normal - no gross deformities noted, no evidence of inflammation in joints, no muscle tenderness  SKIN: no rash        Again, thank you for allowing me to participate in the care of your patient.        Sincerely,        LIVAN Martinez CNP    Electronically signed

## 2025-01-21 DIAGNOSIS — R33.9 URINE RETENTION: ICD-10-CM

## 2025-01-21 DIAGNOSIS — Q79.4 PRUNE BELLY SYNDROME: Primary | ICD-10-CM

## 2025-01-21 RX ORDER — MAGNESIUM HYDROXIDE 1200 MG/15ML
LIQUID ORAL 4 TIMES DAILY
Status: DISCONTINUED | OUTPATIENT
Start: 2025-01-21 | End: 2025-01-21 | Stop reason: HOSPADM

## 2025-01-21 NOTE — PROGRESS NOTES
Call placed to patient's mother, Valerie. She reports that they have been unable to get 0.9% saline irrigation fluid for Greg's bladder irrigation for a year. Order rewritten and will send to Valerie Bermudez for signature. I will print and mail the prescription to patient. Address checked with Valerie, patient's mother.      Thank you,  Delma Harper RN, BSN   Urology Triage Nurse

## 2025-01-22 DIAGNOSIS — N31.9 NEUROGENIC BLADDER: Primary | ICD-10-CM

## 2025-01-22 RX ORDER — MAGNESIUM HYDROXIDE 1200 MG/15ML
250 LIQUID ORAL 4 TIMES DAILY
Qty: 90000 ML | Refills: 3 | Status: SHIPPED | OUTPATIENT
Start: 2025-01-22

## 2025-01-24 NOTE — PROGRESS NOTES
Patient Name: Aidan Louie  : 1995  Age: 29 year old  MRN: [id number]  Date of Initial Social Work Evaluation: 10/15/20    Patient on kidney transplant wait list. Met with patient and his mom/guardian, Valerie (paperwork in the chart filed under media tab 2017), in-clinic today to update psychosocial assessment.      Presenting Information   Living Situation: Patient living in a two-bedroom home in [place], [place] with his mom/legal guardian. Valerie reported that Greg's room is in the living room as the secondary bedroom is occupied with his sisters belongings/other storage. Patient's sister, Melissa (35 yrs old), lives in a group home/independent living setting. Patient does not have a relationship with his biological father.   If not local, plans for short term stay: Previous assessments reported relatives in the [facility] that patient and his family may be able to stay with post-discharge, however, no such relatives were reported during this visit. Discussed requirement to stay local and discussed local lodging resources.   Previous Functional Status: Patient ambulatory and primarily independent with ADL's, however, per patient's mom, patient requires some prompting/reminders when it comes to grooming tasks/maintaining hygiene. Patient's mom alluded to some regression in overall independence after the loss of patient's step-father. Reported that he has been working with his PCA to re-learn how to utilize public transportation, engage in social activities outside of the home, and   Cultural/Language/Spiritual Considerations: None identified at this time.     Support System  Primary Support Person: Mom/Legal Guardian, Valerie.   Other Support: None at this time. Per previous visits, there has been a few different conversations around identifying a co-guardian if patient's mom/guardian is unable to fulfill guardianship duties for whatever reason. Also, previous visits indicate that his aunt  ([place]) has been present for appointments.   Plan for support in immediate post-transplant period: Right now, patient's     Health Care Directive  Decision Maker: Mother/Guardian.   Alternate Decision Maker: None at this time. See note above.   Health Care Directive: Provided education    Mental Health/Coping:   History of Mental Health: ***  History of Chemical Health: Denied current or history of substance use.   Current Status: ***  Coping: ***  Services Needed/Recommended: None reported or indicated at this time.     Financial   Income: Greg and Valerie both receive SSDI. Previously worked for his [employer].   Impact of transplant on income: N/A  Insurance and medication coverage: Medicare and [place] Medicaid.   Financial Concerns: ***  Resources Needed: None reported or indicated at this time.     Assessment and recommendations and plan:  Reviewed transplant education (Medicare, rehabilitation, donor issues, community/financial resources, and psych/family adjustment) as well as psychosocial risks of transplant. Provided patient with a copy of post-transplant informational sheet that includes information on potential costs of medications, Medicare ESRD, post-transplant lodging, etc. Patient seemed to process information well. Appeared well informed, motivated, and able to follow post transplant requirements. Behavior was appropriate during interview. Has adequate income and insurance coverage. Adequate social support. No major contraindications noted for transplant. At this time, patient appears to understand the risks and benefits of transplant.

## 2025-03-04 ENCOUNTER — LAB (OUTPATIENT)
Dept: LAB | Facility: CLINIC | Age: 30
End: 2025-03-04
Payer: MEDICARE

## 2025-03-04 DIAGNOSIS — Z76.82 ORGAN TRANSPLANT CANDIDATE: ICD-10-CM

## 2025-03-04 DIAGNOSIS — N18.6 ESRD (END STAGE RENAL DISEASE) (H): ICD-10-CM

## 2025-03-11 ENCOUNTER — DOCUMENTATION ONLY (OUTPATIENT)
Dept: TRANSPLANT | Facility: CLINIC | Age: 30
End: 2025-03-11
Payer: MEDICARE

## 2025-03-11 ENCOUNTER — ORGAN (OUTPATIENT)
Dept: TRANSPLANT | Facility: CLINIC | Age: 30
End: 2025-03-11
Payer: MEDICARE

## 2025-03-11 ENCOUNTER — TELEPHONE (OUTPATIENT)
Dept: TRANSPLANT | Facility: CLINIC | Age: 30
End: 2025-03-11
Payer: MEDICARE

## 2025-03-11 DIAGNOSIS — Z76.82 AWAITING ORGAN TRANSPLANT: Primary | ICD-10-CM

## 2025-03-11 NOTE — TELEPHONE ENCOUNTER
There are three types of donors - living donors, brain dead donors, and DCD donors.  Living donation would be like if your family member donated an organ to you.  Brain death donors are  donors who have no brain stem reflexes, who have been declared dead, and who's heart is beating only because they are attached to a ventilator.  And then there is your type of offer - DCD donation.    DCD stands for donation after circulatory death.  It is when the potential donor had a brain injury that is not sustainable with meaningful life, but has not deteriorated to the point they are able to be declared brain dead. In these cases, the family is choosing to withdraw the ventilator and any life supporting treatments to allow the donors heart to stop naturally.  There is a chance that the donor may not pass in a time frame suitable for donation and transplant, but without moving forward, we have no way of knowing for sure.    The important part for you to know about this is kidneys from DCD donors have the same long term outcomes as kidneys from brain dead donors.  Sometimes they take longer to come to full power after transplant. This is called 'delayed graft function' and may result in the need for some dialysis treatments for the first few days or weeks.  We do our best to try to avoid situations where long term DGF is expected but as with every transplant, living donors and brain dead donors included, there is always a risk of DGF

## 2025-03-11 NOTE — TELEPHONE ENCOUNTER
Called pts mom to discuss status change to inactive. Pt needs post transplant support plan and neuropsych testing. Pts mom became upset with writer, stating she is doing her best. Expressed concerns that she forgot her car was in the shop so there was no transportation. Pts mom feels the transplant team is judging her that she is not enough to take care of Greg. Expressed financial concerns with social security, having to help take care of sick sister, daughter with autism and broke tooth last night. Reviewed on call team recommending pt have another support person as post transplant is a lot and that may help ensure better outcomes. Pts mom expressed there is no one else to help and that she is alone. Pts mom does not feel the pt needs neuropsych testing. Will review with the team to see if testing will be required for transplant status and will be in touch once team reviews. Pts mom will update writer once post transplant plan in place.

## 2025-03-11 NOTE — TELEPHONE ENCOUNTER
Organ Offer Encounter Information    Organ Offer Information  Organ offer date & time: 3/11/2025  3:15 AM  Coordinator/Fellow/Attending name: Gilda Rodriguez RN   Organ(s):  Organ UNOS ID Match Run ID Comment Organ Laterality   Kidney [id number] MNOP       Patient last dialyzed: 3/8/2025  6:00 PM  Dialysis type: Hemo  Discussed organ offer with: Patient, Parent/Legal Guardian  Patient/Caregiver name: Valerie & Aidan  Discussed risk category with Patient/Other: DCD  Did not understand donor criteria, questions answered, verbalized understanding  Patient/Other asked to speak to a surgeon?: No  Discussed program-specific outcomes: Did not have questions regarding SRTR  Right to decline organ offer without penalty, Patient/Other: Aware of option to decline without penalty  Organ offer decision status Patient/Other: Declined Offer  UNOS decline reason: 801 - Candidate ill, unavailable, refused, or temporarily unsuitable  Detail: Mother states she has no money and nowhere to stay and pt refused the transplant.  Additional Comments: 3/11/2025 3:16 AM  Kidney: Primary, local  MD: Dr. Thomson  OPO Contact: [facility] Line Jose  VXM Results: Compatible w no DSA per Dr. Fernandes  XM Plan (FXM must be done with serum no older than 10 days from transplant): Will admit pt for FXM   Donor/Recip HCV Status: Neg/Neg  Is this an ABO A2 donor to ABO B Recipient: No  Plan (Admission, NPO, Donor OR): Donor OR completed overnight, called pt's mother Valerie to discuss the offer. She said she doesn't have any money or any plan for where to stay after transplant. She woke up Greg to discuss the offer with him because she doesn't want to let the offer go. The pt said he was scared and doesn't want to have surgery. This author told the mother that if she doesn't have anywhere to stay and doesn't have any money and the pt doesn't want to be transplanted yet is still medically decisional, they probably need to do some further evaluation and  planning before they're ready for transplant. Mother Valerie agreed and said she would call her coordinator tomorrow to discuss options.   - - -   COVID Screening  In the past month, have you:  Or anyone close to you had a positive COVID test or suspected to have COVID: No   Had any COVID symptoms (Fever, Cough, Short of Breath, Loss of Taste/Smell, Rash): No      Attestation I have discussed all of the above with the Patient/Legal Guardian/Caregiver regarding this organ offer.: Yes  Coordinator/Fellow/Attending name: Gilda Rodriguez RN

## 2025-03-12 ENCOUNTER — COMMITTEE REVIEW (OUTPATIENT)
Dept: TRANSPLANT | Facility: CLINIC | Age: 30
End: 2025-03-12
Payer: MEDICARE

## 2025-03-12 NOTE — COMMITTEE REVIEW
Kidney/Pancreas Committee Review Note     Evaluation Date: 10/15/2020  Committee Review Date: 3/12/2025    Organ being evaluated for: Kidney    Transplant Phase: Waitlist  Transplant Status: Inactive    Transplant Coordinator: Meera Arreola  Transplant Surgeon:        Referring Physician: Glen Abel    Primary Diagnosis: Prune Belly Syndrome  Secondary Diagnosis:     Committee Review Members:  Dietitian, Registered Billie Govea RD   Nephrology Tasneem Alexander, PHANI, Janett Molina MD, Coleman Barr MD   Nurse Morgan Guerra, GUNNER   Physician Assistant - Medical Yisel Gutiérrez, APRN CNP    - Clinical Faviola Méndez, MSW, Zahra Morris, [facility], Lance Koch, [facility]   Transplant THOMAS COTA, GUNNER, Eleonora Martinez, GUNNER, Ana Bustillos, GUNNER, April Giraldo, GUNNER, Guille Jain, GUNNER, Livier Decker, RN   Transplant Surgery Salma Alvares, LIVAN LECHUGA, Jose Tariq MD       Transplant Eligibility: Irreversible chronic kidney disease treated w/dialysis or expected need for dialysis    Committee Review Decision: Remain Inactive    Relative Contraindications:     Absolute Contraindications:      Committee Chair Jose Tariq MD verbally attested to the committee's decision.    Committee Discussion Details:     ESKD from obstructive nephropathy from congenital abnormalities of urinary tract 2/2 prune belly syndrome. On HD since 2021.       Pt recently made inactive due to not post transplant lodging plan. Pts mom also expressed some financial concerns. Had updated visit with SW 1/20/25. Pts mom is legal guardian, she has been encouraging pt to be more involved in his care. SW recommended neuropsych eval to establish baseline functioning. When discussing ordering this with pts mom she became very upset and did not feel the pt needed it.     Committee determined it is more important for pt to establish a back up caregiver plan. Neuropsych testing not  required for active status but need a solid post transplant support plan (both lodging and caregiver perspective).

## 2025-03-17 ENCOUNTER — TELEPHONE (OUTPATIENT)
Dept: TRANSPLANT | Facility: CLINIC | Age: 30
End: 2025-03-17
Payer: MEDICARE

## 2025-03-17 NOTE — TELEPHONE ENCOUNTER
Called pts mom to update on committee review. Discuss neuropsych testing is not required but will need a back up care person. Pts mom expressed she is trying to find a back up caregiver but explained it is difficult to find someone as Greg is medically complex. After post transplant support plan and back up caregiver identified will need updated SW visit. Pts mom expressed frustration with this recommendation, stating she felt the previous SW she met with didn't have any information that was helpful for her and does not want to see her again. Reviewed can request to see a different SW. Pts mom will contact [facility] when back up person identified and post transplant support plan in place.

## 2025-03-19 NOTE — PROGRESS NOTES
Transplant Surgery Consult Note     Medical record number: [id number]  YOB: 1995,   Consult requested  for evaluation of kidney transplant candidacy.    Assessment and Recommendations:Mr. Louie appears to be a good candidate for kidney transplantation and has a fair understanding of the risks and benefits of this approach to the management of renal failure. 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  Recommend he be considered as a candidate for kidney transplant.  Cardiology consult for cardiac risk stratification to be ordered: No  CT abdomen and pelvis without contrast to be ordered for assessment of vascular targets: Yes. Ordered for today  Transplant listing labs ordered to include HLA, ABOx2, Cr, etc.  Dietician consult ordered: Yes  Social work consult ordered: Yes  Imaging reports reviewed:  Yes    Patient previously contacted for [facility] offer, but declined due to concerns about social and financial support following transplant. Explained to patient and guardian (mother) that we would have them work with social work prior to reactivation on the list to ensure they have a good plan for management of posttransplant care.      The majority of our visit was spent in counselling, discussing the medical and surgical risks of kidney transplantation. We discussed approximate wait time and how that is influenced by issues such as blood type and sensitization (PRA) and access to a living donor. I contrasted potential waiting time for living vs  donor kidneys from  normal (0-85%) or higher (%) kidney donor profile index (KDPI) donors and their associated outcomes. I would not recommend this individual to consider kidneys  from high KDPI donors. The reason for this decision is best summarized as: not on dialysis yet. Potential surgical complications of kidney transplantation include bleeding, superficial or deep wound complications (infection, hernia, lymphocele), ureteral anastomotic failure (leak or stenosis), graft thrombosis, need for reoperation and other issues such as cardiac complications, pneumonia, deep venous thrombosis, pulmonary embolism, post transplant diabetes and death. The potential for recurrent disease or need for retransplantation was also addressed. We discussed the possible need for ureteral stent (and subsequent removal), and the utility of protocol biopsy and laboratory studies to evaluate for rejection or recurrent disease. We discussed the risk of graft rejection, our center's average graft and patient survival rates, immunosuppression protocols, as well as the potential opportunity to participate in clinical trials.  We also discussed the average length of stay, recovery process, and posttransplant lab and monitoring protocol.  I emphasized the need for strict immunosuppression medication adherence and the potential for complications of immunosuppression such as skin cancer or lymphoma, as well as a very low but not zero risk of donor-derived disease transmission risks (infection, cancer). Mr. Louie asked good questions and his candidacy will be reviewed at our Multidisciplinary Selection Committee. Thank you for the opportunity to participate in Mr. Louie's care.      Total time: 30 minutes      Jose Tariq MD    ---------------------------------------------------------------------------------------------------    HPI: Mr. Louie has Chronic renal failure due to prune belly/reflux. The patient is non-diabetic.       The patient is not on dialysis.    Has potential kidney donors:  Doesn't know .  Interested in participation in paired exchange if a donor is willing: Doesn't know   S/P bladder  augmentation and Mitrofanoff  G-tube for nutrition    The patient has the following pertinent history:       No    Yes  Dialysis:    []      [x] via:       Blood Transfusion                  []      []  Number of units:   Most recently:  Pregnancy:    []      [] Number:       Previous Transplant:  []      [] Details:    Cancer    []      [] Comment:   Kidney stones   []      [] Comment:      Recurrent infections  []      []  Type:                  Bladder dysfunction  []      [] Cause:    Claudication   []      [] Distance:    Previous Amputation  []      [] Cause:     Chronic anticoagulation  []      [] Indication:   Jain  []      []      Past Medical History:   Diagnosis Date    Bilateral reflux nephropathy     CKD (chronic kidney disease) stage 4, GFR 15-29 ml/min (H)     End stage renal disease (H)     Hearing loss     Hypertension     Kyphosis 2002    Dawood Reece sequence     Prune belly syndrome     Recurrent UTI     Respiratory bronchiolitis interstitial lung disease (H)     Restrictive lung disease     Scoliosis 2002    Thyroid disease     Tracheostomy dependence (H)      Past Surgical History:   Procedure Laterality Date    Bilateral ureteral reimplantation  5/16/2002    abdominoplasty, Mitrofanoff creation    CYSTOSCOPY  11/14/11    EXAM UNDER ANESTHESIA THROAT  6/10/2003    tonsillar hypertrophy    EXAM UNDER ANESTHESIA, RESTORATIONS, EXTRACTION(S) DENTAL COMPLEX, COMBINED  6/5/2006    GASTROSTOMY TUBE      GASTROSTOMY TUBE  3/16/2002    button change    HERNIORRHAPHY INGUINAL BILATERAL  7/23/99    left ochiopexy    IR CVC NON TUNNEL LINE REMOVAL  4/14/2023    IR CVC TUNNEL PLACEMENT > 5 YRS OF AGE  4/14/2023    LARYNGOSCOPY, BRONCHOSCOPY, COMBINED  6/21/2002    LARYNGOSCOPY, BRONCHOSCOPY, COMBINED  12/12/2002    bilateral ear debridement    LARYNGOSCOPY, BRONCHOSCOPY, COMBINED  5/28/2003    LARYNGOSCOPY, BRONCHOSCOPY, COMBINED  7/13/2007    Failed Deflux injection    LARYNGOSCOPY,  BRONCHOSCOPY, COMBINED  12     VESICOSTOMY      Redo right ureteral reimplantation  3/12/2004    Excision bladder diverticuli, Left to Right pyelopyelostomy    REMOVE CATHETER PERITONEAL N/A 2023    Procedure: REMOVAL, CATHETER, DIALYSIS, PERITONEAL; IJ TEMPORARY DIALYSIS CATHETER PLACED;  Surgeon: Anurag Thompson MD;  Location: [facility] OR    Replacement of trach  10/15/12    Takedown and revision of Mitrofanoff stoma  2006    TRACHEOSTOMY INFANT       Family History   Problem Relation Age of Onset    Diabetes Type 2  Mother     Diabetes Type 2  Maternal Grandmother     Deep Vein Thrombosis (DVT) Maternal Grandmother     Diabetes Type 2  Maternal Grandfather     Diabetes Type 2  Paternal Grandmother     Alzheimer Disease Paternal Grandfather     Thyroid Disease Maternal Aunt         hashimoto    Anesthesia Reaction Maternal Aunt         patients mom reports her sister had an allergic reaction to something during a previous surgery    Thyroid Cancer Maternal Great-Grandmother      Social History     Socioeconomic History    Marital status: Single     Spouse name: Not on file    Number of children: Not on file    Years of education: Not on file    Highest education level: Not on file   Occupational History    Not on file   Tobacco Use    Smoking status: Never    Smokeless tobacco: Never   Substance and Sexual Activity    Alcohol use: No     Alcohol/week: 0.0 standard drinks of alcohol    Drug use: No    Sexual activity: Not on file   Other Topics Concern    Parent/sibling w/ CABG, MI or angioplasty before 65F 55M? Not Asked   Social History Narrative    Not on file     Social Drivers of Health     Financial Resource Strain: Not on file   Food Insecurity: Not on file   Transportation Needs: Not on file   Physical Activity: Not on file   Stress: Not on file   Social Connections: Not on file   Interpersonal Safety: Not on file   Housing Stability: Not on file       ROS:   CONSTITUTIONAL:  No  fevers or chills  EYES: negative for icterus  ENT:  negative for hearing loss, tinnitus and sore throat  RESPIRATORY:  negative for cough, sputum, dyspnea  CARDIOVASCULAR:  negative for chest pain Fatigue  GASTROINTESTINAL:  negative for nausea, vomiting, diarrhea or constipation  GENITOURINARY:  negative for incontinence, dysuria, bladder emptying problems  HEME:  No easy bruising  INTEGUMENT:  negative for rash and pruritus  NEURO:  Negative for headache, seizure disorder  Allergies:   Allergies   Allergen Reactions    Latex     Latex      Other reaction(s): Other (see comments)  Other reaction(s): Contact Dermatitis, Other (see comments)    Adhesive Tape Rash     Medications:  Prescription Medications as of 3/19/2025         Rx Number Disp Refills Start End Last Dispensed Date Next Fill Date Owning Pharmacy    acetaminophen (TYLENOL) 500 MG tablet  -- -- 8/27/2020 --       Sig: Take 500 mg by mouth every 8 hours as needed     Class: Historical    Route: Oral    albuterol (2.5 MG/3ML) 0.083% nebulizer solution  -- --  --       Sig: Take 1 ampule by nebulization 4 times daily     Class: Historical    Route: Nebulization    amLODIPine (NORVASC) 5 MG tablet  -- --  --       Sig: Take 5 mg by mouth 2 times daily    Class: Historical    Route: Oral    azithromycin (ZITHROMAX) 250 MG tablet  -- --  --       Sig: Take 250 mg by mouth three times a week Mon, Wed, Fri    Class: Historical    Route: Oral    bisacodyl (DULCOLAX) 5 MG EC tablet  30 tablet 1 4/21/2023 --   [id number] - [place], [place] - [address] AT [place] [address]    Sig: Take 1 tablet (5 mg) by mouth daily    Class: E-Prescribe    Route: Oral    Renewals       Renewal requests to authorizing provider (Nahid Morrison MD) <b>prohibited</b>            calcium carbonate (TUMS) 500 MG chewable tablet  -- --  --       Sig: Take 1,000 mg by mouth daily    Class: Historical    Route: Oral    cholecalciferol (VITAMIN D3) 25 mcg  (1000 units) capsule  -- --  --       Sig: Take 1 capsule by mouth 2 times daily    Class: Historical    Route: Oral    cinacalcet (SENSIPAR) 30 MG tablet  -- --  --       Sig: Take 30 mg by mouth daily    Class: Historical    Route: Oral    cloNIDine (CATAPRES) 0.1 MG tablet  60 tablet 3 4/20/2023 --   [facility][id number] - [place], [place]  [address] AT [place] [address]    Sig: Take 1 tablet (0.1 mg) by mouth 2 times daily    Class: E-Prescribe    Route: Oral    Renewals       Renewal requests to authorizing provider (Nahid Morrison MD) <b>prohibited</b>            Cranberry 500 MG TABS  -- --  --   [facility][id number] - [place], [place] - [address] AT [place] [address]    Sig: Take by mouth 3 times daily (with meals)    Class: Historical    Route: Oral    diphenhydrAMINE (BENADRYL) 25 MG capsule  -- --  --       Sig: Take 25 mg by mouth every 6 hours as needed for itching or allergies    Class: Historical    Route: Oral    docusate sodium (COLACE) 100 MG capsule  -- --  --       Sig: Take 100 mg by mouth 2 times daily    Class: Historical    Route: Oral    famotidine (PEPCID) 40 MG tablet  -- --  --   [facility][id number] - [place], [place] - [address] AT [place] [address]    Sig: Take 40 mg by mouth 2 times daily     Class: Historical    Route: Oral    ferrous gluconate (FERGON) 324 (38 Fe) MG tablet  90 tablet 11 11/23/2020 --   [facility] #[id number] - [place], [place] - [address] AT [place] [address]    Sig: Take 1 tablet (324 mg) by mouth daily    Class: E-Prescribe    Route: Oral    gentamicin (GARAMYCIN) 0.1 % external cream  -- --  --       Sig: Apply topically daily Peritoneal dialysis site    Class: Historical    Route: Topical    ipratropium (ATROVENT) 0.02 % neb solution  225 mL -- 11/20/2017 --   [facility] [place], [place] - [address]    Sig: Take 0.5 mg by nebulization 4 times daily      Class: Historical    Route: Nebulization    lactulose 20 GM/30ML solution  -- --  --       Sig: Take 20 g by mouth 2 times daily as needed for constipation    Class: Historical    Route: Oral    loratadine (CLARITIN) 10 MG tablet  -- --  --       Sig: Take 1 tablet by mouth daily    Class: Historical    Route: Oral    Melatonin 10 MG TABS tablet  -- --  --   [facility][id number] - [place], [place] - [address] AT [place] [address]    Sig: Take 10 mg by mouth nightly as needed    Class: Historical    Route: Oral    mupirocin (BACTROBAN) 2 % external ointment  -- -- 8/27/2020 --       Sig: Apply topically daily as needed (around stoma)    Class: Historical    Route: Topical    Neomycin-Bacitracin-Polymyxin (NEOSPORIN ORIGINAL) 3.5-400-5000 OINT  14 g 4 6/19/2023 --   [facility] #[id number] - [place], [place] - [address] AT [place] [address]    Sig: Externally apply topically as needed (apply as needed for stoma care)    Class: E-Prescribe    Route: Apply externally    neomycin-bacitracin-polymyxin (NEOSPORIN) 5-400-5000 ointment  -- --  --       Sig: Apply topically daily as needed (Around Stoma - uses EITHER neosporin OR Bactroban)    Class: Historical    Route: Topical    Nutritional Supplements (NEPRO) LIQD  30 each 11 6/29/2020 --   [facility] #[id number] - [place], [place] - [address] AT [place] [address]    Sig: Take 1 Can by mouth daily    Class: E-Prescribe    Route: Oral    order for DME  1 each 11 10/7/2019 --       Sig: Equipment being ordered: Medi-Vac, plastic tubing connector lara type, cat 361, [employer], 1 lara tip/month x 12 months. Use for tracheostomy suctioning    Class: Local Print    order for DME  4 Units 11 10/7/2019 --       Sig: Equipment being ordered: DME    Adult Bivona Uncuffed Tracheostomy Tube-5.0    Patient will do weekly trach changes.    Class: Local Print    order for DME  90 each 3 9/23/2019 --     "   Sig: Equipment being ordered: DME -    Deep suction kits - 12 fr    Class: Local Print    order for DME  1 each 11 8/28/2019 --       Sig: Tracheostomy Humidification Equipment  Equipment being ordered:     1) Air compressor  2) Nebulizer bottle  3) Aerosol tubing  4) Trach mask  5) Sterile water  6) Saline ampules (\"bullets\")  7) Heat Moisture Exchanger (HME) Also known by several other terms including: Thermal Humidifying Filters, Swedish nose, Artificial nose, Filter, Thermovent T.  8) Room/home humidifiers    Class: Local Print    order for DME  30 each 11 8/28/2019 --       Sig: Optifoam Basic   [id number]    Class: Local Print    order for DME  1 each 3 6/10/2019 --   [facility] #[id number] - [place], [employer] AT [place] [address]    Sig: Equipment being ordered: Humidifier (heated), humidifier attachment, saline lavages, humidivent mask    Class: Local Print    polyethylene glycol (MIRALAX) 17 GM/Dose powder  510 g 0 4/21/2023 --   [id number] - [place] - [address] AT [place] [address]    Sig: Take 17 g by mouth daily as needed    Class: E-Prescribe    Route: Oral    Renewals       Renewal requests to authorizing provider (Deni Jurado MD) <b>prohibited</b>            predniSONE (DELTASONE) 20 MG tablet  -- --  --       Sig: Take 20 mg by mouth daily as needed Take only when in Red Zone    Class: Historical    Route: Oral    sennosides (SENOKOT) 8.6 MG tablet  -- --  --       Sig: Take 1 tablet by mouth 2 times daily    Class: Historical    Route: Oral    sevelamer carbonate (RENVELA) 800 MG tablet  -- -- 6/14/2024 --       Sig: TAKE 1 TABLET BY MOUTH THREE TIMES DAILY WITH FOOD WITH MEALS    Class: Historical    simethicone (MYLICON) 40 MG/0.6ML suspension  45 mL 0 4/20/2023 --   [id number] - [place], [place] - [address]    Sig: Take 0.6 mLs (40 mg) by mouth every 6 hours as needed " for cramping    Class: E-Prescribe    Route: Oral    Renewals       Renewal requests to authorizing provider (Nahid Morrison MD) <b>prohibited</b>            sodium chloride 0.9%, bottle, 0.9 % irrigation  73450 mL 3 2025 --   [id number] - [place], [place] - [address]    Sig: Irrigate with 250 mLs as directed 4 times daily.    Class: E-Prescribe    Notes to Pharmacy: 0.9% sodium chloride bottle for bladder irrigation, 250 ml QID(1000 ml/day)    Route: Irrigation    sodium chloride 0.9%, bottle, 0.9 % irrigation  21700 mL 3 2024 --       Sig: Irrigate with 250 mLs as directed 4 times daily    Class: No Print Out    Route: Irrigation    sodium chloride 0.9%, bottle, 0.9 % irrigation  -- -- 2020 --       Sig: Irrigate with as directed 3 times daily Bladder irrigation    Class: Historical    Route: Irrigation    Syringe Luer Slip 60 ML MISC  200 each 11 2024 --       Si Syringe 4 times daily    Class: No Print Out    Route: Does not apply    tobramycin, PF, (DAX) 300 MG/5ML nebulizer solution  -- --  --       Sig: Take 1 ampule by nebulization as needed Take only when in Red Zone    Class: Historical    Route: Nebulization    traZODone (DESYREL) 50 MG tablet  -- -- 2020 --       Sig: Take 25 mg by mouth nightly as needed    Class: Historical    Route: Oral          Exam:     /85   Pulse 98   SpO2 96%   Appearance: in no apparent distress.   Skin: normal  Eyes:  no redness or discharge.  Sclera anicteric  Head and Neck: Normal, no rashes or jaundice  Respiratory: easy respirations, no audible wheezing.  Abdomen: flat, No distention and Surgical scars consistent with history   Extremeties: femoral 3+/3+, Edema, none  Neuro: without deficit   Psychiatric: Normal mood and affect    Diagnostics:   No results found for this or any previous visit (from the past 4 weeks).  OS cPRA   Date Value Ref Range Status   10/15/2020 24  Final     UNOS CPRA   Date Value Ref Range Status    10/17/2024 25  Final

## 2025-03-20 DIAGNOSIS — N31.9 NEUROGENIC BLADDER: Primary | ICD-10-CM

## 2025-06-09 ENCOUNTER — OFFICE VISIT (OUTPATIENT)
Dept: UROLOGY | Facility: CLINIC | Age: 30
End: 2025-06-09
Payer: MEDICARE

## 2025-06-09 ENCOUNTER — TELEPHONE (OUTPATIENT)
Dept: UROLOGY | Facility: CLINIC | Age: 30
End: 2025-06-09

## 2025-06-09 ENCOUNTER — OFFICE VISIT (OUTPATIENT)
Dept: OTOLARYNGOLOGY | Facility: CLINIC | Age: 30
End: 2025-06-09
Attending: OTOLARYNGOLOGY
Payer: MEDICARE

## 2025-06-09 VITALS — WEIGHT: 105 LBS | HEIGHT: 61 IN | BODY MASS INDEX: 19.83 KG/M2

## 2025-06-09 VITALS
OXYGEN SATURATION: 94 % | BODY MASS INDEX: 19.83 KG/M2 | WEIGHT: 105 LBS | HEIGHT: 61 IN | SYSTOLIC BLOOD PRESSURE: 129 MMHG | HEART RATE: 75 BPM | DIASTOLIC BLOOD PRESSURE: 82 MMHG

## 2025-06-09 DIAGNOSIS — Z93.0 TRACHEOSTOMY IN PLACE (H): Primary | ICD-10-CM

## 2025-06-09 DIAGNOSIS — Q87.0 PIERRE ROBIN SEQUENCE: ICD-10-CM

## 2025-06-09 DIAGNOSIS — N18.6 ESRD (END STAGE RENAL DISEASE) (H): Primary | ICD-10-CM

## 2025-06-09 DIAGNOSIS — Z98.1 HISTORY OF FUSION OF CERVICAL SPINE: ICD-10-CM

## 2025-06-09 DIAGNOSIS — N31.9 NEUROGENIC BLADDER: ICD-10-CM

## 2025-06-09 PROCEDURE — 1126F AMNT PAIN NOTED NONE PRSNT: CPT

## 2025-06-09 PROCEDURE — 3074F SYST BP LT 130 MM HG: CPT

## 2025-06-09 PROCEDURE — 31615 TRCHEOBRNCHSC EST TRACHS INC: CPT | Performed by: OTOLARYNGOLOGY

## 2025-06-09 PROCEDURE — 31502 CHANGE OF WINDPIPE AIRWAY: CPT | Mod: 51 | Performed by: OTOLARYNGOLOGY

## 2025-06-09 PROCEDURE — 3079F DIAST BP 80-89 MM HG: CPT

## 2025-06-09 PROCEDURE — 99417 PROLNG OP E/M EACH 15 MIN: CPT

## 2025-06-09 PROCEDURE — 99215 OFFICE O/P EST HI 40 MIN: CPT

## 2025-06-09 PROCEDURE — 99213 OFFICE O/P EST LOW 20 MIN: CPT | Mod: 25 | Performed by: OTOLARYNGOLOGY

## 2025-06-09 RX ORDER — SERTRALINE HYDROCHLORIDE 25 MG/1
25 TABLET, FILM COATED ORAL DAILY
COMMUNITY

## 2025-06-09 RX ORDER — CARVEDILOL 3.12 MG/1
TABLET ORAL
COMMUNITY
Start: 2025-04-22

## 2025-06-09 RX ORDER — MAGNESIUM HYDROXIDE 1200 MG/15ML
250 LIQUID ORAL 4 TIMES DAILY
Status: SHIPPED
Start: 2025-06-09

## 2025-06-09 ASSESSMENT — PAIN SCALES - GENERAL: PAINLEVEL_OUTOF10: NO PAIN (0)

## 2025-06-09 NOTE — PATIENT INSTRUCTIONS
1.  You were seen in the ENT Clinic today by Dr. Dumas. If you have any questions or concerns after your appointment, please call [phone]. Press option #1 for scheduling related needs. Press option #3 for Nurse advice.     2.  Dr. Dumas has recommended the following:              - continue routine trach cares              - Order for supplies will be sent to your supply company              - follow up with our PA Mari DEE for next trach change               3.  Plan is to return to clinic in one year     How to Contact Us:  Send a Nervogrid message to your provider. Our team will respond to you via Nervogrid. Occasionally, we will need to call you to get further information.  For urgent matters (Monday-Friday, 8:00 AM-3:30 PM), call the ENT Clinic: [phone] and speak with a call center team member - they will route your call appropriately.           Stephanie Han LPN  [phone]  [facility] - Otolaryngology

## 2025-06-09 NOTE — LETTER
"6/9/2025       RE: Aidan Louie  [address] [facility] [address]     Dear Colleague,    Thank you for referring your patient, Aidan Louie, to the [facility] [place] at [facility]. Please see a copy of my visit note below.    HPI:  Aidan Louie is a 29 year old male w/ history of Prune Belly, Dawood Reece syndrome, horseshoe kidney, who has prior megaureters and ureteral augment and left to right TUU  managed with CIC per a prolapsed Mitrofanoff, ESRD with dialysis.      accompanied by his mother     MedSurg history-  1999-bilateral inguinal hernia repair  2002-bilateral ureteral reimplantation and Mitrofanoff  2004-right ureteral reimplantation  2006-takedown and revision of Mitrofanoff  2020 - UDS - bl reflux  2022 - insertion of PD catheter  4/2023 - PD catheter removal  8/16/24 - visit with me, new mucus buildup after stopping irrigation 1 year ago - plan to restart irrigation. 12lb loss in the past month, okay to replace PD for ESRD. Flank pain improved with CIC 3x/night and lidocaine patch    Today  - inactive on the kidney transplant list until some SW paper done about guardianship,  - CIC 4-6x/day without difficulty  - back pain - as needed lidocaine patch - effective.  - on cranberry supplement for UTI prevention  - \"UTI\" x2, smells and cloudy  - occasional mitrofanoff prolapse (not today) - does not affect CIC.    - needs nephrology referral here as his local nephrologist was recently retired.       accompanied by his mother      Exam:  /82 (BP Location: Right arm, Patient Position: Sitting, Cuff Size: Adult Small)   Pulse 75   Ht 1.549 m (5' 1\")   Wt 47.6 kg (105 lb)   SpO2 94%   BMI 19.84 kg/m    General: age-appropriate appearing male in NAD sitting in an exam chair  Abdomen: Degree of obesity is none. Abdomen is soft and nontender. No organomegaly. Surgical scars include ureteral augment and " mitrofanoff. Mitrofanoff heatlthy beefy looking    Review of Imaging:  The following imaging exams were independently viewed and interpreted by me and discussed with patient:  ART 11/18/24 -   IMPRESSION:  1.  Horseshoe kidney with stable severe hydronephrosis.  2.  Nonspecific echogenic debris within the urinary bladder. Recommend  correlation with urinalysis to rule out infection.    ART 8/21/24   IMPRESSION:  1.  Horseshoe kidney with stable severe hydronephrosis.  2.  Debris in the urinary bladder. Recommend correlation with  urinalysis to rule out infection.    Review of Labs:  The following labs were reviewed by me and discussed with the patient:  No results found for this or any previous visit (from the past 720 hours).      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    Supply Orders-  Patient needs to perform CIC 6x/day per mitrofanoff for neurogenic bladder.  - catheter 14 Fr straight tip male (Referral 114) 200/month  Patient needs to perform bladder irrigation with 250 mL sterile saline QID and PRN for UTI symptoms to prevent Chanel, mucus buildup, and bladder stone formation.  - ky lubricant packets 200/month  - large gloves non latex no powder 4 boxes/month  - Sterile Saline for irrigation 1L bottle, 30 bottles/month (total 30,000mL each month) Please dispense 3 month supply each time if possible   - luer slip 60cc syringes 200/month    - has not received irrigation supplies since last year from [address] [place] [address] - will have Renetta check    2 copies of visit note and supply orders will be mailed to mom.  ART in 6 mo  F/u in 1 year with me      Josiane Bermudez NP  [facility] [place]    ==========================      Additional Coding Information:    Time spent:  I spent a total of 60 minutes on the day of the visit.    Time spent by me today doing chart review, history and exam, documentation and further activities per the note              Again, thank you for allowing me to participate in the care of your patient.      Sincerely,    Josiane Bermudez NP

## 2025-06-09 NOTE — PROGRESS NOTES
"HPI:  Aidan Louie is a 29 year old male w/ history of Prune Belly, Dawood Reece syndrome, horseshoe kidney, who has prior megaureters and ureteral augment and left to right TUU  managed with CIC per a prolapsed Mitrofanoff, ESRD with dialysis.      accompanied by his mother     MedSurg history-  1999-bilateral inguinal hernia repair  2002-bilateral ureteral reimplantation and Mitrofanoff  2004-right ureteral reimplantation  2006-takedown and revision of Mitrofanoff  2020 - UDS - bl reflux  2022 - insertion of PD catheter  4/2023 - PD catheter removal  8/16/24 - visit with me, new mucus buildup after stopping irrigation 1 year ago - plan to restart irrigation. 12lb loss in the past month, okay to replace PD for ESRD. Flank pain improved with CIC 3x/night and lidocaine patch    Today  - inactive on the kidney transplant list until some SW paper done about guardianship,  - CIC 4-6x/day without difficulty  - back pain - as needed lidocaine patch - effective.  - on cranberry supplement for UTI prevention  - \"UTI\" x2, smells and cloudy  - occasional mitrofanoff prolapse (not today) - does not affect CIC.    - needs nephrology referral here as his local nephrologist was recently retired.       accompanied by his mother      Exam:  /82 (BP Location: Right arm, Patient Position: Sitting, Cuff Size: Adult Small)   Pulse 75   Ht 1.549 m (5' 1\")   Wt 47.6 kg (105 lb)   SpO2 94%   BMI 19.84 kg/m    General: age-appropriate appearing male in NAD sitting in an exam chair  Abdomen: Degree of obesity is none. Abdomen is soft and nontender. No organomegaly. Surgical scars include ureteral augment and mitrofanoff. Mitrofanoff heatlthy beefy looking    Review of Imaging:  The following imaging exams were independently viewed and interpreted by me and discussed with patient:  ART 11/18/24 -   IMPRESSION:  1.  Horseshoe kidney with stable severe hydronephrosis.  2.  Nonspecific echogenic debris within the urinary " bladder. Recommend  correlation with urinalysis to rule out infection.    ART 8/21/24   IMPRESSION:  1.  Horseshoe kidney with stable severe hydronephrosis.  2.  Debris in the urinary bladder. Recommend correlation with  urinalysis to rule out infection.    Review of Labs:  The following labs were reviewed by me and discussed with the patient:  No results found for this or any previous visit (from the past 720 hours).      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    Supply Orders-  Patient needs to perform CIC 6x/day per deep for neurogenic bladder.  - catheter 14 Fr straight tip male (Referral 114) 200/month  Patient needs to perform bladder irrigation with 250 mL sterile saline QID and PRN for UTI symptoms to prevent Chanel, mucus buildup, and bladder stone formation.  - ky lubricant packets 200/month  - large gloves non latex no powder 4 boxes/month  - Sterile Saline for irrigation 1L bottle, 30 bottles/month (total 30,000mL each month) Please dispense 3 month supply each time if possible   - luer slip 60cc syringes 200/month    - has not received irrigation supplies since last year from [address] [place] [address] - will have Renetta check    2 copies of visit note and supply orders will be mailed to mom.  ART in 6 mo  F/u in 1 year with me      Josiane Bermudez NP  [facility] [place]    ==========================      Additional Coding Information:    Time spent:  I spent a total of 60 minutes on the day of the visit.   Time spent by me today doing chart review, history and exam, documentation and further activities per the note

## 2025-06-09 NOTE — TELEPHONE ENCOUNTER
Patient confirmed scheduled appointment:  Date: 6/15/26  Time: 230  Visit type: Return  Provider: Valerie  [place] [facility] [place]imaging:   Additional notes:

## 2025-06-09 NOTE — PROGRESS NOTES
Dear Colleague:  Aidan Louie recently returned for follow-up at the [facility]. My clinic note from our visit is enclosed below.  Speech recognition software may have been used in the documentation below; please contact me with questions.     I appreciate the ongoing opportunity to participate in this patient's care.    Please feel free to contact me with any questions.      Sincerely yours,  Bebe Dumas M.D., M.P.H.  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    He was a longtime patient of Dr. Reid's, and has had a trach for many years because of the mandibular hypoplasia. Per report, a number of attempts toward decannulation had been made, but ultimately the decision was made to keep the trach in place due to concerns about his airway. He went to the operating room with Dr. Jose in August 2017 for flexible and rigid laryngoscopy/bronchoscopy. Per the operative note, this showed moderate trismus and base of tongue collapse, redundant, distorted, floppy epiglottis, with a grade 4 laryngoscopy. A small granuloma was seen on the medial left arytenoid. Vocal fold mobility and subglottis were normal. The 5-0 pediatric Bivona tube was noted to be in good position. Distal airway was unremarkable. It was observed that he would be a difficult intubation. With a Rothman blade under the epiglottis he was thought possibly intubatable but this was felt to be difficult even in a best  case situation.    As of 2020:   He currently has a 5-0 Bivona in place, and has a vertically tall stomal opening. 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  With good wound care, antibiotics, steroid injections, and silver nitrate, as well as clipping of synechium, his trach site significantly improved.  Generally his trach site does well if trach changes occur regularly. Also saw KERVIN Dodson in 2021.    Has had chronic difficulty with granulation particularly if trach changes do not occur regularly; there have been challenges with obtaining supplies.      Today's updates:  - ***  - ***        MEDICATIONS:     Current Outpatient Medications   Medication Sig Dispense Refill    acetaminophen (TYLENOL) 500 MG tablet Take 500 mg by mouth every 8 hours as needed       albuterol (2.5 MG/3ML) 0.083% nebulizer solution Take 1 ampule by nebulization 4 times daily       amLODIPine (NORVASC) 5 MG tablet Take 5 mg by mouth 2 times daily      azithromycin (ZITHROMAX) 250 MG tablet Take 250 mg by mouth three times a week Mon, Wed, Fri      bisacodyl (DULCOLAX) 5 MG EC tablet Take 1 tablet (5 mg) by mouth daily 30 tablet 1    calcium carbonate (TUMS) 500 MG chewable tablet Take 1,000 mg by mouth daily      carvedilol (COREG) 3.125 MG tablet       cholecalciferol (VITAMIN D3) 25 mcg (1000 units) capsule Take 1 capsule by mouth 2 times daily      cinacalcet (SENSIPAR) 30 MG tablet Take 30 mg by mouth daily      cloNIDine (CATAPRES) 0.1 MG tablet Take 1 tablet (0.1 mg) by mouth 2 times daily 60 tablet 3    Cranberry 500 MG TABS Take by mouth 3 times daily (with meals)      diphenhydrAMINE (BENADRYL) 25 MG capsule Take 25 mg by mouth every 6 hours as needed for itching or allergies      docusate sodium (COLACE) 100 MG capsule Take 100 mg by mouth 2  "times daily      famotidine (PEPCID) 40 MG tablet Take 40 mg by mouth 2 times daily       ferrous gluconate (FERGON) 324 (38 Fe) MG tablet Take 1 tablet (324 mg) by mouth daily 90 tablet 11    gentamicin (GARAMYCIN) 0.1 % external cream Apply topically daily Peritoneal dialysis site      ipratropium (ATROVENT) 0.02 % neb solution Take 0.5 mg by nebulization 4 times daily  225 mL     lactulose 20 GM/30ML solution Take 20 g by mouth 2 times daily as needed for constipation      loratadine (CLARITIN) 10 MG tablet Take 1 tablet by mouth daily      Melatonin 10 MG TABS tablet Take 10 mg by mouth nightly as needed      mupirocin (BACTROBAN) 2 % external ointment Apply topically daily as needed (around stoma)      Neomycin-Bacitracin-Polymyxin (NEOSPORIN ORIGINAL) 3.5-400-5000 OINT Externally apply topically as needed (apply as needed for stoma care) 14 g 4    neomycin-bacitracin-polymyxin (NEOSPORIN) 5-400-5000 ointment Apply topically daily as needed (Around Stoma - uses EITHER neosporin OR Bactroban)      Nutritional Supplements (NEPRO) LIQD Take 1 Can by mouth daily 30 each 11    order for DME Equipment being ordered: Medi-Vac, plastic tubing connector lara type, cat 361, [facility], 1 lara tip/month x 12 months. Use for tracheostomy suctioning 1 each 11    order for DME Equipment being ordered: DME    Adult Bivona Uncuffed Tracheostomy Tube-5.0    Patient will do weekly trach changes. 4 Units 11    order for DME Equipment being ordered: DME -    Deep suction kits - 12 fr 90 each 3    order for DME Tracheostomy Humidification Equipment  Equipment being ordered:     1) Air compressor  2) Nebulizer bottle  3) Aerosol tubing  4) Trach mask  5) Sterile water  6) Saline ampules (\"bullets\")  7) Heat Moisture Exchanger (HME) Also known by several other terms including: Thermal Humidifying Filters, Swedish nose, Artificial nose, Filter, Thermovent T.  8) Room/home humidifiers 1 each 11    order for DME " Optifoam Basic   [id number] 30 each 11    order for DME Equipment being ordered: Humidifier (heated), humidifier attachment, saline lavages, humidivent mask 1 each 3    polyethylene glycol (MIRALAX) 17 GM/Dose powder Take 17 g by mouth daily as needed 510 g 0    predniSONE (DELTASONE) 20 MG tablet Take 20 mg by mouth daily as needed Take only when in Red Zone      sennosides (SENOKOT) 8.6 MG tablet Take 1 tablet by mouth 2 times daily      sertraline (ZOLOFT) 25 MG tablet Take 25 mg by mouth daily.      sevelamer carbonate (RENVELA) 800 MG tablet TAKE 1 TABLET BY MOUTH THREE TIMES DAILY WITH FOOD WITH MEALS      simethicone (MYLICON) 40 MG/0.6ML suspension Take 0.6 mLs (40 mg) by mouth every 6 hours as needed for cramping 45 mL 0    sodium chloride 0.9%, bottle, 0.9 % irrigation Irrigate with 250 mLs as directed 4 times daily. 45952 mL 3    sodium chloride 0.9%, bottle, 0.9 % irrigation Irrigate with 250 mLs as directed 4 times daily 72054 mL 3    sodium chloride 0.9%, bottle, 0.9 % irrigation Irrigate with as directed 3 times daily Bladder irrigation      Syringe Luer Slip 60 ML MISC 1 Syringe 4 times daily 200 each 11    tobramycin, PF, (DAX) 300 MG/5ML nebulizer solution Take 1 ampule by nebulization as needed Take only when in Red Zone      traZODone (DESYREL) 50 MG tablet Take 25 mg by mouth nightly as needed         ALLERGIES:    Allergies   Allergen Reactions    Latex     Latex      Other reaction(s): Other (see comments)  Other reaction(s): Contact Dermatitis, Other (see comments)    Adhesive Tape Rash       NEW PMH/PSH: None***    REVIEW OF SYSTEMS:  The patient completed a comprehensive 11 point review of systems (below), which was reviewed. Positives are as noted below.  Patient Supplied Answers to Review of Systems      6/10/2019    12:31 PM   [facility] ENT ROS   Constitutional Unexplained fatigue    Cardiopulmonary Cough    Musculoskeletal Sore or stiff joints     Back pain        Proxy-reported             PHYSICAL EXAM:  ***        PROCEDURES:   Procedure:  Tracheostomy tube change  Indication: This procedure was warranted as part of routine trach care, and was requested by the patient/caregiver.  Description of procedure:  The existing tracheostomy tube was removed and a new identical tube was placed using the introducer. The introducer was withdrawn and replaced with the inner cannula, and the neck ties were secured. Flexible fiberoptic endoscopy through the tube confirmed good position.   ***granulation  The patient tolerated the procedure without difficulty.    5-O Bivona    Procedure: Flexible tracheobronchoscopy through established trach incision  Indications: The procedure was warranted to assess the position and/or condition of the tracheostomy tube and trachea  Description: After written and verbal consent were obtained, the endoscope was passed through the patient's tracheostomy. This allowed visualization of the trachea down to the raul and mainstem bronchi.  ***The trach was also pulled back to allow evaluation of the trach tract, and then was replaced and secured.  Findings: Trach tube in good position within the airway. Trachea clear to arul, which was sharp. Unremarkable takeoffs of mainstem bronchi. Trach tract unremarkable. ***         IMPRESSION AND PLAN:   Aidan Louie returns with ***    1) Continue routine trach care; he is doing a good job with taking care of it--reduced granulation compared to prior with better hygiene  2) Will meet with our RN today to clarify orders for refills etc  3) RTC 1 year for routine trach check or sooner as needed with our PA , or with me as needed    I spent a total of *** minutes on ***6/9/2025 in chart review, review of tests, patient visit, documentation, care coordination, and/or discussion with other providers about the issues documented above, separate from any documented procedure(s).     JONEL Louie returns with a stable trach site; an uneventful trach change was completed today. The shape/design of the outer flanges of his trach do not fit as well as the v shape of the prior pediatric trachs, but the trach has overall been functioning appropriately.    Plan:  1) Continue routine trach care; he is doing a good job with taking care of it--reduced granulation compared to prior, with better hygiene  2) Will meet with our RN today to clarify needed orders for refills etc  3) RTC 1 year for routine trach check or sooner as needed with our PA , or with me as needed    I spent a total of 23 minutes on 6/9/2025 in chart review, review of tests, patient visit, documentation, care coordination, and/or discussion with other providers about the issues documented above, separate from any documented procedure(s).

## 2025-06-09 NOTE — LETTER
6/9/2025       RE: Aidan Louie  [address] [place] [address]     Dear Colleague,    Thank you for referring your patient, Aidan Louie, to the [facility] [place] at [facility]. Please see a copy of my visit note below.    No notes on file    Again, thank you for allowing me to participate in the care of your patient.      Sincerely,    Bebe Dumas MD     was thought possibly intubatable but this was felt to be difficult even in a best case situation.    As of 2020:   He currently has a 5-0 Bivona in place, and has a vertically tall stomal opening. 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  With good wound care, antibiotics, steroid injections, and silver nitrate, as well as clipping of synechium, his trach site significantly improved.  Generally his trach site does well if trach changes occur regularly. Also saw KERVIN Dodson in 2021.    Has had chronic difficulty with granulation particularly if trach changes do not occur regularly; there have been challenges with obtaining supplies.      Today's updates:  - Things are going okay with the trach  - He has been getting into more of a routine of trach and overall hygiene, which reduces the granulation tissue and irritation  - They will need updated orders for supplies        MEDICATIONS:     Current Outpatient Medications   Medication Sig Dispense Refill     acetaminophen (TYLENOL) 500 MG tablet Take 500 mg by mouth every 8 hours as needed        albuterol (2.5 MG/3ML) 0.083% nebulizer solution Take 1 ampule by nebulization 4 times daily        amLODIPine (NORVASC) 5 MG tablet Take 5 mg by mouth 2 times daily       azithromycin (ZITHROMAX) 250 MG tablet Take 250 mg by mouth three times a week Mon, Wed, Fri       bisacodyl (DULCOLAX) 5 MG EC tablet Take 1 tablet (5 mg) by mouth daily 30 tablet 1     calcium carbonate (TUMS) 500 MG chewable tablet Take 1,000 mg by mouth daily       carvedilol (COREG) 3.125 MG tablet        cholecalciferol (VITAMIN D3) 25 mcg (1000 units) capsule Take 1 capsule by mouth 2 times daily       cinacalcet (SENSIPAR) 30 MG tablet Take 30 mg by mouth daily       cloNIDine (CATAPRES) 0.1 MG tablet Take 1 tablet  (0.1 mg) by mouth 2 times daily 60 tablet 3     Cranberry 500 MG TABS Take by mouth 3 times daily (with meals)       diphenhydrAMINE (BENADRYL) 25 MG capsule Take 25 mg by mouth every 6 hours as needed for itching or allergies       docusate sodium (COLACE) 100 MG capsule Take 100 mg by mouth 2 times daily       famotidine (PEPCID) 40 MG tablet Take 40 mg by mouth 2 times daily        ferrous gluconate (FERGON) 324 (38 Fe) MG tablet Take 1 tablet (324 mg) by mouth daily 90 tablet 11     gentamicin (GARAMYCIN) 0.1 % external cream Apply topically daily Peritoneal dialysis site       ipratropium (ATROVENT) 0.02 % neb solution Take 0.5 mg by nebulization 4 times daily  225 mL      lactulose 20 GM/30ML solution Take 20 g by mouth 2 times daily as needed for constipation       loratadine (CLARITIN) 10 MG tablet Take 1 tablet by mouth daily       Melatonin 10 MG TABS tablet Take 10 mg by mouth nightly as needed       mupirocin (BACTROBAN) 2 % external ointment Apply topically daily as needed (around stoma)       Neomycin-Bacitracin-Polymyxin (NEOSPORIN ORIGINAL) 3.5-400-5000 OINT Externally apply topically as needed (apply as needed for stoma care) 14 g 4     neomycin-bacitracin-polymyxin (NEOSPORIN) 5-400-5000 ointment Apply topically daily as needed (Around Stoma - uses EITHER neosporin OR Bactroban)       Nutritional Supplements (NEPRO) LIQD Take 1 Can by mouth daily 30 each 11     order for DME Equipment being ordered: Medi-Vac, plastic tubing connector lara type, cat 361, [facility] zhouwu, 1 lara tip/month x 12 months. Use for tracheostomy suctioning 1 each 11     order for DME Equipment being ordered: DME    Adult Bivona Uncuffed Tracheostomy Tube-5.0    Patient will do weekly trach changes. 4 Units 11     order for DME Equipment being ordered: DME -    Deep suction kits - 12 fr 90 each 3     order for DME Tracheostomy Humidification Equipment  Equipment being ordered:     1) Air compressor  2)  "Nebulizer bottle  3) Aerosol tubing  4) Trach mask  5) Sterile water  6) Saline ampules (\"bullets\")  7) Heat Moisture Exchanger (HME) Also known by several other terms including: Thermal Humidifying Filters, Swedish nose, Artificial nose, Filter, Thermovent T.  8) Room/home humidifiers 1 each 11     order for DME Optifoam Basic   [id number] 30 each 11     order for DME Equipment being ordered: Humidifier (heated), humidifier attachment, saline lavages, humidivent mask 1 each 3     polyethylene glycol (MIRALAX) 17 GM/Dose powder Take 17 g by mouth daily as needed 510 g 0     predniSONE (DELTASONE) 20 MG tablet Take 20 mg by mouth daily as needed Take only when in Red Zone       sennosides (SENOKOT) 8.6 MG tablet Take 1 tablet by mouth 2 times daily       sertraline (ZOLOFT) 25 MG tablet Take 25 mg by mouth daily.       sevelamer carbonate (RENVELA) 800 MG tablet TAKE 1 TABLET BY MOUTH THREE TIMES DAILY WITH FOOD WITH MEALS       simethicone (MYLICON) 40 MG/0.6ML suspension Take 0.6 mLs (40 mg) by mouth every 6 hours as needed for cramping 45 mL 0     sodium chloride 0.9%, bottle, 0.9 % irrigation Irrigate with 250 mLs as directed 4 times daily. 50017 mL 3     sodium chloride 0.9%, bottle, 0.9 % irrigation Irrigate with 250 mLs as directed 4 times daily 09078 mL 3     sodium chloride 0.9%, bottle, 0.9 % irrigation Irrigate with as directed 3 times daily Bladder irrigation       Syringe Luer Slip 60 ML MISC 1 Syringe 4 times daily 200 each 11     tobramycin, PF, (DAX) 300 MG/5ML nebulizer solution Take 1 ampule by nebulization as needed Take only when in Red Zone       traZODone (DESYREL) 50 MG tablet Take 25 mg by mouth nightly as needed         ALLERGIES:    Allergies   Allergen Reactions     Latex      Latex      Other reaction(s): Other (see comments)  Other reaction(s): Contact Dermatitis, Other (see comments)     Adhesive Tape Rash       NEW PMH/PSH: None    REVIEW OF SYSTEMS:  The patient completed a " comprehensive 11 point review of systems (below), which was reviewed. Positives are as noted below.  Patient Supplied Answers to Review of Systems Noncontributory     PHYSICAL EXAM:  General: The patient was alert and conversant, and in no acute distress. Patient accompanied by his mother.  Neck: No palpable cervical lymphadenopathy, 5-O Bivona trach in good position overall, although it appears to be lifted somewhat by the strap position. Moderate dry granulation bilaterally.  Resp: Breathing comfortably, no stridor or stertor.  Neuro: Symmetric facial function. Other cranial nerve function as documented above.  Psych: Normal affect, pleasant and cooperative.  Voice/speech: No significant dysphonia.    PROCEDURES:   Procedure:  Tracheostomy tube change (5-O Bivona)  Indication: This procedure was warranted as part of routine trach care, and was requested by the patient/caregiver.  Description of procedure:  The existing tracheostomy tube was removed and a new identical tube was placed using the introducer. The introducer was withdrawn and replaced with the inner cannula, and the neck ties were secured. Flexible fiberoptic endoscopy through the tube confirmed good position.  Limited external granulation was treated focally with silver nitrate; no granulation was noted within the trach tract.  The patient tolerated the procedure without difficulty.    Procedure: Flexible tracheobronchoscopy through established trach incision  Indications: The procedure was warranted to assess the position and/or condition of the tracheostomy tube and trachea  Description: After written and verbal consent were obtained, the endoscope was passed through the patient's tracheostomy. This allowed visualization of the trachea down to the raul and mainstem bronchi.  The trach was also pulled back to allow evaluation of the trach tract, and then was replaced and secured.  Findings: Trach tube in good position within the airway. Trachea  clear to raul, which was sharp. Unremarkable takeoffs of mainstem bronchi. Trach tract unremarkable.        IMPRESSION AND PLAN:   Aidan Louie returns with a stable trach site; an uneventful trach change was completed today. The shape/design of the outer flanges of his trach do not fit as well as the v shape of the prior pediatric trachs, but the trach has overall been functioning appropriately.    Plan:  1) Continue routine trach care; he is doing a good job with taking care of it--reduced granulation compared to prior, with better hygiene  2) Will meet with our RN today to clarify needed orders for refills etc  3) RTC 1 year for routine trach check or sooner as needed with our PA , or with me as needed    I spent a total of 23 minutes on 6/9/2025 in chart review, review of tests, patient visit, documentation, care coordination, and/or discussion with other providers about the issues documented above, separate from any documented procedure(s).      Bebe Dumas MD

## 2025-06-09 NOTE — NURSING NOTE
"Aidan Louie is a 29 year old male patient that presents today in clinic for the following:    Chief Complaint   Patient presents with    Follow Up     NGB       The patient's allergies and medications were reviewed as noted. A set of vitals were recorded as noted without incident. The patient does not have any other questions for the provider.    Blood pressure 129/82, pulse 75, height 1.549 m (5' 1\"), weight 47.6 kg (105 lb), SpO2 94%. Body mass index is 19.84 kg/m .    Patient Active Problem List   Diagnosis    Prune belly syndrome    HTN (hypertension)    ESRD (end stage renal disease) (H)    Vitamin D deficiency    Recurrent UTI    Low bone density for age    Goiter    Short stature    High serum parathyroid hormone (PTH)    High serum follicle stimulating hormone (FSH)    On corticosteroid therapy    Tracheostomy granuloma (H)    Bilateral reflux nephropathy    Dawood Reece sequence    Tracheostomy dependence (H)    Restrictive lung disease    Scoliosis    Chronic respiratory failure with hypoxia (H)    History of tracheostomy    On prednisone therapy    Infection associated with peritoneal dialysis catheter, initial encounter       Allergies   Allergen Reactions    Latex     Latex      Other reaction(s): Other (see comments)  Other reaction(s): Contact Dermatitis, Other (see comments)    Adhesive Tape Rash       Current Outpatient Medications   Medication Sig Dispense Refill    acetaminophen (TYLENOL) 500 MG tablet Take 500 mg by mouth every 8 hours as needed       albuterol (2.5 MG/3ML) 0.083% nebulizer solution Take 1 ampule by nebulization 4 times daily       amLODIPine (NORVASC) 5 MG tablet Take 5 mg by mouth 2 times daily      azithromycin (ZITHROMAX) 250 MG tablet Take 250 mg by mouth three times a week Mon, Wed, Fri      bisacodyl (DULCOLAX) 5 MG EC tablet Take 1 tablet (5 mg) by mouth daily 30 tablet 1    calcium carbonate (TUMS) 500 MG chewable tablet Take 1,000 mg by mouth daily      " carvedilol (COREG) 3.125 MG tablet       cholecalciferol (VITAMIN D3) 25 mcg (1000 units) capsule Take 1 capsule by mouth 2 times daily      cinacalcet (SENSIPAR) 30 MG tablet Take 30 mg by mouth daily      cloNIDine (CATAPRES) 0.1 MG tablet Take 1 tablet (0.1 mg) by mouth 2 times daily 60 tablet 3    Cranberry 500 MG TABS Take by mouth 3 times daily (with meals)      diphenhydrAMINE (BENADRYL) 25 MG capsule Take 25 mg by mouth every 6 hours as needed for itching or allergies      docusate sodium (COLACE) 100 MG capsule Take 100 mg by mouth 2 times daily      famotidine (PEPCID) 40 MG tablet Take 40 mg by mouth 2 times daily       ferrous gluconate (FERGON) 324 (38 Fe) MG tablet Take 1 tablet (324 mg) by mouth daily 90 tablet 11    gentamicin (GARAMYCIN) 0.1 % external cream Apply topically daily Peritoneal dialysis site      ipratropium (ATROVENT) 0.02 % neb solution Take 0.5 mg by nebulization 4 times daily  225 mL     lactulose 20 GM/30ML solution Take 20 g by mouth 2 times daily as needed for constipation      loratadine (CLARITIN) 10 MG tablet Take 1 tablet by mouth daily      Melatonin 10 MG TABS tablet Take 10 mg by mouth nightly as needed      mupirocin (BACTROBAN) 2 % external ointment Apply topically daily as needed (around stoma)      Neomycin-Bacitracin-Polymyxin (NEOSPORIN ORIGINAL) 3.5-400-5000 OINT Externally apply topically as needed (apply as needed for stoma care) 14 g 4    neomycin-bacitracin-polymyxin (NEOSPORIN) 5-400-5000 ointment Apply topically daily as needed (Around Stoma - uses EITHER neosporin OR Bactroban)      Nutritional Supplements (NEPRO) LIQD Take 1 Can by mouth daily 30 each 11    order for DME Equipment being ordered: Medi-Vac, plastic tubing connector lara type, cat 361, [facility], 1 lara tip/month x 12 months. Use for tracheostomy suctioning 1 each 11    order for DME Equipment being ordered: DME    Adult Bivona Uncuffed Tracheostomy Tube-5.0    Patient  "will do weekly trach changes. 4 Units 11    order for DME Equipment being ordered: DME -    Deep suction kits - 12 fr 90 each 3    order for DME Tracheostomy Humidification Equipment  Equipment being ordered:     1) Air compressor  2) Nebulizer bottle  3) Aerosol tubing  4) Trach mask  5) Sterile water  6) Saline ampules (\"bullets\")  7) Heat Moisture Exchanger (HME) Also known by several other terms including: Thermal Humidifying Filters, Swedish nose, Artificial nose, Filter, Thermovent T.  8) Room/home humidifiers 1 each 11    order for DME Optifoam Basic   [id number] 30 each 11    order for DME Equipment being ordered: Humidifier (heated), humidifier attachment, saline lavages, humidivent mask 1 each 3    polyethylene glycol (MIRALAX) 17 GM/Dose powder Take 17 g by mouth daily as needed 510 g 0    predniSONE (DELTASONE) 20 MG tablet Take 20 mg by mouth daily as needed Take only when in Red Zone      sennosides (SENOKOT) 8.6 MG tablet Take 1 tablet by mouth 2 times daily      sevelamer carbonate (RENVELA) 800 MG tablet TAKE 1 TABLET BY MOUTH THREE TIMES DAILY WITH FOOD WITH MEALS      simethicone (MYLICON) 40 MG/0.6ML suspension Take 0.6 mLs (40 mg) by mouth every 6 hours as needed for cramping 45 mL 0    sodium chloride 0.9%, bottle, 0.9 % irrigation Irrigate with 250 mLs as directed 4 times daily. 12874 mL 3    sodium chloride 0.9%, bottle, 0.9 % irrigation Irrigate with 250 mLs as directed 4 times daily 73023 mL 3    sodium chloride 0.9%, bottle, 0.9 % irrigation Irrigate with as directed 3 times daily Bladder irrigation      Syringe Luer Slip 60 ML MISC 1 Syringe 4 times daily 200 each 11    tobramycin, PF, (DAX) 300 MG/5ML nebulizer solution Take 1 ampule by nebulization as needed Take only when in Red Zone      traZODone (DESYREL) 50 MG tablet Take 25 mg by mouth nightly as needed         Social History     Tobacco Use    Smoking status: Never    Smokeless tobacco: Never   Substance Use Topics    Alcohol use: " No     Alcohol/week: 0.0 standard drinks of alcohol    Drug use: No       Florencio Henry, EMT-P   6/9/2025  1:32 PM

## 2025-06-10 ENCOUNTER — PATIENT OUTREACH (OUTPATIENT)
Dept: CARE COORDINATION | Facility: CLINIC | Age: 30
End: 2025-06-10
Payer: MEDICARE

## 2025-06-11 ENCOUNTER — TELEPHONE (OUTPATIENT)
Dept: UROLOGY | Facility: CLINIC | Age: 30
End: 2025-06-11
Payer: MEDICARE

## 2025-06-11 DIAGNOSIS — Z93.0 TRACHEOSTOMY IN PLACE (H): Primary | ICD-10-CM

## 2025-06-11 NOTE — TELEPHONE ENCOUNTER
Action June 11, 2025 MONSTER 12:56 PM   Action Taken DME orders faxed to [address] [place] [address]. Copies of visit notes and DME orders mailed to patient's home address.

## 2025-06-12 ENCOUNTER — PATIENT OUTREACH (OUTPATIENT)
Dept: CARE COORDINATION | Facility: CLINIC | Age: 30
End: 2025-06-12
Payer: MEDICARE

## 2025-06-16 ENCOUNTER — MEDICAL CORRESPONDENCE (OUTPATIENT)
Dept: HEALTH INFORMATION MANAGEMENT | Facility: CLINIC | Age: 30
End: 2025-06-16
Payer: MEDICARE

## 2025-06-16 ENCOUNTER — DOCUMENTATION ONLY (OUTPATIENT)
Dept: UROLOGY | Facility: CLINIC | Age: 30
End: 2025-06-16
Payer: MEDICARE

## 2025-06-16 NOTE — PROGRESS NOTES
Type of Form Received: Order (syringe, sterile water, lubricant, catheter, gloves)    Form Received (Date) 6/16/25   Form Filled out Yes, date 6/16/25   Placed in provider folder Yes       Received Completed forms Yes   Faxed Forms Faxed To: [address] [place] [address]  Fax Number: [phone]   Sent to HIM (Date) 6/17/25

## 2025-06-18 ENCOUNTER — TELEPHONE (OUTPATIENT)
Dept: TRANSPLANT | Facility: CLINIC | Age: 30
End: 2025-06-18
Payer: MEDICARE

## 2025-06-18 DIAGNOSIS — N18.6 ESRD (END STAGE RENAL DISEASE) (H): Primary | ICD-10-CM

## 2025-06-18 DIAGNOSIS — Z76.82 ORGAN TRANSPLANT CANDIDATE: ICD-10-CM

## 2025-06-18 NOTE — TELEPHONE ENCOUNTER
"Received the following email from [facility] \"I just got a phone call from Greg's mom and she wanted me to check in with  for her. They have found a back-up caregiver which is his biological aunt/uncle. They are wondering if they need to have specific paperwork completed to say they would be back-up guardian? They are also wondering their level of involvement with Greg? They live in [place] and Greg/mom went to visit them beginning of June to discuss this.  She said Greg went to therapy to discuss getting a call in the middle of the night, etc.\"     Responded to [facility] that orders have been placed for updated  visit     "

## 2025-06-24 ENCOUNTER — VIRTUAL VISIT (OUTPATIENT)
Dept: TRANSPLANT | Facility: CLINIC | Age: 30
End: 2025-06-24
Attending: NURSE PRACTITIONER
Payer: MEDICARE

## 2025-06-24 DIAGNOSIS — Z76.82 AWAITING ORGAN TRANSPLANT: Primary | ICD-10-CM

## 2025-06-24 DIAGNOSIS — Z76.82 ORGAN TRANSPLANT CANDIDATE: ICD-10-CM

## 2025-06-24 DIAGNOSIS — N18.6 ESRD (END STAGE RENAL DISEASE) (H): ICD-10-CM

## 2025-06-24 NOTE — LETTER
2025      Aidan Louie  [address] [facility] [address]      Dear Colleague,    Thank you for referring your patient, Aidan Louie, to the [facility]. Please see a copy of my visit note below.    Pre-Transplant Waitlist Patient Social Work Assessment:    Patient Name: Aidan Louie  : 1995  Age: 29 year old  MRN: [id number]  Date of Initial Social Work Evaluation: 10/15/2020    Patient is on the kidney transplant wait list. Met with pt mother/guardian Valerie today over the phone to update their psychosocial assessment and discuss pt's post transplant caregiver and lodging plan. Of note, Greg did not participate in this visit. He was at dialysis and not at home with Valerie.    Presenting Information:  Current Living Situation: Greg lives with his mother/guardian in a house in [place], [place].   If not local, plans for short-term stay post-transplant:  Per Valerie, they will plan to stay with pt's Aunt and Uncle from his biological father's side (Tahir and Shannan Louie) in their house in [place], [place]. Tahir and Shannan have adult children who are moved out of the house.  Previous Functional Status: Greg is ambulatory and primarily independent with ADL's, however, per Valerie, patient requires some prompting/reminders when it comes to grooming tasks/maintaining hygiene. Valerie alluded to some regression in overall independence after the loss of patient's step-father. Reported that Greg has been working with his PCA to re-learn how to utilize public transportation, engage in social activities outside of the home, and learn other ILS skills.   Cultural/Language/Spiritual Considerations: Greg is a 30 y/o single, english speaking, white male.     Support System:  Primary Support Person(s): Valerie Louie (Mom/Legal Guardian)  Other Supports: Valerie reports that she has spoken with Greg's Aunt and Uncle (Shannan and Tahir Louie) on Greg's biological father's side about being Greg's  "secondary support and they are agreeable. Of note, SW did not verbally confirm this with Shannan and Tahir today. Valerie reports that Tahir is retired and Shannan currently works FT at a bank. They have adult children who have moved out of their home. Valerie requests a separate time be set up to call Shannan and Tahir as Shannan works full time. Greg's sister (Melissa, 36 y/o) is also supportive to him. She lives in a group home/independent living setting and has autism per Valerie. Greg does not have a relationship with his biological father, however, he does live about 5 houses down from them in [place], [place].   Plan for support in immediate post-transplant period: Plan to discharge to Greg's Aunt and Uncle's home in [place], [place] post-transplant. Valerie/mom would be Greg's primary support and Shannan and Tahir would be secondary support.    Health Care Directive Information:  Primary Decision Maker: Mom/Legal Guardian (paperwork filed under Media tab on 1/24/2017)  Alternate Decision Maker: Anticipate Aunt and Uncle (Shannan and Tahir Liban)  Health Care Directive: Copy in Chart and Health Care Directive Agent (if patient not able to make decisions)    Mental Health & AODA Concerns/History:  History/Changes to Mental Health: Endorsed history of anxiety and depression. Managing this with medications and sees a therapist once a month, in-person. Was previously seeing therapist every other week. Valerie notes that the medications have been working well and she's noticed Greg \"come back to himself\". Valerie describes Greg as quiet but observant and someone who \"mulls things over\". Valerie reports that it takes Greg some time to process information before he has questions or wants to discuss things.  History/Changes to Chemical Health: Denied any history or current substance use.   Current Status: Valerie reports that Greg is doing better. He continues to need some ongoing support from his mother and other caregivers to ensure success around his health and " safety.  Coping Strategies: Unable to assess during this visit.  Services Needed/Recommended: LIZZIE recommends that Greg continues to work with his therapist monthly and his PCA on going out into the community, finances, etc.  Compliance with Medications, Appointments, Etc: Valerie manages Greg's medication refills. Greg takes his own medications and sets his own reminders. Valerie reports that Greg is working with her and his PCA on picking up his medications.    Work/Financial Concerns:  Current Work Status: Disabled. 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 He typically volunteers MWF for about 2-3 hours in the afternoon/evening.   Primary Source of Income: Greg and Valerie both receive SSDI + ~$90 SNAP monthly  Impact of Transplant on Income: Greg and Valerie are on a limited budget. If they had to pay for lodging out of pocket, this may be difficult for them to afford.  Insurance and Medication Coverage: Medicare and [place] Medicaid  Financial Concerns: Valerie notes that they have a limited income. They are managing ok and report that Greg may look for a job in the future.   Resources Needed: Valerie requested resources for sliding fee scale  near them to look into establishing co guardianship. SW to follow up with Valerie via phone call.     Assessment, Recommendations, and Plan:  Transplant  reviewed transplant education (Medicare, rehabilitation, donor issues, community/financial resources, and psych/family adjustment) as well as psychosocial risks of transplant. Overall, SW unable to assess patient's behavior during today's interview as he was not present. Patient has adequate income, insurance coverage, and a post-transplant caregiver support plan although support plan has not been verified with anticipated secondary supports. No major contraindications noted for transplant and at  this time, patient appears to understand the risks and benefits of transplant. SOT Social Work Team will continue to monitor for psychosocial supports, resources, and advocate on patient's behalf.  encouraged patient to follow-up as needed for questions and concerns. Patient appears to be a good candidate for transplant pending confirmation from patient's secondary supports that they are able to assist patient post-transplant should primary support be unable to.    RE Orozco  [place]   [facility]  Searchable on Vocera: Dee Blanco/Kidney [facility] SW        Again, thank you for allowing me to participate in the care of your patient.        Sincerely,        RE Orozco    Electronically signed

## 2025-06-24 NOTE — PROGRESS NOTES
Pre-Transplant Waitlist Patient Social Work Assessment:    Patient Name: Aidan Louie  : 1995  Age: 29 year old  MRN: [id number]  Date of Initial Social Work Evaluation: 10/15/2020    Patient is on the kidney transplant wait list. Met with pt mother/guardian Valerie today over the phone to update their psychosocial assessment and discuss pt's post transplant caregiver and lodging plan. Of note, Greg did not participate in this visit. He was at dialysis and not at home with Valerie.    Presenting Information:  Current Living Situation: Greg lives with his mother/guardian in a house in [place], [place].   If not local, plans for short-term stay post-transplant:  Per Valerie, they will plan to stay with pt's Aunt and Uncle from his biological father's side (Tahir and Shannan Louie) in their house in [place], [place]. Tahir and Shannan have adult children who are moved out of the house.  Previous Functional Status: Greg is ambulatory and primarily independent with ADL's, however, per Valerie, patient requires some prompting/reminders when it comes to grooming tasks/maintaining hygiene. Valerie alluded to some regression in overall independence after the loss of patient's step-father. Reported that Greg has been working with his PCA to re-learn how to utilize public transportation, engage in social activities outside of the home, and learn other ILS skills.   Cultural/Language/Spiritual Considerations: Greg is a 28 y/o single, english speaking, white male.     Support System:  Primary Support Person(s): Valerie Louie (Mom/Legal Guardian)  Other Supports: Valerie reports that she has spoken with Greg's Aunt and Uncle (Shannan and Tahir Louie) on Greg's biological father's side about being Greg's secondary support and they are agreeable. Of note, SW did not verbally confirm this with Shannan and Tahir today. Valerie reports that Tahir is retired and Shannan currently works FT at a bank. They have adult children who have moved out of their home. Valerie  "requests a separate time be set up to call Shannan and Tahir as Shannan works full time. Greg's sister (Melissa, 34 y/o) is also supportive to him. She lives in a group home/independent living setting and has autism per Valerie. Greg does not have a relationship with his biological father, however, he does live about 5 houses down from them in [place], [place].   Plan for support in immediate post-transplant period: Plan to discharge to Greg's Aunt and Uncle's home in [place], [place] post-transplant. Valerie/mom would be Greg's primary support and Shannan and Tahir would be secondary support.    Health Care Directive Information:  Primary Decision Maker: Mom/Legal Guardian (paperwork filed under Media tab on 1/24/2017)  Alternate Decision Maker: Anticipate Aunt and Uncle (Sandra Liban)  Health Care Directive: Copy in Chart and Health Care Directive Agent (if patient not able to make decisions)    Mental Health & AODA Concerns/History:  History/Changes to Mental Health: Endorsed history of anxiety and depression. Managing this with medications and sees a therapist once a month, in-person. Was previously seeing therapist every other week. Valerie notes that the medications have been working well and she's noticed Greg \"come back to himself\". Valerie describes Greg as quiet but observant and someone who \"mulls things over\". Valerie reports that it takes Greg some time to process information before he has questions or wants to discuss things.  History/Changes to Chemical Health: Denied any history or current substance use.   Current Status: Valerie reports that Greg is doing better. He continues to need some ongoing support from his mother and other caregivers to ensure success around his health and safety.  Coping Strategies: Unable to assess during this visit.  Services Needed/Recommended: LIZZIE recommends that Greg continues to work with his therapist monthly and his PCA on going out into the community, finances, etc.  Compliance with Medications, " Appointments, Etc: Valerie manages Greg's medication refills. Greg takes his own medications and sets his own reminders. Valerie reports that Greg is working with her and his PCA on picking up his medications.    Work/Financial Concerns:  Current Work Status: Disabled. 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 He typically volunteers MWF for about 2-3 hours in the afternoon/evening.   Primary Source of Income: Greg and Valerie both receive SSDI + ~$90 SNAP monthly  Impact of Transplant on Income: Greg and Valerie are on a limited budget. If they had to pay for lodging out of pocket, this may be difficult for them to afford.  Insurance and Medication Coverage: Medicare and [place] Medicaid  Financial Concerns: Valerie notes that they have a limited income. They are managing ok and report that Greg may look for a job in the future.   Resources Needed: Valerie requested resources for sliding fee scale  near them to look into establishing co guardianship. SW to follow up with Valerie via phone call.     Assessment, Recommendations, and Plan:  Transplant  reviewed transplant education (Medicare, rehabilitation, donor issues, community/financial resources, and psych/family adjustment) as well as psychosocial risks of transplant. Overall, SW unable to assess patient's behavior during today's interview as he was not present. Patient has adequate income, insurance coverage, and a post-transplant caregiver support plan although support plan has not been verified with anticipated secondary supports. No major contraindications noted for transplant and at this time, patient appears to understand the risks and benefits of transplant. SOT Social Work Team will continue to monitor for psychosocial supports, resources, and advocate on patient's behalf.  encouraged patient to follow-up as needed  for questions and concerns. Patient appears to be a good candidate for transplant pending confirmation from patient's secondary supports that they are able to assist patient post-transplant should primary support be unable to.    RE Orozco   [facility]  Searchable on Vocera: Dee Blanco/Kidney Pancreas [facility] SW

## 2025-06-25 ENCOUNTER — TELEPHONE (OUTPATIENT)
Dept: UROLOGY | Facility: CLINIC | Age: 30
End: 2025-06-25
Payer: MEDICARE

## 2025-06-25 DIAGNOSIS — Q79.4 PRUNE BELLY SYNDROME: ICD-10-CM

## 2025-06-25 DIAGNOSIS — N31.9 NEUROGENIC BLADDER: Primary | ICD-10-CM

## 2025-06-25 DIAGNOSIS — R33.9 URINE RETENTION: ICD-10-CM

## 2025-06-25 DIAGNOSIS — N39.0 RECURRENT UTI: ICD-10-CM

## 2025-06-26 ENCOUNTER — DOCUMENTATION ONLY (OUTPATIENT)
Dept: OTHER | Facility: CLINIC | Age: 30
End: 2025-06-26
Payer: MEDICARE

## 2025-07-10 ENCOUNTER — TELEPHONE (OUTPATIENT)
Dept: UROLOGY | Facility: CLINIC | Age: 30
End: 2025-07-10
Payer: MEDICARE

## 2025-07-10 DIAGNOSIS — N31.9 NEUROGENIC BLADDER: Primary | ICD-10-CM

## 2025-07-16 ENCOUNTER — COMMITTEE REVIEW (OUTPATIENT)
Dept: TRANSPLANT | Facility: CLINIC | Age: 30
End: 2025-07-16
Payer: MEDICARE

## 2025-07-16 NOTE — COMMITTEE REVIEW
Kidney/Pancreas Committee Review Note     Evaluation Date: 10/15/2020  Committee Review Date: 7/16/2025    Organ being evaluated for: Kidney    Transplant Phase: Waitlist  Transplant Status: Inactive    Transplant Coordinator: Meera Arreola  Transplant Surgeon:        Referring Physician: Glen Abel    Primary Diagnosis: Prune Belly Syndrome  Secondary Diagnosis:     Committee Review Members:  Nephrology Tasneem Alexander, NP, Janett Molina MD, Kulwinder Schuler, APRN CNP, Coleman Barr MD   Nurse Bebe Knox, APRN CNP   Nutrition Norma Paez, VERONICA   Pharmacist Nelia Cantu, [facility]    - Clinical Faviola Méndez, MSW, Zahra Morris, [facility]   Transplant THOMAS COTA, RN, Eleonora Martinez, RN, Ana Bustillos, RN, Allison Mandujano, RN, April Giraldo, RN, Elizabeth Rodriguez, RN, Janelle Hernandez, RN, Yazmin Alvarado, RN, Jen Melissa, GUNNER, Livier Decker, RN   Transplant Surgery Ana Horta MD, MD   Other Gilberto Nelson MD, Guille Jain, RN       Transplant Eligibility: Irreversible chronic kidney disease treated w/dialysis or expected need for dialysis    Committee Review Decision: Make Active    Relative Contraindications:     Absolute Contraindications:      Committee Chair Ana Horta MD verbally attested to the committee's decision.    Committee Discussion Details:     ESKD from reflux nephropathy on HD since 2021    Made inactive in the spring due to needing better post transplant lodging plan/ back up caregiver plan. Pts mom is his legal guardian and has had some of her own health issues. Updated SW visit on 6/24/25, wanted committee input if co-guardian will be a requirement for transplant.     Committee determined at this time a co-guardian is NOT required. SW to look into what would be needed if something happens to pts current guardian, what the process would be to get a guardian through the state. At this time he has adequate social  support and can be made active on the waitlist.

## 2025-07-17 ENCOUNTER — TELEPHONE (OUTPATIENT)
Dept: TRANSPLANT | Facility: CLINIC | Age: 30
End: 2025-07-17
Payer: MEDICARE

## 2025-07-17 ENCOUNTER — DOCUMENTATION ONLY (OUTPATIENT)
Dept: TRANSPLANT | Facility: CLINIC | Age: 30
End: 2025-07-17
Payer: MEDICARE

## 2025-07-17 NOTE — TELEPHONE ENCOUNTER
Called pts mom to go through active status education. She is out of town and would like pt to be apart of conversation. She is requesting a call back next week and would like to wait to activate him until call next week. Will call back on 7/22/25.

## 2025-07-22 ENCOUNTER — TELEPHONE (OUTPATIENT)
Dept: TRANSPLANT | Facility: CLINIC | Age: 30
End: 2025-07-22
Payer: MEDICARE

## 2025-07-22 ENCOUNTER — DOCUMENTATION ONLY (OUTPATIENT)
Dept: TRANSPLANT | Facility: CLINIC | Age: 30
End: 2025-07-22
Payer: MEDICARE

## 2025-07-22 NOTE — TELEPHONE ENCOUNTER
Called patients guardian to inform them of status change to ACTIVE on the Kidney alone list today 25. Status change letter and PRA orders to be mailed. Patient is in agreement with listing ACTIVE status.      Discussed:   - Pt is eligible for  donor offers and pt can receive calls 24 hours a day, 7 days a week, and on call staff need to be able to reach pt or one of emergency contacts within 30 minutes or they might skip over to next recipient on list.   -Please make sure your phone is charged at all times and your voicemail is not full   -Your transplant on call coordinator will give you directions about when and where you will need to come to the hospital for transplant  -The transplant coordinator will call you to discuss your current health status, the offer, and plans to move forward.  Some organ offer calls will require you to come to the hospital immediately; some may not be as time sensitive.  It may be possible for you to come to the hospital and, for various reasons, the transplant could be cancelled.  This is often called a  dry run .  - Reviewed the right to accept or decline offer, without penalty  - Expected length of surgical procedure is about 4-6 hours, expected inpatient length of stay post transplant is 3-4 days, and potential to stay locally post transplant. Offered resources for lodging in the area  - Verified contact information as documented in Epic. Confirmed emergency contact information  -Instructed patient about importance of having PRAs drawn every 3 months via: (Mailers/[facility]). Encouraged them to set reminder in their preferred calendar to stay on top of their quarterly labs  -Reminded pt to stay up on health maintenance and to call with any updates on their health status, insurance or contact information.   -Reminded pt to contact coordinator prior to receiving any blood transfusions as it may change PRA level and make it more difficult to match potential donors    -Donor  website was provided     -Last PRA was 25 on 3/4/25     -Provided name and contact information for additional questions or concerns.  Pt expressed excellent understanding of all and was in good agreement with the plan.     Pts mom was wondering about pneumovax, reports HD center told him he should be getting this. She tried to work with PCP but was told he is too young. Encouraged to have dialysis center connect with PCP office since they are recommending vaccination. She was also wondering if Greg will still have GERD after transplant, reviewed kidney transplant is a treatment for kidney disease and he will still have GERD after transplant. She is also wondering what to do about his saline for the bladder flushes, reports they have had some issues obtaining. Encouraged them to work with urology to review. Offered clinic number but reports she has. Pt verbalized understanding of information and has no further questions. Encouraged to reach out if questions arise.

## 2025-07-29 DIAGNOSIS — Z76.82 AWAITING ORGAN TRANSPLANT: Primary | ICD-10-CM

## 2025-07-30 ENCOUNTER — ORGAN (OUTPATIENT)
Dept: TRANSPLANT | Facility: CLINIC | Age: 30
End: 2025-07-30
Payer: MEDICARE

## 2025-07-30 ENCOUNTER — DOCUMENTATION ONLY (OUTPATIENT)
Dept: TRANSPLANT | Facility: CLINIC | Age: 30
End: 2025-07-30
Payer: MEDICARE

## 2025-07-30 ENCOUNTER — APPOINTMENT (OUTPATIENT)
Dept: ULTRASOUND IMAGING | Facility: CLINIC | Age: 30
End: 2025-07-30
Attending: SURGERY
Payer: MEDICARE

## 2025-07-30 ENCOUNTER — APPOINTMENT (OUTPATIENT)
Dept: GENERAL RADIOLOGY | Facility: CLINIC | Age: 30
DRG: 652 | End: 2025-07-30
Payer: MEDICARE

## 2025-07-30 ENCOUNTER — ANESTHESIA EVENT (OUTPATIENT)
Dept: SURGERY | Facility: CLINIC | Age: 30
End: 2025-07-30
Payer: MEDICARE

## 2025-07-30 ENCOUNTER — APPOINTMENT (OUTPATIENT)
Dept: GENERAL RADIOLOGY | Facility: CLINIC | Age: 30
DRG: 652 | End: 2025-07-30
Attending: SURGERY
Payer: MEDICARE

## 2025-07-30 ENCOUNTER — ANESTHESIA (OUTPATIENT)
Dept: SURGERY | Facility: CLINIC | Age: 30
End: 2025-07-30
Payer: MEDICARE

## 2025-07-30 ENCOUNTER — HOSPITAL ENCOUNTER (INPATIENT)
Facility: CLINIC | Age: 30
End: 2025-07-30
Attending: SURGERY | Admitting: SURGERY
Payer: MEDICARE

## 2025-07-30 PROBLEM — Z76.82 KIDNEY TRANSPLANT CANDIDATE: Status: ACTIVE | Noted: 2025-07-30

## 2025-07-30 PROCEDURE — 250N000011 HC RX IP 250 OP 636: Mod: JZ

## 2025-07-30 PROCEDURE — 258N000003 HC RX IP 258 OP 636

## 2025-07-30 PROCEDURE — 250N000011 HC RX IP 250 OP 636: Performed by: STUDENT IN AN ORGANIZED HEALTH CARE EDUCATION/TRAINING PROGRAM

## 2025-07-30 PROCEDURE — 258N000003 HC RX IP 258 OP 636: Performed by: SURGERY

## 2025-07-30 PROCEDURE — 250N000011 HC RX IP 250 OP 636: Performed by: SURGERY

## 2025-07-30 PROCEDURE — 258N000003 HC RX IP 258 OP 636: Performed by: STUDENT IN AN ORGANIZED HEALTH CARE EDUCATION/TRAINING PROGRAM

## 2025-07-30 PROCEDURE — 258N000003 HC RX IP 258 OP 636: Performed by: ANESTHESIOLOGY

## 2025-07-30 PROCEDURE — 250N000009 HC RX 250: Performed by: STUDENT IN AN ORGANIZED HEALTH CARE EDUCATION/TRAINING PROGRAM

## 2025-07-30 PROCEDURE — 250N000011 HC RX IP 250 OP 636: Performed by: ANESTHESIOLOGY

## 2025-07-30 PROCEDURE — 250N000009 HC RX 250: Performed by: ANESTHESIOLOGY

## 2025-07-30 RX ORDER — EPHEDRINE SULFATE 50 MG/ML
INJECTION, SOLUTION INTRAMUSCULAR; INTRAVENOUS; SUBCUTANEOUS PRN
Status: DISCONTINUED | OUTPATIENT
Start: 2025-07-30 | End: 2025-07-30

## 2025-07-30 RX ORDER — BUMETANIDE 0.25 MG/ML
INJECTION, SOLUTION INTRAMUSCULAR; INTRAVENOUS PRN
Status: DISCONTINUED | OUTPATIENT
Start: 2025-07-30 | End: 2025-07-30

## 2025-07-30 RX ORDER — LIDOCAINE HYDROCHLORIDE 20 MG/ML
INJECTION, SOLUTION INFILTRATION; PERINEURAL PRN
Status: DISCONTINUED | OUTPATIENT
Start: 2025-07-30 | End: 2025-07-30

## 2025-07-30 RX ORDER — MANNITOL 20 G/100ML
INJECTION, SOLUTION INTRAVENOUS PRN
Status: DISCONTINUED | OUTPATIENT
Start: 2025-07-30 | End: 2025-07-30

## 2025-07-30 RX ORDER — FUROSEMIDE 10 MG/ML
INJECTION INTRAMUSCULAR; INTRAVENOUS PRN
Status: DISCONTINUED | OUTPATIENT
Start: 2025-07-30 | End: 2025-07-30

## 2025-07-30 RX ORDER — PROPOFOL 10 MG/ML
INJECTION, EMULSION INTRAVENOUS PRN
Status: DISCONTINUED | OUTPATIENT
Start: 2025-07-30 | End: 2025-07-30

## 2025-07-30 RX ORDER — HEPARIN SODIUM 1000 [USP'U]/ML
INJECTION, SOLUTION INTRAVENOUS; SUBCUTANEOUS PRN
Status: DISCONTINUED | OUTPATIENT
Start: 2025-07-30 | End: 2025-07-30

## 2025-07-30 RX ORDER — SODIUM CHLORIDE, SODIUM GLUCONATE, SODIUM ACETATE, POTASSIUM CHLORIDE AND MAGNESIUM CHLORIDE 526; 502; 368; 37; 30 MG/100ML; MG/100ML; MG/100ML; MG/100ML; MG/100ML
INJECTION, SOLUTION INTRAVENOUS CONTINUOUS PRN
Status: DISCONTINUED | OUTPATIENT
Start: 2025-07-30 | End: 2025-07-30

## 2025-07-30 RX ORDER — FENTANYL CITRATE 50 UG/ML
INJECTION, SOLUTION INTRAMUSCULAR; INTRAVENOUS PRN
Status: DISCONTINUED | OUTPATIENT
Start: 2025-07-30 | End: 2025-07-30

## 2025-07-30 RX ORDER — ONDANSETRON 2 MG/ML
INJECTION INTRAMUSCULAR; INTRAVENOUS PRN
Status: DISCONTINUED | OUTPATIENT
Start: 2025-07-30 | End: 2025-07-30

## 2025-07-30 RX ADMIN — EPHEDRINE SULFATE 5 MG: 5 INJECTION INTRAVENOUS at 13:54

## 2025-07-30 RX ADMIN — HYDROMORPHONE HYDROCHLORIDE 0.5 MG: 1 INJECTION, SOLUTION INTRAMUSCULAR; INTRAVENOUS; SUBCUTANEOUS at 16:35

## 2025-07-30 RX ADMIN — CEFUROXIME SODIUM 1.5 G: 1.5 INJECTION, POWDER, FOR SOLUTION INTRAVENOUS at 12:57

## 2025-07-30 RX ADMIN — PHENYLEPHRINE HYDROCHLORIDE 100 MCG: 10 INJECTION INTRAVENOUS at 17:12

## 2025-07-30 RX ADMIN — MIDAZOLAM 2 MG: 1 INJECTION INTRAMUSCULAR; INTRAVENOUS at 12:30

## 2025-07-30 RX ADMIN — EPHEDRINE SULFATE 5 MG: 5 INJECTION INTRAVENOUS at 16:48

## 2025-07-30 RX ADMIN — SODIUM CHLORIDE, SODIUM GLUCONATE, SODIUM ACETATE, POTASSIUM CHLORIDE AND MAGNESIUM CHLORIDE: 526; 502; 368; 37; 30 INJECTION, SOLUTION INTRAVENOUS at 13:00

## 2025-07-30 RX ADMIN — FENTANYL CITRATE 50 MCG: 50 INJECTION INTRAMUSCULAR; INTRAVENOUS at 16:34

## 2025-07-30 RX ADMIN — FENTANYL CITRATE 50 MCG: 50 INJECTION INTRAMUSCULAR; INTRAVENOUS at 16:55

## 2025-07-30 RX ADMIN — MANNITOL 25 G: 20 INJECTION, SOLUTION INTRAVENOUS at 15:28

## 2025-07-30 RX ADMIN — SODIUM CHLORIDE 500 MG: 9 INJECTION, SOLUTION INTRAVENOUS at 12:45

## 2025-07-30 RX ADMIN — Medication 10 MG: at 16:09

## 2025-07-30 RX ADMIN — ANTI-THYMOCYTE GLOBULIN (RABBIT) 100 MG: 5 INJECTION, POWDER, LYOPHILIZED, FOR SOLUTION INTRAVENOUS at 14:10

## 2025-07-30 RX ADMIN — FENTANYL CITRATE 50 MCG: 50 INJECTION INTRAMUSCULAR; INTRAVENOUS at 13:28

## 2025-07-30 RX ADMIN — FENTANYL CITRATE 50 MCG: 50 INJECTION INTRAMUSCULAR; INTRAVENOUS at 14:40

## 2025-07-30 RX ADMIN — EPHEDRINE SULFATE 5 MG: 5 INJECTION INTRAVENOUS at 13:19

## 2025-07-30 RX ADMIN — FENTANYL CITRATE 50 MCG: 50 INJECTION INTRAMUSCULAR; INTRAVENOUS at 15:51

## 2025-07-30 RX ADMIN — Medication 100 MG: at 17:33

## 2025-07-30 RX ADMIN — FENTANYL CITRATE 50 MCG: 50 INJECTION INTRAMUSCULAR; INTRAVENOUS at 15:42

## 2025-07-30 RX ADMIN — Medication 10 MG: at 15:26

## 2025-07-30 RX ADMIN — SODIUM CHLORIDE, SODIUM GLUCONATE, SODIUM ACETATE, POTASSIUM CHLORIDE AND MAGNESIUM CHLORIDE: 526; 502; 368; 37; 30 INJECTION, SOLUTION INTRAVENOUS at 16:30

## 2025-07-30 RX ADMIN — ANTI-THYMOCYTE GLOBULIN (RABBIT) 100 MG: 5 INJECTION, POWDER, LYOPHILIZED, FOR SOLUTION INTRAVENOUS at 14:13

## 2025-07-30 RX ADMIN — HEPARIN SODIUM 2000 UNITS: 1000 INJECTION INTRAVENOUS; SUBCUTANEOUS at 14:57

## 2025-07-30 RX ADMIN — FENTANYL CITRATE 50 MCG: 50 INJECTION INTRAMUSCULAR; INTRAVENOUS at 14:35

## 2025-07-30 RX ADMIN — FENTANYL CITRATE 50 MCG: 50 INJECTION INTRAMUSCULAR; INTRAVENOUS at 13:07

## 2025-07-30 RX ADMIN — FENTANYL CITRATE 50 MCG: 50 INJECTION INTRAMUSCULAR; INTRAVENOUS at 14:30

## 2025-07-30 RX ADMIN — Medication 10 MG: at 16:38

## 2025-07-30 RX ADMIN — FENTANYL CITRATE 50 MCG: 50 INJECTION INTRAMUSCULAR; INTRAVENOUS at 13:57

## 2025-07-30 RX ADMIN — LIDOCAINE HYDROCHLORIDE 80 MG: 20 INJECTION, SOLUTION INFILTRATION; PERINEURAL at 12:37

## 2025-07-30 RX ADMIN — Medication 50 MG: at 12:37

## 2025-07-30 RX ADMIN — MYCOPHENOLATE MOFETIL 1000 MG: 500 INJECTION, POWDER, LYOPHILIZED, FOR SOLUTION INTRAVENOUS at 13:50

## 2025-07-30 RX ADMIN — PROPOFOL 100 MG: 10 INJECTION, EMULSION INTRAVENOUS at 12:37

## 2025-07-30 RX ADMIN — FUROSEMIDE 100 MG: 10 INJECTION, SOLUTION INTRAVENOUS at 15:30

## 2025-07-30 RX ADMIN — SODIUM CHLORIDE, SODIUM GLUCONATE, SODIUM ACETATE, POTASSIUM CHLORIDE AND MAGNESIUM CHLORIDE: 526; 502; 368; 37; 30 INJECTION, SOLUTION INTRAVENOUS at 15:00

## 2025-07-30 RX ADMIN — FENTANYL CITRATE 50 MCG: 50 INJECTION INTRAMUSCULAR; INTRAVENOUS at 17:32

## 2025-07-30 RX ADMIN — ONDANSETRON 4 MG: 2 INJECTION INTRAMUSCULAR; INTRAVENOUS at 15:47

## 2025-07-30 RX ADMIN — FENTANYL CITRATE 50 MCG: 50 INJECTION INTRAMUSCULAR; INTRAVENOUS at 16:26

## 2025-07-30 RX ADMIN — Medication 20 MG: at 14:35

## 2025-07-30 RX ADMIN — Medication 50 MG: at 13:27

## 2025-07-30 RX ADMIN — BUMETANIDE 5 MG: 0.25 INJECTION INTRAMUSCULAR; INTRAVENOUS at 15:30

## 2025-07-30 RX ADMIN — EPHEDRINE SULFATE 5 MG: 5 INJECTION INTRAVENOUS at 16:08

## 2025-07-30 ASSESSMENT — ACTIVITIES OF DAILY LIVING (ADL)
PATIENT'S_PREFERRED_MEANS_OF_COMMUNICATION: ENGLISH SPEAKER WITH HEARING LOSS, NO SPEECH PROBLEMS.
USE_OF_HEARING_ASSISTIVE_DEVICES: BILATERAL HEARING AIDS
WERE_AUXILIARY_AIDS_OFFERED?: NO
DESCRIBE_HEARING_LOSS: BILATERAL HEARING LOSS
ADLS_ACUITY_SCORE: 44
ADLS_ACUITY_SCORE: 45
ADLS_ACUITY_SCORE: 44
ADLS_ACUITY_SCORE: 53
ADLS_ACUITY_SCORE: 44
WEAR_GLASSES_OR_BLIND: NO
ADLS_ACUITY_SCORE: 53
ADLS_ACUITY_SCORE: 44
ADLS_ACUITY_SCORE: 53
CONCENTRATING,_REMEMBERING_OR_MAKING_DECISIONS_DIFFICULTY: OTHER (SEE COMMENTS)
ADLS_ACUITY_SCORE: 44
DIFFICULTY_COMMUNICATING: NO
DIFFICULTY_EATING/SWALLOWING: NO
ADLS_ACUITY_SCORE: 44
HEARING_DIFFICULTY_OR_DEAF: YES
ADLS_ACUITY_SCORE: 44

## 2025-07-30 NOTE — LETTER
Transition Communication Hand-off for Care Transitions to Next Level of Care Provider    Name: Aidan Louie  : 1995  MRN #: [id number]  Primary Care Provider: Yasmeen Mora MD     Primary Clinic: [address] [facility]  [place] [address]     Reason for Hospitalization:  ESRD (end stage renal disease) (H) [N18.6]  Kidney transplant candidate [Z76.82]  Kidney transplant recipient [Z94.0]  Admit Date/Time: 2025  6:17 AM  Discharge Date: 25  Payor Source: Payor: MEDICARE / Plan: MEDICARE / Product Type: Medicare /          Reason for Communication Hand-off Referral: Other inpatient to outpatient    Discharge Plan:    Stay locally in [place] for transplant appts then home   Concern for non-adherence with plan of care:   no  Discharge Needs Assessment:  Needs      Flowsheet Row Most Recent Value   Equipment Currently Used at Home none   # of Referrals Placed by CM External Care Coordination     Follow-up specialty is recommended: Yes    Follow-up plan:    Future Appointments   Date Time Provider Department Center   2025  6:45 AM  LAB [facility]   2025  8:00 AM UC SIPC INFUSION NURSE INPR [facility]   2025  9:00 AM UC SPEC INF BADIAS FLEX UCINPR [facility]   2025  7:15 AM  LAB [facility]   2025  8:00 AM UC SIPC INFUSION NURSE UCINPR [facility]   2025  8:30 AM UC SPEC INF ABDIAS FLEX UCINPR [facility]   2025 11:30 AM Salma Alvares APRN CNP TXO [facility]   2025  9:20 AM UCSCXR1 UCCXR [facility]   2025  9:45 AM  LAB [facility]   2025 10:45 AM Salma Alvares APRN CNP TXO [facility]   2025 11:30 AM [facility]   2025  8:00 AM  LAB [facility]   2025  9:00 AM Amadou Lai MD [facility]   10/2/2025 11:30 [facility]   2025  9:30 AM  LAB [facility]   2025 10:30 AM Jyoti Wall MD [facility]   12/10/2025  2:20 PM UCSCUS1 [facility]    1/27/2026  7:45 AM  LAB [facility]   1/27/2026  8:30 AM Amadou Lai MD [facility]   4/28/2026  9:00 AM  LAB [facility]   4/28/2026 10:00 AM Amadou Lai MD [facility]   6/8/2026 12:20 PM Mari Davalos PA-C [facility]   6/15/2026  2:30 PM [facility]udez, [facility]   7/28/2026 10:00 AM  LAB [facility]   7/28/2026 11:00 AM Amadou Lai MD [facility]       Any outstanding tests or procedures:        Referrals       Future Labs/Procedures    Home Care Referral     Comments:    Your provider has ordered home health services. If you have not been contacted within 2 days of your discharge please call the selected Home Care agency listed on your Discharge document.  If a Home Care agency is NOT listed, please call [phone].    Home Care Referral     Comments:    Your provider has ordered home health services. If you have not been contacted within 2 days of your discharge please call the selected Home Care agency listed on your Discharge document.  If a Home Care agency is NOT listed, please call [phone].            Supplies       Future Labs/Procedures    Oxygen Adult/Peds     Comments:    Oxygen Documentation  I certify that this patient, Aidan Louie has been under my care (or a nurse practitioner or physican's assistant working with me). This is the face-to-face encounter for oxygen medical necessity.      At the time of this encounter, I have reviewed the qualifying testing and have determined that supplemental oxygen is reasonable and necessary and is expected to improve the patient's condition in a home setting.         Patient has continued oxygen desaturation due to Acute Respiratory Failure J96.01.    If portability is ordered, is the patient mobile within the home? yes    Was this visit performed as a telehealth visit: No    Resume oxygen, no change  Surgical Airway Tracheostomy Mindy Guerrero            Cody Recommendations:    Home  Care in [place] SOC 8/14.   Follow closely with transplant  Loren Ardon    AVS/Discharge Summary is the source of truth; this is a helpful guide for improved communication of patient story

## 2025-07-30 NOTE — ANESTHESIA PROCEDURE NOTES
Airway       Patient location during procedure: OR  Staff -        Anesthesiologist:  Bill Khan MD       Other Anesthesia Staff: Reema Aleman       Performed By: anesthesiologist  Consent for Airway        Urgency: elective  Indications and Patient Condition       Indications for airway management: miguel-procedural       Induction type:intravenous       Mask difficulty assessment: 0 - not attempted (Trach ventilation)    Final Airway Details       Final airway type: trach/surgical airway (Old trach exchanged with cuffed trach for procedure)    Trach/Surgical Airway Details        Type: Tracheostomy       Brand: [employer]       Style: Cuffed       Size: 5      Post intubation assessment        Placement verified by: capnometry        Number of attempts at approach: 1       Secured with: ties       Ease of procedure: easy       Dentition: Intact and Unchanged

## 2025-07-30 NOTE — PROGRESS NOTES
Patient removed from UNOS waitlist after DBD donor left [facility] transplant. UNOS ID [id number] .    Donor Has Risk Criteria for Transmission of HIV/HCV/HBV: No  Recipient Notified of Risk Criteria: Na

## 2025-07-30 NOTE — CONSULTS
[facility]  Transplant Nephrology Consult Note  Date of Admission:  7/30/2025  Today's Date: 07/30/2025  Requesting physician: Jose Tariq*    Reason for Consult:  DDKT    Recommendations:   - No acute indications for dialysis prior to transplant.     Assessment & Plan   # DDKT: Stable prior to transplant.    - Baseline Creatinine: ~ TBD   - Proteinuria: Not checked post transplant   - DSA Hx: Not checked recently due to time from transplant   - Last cPRA: 25%   - BK Viremia: Not checked recently due to time from transplant   - Kidney Tx Biopsy Hx: No biopsy history.    # Immunosuppression: Tacrolimus immediate release (goal 8-10), Mycophenolate mofetil (dose 750 mg every 12 hours), and Methylprednisolone (dose taper)   - Induction with Recent Transplant:  High Intensity Protocol   - Continue with intensive monitoring of immunosuppression for efficacy and toxicity.   - Historical Changes in Immunosuppression: None   - Changes: Not at this time    # Infection Prevention:   Last CD4 Level: Not checked  - PJP: Sulfa/TMP (Bactrim)  - CMV: Valganciclovir (Valcyte)      - CMV IgG Ab High Risk Discordance (D+/R-) at time of transplant: Unknown  Present CMV Serostatus: Negative  - EBV IgG Ab High Risk Discordance (D+/R-) at time of transplant: No  Present EBV Serostatus: Positive    # Hypertension: Controlled;  Goal BP: < 150/90   - PTA medications: amlodipine 5 mg daily and carvedilol 3.125 mg bid.   - Changes: Not at this time    # Anemia in Chronic Renal Disease: Hgb: Stable, low      POORNIMA: No   - Iron studies: Replete    # Mineral Bone Disorder:    - Secondary renal hyperparathyroidism; PTH level: Significantly elevated (601-1200 pg/ml)        On treatment: None  - Vitamin D; level: Normal        On supplement: No  - Calcium; level: Normal        On supplement: No  - Phosphorus; level: Not checked recently        On supplement: No    # Electrolytes:  - Potassium;  level: Low        On supplement: No  - Magnesium; level: Not checked recently        On supplement: No  - Bicarbonate; level: Normal        On supplement: No  - Sodium; level: Normal    # Other Significant PMH:   - 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 Patient self caths a few times per day. Takes cranberry supplements. Follows with Urology.    - Chronic respiratory failure and restrictive lung disease: s/p tracheostomy.   - History of malnutrition: secondary to poor oral intake now s/p G-tube.     # Transplant History:  Etiology of Kidney Failure: Reflux nephropathy  Tx: DDKT  Transplant: N/A  Significant transplant-related complications: None    Recommendations were communicated to the primary team verbally.    Seen and discussed with Dr. Molina.    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     Prior to transplant he had been on HD since 12/2021 and continued to make urine. On non HD days he will self cath 3-6 times and on HD days 2-3 times. Plan for DDKT (DBD) today. He currently denies shortness of breath, chest pain, abdominal pain, fevers, chills, sweats, LE edema.     Review of Systems   The 10 point Review of Systems is negative other than noted in the HPI or  here.      MEDICATIONS:  Current Facility-Administered Medications   Medication Dose Route Frequency Provider Last Rate Last Admin    acetaminophen (TYLENOL) tablet 975 mg  975 mg Oral Once Aidan Dominguez MD        Or    acetaminophen (TYLENOL) Suppository 650 mg  650 mg Rectal Once Aidan Dominguez MD        anti-thymocyte globulin (THYMOGLOBULIN - Rabbit) 100 mg in sodium chloride 0.9 % 270 mL intermittent infusion  2 mg/kg Intravenous Once Aidan Dominguez MD        cefuroxime (ZINACEF) 1.5 g vial to attach to  ml bag for ADULTS or NS 50 ml bag for PEDS  1.5 g Intravenous Once Aidan Dominguez MD        And    cefuroxime (ZINACEF) 1.5 g vial to attach to  ml bag for ADULTS or NS 50 ml bag for PEDS  1.5 g Intravenous See Admin Instructions Aidan Dominguez MD        methylPREDNISolone sodium succinate (solu-MEDROL) 500 mg in sodium chloride 0.9 % 104 mL intermittent infusion  500 mg Intravenous Once Jose Tariq MD        mycophenolate mofetil (CELLCEPT) 1,000 mg in D5W intra-operative intermittent infusion  1,000 mg Intravenous Once Aidan Dominguez MD        sodium chloride (PF) 0.9% PF flush 3 mL  3 mL Intracatheter Q8H [facility] Aidan Dominguez MD         Current Facility-Administered Medications   Medication Dose Route Frequency Provider Last Rate Last Admin       Physical Exam   Temp  Av.8  F (36.6  C)  Min: 97.8  F (36.6  C)  Max: 97.8  F (36.6  C)      Pulse  Av  Min: 72  Max: 72 Resp  Av  Min: 12  Max: 12  SpO2  Av %  Min: 97 %  Max: 97 %     BP (!) 139/90 (BP Location: Right arm)   Pulse 72   Temp 97.8  F (36.6  C) (Oral)   Resp 12   Wt 45.8 kg (100 lb 14.4 oz)   SpO2 97%   BMI 19.06 kg/m      Admit Weight: 45.8 kg (100 lb 14.4 oz)     GENERAL APPEARANCE: alert and no distress  HENT: mouth without ulcers or lesions  RESP: lungs clear to auscultation - no rales, rhonchi or wheezes  CV: regular rhythm, normal rate, no rub, no murmur  EDEMA: no LE edema  bilaterally  ABDOMEN: soft, nondistended, nontender, bowel sounds normal  MS: extremities normal - no gross deformities noted, no evidence of inflammation in joints, no muscle tenderness  SKIN: no rash  DIALYSIS ACCESS: RUE fistula thrill present    Data   All labs reviewed by me.  CMP  Recent Labs   Lab 07/30/25  1031 07/30/25  0820   NA  --  143   POTASSIUM  --  3.2*   CHLORIDE  --  100   CO2  --  27   ANIONGAP  --  16*   GLC 85 90   BUN  --  37.0*   CR  --  6.39*   GFRESTIMATED  --  11*   SUSAN  --  10.0   PROTTOTAL  --  6.8   ALBUMIN  --  4.5   BILITOTAL  --  0.2   ALKPHOS  --  69   AST  --  15   ALT  --  11     CBC  Recent Labs   Lab 07/30/25  0820   HGB 9.4*   WBC 5.5   RBC 3.02*   HCT 28.7*   MCV 95   MCH 31.1   MCHC 32.8   RDW 15.9*        INRNo lab results found in last 7 days.  ABGNo lab results found in last 7 days.   Urine Studies  Recent Labs   Lab Test 04/06/23  1853 11/30/20  1132 07/11/17  1341   COLOR Light Yellow Yellow Yellow   APPEARANCE Slightly Cloudy* Clear Clear   URINEGLC 30* Negative Negative   URINEBILI Negative Negative Negative   URINEKETONE Trace* Negative Negative   SG 1.015 1.020 1.005   UBLD Small* Small* Negative   URINEPH 6.0 8.5* 6.0   PROTEIN 300* >300* 100*   UROBILINOGEN  --  0.2  --    NITRITE Negative Negative Positive*   LEUKEST Large* Small* Small*   RBCU 3*  --  <1   WBCU 80*  --  6*     Recent Labs   Lab Test 08/30/21  1307 01/03/19  1435 07/11/17  1341   UTPG 4.92* 2.60* 3.04*     PTH  Recent Labs   Lab Test 08/30/21  1300 11/30/20  1415 12/09/19  1311 05/13/19  1027 01/03/19  1438 07/11/17  1337   PTHI 1,178* 858* 331* 195* 177* 191*     Iron Studies  Recent Labs   Lab Test 04/13/23  0749 08/30/21  1300 11/30/20  1415 08/09/18  1056 08/09/18  1046 07/11/17  1337   IRON 24* 74 92 178  --  77   * 254 272 266  --  296   IRONSAT 21 29 34 67  --  26   JUVENAL 1,092* 66 43  --  14 16*       IMAGING:  All imaging studies reviewed by me.    Past Medical History    I  have reviewed this patient's medical history and updated it with pertinent information if needed.   Past Medical History:   Diagnosis Date    Bilateral reflux nephropathy     CKD (chronic kidney disease) stage 4, GFR 15-29 ml/min (H)     End stage renal disease (H)     Hearing loss     Hypertension     Kyphosis     Dawood Reece sequence     Prune belly syndrome     Recurrent UTI     Respiratory bronchiolitis interstitial lung disease (H)     Restrictive lung disease     Scoliosis     Thyroid disease     Tracheostomy dependence (H)        Past Surgical History   I have reviewed this patient's surgical history and updated it with pertinent information if needed.  Past Surgical History:   Procedure Laterality Date    Bilateral ureteral reimplantation  2002    abdominoplasty, Mitrofanoff creation    CYSTOSCOPY  12    EXAM UNDER ANESTHESIA THROAT  6/10/2003    tonsillar hypertrophy    EXAM UNDER ANESTHESIA, RESTORATIONS, EXTRACTION(S) DENTAL COMPLEX, COMBINED  2006    GASTROSTOMY TUBE      GASTROSTOMY TUBE  3/16/2002    button change    HERNIORRHAPHY INGUINAL BILATERAL  89    left ochiopexy    IR CVC NON TUNNEL LINE REMOVAL  2023    IR CVC TUNNEL PLACEMENT > 5 YRS OF AGE  2023    LARYNGOSCOPY, BRONCHOSCOPY, COMBINED  2002    LARYNGOSCOPY, BRONCHOSCOPY, COMBINED  2002    bilateral ear debridement    LARYNGOSCOPY, BRONCHOSCOPY, COMBINED  2003    LARYNGOSCOPY, BRONCHOSCOPY, COMBINED  2007    Failed Deflux injection    LARYNGOSCOPY, BRONCHOSCOPY, COMBINED  13     VESICOSTOMY      Redo right ureteral reimplantation  3/12/2004    Excision bladder diverticuli, Left to Right pyelopyelostomy    REMOVE CATHETER PERITONEAL N/A 2023    Procedure: REMOVAL, CATHETER, DIALYSIS, PERITONEAL; IJ TEMPORARY DIALYSIS CATHETER PLACED;  Surgeon: Anurag Thompson MD;  Location: [facility] OR    Replacement of trach  10/15/12    Takedown and revision of Mitrofanoff  stoma  12/8/2006    TRACHEOSTOMY INFANT         Family History   I have reviewed this patient's family history and updated it with pertinent information if needed.   Family History   Problem Relation Age of Onset    Diabetes Type 2  Mother     Diabetes Type 2  Maternal Grandmother     Deep Vein Thrombosis (DVT) Maternal Grandmother     Diabetes Type 2  Maternal Grandfather     Diabetes Type 2  Paternal Grandmother     Alzheimer Disease Paternal Grandfather     Thyroid Disease Maternal Aunt         hashimoto    Anesthesia Reaction Maternal Aunt         patients mom reports her sister had an allergic reaction to something during a previous surgery    Thyroid Cancer Maternal Great-Grandmother        Social History   I have reviewed this patient's social history and updated it with pertinent information if needed. Aidan Louie  reports that he has never smoked. He has never used smokeless tobacco. He reports that he does not drink alcohol and does not use drugs.    Prior to Admission Medications   Medications Prior to Admission   Medication Sig Dispense Refill Last Dose/Taking    acetaminophen (TYLENOL) 500 MG tablet Take 500 mg by mouth every 8 hours as needed    Unknown    albuterol (2.5 MG/3ML) 0.083% nebulizer solution Take 1 ampule by nebulization 4 times daily    7/30/2025 at  1:00 AM    amLODIPine (NORVASC) 5 MG tablet Take 5 mg by mouth daily.   7/29/2025 Morning    bisacodyl (DULCOLAX) 5 MG EC tablet Take 1 tablet (5 mg) by mouth daily (Patient taking differently: Take 5 mg by mouth daily as needed.) 30 tablet 1 Taking Differently    calcium carbonate (TUMS) 500 MG chewable tablet Take 0.5 tablets by mouth daily.   7/30/2025 at  2:00 AM    carvedilol (COREG) 3.125 MG tablet Take 3.125 mg by mouth 2 times daily.   7/29/2025    Cranberry 500 MG TABS Take by mouth 3 times daily (with meals)   7/30/2025 at  2:00 AM    diphenhydrAMINE (BENADRYL) 25 MG capsule Take 25 mg by mouth every 6 hours as needed for  itching or allergies   Taking As Needed    docusate sodium (COLACE) 100 MG capsule Take 100 mg by mouth 3 times daily.   7/29/2025    famotidine (PEPCID) 40 MG tablet Take 40 mg by mouth 2 times daily    7/30/2025 at  2:00 AM    ipratropium (ATROVENT) 0.02 % neb solution Take 0.5 mg by nebulization 4 times daily  225 mL  7/30/2025 at  1:00 AM    lidocaine-prilocaine (EMLA) 2.5-2.5 % external cream Apply topically daily as needed for moderate pain. Apply to fistula site before dialysis   Past Week    loratadine (CLARITIN) 10 MG tablet Take 1 tablet by mouth daily   7/29/2025 Morning    multivitamin w/minerals (THERA-VIT-M) tablet Take 1 tablet by mouth daily.   7/29/2025 Morning    sennosides (SENOKOT) 8.6 MG tablet Take 1 tablet by mouth 2 times daily as needed.   Taking As Needed    sertraline (ZOLOFT) 100 MG tablet Take 100 mg by mouth daily.   7/29/2025 Morning    sevelamer carbonate (RENVELA) 800 MG tablet Take 1,600 mg by mouth 3 times daily (with meals).   7/29/2025    sodium zirconium cyclosilicate (LOKELMA) 10 g PACK packet Take 10 g by mouth every other day. On non-hemodialysis days   Past Week    vitamin D3 (CHOLECALCIFEROL) 50 mcg (2000 units) tablet Take 1 tablet by mouth daily.   7/30/2025 at  2:00 AM    cinacalcet (SENSIPAR) 30 MG tablet Take 30 mg by mouth daily       Disposable Gloves (CAREMATES NITRILE GLOVES LARGE) MISC 4 Boxes every 30 days. 4 each 11     Nutritional Supplements (NEPRO) LIQD Take 1 Can by mouth daily 30 each 11     order for DME Equipment being ordered: Medi-Vac, plastic tubing connector lara type, cat 361, [facility] [employer] [facility] [employer], 1 lara tip/month x 12 months. Use for tracheostomy suctioning 1 each 11     order for DME Equipment being ordered: DME    Adult Bivona Uncuffed Tracheostomy Tube-5.0    Patient will do weekly trach changes. 4 Units 11     order for DME Equipment being ordered: DME -    Deep suction kits - 12 fr 90 each 3     order for DME Tracheostomy  "Humidification Equipment  Equipment being ordered:     1) Air compressor  2) Nebulizer bottle  3) Aerosol tubing  4) Trach mask  5) Sterile water  6) Saline ampules (\"bullets\")  7) Heat Moisture Exchanger (HME) Also known by several other terms including: Thermal Humidifying Filters, Swedish nose, Artificial nose, Filter, Thermovent T.  8) Room/home humidifiers 1 each 11     order for DME Optifoam Basic   [id number] 30 each 11     order for DME Equipment being ordered: Humidifier (heated), humidifier attachment, saline lavages, humidivent mask 1 each 3     predniSONE (DELTASONE) 20 MG tablet Take 20 mg by mouth daily as needed Take only when in Red Zone       sodium chloride 0.9%, bottle, 0.9 % irrigation Irrigate with 250 mLs as directed 4 times daily.       sodium chloride 0.9%, bottle, 0.9 % irrigation Irrigate with 250 mLs as directed 4 times daily. 69534 mL 3     sodium chloride 0.9%, bottle, 0.9 % irrigation Irrigate with 250 mLs as directed 4 times daily 09692 mL 3     sodium chloride 0.9%, bottle, 0.9 % irrigation Irrigate with as directed 3 times daily Bladder irrigation       Syringe Luer Slip (CAREPOINT SYRINGE LUER SLIP) 60 ML MISC 1 Syringe 4 times daily. 200 each 11     Syringe Luer Slip 60 ML MISC 1 Syringe 4 times daily 200 each 11     tobramycin, PF, (DAX) 300 MG/5ML nebulizer solution Take 1 ampule by nebulization as needed Take only when in Red Zone       traZODone (DESYREL) 50 MG tablet Take 25 mg by mouth nightly as needed          "

## 2025-07-30 NOTE — ANESTHESIA PREPROCEDURE EVALUATION
Anesthesia Pre-Procedure Evaluation    Patient: Aidan Louie   MRN: [id number] : 1995          Procedure : Procedure(s):  Transplant kidney recipient  donor         Past Medical History:   Diagnosis Date    Bilateral reflux nephropathy     CKD (chronic kidney disease) stage 4, GFR 15-29 ml/min (H)     dialysis    End stage renal disease (H)     Hearing loss     Bilateral hearing aids    Hypertension     Kyphosis     Dawood Reece sequence     Prune belly syndrome     Recurrent UTI     Respiratory bronchiolitis interstitial lung disease (H)     Restrictive lung disease     Scoliosis     Thyroid disease     Tracheostomy dependence (H)     Has speaking valve.  Does not use a vent.      Past Surgical History:   Procedure Laterality Date    Bilateral ureteral reimplantation  2002    abdominoplasty, Mitrofanoff creation    CYSTOSCOPY  12    EXAM UNDER ANESTHESIA THROAT  6/10/2003    tonsillar hypertrophy    EXAM UNDER ANESTHESIA, RESTORATIONS, EXTRACTION(S) DENTAL COMPLEX, COMBINED  2006    GASTROSTOMY TUBE      GASTROSTOMY TUBE  3/16/2002    button change    HERNIORRHAPHY INGUINAL BILATERAL  89    left ochiopexy    IR CVC NON TUNNEL LINE REMOVAL  2023    IR CVC TUNNEL PLACEMENT > 5 YRS OF AGE  2023    LARYNGOSCOPY, BRONCHOSCOPY, COMBINED  2002    LARYNGOSCOPY, BRONCHOSCOPY, COMBINED  2002    bilateral ear debridement    LARYNGOSCOPY, BRONCHOSCOPY, COMBINED  2003    LARYNGOSCOPY, BRONCHOSCOPY, COMBINED  2007    Failed Deflux injection    LARYNGOSCOPY, BRONCHOSCOPY, COMBINED  13     VESICOSTOMY      Redo right ureteral reimplantation  3/12/2004    Excision bladder diverticuli, Left to Right pyelopyelostomy    REMOVE CATHETER PERITONEAL N/A 2023    Procedure: REMOVAL, CATHETER, DIALYSIS, PERITONEAL; IJ TEMPORARY DIALYSIS CATHETER PLACED;  Surgeon: Anurag Thompson MD;  Location: [facility] OR    Replacement of trach   10/15/12    Takedown and revision of Mitrofanoff stoma  12/8/2006    TRACHEOSTOMY INFANT        Allergies   Allergen Reactions    Latex      Other reaction(s): Other (see comments)  Other reaction(s): Contact Dermatitis, Other (see comments)    Adhesive Tape Rash      Social History     Tobacco Use    Smoking status: Never    Smokeless tobacco: Never   Substance Use Topics    Alcohol use: No     Alcohol/week: 0.0 standard drinks of alcohol      Wt Readings from Last 1 Encounters:   07/30/25 45.8 kg (100 lb 14.4 oz)        Anesthesia Evaluation   Pt has had prior anesthetic. Type: General.        ROS/MED HX  ENT/Pulmonary: Comment: Restrictive lung disease      Neurologic:       Cardiovascular:     (+)  hypertension- -   -  - -                                      METS/Exercise Tolerance:     Hematologic:       Musculoskeletal:       GI/Hepatic:       Renal/Genitourinary:     (+) renal disease, type: ESRD, Pt requires dialysis, type: Hemodialysis,          Endo:     (+)          thyroid problem,            Psychiatric/Substance Use:       Infectious Disease:       Malignancy:       Other:              Physical Exam  Airway  Mallampati: unable to assess  Neck ROM: unable to assess  Upper bite lip test: unable to assess  Mouth opening: unable to assess    Cardiovascular   Rhythm: regular     Dental   (+) Minor Abnormalities - some fillings, tiny chips      Pulmonary Breath sounds clear to auscultation        Neurological   He appears awake.    Other Findings       OUTSIDE LABS:  CBC:   Lab Results   Component Value Date    WBC 5.5 07/30/2025    WBC 9.0 04/18/2023    HGB 9.4 (L) 07/30/2025    HGB 7.7 (L) 04/18/2023    HCT 28.7 (L) 07/30/2025    HCT 25.2 (L) 04/18/2023     07/30/2025     (H) 04/18/2023     BMP:   Lab Results   Component Value Date     07/30/2025     04/22/2023    POTASSIUM 3.2 (L) 07/30/2025    POTASSIUM 5.7 (H) 04/22/2023    CHLORIDE 100 07/30/2025    CHLORIDE 101 04/22/2023     "CO2 27 07/30/2025    CO2 20 (L) 04/22/2023    BUN 37.0 (H) 07/30/2025    BUN 56.5 (H) 04/22/2023    CR 6.39 (H) 07/30/2025    CR 4.93 (H) 04/22/2023    GLC 85 07/30/2025    GLC 90 07/30/2025     COAGS:   Lab Results   Component Value Date    PTT 31 07/30/2025    INR 1.08 07/30/2025     POC: No results found for: \"BGM\", \"HCG\", \"HCGS\"  HEPATIC:   Lab Results   Component Value Date    ALBUMIN 4.5 07/30/2025    PROTTOTAL 6.8 07/30/2025    ALT 11 07/30/2025    AST 15 07/30/2025    ALKPHOS 69 07/30/2025    BILITOTAL 0.2 07/30/2025     OTHER:   Lab Results   Component Value Date    LACT 1.4 04/10/2023    A1C 4.6 07/30/2025    SUSAN 10.0 07/30/2025    PHOS 6.3 (H) 04/22/2023    MAG 1.5 (L) 04/22/2023    TSH 1.51 06/05/2019    T4 0.8 06/05/2019    CRP <2.9 09/17/2015    SED 66 (H) 04/06/2023       Anesthesia Plan    ASA Status:  4      NPO Status: NPO Appropriate   Anesthesia Type: General.  Airway: tracheostomy.  Induction: intravenous.  Maintenance: Balanced.   Techniques and Equipment:       - Monitoring Plan: standard ASA monitoring, central line kit     Consents    Anesthesia Plan(s) and associated risks, benefits, and realistic alternatives discussed. Questions answered and patient/representative(s) expressed understanding.     - Discussed: anesthesiologist     - Discussed with:  Patient, family          Blood Consent:      - Discussed with: patient, family.     - Consented: consented to blood products     Postoperative Care    Pain management: plan for postoperative opioid use.     Comments:                   Bill Khan MD    I have reviewed the pertinent notes and labs in the chart from the past 30 days and (re)examined the patient.  Any updates or changes from those notes are reflected in this note.    Clinically Significant Risk Factors Present on Admission        # Hypokalemia: Lowest K = 3.2 mmol/L in last 2 days, will replace as needed            # Hypertension: Noted on problem list      # Anemia: based on " hgb <11

## 2025-07-30 NOTE — TELEPHONE ENCOUNTER
TRANSPLANT OR REPORT    Organ: Kidney  Laterality (if known): LEFT  Organ Location: Local    UN ID: [id number]  Donor OR Time: 7/29 2300  Actual Cross Clamp Time: 7/30 0038  Expected Organ Arrival Time: 0300    Surgeon: Marciano  Time in OR: To follow prior KI  Time in 3C: 1000    Recipient Details  Admission ETA: 0600  Unit: 7A  Isolation: No  Latex Allergy: No  : No  Diagnosis: ESKD    Kidney/Panc Transplants  XM Status (Need to wait for XM?): Pending        Transplant Coordinator Contact Info: Flavia       Vessel Bank Information  Transplant hospitals must not store a donor s extra vessels if the donor has tested positive for any of the following:   - HIV by antibody, antigen, or nucleic acid test (RACQUEL)   - Hepatitis B surface antigen (HBsAg)   - Hepatitis B (HBV) by RACQUEL   - Hepatitis C (HCV) by antibody or RACQUEL     Extra vessels from donors that do not test positive for HIV, HBV, or HCV as above may be stored

## 2025-07-30 NOTE — TELEPHONE ENCOUNTER
Organ Offer Encounter Information    Organ Offer Information  Organ offer date & time: 7/30/2025  1:22 AM  Coordinator/Fellow/Attending name: Lupe Gutierrez RN   Organ(s):  Organ UNOS ID Match Run ID Comment Organ Laterality   Kidney [id number]          Recent infections?: Yes (Comment: Temp 99. Slight cough- Allergies. Lightheaded at dialysis- chills- light on water) Recent IV antibiotics?: Neg     New medications?: Yes (Comment: Lokelma, Increased Sevelemar dt Phos) Recent pregnancy?: No (Comment: NA)     Angicoagulation medications?: No Recent vaccinations?: No     Recent blood transfusions?: No Recent hospitalizations?: No   Has your insurance changed in the last 6-12 months?: Neg    Patient last dialyzed: 7/30/2025  8:00 PM  Dialysis type: Hemo  Discussed organ offer with: Parent/Legal Guardian  Patient/Caregiver name: Parent/Guardian Valerie  Discussed risk category with Patient/Other: N/A  Understood donor criteria, verbalized understanding  Patient/Other asked to speak to a surgeon?: No  Discussed program-specific outcomes: Did not have questions regarding SRTR  Right to decline organ offer without penalty, Patient/Other: Aware of option to decline without penalty  Organ offer decision status Patient/Other: Accepted Offer  Organ disposition: Transplanted  Additional Comments: 7/30/2025 1:23 AM  Kidney: Local/Primary/DBD  MD: Marciano/Umberto  OPO Contact: LS Imports  VXM Results: DSA to CW7- mfi 930 in peak date- none in current per Dr. Easley  XM Plan (FXM must be done with serum no older than 10 days from transplant): Plan to be drawn upon admit  Donor/Recip HCV Status: Donor Negative  Is this an ABO A2 donor to ABO B Recipient: No  Plan (Admission, NPO, Donor OR): Called patient's mom regarding DBD local kidney offer. Mom noted that patient came back from dialysis today and has a mild temp- 99 degrees. He feels fine. Had diarrhea earlier in the week- but has been fine for the past 3 days. Mom  got back from trip a week ago and came home with diarrhea- she is still recovering. Mom also has Infected sores- her doctor didn't swab for MRSA. She verified Greg does not have any. Greg has been NPO since 2300. Plan to admit patient for kidney transplant at 0600. Provided admission instructions.  - - -   COVID Screening  In the past month, have you:  Or anyone close to you had a positive COVID test or suspected to have COVID: No  Had any COVID symptoms (Fever, Cough, Short of Breath, Loss of Taste/Smell, Rash): No    Admissions: 0224 Lizeth  Unit: 0130: 7A Yarelis   Update Provider Entering Orders (XM Plan & COVID Testing):  0228 ELSA SORENSEN [ Msg Id [id number] ]  Immunology: 0212 Lizeth  KIDNEY Research ([phone]): Done 0230  Book OR: 0259 Samantha  Vessel Storage Confirmation (PA/CHARLEY/MILDRED): NA  Blood Bank: 0233 [address]  TransNet/ABO Verification: Printed 0256  Add Organ: Done 0231     Lupe Gutierrez  Transplant Coordinator    Attestation I have discussed all of the above with the Patient/Legal Guardian/Caregiver regarding this organ offer.: Yes  Coordinator/Fellow/Attending name: Lupe Gutierrez, RN

## 2025-07-30 NOTE — OP NOTE
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 Left kidney  transplant,  Donation after Brain Death, Right  iliac fossa, without vascular reconstruction. A J-J ureteral stent was placed.  2. Kidney allograft preparation on Back Table  22 modifier should be used given his extensive reoperative abdomen for urologic reconstruction. The bladder was very deep and scarred and difficult to visualize and access    Surgeon: Jose Tariq MD    Fellow/Assistant:  Ap Shipman MD- Fellow. Dr. Shipman was the primary assistant for the procedure and participated in all aspects of the case under my supervision. His presence was necessary due to lack of a suitable level surgical resident to assist.   Deangelo Clifton MD- resident. Dr Clifton was a secondary assistant for the procedure who participated in exposure, venous anastomosis, and closure.     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 After discussing the risks and benefits of proceeding, the patient agreed to proceed with surgery and provided informed consent.  Findings: Integrity of recipient artery: Normal. Donor 15 yo DBD, KDPI 1%. Terminal Cr 0.4. Pump flow ~220, R 0.08. 1/1/2 mismatch, Cw DSA on remote sample (MFI~ 500) but not on current sample. Placed in right iliac fossa, end to side to external iliac vessels. Ureter anastomosed to augmented bladder- very difficult anastomosis due to reoperative field. URETERAL STENT PLACED, Catheter left in mitrofanoff.   Intraoperative Events: None    Final  ABO/Crossmatch verification: After the donor organ arrived to the operating room and prior to anastomosis, I participated in the transplant pre-verification upon organ receipt timeout by visually verifying the donor ID, organ and laterality, donor blood type, recipient unique identifier, recipient blood type, and that the donor and recipient are blood type compatible. The crossmatch was done prospectively; the T cell flow crossmatch result was negative and B cell flow crossmatch result was negative prior to anastomosis.  The patient received Thymoglobulin, Cellcept, and Solumedrol on induction.    Donor Organ Information:   Donor UNOS ID:  [id number]    Donor arterial clamp on:  7/30/2025 12:38 AM    Total ischemic time:  902 min    Cold ischemic time:  902 min    Warm ischemic time:  38 min    Preservation fluid:  UW      Back Table Details:   Procedure:  Bench preparation of the kidney allograft for transplantation without vascular reconstruction    Surgeon:  Jose Tariq MD    Faculty Co-Surgeon:  JOSELINE GABRIEL    Fellow/Assistant:  As above    Donor arrival to recipient room:  7/30/2025 12:42 PM    Graft injury:  No    Graft biopsy:  no    Organ received on:  Pump    Pump resistance:  0.08    Pump flow:  226    Arterial anatomy:  Single    Donor arterial quality:  Normal    Venous anatomy:  Single    Ureteral anatomy:  Single    Any reconstruction:  No    Artery:  N/a    Vein:  N/a     Complications: None.    Findings: Normal as above      None.    Back Table Preparation:  The donor kidney was received and inspected. It had been flushed with UW. The graft was prepared on the back table by removing perinephric fat and ligating venous tributaries and lymphatics. The ureter was also cleaned of excess tissue. If required, reconstruction was performed as detailed above. The kidney was stored in iced cold preservation solution until ready for transplantation. Faculty was present for the critical portions of the  procedure.    Operative Procedure:   Arterial anastomosis start:  7/30/2025  3:02 PM    Arterial unclamp:  7/30/2025  3:40 PM    Extra vessels used:    N/a      The patient was brought to the operating room, placed in a supine position, and a time out was performed. Sequential compression devices were placed on both lower extremities and general endotracheal anesthesia was induced.  The patient was given IV antibiotics and a Aguirre catheter. A central line was not placed by Anesthesia service. The abdomen was then shaved, prepped, and draped in the usual sterile fashion.  An incision was made in the right lower quadrant and carried down through the subcutaneous tissue and the abdominal wall fascia. If encountered, the epigastric vessels were ligated in continuity, divided and secured with surgical clips. The right iliac artery and vein were exposed. The retractor system was placed and the lymphatics overlying the vessels were serially ligated and divided.     The patient was heparinized. We applied atraumatic vascular clamps and the donor kidney was brought to the operative field. We made a venotomy and the renal vein was anastomosed to the recipient right External Iliac vein in an end-to-side fashion. An arteriotomy was made and the donor renal artery was anastomosed to the recipient right external iliac artery  in an end to side fashion. The patient was simultaneously loaded with IV mannitol, Lasix and volume. The renal artery was protected and the clamps were removed. After several cardiac cycles, we opened the renal artery and the kidney had Good reperfusion and was firm and pink .    The transplant ureter was managed by creating a Liche (anterior multistitch) anastomosis with absorbable suture. A stent was placed across the anastomosis. The kidney made Yes urine prior to implantation.    Hemostasis was obtained, the anastomoses inspected, and the kidney placed in the iliac fossa. After placement, the vessel lay  was inspected and found to be acceptable. The kidney position was Retroperitoneal. The field was irrigated with antibiotic solution. A drain was placed. The retractor was removed and the abdominal wall fascia reapproximated. Subcutaneous tissues were irrigated and hemostasis obtained.  The skin was reapproximated with running subcuticular stitch and dermabond was applied.   All needle, sponge and instrument counts were correct x 2. The patient was awakened, extubated, and transferred to PACU for post-op monitoring. Faculty was present for key portions of the procedure.

## 2025-07-30 NOTE — PROGRESS NOTES
Pt left unit at ~1100 to pre-op. Patient went to 3C with his capped trach, bilateral hearing aids, and home obturator (in biohazard bag with pt label).    Pt did NOT receive a 2 RN skin check prior to leaving the unit for surgery, will perform one asap once pt returns to floor.

## 2025-07-30 NOTE — ANESTHESIA POSTPROCEDURE EVALUATION
Patient: Aidan Louie    Procedure: Procedure(s):  Transplant kidney recipient  donor, stent placement       Anesthesia Type:  General    Note:  Disposition: ICU            ICU Sign Out: Anesthesiologist/ICU physician sign out WAS performed   Postop Pain Control: Uneventful            Sign Out: Well controlled pain   PONV: No   Neuro/Psych: Uneventful            Sign Out: Acceptable/Baseline neuro status   Airway/Respiratory: Uneventful            Sign Out: Acceptable/Baseline resp. status   CV/Hemodynamics: Uneventful            Sign Out: Acceptable CV status; No obvious hypovolemia; No obvious fluid overload   Other NRE: NONE   DID A NON-ROUTINE EVENT OCCUR? No           Last vitals:  Vitals Value Taken Time   /88 25 18:15   Temp 36.8  C (98.3  F) 25 18:00   Pulse 101 25 18:28   Resp 27 25 18:28   SpO2 100 % 25 18:28   Vitals shown include unfiled device data.    Electronically Signed By: Lupe Jj DO  2025  6:28 PM

## 2025-07-30 NOTE — PLAN OF CARE
Goal Outcome Evaluation:      Plan of Care Reviewed With: patient    Overall Patient Progress: no changeOverall Patient Progress: no change    Outcome Evaluation: pt. in surgery      BP (!) 142/88   Pulse 76   Temp 97.8  F (36.6  C) (Oral)   Resp 16   Wt 45.8 kg (100 lb 14.4 oz)   SpO2 97%   BMI 19.06 kg/m      Shift: 2283-6997  Isolation Status: none  VS: stable on RA, afebrile  Neuro: Aox4  Behaviors: calm, cooperative  Respiratory: WDL  Cardiac: WDL  Diet: NPO  IV Access: R CVC   Lines/Drains: L arm fistula  GI/: Mitrofanoff, self caths.   Mobility: ind  Events/Education: went down to OR at 1100 for kidney transplant

## 2025-07-30 NOTE — ANESTHESIA CARE TRANSFER NOTE
Patient: Aidan Louie    Procedure: Procedure(s):  Transplant kidney recipient  donor, stent placement       Diagnosis: ESRD (end stage renal disease) (H) [N18.6]  Diagnosis Additional Information: No value filed.    Anesthesia Type:   General     Note:    Oropharynx: spontaneously breathing (trach in place - cuff deflated)  Level of Consciousness: drowsy  Oxygen Supplementation: blow-by O2  Level of Supplemental Oxygen (L/min / FiO2): 8  Independent Airway: airway patency satisfactory and stable (trach at baseline)  Dentition: dentition unchanged  Vital Signs Stable: post-procedure vital signs reviewed and stable  Report to RN Given: handoff report given  Patient transferred to: PACU    Handoff Report: Identifed the Patient, Identified the Reponsible Provider, Reviewed the pertinent medical history, Discussed the surgical course, Reviewed Intra-OP anesthesia mangement and issues during anesthesia, Set expectations for post-procedure period and Allowed opportunity for questions and acknowledgement of understanding    Vitals:  Vitals Value Taken Time   /84 25 17:54   Temp     Pulse 102 25 17:54   Resp 18 25 17:54   SpO2 100 % 25 17:54   Vitals shown include unfiled device data.    Electronically Signed By: LIVAN Jacinto CRNA  2025  5:55 PM

## 2025-07-30 NOTE — H&P
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 Mr. Louie's mom, who is also his guardian, was also notified that an acceptable  donor organ has become available and is present at the hospital. Both Mr. Louie and mom have been informed of the risks and benefits regarding  donor organ transplantation, and has elected to proceed with possible DBDKT.      - NPO   - Labs, CXR, EKG, COVID PCR, crossmatch   - Pre-op shower   - Surgeon to obtain consent    LIVAN Ochoa, CNP  Adult Solid Organ Transplant   Contact information via Vocera Web Console or via [facility] Paging/Directory    Transplant Staff: Dr. Tariq     History of Present Illness   Aidan Louie is a 29 year old male who presents pertinent history as noted above for possible DBDKT. His mother, who is also his guardian, notes he has been feeling well over the last one month. He had dialysis last evening and returned tired, though not unusual, but did have a temperature of 99.0. He has had mild diarrhea over the last few days, which his mom has also had since returning  from a camping trip.     By history, Greg has a G- tube but eats PO food and takes his fluid orally unless he is ill. He is not mechanically ventilated via tracheostomy unless in respiratory failure. He ambulates unassisted, typically at his own pace. He is able to make his needs known, and can be quiet when under stress, such as this morning. Mom notes he has had numerous surgeries and has some bad memories of hospitalizations. Mom is aware of the recommendation to stay locally upon discharge post- KT with plan to stay with family in the [facility].     Last dialyzed: last evening  Dialysis pattern: TTS, [place], [place], [facility]   Dialysis access: Left lower forearm AVF  Urine production: self- cath via Mitrofanoff 3 to 6 times per day on non-dialysis days, 1 to 2 times per day on dialysis days   EDW: unknown per mom       Clinically Significant Risk Factors Present on Admission   # Hypertension: Noted on problem list     Past Medical History    I have reviewed this patient's medical history and updated it with pertinent information if needed.   Past Medical History:   Diagnosis Date    Bilateral reflux nephropathy     CKD (chronic kidney disease) stage 4, GFR 15-29 ml/min (H)     End stage renal disease (H)     Hearing loss     Hypertension     Kyphosis 2002    Dawood Reece sequence     Prune belly syndrome     Recurrent UTI     Respiratory bronchiolitis interstitial lung disease (H)     Restrictive lung disease     Scoliosis 2002    Thyroid disease     Tracheostomy dependence (H)        Past Surgical History   I have reviewed this patient's surgical history and updated it with pertinent information if needed.  Past Surgical History:   Procedure Laterality Date    Bilateral ureteral reimplantation  5/16/2002    abdominoplasty, Mitrofanoff creation    CYSTOSCOPY  11/14/11    EXAM UNDER ANESTHESIA THROAT  6/10/2003    tonsillar hypertrophy    EXAM UNDER ANESTHESIA, RESTORATIONS, EXTRACTION(S) DENTAL COMPLEX,  COMBINED  2006    GASTROSTOMY TUBE      GASTROSTOMY TUBE  3/16/2002    button change    HERNIORRHAPHY INGUINAL BILATERAL  89    left ochiopexy    IR CVC NON TUNNEL LINE REMOVAL  2023    IR CVC TUNNEL PLACEMENT > 5 YRS OF AGE  2023    LARYNGOSCOPY, BRONCHOSCOPY, COMBINED  2002    LARYNGOSCOPY, BRONCHOSCOPY, COMBINED  2002    bilateral ear debridement    LARYNGOSCOPY, BRONCHOSCOPY, COMBINED  2003    LARYNGOSCOPY, BRONCHOSCOPY, COMBINED  2007    Failed Deflux injection    LARYNGOSCOPY, BRONCHOSCOPY, COMBINED  12     VESICOSTOMY      Redo right ureteral reimplantation  3/12/2004    Excision bladder diverticuli, Left to Right pyelopyelostomy    REMOVE CATHETER PERITONEAL N/A 2023    Procedure: REMOVAL, CATHETER, DIALYSIS, PERITONEAL; IJ TEMPORARY DIALYSIS CATHETER PLACED;  Surgeon: Anurag Thompson MD;  Location: [facility] OR    Replacement of trach  10/15/12    Takedown and revision of Mitrofanoff stoma  2006    TRACHEOSTOMY INFANT         Prior to Admission Medications   Prior to Admission Medications   Prescriptions Last Dose Informant Patient Reported? Taking?   Cranberry 500 MG TABS   Yes No   Sig: Take by mouth 3 times daily (with meals)   Disposable Gloves (CAREMATES NITRILE GLOVES LARGE) MISC   No No   Si Boxes every 30 days.   Melatonin 10 MG TABS tablet   Yes No   Sig: Take 10 mg by mouth nightly as needed   Neomycin-Bacitracin-Polymyxin (NEOSPORIN ORIGINAL) 3.5-400-5000 OINT   No No   Sig: Externally apply topically as needed (apply as needed for stoma care)   Nutritional Supplements (NEPRO) LIQD   No No   Sig: Take 1 Can by mouth daily   Syringe Luer Slip (CAREPOINT SYRINGE LUER SLIP) 60 ML MISC   No No   Si Syringe 4 times daily.   Syringe Luer Slip 60 ML MISC   No No   Si Syringe 4 times daily   acetaminophen (TYLENOL) 500 MG tablet   Yes No   Sig: Take 500 mg by mouth every 8 hours as needed    albuterol (2.5 MG/3ML) 0.083%  nebulizer solution   Yes No   Sig: Take 1 ampule by nebulization 4 times daily    amLODIPine (NORVASC) 5 MG tablet   Yes No   Sig: Take 5 mg by mouth 2 times daily   azithromycin (ZITHROMAX) 250 MG tablet   Yes No   Sig: Take 250 mg by mouth three times a week Mon, Wed, Fri   bisacodyl (DULCOLAX) 5 MG EC tablet   No No   Sig: Take 1 tablet (5 mg) by mouth daily   calcium carbonate (TUMS) 500 MG chewable tablet   Yes No   Sig: Take 1,000 mg by mouth daily   carvedilol (COREG) 3.125 MG tablet   Yes No   cholecalciferol (VITAMIN D3) 25 mcg (1000 units) capsule   Yes No   Sig: Take 1 capsule by mouth 2 times daily   cinacalcet (SENSIPAR) 30 MG tablet   Yes No   Sig: Take 30 mg by mouth daily   cloNIDine (CATAPRES) 0.1 MG tablet   No No   Sig: Take 1 tablet (0.1 mg) by mouth 2 times daily   diphenhydrAMINE (BENADRYL) 25 MG capsule   Yes No   Sig: Take 25 mg by mouth every 6 hours as needed for itching or allergies   docusate sodium (COLACE) 100 MG capsule   Yes No   Sig: Take 100 mg by mouth 2 times daily   famotidine (PEPCID) 40 MG tablet  Self Yes No   Sig: Take 40 mg by mouth 2 times daily    ferrous gluconate (FERGON) 324 (38 Fe) MG tablet   No No   Sig: Take 1 tablet (324 mg) by mouth daily   gentamicin (GARAMYCIN) 0.1 % external cream   Yes No   Sig: Apply topically daily Peritoneal dialysis site   ipratropium (ATROVENT) 0.02 % neb solution   Yes No   Sig: Take 0.5 mg by nebulization 4 times daily    lactulose 20 GM/30ML solution   Yes No   Sig: Take 20 g by mouth 2 times daily as needed for constipation   loratadine (CLARITIN) 10 MG tablet   Yes No   Sig: Take 1 tablet by mouth daily   mupirocin (BACTROBAN) 2 % external ointment   Yes No   Sig: Apply topically daily as needed (around stoma)   neomycin-bacitracin-polymyxin (NEOSPORIN) 5-400-5000 ointment   Yes No   Sig: Apply topically daily as needed (Around Stoma - uses EITHER neosporin OR Bactroban)   order for DME   No No   Sig: Equipment being ordered:  "Humidifier (heated), humidifier attachment, saline lavages, humidivent mask   order for DME   No No   Sig: Tracheostomy Humidification Equipment  Equipment being ordered:     1) Air compressor  2) Nebulizer bottle  3) Aerosol tubing  4) Trach mask  5) Sterile water  6) Saline ampules (\"bullets\")  7) Heat Moisture Exchanger (HME) Also known by several other terms including: Thermal Humidifying Filters, Swedish nose, Artificial nose, Filter, Thermovent T.  8) Room/home humidifiers   order for DME   No No   Sig: Optifoam Basic   [id number]   order for DME   No No   Sig: Equipment being ordered: DME -    Deep suction kits - 12 fr   order for DME   No No   Sig: Equipment being ordered: Medi-Vac, plastic tubing connector lara type, cat 361, [email or web address], 1 lara tip/month x 12 months. Use for tracheostomy suctioning   order for DME   No No   Sig: Equipment being ordered: DME    Adult Bivona Uncuffed Tracheostomy Tube-5.0    Patient will do weekly trach changes.   polyethylene glycol (MIRALAX) 17 GM/Dose powder   No No   Sig: Take 17 g by mouth daily as needed   predniSONE (DELTASONE) 20 MG tablet   Yes No   Sig: Take 20 mg by mouth daily as needed Take only when in Red Zone   sennosides (SENOKOT) 8.6 MG tablet   Yes No   Sig: Take 1 tablet by mouth 2 times daily   sertraline (ZOLOFT) 25 MG tablet   Yes No   Sig: Take 25 mg by mouth daily.   sevelamer carbonate (RENVELA) 800 MG tablet   Yes No   Sig: TAKE 1 TABLET BY MOUTH THREE TIMES DAILY WITH FOOD WITH MEALS   simethicone (MYLICON) 40 MG/0.6ML suspension   No No   Sig: Take 0.6 mLs (40 mg) by mouth every 6 hours as needed for cramping   sodium chloride 0.9%, bottle, 0.9 % irrigation   Yes No   Sig: Irrigate with as directed 3 times daily Bladder irrigation   sodium chloride 0.9%, bottle, 0.9 % irrigation   No No   Sig: Irrigate with 250 mLs as directed 4 times daily   sodium chloride 0.9%, bottle, 0.9 % irrigation   No No   Sig: Irrigate with 250 mLs " as directed 4 times daily.   sodium chloride 0.9%, bottle, 0.9 % irrigation   No No   Sig: Irrigate with 250 mLs as directed 4 times daily.   tobramycin, PF, (DAX) 300 MG/5ML nebulizer solution   Yes No   Sig: Take 1 ampule by nebulization as needed Take only when in Red Zone   traZODone (DESYREL) 50 MG tablet   Yes No   Sig: Take 25 mg by mouth nightly as needed      Facility-Administered Medications: None     Allergies   Allergies   Allergen Reactions    Latex     Latex      Other reaction(s): Other (see comments)  Other reaction(s): Contact Dermatitis, Other (see comments)    Adhesive Tape Rash       Social History   I have reviewed this patient's social history and updated it with pertinent information if needed. Aidan Louie  reports that he has never smoked. He has never used smokeless tobacco. He reports that he does not drink alcohol and does not use drugs.    Family History   I have reviewed this patient's family history and updated it with pertinent information if needed.   Family History   Problem Relation Age of Onset    Diabetes Type 2  Mother     Diabetes Type 2  Maternal Grandmother     Deep Vein Thrombosis (DVT) Maternal Grandmother     Diabetes Type 2  Maternal Grandfather     Diabetes Type 2  Paternal Grandmother     Alzheimer Disease Paternal Grandfather     Thyroid Disease Maternal Aunt         hashimoto    Anesthesia Reaction Maternal Aunt         patients mom reports her sister had an allergic reaction to something during a previous surgery    Thyroid Cancer Maternal Great-Grandmother        Review of Systems   The 10 point Review of Systems is negative other than noted in the HPI or here.     Physical Exam   Temp: 97.8  F (36.6  C) Temp src: Oral BP: (!) 139/90 Pulse: 72   Resp: 12 SpO2: 97 % O2 Device: None (Room air)    Vital Signs with Ranges  Temp:  [97.8  F (36.6  C)] 97.8  F (36.6  C)  Pulse:  [72] 72  Resp:  [12] 12  BP: (139)/(90) 139/90  SpO2:  [97 %] 97 %  100 lbs 14.4  oz    Constitutional: No obvious acute distress   HEENT: Normocephalic, atraumatic. Well healed tracheotomy in place   Respiratory: No increased work of breathing, he is not hypoxic, lung fields clear  Cardiovascular: S1, S2 without obvious murmur  GI: Soft, non-tender  Lymph/Hematologic: No obvious edema   Genitourinary: Mitrofanoff   Skin: Warm, dry  Musculoskeletal: Expected bulk and tone with expected stature   Neurologic: Awake, does not interact with me which is expected per mom, grossly non-focal   Neuropsychiatric: Calm, follows instructions     Data   Pending

## 2025-07-30 NOTE — PHARMACY-ADMISSION MEDICATION HISTORY
Pharmacist Admission Medication History    Admission medication history is complete. The information provided in this note is only as accurate as the sources available at the time of the update.    Information Source(s): Patient, Family member, Prescription bottles, and CareEverywhere/SureScripts via in-person    Pertinent Information:   Greg manages his own medications. He does not use a medication box or pill organizer.  He has two different strengths and instructions for amlodipine: 5 mg by mouth twice daily and 10 mg by mouth once daily. Per Greg he is currently taking amlodipine 5 mg by mouth once daily. He was able to show me which bottle he is using and how many he takes.  At dialysis unit he receives calcitriol and cinacalcet (unable to verify dose)  Per mom they have been unable to obtain sodium chloride irrigation solution for bladder irrigation for ~ 2 years. Patient does self-cath.    Changes made to PTA medication list:  Added:   Ferrous sulfate 325 mg daily  Lokelma 10 g on non-HD days  Calcitriol 0.25 mcg on dialysis days  Deleted:   Azithromycin 250 mg three times a week MWF  Ferrous gluconate  Gentamicin cream to PD site  Lactulose solution  Mupirocin ointment around stoma  Neosporin around stoma  Trazodone 25 mg nightly prn sleep  Sodium chloride irrigation for bladder (DUPLICATE orders)  Changed:   Amlodipine 5mg twice daily CHANGED to 5 mg once daily in AM  Bisacodyl 5 mg tablet changed to PRN  Calcium carbonate (Tums chewable 500 mg) 1000 mg daily CHANGED to 1/2 tablet (250 mg) by mouth nightly  Carvedilol added frequency of TWICE daily  Cholecalciferol 25 mcg twice daily CHANGED to 50 mcg once daily  Cinacalcet changed to three times per week  Docusate 100 mg twice daily CHANGED to three times daily  Cinacalcet 30 mg daily to 60 mg with dialysis    Allergies reviewed with patient and updates made in EHR: yes    Medication History Completed By:   Luna Mustafa Pharm.D., BCPS,  BCTXP  [facility] pharmacist   7/30/2025 8:59 AM    PTA Med List   Medication Sig Last Dose/Taking    acetaminophen (TYLENOL) 500 MG tablet Take 500 mg by mouth every 8 hours as needed  Unknown    albuterol (2.5 MG/3ML) 0.083% nebulizer solution Take 1 ampule by nebulization 4 times daily  7/30/2025 at  1:00 AM    amLODIPine (NORVASC) 5 MG tablet Take 5 mg by mouth daily. 7/29/2025 Morning    bisacodyl (DULCOLAX) 5 MG EC tablet Take 1 tablet (5 mg) by mouth daily (Patient taking differently: Take 5 mg by mouth daily as needed.) Taking Differently    calcitRIOL (ROCALTROL) 0.25 MCG capsule Take 0.25 mcg by mouth three times a week. At dialysis Morning    calcium carbonate (TUMS) 500 MG chewable tablet Take 0.5 tablets by mouth daily. 7/30/2025 at  2:00 AM    carvedilol (COREG) 3.125 MG tablet Take 3.125 mg by mouth 2 times daily. 7/29/2025    cinacalcet (SENSIPAR) 30 MG tablet Take 60 mg by mouth three times a week. At dialysis 7/29/2025    Cranberry 500 MG TABS Take by mouth 3 times daily (with meals) 7/30/2025 at  2:00 AM    diphenhydrAMINE (BENADRYL) 25 MG capsule Take 25 mg by mouth every 6 hours as needed for itching or allergies Taking As Needed    docusate sodium (COLACE) 100 MG capsule Take 100 mg by mouth 3 times daily. 7/29/2025    famotidine (PEPCID) 40 MG tablet Take 40 mg by mouth 2 times daily  7/30/2025 at  2:00 AM    ipratropium (ATROVENT) 0.02 % neb solution Take 0.5 mg by nebulization 4 times daily  7/30/2025 at  1:00 AM    lidocaine-prilocaine (EMLA) 2.5-2.5 % external cream Apply topically daily as needed for moderate pain. Apply to fistula site before dialysis Past Week    loratadine (CLARITIN) 10 MG tablet Take 1 tablet by mouth daily 7/29/2025 Morning    Melatonin 10 MG TABS tablet Take 10 mg by mouth nightly as needed for sleep. More than a month    multivitamin w/minerals (THERA-VIT-M) tablet Take 1 tablet by mouth daily. 7/29/2025 Morning    sennosides (SENOKOT) 8.6 MG tablet Take 1 tablet by  mouth 2 times daily as needed. Taking As Needed    sertraline (ZOLOFT) 100 MG tablet Take 100 mg by mouth daily. 7/29/2025 Morning    sevelamer carbonate (RENVELA) 800 MG tablet Take 1,600 mg by mouth 3 times daily (with meals). 7/29/2025    sodium zirconium cyclosilicate (LOKELMA) 10 g PACK packet Take 10 g by mouth every other day. On non-hemodialysis days Past Week    tobramycin, PF, (DAX) 300 MG/5ML nebulizer solution Take 1 ampule by nebulization as needed Take only when in Red Zone Unknown    vitamin D3 (CHOLECALCIFEROL) 50 mcg (2000 units) tablet Take 1 tablet by mouth daily. 7/30/2025 at  2:00 AM

## 2025-07-30 NOTE — LETTER
Transition Communication Hand-off for Care Transitions to Next Level of Care Provider    Name: Aidan Louie  : 1995  MRN #: [id number]  Primary Care Provider: Yasmeen Herrera [facility]     Primary Clinic: [address] [facility] [address]     Reason for Hospitalization:  ESRD (end stage renal disease) (H) [N18.6]  Kidney transplant candidate [Z76.82]  Kidney transplant recipient [Z94.0]  Admit Date/Time: 2025  6:17 AM  Discharge Date:  Payor Source: Payor: MEDICARE / Plan: MEDICARE / Product Type: Medicare /           Reason for Communication Hand-off Referral: Other continuity of care    Discharge Plan: patient s/p kidney transplant.  Pt will be staying locally with family for 10-14 days post hospitalization or until medically cleared to return to his home in [place].        Concern for non-adherence with plan of care:   No  Discharge Needs Assessment:  Needs      Flowsheet Row Most Recent Value   Equipment Currently Used at Home none   # of Referrals Placed by CM External Care Coordination         Follow-up specialty is recommended: Yes    Follow-up plan:    Future Appointments   Date Time Provider Department Center   2025  7:15 AM  LAB [facility]   2025  8:00 AM UC SIPC INFUSION NURSE UCINPR [facility]   2025  9:00 AM UC SPEC INF ABDIAS FLEX INPR [facility]   2025  7:00 AM UC SIPC INFUSION NURSE [facility]   2025 11:30 [facility]   2025  9:20 AM UCSCXR1 UCCXR [facility]   2025  9:45 AM  LAB [facility]   2025 10:45 AM Salma Alvares APRN CNP TXO [facility]   2025 11:30 AM Janett Molina MD [facility]   2025  8:00 AM  LAB [facility]   2025  9:00 AM Amadou Lai MD [facility]   10/2/2025 11:30 AM [facility]   2025  9:30 AM UC LAB UCLABR [facility]   2025 10:30 [facility]   12/10/2025  2:20 PM UCSCUS1 [facility]   2026  7:45 AM   LAB [facility]   1/27/2026  8:30 AM Amadou Lai MD [facility]   4/28/2026  9:00 AM  LAB [facility]   4/28/2026 10:00 AM Amadou Lai MD [facility]   6/8/2026 12:20 PM Mari Davalos PA-C [facility]   6/15/2026  2:30 PM Josiane Bermudez, PHANI [facility]   7/28/2026 10:00 AM  LAB [facility]   7/28/2026 11:00 AM Amadou Lai MD [facility]       Any outstanding tests or procedures:        Referrals       Future Labs/Procedures    Home Care Referral     Comments:    Your provider has ordered home health services. If you have not been contacted within 2 days of your discharge please call the selected Home Care agency listed on your Discharge document.  If a Home Care agency is NOT listed, please call [phone].    Home Care Referral     Comments:    Your provider has ordered home health services. If you have not been contacted within 2 days of your discharge please call the selected Home Care agency listed on your Discharge document.  If a Home Care agency is NOT listed, please call [phone].            Supplies       Future Labs/Procedures    Oxygen Adult/Peds     Comments:    Oxygen Documentation  I certify that this patient, Aidan Louie has been under my care (or a nurse practitioner or physican's assistant working with me). This is the face-to-face encounter for oxygen medical necessity.      At the time of this encounter, I have reviewed the qualifying testing and have determined that supplemental oxygen is reasonable and necessary and is expected to improve the patient's condition in a home setting.         Patient has continued oxygen desaturation due to Acute Respiratory Failure J96.01.    If portability is ordered, is the patient mobile within the home? yes    Was this visit performed as a telehealth visit: No    Resume oxygen, no change  Surgical Airway Tracheostomy Mindy Guerrero            [place] Recommendations:  Please see attached AVS.  [facility]  home care arranged for estimated SOC 8/14 pending medical clearance to return home to [place].     Sydnie Alvarez RN    AVS/Discharge Summary is the source of truth; this is a helpful guide for improved communication of patient story

## 2025-07-30 NOTE — OR NURSING
Signed consents with patient and legal Valerie trujillo.  Valerie requests patient sign for procedure/blood consent to practice for when he no longer is under legal guardianship.  Legal guardian reviewed consents and also agreed to signing.

## 2025-07-30 NOTE — H&P
[facility], [place]    Transplant Surgery  History and Physical    Aidan Louie  : 1995  MRN # [id number]    ADMIT DATE: 2025    PCP: Yasmeen Irving    CHIEF COMPLAINT: Kidney Transplant Candidate    HPI: Aidan Louie is a 29 year old male with ESKD with dialysis on ??***?, has a matured AV fistula on the left side. Presents to the hospital for kidney transplant. Other medical history include Prune belly, Dawood en syndrome, horseshoe kidney. Patient has common UTI. Patient has restrictive lung disease, has a tracheostomy in place. Weight has been consistent around 47 kg. Last cardiology work up was completed on 10/23/2024 that showed left ventricular EF 60-65%, trace insufficiency in both mitral and tricuspid valves, Otherwise normal echocardiogram of the heart.     UNOID cPRA done in  is 26, expected to receive a brain dead kidney.     ROS:   CONSTITUTIONAL: Denies fever, chills, fatigue, or weight fluctuations  HEAD: Denies headache.  EENT: Denies vision changes, hearing changes, dysphagia, or sore throat.   NECK: Denies lymphadenopathy.   CV:Denies chest pain, palpitations, or shortness of breath.   PULMONARY:Denies shortness of breath, cough, or previous TB exposure.   GI:Denies nausea, vomiting, diarrhea, and abdominal pain. Bowel movements are regular.   :Denies urinary alterations, dysuria, urinary frequency, hematuria, and abnormal drainage.   EXT:Denies lower extremity edema.   SKIN:Denies abnormal rashes or lesions.   MUSCULOSKELETAL:Denies upper or lower extremity weakness and pain.   NEUROLOGIC:Denies lightheadedness, dizziness, seizures, or upper or lower extremity paresthesia.   HEMATOLOGICAL:No abnormal bruising or bleeding.   PSYCHIATRIC:Denies any mood alterations.     PMH:  Past Medical History:   Diagnosis Date    Bilateral reflux nephropathy     CKD (chronic kidney disease) stage 4, GFR 15-29 ml/min (H)     End stage  renal disease (H)     Hearing loss     Hypertension     Kyphosis     Dawood Reece sequence     Prune belly syndrome     Recurrent UTI     Respiratory bronchiolitis interstitial lung disease (H)     Restrictive lung disease     Scoliosis     Thyroid disease     Tracheostomy dependence (H)        PSH:  Past Surgical History:   Procedure Laterality Date    Bilateral ureteral reimplantation  2002    abdominoplasty, Mitrofanoff creation    CYSTOSCOPY  12    EXAM UNDER ANESTHESIA THROAT  6/10/2003    tonsillar hypertrophy    EXAM UNDER ANESTHESIA, RESTORATIONS, EXTRACTION(S) DENTAL COMPLEX, COMBINED  2006    GASTROSTOMY TUBE      GASTROSTOMY TUBE  3/16/2002    button change    HERNIORRHAPHY INGUINAL BILATERAL  99    left ochiopexy    IR CVC NON TUNNEL LINE REMOVAL  2023    IR CVC TUNNEL PLACEMENT > 5 YRS OF AGE  2023    LARYNGOSCOPY, BRONCHOSCOPY, COMBINED  2002    LARYNGOSCOPY, BRONCHOSCOPY, COMBINED  2002    bilateral ear debridement    LARYNGOSCOPY, BRONCHOSCOPY, COMBINED  2003    LARYNGOSCOPY, BRONCHOSCOPY, COMBINED  2007    Failed Deflux injection    LARYNGOSCOPY, BRONCHOSCOPY, COMBINED  13     VESICOSTOMY      Redo right ureteral reimplantation  3/12/2004    Excision bladder diverticuli, Left to Right pyelopyelostomy    REMOVE CATHETER PERITONEAL N/A 2023    Procedure: REMOVAL, CATHETER, DIALYSIS, PERITONEAL; IJ TEMPORARY DIALYSIS CATHETER PLACED;  Surgeon: Anurag Thompson MD;  Location: [facility] OR    Replacement of trach  10/15/12    Takedown and revision of Mitrofanoff stoma  2006    TRACHEOSTOMY INFANT         MEDICATIONS:  Cannot display prior to admission medications because the patient has not been admitted in this contact.        ALLERGIES:     Allergies   Allergen Reactions    Latex     Latex      Other reaction(s): Other (see comments)  Other reaction(s): Contact Dermatitis, Other (see comments)    Adhesive Tape Rash        FAMILY HISTORY:  Family History   Problem Relation Age of Onset    Diabetes Type 2  Mother     Diabetes Type 2  Maternal Grandmother     Deep Vein Thrombosis (DVT) Maternal Grandmother     Diabetes Type 2  Maternal Grandfather     Diabetes Type 2  Paternal Grandmother     Alzheimer Disease Paternal Grandfather     Thyroid Disease Maternal Aunt         hashimoto    Anesthesia Reaction Maternal Aunt         patients mom reports her sister had an allergic reaction to something during a previous surgery    Thyroid Cancer Maternal Great-Grandmother        SOCIAL HISTORY:  Social History     Socioeconomic History    Marital status: Single     Spouse name: Not on file    Number of children: Not on file    Years of education: Not on file    Highest education level: Not on file   Occupational History    Not on file   Tobacco Use    Smoking status: Never    Smokeless tobacco: Never   Substance and Sexual Activity    Alcohol use: No     Alcohol/week: 0.0 standard drinks of alcohol    Drug use: No    Sexual activity: Not on file   Other Topics Concern    Parent/sibling w/ CABG, MI or angioplasty before 65F 55M? Not Asked   Social History Narrative    Not on file     Social Drivers of Health     Financial Resource Strain: Not on file   Food Insecurity: Not on file   Transportation Needs: Not on file   Physical Activity: Not on file   Stress: Not on file   Social Connections: Not on file   Interpersonal Safety: Not on file   Housing Stability: Not on file       PHYSICAL EXAM:  There were no vitals taken for this visit.  GENERAL: Appears alert and oriented times three.   HEENT: Eye symmetrical and free of discharge bilaterally. Mucous membranes moist and without lesions.  NECK: Supple and without lymphadenopathy.  CV: RRR, S1S2 present without murmur, rub, or gallop.   RESPIRATORY: Respirations regular, even, and unlabored. Lungs CTA throughout.   GI: Soft and non distended with normoactive bowel sounds present in all  "quadrants. No tenderness, rebound, guarding. No organomegaly.   EXTREMITIES: No peripheral edema. 2+ bilateral pedal pulses.   NEUROLOGIC: Alert and orientated x 3. CN II-XII grossly intact. No focal deficits.   MUSCULOSKELETAL: No joint swelling or tenderness.   SKIN: No jaundice. No rashes or lesions.     LABS:  CBC:  Recent Labs   Lab Test 04/18/23  0705   WBC 9.0   RBC 2.62*   HGB 7.7*   HCT 25.2*   MCV 96   MCH 29.4   MCHC 30.6*   RDW 13.3   *       CMP:  Recent Labs   Lab Test 04/22/23  0735 04/13/23  0749 04/12/23  0605      < > 136   POTASSIUM 5.7*   < > 4.0   CHLORIDE 101   < > 100   SUSAN 9.0   < > 8.8   CO2 20*   < > 23   BUN 56.5*   < > 21.8*   CR 4.93*   < > 3.11*   GLC 87   < > 97   AST  --   --  30   ALT  --   --  11   BILITOTAL  --   --  0.2   ALBUMIN  --   --  2.5*   PROTTOTAL  --   --  5.8*   ALKPHOS  --   --  69    < > = values in this interval not displayed.       IMAGING:    ASSESSMENT & PLAN: 29-year-old male with ESKD on dialysis, history of Prune Belly syndrome, Dawood Reece sequence, and horseshoe kidney, presenting for brain-dead donor kidney transplant. Medically optimized with mature AV fistula, cPRA 25%, and no cardiac contraindications    FEN:   PROPHY:  ***  LINES:  ***  DISPO:  ***  CODE STATUS:  ***      - - - - - - - - - - - - - - - - - -  Aidan Dominguez MD  [facility] General Surgery PGY-1  07/30/2025    See [facility] for on-call pager information: [facility] Paging/Directory   \"SURGERY TRANSPLANT ABDOMINAL KIDNEY/PANCREAS/LIVER/LIVING DONOR/[facility]/ \"            "

## 2025-07-31 ENCOUNTER — DOCUMENTATION ONLY (OUTPATIENT)
Dept: TRANSPLANT | Facility: CLINIC | Age: 30
End: 2025-07-31
Payer: MEDICARE

## 2025-07-31 ENCOUNTER — TELEPHONE (OUTPATIENT)
Dept: TRANSPLANT | Facility: CLINIC | Age: 30
End: 2025-07-31
Payer: MEDICARE

## 2025-07-31 ENCOUNTER — TELEPHONE (OUTPATIENT)
Dept: ONCOLOGY | Facility: CLINIC | Age: 30
End: 2025-07-31
Payer: MEDICARE

## 2025-07-31 DIAGNOSIS — Z48.298 AFTERCARE FOLLOWING ORGAN TRANSPLANT: Primary | ICD-10-CM

## 2025-07-31 PROBLEM — Z94.0 KIDNEY TRANSPLANT RECIPIENT: Status: ACTIVE | Noted: 2025-07-31

## 2025-07-31 PROBLEM — Z94.0 KIDNEY REPLACED BY TRANSPLANT: Status: ACTIVE | Noted: 2025-07-31

## 2025-07-31 ASSESSMENT — ACTIVITIES OF DAILY LIVING (ADL)
ADLS_ACUITY_SCORE: 55
DEPENDENT_IADLS:: MEDICATION MANAGEMENT;MONEY MANAGEMENT;TRANSPORTATION
ADLS_ACUITY_SCORE: 55

## 2025-07-31 NOTE — DISCHARGE INSTRUCTIONS
Nutrition Services - Post-Transplant Diet Guidelines   Follow recommendations on the Guide to Your Diet after Transplant handout (summary below).    You have increased energy and protein needs for six to eight weeks after transplant. Your goal is to consume at least 60 grams of protein per day during this time frame.   Eat a heart-healthy diet (low sodium, low saturated and trans-fat) once you are outside of the 6-8 week post-transplant window, and once your appetite improves to normal  In some cases (but not in all cases), adjust potassium intake   Take calcium and vitamin D supplement if your doctor or team orders  Take measures to prevent food poisoning: store and prepare foods to the proper temperature, practice good handwashing, heat all deli meat (to 165 degrees Fahrenheit), avoid raw fish and meats (including uncooked eggs, non-pasteurized or raw milk, and mold-ripened, non-pasteurized, and raw cheeses [including Brie, Camembert, Roquefort, Stilton, Gorgonzola and Woody or other soft, unpasteurized cheeses such as Mexican queso fresco]), and throw out leftovers older than two days.   Do not consume grapefruit or pomegranate-containing products. These can interact with your medications.   Avoid the following post txp d/t risk for rejection, unknown effects on the organs, and/or potential interactions with immunosuppressants:       - Herbal, Chinese, holistic, chiropractic, natural, alternative medicines and supplements       - Detoxes and cleanses       - Weight loss pills       - Protein powders or other products with extracts or herbs (ie green tea extract)  If you have any nutrition-related questions or concerns after discharge, please contact our outpatient transplant dietitian, Norma Paez at [phone]

## 2025-07-31 NOTE — PROGRESS NOTES
Care Management Follow Up    Length of Stay (days): 1    Expected Discharge Date: 08/04/2025     Concerns to be Addressed: all concerns addressed in this encounter     Patient plan of care discussed at interdisciplinary rounds: Yes    Anticipated Discharge Disposition: Home              Anticipated Discharge Services: None  Anticipated Discharge DME: None    Patient/family educated on Medicare website which has current facility and service quality ratings: no  Education Provided on the Discharge Plan: Yes  Patient/Family in Agreement with the Plan: yes    Referrals Placed by CM/SW:    Private pay costs discussed: Not applicable    Discussed  Partnership in Safe Discharge Planning  document with patient/family: No     Handoff Completed: No, handoff not indicated or clinically appropriate    Additional Information:  Patient s/p kidney transplant.with a complex medical history.  Patient will need ATC appointments confirmed prior to discharge.      Met with pt and his mom (legal guardian).  Prior to hospitalization pt managed his own self cathing, mom set up supplies. Pt mom managed patients medications.   Pt and his mom plan to stay locally with pt's aunt and uncle in [facility].  Reviewed per SW note pt has home O2 set up through [place].  Per pt mom pt uses O2 as needed at home.  Pt mom did not bring O2 to the hospital but will arrange to have it available when pt is ready for discharge.  Pt and his mom brought trach supplies, self cathing supplies.  Per pt mom she has been struggling to secure flushes for pt Mitrofanoff.  Pt followed by [facility].  Agreed to reach out to [facility] RN.  Pt mom also messaged urology clinic.  Discussed anticipated plan for ATC appointments.  Discussed/reviewed home care RN services.  Discussed/reviewed anticipated plan for transplant labs on M/TH.  Plan [facility] RN for when pt returns to [place].    Messaged Sydnie Chowdhury, Urology Clinic RN inquiring about  flushing supplies.     Initiated home care referrals to  [facility], [facility]  [facility] Jose ROJAS  [facility] Jose ROJAS  [facility] ROJAS    Estimated start of care for home care RN would be Thursday 8/14 as patient will need to remain locally for at least two weeks post hospitalization or until medically cleared to return home.     Discharge Address:      [address] [place] [address]    Previous Community Resources:      [facility]   [facility] [place]  Address: [address] [place] [address]  Phone: [phone]  Toll Free: [phone]     Care Management Services?   [facility]   [address] [place] [address]  Phone:  [phone]  Fax: [phone]    [facility]   [facility], Jose ROJAS  PRN      Next Steps:   Follow for discharge planning  Initiate home care referral  Ensure home care order placed at discharge  Coordinate ATC appointments    Sydnie Alvarez, RN BSN, PHN, ACM-RN  7A Nurse Care Coordinator    [facility]  Phone: [phone]  Available on Vocera  7A SOT RNCC

## 2025-07-31 NOTE — PROGRESS NOTES
Transplant Surgery  Inpatient Daily Progress Note  07/31/2025    Assessment & Plan: Aidan Louie is a 29 year old male with pertinent PMH including ESKD secondary to reflux nephropathy on iHD [place]), neurogenic bladder s/p bladder augmentation with Mitrofanoff with 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 Tariq.     Graft function: s/p DBDKT with stent, POD#1  Kidney: Post- op US with patent Doppler, no fluid collections. Admission creatinine 6.39, creatinine this AM 4.35. UOP 7/30 3.7 liters.    - Consult to Transplant Nephrology.     Immunosuppressed status secondary to medications:   Induction IS: Intermediate - cPRA 25  Thymoglobulin 100 mg POD#0  Basiliximab POD#1 and anticipate POD#5  SM pulse followed by prednisone taper  Maintenance IS:   mg BID  Tacrolimus with goal trough 8 to 12    Hematology:   Anemia of chronic disease:   Acute surgical blood loss anemia: Hemoglobin stable, he has not required transfusion.     Cardiorespiratory:   Chronic respiratory failure, s/p tracheostomy:  Restrictive lung disease:   - RCAT   - Scheduled and PRN DuoNebs   - Early mobility, pulmonary hygiene     GI/Nutrition: Has G- tube and takes his nutrition by mouth unless ill.   Status post Nissen fundoplication:  Diet: Regular diet.    - Consult to RD     Endocrine: Hemoglobin A1c 4.6% 7/30/2025.    Fluid/Electrolytes:   Hyperphosphatemia: Monitor.     :   Neurogenic bladder s/p bladder augmentation with Mitrofanoff with history of  recurrent UTI in the setting of Prune Belly Syndrome and Dawood Reece sequence: Self- cath at home 3 to 6 times per day on non-dialysis days, 1 to 2 times per day on dialysis days.     Infectious disease: Afebrile.   Concern for UTI:   - Ceftriaxone, follow urine culture    - Consider early stent removal   - Repeat UA close to discharge and consider miguel- stent removal antibiotic coverage     Prophylaxis: DVT, pneumocystis (Bactrim), viral (Valcyte)    Disposition: 7A     ABDIAS/Fellow/Resident Provider:   LIVAN Ochoa CNP  Adult Solid Organ Transplant   Contact information via Vocera Web Console or via [facility] Paging/Directory    I saw and evaluated this patient as part of a shared visit. I spent 30 minutes on review of labs and imaging, exam, care coordination, and counseling.  Medically Ready for Discharge: Anticipated in 2-4 Days       Faculty: Dr. Tariq  _________________________________________________________________    Interval History: History is obtained from the patient and his mom  Overnight events: No acute events     ROS:   A 10-point review of systems was negative except as noted above.    Meds:  Current Facility-Administered Medications   Medication Dose Route Frequency Provider Last Rate Last Admin    acetaminophen (TYLENOL) tablet 975 mg  975 mg Oral Q8H Jose Tariq MD   975 mg at 07/31/25 1328    atorvastatin (LIPITOR) tablet 10 mg  10 mg Oral Daily Jose Tariq MD   10 mg at 07/31/25 0825    basiliximab (SIMULECT) 20 mg in sodium chloride 0.9 % 50 mL infusion  20 mg Intravenous Once Kayley Davis  mL/hr at 07/31/25 1443 20 mg at 07/31/25 1443    Followed by    [START ON 8/4/2025] basiliximab (SIMULECT) 20 mg in sodium chloride 0.9 % 50 mL infusion  20 mg Intravenous Once Kayley Davis NP        cefTRIAXone (ROCEPHIN) 1 g vial to attach to  mL bag for ADULTS or NS 50 mL bag for PEDS  1 g Intravenous Q24H Kayley Davis,  NP   1 g at 07/31/25 1333    famotidine (PEPCID) tablet 10 mg  10 mg Oral Daily Jose Tariq MD   10 mg at 07/31/25 0825    ipratropium - albuterol 0.5 mg/2.5 mg (3mg)/3 mL (DUONEB) neb solution 3 mL  3 mL Nebulization Q8H Kayley Davis NP   3 mL at 07/31/25 0940    [START ON 8/1/2025] magnesium oxide (MAG-OX) tablet 400 mg  400 mg Oral Daily with lunch Jose Tariq MD        methocarbamol (ROBAXIN) tablet 500 mg  500 mg Oral Q6H Kayley Davis NP   500 mg at 07/31/25 1103    [START ON 8/1/2025] methylPREDNISolone Na Suc (solu-MEDROL) injection 100 mg  100 mg Intravenous Once Kayley Davis NP        mycophenolate (GENERIC EQUIVALENT) capsule 750 mg  750 mg Oral BID IS Jose Tariq MD   750 mg at 07/31/25 0825    polyethylene glycol (MIRALAX) Packet 17 g  17 g Oral Daily Jose Tariq MD   17 g at 07/31/25 0825    [START ON 8/2/2025] predniSONE (DELTASONE) tablet 60 mg  60 mg Oral Daily Kayley Davis NP        Followed by    [START ON 8/4/2025] predniSONE (DELTASONE) tablet 40 mg  40 mg Oral Daily Kayley Davis NP        Followed by    [START ON 8/6/2025] predniSONE (DELTASONE) tablet 20 mg  20 mg Oral Daily Kayley Davis NP        Followed by    [START ON 8/13/2025] predniSONE (DELTASONE) tablet 15 mg  15 mg Oral Daily Kayley Davis NP        Followed by    [START ON 8/20/2025] predniSONE (DELTASONE) tablet 10 mg  10 mg Oral Daily Kayley Davis NP        Followed by    [START ON 8/27/2025] predniSONE (DELTASONE) tablet 5 mg  5 mg Oral Daily Kayley Davis NP        senna-docusate (SENOKOT-S/PERICOLACE) 8.6-50 MG per tablet 1 tablet  1 tablet Oral BID Jose Tariq MD   1 tablet at 07/31/25 0825    sodium chloride (PF) 0.9% PF flush 10 mL  10 mL Intracatheter Q8H Jose Tariq MD   10 mL at 07/31/25 1334    sulfamethoxazole-trimethoprim (BACTRIM) 400-80  MG per tablet 1 tablet  1 tablet Oral Once per day on Tuesday Thursday Saturday Jose Tariq MD   1 tablet at 07/31/25 0825    tacrolimus (GENERIC EQUIVALENT) capsule 1.5 mg  0.03 mg/kg Oral BID IS Jose Tariq MD   1.5 mg at 07/31/25 0844    valGANciclovir (VALCYTE) tablet 450 mg  450 mg Oral Once per day on Monday Thursday Jose Tariq MD   450 mg at 07/31/25 0825       Physical Exam:     Admit Weight: 45.8 kg (100 lb 14.4 oz)    Current vitals:   /80   Pulse 97   Temp 98.3  F (36.8  C) (Oral)   Resp 20   Wt 45.8 kg (100 lb 14.4 oz)   SpO2 100%   BMI 19.06 kg/m      Vital sign ranges:    Temp:  [98.2  F (36.8  C)-98.7  F (37.1  C)] 98.3  F (36.8  C)  Pulse:  [] 97  Resp:  [14-34] 20  BP: (112-162)/(60-99) 128/80  SpO2:  [95 %-100 %] 100 %    General Appearance: in no apparent distress.   Skin: warm, perfused  Heart: appears adequately perfused   Lungs: no increased work of breathing   Abdomen: the abdomen is non-distended, dressing intact   : izaguirre is present.  Urine is clear, pink tinged .  Extremities: edema: no significant edema, strength adequate   Neurologic: alert and oriented. Tremor absent..     Data:   CMP  Recent Labs   Lab 07/31/25  0941 07/31/25  0704 07/31/25  0439 07/30/25  2237 07/30/25  1905 07/30/25  1031 07/30/25  0820   NA  --   --  142  --  141  --  143   POTASSIUM 3.5  --  3.8  3.8   < > 3.4  --  3.2*   CHLORIDE  --   --  103  --  100  --  100   CO2  --   --  22  --  24  --  27   GLC  --  137* 160*  --  145*   < > 90   BUN  --   --  29.2*  --  34.0*  --  37.0*   CR  --   --  4.35*  --  5.98*  --  6.39*   GFRESTIMATED  --   --  18*  --  12*  --  11*   SUSAN  --   --  9.0  --  8.7*  --  10.0   MAG  --   --  2.2  --  2.1  --   --    PHOS  --   --  4.7*  --  4.3  --   --    ALBUMIN  --   --   --   --   --   --  4.5   BILITOTAL  --   --   --   --   --   --  0.2   ALKPHOS  --   --   --   --   --   --  69   AST  --   --    --   --   --   --  15   ALT  --   --   --   --   --   --  11    < > = values in this interval not displayed.     CBC  Recent Labs   Lab 07/31/25  1415 07/31/25  0941 07/31/25  0439 07/30/25  2314 07/30/25  1905 07/30/25  0820   HGB 7.7* 8.1* 8.3*   < > 8.6* 9.4*   WBC  --   --  18.4*  --  8.1 5.5   PLT  --   --  175  --  173 262   A1C  --   --   --   --   --  4.6    < > = values in this interval not displayed.

## 2025-07-31 NOTE — PLAN OF CARE
Goal Outcome Evaluation:      Plan of Care Reviewed With: patient, parent    Overall Patient Progress: improvingOverall Patient Progress: improving    Outcome Evaluation: AVSS on 1 LPM via trach dome, tele showing NSR/NST. Denied nausea, C/O mild pain. AUO via Mitrofanoff to izaguirre bag, clear yellow/pink, I=Os.    /60 (BP Location: Right arm)   Pulse 73   Temp 98.2  F (36.8  C) (Axillary)   Resp 20   Wt 45.8 kg (100 lb 14.4 oz)   SpO2 98%   BMI 19.06 kg/m      Shift: 1712-3662  Isolation Status: standard/cytotoxic  VS: stable on 1 LPM via trach dome, afebrile  Neuro: A&O x4, cognitive delay at baseline but able to make needs known  Behaviors: receptive to cares  BG: POD 1 and 2: 137  Labs/Imaging: Q4 K and Hgb: K 3.7, 3.4, 3.8; Hgb 8.0, 7.8, 8.3; Creatinine 4.35  Respiratory: Trach with 1 LPM via dome; clear/coarse lung sounds, audible wheezing d/t damaged lower lobes  Cardiac: on tele, NST/NST   Pain/Nausea: Denied nausea. C/O mild pain.  PRN: N/A  Diet: regular  LDA: R PIV x2, L foot PIV; L AVF; RLQ Mitrofanoff; R GLENN with ~ 85 mL dark red/serosang output  Infusion(s): D5LR @ 100 mL/hr; 0.9% NS and 0.45% NS per UO  GI/: LBM 7/30, BS+, not yet passing gas. Mitrofanoff hooked to izaguirre bag and patent with ~ 150-300 mL/hr clear pink/yellow urine  Skin: RLQ incision covered with op dressing, C/D/I; LUQ G tube capped; preventative mepilex  Mobility: Ax1, not OOB this shift  Events/Education: med card and lab book started  Plan: Notify MD of any significant changes, continue current POC.

## 2025-07-31 NOTE — PROGRESS NOTES
CLINICAL NUTRITION SERVICES - ASSESSMENT NOTE    Registered Dietitian Interventions:  -- High Kcal/high protein diet instructions added to discharge summary tab 7/31  -- Handouts left at bedside  - Your diet guidelines after transplant and food safety booklet.     Future/Additional Recommendations:  If suspect eating poorly, would offer supplements and start on kcal cnts.     Minimize diet restrictions as able due to high calorie/protein needs post-transplant. Oral supplements as needed to help meet nutritional needs.    High-protein food choices with meals to help meet high needs post-transplant over the next 6-8 weeks.    Heat-healthy diet (low saturated fat, low sodium, high fiber) and food safety precautions long term due to immunosuppression regimen post transplant.      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 S/p DDKT (7/30/25)    INFORMATION OBTAINED  Assessed patient in room.    NUTRITION HISTORY  G-tube is present. Greg eats PO food and takes fluid orally unless he is ill.    Met with Greg and his mom at bedside. Mostly spoke with patients mom. She reports good appetite PTA. He has been told to eat slowly and chew food well. Was not following any dietary restrictions. Endorses slight weight loss but reports Greg recently started working more.     CURRENT NUTRITION ORDERS  Diet: Regular    CURRENT INTAKE/TOLERANCE  No documentation recorded    LABS  Nutrition-relevant  "labs:   (7/31): BUN 29.2 mg/dL (H), Cr 4.35 mg/dL (H), phod 4.7 mg/dL (H)    MEDICATIONS  Nutrition-relevant medications:   Lipitor  Miralax  Senokot-S    ANTHROPOMETRICS  Height: 154.9 cm (5' 1\")  Admission Weight: 45.8 kg (100 lb 14.4 oz) (07/30/25 0631)   Most Recent Weight: 45.8 kg (100 lb 14.4 oz) (07/30/25 0631)  IBW: 50.9 kg  BMI: Body mass index is 19.06 kg/m .   Weight History:   Wt Readings from Last 10 Encounters:   07/30/25 45.8 kg (100 lb 14.4 oz)   06/09/25 47.6 kg (105 lb)   06/09/25 47.6 kg (105 lb)   08/16/24 46.3 kg (102 lb)   06/17/24 48.5 kg (107 lb)   11/14/23 48.3 kg (106 lb 6.4 oz)   06/19/23 46.3 kg (102 lb)   05/17/23 46.1 kg (101 lb 11.2 oz)   04/22/23 46 kg (101 lb 6.4 oz)   12/05/22 47.2 kg (104 lb)   3.8% weight loss in ~ 2 months   Dosing Weight: 46 kg, based on actual wt    ASSESSED NUTRITION NEEDS  Estimated Energy Needs 8173-7454 kcals (30-35 Kcal/Kg)  Justification: increased needs post-op SOT  Estimated Protein Needs: 60-92 grams protein (1.3-2 gm/Kg)  Justification: increased needs post op SOT  Estimated Fluid Needs: 3942-7328  mL (25-30 mL/Kg)  Justification: maintenance, or per MD pending fluid status and adequate UOP    SYSTEM FINDINGS    GI symptoms: capped G-tube,   Skin/wounds: Duncan score 19    MALNUTRITION  % Intake: No decreased intake noted  % Weight Loss: Weight loss does not meet criteria   Subcutaneous Fat Loss: None observed  Muscle Loss: Clavicles (pectoralis and deltoids): Mild  Fluid Accumulation/Edema: None noted  Malnutrition Diagnosis: Patient does not meet two of the established criteria necessary for diagnosing malnutrition but is at risk for malnutrition  Malnutrition Present on Admission: No    NUTRITION DIAGNOSIS  Food and nutrition-related knowledge deficit r/t length of time since previous post-transplant education AEB patient verbal report, review of chart record, and MD consult for nutrition education.      INTERVENTIONS  Nutrition Education:  - " Provided handout: Post-transplant diet guidelines. Patient receptive to information provided. Expected diet compliance is good.     Goals  1. Patient will verbalize understanding of 3 important aspects of post-transplant diet guidelines.   2. PO intake >50% meals TID.     Monitoring/Evaluation  Progress toward goals will be monitored and evaluated per policy.    Billie Govea MS/RD/LD/CNSC  Available on [employer]   M-F (7am-3:30pm) - 7A/7B Clinical Dietitian  Weekend/Holiday Dietitian (7am-3:30pm)    ** Clinical Dietitians no longer available on pager

## 2025-07-31 NOTE — PLAN OF CARE
Goal Outcome Evaluation:    Plan of Care Reviewed With: patient  Overall Patient Progress: improving  Outcome Evaluation: patient OOB & ambulated in bright, denies nausea, good appetite    BP (!) 146/80 (BP Location: Right arm)   Pulse 98   Temp 98  F (36.7  C) (Oral)   Resp 16   Wt 45.8 kg (100 lb 14.4 oz)   SpO2 96%   BMI 19.06 kg/m      Shift: 1238-3794  Isolation Status: none  VS: VSS on RA, afebrile  Neuro: Aox4, cognitive delay at baseline but able to communicate & make needs known, mom also at bedside (legal guardian)  Behaviors: very flat, withdrawn at beginning of shift until ~1pm, mom very involved and advocates for patient but sometimes does not allow patient to answer fully - continue to ask patient directly & involve him in care as he is capable   BG: POD 1 & 2  Labs/Imaging: K+ 3.2 (up from 2.9, lokelma given), hgb 8.4  Respiratory: shiley trach capped; pt has scheduled & PRN nebs if needed, suctioned x5 this shift - pt does participate in trach cares & can help  Cardiac: WDL  Pain/Nausea: endorsing some abdominal, incisional pain; denies nausea  PRNs: PRN oxy 5mg x2, IV dilaudid x1  Diet: regular diet w/ good appetite, encouraged pt to go slow as he is not yet passing gas  LDA: PIV x2, GLENN w/ serosang  Infusion(s): D5LR at 100 mL/hr; IV simulect & solumedrol given  GI/: no gas/no BM since surgery; pt's home regimen is colace 3x daily -- mom has shared that pt has had serious issues w/ constipation post surgery and is advocating for increase in bowel meds; izaguirre in place via mitrofanoff w/ good UOP  Skin: RLQ incision covered w/ OP dressing, R GLENN, L arm fistula, collin button from prior PEG, preventative mepi on bottom  Mobility: pt ambulated x1 since surgery and did well! Up SBA  Plan: med card & lab book up to date & reviewed w/ pt and mom; continue with POC & update team with any changes    Handoff given to following RN.

## 2025-07-31 NOTE — ANESTHESIA POSTPROCEDURE EVALUATION
Patient: Aidan Louie    Procedure: Procedure(s):  Transplant kidney recipient  donor, stent placement       Anesthesia Type:  General    Note:  Disposition: Inpatient   Postop Pain Control: Uneventful            Sign Out: Well controlled pain   PONV: No   Neuro/Psych: Uneventful            Sign Out: Acceptable/Baseline neuro status   Airway/Respiratory: Uneventful            Sign Out: AIRWAY IN SITU/Resp. Support   CV/Hemodynamics: Uneventful            Sign Out: Acceptable CV status; No obvious hypovolemia; No obvious fluid overload   Other NRE: NONE   DID A NON-ROUTINE EVENT OCCUR? No           Last vitals:  Vitals Value Taken Time   /81 25 20:30   Temp 36.8  C (98.3  F) 25 18:00   Pulse 93 25 20:33   Resp 16 25 20:33   SpO2 100 % 25 20:33   Vitals shown include unfiled device data.    Electronically Signed By: Lupe Jj DO  2025  8:34 PM

## 2025-07-31 NOTE — PROGRESS NOTES
Handoff information     Type of transplant: DDKT  Date of transplant: 7/30/25  Direct/non-direct/PEP- n/a  Transplant history: naive  (Why they lost previous tx graft)  Outstanding items for patient: none  Pertinent history: ESKD from reflux nephropathy on HD since 2021. Neurogeneic bladder s/p bladder augmentation with Mitrofanoff (follows with urology, self caths), chronic respiratory failure requiring trach (follows with local pulmonary Dr. Baez), malnutrition s/p g tube  Barriers to post transplant care: pts mom is guardian and they live almost 2 hours from the hospital   Patient Status Form Completed: done  A2 to B transplant?: no

## 2025-07-31 NOTE — PHARMACY-TRANSPLANT NOTE
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, cauda equina syndrome, chronic respiratory failure and restrictive lung disease status post tracheostomy approximately one- year ago, history of malnutrition now stable, s/p G- tube placement, history of Nissen fundoplication, history of PD catheter placement and removal, and history of bilateral hernia repair and left orchiopexy     Planned immunosuppression regimen per kidney transplant protocol:  INDUCTION:  7/30 Thymoglobulin 100mg; SM 500mg  7/31 Basiliximab 20mg; SM 250mg  8/1 SM 100mg  8/4 Basiliximab 20mg    MAINTENANCE:    + Mycophenolate mofetil 750mg BID  + Tacrolimus with goal trough levels of 8-10 mcg/L for first 6 months post-transplant  + Prednisone taper to off    Opportunistic pathogen prophylaxis includes:   + PJP: trimethoprim/sulfamethoxazole indefinitely  + CMV D?/R-: Valganciclovir for duration dependent upon donor status    Urine after transplant cloudy c/f for UTI with history of recurrence and mitrofanoff  + Ceftriaxone 1gm daily    CAD ppx  + Atorvastatin 10mg daily indefinitely    + Neuro   + Sertraline 100mg daily from PTA      Patient is not enrolled in medication study.    Pharmacy will monitor for medication interactions and immunosuppression levels in conjunction with the team. Medication therapy needs for discharge planning will continue to be addressed throughout the current admission via multidisciplinary rounds and order review.  Pharmacy will make recommendations as appropriate.

## 2025-07-31 NOTE — TELEPHONE ENCOUNTER
BulmaroEZRA Tay) IS A (Kidney) TRANSPLANT PATIENT  (DATE OF TRANSPLANT: (DATE: 07/30/2025) )      Health Benefit: ([employer]) Medicare Part B & Part D Advantage Plan  ID: [id number]  Group: [id number]  Effective: 01/01/2024  Processing information:   ID: [id number]  Bin: [id number]  PCN: [id number]  Group: [id number]  DO NOT BILL TO ORIGINAL MEDICARE   Medicare ID#  [id number]  Medicare Part A Effective: 12/01/2016  Medicare Part B Effective: 12/01/2016    Secondary health Benefit: subsidy medicaid/pmap plan  Plan Name: [place] Medicaid   ID: [id number]   Rx Bin: [id number]   Processor Name: [place] Manual Bill  Effective: 11/16/2010  PT WILL PAY $0 AT TIME OF SERVICE      Part D:   Plan Name: [employer] Part D  ID #: [id number]  Rx Bin: [id number]  PCN: [id number]  Rx Group: [id number]  Effective: 01/01/2024    NO Deductible or Max out of pocket Due to Low subsidy  Copay structure:  $ (0) Generic  $ (0) Brand  $ (0) Non- Formulary       Test Claim Specialty #28     MYCOPHENOLATE 250mg: (#240/30DS) = $0 AT TIME OF SERVICE  PROGRAF 1mg: (#120/30DS) = $0 AT TIME OF SERVICE   TACROLIMUS 1mg: (#120/30DS) = $0 AT TIME OF SERVICE    CYCLOSPORINE 100mg (#60/30DS) = $0 AT TIME OF SERVICE    VALGANCICLOVIR 450mg: (#60/30DS) = $0  VALACYCLOVIR 1GM: (#90/30DS) = $0    Test Claim Discharge #27 (21 Years and Older)     MYCOPHENOLATE 250mg: (#240/30DS) = $0 AT TIME OF SERVICE  PROGRAF 1mg: (#120/30DS) = $0 AT TIME OF SERVICE   TACROLIMUS 1mg: (#120/30DS) = $0 AT TIME OF SERVICE    CYCLOSPORINE 100mg (#60/30DS) = $0 AT TIME OF SERVICE    VALGANCICLOVIR 450mg: (#60/30DS) = $0  VALACYCLOVIR 1GM: (#90/30DS) = $0    Can patient fill with [facility] for medications listed? yes    Pharmacy test claims are estimates and may not reflect the final cost. Actual costs can vary based on additional medications filled and the patient's progress toward meeting their deductible and out-of-pocket maximum.    If the patient has any questions, please advise them to e-mail the liaison group  at [email or web address]

## 2025-07-31 NOTE — PROGRESS NOTES
[facility]  Transplant Nephrology Progress Note  Date of Admission:  7/30/2025  Today's Date: 07/31/2025  Requesting physician: Jose Tariq*    Recommendations:   - Agree with antibiotics for abnormal UA while awaiting cultures.   - No acute indications for dialysis.  - Continue current immunosuppression.  - Strict I/Os and daily standing weights.     Assessment & Plan   # DDKT: Trend down in creatinine. Good urine output.    - Baseline Creatinine: ~ TBD   - Proteinuria: Not checked post transplant   - DSA Hx: Not checked recently due to time from transplant   - Last cPRA: 25%   - BK Viremia: Not checked recently due to time from transplant   - Kidney Tx Biopsy Hx: No biopsy history.    # Immunosuppression: Tacrolimus immediate release (goal 8-10), Mycophenolate mofetil (dose 750 mg every 12 hours), and Methylprednisolone (dose taper)   - Induction with Recent Transplant:  Intermediate Intensity Protocol   - Continue with intensive monitoring of immunosuppression for efficacy and toxicity.   - Historical Changes in Immunosuppression: None   - Changes: Not at this time    # Infection Prevention:   Last CD4 Level: Not checked  - PJP: Sulfa/TMP (Bactrim)  - CMV: Valganciclovir (Valcyte)      - CMV IgG Ab High Risk Discordance (D+/R-) at time of transplant: Unknown  Present CMV Serostatus: Negative  - EBV IgG Ab High Risk Discordance (D+/R-) at time of transplant: No  Present EBV Serostatus: Positive    # Hypertension: Controlled;  Goal BP: < 150/90   - PTA medications: amlodipine 5 mg daily and carvedilol 3.125 mg bid.   - Changes: Not at this time    # Anemia in Chronic Renal Disease: Hgb: Stable, low      POORNIMA: No   - Iron studies: Replete    # Leukocytosis: likely secondary to steroids. No s/s of infection. Management per Surgery.     # Possible UTI: pre op UA with +nitrite, large leukocyte esterase, WBC >182, and WBC clumps. History of klebsiella pneumoniae  (2015 x2), enterobacter (2016), and klebsiella oxytoca (2017) UTI; patient self caths through Mitrofanoff. Given cefuroxime 7/31 and then switched to ceftriaxone while awaiting cultures.    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 Patient self caths a few times per day. Takes cranberry supplements. Follows with Urology.    - Chronic respiratory failure and restrictive lung disease: s/p tracheostomy.   - History of malnutrition: secondary to poor oral intake now s/p G-tube.     # Transplant History:  Etiology of Kidney Failure: Reflux nephropathy  Tx: DDKT  Transplant: 7/30/2025 (Kidney)  Significant transplant-related complications: None    Recommendations were communicated to the primary team verbally.    Seen and discussed with Dr. Molina.    LIVAN Peres [facility]  Transplant Nephrology  Contact information via Patient [employer] Web Console      Interval History  Mr. Bojorquez creatinine is 4.35 (07/31 0439); Trend down.  Good urine output.  Other significant labs/tests/vitals: VSS on 1L NC overnight.   No events overnight.  No chest pain or shortness of breath.  No leg swelling.  No nausea and vomiting. No bowel movement since surgery.   No fever, sweats or chills.      Review of Systems   4 point  ROS was obtained and negative except as noted in the Interval History.    MEDICATIONS:  Current Facility-Administered Medications   Medication Dose Route Frequency Provider Last Rate Last Admin    acetaminophen (TYLENOL) tablet 975 mg  975 mg Oral Q8H Jose Tariq MD   975 mg at 07/31/25 0508    atorvastatin (LIPITOR) tablet 10 mg  10 mg Oral Daily Jose Tariq MD        basiliximab (SIMULECT) 20 mg in sodium chloride 0.9 % 50 mL infusion  20 mg Intravenous Once Kayley Davis NP        Followed by    [START ON 8/4/2025] basiliximab (SIMULECT) 20 mg in sodium chloride 0.9 % 50 mL infusion  20 mg Intravenous Once Kayley Davis NP        famotidine (PEPCID) tablet 10 mg  10 mg Oral Daily Jose Tariq MD        Or    famotidine (PEPCID) injection 10 mg  10 mg Intravenous Daily oJse Tariq MD        furosemide (LASIX) injection 80 mg  80 mg Intravenous BID Jose Tariq MD        [START ON 8/1/2025] magnesium oxide (MAG-OX) tablet 400 mg  400 mg Oral Daily with lunch Jose Tariq MD        methylPREDNISolone Na Suc (solu-MEDROL) 250 mg in sodium chloride 0.9 % 54 mL intermittent infusion  250 mg Intravenous Once Kayley Davis NP        Followed by    [START ON 8/1/2025] methylPREDNISolone Na Suc (solu-MEDROL) injection 100 mg  100 mg Intravenous Once Kayley Davis NP        mycophenolate (GENERIC EQUIVALENT) capsule 750 mg  750 mg Oral BID IS Jose Tariq MD   750 mg at 07/30/25 2306    polyethylene glycol (MIRALAX) Packet 17 g  17 g Oral Daily Jose Tariq MD        [START ON 8/2/2025] predniSONE (DELTASONE) tablet 60 mg  60 mg Oral Daily Kayley Davis NP        Followed by    [START ON 8/4/2025] predniSONE (DELTASONE) tablet 40 mg  40 mg Oral Daily Kayley Davis NP        Followed by    [START ON 8/6/2025] predniSONE  (DELTASONE) tablet 20 mg  20 mg Oral Daily Kayley Davis NP        Followed by    [START ON 2025] predniSONE (DELTASONE) tablet 15 mg  15 mg Oral Daily Kayley Davis NP        Followed by    [START ON 2025] predniSONE (DELTASONE) tablet 10 mg  10 mg Oral Daily Kayley Davis NP        Followed by    [START ON 2025] predniSONE (DELTASONE) tablet 5 mg  5 mg Oral Daily Kayley Davis NP        senna-docusate (SENOKOT-S/PERICOLACE) 8.6-50 MG per tablet 1 tablet  1 tablet Oral BID Jose Tariq MD   1 tablet at 25 2214    sodium chloride (PF) 0.9% PF flush 10 mL  10 mL Intracatheter Q8H Jose Tariq MD        sulfamethoxazole-trimethoprim (BACTRIM) 400-80 MG per tablet 1 tablet  1 tablet Oral Once per day on  Jose Tariq MD        tacrolimus (GENERIC EQUIVALENT) capsule 1.5 mg  0.03 mg/kg Oral BID IS Jose Tariq MD        valGANciclovir (VALCYTE) tablet 450 mg  450 mg Oral Once per day on  Jose Tariq MD         Current Facility-Administered Medications   Medication Dose Route Frequency Provider Last Rate Last Admin    dextrose 5% in lactated ringers infusion   Intravenous Continuous Jose Tariq  mL/hr at 25 0800 Rate Verify at 25 0800    sodium chloride 0.45% infusion   Intravenous Continuous PRN Jose Tariq MD   Paused at 25 2200    sodium chloride 0.9 % infusion  1,000 mL Intravenous Continuous PRN Jose Tariq MD 30 mL/hr at 25 0823 Rate Change at 25 0823       Physical Exam   Temp  Av.8  F (36.6  C)  Min: 97.8  F (36.6  C)  Max: 97.8  F (36.6  C)      Pulse  Av  Min: 72  Max: 72 Resp  Av  Min: 12  Max: 12  SpO2  Av %  Min: 97 %  Max: 97 %     /82 (BP Location: Right arm)   Pulse 76   Temp 98.7  F (37.1  C)  (Oral)   Resp 20   Wt 45.8 kg (100 lb 14.4 oz)   SpO2 99%   BMI 19.06 kg/m      Admit Weight: 45.8 kg (100 lb 14.4 oz)     GENERAL APPEARANCE: alert and no distress  HENT: mouth without ulcers or lesions  RESP: lungs clear to auscultation - no rales, rhonchi or wheezes  CV: regular rhythm, normal rate, no rub, no murmur  EDEMA: no LE edema bilaterally  ABDOMEN: soft, nondistended, nontender, bowel sounds normal  MS: extremities normal - no gross deformities noted, no evidence of inflammation in joints, no muscle tenderness  SKIN: no rash  DIALYSIS ACCESS: RUE fistula thrill present    Data   All labs reviewed by me.  CMP  Recent Labs   Lab 07/31/25  0704 07/31/25  0439 07/31/25  0140 07/30/25  2237 07/30/25  1905 07/30/25  1031 07/30/25  0820   NA  --  142  --   --  141  --  143   POTASSIUM  --  3.8  3.8 3.4 3.7 3.4  --  3.2*   CHLORIDE  --  103  --   --  100  --  100   CO2  --  22  --   --  24  --  27   ANIONGAP  --  17*  --   --  17*  --  16*   * 160*  --   --  145* 85 90   BUN  --  29.2*  --   --  34.0*  --  37.0*   CR  --  4.35*  --   --  5.98*  --  6.39*   GFRESTIMATED  --  18*  --   --  12*  --  11*   SUSAN  --  9.0  --   --  8.7*  --  10.0   MAG  --  2.2  --   --  2.1  --   --    PHOS  --  4.7*  --   --  4.3  --   --    PROTTOTAL  --   --   --   --   --   --  6.8   ALBUMIN  --   --   --   --   --   --  4.5   BILITOTAL  --   --   --   --   --   --  0.2   ALKPHOS  --   --   --   --   --   --  69   AST  --   --   --   --   --   --  15   ALT  --   --   --   --   --   --  11     CBC  Recent Labs   Lab 07/31/25  0439 07/31/25  0140 07/30/25  2314 07/30/25  1905 07/30/25  0820   HGB 8.3* 7.8* 8.0* 8.6* 9.4*   WBC 18.4*  --   --  8.1 5.5   RBC 2.68*  --   --  2.79* 3.02*   HCT 25.8*  --   --  26.8* 28.7*   MCV 96 96 96 96 95   MCH 31.0  --   --  30.8 31.1   MCHC 32.2  --   --  32.1 32.8   RDW 16.6*  --   --  16.2* 15.9*     --   --  173 262     INR  Recent Labs   Lab 07/30/25  0820   INR 1.08   PTT  31     ABGNo lab results found in last 7 days.   Urine Studies  Recent Labs   Lab Test 07/30/25  1030 04/06/23  1853 11/30/20  1132 07/11/17  1341   COLOR Orange* Light Yellow Yellow Yellow   APPEARANCE Cloudy* Slightly Cloudy* Clear Clear   URINEGLC Negative 30* Negative Negative   URINEBILI Negative Negative Negative Negative   URINEKETONE Negative Trace* Negative Negative   SG 1.006 1.015 1.020 1.005   UBLD Moderate* Small* Small* Negative   URINEPH 8.0* 6.0 8.5* 6.0   PROTEIN 300* 300* >300* 100*   UROBILINOGEN  --   --  0.2  --    NITRITE Positive* Negative Negative Positive*   LEUKEST Large* Large* Small* Small*   RBCU 48* 3*  --  <1   WBCU >182* 80*  --  6*     Recent Labs   Lab Test 08/30/21  1307 01/03/19  1435 07/11/17  1341   UTPG 4.92* 2.60* 3.04*     PTH  Recent Labs   Lab Test 08/30/21  1300 11/30/20  1415 12/09/19  1311 05/13/19  1027 01/03/19  1438 07/11/17  1337   PTHI 1,178* 858* 331* 195* 177* 191*     Iron Studies  Recent Labs   Lab Test 04/13/23  0749 08/30/21  1300 11/30/20  1415 08/09/18  1056 08/09/18  1046 07/11/17  1337   IRON 24* 74 92 178  --  77   * 254 272 266  --  296   IRONSAT 21 29 34 67  --  26   JUVENAL 1,092* 66 43  --  14 16*       IMAGING:  All imaging studies reviewed by me.

## 2025-07-31 NOTE — PROVIDER NOTIFICATION
JADA Dominguez MD, paged/updated via [employer] about pts SBPs being just below 120 (112-118), MAPs above 60, OVSS on 1 L trach dome, pt denying pain and sleeping comfortably. Alberto BROOKS MD, aware. Will continue current POC.

## 2025-07-31 NOTE — PLAN OF CARE
Goal Outcome Evaluation:      Plan of Care Reviewed With: parent          Outcome Evaluation: Plan for patient to discharge locally to [facility] home in [place] with pt mom. Please see SW note from today for more information.

## 2025-07-31 NOTE — PROGRESS NOTES
Transplant Surgery Progress Note  Surgery Cross-Cover  Post Op Check    2025    Aidan Louie is a 29 year old male POD#0 s/p Procedure(s):  Transplant kidney recipient  donor, stent placement for Pre-Op Diagnosis Codes:      * ESRD (end stage renal disease) (H) [N18.6]    Pt reports their pain is controlled with current regimen. Denies nausea, SOB, chest pain, or dizziness. Patient is not passing flatus or having bowel movements and Is voiding via urinary catheter. Patient has a tracheostomy in place.     /77 (BP Location: Right arm)   Pulse 94   Temp 98.2  F (36.8  C) (Axillary)   Resp 18   Wt 45.8 kg (100 lb 14.4 oz)   SpO2 100%   BMI 19.06 kg/m      Gen: A&O x4, NAD   Chest: breathing non-labored on tracheostomy tube. Sating well.   Abdomen: soft, non-tender, non-distended  Incision: clean, dry, intact covered by dressings, with staples. With no sign of bleed or infection.   Extremities: warm and well perfused  Devices: RLQ GLENN drain in place    A/P: No acute post-op issues. Continue plan of care per primary team. Please call with any questions.    Aidan Dominguez MD  General Surgery PGY-1

## 2025-07-31 NOTE — CONSULTS
Care Management Initial Consult    General Information  Assessment completed with: Patient, Parents, Mainly spoke with pt mom/guardian (Valerie), patient also present at bedside  Type of CM/SW Visit: Initial Assessment    Primary Care Provider verified and updated as needed: Yes (Dr. Bernie Mora at [facility] in [place], [place])   Readmission within the last 30 days: no previous admission in last 30 days      Reason for Consult: other (see comments), discharge planning (s/p kidney transplant)  Advance Care Planning: Advance Care Planning Reviewed: present on chart          Communication Assessment  Patient's communication style:      Hearing Difficulty or Deaf: yes   Wear Glasses or Blind: no    Cognitive  Cognitive/Neuro/Behavioral: .WDL except, arousability  Level of Consciousness: alert  Arousal Level: arouses to touch/gentle shaking (very sleepy, likes to sleep late per mom)  Orientation: oriented x 4  Mood/Behavior: calm, cooperative  Best Language: 0 - No aphasia  Speech: clear, spontaneous, logical, trached    Living Environment:   People in home: parent(s)  Valerie  Current living Arrangements: house      Able to return to prior arrangements: yes       Family/Social Support:  Care provided by: self, parent(s)  Provides care for: no one, unable/limited ability to care for self  Marital Status: Single  Support system: Parent(s), Other (specify) (Aunt and Uncles)          Description of Support System: Supportive, Involved         Current Resources:   Patient receiving home care services: No        Community Resources: Other (see comment), OP Dialysis (OP dialysis at [facility] on TTS schedule; Valerie reports that patient was also receiving services through [facility] where a , SW and RN follow patient. Pt receives services where a professional takes patient out into the community for social engagement. Valerie is not sure if this is paid for through pt's [employer],  etc.)  Equipment currently used at home: other (see comments) (unable to assess)  Supplies currently used at home: Oxygen Tubing/Supplies (trach supplies through [facility] [employer] in [place]. 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  No    Lifestyle & Psychosocial Needs:  Social Drivers of Health     Food Insecurity: Not on file   Depression: Not at risk (6/9/2025)    PHQ-2     PHQ-2 Score: 0   Housing Stability: Not on file   Tobacco Use: Low Risk  (7/30/2025)    Patient History     Smoking Tobacco Use: Never     Smokeless Tobacco Use: Never     Passive Exposure: Not on file   Financial Resource Strain: Not on file   Alcohol Use: Not on file   Transportation Needs: Not on file   Physical Activity: Not on file   Interpersonal Safety: Unknown (7/30/2025)    Interpersonal Safety     Do you feel physically and emotionally safe where you currently live?: Not on file     Within the past 12 months, have you been hit, slapped, kicked or otherwise physically hurt by someone?: Patient unable to answer     Within the past 12 months, have you been humiliated or emotionally abused in other ways by your partner or ex-partner?: Patient unable to answer   Stress: Not on file   Social Connections: Not on file   Health Literacy: Not on file       Functional Status:  Prior to admission patient needed assistance:   Dependent ADLs:: Independent  Dependent IADLs:: Medication Management, Money Management, Transportation       Mental Health Status:  Mental Health Status: Other (see comment) (Hx of anxiety and depression)  Mental Health Management:  Medication, Individual Therapy (Takes medication and sees therapist 1x/month. Per Valerie this is going well for patient.)    Chemical Dependency Status:  Chemical Dependency Status: No Current Concerns             Values/Beliefs:  Spiritual, Cultural Beliefs, Cheondoism Practices, Values that affect care: no               Discussed  Partnership in Safe Discharge Planning  document with patient/family: No    Additional Information:  Patient underwent  donor kidney transplant on 25.  Met with patient and pt mother/guardian (Valerie) to update psychosocial assessment and provide brief education about SW role while inpatient, as well as expectations/requirements and follow up needs post-transplant. SW also provided education about need for compliance with transplant medications, and explained ESRD Medicare benefits and medication coverage under Medicare part B.  Medicare 2728 forms completed and signed by guardian. Pt did not participate in conversation.     Plan at discharge is for patient and Valerie to discharge to pt's Aunt (Yayo coulter Shannan) and Uncle (Tahir)'s home in [place], [place]. Valerie will be pt's primary support person with Yayo and Tahir as secondary supports, if needed. Pt and Valerie had no other questions for SW at this time and are aware how to contact Kidney Transplant SW if other questions arise.     Discharge Address:     [address] [place] [address]    Previous Community Resources:     [facility]  Address: [address] [place] [address]  Phone: [phone]  Toll Free: [phone]    Care Management Services?   [facility]   [address] [place] [address]  Phone:  [phone]  Fax: [phone]      Next Steps: Kidney/Pancreas/Auto-Islet SOT SW Team to continue to follow indefinitely for outpatient concerns/questions and for one year post-transplant for hospital readmissions. SW provided patient with SW's contact  information for any questions/concerns.       Dee Blanco, RE Marino   [facility] Acute Care Management  Searchable on Vocera: Kidney Pancreas Auto Islet SW

## 2025-07-31 NOTE — PROGRESS NOTES
Admitted/transferred from: PACU  Time of arrival on unit ~2115  2 RN full  skin assessment completed by Carolyne BAY and Erna MARTINEZ RNs  Skin assessment finding: issues found RLQ incision covered with large primapore op dressing, C/D/I; LUQ G tube capped; L AVF with dressing, C/D/I; midline tracheostomy; preventative sacral mepilex   Interventions/actions: skin interventions monitor dressings     Will continue to monitor.

## 2025-08-01 ASSESSMENT — ACTIVITIES OF DAILY LIVING (ADL)
ADLS_ACUITY_SCORE: 55
ADLS_ACUITY_SCORE: 55
ADLS_ACUITY_SCORE: 57
ADLS_ACUITY_SCORE: 57
ADLS_ACUITY_SCORE: 55
ADLS_ACUITY_SCORE: 57
ADLS_ACUITY_SCORE: 55
ADLS_ACUITY_SCORE: 57
ADLS_ACUITY_SCORE: 57
ADLS_ACUITY_SCORE: 55
ADLS_ACUITY_SCORE: 55
ADLS_ACUITY_SCORE: 57
ADLS_ACUITY_SCORE: 57
ADLS_ACUITY_SCORE: 55
ADLS_ACUITY_SCORE: 55
ADLS_ACUITY_SCORE: 57
ADLS_ACUITY_SCORE: 55
ADLS_ACUITY_SCORE: 57
ADLS_ACUITY_SCORE: 57

## 2025-08-01 NOTE — CONSULTS
"Consult received for Vascular Access Team.  See LDA for details. For additional needs place \"Consult for Inpatient Vascular Access Care\"  [id number] order in EPIC.  "

## 2025-08-01 NOTE — PROVIDER NOTIFICATION
08/01/25 1000   Call Information   Date of Call 08/01/25   Time of Call 0953   Name of person requesting the team Ann   Title of person requesting team RN   RRT Arrival time 0955   Time RRT ended 1000   Reason for call   Type of RRT Adult   Primary reason for call Cardiovascular   Cardiovascular With symptoms   Was patient transferred from the ED, ICU, or PACU within last 24 hours prior to RRT call? No   SBAR   Situation Pt c/o chest pain.   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 Tariq.   Notable History/Conditions Cardiac;Congestive heart failure;COPD;End-Stage disease;Organ failure   Assessment Pt was laying in bed, VSS.   Interventions Labs;ECG   Adjustments to Recommend none   Patient Outcome   Patient Outcome Stabilized on unit   RRT Team   Attending/Primary/Covering Physician Transplant   Date Attending Physician notified 08/01/25   Time Attending Physician notified 1005   Physician(s) Jesika MARTINEZ NP   Lead RN Akira REYNOSO, RN   GUNNER Juarez RN   RT Stephanie RT   Other staff none   Post RRT Intervention Assessment   Post RRT Assessment Stable/Improved   Date Follow Up Done 08/01/25   Time Follow Up Done 1500

## 2025-08-01 NOTE — PROGRESS NOTES
BP (!) 132/91 (BP Location: Right arm)   Pulse 95   Temp 97.6  F (36.4  C) (Oral)   Resp 18   Wt 45.8 kg (100 lb 14.4 oz)   SpO2 100%   BMI 19.06 kg/m    Shift: 8519-4485  Isolation Status: standard  VS: hypertensive, trach dome 30% on 30L, afebrile  Neuro: Aox4  Behaviors: flat affect, mom at bedside  BG: none  Labs: creat 1.80, trop 11, hgb 7.3  Respiratory: shiley 5 trach cuffed with trach dome, scheduled nebs. Suctioned x3-- pt can  self-suction  Cardiac: chest pain in am/ more burning-- rapid response called. Trops/EKG done-- reassuring.  Pain/Nausea: abd pain/nausea w/o vomiting with chest burning   PRN: zofran, milk of mag  Diet: regular  IV Access: R PIV SL  Infusion(s): MIVF stopped, IV abx  Lines/Drains: R GLENN, G tube capped  GI/: izaguirre in place via mitrofanoff (good UOP), no BM is starting to pass gas  Skin: RLQ with op dressing, R GLENN, L AV fistula  Mobility: SBA with walker, walking in halls  Events/Education: met with specialty pharmacy today, med card/ lab book updated and at bedside.  Plan: continue POC and notify team of changes

## 2025-08-01 NOTE — PROGRESS NOTES
CLINICAL NUTRITION SERVICES - DISCHARGE NOTE    Patient s discharge needs assessed and discharge planning has been conducted with the multidisciplinary transplant care team including physicians, pharmacy, social work and transplant coordinator.    Follow up/Monitoring:  Once discharged, place outpatient nutrition consult via the transplant team if nutrition concerns arise.    Stefany Culp PhD, RD, LD  Clinical Dietitian   Available by name via [employer]  Weekend/Holiday Vocera: Weekend Holiday Clinical Dietitian [Multi Site Groups]

## 2025-08-01 NOTE — PROGRESS NOTES
Reason for Assessment:  Nutrition education regarding post transplant diet.   Nutrition Diagnosis:  Food- and nutrition-related knowledge deficit r/t no previous knowledge of post transplant diet guidelines AEB patient verbal report.   Interventions:   Provided instruction on post-transplant diet with discussion regarding protein sources and high protein needs in acute post-tx phase.   -- Reviewed recommendations to follow low fat/low sodium diet long term and discussed heart healthy diet tips.    -- Reviewed need for food safety precautions to prevent food borne illness.  Provided handouts: Post Transplant Diet Guidelines and USDA food safety booklet  Pt verbalized understanding of diet following education and denied further nutritional questions.    Goals:   Patient will verbalize understanding of education provided.  Follow-up:    Patient to ask any further nutrition-related questions before discharge.  In addition, pt may request outpatient RD appointment.    Stefany Culp PhD, RD, LD  Clinical Dietitian   Available by name via [employer]  Weekend/Holiday Vocera: Weekend Holiday Clinical Dietitian [Multi Site Groups]

## 2025-08-01 NOTE — PROGRESS NOTES
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 Tariq.     Graft function: s/p DBDKT with stent, POD#2  Kidney: Post- op US with patent Doppler, no fluid collections. Admission creatinine 6.39, creatinine this AM 1.80. UOP 7/31 5.4 liters.    - Consult to Transplant Nephrology.    - POD#2 plan of care    - Plan for early (~ 2 week post-op stent removal) in OR    Acute surgical pain:   - Schedule Tylenol and Robaxin   - PRN oxycodone     Neuro/Psych:  Depression/Anxiety:   - Continue home sertraline     Immunosuppressed status secondary to medications:   Induction IS: Intermediate - cPRA 25  Thymoglobulin 100 mg POD#0  Basiliximab POD#1 and anticipate POD#5 (therapy plan entered)  SM pulse followed by prednisone taper  Maintenance IS:   mg BID  Tacrolimus with goal trough 8 to 12    Hematology:   Anemia of chronic disease:   Acute surgical blood loss anemia: Hemoglobin stable, he has not required transfusion.     Cardiorespiratory:   Chronic respiratory failure, s/p tracheostomy:  Restrictive lung disease:   - RCAT   - Scheduled and PRN DuoNebs   - Early mobility, pulmonary hygiene     Hypertension:    - Carvedilol 3.125 mg BID mg   - Hold home amlodipine 5 mg    Chest pain:  RRT called for chest pain 8/1. Pain mid- sternal, non-radiating, and associated with some reflux. Patient has had Nissen fundoplication and does not vomit.    - EKG reassuring, no obvious acute myocardial ischemia   - Serial troponin, first trend reassuring    - Added simethicone, PPI, and vented G- tube     GI/Nutrition: Has G- tube and takes his nutrition by mouth unless ill. Pepcid, PPI.   Status post Nissen fundoplication:  Diet: Regular diet.    - Consult to RD     Endocrine: Hemoglobin A1c 4.6% 7/30/2025.    Fluid/Electrolytes:   Hyperphosphatemia: Monitor.   Hypercalcemia: Monitor.     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    - Plan to keep Aguirre in Mitrofanoff x 2 weeks post- op, may change after 5 to 6 days     Infectious disease: Afebrile.   Concern for UTI: Urine culture negative.    - Augmentin until stent removal    - Consider early stent removal   - Repeat UA close to discharge     Prophylaxis: DVT, pneumocystis (Bactrim), viral (Valcyte)    Disposition: 7A     ABDIAS/Fellow/Resident Provider:   LIVAN Ochoa, CNP  Adult Solid Organ Transplant   Contact information via Vocera Web Console or via [facility] Paging/Directory    I saw and evaluated this patient as part of a shared visit. I spent 30 minutes on review of labs and imaging, exam, care coordination, and counseling.  Medically Ready for Discharge: Anticipated Tomorrow     Faculty: Dr. Tariq  _________________________________________________________________    Interval History: History is obtained from the patient and his mom  Overnight events: No acute events     ROS:   A 10-point review of systems was negative except as noted above.    Meds:  Current Facility-Administered Medications   Medication Dose Route Frequency  Provider Last Rate Last Admin    acetaminophen (TYLENOL) tablet 975 mg  975 mg Oral Q8H Jose Tariq MD   975 mg at 08/01/25 1247    atorvastatin (LIPITOR) tablet 10 mg  10 mg Oral Daily Jose Tariq MD   10 mg at 08/01/25 0850    [START ON 8/4/2025] basiliximab (SIMULECT) 20 mg in sodium chloride 0.9 % 50 mL infusion  20 mg Intravenous Once Kayley Davis NP        carvedilol (COREG) tablet 3.125 mg  3.125 mg Oral BID Kayley Davis NP   3.125 mg at 08/01/25 1025    cefTRIAXone (ROCEPHIN) 1 g vial to attach to  mL bag for ADULTS or NS 50 mL bag for PEDS  1 g Intravenous Q24H Kayley Davis NP   1 g at 08/01/25 1229    [START ON 8/2/2025] famotidine (PEPCID) tablet 20 mg  20 mg Oral Daily Jose Tariq MD        ipratropium - albuterol 0.5 mg/2.5 mg (3mg)/3 mL (DUONEB) neb solution 3 mL  3 mL Nebulization Q8H Kayley Davis NP   3 mL at 08/01/25 0905    magnesium oxide (MAG-OX) tablet 400 mg  400 mg Oral Daily with lunch Jose Tariq MD   400 mg at 08/01/25 1229    methocarbamol (ROBAXIN) tablet 500 mg  500 mg Oral Q6H Kayley Davis NP   500 mg at 08/01/25 0849    mycophenolate (GENERIC EQUIVALENT) capsule 750 mg  750 mg Oral BID IS Jose Tariq MD   750 mg at 08/01/25 0844    pantoprazole (PROTONIX) EC tablet 40 mg  40 mg Oral Daily Kayley Davis NP   40 mg at 08/01/25 1025    polyethylene glycol (MIRALAX) Packet 17 g  17 g Oral Daily Jose Tariq MD   17 g at 08/01/25 0849    [START ON 8/2/2025] predniSONE (DELTASONE) tablet 60 mg  60 mg Oral Daily Kayley Davis NP        Followed by    [START ON 8/4/2025] predniSONE (DELTASONE) tablet 40 mg  40 mg Oral Daily Kayley Davis NP        Followed by    [START ON 8/6/2025] predniSONE (DELTASONE) tablet 20 mg  20 mg Oral Daily Kayley Davis NP        Followed by    [START ON 8/13/2025] predniSONE  (DELTASONE) tablet 15 mg  15 mg Oral Daily Kayley Davis NP        Followed by    [START ON 8/20/2025] predniSONE (DELTASONE) tablet 10 mg  10 mg Oral Daily Kayley Davis NP        Followed by    [START ON 8/27/2025] predniSONE (DELTASONE) tablet 5 mg  5 mg Oral Daily Kayley Davis NP        senna-docusate (SENOKOT-S/PERICOLACE) 8.6-50 MG per tablet 1 tablet  1 tablet Oral BID Jose Tariq MD   1 tablet at 08/01/25 0849    sertraline (ZOLOFT) tablet 100 mg  100 mg Oral Daily Kayley Davis NP   100 mg at 08/01/25 0849    sodium chloride (PF) 0.9% PF flush 10 mL  10 mL Intracatheter Q8H Jose Tariq MD   10 mL at 07/31/25 1334    sulfamethoxazole-trimethoprim (BACTRIM) 400-80 MG per tablet 1 tablet  1 tablet Oral Daily Jose Tariq MD   1 tablet at 08/01/25 1025    tacrolimus (GENERIC EQUIVALENT) capsule 3 mg  3 mg Oral BID IS Kayley Davis NP   3 mg at 08/01/25 0844    valGANciclovir (VALCYTE) tablet 450 mg  450 mg Oral Daily Jose Tariq MD   450 mg at 08/01/25 1025       Physical Exam:     Admit Weight: 45.8 kg (100 lb 14.4 oz)    Current vitals:   BP (!) 132/91 (BP Location: Right arm)   Pulse 82   Temp 98  F (36.7  C) (Oral)   Resp 20   Wt 45.8 kg (100 lb 14.4 oz)   SpO2 100%   BMI 19.06 kg/m      Vital sign ranges:    Temp:  [97.6  F (36.4  C)-98  F (36.7  C)] 98  F (36.7  C)  Pulse:  [] 82  Resp:  [16-20] 20  BP: (132-159)/() 132/91  SpO2:  [96 %-100 %] 100 %    General Appearance: in no apparent distress.   Skin: warm, perfused  Heart: appears adequately perfused   Lungs: no increased work of breathing   Abdomen: the abdomen is non-distended, incision intact   : izaguirre is present.  Urine is clear, pink tinged .  Extremities: edema: no significant edema, strength adequate   Neurologic: alert and oriented. Tremor absent..     Data:   CMP  Recent Labs   Lab 08/01/25  0630 08/01/25  0604  07/31/25 1737 07/31/25  0704 07/31/25  0439 07/30/25  1031 07/30/25  0820   NA  --  140  --   --  142   < > 143   POTASSIUM  --  3.6 3.2*   < > 3.8  3.8   < > 3.2*   CHLORIDE  --  109*  --   --  103   < > 100   CO2  --  23  --   --  22   < > 27   * 209*  --    < > 160*   < > 90   BUN  --  23.4*  --   --  29.2*   < > 37.0*   CR  --  1.80*  --   --  4.35*   < > 6.39*   GFRESTIMATED  --  52*  --   --  18*   < > 11*   SUSAN  --  10.8*  --   --  9.0   < > 10.0   MAG  --  2.1  --   --  2.2   < >  --    PHOS  --  3.1  --   --  4.7*   < >  --    ALBUMIN  --   --   --   --   --   --  4.5   BILITOTAL  --   --   --   --   --   --  0.2   ALKPHOS  --   --   --   --   --   --  69   AST  --   --   --   --   --   --  15   ALT  --   --   --   --   --   --  11    < > = values in this interval not displayed.     CBC  Recent Labs   Lab 08/01/25 0604 07/31/25 1737 07/31/25  0941 07/31/25  0439 07/30/25  1905 07/30/25  0820   HGB 7.3* 8.4*   < > 8.3*   < > 9.4*   WBC 12.3*  --   --  18.4*   < > 5.5   *  --   --  175   < > 262   A1C  --   --   --   --   --  4.6    < > = values in this interval not displayed.

## 2025-08-01 NOTE — PROGRESS NOTES
[facility]  Transplant Nephrology Progress Note  Date of Admission:  7/30/2025  Today's Date: 08/01/2025  Requesting physician: Jose Tariq*    Recommendations:   - Start Coreg 3.125mg BID PO.   - PTH, Vitamin D levels tomorrow (ordered).   - Agree with potassium replacement.  - Recommend K+ recheck this afternoon.   - Encourage fluid intake.   - No acute indications for dialysis.  - Continue current immunosuppression.  - Strict I/Os and daily standing weights.     Assessment & Plan   # DDKT: Trend down in creatinine. Good urine output.    - Baseline Creatinine: ~ TBD   - Proteinuria: Not checked post transplant   - DSA Hx: Not checked recently due to time from transplant  - Last cPRA: 25%   - BK Viremia: Not checked recently due to time from transplant   - Kidney Tx Biopsy Hx: No biopsy history.    # Immunosuppression: Tacrolimus immediate release (goal 8-10), Mycophenolate mofetil (dose 750 mg every 12 hours), and Methylprednisolone (dose taper)   - Induction with Recent Transplant:  Intermediate Intensity Protocol   - Continue with intensive monitoring of immunosuppression for efficacy and toxicity.   - Historical Changes in Immunosuppression: None   - Changes: Not at this time    # Infection Prevention:   Last CD4 Level: Not checked  - PJP: Sulfa/TMP (Bactrim)  - CMV: Valganciclovir (Valcyte)      - CMV IgG Ab High Risk Discordance (D+/R-) at time of transplant: Unknown  Present CMV Serostatus: Negative  - EBV IgG Ab High Risk Discordance (D+/R-) at time of transplant: No  Present EBV Serostatus: Positive    # Hypertension: Controlled;  Goal BP: < 150/90   - PTA medications: amlodipine 5 mg daily and carvedilol 3.125 mg bid.   - Changes: Yes - Start coreg 3.125mg BID    # Anemia in Chronic Renal Disease: Hgb: Stable, low      POORNIMA: No   - Iron studies: Replete    # Leukocytosis: likely secondary to steroids. No s/s of infection. Management per Surgery.      # Possible UTI: pre op UA with +nitrite, large leukocyte esterase, WBC >182, and WBC clumps. History of klebsiella pneumoniae (2015 x2), enterobacter (2016), and klebsiella oxytoca (2017) UTI; patient self caths through Mitrofanoff. Given cefuroxime 7/31 and then switched to ceftriaxone. Urine culture 07/30 negative.     # Mineral Bone Disorder:    - Secondary renal hyperparathyroidism; PTH level: Significantly elevated (601-1200 pg/ml)        On treatment: None  - Vitamin D; level: Normal        On supplement: No  - Calcium; level: High        On supplement: No- will check PTH and Vit D tomorrow.   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 Patient self caths a few times per day. Takes cranberry supplements. Follows with Urology.    - Chronic respiratory failure and restrictive lung disease: s/p tracheostomy.   - History of malnutrition: secondary to poor oral intake now s/p G-tube.     # Transplant History:  Etiology of Kidney Failure: Reflux nephropathy  Tx: DDKT  Transplant: 7/30/2025 (Kidney)  Significant transplant-related complications: None    Recommendations were communicated to the primary team verbally.    Seen and discussed with Dr. Molina.    [facility]  Transplant Nephrology  Contact information via [employer] Web Console      Interval History  Mr. Bojorquez creatinine is 1.80 (08/01 0604); Trend down.  Great urine output. 5.9L yesterday  Other significant labs/tests/vitals: Mild HTN.  Calcium elevated to 10.8. K   3.6.  yes chest pain and no shortness of breath. Patient reports CP shortly after taking meds. EKG, trops ordered. Will start PPI.   no leg swelling.  no nausea and vomiting.    no fever, sweats or chills.       Review of Systems   4 point ROS was obtained and negative except as noted in the Interval History.    MEDICATIONS:  Current Facility-Administered Medications   Medication Dose Route Frequency Provider Last Rate Last Admin    acetaminophen (TYLENOL) tablet 975 mg  975 mg Oral Q8H Jose Tariq MD   975 mg at 08/01/25 0615    atorvastatin (LIPITOR) tablet 10 mg  10 mg Oral Daily Jose Tariq MD   10 mg at 07/31/25 0825    [START ON 8/4/2025] basiliximab (SIMULECT) 20 mg in sodium chloride 0.9 % 50 mL infusion  20 mg Intravenous Once Kayley Davis NP        cefTRIAXone (ROCEPHIN) 1 g vial to attach to  mL bag for ADULTS or NS 50 mL bag for PEDS  1 g Intravenous Q24H Kayley Davis NP   1 g at 07/31/25 1333    famotidine (PEPCID) tablet 10 mg  10 mg Oral Daily Jose Tariq MD   10 mg at 07/31/25 0825    ipratropium - albuterol 0.5 mg/2.5 mg (3mg)/3 mL (DUONEB) neb solution 3 mL  3 mL Nebulization Q8H Kayley Davis NP   3 mL at 08/01/25 0110    magnesium oxide (MAG-OX) tablet 400 mg  400 mg Oral Daily with lunch Jose Tariq MD        methocarbamol (ROBAXIN) tablet 500 mg  500 mg Oral Q6H Kayley Davis NP   500 mg at 07/31/25 2232    methylPREDNISolone Na Suc (solu-MEDROL) injection 100 mg  100 mg Intravenous Once Kayley Davis NP        mycophenolate (GENERIC EQUIVALENT) capsule 750 mg  750 mg Oral BID IS Jose Tariq MD   750 mg at 07/31/25 1717    polyethylene glycol (MIRALAX) Packet 17 g  17 g Oral Daily Jose Tariq MD   17 g at 07/31/25 0825    potassium chloride (KLOR-CON) Packet 20 mEq  20 mEq Oral BID Aidan Dominguez MD        [START ON 8/2/2025]  predniSONE (DELTASONE) tablet 60 mg  60 mg Oral Daily Kayley Davis NP        Followed by    [START ON 2025] predniSONE (DELTASONE) tablet 40 mg  40 mg Oral Daily Kayley Davis NP        Followed by    [START ON 2025] predniSONE (DELTASONE) tablet 20 mg  20 mg Oral Daily Kayley Davis NP        Followed by    [START ON 2025] predniSONE (DELTASONE) tablet 15 mg  15 mg Oral Daily Kayley Davis NP        Followed by    [START ON 2025] predniSONE (DELTASONE) tablet 10 mg  10 mg Oral Daily Kayley Davis NP        Followed by    [START ON 2025] predniSONE (DELTASONE) tablet 5 mg  5 mg Oral Daily Kayley Davis NP        senna-docusate (SENOKOT-S/PERICOLACE) 8.6-50 MG per tablet 1 tablet  1 tablet Oral BID Jose Tariq MD   1 tablet at 25 1943    sertraline (ZOLOFT) tablet 100 mg  100 mg Oral Daily Kayley Davis NP   100 mg at 25 1524    sodium chloride (PF) 0.9% PF flush 10 mL  10 mL Intracatheter Q8H Jose Tariq MD   10 mL at 25 1334    sulfamethoxazole-trimethoprim (BACTRIM) 400-80 MG per tablet 1 tablet  1 tablet Oral Once per day on  Jose Tariq MD   1 tablet at 25 0825    tacrolimus (GENERIC EQUIVALENT) capsule 3 mg  3 mg Oral BID IS Kayley Davis NP   3 mg at 25 1717    valGANciclovir (VALCYTE) tablet 450 mg  450 mg Oral Once per day on  Jose Tariq MD   450 mg at 25 0825     Current Facility-Administered Medications   Medication Dose Route Frequency Provider Last Rate Last Admin    dextrose 5% in lactated ringers infusion   Intravenous Continuous Jose Tariq  mL/hr at 25 1446 New Bag at 25 1446       Physical Exam   Temp  Av.8  F (36.6  C)  Min: 97.8  F (36.6  C)  Max: 97.8  F (36.6  C)      Pulse  Av  Min: 72  Max: 72 Resp  Av  Min: 12   Max: 12  SpO2  Av %  Min: 97 %  Max: 97 %     BP (!) 148/97 (BP Location: Right arm)   Pulse 81   Temp 97.6  F (36.4  C) (Oral)   Resp 18   Wt 45.8 kg (100 lb 14.4 oz)   SpO2 100%   BMI 19.06 kg/m      Admit Weight: 45.8 kg (100 lb 14.4 oz)     GENERAL APPEARANCE: alert and no distress  HENT: mouth without ulcers or lesions  RESP: lungs clear to auscultation - no rales, rhonchi or wheezes. Trach in place  CV: regular rhythm, normal rate, no rub, no murmur  EDEMA: no LE edema bilaterally  ABDOMEN: soft, nondistended, nontender, bowel sounds normal  MS: extremities normal - no gross deformities noted, no evidence of inflammation in joints, no muscle tenderness  SKIN: no rash  DIALYSIS ACCESS: RUE fistula thrill present    Data   All labs reviewed by me.  CMP  Recent Labs   Lab 25  0630 25  0604 25  1737 25  1415 25  0941 25  0704 25  0439 25  2237 25  1905 25  1031 25  0820   NA  --  140  --   --   --   --  142  --  141  --  143   POTASSIUM  --  3.6 3.2* 2.9* 3.5  --  3.8  3.8   < > 3.4  --  3.2*   CHLORIDE  --  109*  --   --   --   --  103  --  100  --  100   CO2  --  23  --   --   --   --  22  --  24  --  27   ANIONGAP  --  8  --   --   --   --  17*  --  17*  --  16*   * 209*  --   --   --  137* 160*  --  145*   < > 90   BUN  --  23.4*  --   --   --   --  29.2*  --  34.0*  --  37.0*   CR  --  1.80*  --   --   --   --  4.35*  --  5.98*  --  6.39*   GFRESTIMATED  --  52*  --   --   --   --  18*  --  12*  --  11*   SUSAN  --  10.8*  --   --   --   --  9.0  --  8.7*  --  10.0   MAG  --  2.1  --   --   --   --  2.2  --  2.1  --   --    PHOS  --  3.1  --   --   --   --  4.7*  --  4.3  --   --    PROTTOTAL  --   --   --   --   --   --   --   --   --   --  6.8   ALBUMIN  --   --   --   --   --   --   --   --   --   --  4.5   BILITOTAL  --   --   --   --   --   --   --   --   --   --  0.2   ALKPHOS  --   --   --   --   --   --   --   --   --    --  69   AST  --   --   --   --   --   --   --   --   --   --  15   ALT  --   --   --   --   --   --   --   --   --   --  11    < > = values in this interval not displayed.     CBC  Recent Labs   Lab 08/01/25  0604 07/31/25  1737 07/31/25  1415 07/31/25  0941 07/31/25  0439 07/30/25  2314 07/30/25  1905 07/30/25  0820   HGB 7.3* 8.4* 7.7* 8.1* 8.3*   < > 8.6* 9.4*   WBC 12.3*  --   --   --  18.4*  --  8.1 5.5   RBC 2.41*  --   --   --  2.68*  --  2.79* 3.02*   HCT 23.1*  --   --   --  25.8*  --  26.8* 28.7*   MCV 96 97 95 96 96   < > 96 95   MCH 30.3  --   --   --  31.0  --  30.8 31.1   MCHC 31.6  --   --   --  32.2  --  32.1 32.8   RDW 16.6*  --   --   --  16.6*  --  16.2* 15.9*   *  --   --   --  175  --  173 262    < > = values in this interval not displayed.     INR  Recent Labs   Lab 07/30/25  0820   INR 1.08   PTT 31     ABGNo lab results found in last 7 days.   Urine Studies  Recent Labs   Lab Test 07/30/25  1030 04/06/23  1853 11/30/20  1132 07/11/17  1341   COLOR Orange* Light Yellow Yellow Yellow   APPEARANCE Cloudy* Slightly Cloudy* Clear Clear   URINEGLC Negative 30* Negative Negative   URINEBILI Negative Negative Negative Negative   URINEKETONE Negative Trace* Negative Negative   SG 1.006 1.015 1.020 1.005   UBLD Moderate* Small* Small* Negative   URINEPH 8.0* 6.0 8.5* 6.0   PROTEIN 300* 300* >300* 100*   UROBILINOGEN  --   --  0.2  --    NITRITE Positive* Negative Negative Positive*   LEUKEST Large* Large* Small* Small*   RBCU 48* 3*  --  <1   WBCU >182* 80*  --  6*     Recent Labs   Lab Test 08/30/21  1307 01/03/19  1435 07/11/17  1341   UTPG 4.92* 2.60* 3.04*     PTH  Recent Labs   Lab Test 08/30/21  1300 11/30/20  1415 12/09/19  1311 05/13/19  1027 01/03/19  1438 07/11/17  1337   PTHI 1,178* 858* 331* 195* 177* 191*     Iron Studies  Recent Labs   Lab Test 04/13/23  0749 08/30/21  1300 11/30/20  1415 08/09/18  1056 08/09/18  1046 07/11/17  1337   IRON 24* 74 92 178  --  77   * 254 272  266  --  296   IRONSAT 21 29 34 67  --  26   JUVENAL 1,092* 66 43  --  14 16*       IMAGING:  All imaging studies reviewed by me.

## 2025-08-01 NOTE — DISCHARGE SUMMARY
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 Tariq.     Hospital Course   Graft function: s/p DBDKT with stent, POD#6  Kidney: Post- op US with patent Doppler, no fluid collections. Admission creatinine 6.39, creatinine on the day of discharge is 1. UOP  since surgery has been robust.   -Plan for ureteral stent and drain to be removed 2 weeks post op in OR. Ureteral stent to be removed ~ 2 weeks to UTI.      Acute surgical pain:   - PRN Tylenol, Robaxin     Neuro/Psych:  Depression/Anxiety:   - Continue home sertraline      Immunosuppressed status secondary to medications:   Induction IS: Intermediate - cPRA 25  Thymoglobulin 100 mg POD#0  Basiliximab POD#1 and anticipate POD#5 (therapy plan entered)  SM pulse followed by prednisone taper  Maintenance IS:   mg BID  Tacrolimus with goal trough 8 to 12    Transplant coordinator: Aubrie Knutson,  RN  Graft type: DBD   DSA at time of transplant: None, historical Cw7 in 2020   Ureteral stent: Yes, tentative plan to remove in ~ 2 weeks in OR   CMV: Donor not on file  / Recipient -  EBV: Donor not on file / Recipient +  Prophylaxis: DVT, pneumocystis (Bactrim), viral (Valcyte)     Hematology:   Anemia of chronic disease:   Acute surgical blood loss anemia: Hemoglobin stable, he has not required transfusion.      Cardiorespiratory:   Chronic respiratory failure, s/p tracheostomy:  Restrictive lung disease: Managed post-operatively with respiratory care, early mobility, DuoNebs      Hypertension:    - Amlodipine 5 mg      Chest pain: RRT called for chest pain 8/1. Pain mid- sternal, non-radiating, and associated with some reflux. Patient has had Nissen fundoplication and does not vomit.    - EKG reassuring, no obvious acute myocardial ischemia   - Serial troponin, first trend reassuring    - Added simethicone, PPI, and vented G- tube      GI/Nutrition: Has G- tube and takes his nutrition by mouth unless ill. Pepcid, PPI.   Status post Nissen fundoplication:  Diet: Regular diet.    - Consult to RD      Endocrine: Hemoglobin A1c 4.6% 7/30/2025. No insulin requirement post- operatively.      Fluid/Electrolytes:   Hyperphosphatemia: Monitor.   Hypercalcemia: Monitor.      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    - Plan to keep Aguirre in Mitrofanoff x 2 weeks post- op. Aguirre changed on 8/5/25.      Infectious disease: Afebrile.   Concern for UTI: Urine culture negative.    - Continue Augmentin until stent removed.    Significant Results and Procedures      Procedure date:  07/30/25    Preoperative diagnosis:  End Stage renal failure due to prune belly syndrome    Postoperative diagnosis:  Same     Procedure:  1. Left kidney  transplant,  Donation after Brain Death, Right  iliac fossa, without vascular reconstruction. A J-J ureteral stent was placed.  2. Kidney allograft preparation on Back Table  22 modifier should be used given his extensive reoperative abdomen for urologic reconstruction. The bladder was very deep and scarred and difficult to visualize and access    Surgeon: Jose Tariq MD    Fellow/Assistant:  Ap hSipman MD- Fellow. Dr. Shipman was the primary assistant for the procedure and participated in all aspects of the case under my supervision. His presence was necessary due to lack of a suitable level surgical resident to assist.   Deangelo Clifton MD- resident. Dr Clifton was a secondary assistant for the procedure who participated in exposure, venous anastomosis, and closure.     Anesthesia:  General    Specimen:  None    Drains:  Jair-Rothman drain    Urine output:  unrecorded mls- catheterized mitrofanoff on field    Estimated blood loss:  150    Fluids administered:        Pending Results   These results will be followed up by Aubrie Knutson   Unresulted Labs Ordered in the Past 30 Days of this Admission       Date and Time Order Name Status Description    7/30/2025  9:30 AM Prepare red blood cells (unit) Preliminary     7/30/2025  9:30 AM Prepare red blood cells (unit) Preliminary     7/30/2025  6:41 AM BK Virus IgG Antibody In process             Code Status   Full    Primary Care Physician   [facility]    General Appearance: in no apparent distress.   Skin: warm, perfused  Heart: appears adequately perfused   Lungs: no increased work of breathing   Abdomen: the abdomen is non-distended, incision intact   : izaguirre is present.  Urine is clear, pink tinged .  Extremities: edema: no significant edema, strength adequate   Neurologic: alert and oriented, tremor absent.    Time Spent on this Encounter   I, Naseren Parks PA-C, personally saw the patient today and spent  greater than 30 minutes discharging this patient.    Discharge Disposition   Discharged to home (local)  Condition at discharge: Stable    Consultations This Hospital Stay   NEPHROLOGY KIDNEY/PANCREAS TRANSPLANT ADULT IP CONSULT  CONSULT FOR INPATIENT VASCULAR ACCESS CARE  SOT MEDICATION HISTORY IP PHARMACY CONSULT  CARE MANAGEMENT / SOCIAL WORK IP CONSULT  PHARMACY IP CONSULT  NUTRITION SERVICES ADULT IP CONSULT  NEPHROLOGY KIDNEY/PANCREAS TRANSPLANT ADULT IP CONSULT  CARE MANAGEMENT / SOCIAL WORK IP CONSULT  CONSULT FOR INPATIENT VASCULAR ACCESS CARE    Discharge Orders      Home Care Referral      Reason for your hospital stay    Patient underwent kidney transplant on 7/30/25.     Activity    Your activity upon discharge: Walk at least four times a day, lift no greater than 10 pounds for 6-8 weeks from the time of surgery.  No driving while taking narcotics or 3 weeks after surgery.     Tubes and Drains    1. Gastrostomy/Enterostomy Gastrostomy Pre existing PEG: Resume previous cares  2. Surgical drain. Drain to suction. Monitor daily output  3. Izaguirre catheter. Last exchanged on 8/5/25. Continue to gravity drainage. Flush with normal saline 50 ml as needed.     ADULT [facility] Specialty Follow-up and recommended labs and tests    [facility] FOLLOW UP:     1. [facility]: Over the next 2 days you will be seen in the [facility] (ph. [phone], option 3). Your labs will be drawn at the beginning of your appointment. DO NOT take your medications prior to having labs drawn. Please bring all your medications with you from home to take after labs are drawn.     2. Follow up with Dr. Tariq in Transplant Clinic in 1-2 weeks.     3. Follow up with Transplant Nephrologist as scheduled.     4. Ureteral stent, izaguirre, and drain will be removed in OR two week post transplant. Transplant team will arrange.    5. Follow up with your primary care provider in ~8 weeks. Patient to  schedule.     Remember to always bring an updated medication list to all appointments.      Call your Transplant Coordinator ([phone]) with questions about Transplant Center appointment scheduling.     LABS:   CBC, BMP, magnesium, phosphorus, tacrolimus level to be drawn daily while in ATC, then every Monday and Thursday by home health care nurse if arranged, or at an outpatient lab.  PTH, iron panel, and Vitamin D level to be drawn on 1st day at [facility].     Monitor and record    Monitor blood pressure and weight daily.     When to contact your care team    WHEN TO CONTACT YOUR  COORDINATOR:     Transplant Coordinator: Aubrie Knutson, phone: [phone]     Notify your coordinator if you have pain over your kidney, increased redness or drainage from your incision, fever greater than 100F, decreased urine output or new or increased amount of blood in urine.     Notify your coordinator if you have any issues with izaguirre catheter.    Notify your coordinator immediately if you are ever unable to take your immunosuppressive medications for any reason.     If you have URGENT concerns after office hours, please call the hospital switchboard at [phone] and ask to have the organ transplant nurse on-call paged. If you have a life-threatening emergency, go to the nearest emergency room.     Wound care and dressings    Instructions to care for your wound at home: Wash incision daily with soap and water. Do not soak or scrub.   .     Discharge Instructions    Once izaguirre removed should straight cath every 4 hours while awake and as needed. Recommend to flush with 250 ml as needed to clear mucus.     Oxygen Adult/Peds    Oxygen Documentation  I certify that this patient, Aidan Louie has been under my care (or a nurse practitioner or physican's assistant working with me). This is the face-to-face encounter for oxygen medical necessity.      At the time of this encounter, I have reviewed the qualifying testing and have  determined that supplemental oxygen is reasonable and necessary and is expected to improve the patient's condition in a home setting.         Patient has continued oxygen desaturation due to Acute Respiratory Failure J96.01.    If portability is ordered, is the patient mobile within the home? yes    Was this visit performed as a telehealth visit: No    Resume oxygen, no change  Surgical Airway Tracheostomy Mindy 5     Diet    Follow this diet upon discharge: Diet recommendations post-transplant: Heart healthy dietary habits long term (low saturated/trans fat, low sodium). High protein diet x 8 weeks. Practice food safety precautions.     Discharge Medications   Current Discharge Medication List        START taking these medications    Details   amoxicillin-clavulanate (AUGMENTIN) 875-125 MG tablet Take 1 tablet by mouth 2 times daily.  Qty: 20 tablet, Refills: 0    Associated Diagnoses: Kidney transplant recipient      atorvastatin (LIPITOR) 10 MG tablet Take 1 tablet (10 mg) by mouth daily.  Qty: 30 tablet, Refills: 11    Associated Diagnoses: Kidney transplant recipient      magnesium hydroxide (MILK OF MAGNESIA) 400 MG/5ML suspension Take 30 mLs by mouth daily as needed for constipation (Use if polyethylene glycol (Miralax) is not effective after 24 hours.).  Qty: 118 mL, Refills: 1    Associated Diagnoses: Kidney transplant recipient      magnesium oxide (MAG-OX) 400 MG tablet Take 1 tablet (400 mg) by mouth daily (with lunch).  Qty: 30 tablet, Refills: 3    Associated Diagnoses: Kidney transplant recipient      methocarbamol (ROBAXIN) 500 MG tablet Take 1 tablet (500 mg) by mouth every 6 hours as needed for muscle spasms.  Qty: 20 tablet, Refills: 0    Associated Diagnoses: Kidney transplant recipient      mycophenolate (GENERIC EQUIVALENT) 250 MG capsule Take 3 capsules (750 mg) by mouth 2 times daily.  Qty: 180 capsule, Refills: 11    Associated Diagnoses: Kidney transplant recipient      pantoprazole  (PROTONIX) 40 MG EC tablet Take 1 tablet (40 mg) by mouth daily.  Qty: 30 tablet, Refills: 3    Associated Diagnoses: Kidney transplant recipient      phosphorus tablet 250 mg (PHOSPHA 250 NEUTRAL) 250 MG per tablet Take 2 tablets (500 mg) by mouth 2 times daily.  Qty: 120 tablet, Refills: 1    Associated Diagnoses: Kidney transplant recipient      polyethylene glycol (MIRALAX) 17 GM/Dose powder Take 17 g by mouth daily.  Qty: 510 g, Refills: 1    Associated Diagnoses: Kidney transplant recipient      senna-docusate (SENOKOT-S/PERICOLACE) 8.6-50 MG tablet Take 1-2 tablets by mouth 2 times daily.  Qty: 120 tablet, Refills: 1    Associated Diagnoses: Kidney transplant recipient      sodium bicarbonate 650 MG tablet Take 1 tablet (650 mg) by mouth 2 times daily.  Qty: 60 tablet, Refills: 3    Associated Diagnoses: Kidney transplant recipient      sulfamethoxazole-trimethoprim (BACTRIM) 400-80 MG tablet Take 1 tablet by mouth daily.  Qty: 30 tablet, Refills: 11    Associated Diagnoses: Kidney transplant recipient      !! tacrolimus (GENERIC EQUIVALENT) 0.5 MG capsule Tacrolimus 0.5 mg capsules BID to allow for dose adjustments.  Qty: 60 capsule, Refills: 11    Associated Diagnoses: Kidney transplant recipient      !! tacrolimus (GENERIC EQUIVALENT) 1 MG capsule Take 5 capsules (5 mg) by mouth 2 times daily.  Qty: 300 capsule, Refills: 11    Comments: 1 mg capsules for dose adjustments  Associated Diagnoses: Kidney transplant recipient      valGANciclovir (VALCYTE) 450 MG tablet Take 2 tablets (900 mg) by mouth daily.  Qty: 60 tablet, Refills: 2    Associated Diagnoses: Kidney transplant recipient       !! - Potential duplicate medications found. Please discuss with provider.        CONTINUE these medications which have CHANGED    Details   acetaminophen (TYLENOL) 325 MG tablet Take 2 tablets (650 mg) by mouth every 6 hours as needed for mild pain.  Qty: 100 tablet, Refills: 1    Associated Diagnoses: Kidney transplant  recipient      amLODIPine (NORVASC) 5 MG tablet Take 1 tablet (5 mg) by mouth daily.  Qty: 30 tablet, Refills: 3    Associated Diagnoses: Kidney transplant recipient      bisacodyl (DULCOLAX) 5 MG EC tablet Take 1 tablet (5 mg) by mouth daily as needed for constipation.  Qty: 30 tablet, Refills: 1    Associated Diagnoses: Constipation, unspecified constipation type      cinacalcet (SENSIPAR) 30 MG tablet Take 1 tablet (30 mg) by mouth daily.  Qty: 30 tablet, Refills: 3    Associated Diagnoses: Kidney transplant recipient      famotidine (PEPCID) 20 MG tablet Take 1 tablet (20 mg) by mouth 2 times daily.  Qty: 60 tablet, Refills: 3    Associated Diagnoses: Kidney transplant recipient      predniSONE (DELTASONE) 10 MG tablet Take 2 tablets (20 mg) by mouth daily for 7 days, THEN 1.5 tablets (15 mg) daily for 7 days, THEN 1 tablet (10 mg) daily for 7 days, THEN 0.5 tablets (5 mg) daily for 7 days.  Qty: 35 tablet, Refills: 0    Associated Diagnoses: Kidney transplant recipient      !! sodium chloride 0.9%, bottle, 0.9 % irrigation Irrigate with 250 mLs as directed 4 times daily.  Qty: 9000 mL, Refills: 0    Associated Diagnoses: Neurogenic bladder      !! sodium chloride 0.9%, bottle, 0.9 % irrigation Irrigate with 250 mLs as directed 4 times daily.    Associated Diagnoses: Neurogenic bladder; Prune belly syndrome      !! sodium chloride 0.9%, bottle, 0.9 % irrigation Irrigate with 250 mLs as directed 4 times daily.    Associated Diagnoses: Neurogenic bladder       !! - Potential duplicate medications found. Please discuss with provider.        CONTINUE these medications which have NOT CHANGED    Details   albuterol (2.5 MG/3ML) 0.083% nebulizer solution Take 1 ampule by nebulization 4 times daily     Associated Diagnoses: CKD (chronic kidney disease) stage 3, GFR 30-59 ml/min (H); HTN (hypertension); Prune belly syndrome      diphenhydrAMINE (BENADRYL) 25 MG capsule Take 25 mg by mouth every 6 hours as needed for  itching or allergies      ipratropium (ATROVENT) 0.02 % neb solution Take 0.5 mg by nebulization 4 times daily   Qty: 225 mL      lidocaine-prilocaine (EMLA) 2.5-2.5 % external cream Apply topically daily as needed for moderate pain. Apply to fistula site before dialysis      loratadine (CLARITIN) 10 MG tablet Take 1 tablet by mouth daily      Melatonin 10 MG TABS tablet Take 10 mg by mouth nightly as needed for sleep.      multivitamin w/minerals (THERA-VIT-M) tablet Take 1 tablet by mouth daily.      sertraline (ZOLOFT) 100 MG tablet Take 100 mg by mouth daily.      tobramycin, PF, (DAX) 300 MG/5ML nebulizer solution Take 1 ampule by nebulization as needed Take only when in [place]    Associated Diagnoses: CKD (chronic kidney disease) stage 3, GFR 30-59 ml/min (H); HTN (hypertension); Prune belly syndrome      Disposable Gloves (CAREMATES NITRILE GLOVES LARGE) MISC 4 Boxes every 30 days.  Qty: 4 each, Refills: 11    Comments: No powder, latex free, size L  Associated Diagnoses: Neurogenic bladder      Nutritional Supplements (NEPRO) LIQD Take 1 Can by mouth daily  Qty: 30 each, Refills: 11    Associated Diagnoses: Moderate protein-calorie malnutrition; CKD (chronic kidney disease) stage 4, GFR 15-29 ml/min (H)      !! order for DME Equipment being ordered: Medi-Vac, plastic tubing connector lara type, cat 361[facility], 1 lara tip/month x 12 months. Use for tracheostomy suctioning  Qty: 1 each, Refills: 11    Associated Diagnoses: Tracheostomy care (H)      !! order for DME Equipment being ordered: DME    Adult Bivona Uncuffed Tracheostomy Tube-5.0    Patient will do weekly trach changes.  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 order for DME Equipment being ordered: DME -    Deep suction kits - 12 fr  Qty: 90 each, Refills: 3    Associated Diagnoses: Tracheostomy in place (H)      !! order for  "DME Tracheostomy Humidification Equipment  Equipment being ordered:     1) Air compressor  2) Nebulizer bottle  3) Aerosol tubing  4) Trach mask  5) Sterile water  6) Saline ampules (\"bullets\")  7) Heat Moisture Exchanger (HME) Also known by several other terms including: Thermal Humidifying Filters, Swedish nose, Artificial nose, Filter, Thermovent T.  8) Room/home humidifiers  Qty: 1 each, Refills: 11    Associated Diagnoses: Tracheostomy in place (H)      !! order for DME Optifoam Basic   [id number]  Qty: 30 each, Refills: 11    Associated Diagnoses: Granulation tissue of site of tracheostomy; Tracheostomy in place (H); Tracheostomy granuloma (H)      !! order for DME Equipment being ordered: Humidifier (heated), humidifier attachment, saline lavages, humidivent mask  Qty: 1 each, Refills: 3    Associated Diagnoses: Tracheostomy in place (H)      !! Syringe Luer Slip (CAREPOINT SYRINGE LUER SLIP) 60 ML MISC 1 Syringe 4 times daily.  Qty: 200 each, Refills: 11    Associated Diagnoses: Neurogenic bladder      !! Syringe Luer Slip 60 ML MISC 1 Syringe 4 times daily  Qty: 200 each, Refills: 11    Associated Diagnoses: Prune belly syndrome; Neurogenic bladder       !! - Potential duplicate medications found. Please discuss with provider.        STOP taking these medications       calcitRIOL (ROCALTROL) 0.25 MCG capsule Comments:   Reason for Stopping:         calcium carbonate (TUMS) 500 MG chewable tablet Comments:   Reason for Stopping:         carvedilol (COREG) 3.125 MG tablet Comments:   Reason for Stopping:         Cranberry 500 MG TABS Comments:   Reason for Stopping:         docusate sodium (COLACE) 100 MG capsule Comments:   Reason for Stopping:         sennosides (SENOKOT) 8.6 MG tablet Comments:   Reason for Stopping:         sevelamer carbonate (RENVELA) 800 MG tablet Comments:   Reason for Stopping:         sodium zirconium cyclosilicate (LOKELMA) 10 g PACK packet Comments:   Reason for Stopping:         " vitamin D3 (CHOLECALCIFEROL) 50 mcg (2000 units) tablet Comments:   Reason for Stopping:             Allergies   Allergies   Allergen Reactions    Latex      Other reaction(s): Other (see comments)  Other reaction(s): Contact Dermatitis, Other (see comments)    Adhesive Tape Rash     Data   Most Recent 3 CBC's:  Recent Labs   Lab Test 08/05/25  0559 08/04/25  0536 08/03/25  0542   WBC 7.8 6.6 5.6   HGB 7.9* 7.5* 7.6*   MCV 96 98 99    196 178      Most Recent 3 BMP's:  Recent Labs   Lab Test 08/05/25  0559 08/04/25  0536 08/03/25  0542    138 141   POTASSIUM 4.3 4.5 4.8   CHLORIDE 109* 112* 113*   CO2 21* 20* 22   BUN 25.2* 27.0* 21.7*   CR 0.99 1.15 1.31*   ANIONGAP 9 6* 6*   SUSAN 10.8* 10.5* 10.5*   GLC 95 87 82     Most Recent 2 LFT's:  Recent Labs   Lab Test 07/30/25  0820 04/12/23  0605   AST 15 30   ALT 11 11   ALKPHOS 69 69   BILITOTAL 0.2 0.2     Most Recent INR's and Anticoagulation Dosing History:  Anticoagulation Dose History          Latest Ref Rng & Units 10/15/2020 4/12/2023 7/30/2025   Recent Dosing and Labs   INR 0.85 - 1.15 1.03  1.21  1.08      Most Recent 3 Troponin's:No lab results found.  Most Recent Cholesterol Panel:  Recent Labs   Lab Test 07/30/25  0820   CHOL 138   LDL 69   HDL 58   TRIG 54     Most Recent 6 Bacteria Isolates From Any Culture (See EPIC Reports for Culture Details):  Recent Labs   Lab Test 08/02/19  1530 07/11/17  1322   CULT Light growth  Staphylococcus aureus  *  Light growth  Normal steven   50,000 to 100,000 colonies/mL Klebsiella oxytoca*     Most Recent TSH, T4 and A1c Labs:  Recent Labs   Lab Test 07/30/25  0820 06/05/19  0000   TSH  --  1.51   T4  --  0.8   A1C 4.6  --

## 2025-08-01 NOTE — PLAN OF CARE
Goal Outcome Evaluation:      Plan of Care Reviewed With: patient    Overall Patient Progress: improvingOverall Patient Progress: improving    Outcome Evaluation: hypertensive on o2. pain managed with PRN. good urine output      BP (!) 153/109 (BP Location: Right arm)   Pulse 98   Temp 97.8  F (36.6  C) (Oral)   Resp 18   Wt 45.8 kg (100 lb 14.4 oz)   SpO2 97%   BMI 19.06 kg/m      Shift: 8935-7414  Isolation Status: none  VS: stable on 1 LPM via trach dome, afebrile  Neuro: Aox4, cognitive delay at baseline but able to make needs known  Behaviors: restful  BG: pending   Labs/Imaging: am labs pending   Respiratory: Trach with 1 LPM via dome; clear/coarse lung sounds, audible wheezing d/t damaged lower lobes  Cardiac: WDL   Pain/Nausea: reports pain.  PRN: oxycodone 1x   Diet: regular  LDA: R PIV x2,   Infusion(s): D5LR @ 100 mL/hr  GI/: LBM .  Aguirre with good output   Skin: RLQ incision covered with op dressing, C/D/I; LUQ G tube capped; preventative mepilex  Mobility:not OOB this shift  Events/Education: med card and lab book updated   Plan: cont w/POC

## 2025-08-01 NOTE — CODE/RAPID RESPONSE
"Rapid Response Team Note    Assessment   A rapid response was called on Aidan Louie due to chest pain.This presentation is likely due to reflux symptoms.    Plan   -  Check troponin and ECG --> troponin was checked and normal and ECG is without any acute ischemic changes   - Continue Pepcid 20mg once daily and Protonix 40mg once daily   -  Disposition: The patient will remain on the current unit. We will continue to monitor this patient closely.  -  Reassessment and plan follow-up will be performed by the rapid response team    GERARD Ulloa  Rapid Response Team ABDIAS  Securely message with [employer]     Medical Decision Making       30 MINUTES SPENT BY ME on the date of service doing chart review, history, exam, documentation & further activities per the note.      Hospital Course   Brief Summary of events leading to rapid response:   Mr. 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 This morning, Mr White complained of \"crushing chest pain\" while sipping on a drink and a rapid response was called. Upon arrival, Mr. Louie was resting comfortably in bed, no acute distress with stable vital signs. He reports some chest pain, but does not want any medication for pain. He denies the pain radiating anywhere, shortness of breath, fevers, chills or additional new symptoms. We discussed a beginning work up including ECG, troponin and then looking at medications that might be able to help with GERD symptoms and monitoring him closely which the patient and his mother were in agreement with.    Physical Exam   Vital " Signs: Temp: 97.7  F (36.5  C) Temp src: Oral BP: (!) 158/111 Pulse: 87   Resp: 20 SpO2: 100 % O2 Device: None (Room air) Oxygen Delivery: 1 LPM  Weight: 100 lbs 14.4 oz      Exam:   General Appearance: 29 year old gentleman sitting up in bed sipping on a drink  Respiratory: breathing comfortably on 1L nasal canula  Cardiovascular: regular rate and rhythm, no appreciable murmurs, rubs or gallops     Significant Results and Procedures   Last Comprehensive Metabolic Panel:  Lab Results   Component Value Date     08/01/2025    POTASSIUM 3.6 08/01/2025    CHLORIDE 109 (H) 08/01/2025    CO2 23 08/01/2025    ANIONGAP 8 08/01/2025     (H) 08/01/2025    BUN 23.4 (H) 08/01/2025    CR 1.80 (H) 08/01/2025    GFRESTIMATED 52 (L) 08/01/2025    SUSAN 10.8 (H) 08/01/2025       Lab Results   Component Value Date    WBC 12.3 08/01/2025    WBC 8.3 11/30/2020     Lab Results   Component Value Date    RBC 2.41 08/01/2025    RBC 3.57 11/30/2020     Lab Results   Component Value Date    HGB 7.3 08/01/2025    HGB 10.3 11/30/2020     Lab Results   Component Value Date    HCT 23.1 08/01/2025    HCT 32.7 11/30/2020     Lab Results   Component Value Date    MCV 96 08/01/2025    MCV 92 11/30/2020     Lab Results   Component Value Date    MCH 30.3 08/01/2025    MCH 28.9 11/30/2020     Lab Results   Component Value Date    MCHC 31.6 08/01/2025    MCHC 31.5 11/30/2020     Lab Results   Component Value Date    RDW 16.6 08/01/2025    RDW 13.5 11/30/2020     Lab Results   Component Value Date     08/01/2025     11/30/2020

## 2025-08-01 NOTE — PROGRESS NOTES
Care Management Follow Up    Length of Stay (days): 2    Expected Discharge Date: 08/04/2025     Concerns to be Addressed: discharge planning     Patient plan of care discussed at interdisciplinary rounds: Yes    Anticipated Discharge Disposition: Home Care              Anticipated Discharge Services: Home Care  Anticipated Discharge DME: None    Patient/family educated on Medicare website which has current facility and service quality ratings: yes  Education Provided on the Discharge Plan: Yes  Patient/Family in Agreement with the Plan: yes    Referrals Placed by CM/SW: External Care Coordination  Private pay costs discussed: Not applicable    Discussed  Partnership in Safe Discharge Planning  document with patient/family: No     Handoff Completed: No, handoff not indicated or clinically appropriate    Additional Information:  Pt s/p kdiney transplant with a complex medical history.  Mom Legal guardian.  Pt will need ATC appointments confirmed prior to discharge.     Received call from [facility] confirming agency is able to accept for home RN services with estimated start of care on 8/14.  Agreed to update agency when pt medically cleared for discharge.     Accepting Home Care  [facility] [place]  [phone]  Fax [phone]    Next Steps:  Confirm ATC appointments prior to discharge  Ensure Home Care order placed with estimated SOC 8/14 indicated on the order  Follow for discharge planning    Sydnie Alvarez, RN BSN, PHN, ACM-RN  7A Nurse Care Coordinator    [facility]  Phone: [phone]  Available on Alexa  7A SOT RNCC

## 2025-08-02 ASSESSMENT — ACTIVITIES OF DAILY LIVING (ADL)
ADLS_ACUITY_SCORE: 57

## 2025-08-02 NOTE — PROGRESS NOTES
BP (!) 147/87 (BP Location: Left arm)   Pulse 105   Temp 97.8  F (36.6  C) (Oral)   Resp 18   Wt 45.8 kg (100 lb 14.4 oz)   SpO2 99%   BMI 19.06 kg/m      Shift: 0082-1165  Isolation Status: standard  VS: stable with trach dome 30% FiO2 on 30L , afebrile  Neuro: Aox4  Behaviors: flat affect, mom at bedside-- assists with communication  BG: none  Labs: creat 1.50, Hgb 7.6  Respiratory: shiley 5 trach cuffed-- scheduled nebs. Pt self-suctions  Cardiac: WDL  Pain/Nausea: abd pain managed with scheduled meds, denies nausea  PRN: milk of mag  Diet: regular  IV Access: R PIV SL, L AVF  Infusion(s): IV abx  Lines/Drains: R GLENN (60 ml serous output)  GI/: catheter in place via Mitrofanoff, loose BM x2   Skin: RLQ incision dermabonded, R GLENN, G tube button  Mobility: SBA with walker  Events/Education: med card/lab book updated and at bedside  Plan: continue POC and notify team of changes

## 2025-08-02 NOTE — PLAN OF CARE
/70 (BP Location: Left arm)   Pulse 71   Temp 97.8  F (36.6  C) (Oral)   Resp 20   Wt 45.8 kg (100 lb 14.4 oz)   SpO2 99%   BMI 19.06 kg/m      Shift: [id number]  Isolation Status: N/A  VS: WNL on Trach dome , afebrile  Neuro: Aox4 Trach, wears bilateral hearing aids, patients mother assisted with  communication   Behaviors: Calm, cooperative   BG: N/A  Labs/Imaging: HGB 7.6, other AM labs pending   Respiratory: Trach Shiley 5 uncuffed, patient self suctions, on Trach dome 30% FiO2 30L   Cardiac: WNL denies chest pain   Pain/Nausea: no nausea reported, pt notifies mother or staff with pain   PRN: oxy 5mg x 1   Diet: Regular   LDA: Ureteral cath, CVC DL (dialysis), Fistula left arm, GLENN x 1, G-tube, Trach, RPIV,   Infusion(s): N/A  GI/: urethral cath, last BM prior to admission up to bathroom frequently with no BM.   Skin: Hockey stick incision RLQ.   Mobility: Ax1/ SBA   Plan: Continue with POC   Goal Outcome Evaluation:  Goals reviewed with mother and PT overall progressing

## 2025-08-02 NOTE — PROGRESS NOTES
[facility]  Transplant Nephrology Progress Note  Date of Admission:  7/30/2025  Today's Date: 08/02/2025  Requesting physician: Jose Tariq*    Recommendations:   - Start Cinacalcet 30mg daily (ordered).   - Start sodium bicarb 650mg BID (ordered).   - Agree with Phos Neutral 250mg BID PO.   - Encourage fluid intake.   - No acute indications for dialysis.  - Continue current immunosuppression.  - Strict I/Os and daily weights.     Assessment & Plan   # DDKT: Trend down in creatinine. Good urine output.    - Baseline Creatinine: ~ TBD   - Proteinuria: Not checked post transplant   - DSA Hx: Not checked recently due to time from transplant  - Last cPRA: 25%   - BK Viremia: Not checked recently due to time from transplant   - Kidney Tx Biopsy Hx: No biopsy history.    # Immunosuppression: Tacrolimus immediate release (goal 8-10), Mycophenolate mofetil (dose 750 mg every 12 hours), and Methylprednisolone (dose taper)   - Induction with Recent Transplant:  Intermediate Intensity Protocol   - Continue with intensive monitoring of immunosuppression for efficacy and toxicity.   - Historical Changes in Immunosuppression: None   - Changes: Not at this time    # Infection Prevention:   Last CD4 Level: Not checked  - PJP: Sulfa/TMP (Bactrim)  - CMV: Valganciclovir (Valcyte)      - CMV IgG Ab High Risk Discordance (D+/R-) at time of transplant: Unknown  Present CMV Serostatus: Negative  - EBV IgG Ab High Risk Discordance (D+/R-) at time of transplant: No  Present EBV Serostatus: Positive    # Hypertension: Controlled;  Goal BP: < 150/90   - PTA medications: amlodipine 5 mg daily and carvedilol 3.125 mg bid.   - Changes: Not at this time   - Current : Carvedilol 3.125 mg BID    # Anemia in Chronic Renal Disease: Hgb: Stable, low      POORNIMA: No   - Iron studies: Replete    # Leukocytosis: likely secondary to steroids. No s/s of infection. Management per Surgery.     #  Possible UTI: pre op UA with +nitrite, large leukocyte esterase, WBC >182, and WBC clumps. History of klebsiella pneumoniae (2015 x2), enterobacter (2016), and klebsiella oxytoca (2017) UTI; patient self caths through Mitrofanoff. Given cefuroxime 7/31 and then switched to ceftriaxone. Urine culture 07/30 negative.     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 Patient self caths a few times per day. Takes cranberry supplements. Follows with Urology.    - Chronic respiratory failure and restrictive lung disease: s/p tracheostomy.   - History of malnutrition: secondary to poor oral intake now s/p G-tube.     # Transplant History:  Etiology of Kidney Failure: Reflux nephropathy  Tx: DDKT  Transplant: 7/30/2025 (Kidney)  Significant transplant-related complications: None    Recommendations were communicated to the primary team verbally.    Seen and discussed with Dr. Molina.    LIVAN Luo [facility]  Transplant Nephrology  Contact information via Vocera Web Console      Interval History  Mr. Bojorquez creatinine is 1.50 (08/02 0544); Trend down.  Good urine output.  Other significant labs/tests/vitals: VSS  No events overnight.  No chest pain or shortness of breath.  No leg swelling.  No nausea and vomiting.   Bowel movements are not yet, passing gas. Reporting mild abd  pain.   no fever, sweats or chills.       Review of Systems   4 point ROS was obtained and negative except as noted in the Interval History.    MEDICATIONS:  Current Facility-Administered Medications   Medication Dose Route Frequency Provider Last Rate Last Admin    acetaminophen (TYLENOL) tablet 975 mg  975 mg Oral Q8H Jose Tariq MD   975 mg at 08/02/25 0526    atorvastatin (LIPITOR) tablet 10 mg  10 mg Oral Daily Jose Tariq MD   10 mg at 08/01/25 0850    [START ON 8/4/2025] basiliximab (SIMULECT) 20 mg in sodium chloride 0.9 % 50 mL infusion  20 mg Intravenous Once Kayley Davis NP        carvedilol (COREG) tablet 3.125 mg  3.125 mg Oral BID Kayley Davis NP   3.125 mg at 08/01/25 2125    cefTRIAXone (ROCEPHIN) 1 g vial to attach to  mL bag for ADULTS or NS 50 mL bag for PEDS  1 g Intravenous Q24H Kayley Dvais NP   1 g at 08/01/25 1229    famotidine (PEPCID) tablet 20 mg  20 mg Oral Daily Jose Tariq MD        ipratropium - albuterol 0.5 mg/2.5 mg (3mg)/3 mL (DUONEB) neb solution 3 mL  3 mL Nebulization Q8H Janett Molina MD   3 mL at 08/02/25 0831    magnesium oxide (MAG-OX) tablet 400 mg  400 mg Oral Daily with lunch Jose Tariq MD   400 mg at 08/01/25 1229    methocarbamol (ROBAXIN) tablet 500 mg  500 mg Oral Q6H Kayley Davis NP   500 mg at 08/02/25 0250    mycophenolate (GENERIC EQUIVALENT) capsule 750 mg  750 mg Oral BID IS Jose Tariq MD   750 mg at 08/01/25 1718    pantoprazole (PROTONIX) EC tablet 40 mg  40 mg Oral Daily Kayley Davis NP   40 mg at 08/01/25 1025    phosphorus tablet 250 mg (PHOSPHA 250 NEUTRAL) per tablet 250 mg  250 mg Oral BID Mattie Willis APRN CNP        polyethylene glycol (MIRALAX) Packet 17 g  17 g Oral Daily Jose Tariq MD   17 g at 08/01/25  0849    predniSONE (DELTASONE) tablet 60 mg  60 mg Oral Daily Kayley Davis NP        Followed by    [START ON 2025] predniSONE (DELTASONE) tablet 40 mg  40 mg Oral Daily Kayley Davis NP        Followed by    [START ON 2025] predniSONE (DELTASONE) tablet 20 mg  20 mg Oral Daily Kayley Davis NP        Followed by    [START ON 2025] predniSONE (DELTASONE) tablet 15 mg  15 mg Oral Daily Kayley Davis NP        Followed by    [START ON 2025] predniSONE (DELTASONE) tablet 10 mg  10 mg Oral Daily Kayley Davis NP        Followed by    [START ON 2025] predniSONE (DELTASONE) tablet 5 mg  5 mg Oral Daily Kayley Davis NP        senna-docusate (SENOKOT-S/PERICOLACE) 8.6-50 MG per tablet 1 tablet  1 tablet Oral BID Jose Tariq MD   1 tablet at 25 2125    sertraline (ZOLOFT) tablet 100 mg  100 mg Oral Daily Kayley Davis NP   100 mg at 25 0849    sodium chloride (PF) 0.9% PF flush 10 mL  10 mL Intracatheter Q8H Jose Tariq MD   10 mL at 25 1334    sulfamethoxazole-trimethoprim (BACTRIM) 400-80 MG per tablet 1 tablet  1 tablet Oral Daily Jose Tariq MD   1 tablet at 25 1025    tacrolimus (GENERIC EQUIVALENT) capsule 3 mg  3 mg Oral BID IS Kayley Davis NP   3 mg at 25 1718    valGANciclovir (VALCYTE) tablet 450 mg  450 mg Oral Daily Jose Tariq MD   450 mg at 25 1025     Current Facility-Administered Medications   Medication Dose Route Frequency Provider Last Rate Last Admin       Physical Exam   Temp  Av.8  F (36.6  C)  Min: 97.8  F (36.6  C)  Max: 97.8  F (36.6  C)      Pulse  Av  Min: 72  Max: 72 Resp  Av  Min: 12  Max: 12  SpO2  Av %  Min: 97 %  Max: 97 %     /70 (BP Location: Left arm)   Pulse 71   Temp 97.8  F (36.6  C) (Oral)   Resp 20   Wt 45.8 kg (100 lb 14.4 oz)   SpO2 99%   BMI 19.06 kg/m      Admit  Weight: 45.8 kg (100 lb 14.4 oz)     GENERAL APPEARANCE: alert and no distress  HENT: mouth without ulcers or lesions  RESP: lungs clear to auscultation - no rales, rhonchi or wheezes. Trach in place  CV: regular rhythm, normal rate, no rub, no murmur  EDEMA: no LE edema bilaterally  ABDOMEN: soft, nondistended, nontender, bowel sounds normal  MS: extremities normal - no gross deformities noted, no evidence of inflammation in joints, no muscle tenderness  SKIN: no rash  DIALYSIS ACCESS: RUE fistula thrill present    Data   All labs reviewed by me.  CMP  Recent Labs   Lab 08/02/25  0544 08/02/25  0516 08/01/25  0630 08/01/25  0604 07/31/25  1737 07/31/25  1415 07/31/25  0704 07/31/25  0439 07/30/25  2237 07/30/25  1905 07/30/25  1031 07/30/25  0820     --   --  140  --   --   --  142  --  141  --  143   POTASSIUM 4.9  --   --  3.6 3.2* 2.9*   < > 3.8  3.8   < > 3.4  --  3.2*   CHLORIDE 113*  --   --  109*  --   --   --  103  --  100  --  100   CO2 20*  --   --  23  --   --   --  22  --  24  --  27   ANIONGAP 8  --   --  8  --   --   --  17*  --  17*  --  16*   GLC 84 82 160* 209*  --   --    < > 160*  --  145*   < > 90   BUN 17.1  --   --  23.4*  --   --   --  29.2*  --  34.0*  --  37.0*   CR 1.50*  --   --  1.80*  --   --   --  4.35*  --  5.98*  --  6.39*   GFRESTIMATED 65  --   --  52*  --   --   --  18*  --  12*  --  11*   SUSAN 11.0*  --   --  10.8*  --   --   --  9.0  --  8.7*  --  10.0   MAG 2.0  --   --  2.1  --   --   --  2.2  --  2.1  --   --    PHOS 2.0*  --   --  3.1  --   --   --  4.7*  --  4.3  --   --    PROTTOTAL  --   --   --   --   --   --   --   --   --   --   --  6.8   ALBUMIN  --   --   --   --   --   --   --   --   --   --   --  4.5   BILITOTAL  --   --   --   --   --   --   --   --   --   --   --  0.2   ALKPHOS  --   --   --   --   --   --   --   --   --   --   --  69   AST  --   --   --   --   --   --   --   --   --   --   --  15   ALT  --   --   --   --   --   --   --   --   --   --   --   11    < > = values in this interval not displayed.     CBC  Recent Labs   Lab 08/02/25  0544 08/01/25  0604 07/31/25  1737 07/31/25  1415 07/31/25  0941 07/31/25  0439 07/30/25  2314 07/30/25  1905   HGB 7.6* 7.3* 8.4* 7.7*   < > 8.3*   < > 8.6*   WBC 8.1 12.3*  --   --   --  18.4*  --  8.1   RBC 2.48* 2.41*  --   --   --  2.68*  --  2.79*   HCT 24.6* 23.1*  --   --   --  25.8*  --  26.8*   MCV 99 96 97 95   < > 96   < > 96   MCH 30.6 30.3  --   --   --  31.0  --  30.8   MCHC 30.9* 31.6  --   --   --  32.2  --  32.1   RDW 16.4* 16.6*  --   --   --  16.6*  --  16.2*    143*  --   --   --  175  --  173    < > = values in this interval not displayed.     INR  Recent Labs   Lab 07/30/25  0820   INR 1.08   PTT 31     ABGNo lab results found in last 7 days.   Urine Studies  Recent Labs   Lab Test 07/30/25  1030 04/06/23  1853 11/30/20  1132 07/11/17  1341   COLOR Orange* Light Yellow Yellow Yellow   APPEARANCE Cloudy* Slightly Cloudy* Clear Clear   URINEGLC Negative 30* Negative Negative   URINEBILI Negative Negative Negative Negative   URINEKETONE Negative Trace* Negative Negative   SG 1.006 1.015 1.020 1.005   UBLD Moderate* Small* Small* Negative   URINEPH 8.0* 6.0 8.5* 6.0   PROTEIN 300* 300* >300* 100*   UROBILINOGEN  --   --  0.2  --    NITRITE Positive* Negative Negative Positive*   LEUKEST Large* Large* Small* Small*   RBCU 48* 3*  --  <1   WBCU >182* 80*  --  6*     Recent Labs   Lab Test 08/30/21  1307 01/03/19  1435 07/11/17  1341   UTPG 4.92* 2.60* 3.04*     PTH  Recent Labs   Lab Test 08/02/25  0544 08/30/21  1300 11/30/20  1415 12/09/19  1311 05/13/19  1027 01/03/19  1438 07/11/17  1337   PTHI 143* 1,178* 858* 331* 195* 177* 191*     Iron Studies  Recent Labs   Lab Test 04/13/23  0749 08/30/21  1300 11/30/20  1415 08/09/18  1056 08/09/18  1046 07/11/17  1337   IRON 24* 74 92 178  --  77   * 254 272 266  --  296   IRONSAT 21 29 34 67  --  26   JUVENAL 1,092* 66 43  --  14 16*       IMAGING:  All  imaging studies reviewed by me.

## 2025-08-02 NOTE — PROGRESS NOTES
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 Tariq.     TODAY:   - Start cinacalcet 30 mg, sodium bicarb 650 mg BID, and Phos neutral 250 mg BID   - Possibly home 8/3    Graft function: s/p DBDKT with stent, POD#3  Kidney: Post- op US with patent Doppler, no fluid collections. Admission creatinine 6.39, creatinine this AM 1.5. 24hr UOP = 2L   - ConsultedTransplant Nephrology.    - POD#2 plan of care    - Will exchange Aguirre on POD5/6 in outpatient clinic   - Plan for stent, drain, and Aguirre removal in OR ~2 weeks post-op   Acute surgical pain:   - Scheduled Tylenol and Robaxin   - PRN oxycodone     Neuro/Psych:  Depression/Anxiety:   - Continue home sertraline     Immunosuppressed status secondary to medications:   Induction IS: Intermediate - cPRA 25  Thymoglobulin 100 mg POD#0  Basiliximab POD#1 and anticipate POD#5 (therapy plan entered)  SM pulse followed by prednisone taper  Maintenance IS:   mg BID  Tacrolimus with goal trough 8 to 12. Most recent level = 5 (12.5hr trough)    Hematology:   Anemia of chronic disease:   Acute surgical blood loss anemia: Hemoglobin stable, he has not required transfusion.     Cardiorespiratory:   Chronic respiratory failure, s/p  tracheostomy:  Restrictive lung disease:   - RCAT   - Scheduled and PRN DuoNebs   - Early mobility, pulmonary hygiene   Hypertension: Goal <150/90   - Carvedilol 3.125 mg BID mg   - Holding home amlodipine 5 mg  Chest pain: RRT called for chest pain 8/1. Pain mid- sternal, non-radiating, and associated with some reflux. Patient has had Nissen fundoplication and does not vomit.    - EKG reassuring, no obvious acute myocardial ischemia   - Serial troponin, all wnl   - Continue simethicone, PPI, and venting G- tube     GI/Nutrition: Has G- tube and takes his nutrition by mouth unless ill. Pepcid, PPI.   Status post Nissen fundoplication:  Diet: Regular diet.    - Consult to RD   Post-op illeus:   - Miralax   - Senna   - Milk of mag   - PRN bisacodyl suppository    Endocrine: Hemoglobin A1c 4.6% 7/30/2025.    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    - Plan to keep Aguirre in [place] x 2 weeks post- op, may change after 5 to 6 days     Infectious disease: Afebrile.   Concern for UTI: Urine culture negative.    - Augmentin until stent removal    - Repeat UA close to discharge     Prophylaxis: DVT, pneumocystis (Bactrim), viral (Valcyte)    Disposition: 7A     ABDIAS/Fellow/Resident Provider:   Deangelo Clifton MD  Urology, PGY-1       Faculty: Dr. Tariq  _________________________________________________________________    Interval History: History is obtained from the patient and his mom  Overnight events: Had 3x BM. Feeling better. Tolerating PO, ambulating.     ROS:   A 10-point review of systems was negative except as noted above.    Meds:  Current Facility-Administered Medications    Medication Dose Route Frequency Provider Last Rate Last Admin    acetaminophen (TYLENOL) tablet 975 mg  975 mg Oral Q8H Jose Tariq MD   975 mg at 08/02/25 1239    atorvastatin (LIPITOR) tablet 10 mg  10 mg Oral Daily Jose Tariq MD   10 mg at 08/02/25 0904    [START ON 8/4/2025] basiliximab (SIMULECT) 20 mg in sodium chloride 0.9 % 50 mL infusion  20 mg Intravenous Once Kayley Davis NP        carvedilol (COREG) tablet 3.125 mg  3.125 mg Oral BID Kayley Davis NP   3.125 mg at 08/02/25 0902    cefTRIAXone (ROCEPHIN) 1 g vial to attach to  mL bag for ADULTS or NS 50 mL bag for PEDS  1 g Intravenous Q24H Kayley Davis NP   1 g at 08/02/25 1102    cinacalcet (SENSIPAR) tablet 30 mg  30 mg Oral Daily Brenda Rothman APRN CNP   30 mg at 08/02/25 1436    famotidine (PEPCID) tablet 20 mg  20 mg Oral Daily Jose Tariq MD   20 mg at 08/02/25 0904    ipratropium - albuterol 0.5 mg/2.5 mg (3mg)/3 mL (DUONEB) neb solution 3 mL  3 mL Nebulization Q8H Janett Molina MD   3 mL at 08/02/25 0831    magnesium hydroxide (MILK OF MAGNESIA) suspension 30 mL  30 mL Oral Once Mattie Willis APRN CNP        magnesium oxide (MAG-OX) tablet 400 mg  400 mg Oral Daily with lunch Jose Tariq MD   400 mg at 08/02/25 1102    methocarbamol (ROBAXIN) tablet 500 mg  500 mg Oral Q6H Kayley Davis NP   500 mg at 08/02/25 1436    mycophenolate (GENERIC EQUIVALENT) capsule 750 mg  750 mg Oral BID IS Jose Tariq MD   750 mg at 08/02/25 0902    pantoprazole (PROTONIX) EC tablet 40 mg  40 mg Oral Daily Kayley Davis NP   40 mg at 08/02/25 0904    phosphorus tablet 250 mg (PHOSPHA 250 NEUTRAL) per tablet 250 mg  250 mg Oral BID Mattie Willis APRN CNP   250 mg at 08/02/25 0904    polyethylene glycol (MIRALAX) Packet 17 g  17 g Oral BID Mattie Willis APRN CNP        predniSONE (DELTASONE)  tablet 60 mg  60 mg Oral Daily Kayley Davis NP   60 mg at 08/02/25 0904    Followed by    [START ON 8/4/2025] predniSONE (DELTASONE) tablet 40 mg  40 mg Oral Daily Kayley Davis NP        Followed by    [START ON 8/6/2025] predniSONE (DELTASONE) tablet 20 mg  20 mg Oral Daily Kayley Davis NP        Followed by    [START ON 8/13/2025] predniSONE (DELTASONE) tablet 15 mg  15 mg Oral Daily Kayley Davis NP        Followed by    [START ON 8/20/2025] predniSONE (DELTASONE) tablet 10 mg  10 mg Oral Daily Kayley Davis NP        Followed by    [START ON 8/27/2025] predniSONE (DELTASONE) tablet 5 mg  5 mg Oral Daily Kayley Davis NP        senna-docusate (SENOKOT-S/PERICOLACE) 8.6-50 MG per tablet 2 tablet  2 tablet Oral BID Mattie Willis APRN CNP        sertraline (ZOLOFT) tablet 100 mg  100 mg Oral Daily Kayley Davis NP   100 mg at 08/02/25 0904    sodium bicarbonate tablet 650 mg  650 mg Oral BID Brenda Rothman APRN CNP        sodium chloride (PF) 0.9% PF flush 10 mL  10 mL Intracatheter Q8H Jose Tariq MD   10 mL at 07/31/25 1334    sulfamethoxazole-trimethoprim (BACTRIM) 400-80 MG per tablet 1 tablet  1 tablet Oral Daily Jose Tariq MD   1 tablet at 08/02/25 0902    tacrolimus (GENERIC EQUIVALENT) capsule 4 mg  4 mg Oral BID IS Mattie Willis APRN CNP        valGANciclovir (VALCYTE) tablet 450 mg  450 mg Oral Daily Jose Tariq MD   450 mg at 08/02/25 0902       Physical Exam:     Admit Weight: 45.8 kg (100 lb 14.4 oz)    Current vitals:   /78 (BP Location: Left arm)   Pulse 77   Temp 97.5  F (36.4  C) (Oral)   Resp 18   Wt 45.8 kg (100 lb 14.4 oz)   SpO2 99%   BMI 19.06 kg/m      Vital sign ranges:    Temp:  [97.5  F (36.4  C)-98  F (36.7  C)] 97.5  F (36.4  C)  Pulse:  [71-95] 77  Resp:  [18-20] 18  BP: (116-154)/(70-92) 130/78  FiO2 (%):  [30 %] 30 %  SpO2:  [96 %-100 %] 99 %    General  Appearance: in no apparent distress.   Heart: grossly well perfused   Lungs: no increased work of breathing on room air  Abdomen: the abdomen is non-distended, incision c/d/I. GLENN drain with serosanguinous output  : Aguirre in [facility].  Urine is clear  Extremities: Spontaneously moving all 4 extremities  Neurologic: No gross focal defecits    Data:   CMP  Recent Labs   Lab 08/02/25  0544 08/02/25  0516 08/01/25  0630 08/01/25  0604 07/30/25  1031 07/30/25  0820     --   --  140   < > 143   POTASSIUM 4.9  --   --  3.6   < > 3.2*   CHLORIDE 113*  --   --  109*   < > 100   CO2 20*  --   --  23   < > 27   GLC 84 82   < > 209*   < > 90   BUN 17.1  --   --  23.4*   < > 37.0*   CR 1.50*  --   --  1.80*   < > 6.39*   GFRESTIMATED 65  --   --  52*   < > 11*   SUSAN 11.0*  --   --  10.8*   < > 10.0   MAG 2.0  --   --  2.1   < >  --    PHOS 2.0*  --   --  3.1   < >  --    ALBUMIN  --   --   --   --   --  4.5   BILITOTAL  --   --   --   --   --  0.2   ALKPHOS  --   --   --   --   --  69   AST  --   --   --   --   --  15   ALT  --   --   --   --   --  11    < > = values in this interval not displayed.     CBC  Recent Labs   Lab 08/02/25  0544 08/01/25  0604 07/30/25  1905 07/30/25  0820   HGB 7.6* 7.3*   < > 9.4*   WBC 8.1 12.3*   < > 5.5    143*   < > 262   A1C  --   --   --  4.6    < > = values in this interval not displayed.

## 2025-08-03 ASSESSMENT — ACTIVITIES OF DAILY LIVING (ADL)
ADLS_ACUITY_SCORE: 57
ADLS_ACUITY_SCORE: 58
ADLS_ACUITY_SCORE: 57

## 2025-08-03 NOTE — PROGRESS NOTES
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    TODAY:   - Discharge tomorrow   - Tap water enema    Graft function: POD#4  Kidney: Post- op US with patent Doppler, no fluid collections. Admission creatinine 6.39, creatinine this AM 1.3.    - Will exchange Aguirre on POD 5/6 in outpatient clinic   - Plan for stent, drain, and Aguirre removal in OR ~2 weeks post-op   Acute surgical pain:   - Scheduled Tylenol and Robaxin   - PRN oxycodone     Neuro/Psych:  Depression/Anxiety:   - Continue home sertraline     Immunosuppressed status secondary to medications:   Induction IS: Intermediate - cPRA 25  Thymoglobulin 100 mg POD#0  Basiliximab POD#1 and anticipate POD#5 (therapy plan entered)  SM pulse followed by prednisone taper  Maintenance IS:   mg BID  Tacrolimus with goal trough 8 to 12    Hematology:   Anemia of chronic disease:   Acute surgical blood loss anemia: Hemoglobin stable, he has not required transfusion.     Cardiorespiratory:   Chronic respiratory failure, s/p tracheostomy:  Restrictive lung disease:   - Scheduled and PRN DuoNebs  Hypertension: Goal <150/90   - Carvedilol 3.125 mg BID mg  Chest pain: RRT called for chest pain . Pain mid- sternal, non-radiating, and  associated with some reflux. Patient has had Nissen fundoplication and does not vomit. EKG reassuring, no obvious acute myocardial ischemia. Serial troponin normal.    GI/Nutrition: Has G- tube and takes his nutrition by mouth unless ill. Pepcid, PPI.   Status post Nissen fundoplication:  Diet: Regular diet.   Acute on chronic, opioid induced constipation:   - Miralax   - Senna   - Milk of mag   - Tap water enema today    Endocrine: No acute issues.     Fluid/Electrolytes:   Hypophosphatemia: Phos neutra 500 mg BID  Hypercalcemia: Started cinacalcet 30 mg this admission.    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    - Plan to keep Aguirre in [place] x 2 weeks post- op, may change after 5 to 6 days     Infectious disease: Afebrile.   Concern for UTI: Urine culture negative.    - Augmentin until stent removal     Prophylaxis: DVT, pneumocystis (Bactrim), viral (Valcyte)    Disposition: 7A. Mother does not have all respiratory supplies here. She will travel to [place] today and get them. Plan to discharge to stay locally tomorrow.    ABDIAS/Fellow/Resident Provider: Mattie Willis NP     Faculty: Dr. Tariq  _________________________________________________________________    Interval History: History is obtained from the patient and his mom  Overnight events: Feeling constipated, poor appetite, poor oral intake    ROS:   A 10-point review of systems was negative except as noted above.    Meds:  Current Facility-Administered Medications   Medication Dose Route Frequency Provider Last Rate Last Admin    acetaminophen (TYLENOL) tablet 975 mg  975 mg Oral Q8H Jose Tariq MD   975 mg at 08/03/25 1326    amoxicillin (AMOXIL) tablet 875 mg  875 mg Oral Q12H [facility] (08/20)  Jose Tariq MD        atorvastatin (LIPITOR) tablet 10 mg  10 mg Oral Daily Jose Tariq MD   10 mg at 08/03/25 0823    [START ON 8/4/2025] basiliximab (SIMULECT) 20 mg in sodium chloride 0.9 % 50 mL infusion  20 mg Intravenous Once Kayley Davis NP        carvedilol (COREG) tablet 3.125 mg  3.125 mg Oral BID Kayley Davis NP   3.125 mg at 08/03/25 0825    cinacalcet (SENSIPAR) tablet 30 mg  30 mg Oral Daily Brenda Rothman APRN CNP   30 mg at 08/03/25 0821    famotidine (PEPCID) tablet 20 mg  20 mg Oral Daily Jose Tariq MD   20 mg at 08/03/25 0823    ipratropium - albuterol 0.5 mg/2.5 mg (3mg)/3 mL (DUONEB) neb solution 3 mL  3 mL Nebulization Q8H Janett Molina MD   3 mL at 08/03/25 1705    magnesium oxide (MAG-OX) tablet 400 mg  400 mg Oral Daily with lunch Jose Tariq MD   400 mg at 08/03/25 1106    methocarbamol (ROBAXIN) tablet 500 mg  500 mg Oral Q6H Kayley Davis NP   500 mg at 08/03/25 0824    mycophenolate (GENERIC EQUIVALENT) capsule 750 mg  750 mg Oral BID IS Jose Tariq MD   750 mg at 08/03/25 0822    pantoprazole (PROTONIX) EC tablet 40 mg  40 mg Oral Daily Kayley Davis NP   40 mg at 08/03/25 0826    phosphorus tablet 250 mg (PHOSPHA 250 NEUTRAL) per tablet 500 mg  500 mg Oral BID Mattie Willis APRN CNP        polyethylene glycol (MIRALAX) Packet 17 g  17 g Oral BID Mattie Willis APRN CNP   17 g at 08/03/25 0822    [START ON 8/4/2025] predniSONE (DELTASONE) tablet 40 mg  40 mg Oral Daily Kayley Davis NP        Followed by    [START ON 8/6/2025] predniSONE (DELTASONE) tablet 20 mg  20 mg Oral Daily Kayley Davis NP        Followed by    [START ON 8/13/2025] predniSONE (DELTASONE) tablet 15 mg  15 mg Oral Daily Kayley Davis NP        Followed by    [START ON 8/20/2025] predniSONE (DELTASONE) tablet 10 mg  10 mg Oral Daily Kayley Davis  PHANI JENKINS        Followed by    [START ON 8/27/2025] predniSONE (DELTASONE) tablet 5 mg  5 mg Oral Daily Kayley Davis NP        senna-docusate (SENOKOT-S/PERICOLACE) 8.6-50 MG per tablet 2 tablet  2 tablet Oral BID Mattie Willis APRN CNP   2 tablet at 08/03/25 0825    sertraline (ZOLOFT) tablet 100 mg  100 mg Oral Daily Kayley Davis NP   100 mg at 08/03/25 0825    sodium bicarbonate tablet 650 mg  650 mg Oral BID Brenda Rothman APRN CNP   650 mg at 08/03/25 0825    sodium chloride (PF) 0.9% PF flush 10 mL  10 mL Intracatheter Q8H Jose Tariq MD   10 mL at 08/02/25 2108    sulfamethoxazole-trimethoprim (BACTRIM) 400-80 MG per tablet 1 tablet  1 tablet Oral Daily Jose Tariq MD   1 tablet at 08/03/25 0823    tacrolimus (GENERIC EQUIVALENT) capsule 4 mg  4 mg Oral BID IS Mattie Willis APRN CNP   4 mg at 08/03/25 0822    valGANciclovir (VALCYTE) tablet 450 mg  450 mg Oral Daily Jose Tariq MD   450 mg at 08/03/25 0823       Physical Exam:     Admit Weight: 45.8 kg (100 lb 14.4 oz)    Current vitals:   /79 (BP Location: Right arm)   Pulse 82   Temp 97.4  F (36.3  C) (Oral)   Resp 18   Wt 45.8 kg (100 lb 14.4 oz)   SpO2 99%   BMI 19.06 kg/m      Vital sign ranges:    Temp:  [97.4  F (36.3  C)-98.4  F (36.9  C)] 97.4  F (36.3  C)  Pulse:  [] 82  Resp:  [16-18] 18  BP: (122-147)/() 130/79  FiO2 (%):  [30 %] 30 %  SpO2:  [99 %-100 %] 99 %    General Appearance: in no apparent distress.   Heart: grossly well perfused   Lungs: no increased work of breathing on room air, trach (chronic)  Abdomen: the abdomen is non-distended, incision c/d/I. GLENN drain with serosanguinous output  : Aguirre in [place].  Urine is clear  Extremities: Spontaneously moving all 4 extremities  Neurologic: Alert, follows commands    Data:   CMP  Recent Labs   Lab 08/03/25  0542 08/02/25  0544 07/30/25  1031 07/30/25  0820    141    < > 143   POTASSIUM 4.8 4.9   < > 3.2*   CHLORIDE 113* 113*   < > 100   CO2 22 20*   < > 27   GLC 82 84   < > 90   BUN 21.7* 17.1   < > 37.0*   CR 1.31* 1.50*   < > 6.39*   GFRESTIMATED 76 65   < > 11*   SUSAN 10.5* 11.0*   < > 10.0   MAG 2.0 2.0   < >  --    PHOS 1.8* 2.0*   < >  --    ALBUMIN  --   --   --  4.5   BILITOTAL  --   --   --  0.2   ALKPHOS  --   --   --  69   AST  --   --   --  15   ALT  --   --   --  11    < > = values in this interval not displayed.     CBC  Recent Labs   Lab 08/03/25  0542 08/02/25  0544 07/30/25  1905 07/30/25  0820   HGB 7.6* 7.6*   < > 9.4*   WBC 5.6 8.1   < > 5.5    157   < > 262   A1C  --   --   --  4.6    < > = values in this interval not displayed.

## 2025-08-03 NOTE — PROGRESS NOTES
BP (!) 140/85 (BP Location: Right arm)   Pulse 97   Temp 97.8  F (36.6  C) (Oral)   Resp 18   Wt 45.8 kg (100 lb 14.4 oz)   SpO2 99%   BMI 19.06 kg/m    Shift: [id number]  Isolation Status: standard  VS: stable with trach dome 30%FiO2 on 30L, afebrile  Neuro: Aox4  Behaviors: flat affect, mom at bedside-- assists with communication  BG: none  Labs: Creat 1.31  Respiratory: shiley 5 trach cuffed-- scheduled nebs. Pt self-suctions (today x3)  Cardiac: WDL  Pain/Nausea: minimal abd pain managed wit scheduled meds, denies nausea  PRN: none given  Diet: regular-- poor PO intake in am, improving in afternoon  IV Access: R PIV SL, L AVF  Infusion(s): abx  Lines/Drains: R GLENN (minimal serous output)  GI/: catheter in place via mitrofanoff (mucousy urine, flushed x1), BM x2 (patient refused enema)  Skin: RLQ incision dermabonded, R GLENN, G tube button  Mobility: SBA with walker  Events/Education: med card/lab book updated and at bedside  Plan: continue POC and notify team of changes. Plan for discharge tomorrow once respiratory supplies arrive-- per mother, needing to stop at home pharmacy in [place] and planning on driving back to be there when pharmacy opens in the morning!!

## 2025-08-03 NOTE — PROGRESS NOTES
[facility]  Transplant Nephrology Progress Note  Date of Admission:  7/30/2025  Today's Date: 08/03/2025  Requesting physician: Jose Tariq*    Recommendations:   - Agree with Phos Neutral 500mg BID PO.   - Encourage fluid intake.   - No acute indications for dialysis.  - Continue current immunosuppression.  - Strict I/Os and daily weights.     Assessment & Plan   # DDKT: Trend down in creatinine. Good urine output.    - Baseline Creatinine: ~ TBD   - Proteinuria: Not checked post transplant   - DSA Hx: Not checked recently due to time from transplant  - Last cPRA: 25%   - BK Viremia: Not checked recently due to time from transplant   - Kidney Tx Biopsy Hx: No biopsy history.    # Immunosuppression: Tacrolimus immediate release (goal 8-10), Mycophenolate mofetil (dose 750 mg every 12 hours), and Methylprednisolone (dose taper)   - Induction with Recent Transplant:  Intermediate Intensity Protocol   - Continue with intensive monitoring of immunosuppression for efficacy and toxicity.   - Historical Changes in Immunosuppression: None   - Changes: Not at this time    # Infection Prevention:   Last CD4 Level: Not checked  - PJP: Sulfa/TMP (Bactrim)  - CMV: Valganciclovir (Valcyte)      - CMV IgG Ab High Risk Discordance (D+/R-) at time of transplant: Unknown  Present CMV Serostatus: Negative  - EBV IgG Ab High Risk Discordance (D+/R-) at time of transplant: No  Present EBV Serostatus: Positive    # Hypertension: Controlled;  Goal BP: < 150/90   - PTA medications: amlodipine 5 mg daily and carvedilol 3.125 mg bid.   - Changes: Not at this time   - Current : Carvedilol 3.125 mg BID    # Anemia in Chronic Renal Disease: Hgb: Stable, low      POORNIMA: No   - Iron studies: Replete    # Leukocytosis: likely secondary to steroids. No s/s of infection. Management per Surgery.     # Possible UTI: pre op UA with +nitrite, large leukocyte esterase, WBC >182, and WBC clumps.  History of klebsiella pneumoniae (2015 x2), enterobacter (2016), and klebsiella oxytoca (2017) UTI; patient self caths through Mitrofanoff. Given cefuroxime 7/31 and then switched to ceftriaxone. Urine culture 07/30 negative.     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 Patient self caths a few times per day. Takes cranberry supplements. Follows with Urology.    - Chronic respiratory failure and restrictive lung disease: s/p tracheostomy.   - History of malnutrition: secondary to poor oral intake now s/p G-tube.     # Transplant History:  Etiology of Kidney Failure: Reflux nephropathy  Tx: DDKT  Transplant: 7/30/2025 (Kidney)  Significant transplant-related complications: None    Recommendations were communicated to the primary team verbally.    Seen and discussed with Dr. Molina.    LIVAN Luo [facility]  Transplant Nephrology  Contact information via [employer] Web Console      Interval History  Mr. Bojorquez creatinine is 1.31 (08/03 0542); Trend down.  Good urine output.  Other significant labs/tests/vitals: VSS. Phos 1.8.  No events overnight.  no chest pain or shortness of breath.  no leg swelling.  no nausea and vomiting.  Bowel movements are small loose yesterday.  no fever, sweats or chills.   Mom  reporting he is more lethargic today and not eating and drinking as he usually does. Did report some abdominal pain / gas pain yesterday.       Review of Systems   4 point ROS was obtained and negative except as noted in the Interval History.    MEDICATIONS:  Current Facility-Administered Medications   Medication Dose Route Frequency Provider Last Rate Last Admin    acetaminophen (TYLENOL) tablet 975 mg  975 mg Oral Q8H Jose Tariq MD   975 mg at 08/03/25 0545    atorvastatin (LIPITOR) tablet 10 mg  10 mg Oral Daily Jose Tariq MD   10 mg at 08/03/25 0823    [START ON 8/4/2025] basiliximab (SIMULECT) 20 mg in sodium chloride 0.9 % 50 mL infusion  20 mg Intravenous Once Kayley Davis NP        carvedilol (COREG) tablet 3.125 mg  3.125 mg Oral BID Kayley Davis NP   3.125 mg at 08/03/25 0825    cefTRIAXone (ROCEPHIN) 1 g vial to attach to  mL bag for ADULTS or NS 50 mL bag for PEDS  1 g Intravenous Q24H Kayley Davis NP   1 g at 08/02/25 1102    cinacalcet (SENSIPAR) tablet 30 mg  30 mg Oral Daily Brenda Rothman APRN CNP   30 mg at 08/03/25 0821    famotidine (PEPCID) tablet 20 mg  20 mg Oral Daily Jose Tariq MD   20 mg at 08/03/25 0823    ipratropium - albuterol 0.5 mg/2.5 mg (3mg)/3 mL (DUONEB) neb solution 3 mL  3 mL Nebulization Q8H Janett Molina MD   3 mL at 08/03/25 0828    magnesium oxide (MAG-OX) tablet 400 mg  400 mg Oral Daily with lunch Jose Tariq MD   400 mg at 08/02/25 1102    methocarbamol (ROBAXIN) tablet 500 mg  500 mg Oral Q6H Kayley Davis NP   500 mg at 08/03/25 0824    mycophenolate (GENERIC EQUIVALENT) capsule 750 mg  750 mg Oral BID IS Jose Tariq MD   750 mg at 08/03/25 0822    pantoprazole (PROTONIX) EC tablet 40 mg  40 mg Oral Daily Kayley Davis NP   40 mg at 08/03/25 0826    phosphorus tablet 250 mg (PHOSPHA 250 NEUTRAL) per tablet 500 mg  500  mg Oral BID Mattie Willis APRN CNP        polyethylene glycol (MIRALAX) Packet 17 g  17 g Oral BID Mattie Willis APRN CNP   17 g at 25 0822    [START ON 2025] predniSONE (DELTASONE) tablet 40 mg  40 mg Oral Daily Kayley Davis NP        Followed by    [START ON 2025] predniSONE (DELTASONE) tablet 20 mg  20 mg Oral Daily Kayley Davis NP        Followed by    [START ON 2025] predniSONE (DELTASONE) tablet 15 mg  15 mg Oral Daily Kayley Davis NP        Followed by    [START ON 2025] predniSONE (DELTASONE) tablet 10 mg  10 mg Oral Daily Kayley Davis NP        Followed by    [START ON 2025] predniSONE (DELTASONE) tablet 5 mg  5 mg Oral Daily Kayley Davis NP        senna-docusate (SENOKOT-S/PERICOLACE) 8.6-50 MG per tablet 2 tablet  2 tablet Oral BID Mattie Willis APRN CNP   2 tablet at 25 0825    sertraline (ZOLOFT) tablet 100 mg  100 mg Oral Daily Kayley Davis NP   100 mg at 25 0825    sodium bicarbonate tablet 650 mg  650 mg Oral BID Brenda Rothman APRN CNP   650 mg at 25 0825    sodium chloride (PF) 0.9% PF flush 10 mL  10 mL Intracatheter Q8H Jose Tariq MD   10 mL at 25 210    sulfamethoxazole-trimethoprim (BACTRIM) 400-80 MG per tablet 1 tablet  1 tablet Oral Daily Jose Tariq MD   1 tablet at 25 08    tacrolimus (GENERIC EQUIVALENT) capsule 4 mg  4 mg Oral BID IS Mattie Willis APRN CNP   4 mg at 25 0822    valGANciclovir (VALCYTE) tablet 450 mg  450 mg Oral Daily Jose Tariq MD   450 mg at 25 0823     Current Facility-Administered Medications   Medication Dose Route Frequency Provider Last Rate Last Admin       Physical Exam   Temp  Av.8  F (36.6  C)  Min: 97.8  F (36.6  C)  Max: 97.8  F (36.6  C)      Pulse  Av  Min: 72  Max: 72 Resp  Av  Min: 12  Max: 12  SpO2  Av %  Min: 97 %  Max: 97 %      /80 (BP Location: Right arm)   Pulse 68   Temp 97.9  F (36.6  C) (Oral)   Resp 16   Wt 45.8 kg (100 lb 14.4 oz)   SpO2 100%   BMI 19.06 kg/m      Admit Weight: 45.8 kg (100 lb 14.4 oz)     GENERAL APPEARANCE: alert and no distress  HENT: mouth without ulcers or lesions  RESP: lungs clear to auscultation - no rales, rhonchi or wheezes. Trach in place  CV: regular rhythm, normal rate, no rub, no murmur  EDEMA: no LE edema bilaterally  ABDOMEN: soft, nondistended, nontender, bowel sounds normal  MS: extremities normal - no gross deformities noted, no evidence of inflammation in joints, no muscle tenderness  SKIN: no rash  DIALYSIS ACCESS: RUE fistula thrill present    Data   All labs reviewed by me.  CMP  Recent Labs   Lab 08/03/25  0542 08/02/25  0544 08/02/25  0516 08/01/25  0630 08/01/25  0604 07/31/25  1737 07/31/25  0704 07/31/25  0439 07/30/25  1031 07/30/25  0820    141  --   --  140  --   --  142   < > 143   POTASSIUM 4.8 4.9  --   --  3.6 3.2*   < > 3.8  3.8   < > 3.2*   CHLORIDE 113* 113*  --   --  109*  --   --  103   < > 100   CO2 22 20*  --   --  23  --   --  22   < > 27   ANIONGAP 6* 8  --   --  8  --   --  17*   < > 16*   GLC 82 84 82 160* 209*  --    < > 160*   < > 90   BUN 21.7* 17.1  --   --  23.4*  --   --  29.2*   < > 37.0*   CR 1.31* 1.50*  --   --  1.80*  --   --  4.35*   < > 6.39*   GFRESTIMATED 76 65  --   --  52*  --   --  18*   < > 11*   SUSAN 10.5* 11.0*  --   --  10.8*  --   --  9.0   < > 10.0   MAG 2.0 2.0  --   --  2.1  --   --  2.2   < >  --    PHOS 1.8* 2.0*  --   --  3.1  --   --  4.7*   < >  --    PROTTOTAL  --   --   --   --   --   --   --   --   --  6.8   ALBUMIN  --   --   --   --   --   --   --   --   --  4.5   BILITOTAL  --   --   --   --   --   --   --   --   --  0.2   ALKPHOS  --   --   --   --   --   --   --   --   --  69   AST  --   --   --   --   --   --   --   --   --  15   ALT  --   --   --   --   --   --   --   --   --  11    < > = values in this  interval not displayed.     CBC  Recent Labs   Lab 08/03/25  0542 08/02/25  0544 08/01/25  0604 07/31/25  1737 07/31/25  0941 07/31/25  0439   HGB 7.6* 7.6* 7.3* 8.4*   < > 8.3*   WBC 5.6 8.1 12.3*  --   --  18.4*   RBC 2.47* 2.48* 2.41*  --   --  2.68*   HCT 24.5* 24.6* 23.1*  --   --  25.8*   MCV 99 99 96 97   < > 96   MCH 30.8 30.6 30.3  --   --  31.0   MCHC 31.0* 30.9* 31.6  --   --  32.2   RDW 16.2* 16.4* 16.6*  --   --  16.6*    157 143*  --   --  175    < > = values in this interval not displayed.     INR  Recent Labs   Lab 07/30/25  0820   INR 1.08   PTT 31     ABGNo lab results found in last 7 days.   Urine Studies  Recent Labs   Lab Test 07/30/25  1030 04/06/23  1853 11/30/20  1132 07/11/17  1341   COLOR Orange* Light Yellow Yellow Yellow   APPEARANCE Cloudy* Slightly Cloudy* Clear Clear   URINEGLC Negative 30* Negative Negative   URINEBILI Negative Negative Negative Negative   URINEKETONE Negative Trace* Negative Negative   SG 1.006 1.015 1.020 1.005   UBLD Moderate* Small* Small* Negative   URINEPH 8.0* 6.0 8.5* 6.0   PROTEIN 300* 300* >300* 100*   UROBILINOGEN  --   --  0.2  --    NITRITE Positive* Negative Negative Positive*   LEUKEST Large* Large* Small* Small*   RBCU 48* 3*  --  <1   WBCU >182* 80*  --  6*     Recent Labs   Lab Test 08/30/21  1307 01/03/19  1435 07/11/17  1341   UTPG 4.92* 2.60* 3.04*     PTH  Recent Labs   Lab Test 08/02/25  0544 08/30/21  1300 11/30/20  1415 12/09/19  1311 05/13/19  1027 01/03/19  1438 07/11/17  1337   PTHI 143* 1,178* 858* 331* 195* 177* 191*     Iron Studies  Recent Labs   Lab Test 04/13/23  0749 08/30/21  1300 11/30/20  1415 08/09/18  1056 08/09/18  1046 07/11/17  1337   IRON 24* 74 92 178  --  77   * 254 272 266  --  296   IRONSAT 21 29 34 67  --  26   JUVENAL 1,092* 66 43  --  14 16*       IMAGING:  All imaging studies reviewed by me.

## 2025-08-03 NOTE — PROGRESS NOTES
Care Management Follow Up    Length of Stay (days): 4    Expected Discharge Date: 08/04/2025     Concerns to be Addressed: discharge planning     Patient plan of care discussed at interdisciplinary rounds: No    Anticipated Discharge Disposition: Home Care              Anticipated Discharge Services: Home Care  Anticipated Discharge DME: None    Patient/family educated on Medicare website which has current facility and service quality ratings: yes  Education Provided on the Discharge Plan: Yes  Patient/Family in Agreement with the Plan: yes    Referrals Placed by CM/SW: External Care Coordination  Private pay costs discussed: Not applicable    Discussed  Partnership in Safe Discharge Planning  document with patient/family: No     Handoff Completed: No, handoff not indicated or clinically appropriate    Accepting Home Care  [facility] [place]  [phone]  Fax [phone]    Discharge Address:      [address] [place] [address]     Previous Community Resources:      Trach Supplies   [facility] [place]  Address: [address] [place] [address]  Phone: [phone]  Toll Free: [phone]     Care Management Services?   [facility]   [address]  [place] [address]  Phone:  [phone]  Fax: [phone]     Oxygen   [facility] [place]  PRN      Additional Information:  Informed patient will be discharging today, Called to have ATC appt's scheduled.    ATC appointments scheduled   8/4 & 8/5 at 8am.    Lm for [facility] to inform pt will be discharging today.  Ana María from [facility] returned call- informed pt discharging today. Ana María stated they have SOC as 8/15. Confirmed fax number.     Met with patient and mom- pt is no longer discharging today, possibly tomorrow. Patient's mother expressed concerns that something does not seem right with patient, as he is not talking, eating or drinking how he normally does or has previously after surgery. Patient  mom will go home later today or tomorrow and obtain oxygen concentrator, informed mom that RNCC will verify prior to discharge concentrator is available.     Mom informed will reschedule ATC appointments, and informed that day of appointment to make sure to bring pills with and hold rejection meds the morning of appointment.     Provider updated with concerns.     Updated ATC pt not discharging now. Will contact back when discharge confirmed. LM for [facility] updating patient no longer discharging today, possibly tomorrow, will update when confirmed.       Next Steps:   []Confirm ATC appointments prior to discharge- inform mom of appointments (mom would like to have instructions for medications day of atc appt in AVS)  []Verify Mom has brought concetrator  []Update [place]  []Ensure Home Care order placed with estimated SOC 8/14 indicated on the order  []Follow for discharge planning        Josefa Read V RN  Weekend Covering Nurse Care Coordinator  [facility] Acute Care Management  Searchable on Vocera: 7A SOT RNCC, 7B Med Surg RNCC

## 2025-08-04 ASSESSMENT — ACTIVITIES OF DAILY LIVING (ADL)
ADLS_ACUITY_SCORE: 58

## 2025-08-04 NOTE — PROGRESS NOTES
Care Management Follow Up    Length of Stay (days): 5  Expected Discharge Date: 08/04/2025    Concerns to be Addressed: Appointment Scheduling    Additional Information:  08/04:  CHW delegated by the RNCC has scheduled ATC Appts for this Patient discharging today Monday 08/04/2025  after kidney  transplant, for  tomorrow Tuesday 08/05/2025 and Wednesday 08/06/2025.       Juan Francisco Bashir   Community Health Worker, Certified  7A & 7B [facility], Acute Care Management.  [place], [place]   PH: [phone]

## 2025-08-04 NOTE — PROGRESS NOTES
Care Management Discharge Note    Discharge Date: 08/04/2025       Discharge Disposition: Home Care    Discharge Services: Home Care    Discharge DME: None    Discharge Transportation: family or friend will provide    Is transportation arrangement complete? No ?    Private pay costs discussed: Not applicable    Does the patient's insurance plan have a 3 day qualifying hospital stay waiver?  No    PAS Confirmation Code:    Patient/family educated on Medicare website which has current facility and service quality ratings: yes    Education Provided on the Discharge Plan: Yes  Persons Notified of Discharge Plans: mother  Patient/Family in Agreement with the Plan: yes    Handoff Referral Completed: No, handoff not indicated or clinically appropriate    Additional Information:    Estimated start of care for home care RN would be Thursday 8/14 as patient will need to remain locally for at least two weeks post hospitalization or until medically cleared to return home.   1130: Attempted to meet with mom but she was on the phone.   1600: Was notified by NP that mother did not have urology supplies. Went to meet with mother but was informed by bedside that mother went to get supplies and will return around 6 PM.   Anticipate discharge in the morning.     Discharge Address through 8/13:    [address] [place] [address]    [facility] for RN SOC ~8/14    Services Available         Address   [address] [place] [address]  [place] [address]             Contact Information    [phone]           Previous Community Resources:      [facility] [place]  Address: [address] [place] [address]  Phone: [phone]  Toll Free: [phone]     Care Management Services?   [facility]   [address] [facility]  [place] [address]  Phone:  [phone]  Fax: [phone]     Oxygen  [facility]  PRN      Next Steps:   [] Confirm home care orders.  Send home care orders  [] Coordinate ATC appointments  [] EHO  IMM done  Mother to transport     Future Appointments   Date Time Provider Department Center   8/5/2025  7:15 AM  [facility]   8/5/2025  8:00 AM [facility]   8/5/2025  8:00 AM  SIPC INFUSION NURSE INPR [facility]   8/6/2025  7:45 AM  [facility]   8/6/2025  8:00 AM  [facility]SE [facility]   8/6/2025  8:30 AM  SPEC INF ABDIAS FLEX INPR [facility]   8/13/2025 11:30 AM Salma Alvares APRN CNP [facility]   9/4/2025  9:20 AM [email or web address] UCCXR [facility]   9/4/2025  9:45 AM  [facility]   9/4/2025 10:45 AM Salma Alvares APRN CNP [facility]   9/4/2025 11:30 AM Janett Molina MD [facility]   9/30/2025  8:00 AM  [facility]   9/30/2025  9:00 AM Amadou Lai MD [facility]   10/2/2025 11:30 AM Zahra Morris LICSW [facility]   12/1/2025  9:30 AM  [facility]   12/1/2025 10:30 AM Jyoti Wall MD [facility]   12/10/2025  2:20 PM [email or web address] [facility]   1/27/2026  7:45 AM  [facility]   1/27/2026  8:30 AM Amadou Lai MD [facility]   4/28/2026  9:00 AM  [facility]   4/28/2026 10:00 AM Amadou Lai MD [facility]   6/8/2026 12:20 PM Mari Davalos PA-C UCENT [facility]   6/15/2026  2:30 PM Josiane Bermudez NP UCUROP [facility]   7/28/2026 10:00 AM UC LAB UCLABR [facility]   7/28/2026 11:00 AM [facility]     Loren Ardon RN, BSN, PHN, CCM  Float Nurse Care Coordinator  [facility] Acute Care Management   Searchable on Vocera: 7A SOCAREY RNCC

## 2025-08-04 NOTE — PLAN OF CARE
/89 (BP Location: Right arm)   Pulse 59   Temp 97.6  F (36.4  C) (Oral)   Resp 18   Wt 45.8 kg (100 lb 14.4 oz)   SpO2 98%   BMI 19.06 kg/m        [id number]  Hypertensive, scheduled coreg given, and intermittently tachy, OVSS- trach dome in place. No blood sugar checks. Shiley 5 trach cuffed-pt self suctions. No complaints of pain or nausea. Regular diet. L AVF. PIV TKO. R GLENN in place, serous OP. Senna and miralax given. G tube buttton. Up SBA with walker. Aguirre bag connected to mitrofanoff. Writer asked team to place irrigation orders if needed. Simulect given this shift. Pt reported feeling warm at end of shift, temp WDL. Fan given and heat turned down in room. Team aware. EKG completed today. Will continue to monitor and notify team with changes.

## 2025-08-04 NOTE — PLAN OF CARE
Goal Outcome Evaluation:      Plan of Care Reviewed With: patient    Overall Patient Progress: no changeOverall Patient Progress: no change    Outcome Evaluation: 1500 - 1900  Pt A/Ox4, VSS exc some baseline tachycarida and HTN. Pt denies pain. Pt has shiley # 5 trach hooked up to trachdome at 30L with 30% FIO2. Pt self suctions intermittently, neb done on shift by RT. Up with SBA +W to toielt, BM watery BM this evening. Reg diet with G tube button presnet (not in use currnetly). Pt with good appeite. PIV SL. L AV fistula present. Pt has mitrofanoff catheter hooked up to a izaguirre bag for draiange. R abd incison closed with dermabond, open to air. POC is pt mother who if Guardian is getting supplies from pts home in [place] so discharge can safely occur tomorrow.

## 2025-08-04 NOTE — PROGRESS NOTES
[facility]  Transplant Nephrology Progress Note  Date of Admission:  7/30/2025  Today's Date: 08/04/2025  Requesting physician: Jose Tariq*    Recommendations:   - Agree with ECG today. Consider stopping carvedilol and starting amlodipine 5 mg daily given reported bradycardia overnight.   - Continue cinacalcet 30 mg daily.   - No acute indications for dialysis.  - Continue current immunosuppression.  - Strict I/Os and daily weights.     Assessment & Plan   # DDKT: Trend down in creatinine. Good urine output.    - Baseline Creatinine: ~ TBD   - Proteinuria: Not checked post transplant   - DSA Hx: Not checked recently due to time from transplant  - Last cPRA: 25%   - BK Viremia: Not checked recently due to time from transplant   - Kidney Tx Biopsy Hx: No biopsy history.    # Immunosuppression: Tacrolimus immediate release (goal 8-10), Mycophenolate mofetil (dose 750 mg every 12 hours), and Prednisone (dose taper)   - Induction with Recent Transplant:  Intermediate Intensity Protocol   - Continue with intensive monitoring of immunosuppression for efficacy and toxicity.   - Historical Changes in Immunosuppression: None   - Changes: Not at this time    # Infection Prevention:   Last CD4 Level: Not checked  - PJP: Sulfa/TMP (Bactrim)  - CMV: Valganciclovir (Valcyte)      - CMV IgG Ab High Risk Discordance (D+/R-) at time of transplant: Unknown  Present CMV Serostatus: Negative  - EBV IgG Ab High Risk Discordance (D+/R-) at time of transplant: No  Present EBV Serostatus: Positive    # Hypertension: Controlled;  Goal BP: < 150/90   - PTA medications: amlodipine 5 mg daily and carvedilol 3.125 mg bid.   - Changes: Not at this time   - Current : Carvedilol 3.125 mg BID    # Anemia in Chronic Renal Disease: Hgb: Stable, low      POORNIMA: No   - Iron studies: Replete    # Mineral Bone Disorder:    - Secondary renal hyperparathyroidism; PTH level: Minimally elevated (  pg/ml)        On treatment: Cinacalcet  - Vitamin D; level: Normal        On supplement: No  - Calcium; level: High        On supplement: No  - Phosphorus; level: Low        On supplement: Yes; phospha neutral 250 mg bid.     # Electrolytes:  - Potassium; level: Normal        On supplement: No  - Magnesium; level: Normal        On supplement: No  - Bicarbonate; level: Low        On supplement: No  - Sodium; level: Normal    # Concern for UTI: UA suspicious for UTI but urine culture negative. Currently on augmentin until stent removal.     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 Patient self caths 3-6 times on non HD days and 1-2 times on HD days. Takes cranberry supplements. Follows with Urology.    - Chronic respiratory failure and restrictive lung disease: s/p tracheostomy.   - History of malnutrition: secondary to poor oral intake now s/p G-tube.     # Transplant History:  Etiology of Kidney Failure: Reflux nephropathy  Tx: DDKT  Transplant: 7/30/2025 (Kidney)  Significant transplant-related complications: None    Recommendations were communicated to the primary team verbally.    Seen and discussed with Dr. Molina.    LIVAN Peres CNP  Transplant Nephrology  Contact information via Vocera Web Console      Interval History  Mr. Bojorquez creatinine is 1.15 (08/04 0542); Trend down.  Good urine output.  Other significant labs/tests/vitals: VSS.   No events overnight.  no chest pain or shortness of breath.  no leg swelling.  no nausea and vomiting.  Bowel movements are small loose yesterday.  no fever, sweats or chills.     Review of Systems   4 point ROS was obtained and negative except as noted in the Interval History.    MEDICATIONS:  Current Facility-Administered Medications   Medication Dose Route  Frequency Provider Last Rate Last Admin    acetaminophen (TYLENOL) tablet 975 mg  975 mg Oral Q8H Jose Tariq MD   975 mg at 08/04/25 0526    amoxicillin (AMOXIL) tablet 875 mg  875 mg Oral Q12H [facility] (08/20) Jose Tariq MD   875 mg at 08/03/25 2105    atorvastatin (LIPITOR) tablet 10 mg  10 mg Oral Daily Jose Tariq MD   10 mg at 08/03/25 0823    basiliximab (SIMULECT) 20 mg in sodium chloride 0.9 % 50 mL infusion  20 mg Intravenous Once Kayley Davis NP        carvedilol (COREG) tablet 3.125 mg  3.125 mg Oral BID Kayley Davis NP   3.125 mg at 08/03/25 2101    cinacalcet (SENSIPAR) tablet 30 mg  30 mg Oral Daily Brenda Rothman APRN CNP   30 mg at 08/03/25 0821    famotidine (PEPCID) tablet 20 mg  20 mg Oral Daily Jose Tariq MD   20 mg at 08/03/25 0823    ipratropium - albuterol 0.5 mg/2.5 mg (3mg)/3 mL (DUONEB) neb solution 3 mL  3 mL Nebulization Q8H Janett Molina MD   3 mL at 08/04/25 0046    magnesium oxide (MAG-OX) tablet 400 mg  400 mg Oral Daily with lunch Jose Tariq MD   400 mg at 08/03/25 1106    methocarbamol (ROBAXIN) tablet 500 mg  500 mg Oral Q6H Kayley Davis NP   500 mg at 08/04/25 0223    mycophenolate (GENERIC EQUIVALENT) capsule 750 mg  750 mg Oral BID IS Jose Tariq MD   750 mg at 08/03/25 1812    pantoprazole (PROTONIX) EC tablet 40 mg  40 mg Oral Daily Kayley Davis NP   40 mg at 08/03/25 0826    phosphorus tablet 250 mg (PHOSPHA 250 NEUTRAL) per tablet 500 mg  500 mg Oral BID Mattie Willis APRN CNP   500 mg at 08/03/25 2101    polyethylene glycol (MIRALAX) Packet 17 g  17 g Oral BID Mattie Willis APRN CNP   17 g at 08/03/25 2102    predniSONE (DELTASONE) tablet 40 mg  40 mg Oral Daily Kayley Davis, NP        Followed by    [START ON 8/6/2025] predniSONE (DELTASONE) tablet 20 mg  20 mg Oral Daily Kayley Davis, NP         Followed by    [START ON 2025] predniSONE (DELTASONE) tablet 15 mg  15 mg Oral Daily Kayley Davis NP        Followed by    [START ON 2025] predniSONE (DELTASONE) tablet 10 mg  10 mg Oral Daily Kayley Davis NP        Followed by    [START ON 2025] predniSONE (DELTASONE) tablet 5 mg  5 mg Oral Daily Kayley Davis NP        senna-docusate (SENOKOT-S/PERICOLACE) 8.6-50 MG per tablet 2 tablet  2 tablet Oral BID Mattie Willis APRN CNP   2 tablet at 25    sertraline (ZOLOFT) tablet 100 mg  100 mg Oral Daily Kayley Davis NP   100 mg at 25 08    sodium bicarbonate tablet 650 mg  650 mg Oral BID Brenda Rothman APRN CNP   650 mg at 25    sodium chloride (PF) 0.9% PF flush 10 mL  10 mL Intracatheter Q8H Jose Tariq MD   10 mL at 25    sulfamethoxazole-trimethoprim (BACTRIM) 400-80 MG per tablet 1 tablet  1 tablet Oral Daily Jose Tariq MD   1 tablet at 25 08    tacrolimus (GENERIC EQUIVALENT) capsule 4 mg  4 mg Oral BID IS Mattie Willis APRN CNP   4 mg at 25 181    valGANciclovir (VALCYTE) tablet 450 mg  450 mg Oral Daily Jose Tariq MD   450 mg at 25 0823     Current Facility-Administered Medications   Medication Dose Route Frequency Provider Last Rate Last Admin       Physical Exam   Temp  Av.8  F (36.6  C)  Min: 97.8  F (36.6  C)  Max: 97.8  F (36.6  C)      Pulse  Av  Min: 72  Max: 72 Resp  Av  Min: 12  Max: 12  SpO2  Av %  Min: 97 %  Max: 97 %     /70 (BP Location: Right arm)   Pulse 114   Temp 97.6  F (36.4  C) (Oral)   Resp 18   Wt 45.8 kg (100 lb 14.4 oz)   SpO2 99%   BMI 19.06 kg/m      Admit Weight: 45.8 kg (100 lb 14.4 oz)     GENERAL APPEARANCE: alert and no distress  HENT: mouth without ulcers or lesions  RESP: lungs clear to auscultation - no rales, rhonchi or wheezes. Trach in place  CV:  regular rhythm, normal rate, no rub, no murmur  EDEMA: no LE edema bilaterally  ABDOMEN: soft, nondistended, nontender, bowel sounds normal  MS: extremities normal - no gross deformities noted, no evidence of inflammation in joints, no muscle tenderness  SKIN: no rash  DIALYSIS ACCESS: RUE fistula thrill present    Data   All labs reviewed by me.  CMP  Recent Labs   Lab 08/04/25 0536 08/03/25  0542 08/02/25  0544 08/02/25  0516 08/01/25  0630 08/01/25  0604 07/30/25  1031 07/30/25  0820    141 141  --   --  140   < > 143   POTASSIUM 4.5 4.8 4.9  --   --  3.6   < > 3.2*   CHLORIDE 112* 113* 113*  --   --  109*   < > 100   CO2 20* 22 20*  --   --  23   < > 27   ANIONGAP 6* 6* 8  --   --  8   < > 16*   GLC 87 82 84 82   < > 209*   < > 90   BUN 27.0* 21.7* 17.1  --   --  23.4*   < > 37.0*   CR 1.15 1.31* 1.50*  --   --  1.80*   < > 6.39*   GFRESTIMATED 89 76 65  --   --  52*   < > 11*   SUSAN 10.5* 10.5* 11.0*  --   --  10.8*   < > 10.0   MAG 1.7 2.0 2.0  --   --  2.1   < >  --    PHOS 2.2* 1.8* 2.0*  --   --  3.1   < >  --    PROTTOTAL  --   --   --   --   --   --   --  6.8   ALBUMIN  --   --   --   --   --   --   --  4.5   BILITOTAL  --   --   --   --   --   --   --  0.2   ALKPHOS  --   --   --   --   --   --   --  69   AST  --   --   --   --   --   --   --  15   ALT  --   --   --   --   --   --   --  11    < > = values in this interval not displayed.     CBC  Recent Labs   Lab 08/04/25 0536 08/03/25  0542 08/02/25  0544 08/01/25  0604   HGB 7.5* 7.6* 7.6* 7.3*   WBC 6.6 5.6 8.1 12.3*   RBC 2.45* 2.47* 2.48* 2.41*   HCT 23.9* 24.5* 24.6* 23.1*   MCV 98 99 99 96   MCH 30.6 30.8 30.6 30.3   MCHC 31.4* 31.0* 30.9* 31.6   RDW 16.0* 16.2* 16.4* 16.6*    178 157 143*     INR  Recent Labs   Lab 07/30/25  0820   INR 1.08   PTT 31     ABGNo lab results found in last 7 days.   Urine Studies  Recent Labs   Lab Test 07/30/25  1030 04/06/23  1853 11/30/20  1132 07/11/17  1341   COLOR Orange* Light Yellow Yellow Yellow    APPEARANCE Cloudy* Slightly Cloudy* Clear Clear   URINEGLC Negative 30* Negative Negative   URINEBILI Negative Negative Negative Negative   URINEKETONE Negative Trace* Negative Negative   SG 1.006 1.015 1.020 1.005   UBLD Moderate* Small* Small* Negative   URINEPH 8.0* 6.0 8.5* 6.0   PROTEIN 300* 300* >300* 100*   UROBILINOGEN  --   --  0.2  --    NITRITE Positive* Negative Negative Positive*   LEUKEST Large* Large* Small* Small*   RBCU 48* 3*  --  <1   WBCU >182* 80*  --  6*     Recent Labs   Lab Test 08/30/21  1307 01/03/19  1435 07/11/17  1341   UTPG 4.92* 2.60* 3.04*     PTH  Recent Labs   Lab Test 08/02/25  0544 08/30/21  1300 11/30/20  1415 12/09/19  1311 05/13/19  1027 01/03/19  1438 07/11/17  1337   PTHI 143* 1,178* 858* 331* 195* 177* 191*     Iron Studies  Recent Labs   Lab Test 04/13/23  0749 08/30/21  1300 11/30/20  1415 08/09/18  1056 08/09/18  1046 07/11/17  1337   IRON 24* 74 92 178  --  77   * 254 272 266  --  296   IRONSAT 21 29 34 67  --  26   JUVENAL 1,092* 66 43  --  14 16*       IMAGING:  All imaging studies reviewed by me.

## 2025-08-04 NOTE — PLAN OF CARE
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   Resp: Mindy Washingtno 5, trach dome 30%FiO2, 30LPM  GI: Mitrofanoff to izaguirre bag, significant mucous strands noted. Flushed x1  : several small, loose BMs overnight, G-tube, not in use  Skin: RLQ incision CDI liquid bandage closure.   Pain: x1 PO oxy for 7/10 pain, scheduled APAP and robaxin  Lines/Gtt: R PIV SL.   Drains: R GLENN minimal serous OP  Activity: SBA c walker, ambulated hallway x1  Diet: Reg diet, fair appetite  Updates: Mother voices concerns of mucous strands in urine may be \"clogging\" izaguirre tubing from mitrofanoff, overnight provider notified.   Plan: tentative discharge today pending mother's return, she has to drive to [place] to retrieve resp supplies for pt.     "

## 2025-08-04 NOTE — PROGRESS NOTES
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    TODAY:   - Discontinue carvedilol and start amlodipine 5 mg daily for concern of bradycardia overnight   - EKG   - Tac level pending   - Encourage patient to take bowel medications to avoid constipation                                                                                                                                        Graft function: POD#5  Kidney: Post- op US with patent Doppler, no fluid collections. Admission creatinine 6.39, creatinine this AM 1.1.    - Will exchange Aguirre on POD 5/6 in outpatient clinic   - Plan for stent, drain, and Aguirre removal in OR ~2 weeks post-op   Acute surgical pain:   - Scheduled Tylenol and Robaxin   - PRN oxycodone     Neuro/Psych:  Depression/Anxiety:   - Continue home sertraline     Immunosuppressed status secondary to medications:   Induction IS: Intermediate - cPRA 25  Thymoglobulin 100 mg POD#0  Basiliximab POD#1 and anticipate POD#5  SM pulse followed by prednisone taper  Maintenance IS:   mg BID  Tacrolimus with goal trough 8 to 12    Hematology:   Anemia of chronic disease:   Acute surgical blood loss anemia: Hemoglobin stable, he has not required transfusion.      Cardiorespiratory:   Chronic respiratory failure, s/p tracheostomy:  Restrictive lung disease:   - Scheduled and PRN DuoNebs  Hypertension: Goal <150/90   - Start amlodipine 5 mg daily. D   - Discontinue carvedilol d/t concern for bradycardia overnight  Chest pain: RRT called for chest pain 8/1. Pain mid- sternal, non-radiating, and associated with some reflux. Patient has had Nissen fundoplication and does not vomit. EKG reassuring, no obvious acute myocardial ischemia. Serial troponin normal.    GI/Nutrition: Has G- tube and takes his nutrition by mouth unless ill. Pepcid, PPI.   Status post Nissen fundoplication:  Diet: Regular diet.   Acute on chronic, opioid induced constipation:   - Miralax   - Senna   - Milk of mag   - PRN suppository    Endocrine: No acute issues.     Fluid/Electrolytes:   Hypophosphatemia: Phos neutra 500 mg BID  Hypercalcemia: Started cinacalcet 30 mg this admission.    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    - Plan to keep Aguirre in Mitrofanoff x 2 weeks post- op, may change after 5 to 6 days     Infectious disease: Afebrile.   Concern for UTI: Urine culture negative.    - Augmentin until stent removal     Prophylaxis: DVT, pneumocystis (Bactrim), viral (Valcyte)    Disposition: 7A. Mother does not have all respiratory supplies here. She will travel to [place] today and get them. Plan to discharge to stay locally tomorrow.    ABDIAS/Fellow/Resident Provider: Connie Gerard, LIVAN CNP, Alexa/pager [phone]    Faculty: Dr. Tariq  _________________________________________________________________    Interval History: History is obtained from the patient and his mom  Overnight events: No acute overnight events reported. Per patient's mother she noted a HR of 39  overnight on patient's pulse oximeter that spontaneously resolved. Patient does not endorse any complaints this morning.      ROS:   A 10-point review of systems was negative except as noted above.    Meds:  Current Facility-Administered Medications   Medication Dose Route Frequency Provider Last Rate Last Admin    acetaminophen (TYLENOL) tablet 975 mg  975 mg Oral Q8H Jose Tariq MD   975 mg at 08/04/25 1319    [START ON 8/5/2025] amLODIPine (NORVASC) tablet 5 mg  5 mg Oral Daily Connie Gerard APRN CNP        amoxicillin (AMOXIL) tablet 875 mg  875 mg Oral Q12H [facility] (08/20) Jose Tariq MD   875 mg at 08/04/25 1008    atorvastatin (LIPITOR) tablet 10 mg  10 mg Oral Daily Jose Tariq MD   10 mg at 08/04/25 1004    cinacalcet (SENSIPAR) tablet 30 mg  30 mg Oral Daily Brenda Rothman APRN CNP   30 mg at 08/04/25 1005    famotidine (PEPCID) tablet 20 mg  20 mg Oral Daily Jose Tariq MD   20 mg at 08/04/25 1002    ipratropium - albuterol 0.5 mg/2.5 mg (3mg)/3 mL (DUONEB) neb solution 3 mL  3 mL Nebulization Q8H Janett Molina MD   3 mL at 08/04/25 0824    magnesium oxide (MAG-OX) tablet 400 mg  400 mg Oral Daily with lunch Jose Tariq MD   400 mg at 08/04/25 1202    methocarbamol (ROBAXIN) tablet 500 mg  500 mg Oral Q6H Kayley Davis, NP   500 mg at 08/04/25 1003    mycophenolate (GENERIC EQUIVALENT) capsule 750 mg  750 mg Oral BID IS Jose Tariq MD   750 mg at 08/04/25 0959    pantoprazole (PROTONIX) EC tablet 40 mg  40 mg Oral Daily Kayley Davis, NP   40 mg at 08/04/25 1004    phosphorus tablet 250 mg (PHOSPHA 250 NEUTRAL) per tablet 500 mg  500 mg Oral BID Mattie Willis APRN CNP   500 mg at 08/04/25 1002    polyethylene glycol (MIRALAX) Packet 17 g  17 g Oral BID Mattie Willis APRN CNP   17 g at 08/04/25 1000    predniSONE (DELTASONE) tablet 40 mg  40 mg  Oral Daily Kayley Davis NP   40 mg at 08/04/25 1004    Followed by    [START ON 8/6/2025] predniSONE (DELTASONE) tablet 20 mg  20 mg Oral Daily Kayley Davis NP        Followed by    [START ON 8/13/2025] predniSONE (DELTASONE) tablet 15 mg  15 mg Oral Daily Kayley Davis NP        Followed by    [START ON 8/20/2025] predniSONE (DELTASONE) tablet 10 mg  10 mg Oral Daily Kayley Davis NP        Followed by    [START ON 8/27/2025] predniSONE (DELTASONE) tablet 5 mg  5 mg Oral Daily Kayley Davis NP        senna-docusate (SENOKOT-S/PERICOLACE) 8.6-50 MG per tablet 2 tablet  2 tablet Oral BID Mattie Willis APRN CNP   2 tablet at 08/04/25 1005    sertraline (ZOLOFT) tablet 100 mg  100 mg Oral Daily Kayley Davis NP   100 mg at 08/04/25 1000    sodium bicarbonate tablet 650 mg  650 mg Oral BID Brenda Rothman APRN CNP   650 mg at 08/04/25 1006    sodium chloride (PF) 0.9% PF flush 10 mL  10 mL Intracatheter Q8H Jose Tariq MD   10 mL at 08/03/25 2102    sulfamethoxazole-trimethoprim (BACTRIM) 400-80 MG per tablet 1 tablet  1 tablet Oral Daily Jose Tariq MD   1 tablet at 08/04/25 1000    tacrolimus (GENERIC EQUIVALENT) capsule 4 mg  4 mg Oral BID IS Mattie Willis APRN CNP   4 mg at 08/04/25 0958    [START ON 8/5/2025] valGANciclovir (VALCYTE) tablet 900 mg  900 mg Oral Daily Jose Tariq MD           Physical Exam:     Admit Weight: 45.8 kg (100 lb 14.4 oz)    Current vitals:   /70 (BP Location: Right arm)   Pulse 106   Temp 98  F (36.7  C) (Oral)   Resp 16   Wt 45.8 kg (100 lb 14.4 oz)   SpO2 100%   BMI 19.06 kg/m      Vital sign ranges:    Temp:  [97.6  F (36.4  C)-98  F (36.7  C)] 98  F (36.7  C)  Pulse:  [] 106  Resp:  [16-20] 16  BP: (114-150)/(70-89) 137/70  FiO2 (%):  [30 %] 30 %  SpO2:  [97 %-100 %] 100 %    General Appearance: in no apparent distress.   Heart: grossly well perfused    Lungs: no increased work of breathing on room air, trach (chronic)  Abdomen: the abdomen is non-distended, incision c/d/I. GLENN drain with serosanguinous output  : Aguirre in Mitrofanoff.  Urine is clear  Extremities: Spontaneously moving all 4 extremities  Neurologic: Alert, follows commands    Data:   CMP  Recent Labs   Lab 08/04/25  0536 08/03/25  0542 07/30/25  1031 07/30/25  0820    141   < > 143   POTASSIUM 4.5 4.8   < > 3.2*   CHLORIDE 112* 113*   < > 100   CO2 20* 22   < > 27   GLC 87 82   < > 90   BUN 27.0* 21.7*   < > 37.0*   CR 1.15 1.31*   < > 6.39*   GFRESTIMATED 89 76   < > 11*   SUSAN 10.5* 10.5*   < > 10.0   MAG 1.7 2.0   < >  --    PHOS 2.2* 1.8*   < >  --    ALBUMIN  --   --   --  4.5   BILITOTAL  --   --   --  0.2   ALKPHOS  --   --   --  69   AST  --   --   --  15   ALT  --   --   --  11    < > = values in this interval not displayed.     CBC  Recent Labs   Lab 08/04/25  0536 08/03/25  0542 07/30/25  1905 07/30/25  0820   HGB 7.5* 7.6*   < > 9.4*   WBC 6.6 5.6   < > 5.5    178   < > 262   A1C  --   --   --  4.6    < > = values in this interval not displayed.

## 2025-08-05 ASSESSMENT — ACTIVITIES OF DAILY LIVING (ADL)
ADLS_ACUITY_SCORE: 58

## 2025-08-05 NOTE — PLAN OF CARE
BP (!) 149/83 (BP Location: Right arm)   Pulse 85   Temp 97.9  F (36.6  C) (Oral)   Resp 18   Wt 43.9 kg (96 lb 11.2 oz)   SpO2 98%   BMI 18.27 kg/m      8021-9535  Hypertensive, scheduled med given, OVSS. Trach dome in place. Shiley 5 trach in place. Pt self suctions. Pt able to make needs known. Regular diet. PIV SL. Mitrofanoff connected to izaguirre bag. Oncoming nurse to switch out izaguirre bag.  Izaguirre handout at bedside. GLENN in place, handout printed. Dressing changed x1. Supplies given. Incision MARCELA. G tube button. No complaints of pain or nausea. Med card + lab book up to date. AVS printed. Meds to be ready at 4 pm. Plan for RT to give pt trach dome + HME prior to discharge. Pt receiving scheduled nebs. Mom would like to speak with surgeon before discharging. Care coordinator working with pt. Will continue to monitor and notify team with changes.

## 2025-08-05 NOTE — PHARMACY-TRANSPLANT NOTE
Solid Organ Transplant Recipient Prior to Discharge Note    29 year old male s/p kidney transplant on 7/30/2025.    Planned immunosuppression regimen per kidney transplant protocol:  MAINTENANCE:   - Mycophenolate 750 mg twice daily  - Prednisone taper   - Tacrolimus with goal trough levels of 8-10 mcg/L for first 6 months post-transplant   - Most recent tacrolimus level: 5.3 (8/4/2025), dose increased from 4 mg twice daily to 5 mg twice daily    ADDENDUM 8/7/2025:   -Most recent tacrolimus level 8/6/2025 is 6.5 mcg/L (11 hour trough). Tacrolimus dose increased from 5 mg bid to 6 mg bid on 8/7/2025.     Opportunistic pathogen prophylaxis includes:  - PJP: trimethoprim/sulfamethoxazole  - CMV D-/R-: Valganciclovir for 3 months duration tentatively    Possible UTI  Augmentin: course to continue until stent removal     Pharmacy has monitored for medication interactions and immunosuppression levels in conjunction with the multidisciplinary team. In anticipation for discharge, medication therapy needs have been addressed daily throughout the current admission via multidisciplinary rounds and/or discussions, order verification, daily clinical pharmacy review, and communication with prescribers.  Makayla Mead, Pharm.D., BCPS    Addendum 8/7/2025  Praas Root.[email or web address]., BCPS, BCTXP  [address] pharmacist

## 2025-08-05 NOTE — PLAN OF CARE
Goal Outcome Evaluation:      Plan of Care Reviewed With: patient    Overall Patient Progress: improvingOverall Patient Progress: improving    Outcome Evaluation: VSS on tachdome 30L. denies pain and nausea.    BP (!) 141/88 (BP Location: Right arm)   Pulse 77   Temp 97.6  F (36.4  C) (Oral)   Resp 18   Wt 43.9 kg (96 lb 11.2 oz)   SpO2 100%   BMI 18.27 kg/m      Shift: [id number]  Isolation Status: none  VS: stable on 30L via trach dome, afebrile  Neuro: Aox4, cognitive delay at baseline but able to make needs known  Behaviors: restful  BG: pending   Labs/Imaging: am labs pending   Respiratory: Trach with 30L via dome; clear/coarse lung sounds, audible wheezing d/t damaged lower lobes  Cardiac: WDL   Pain/Nausea: reports pain.  PRN: oxycodone 1x   Diet: regular  LDA: R PIV x2,   Infusion(s): D5LR @ 100 mL/hr  GI/: LBM .  Aguirre with good output   Skin: RLQ incision covered with op dressing, C/D/I; LUQ G tube capped; preventative mepilex  Mobility:not OOB this shift  Events/Education: med card and lab book updated   Plan: cont w/POC

## 2025-08-05 NOTE — PROGRESS NOTES
[facility]  Transplant Nephrology Progress Note  Date of Admission:  7/30/2025  Today's Date: 08/05/2025  Requesting physician: Jose Tariq*    Recommendations:   - Continue cinacalcet 30 mg daily.   - No acute indications for dialysis.  - Continue current immunosuppression.  - Strict I/Os and daily weights.     Assessment & Plan   # DDKT: Trend down in creatinine. Good urine output.    - Baseline Creatinine: ~ TBD   - Proteinuria: Not checked post transplant   - DSA Hx: Not checked recently due to time from transplant  - Last cPRA: 25%   - BK Viremia: Not checked recently due to time from transplant   - Kidney Tx Biopsy Hx: No biopsy history.    # Immunosuppression: Tacrolimus immediate release (goal 8-10), Mycophenolate mofetil (dose 750 mg every 12 hours), and Prednisone (dose taper)   - Induction with Recent Transplant:  Intermediate Intensity Protocol   - Continue with intensive monitoring of immunosuppression for efficacy and toxicity.   - Historical Changes in Immunosuppression: None   - Changes: Not at this time    # Infection Prevention:   Last CD4 Level: Not checked  - PJP: Sulfa/TMP (Bactrim)  - CMV: Valganciclovir (Valcyte)      - CMV IgG Ab High Risk Discordance (D+/R-) at time of transplant: Unknown  Present CMV Serostatus: Negative  - EBV IgG Ab High Risk Discordance (D+/R-) at time of transplant: No  Present EBV Serostatus: Positive    # Hypertension: Controlled;  Goal BP: < 150/90   - PTA medications: amlodipine 5 mg daily and carvedilol 3.125 mg bid.   - Currently on amlodipine 5 mg daily. Carvedilol discontinued due to reported bradycardia overnight 8/4- ECG showed NSR.   - Changes: Not at this time    # Anemia in Chronic Renal Disease: Hgb: Stable, low      POORNIMA: No   - Iron studies: Replete    # Mineral Bone Disorder:    - Secondary renal hyperparathyroidism; PTH level: Minimally elevated ( pg/ml)        On treatment: Cinacalcet  -  Vitamin D; level: Normal        On supplement: No  - Calcium; level: High        On supplement: No  - Phosphorus; level: Low        On supplement: Yes; phospha neutral 250 mg bid.     # Electrolytes:  - Potassium; level: Normal        On supplement: No  - Magnesium; level: Low        On supplement: Yes; magnesium oxide 400 mg daily.   - Bicarbonate; level: Low        On supplement: Yes; sodium bicarb 650 mg bid.   - Sodium; level: Normal    # Concern for UTI: UA suspicious for UTI but urine culture negative. Currently on augmentin until stent removal.    - Management per Surgery.    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 Patient self caths 3-6 times on non HD days and 1-2 times on HD days. Takes cranberry supplements. Follows with Urology.    - Chronic respiratory failure and restrictive lung disease: s/p tracheostomy.   - History of malnutrition: secondary to poor oral intake now s/p G-tube.     # Transplant History:  Etiology of Kidney Failure: Reflux nephropathy  Tx: DDKT  Transplant: 7/30/2025 (Kidney)  Significant transplant-related complications: None    Recommendations were communicated to the primary team verbally.    Seen and discussed with Dr. Molina.    LIVAN Peres [facility]  Transplant Nephrology  Contact information via Vocera Web Console      Interval History  Mr. Bojorquez creatinine is 0.99 (08/05 0542); Trend down.  Good urine output.  Other significant labs/tests/vitals: VSS.   No events overnight.  no chest pain or shortness of breath.  no leg swelling.  no nausea and vomiting.  Bowel movements are soft.  no fever, sweats or chills.     Review of Systems   4 point ROS was obtained and negative except as noted in the Interval History.    MEDICATIONS:  Current Facility-Administered  Medications   Medication Dose Route Frequency Provider Last Rate Last Admin    acetaminophen (TYLENOL) tablet 975 mg  975 mg Oral Q8H Jose Tariq MD   975 mg at 08/04/25 1944    amLODIPine (NORVASC) tablet 5 mg  5 mg Oral Daily Connie Gerard APRN CNP        amoxicillin (AMOXIL) tablet 875 mg  875 mg Oral Q12H PATRICK (08/20) Jose Tariq MD   875 mg at 08/04/25 1942    atorvastatin (LIPITOR) tablet 10 mg  10 mg Oral Daily Jose Tariq MD   10 mg at 08/04/25 1004    cinacalcet (SENSIPAR) tablet 30 mg  30 mg Oral Daily Brenda Rothman APRN CNP   30 mg at 08/04/25 1005    famotidine (PEPCID) tablet 20 mg  20 mg Oral Daily Jose Tariq MD   20 mg at 08/04/25 1002    ipratropium - albuterol 0.5 mg/2.5 mg (3mg)/3 mL (DUONEB) neb solution 3 mL  3 mL Nebulization Q8H Janett Molina MD   3 mL at 08/05/25 0843    magnesium oxide (MAG-OX) tablet 400 mg  400 mg Oral Daily with lunch Jose Tariq MD   400 mg at 08/04/25 1202    methocarbamol (ROBAXIN) tablet 500 mg  500 mg Oral Q6H Kayley Davis NP   500 mg at 08/05/25 0144    mycophenolate (GENERIC EQUIVALENT) capsule 750 mg  750 mg Oral BID IS Jose Tariq MD   750 mg at 08/04/25 1753    pantoprazole (PROTONIX) EC tablet 40 mg  40 mg Oral Daily Kayley Davis NP   40 mg at 08/04/25 1004    phosphorus tablet 250 mg (PHOSPHA 250 NEUTRAL) per tablet 500 mg  500 mg Oral BID Mattie Willis APRN CNP   500 mg at 08/04/25 1942    polyethylene glycol (MIRALAX) Packet 17 g  17 g Oral BID Mattie Willis APRN CNP   17 g at 08/04/25 2108    predniSONE (DELTASONE) tablet 40 mg  40 mg Oral Daily Kayley Davis NP   40 mg at 08/04/25 1004    Followed by    [START ON 8/6/2025] predniSONE (DELTASONE) tablet 20 mg  20 mg Oral Daily Kayley Davis NP        Followed by    [START ON 8/13/2025] predniSONE (DELTASONE) tablet 15 mg  15 mg  Oral Daily Kayley Davis NP        Followed by    [START ON 2025] predniSONE (DELTASONE) tablet 10 mg  10 mg Oral Daily Kayley Davis NP        Followed by    [START ON 2025] predniSONE (DELTASONE) tablet 5 mg  5 mg Oral Daily Kayley Davis NP        senna-docusate (SENOKOT-S/PERICOLACE) 8.6-50 MG per tablet 2 tablet  2 tablet Oral BID Mattie Willis APRN CNP   2 tablet at 25    sertraline (ZOLOFT) tablet 100 mg  100 mg Oral Daily Kayley Davis NP   100 mg at 25 1000    sodium bicarbonate tablet 650 mg  650 mg Oral BID Brenda Rothman APRN CNP   650 mg at 25    sodium chloride (PF) 0.9% PF flush 10 mL  10 mL Intracatheter Q8H Jose Tariq MD   10 mL at 25    sulfamethoxazole-trimethoprim (BACTRIM) 400-80 MG per tablet 1 tablet  1 tablet Oral Daily Jose Tariq MD   1 tablet at 25 1000    tacrolimus (GENERIC EQUIVALENT) capsule 5 mg  5 mg Oral BID IS Connie Gerard APRN CNP   5 mg at 25 175    valGANciclovir (VALCYTE) tablet 900 mg  900 mg Oral Daily Jose Tariq MD         Current Facility-Administered Medications   Medication Dose Route Frequency Provider Last Rate Last Admin       Physical Exam   Temp  Av.8  F (36.6  C)  Min: 97.8  F (36.6  C)  Max: 97.8  F (36.6  C)      Pulse  Av  Min: 72  Max: 72 Resp  Av  Min: 12  Max: 12  SpO2  Av %  Min: 97 %  Max: 97 %     /82 (BP Location: Right arm)   Pulse 85   Temp 97.6  F (36.4  C) (Oral)   Resp 16   Wt 43.9 kg (96 lb 11.2 oz)   SpO2 100%   BMI 18.27 kg/m      Admit Weight: 45.8 kg (100 lb 14.4 oz)     GENERAL APPEARANCE: alert and no distress  HENT: mouth without ulcers or lesions  RESP: lungs clear to auscultation - no rales, rhonchi or wheezes. Trach in place  CV: regular rhythm, normal rate, no rub, no murmur  EDEMA: no LE edema bilaterally  ABDOMEN: soft, nondistended, nontender,  bowel sounds normal  MS: extremities normal - no gross deformities noted, no evidence of inflammation in joints, no muscle tenderness  SKIN: no rash  DIALYSIS ACCESS: RUE fistula thrill present    Data   All labs reviewed by me.  CMP  Recent Labs   Lab 08/05/25  0559 08/04/25  0536 08/03/25  0542 08/02/25  0544 07/30/25  1031 07/30/25  0820    138 141 141   < > 143   POTASSIUM 4.3 4.5 4.8 4.9   < > 3.2*   CHLORIDE 109* 112* 113* 113*   < > 100   CO2 21* 20* 22 20*   < > 27   ANIONGAP 9 6* 6* 8   < > 16*   GLC 95 87 82 84   < > 90   BUN 25.2* 27.0* 21.7* 17.1   < > 37.0*   CR 0.99 1.15 1.31* 1.50*   < > 6.39*   GFRESTIMATED >90 89 76 65   < > 11*   SUSAN 10.8* 10.5* 10.5* 11.0*   < > 10.0   MAG 1.5* 1.7 2.0 2.0   < >  --    PHOS 2.3* 2.2* 1.8* 2.0*   < >  --    PROTTOTAL  --   --   --   --   --  6.8   ALBUMIN  --   --   --   --   --  4.5   BILITOTAL  --   --   --   --   --  0.2   ALKPHOS  --   --   --   --   --  69   AST  --   --   --   --   --  15   ALT  --   --   --   --   --  11    < > = values in this interval not displayed.     CBC  Recent Labs   Lab 08/05/25  0559 08/04/25  0536 08/03/25  0542 08/02/25  0544   HGB 7.9* 7.5* 7.6* 7.6*   WBC 7.8 6.6 5.6 8.1   RBC 2.58* 2.45* 2.47* 2.48*   HCT 24.8* 23.9* 24.5* 24.6*   MCV 96 98 99 99   MCH 30.6 30.6 30.8 30.6   MCHC 31.9 31.4* 31.0* 30.9*   RDW 16.0* 16.0* 16.2* 16.4*    196 178 157     INR  Recent Labs   Lab 07/30/25  0820   INR 1.08   PTT 31     ABGNo lab results found in last 7 days.   Urine Studies  Recent Labs   Lab Test 07/30/25  1030 04/06/23  1853 11/30/20  1132 07/11/17  1341   COLOR Orange* Light Yellow Yellow Yellow   APPEARANCE Cloudy* Slightly Cloudy* Clear Clear   URINEGLC Negative 30* Negative Negative   URINEBILI Negative Negative Negative Negative   URINEKETONE Negative Trace* Negative Negative   SG 1.006 1.015 1.020 1.005   UBLD Moderate* Small* Small* Negative   URINEPH 8.0* 6.0 8.5* 6.0   PROTEIN 300* 300* >300* 100*   UROBILINOGEN   --   --  0.2  --    NITRITE Positive* Negative Negative Positive*   LEUKEST Large* Large* Small* Small*   RBCU 48* 3*  --  <1   WBCU >182* 80*  --  6*     Recent Labs   Lab Test 08/30/21  1307 01/03/19  1435 07/11/17  1341   UTPG 4.92* 2.60* 3.04*     PTH  Recent Labs   Lab Test 08/02/25  0544 08/30/21  1300 11/30/20  1415 12/09/19  1311 05/13/19  1027 01/03/19  1438 07/11/17  1337   PTHI 143* 1,178* 858* 331* 195* 177* 191*     Iron Studies  Recent Labs   Lab Test 04/13/23  0749 08/30/21  1300 11/30/20  1415 08/09/18  1056 08/09/18  1046 07/11/17  1337   IRON 24* 74 92 178  --  77   * 254 272 266  --  296   IRONSAT 21 29 34 67  --  26   JUVENAL 1,092* 66 43  --  14 16*       IMAGING:  All imaging studies reviewed by me.

## 2025-08-05 NOTE — PROGRESS NOTES
Care Management Discharge Note- NOT discharged    Discharge Date: 08/05/2025- May be delayed until 8/6    Discharge Disposition: Home Care    Discharge Services: Home Care in [place]    Discharge DME: None. Ordered urology supplies through discharge pharmacy    Discharge Transportation: family or friend will provide    Is transportation arrangement complete? No    Private pay costs discussed: Working with Anastacia Perkins RN for PA for OP urology supplies.    Does the patient's insurance plan have a 3 day qualifying hospital stay waiver?  No    PAS Confirmation Code:    Patient/family educated on Medicare website which has current facility and service quality ratings: yes    Education Provided on the Discharge Plan: Yes  Persons Notified of Discharge Plans: Mother and patient but they have questions  Patient/Family in Agreement with the Plan: yes    Handoff Referral Completed: PENDING final discharge    Additional Information:    1137: Patient is to discharge today to local address. Mother states that she is unable to get urologic supplies. RNCC called Anastacia and left message for [facility]  asking if they can assist with this issue, if there is an in network provider we can send orders to for urology supplies. Patient has Medicare and Medicaid [place]. Uncertain why cost is a barrier to obtaining supplies.   1300: RNCC received return call from Gregorio MARTINO with Anastacia. He is working on PA through health plan and anticipates patient will obtain auth in 2-3 days but to be safe, Gregorio is asking that 9 days of supplies sent home with patient while he is staying locally in [place]. RNCC collaborated with Nasreen Parks PA-C to explore options.  Provider wants home care locally. RNCC sent asap referral to HUB for locally agency through 8/13.  RNALAN was notified by [facility] that patient was out of area.   1426RNC informed them again that the patient will be remaining locally in [place] through 8/13 and [facility] agreed to search  for short term HC agency. Uncertain if this will occur in time for discharge.     1450 Met with mother and patient at bedside to review current discharge plan.   Informed mother that 9 days of urologic supplies will be sent with patient while Anastacia RN works on Prior Auth for urologic supplies (est 2-3 days) .   1635: Call from [facility] regarding [facility] could see patient locally and in [place]. Chart was locked by Sherrie. RNCC discussed with Jimena Read. Call back from [facility] stating they cannot open in one branch and then another branch.   1712: RNCC updated bedside RN/mother. Mother stated she will be exhausted but okay locally.    Barriers per mother:  Mother asked for additional items to be addressed prior to discharge:  1) Meet with surgeon (which will happen this afternoon)  2) Mother wants to meet with RD to discuss book provided. Mother stated it does not apply and wants to discuss with RD. Provider paged RD to meet with patient/mother.  3) ATC -appointments. Writer is not able to send yet as discharge is not confirmed. Mother is aware they are needed post discharge days 1 and 2.   4) RT supplies: Mother stated that she needs 2 nose HMEs and and 2 trach domes.   5) Constipation: mother want constipation issue to be addressed. Provider indicated mother had declined the medications. Provider will order enema.   6) Mother wants the transplant coordinators names and phone numbers. RNCC asked SW to please provide to mother.     RNCC updated Nasreen BYERS who will meet with patient again.     Discharge Address through 8/13:    [address] [place] [address]         [facility] -RN SOC 8/14 (they a\have already reached out to mother)  [address]  [place] [address] [phone]      Previous Community Resources:      Trach Supplies   [facility] [place]  Address: [address] [place] [address]  Phone: [phone]  Toll Free: [phone]     Care Management Services (through [employer])  [facility] ,   Jorje RN Direct [phone] (actively working on PA for supplies)  [address] [place] [address]  Phone:  [phone]  Fax: [phone]     Oxygen  [facility], Jose ROJAS  PRN       Next Steps:   [x] Confirm home care orders. Send home care orders to [facility]  [x] Coordinate ATC appointments  [x] EHO (Dr Mora)  [x] Review mother's barrier's to discharge (above)  IMM done    Mother to transport to local address.     Future Appointments   Date Time Provider Department Center   8/6/2025  6:45 AM  [facility]   8/6/2025  8:00 AM UC SIPC INFUSION NURSE [facility]   8/6/2025  9:00 AM [facility]   8/7/2025  7:15 AM  [facility]   8/7/2025  8:00 AM UC SIPC INFUSION NURSE INPR [facility]   8/7/2025  8:30 AM UC SPEC INF ABDIAS FLEX INPR [facility]   8/13/2025 11:30 AM Salma Alvares APRN CNP [facility]   9/4/2025  9:20 AM [email or web address] UCCXR [facility]   9/4/2025  9:45 AM  [facility]   9/4/2025 10:45 AM Salma Alvares APRN CNP [facility]   9/4/2025 11:30 AM Janett Molina MD [facility]   9/30/2025  8:00 AM  [facility]   9/30/2025  9:00 AM Amadou Lai MD [facility]   10/2/2025 11:30 AM Zahra Morris [facility]   12/1/2025  9:30 AM  [facility]   12/1/2025 10:30 AM Jyoti Wall MD [facility]   12/10/2025  2:20 PM [email or web address] [facility]   1/27/2026  7:45 AM [facility]   1/27/2026  8:30 AM Amadou Lai MD [facility]   4/28/2026  9:00 AM [facility]   4/28/2026 10:00 AM Amadou Lai MD [facility]   6/8/2026 12:20 PM Mari Davalos PA-C [facility]   6/15/2026  2:30 PM Josiane Bermudez, PHANI [facility]   7/28/2026 10:00 AM [facility]   7/28/2026 11:00 AM Amadou Lai MD [facility]       Loren Ardon RN, BSN, PHN, CCM  Float Nurse  Care Coordinator  [facility] Acute Care Management   Searchable on Vocera: 7A EZRA RNCC

## 2025-08-05 NOTE — PROGRESS NOTES
Care Management Follow Up    Length of Stay (days): 6  Expected Discharge Date: 08/05/2025    Concerns to be Addressed: Appointment Scheduling    Additional Information:  08/05:  CHW delegated by the RNCC, has scheduled ATC appts for this Pt for tomorrow Wednesday 08/06/2025 and Thursday 08/07/2025.    Juan Francisco Bashir   Community Health Worker, Certified  [facility], Acute Care Management.  [place], [place]   PH: [phone]

## 2025-08-05 NOTE — PROGRESS NOTES
Patient's trach was changed to #5 Bivona cuff less. He tolerated the trach   well. Suctioned for small amount of thick pale yellow secretion prior to the   Trach change. B.S was clear, diminished post trach change. Patient is   Resting comfortably with out any sign of respiratory distress.   Will continue to monitor.

## 2025-08-06 ENCOUNTER — APPOINTMENT (OUTPATIENT)
Dept: GENERAL RADIOLOGY | Facility: CLINIC | Age: 30
DRG: 652 | End: 2025-08-06
Attending: PHYSICIAN ASSISTANT
Payer: MEDICARE

## 2025-08-06 ENCOUNTER — APPOINTMENT (OUTPATIENT)
Dept: ULTRASOUND IMAGING | Facility: CLINIC | Age: 30
DRG: 652 | End: 2025-08-06
Attending: PHYSICIAN ASSISTANT
Payer: MEDICARE

## 2025-08-06 ASSESSMENT — ACTIVITIES OF DAILY LIVING (ADL)
ADLS_ACUITY_SCORE: 58
ADLS_ACUITY_SCORE: 58
ADLS_ACUITY_SCORE: 62
ADLS_ACUITY_SCORE: 62
ADLS_ACUITY_SCORE: 58
ADLS_ACUITY_SCORE: 62
ADLS_ACUITY_SCORE: 58
ADLS_ACUITY_SCORE: 62
ADLS_ACUITY_SCORE: 58
ADLS_ACUITY_SCORE: 62
ADLS_ACUITY_SCORE: 58
ADLS_ACUITY_SCORE: 62
ADLS_ACUITY_SCORE: 58
ADLS_ACUITY_SCORE: 58

## 2025-08-06 NOTE — PROGRESS NOTES
Care Management Follow Up    Length of Stay (days): 7    Expected Discharge Date: 08/07/2025     Concerns to be Addressed: discharge planning     Patient plan of care discussed at interdisciplinary rounds: Yes     Anticipated Discharge Disposition: Home Care              Anticipated Discharge Services: Home Care  Anticipated Discharge DME: None    Patient/family educated on Medicare website which has current facility and service quality ratings: yes  Education Provided on the Discharge Plan: Yes  Patient/Family in Agreement with the Plan: yes    Referrals Placed by CM/SW: External Care Coordination  Private pay costs discussed: Not applicable    Discussed  Partnership in Safe Discharge Planning  document with patient/family: No     Handoff Completed: No, handoff not indicated or clinically appropriate    Additional Information:  RNCC was notified that patient would possibly be discharging today. RNCC spoke with Sherrie from [facility] to see if patient would be able to get home care while staying locally in [place].  Sherrie states that she would check into it.    Sherrie returned call and states that patient is unable to get home care in two different spots for same hospital stay.  She states patient has medicare and that patient would not be able to get the 5 visits in prior to discharging to another home care in [place].      RNCC update provider and charge nurse of this information.     Discharge Address through 8/13:    [address] [place] [address]           [facility] [facility] -RN SOC 8/14 (they a\have already reached out to mother)  [address]  [place] [address] [phone]        Previous Community Resources:      Trach Supplies   [facility] [place]  Address: [address] [place] [address]  Phone: [phone]  Toll Free: [phone]     Care Management Services (through [facility])  [facility]:Mary Powers Jorje RN Direct [phone]  (actively working on PA for supplies)  [address] [address]  Phone:  [phone]  Fax: [phone]     Oxygen  [facility], Jose ROJAS  PRN      Barriers per mother:  Mother asked for additional items to be addressed prior to discharge:  1) Meet with surgeon (which will happen this afternoon)  2) Mother wants to meet with RD to discuss book provided. Mother stated it does not apply and wants to discuss with RD. Provider paged RD to meet with patient/mother.  3) ATC -appointments. Writer is not able to send yet as discharge is not confirmed. Mother is aware they are needed post discharge days 1 and 2.   4) RT supplies: Mother stated that she needs 2 nose HMEs and and 2 trach domes.   5) Constipation: mother want constipation issue to be addressed. Provider indicated mother had declined the medications. Provider will order enema.   6) Mother wants the transplant coordinators names and phone numbers. RNCC asked SW to please provide to mother.      Next Steps:   [x] Confirm home care orders. Send home care orders to [facility]  [x] Coordinate ATC appointments  [x] EHO (Dr Mora)  [x] Review mother's barrier's to discharge (above)  IMM done    Connie Sarabia, RN    Float Nurse Coordinator     [facility] Acute Care Management    Searchable on [employer]:     Covering for: Renetta PENNINGTON    Phone: [phone]

## 2025-08-06 NOTE — PROGRESS NOTES
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    TODAY:   - US bladder. If no retention will give 1L fluid bolus   - Abd XR   - Urology consult                                                                                                                                        Graft function: POD#7  Graft function: s/p DBDKT with stent, POD#6  Kidney: Post- op US with patent Doppler, no fluid collections. Admission creatinine 6.39 Cr 1 today. UOP 1.8L yesterday, decreased urine output o/n. Pt c/o bladder fullness.   - US bladder   - Urology consult   - Consider IV fluid if no urinary retention d/t lower UO.   -Plan for ureteral stent and drain to be removed 2 weeks post op in OR. Ureteral stent to be removed ~ 2 weeks to UTI.      Acute surgical pain:   - PRN Tylenol, Robaxin     Neuro/Psych:  Depression/Anxiety:   - Continue home sertraline      Immunosuppressed status secondary to medications:   Induction IS: Intermediate - cPRA 25  Thymoglobulin 100 mg POD#0  Basiliximab POD#1 and anticipate POD#5 (therapy plan entered)  SM pulse followed by prednisone taper  Maintenance IS:   mg BID  Tacrolimus with goal trough 8 to 12     Transplant coordinator: Aubrie Knutson RN  Graft type: DBD    DSA at time of transplant: None, historical Cw7 in 2020   Ureteral stent: Yes, tentative plan to remove in ~ 2 weeks in OR   CMV: Donor not on file  / Recipient -  EBV: Donor not on file / Recipient +  Prophylaxis: DVT, pneumocystis (Bactrim), viral (Valcyte)      Hematology:   Anemia of chronic disease:   Acute surgical blood loss anemia: Hemoglobin stable, he has not required transfusion.      Cardiorespiratory:   Chronic respiratory failure, s/p tracheostomy:  Restrictive lung disease: Managed post-operatively with respiratory care, early mobility, DuoNebs      Hypertension:    - Amlodipine 5 mg      Chest pain: RRT called for chest pain 8/1. Pain mid- sternal, non-radiating, and associated with some reflux. Patient has had Nissen fundoplication and does not vomit.    - EKG reassuring, no obvious acute myocardial ischemia   - Serial troponin, first trend reassuring    - Added simethicone, PPI, and vented G- tube      GI/Nutrition: Has G- tube and takes his nutrition by mouth unless ill. Pepcid, PPI.   Status post Nissen fundoplication:  Diet: Regular diet.    - Consult to RD   Stewart fierro  Constipation: PTA duclolax 5 mg daily, colace 100 mg TID, and Senna 1 BID  -Continue PEG BID, Senna colace BID, MOM PRN  -Abd XR to evaluate stool burden.     Endocrine: Hemoglobin A1c 4.6% 7/30/2025. No insulin requirement post- operatively.      Fluid/Electrolytes:   Hyperphosphatemia: Monitor.   Hypercalcemia: Monitor.      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    - Plan to keep Aguirre in Mitrofanoff x 2 weeks post- op. Aguirre changed on 8/5/25.    - Urology consult for recommendations for catheterization and flushing of Mitrofanoff.      Infectious disease: Afebrile.   Concern for UTI: Urine culture  negative.    - Continue Augmentin until stent removed.    Prophylaxis: DVT, pneumocystis (Bactrim), viral (Valcyte)    Disposition: 7A. Plan to discharge to stay locally tomorrow.    ABDIAS/Fellow/Resident Provider: DARCI Desai Vocera/pager [phone]    Faculty: Dr. Tariq  _________________________________________________________________    Interval History: History is obtained from the patient and his mom  Overnight events: Patient uncomfortable c/o bladder fullness. Low UO o/n.  +BM this morning.    ROS:   A 10-point review of systems was negative except as noted above.    Meds:  Current Facility-Administered Medications   Medication Dose Route Frequency Provider Last Rate Last Admin    acetaminophen (TYLENOL) tablet 975 mg  975 mg Oral Q8H Jose Tariq MD   975 mg at 08/06/25 1130    amLODIPine (NORVASC) tablet 5 mg  5 mg Oral Daily Connie Gerard APRN CNP   5 mg at 08/06/25 0859    atorvastatin (LIPITOR) tablet 10 mg  10 mg Oral Daily Jose Tariq MD   10 mg at 08/06/25 0900    cinacalcet (SENSIPAR) tablet 30 mg  30 mg Oral Daily Brenda Rothman APRN CNP   30 mg at 08/06/25 0901    famotidine (PEPCID) tablet 20 mg  20 mg Oral BID Jose Tariq MD   20 mg at 08/06/25 0900    ipratropium - albuterol 0.5 mg/2.5 mg (3mg)/3 mL (DUONEB) neb solution 3 mL  3 mL Nebulization Q8H Janett Molina MD   3 mL at 08/06/25 0226    magnesium oxide (MAG-OX) tablet 800 mg  800 mg Oral BID Nasreen Parks PA-C        methocarbamol (ROBAXIN) tablet 500 mg  500 mg Oral Q6H Kayley Davis, NP   500 mg at 08/06/25 0900    mycophenolate (GENERIC EQUIVALENT) capsule 750 mg  750 mg Oral BID IS Jose Tariq MD   750 mg at 08/06/25 0859    pantoprazole (PROTONIX) EC tablet 40 mg  40 mg Oral Daily Kayley Davis NP   40 mg at 08/06/25 0901    phosphorus tablet 250 mg (PHOSPHA 250 NEUTRAL) per tablet 500 mg  500 mg Oral BID Mattie Willis  LIVAN Andrade CNP   500 mg at 08/06/25 0859    polyethylene glycol (MIRALAX) Packet 17 g  17 g Oral BID Mattie Willis APRN CNP   17 g at 08/06/25 0911    predniSONE (DELTASONE) tablet 20 mg  20 mg Oral Daily Kayley Davis NP   20 mg at 08/06/25 0900    Followed by    [START ON 8/13/2025] predniSONE (DELTASONE) tablet 15 mg  15 mg Oral Daily Kayley Davis NP        Followed by    [START ON 8/20/2025] predniSONE (DELTASONE) tablet 10 mg  10 mg Oral Daily Kayley Davis NP        Followed by    [START ON 8/27/2025] predniSONE (DELTASONE) tablet 5 mg  5 mg Oral Daily Kayley Davis NP        senna-docusate (SENOKOT-S/PERICOLACE) 8.6-50 MG per tablet 2 tablet  2 tablet Oral BID Mattie Willis APRN CNP   2 tablet at 08/06/25 0901    sertraline (ZOLOFT) tablet 100 mg  100 mg Oral Daily Kayley Davis NP   100 mg at 08/06/25 0901    sodium bicarbonate tablet 650 mg  650 mg Oral BID Brenda Rothman APRN CNP   650 mg at 08/06/25 0901    sodium chloride (PF) 0.9% PF flush 10 mL  10 mL Intracatheter Q8H Jose Tariq MD   10 mL at 08/06/25 0910    sulfamethoxazole-trimethoprim (BACTRIM) 400-80 MG per tablet 1 tablet  1 tablet Oral Daily Jose Tariq MD   1 tablet at 08/06/25 0900    tacrolimus (GENERIC EQUIVALENT) capsule 5 mg  5 mg Oral BID IS Connie Gerard APRN CNP   5 mg at 08/06/25 0901    valGANciclovir (VALCYTE) tablet 900 mg  900 mg Oral Daily Jose Tariq MD   900 mg at 08/06/25 0859       Physical Exam:     Admit Weight: 45.8 kg (100 lb 14.4 oz)    Current vitals:   /57 (BP Location: Right arm)   Pulse 82   Temp 97.7  F (36.5  C) (Oral)   Resp 20   Wt 43.9 kg (96 lb 11.2 oz)   SpO2 100%   BMI 18.27 kg/m      Vital sign ranges:    Temp:  [97.5  F (36.4  C)-97.9  F (36.6  C)] 97.7  F (36.5  C)  Pulse:  [] 82  Resp:  [16-20] 20  BP: (110-140)/(57-88) 110/57  FiO2 (%):  [30 %] 30 %  SpO2:  [97 %-100 %]  100 %    General Appearance: in no apparent distress.   Heart: grossly well perfused   Lungs: no increased work of breathing on room air, trach (chronic)  Abdomen: soft, non-distended, mild ttp lower quadrant,  incision c/d/I. GLENN drain with serosanguinous output. G tube.   : Aguirre in Mitrofanoff.  Urine is clear  Extremities: Spontaneously moving all 4 extremities  Neurologic: Alert, follows commands    Data:   CMP  Recent Labs   Lab 08/06/25  0532 08/05/25  0559    139   POTASSIUM 4.3 4.3   CHLORIDE 112* 109*   CO2 21* 21*   GLC 99 95   BUN 30.4* 25.2*   CR 1.02 0.99   GFRESTIMATED >90 >90   SUSAN 10.8* 10.8*   MAG 1.5* 1.5*   PHOS 2.3* 2.3*     CBC  Recent Labs   Lab 08/06/25  0532 08/05/25  0559   HGB 8.1* 7.9*   WBC 10.8 7.8    231

## 2025-08-06 NOTE — PLAN OF CARE
Goal Outcome Evaluation:      Plan of Care Reviewed With: patient, parent    Overall Patient Progress: improvingOverall Patient Progress: improving    Outcome Evaluation: not mentally ready for discharge today    /88 (BP Location: Right arm)   Pulse 107   Temp 97.5  F (36.4  C) (Oral)   Resp 18   Wt 43.9 kg (96 lb 11.2 oz)   SpO2 98%   BMI 18.27 kg/m       Shift: 2342-1275  Isolation Status: none  VS: tachy on trach (bivona #5 cuffless), afebrile  Neuro: Aox4, flat affect   Behaviors: calm and cooperative   BG: none  Labs: AM labs: Cre 1.66  Respiratory: Trach bivona cuffless, nebs 3x daily  Cardiac: Tachy, denies chest pain  Pain/Nausea: denies nausea, pt reports pain (oxy x1)  PRN: Oxy x1 for bladder pain  Diet: regular, good appetite   IV Access: Right PIV saline locked  Infusion(s): none  Lines/Drains: R.GLENN  GI/: 4 hard Bms, Mitrofanoff attached to izaguirre bag, Over 400 mL out at the beginning of shift then urine output slowly dropped off. Paged on call and a pt care order was placed to flush with 30 mL PRN. irrigated x1. Also gave suppository.  Pt refused enema. Has scheduled senna and Miralax. Pt reported abdominal fullness.   Skin: Abdominal incision dermabonded open to air  Mobility: standby assist with walker, walked the hallway x1  Events/Education: Went over GLENN education with Mom, gave her extra supplies. Wasn't able to change izaguirre bag due to needing to replace whole system, left provider a note. Pt walked back from the bathroom, monitor showed the pt to be tachy (130s). Heart rate came down to 110 bpm after a few minutes. EKG ordered by charge nurse. Results of EKG showed normal sinus rhythm. RT came and changed the trach from shiley to bivona.   Plan: Possible discharge tomorrow, continue plan of care and notify team of any changes

## 2025-08-06 NOTE — PLAN OF CARE
Goal Outcome Evaluation:      Plan of Care Reviewed With: patient, parent, guardian    Overall Patient Progress: improvingOverall Patient Progress: improving    Outcome Evaluation: Pt and his mother had multiple complains during the day    /80 (BP Location: Right arm)   Pulse 92   Temp 98.1  F (36.7  C) (Oral)   Resp 18   Wt 43.9 kg (96 lb 11.2 oz)   SpO2 98%   BMI 18.27 kg/m       Neuro: A/Ox3 with baseline mental delays  VS: VSS on RA  Respiratory: Trach inplace with speaking valve on/off during this shift  Pain: c/o bladder pain/abdominal discomfort but refused pain meds  GI: On regular diet but refused to eat today. Denies nausea. BMx2  : Mitrofanoff izaguirre inplace with urine out put, however pt and his mother c/o of catheter not draining enough of urine (bladder scan 80ml) Transplant team ordered bladder irrigation but pt refused nurse to irrigate before pt was seen by Urology team.   Activity - independent in room  Plan: Continue with POC

## 2025-08-06 NOTE — PLAN OF CARE
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   : no BM this shift. G-tube, not in use  Skin: RLQ incision CDI liquid bandage closure.   Pain: abd discomfort, scheduled APAP and robaxin  Lines/Gtt: R PIV SL.   Drains: R GLENN minimal serous OP  Activity: SBA c walker  Diet: Reg diet, fair appetite  Updates: izaguirre exchanged per order, pt moses fair, expressed frustration with izaguirre system with a preference for straight catheterization. Mother voices concerns of urinary retention d/t mucous strands, believes they would benefit from a urology consult.   Plan: Continue POC.

## 2025-08-06 NOTE — PROGRESS NOTES
[facility] [place]  Transplant Nephrology Progress Note  Date of Admission:  7/30/2025  Today's Date: 08/06/2025  Requesting physician: Jose aTriq*    Recommendations:   - Continue cinacalcet 30 mg daily.   - No acute indications for dialysis.  - Continue current immunosuppression.  - Strict I/Os and daily weights.   - agree with IV fluids   - agree with bladder US, if distended or have elevated creatinine, will consider tx renal US.    Assessment & Plan   # DDKT: Stable in creatinine. Good urine output.    - Baseline Creatinine: ~ TBD   - Proteinuria: Not checked post transplant   - DSA Hx: Not checked recently due to time from transplant  - Last cPRA: 25%   - BK Viremia: Not checked recently due to time from transplant   - Kidney Tx Biopsy Hx: No biopsy history.    # Immunosuppression: Tacrolimus immediate release (goal 8-10), Mycophenolate mofetil (dose 750 mg every 12 hours), and Prednisone (dose taper)   - Induction with Recent Transplant:  Intermediate Intensity Protocol   - Continue with intensive monitoring of immunosuppression for efficacy and toxicity.   - Historical Changes in Immunosuppression: None   - Changes: Not at this time    # Infection Prevention:   Last CD4 Level: Not checked  - PJP: Sulfa/TMP (Bactrim)  - CMV: Valganciclovir (Valcyte)      - CMV IgG Ab High Risk Discordance (D+/R-) at time of transplant: Unknown  Present CMV Serostatus: Negative  - EBV IgG Ab High Risk Discordance (D+/R-) at time of transplant: No  Present EBV Serostatus: Positive    # Hypertension: Controlled;  Goal BP: < 150/90   - PTA medications: amlodipine 5 mg daily and carvedilol 3.125 mg bid.   - Currently on amlodipine 5 mg daily. Carvedilol discontinued due to reported bradycardia overnight 8/4- ECG showed NSR.   - Changes: Not at this time    # Anemia in Chronic Renal Disease: Hgb: Stable, low      POORNIMA: No   - Iron studies: Replete    # Mineral Bone Disorder:    -  Secondary renal hyperparathyroidism; PTH level: Minimally elevated ( pg/ml)        On treatment: Cinacalcet  - Vitamin D; level: Normal        On supplement: No  - Calcium; level: High        On supplement: No; on cinacalcet  - Phosphorus; level: Low        On supplement: Yes; phospha neutral 250 mg bid.     # Electrolytes:  - Potassium; level: Normal        On supplement: No  - Magnesium; level: Low        On supplement: Yes; magnesium oxide 400 mg daily. Will increase to 800 mg daily.  - Bicarbonate; level: Low        On supplement: Yes; sodium bicarb 650 mg bid   - Sodium; level: Normal    # Concern for UTI: UA suspicious for UTI but urine culture negative. Currently on augmentin until stent removal.    - Management per Surgery.    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 Patient self caths 3-6 times on non HD days and 1-2 times on HD days. Takes cranberry supplements. Follows with Urology.    - Chronic respiratory failure and restrictive lung disease: s/p tracheostomy.   - History of malnutrition: secondary to poor oral intake now s/p G-tube.     # Transplant History:  Etiology of Kidney Failure: Reflux nephropathy  Tx: DDKT  Transplant: 7/30/2025 (Kidney)  Significant transplant-related complications: None    Recommendations were communicated to the primary team verbally.    Seen and discussed with Dr. Molina.    Bebe Knox APRN CNP  Transplant Nephrology  Contact information via [employer] Web Console        Physician Attestation     I saw and evaluated Aidan Louie as part of a shared APRN/PA visit.     I personally reviewed the vital signs, medications, labs, and imaging.    I personally provided a substantive portion of care for this patient and I approve the care plan as written by the  ABDIAS.  I was involved with Medical Decision Making including  Pt with new kidney transplant, now with good UOP and down trended creatinine. Continue on current immunosuppression.   Please see A&P for additional details of medical decision making.    Janett Molina MD  Date of Service (when I saw the patient): 08/06/25     Interval History    Chart reviewed this morning. No overnight concerns from nursing. Creatinine stable at 1.02. Good urine output. VSS. Afebrile. Pt was standing with walker and seems able to walk in room.    MEDICATIONS:  Current Facility-Administered Medications   Medication Dose Route Frequency Provider Last Rate Last Admin    acetaminophen (TYLENOL) tablet 975 mg  975 mg Oral Q8H Jose Tariq MD   975 mg at 08/06/25 0221    amLODIPine (NORVASC) tablet 5 mg  5 mg Oral Daily Connie Gerard APRN CNP   5 mg at 08/05/25 0909    amoxicillin (AMOXIL) tablet 875 mg  875 mg Oral Q12H [facility] (08/20) Jose Tariq MD   875 mg at 08/05/25 1949    atorvastatin (LIPITOR) tablet 10 mg  10 mg Oral Daily Jose Tariq MD   10 mg at 08/05/25 0909    cinacalcet (SENSIPAR) tablet 30 mg  30 mg Oral Daily Brenda Rothman APRN CNP   30 mg at 08/05/25 0911    famotidine (PEPCID) tablet 20 mg  20 mg Oral BID Jose Tariq MD   20 mg at 08/05/25 1953    ipratropium - albuterol 0.5 mg/2.5 mg (3mg)/3 mL (DUONEB) neb solution 3 mL  3 mL Nebulization Q8H Janett Molina MD   3 mL at 08/06/25 0226    magnesium oxide (MAG-OX) tablet 400 mg  400 mg Oral BID Nasreen Parks PA-C        magnesium sulfate 2 g in 50 mL sterile water intermittent infusion  2 g Intravenous Once Nasreen Parks PA-C        methocarbamol (ROBAXIN) tablet 500 mg  500 mg Oral Q6H Kayley Davis, NP   500 mg at 08/06/25 0221    mycophenolate (GENERIC EQUIVALENT) capsule 750 mg  750 mg Oral BID IS Jose Tariq MD   750 mg at 08/05/25 1661     pantoprazole (PROTONIX) EC tablet 40 mg  40 mg Oral Daily Kayley Davis NP   40 mg at 25    phosphorus tablet 250 mg (PHOSPHA 250 NEUTRAL) per tablet 500 mg  500 mg Oral BID Mattie Willis APRN CNP   500 mg at 25    polyethylene glycol (MIRALAX) Packet 17 g  17 g Oral BID Mattie Willis APRN CNP   17 g at 25    predniSONE (DELTASONE) tablet 20 mg  20 mg Oral Daily Kayley Davis NP        Followed by    [START ON 2025] predniSONE (DELTASONE) tablet 15 mg  15 mg Oral Daily Kayley Davis NP        Followed by    [START ON 2025] predniSONE (DELTASONE) tablet 10 mg  10 mg Oral Daily Kyaley Davis NP        Followed by    [START ON 2025] predniSONE (DELTASONE) tablet 5 mg  5 mg Oral Daily Kayley Davis NP        senna-docusate (SENOKOT-S/PERICOLACE) 8.6-50 MG per tablet 2 tablet  2 tablet Oral BID Mattie Willis APRN CNP   2 tablet at 25    sertraline (ZOLOFT) tablet 100 mg  100 mg Oral Daily Kayley Davis NP   100 mg at 25 09    sodium bicarbonate tablet 650 mg  650 mg Oral BID Brenda Rothman APRN CNP   650 mg at 25 194    sodium chloride (PF) 0.9% PF flush 10 mL  10 mL Intracatheter Q8H Jose Tariq MD   10 mL at 25 2218    sulfamethoxazole-trimethoprim (BACTRIM) 400-80 MG per tablet 1 tablet  1 tablet Oral Daily Jose Tariq MD   1 tablet at 25 09    tacrolimus (GENERIC EQUIVALENT) capsule 5 mg  5 mg Oral BID IS Connie Gerard APRN CNP   5 mg at 25 183    valGANciclovir (VALCYTE) tablet 900 mg  900 mg Oral Daily Jose Tariq MD   900 mg at 25 0908     Current Facility-Administered Medications   Medication Dose Route Frequency Provider Last Rate Last Admin       Physical Exam   Temp  Av.8  F (36.6  C)  Min: 97.8  F (36.6  C)  Max: 97.8  F (36.6  C)      Pulse  Av  Min: 72  Max: 72 Resp  Avg:  12  Min: 12  Max: 12  SpO2  Av %  Min: 97 %  Max: 97 %     /57 (BP Location: Right arm)   Pulse 82   Temp 97.7  F (36.5  C) (Oral)   Resp 20   Wt 43.9 kg (96 lb 11.2 oz)   SpO2 100%   BMI 18.27 kg/m      Admit Weight: 45.8 kg (100 lb 14.4 oz)     GENERAL APPEARANCE: alert and no distress  HENT: atraumatic  RESP: lungs clear to auscultation Trach in place  CV: regular rhythm, normal rate,  EDEMA: no LE edema bilaterally  ABDOMEN: soft, nondistended, nontender  MS: extremities normal - no gross deformities noted  SKIN: no rash  DIALYSIS ACCESS: RUE fistula thrill present    Data   All labs reviewed by me.  CMP  Recent Labs   Lab 25  0532 25  0559 25  0536 25  0542    139 138 141   POTASSIUM 4.3 4.3 4.5 4.8   CHLORIDE 112* 109* 112* 113*   CO2 21* 21* 20* 22   ANIONGAP 9 9 6* 6*   GLC 99 95 87 82   BUN 30.4* 25.2* 27.0* 21.7*   CR 1.02 0.99 1.15 1.31*   GFRESTIMATED >90 >90 89 76   SUSAN 10.8* 10.8* 10.5* 10.5*   MAG 1.5* 1.5* 1.7 2.0   PHOS 2.3* 2.3* 2.2* 1.8*     CBC  Recent Labs   Lab 25  0532 25  0559 25  0536 25  0542   HGB 8.1* 7.9* 7.5* 7.6*   WBC 10.8 7.8 6.6 5.6   RBC 2.62* 2.58* 2.45* 2.47*   HCT 25.5* 24.8* 23.9* 24.5*   MCV 97 96 98 99   MCH 30.9 30.6 30.6 30.8   MCHC 31.8 31.9 31.4* 31.0*   RDW 16.3* 16.0* 16.0* 16.2*    231 196 178     INR  No lab results found in last 7 days.    ABGNo lab results found in last 7 days.   Urine Studies  Recent Labs   Lab Test 25  1030 23  1853 20  1132 17  1341   COLOR Orange* Light Yellow Yellow Yellow   APPEARANCE Cloudy* Slightly Cloudy* Clear Clear   URINEGLC Negative 30* Negative Negative   URINEBILI Negative Negative Negative Negative   URINEKETONE Negative Trace* Negative Negative   SG 1.006 1.015 1.020 1.005   UBLD Moderate* Small* Small* Negative   URINEPH 8.0* 6.0 8.5* 6.0   PROTEIN 300* 300* >300* 100*   UROBILINOGEN  --   --  0.2  --    NITRITE Positive* Negative  Negative Positive*   LEUKEST Large* Large* Small* Small*   RBCU 48* 3*  --  <1   WBCU >182* 80*  --  6*     Recent Labs   Lab Test 08/30/21  1307 01/03/19  1435 07/11/17  1341   UTPG 4.92* 2.60* 3.04*     PTH  Recent Labs   Lab Test 08/02/25  0544 08/30/21  1300 11/30/20  1415 12/09/19  1311 05/13/19  1027 01/03/19  1438 07/11/17  1337   PTHI 143* 1,178* 858* 331* 195* 177* 191*     Iron Studies  Recent Labs   Lab Test 04/13/23  0749 08/30/21  1300 11/30/20  1415 08/09/18  1056 08/09/18  1046 07/11/17  1337   IRON 24* 74 92 178  --  77   * 254 272 266  --  296   IRONSAT 21 29 34 67  --  26   JUVENAL 1,092* 66 43  --  14 16*       IMAGING:  All imaging studies reviewed by me.

## 2025-08-06 NOTE — CONSULTS
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 Tariq.       Mom reports low urine output despite good PO fluid intake. I hand irrigated the catheter at the bedside with small amount of mucus. Urine appears to be dark yellow.    Plan:  - low urine output could be from dehydration. Izaguirre is draining well.  - resume bladder irrigation with 250cc of saline or sterile water 4 times a day.  - izaguirre removal in 1 week per transplant team. Okay to resume CIC 6x/day after.    Here is the instruction for bladder irrigation-  Use sterile normal saline and a 60 mL syringe.  Instill 1-2 syringes ( mL) of normal saline, then withdraw one (60 mL), then instill one (60 mL), and withdraw one (60 mL).  Continue this process until there is no longer mucous being irrigated out.      Urology will sign off.    Discussed with Dr. Billy    Thank you for the opportunity to participate in the care of Aidan Louie.     Valerie Bermudez NP  Department of Urology                Chief Complaint:   We were asked to see Aidan Louie at the request of Dr. 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 Tariq.     He currently has a izaguirre via mitrofanoff. Transplant team would like to keep izaguirre in for 1 more week. Currently doing bladder irrigation 50-100cc as needed.    Mom reports low UOP. Documented UOP 1850/125.             Past Medical History:     Past Medical History:   Diagnosis Date    Bilateral reflux nephropathy     Hearing loss     Bilateral hearing aids    Hypertension     Kidney replaced by transplant 07/30/2025    DBD. Induction w/ Thymo 2mg/kg & basiliximab.    Kyphosis 2002    Dawood Reece sequence     Prune belly syndrome     Recurrent UTI     Respiratory bronchiolitis interstitial lung disease (H)     Restrictive lung disease     Scoliosis 2002    Thyroid disease     Tracheostomy dependence (H)     Has speaking valve.  Does not use a vent.            Past Surgical History:     Past Surgical History:   Procedure Laterality Date    BENCH KIDNEY Left 7/30/2025    Procedure: Bench kidney;  Surgeon: Jose Tariq MD;  Location: [facility] OR    Bilateral ureteral reimplantation  5/16/2002    abdominoplasty, Mitrofanoff creation    CYSTOSCOPY  11/14/11    EXAM UNDER ANESTHESIA  THROAT  6/10/2003    tonsillar hypertrophy    EXAM UNDER ANESTHESIA, RESTORATIONS, EXTRACTION(S) DENTAL COMPLEX, COMBINED  2006    GASTROSTOMY TUBE      GASTROSTOMY TUBE  3/16/2002    button change    HERNIORRHAPHY INGUINAL BILATERAL  99    left ochiopexy    IR CVC NON TUNNEL LINE REMOVAL  2023    IR CVC TUNNEL PLACEMENT > 5 YRS OF AGE  2023    LARYNGOSCOPY, BRONCHOSCOPY, COMBINED  2002    LARYNGOSCOPY, BRONCHOSCOPY, COMBINED  2002    bilateral ear debridement    LARYNGOSCOPY, BRONCHOSCOPY, COMBINED  2003    LARYNGOSCOPY, BRONCHOSCOPY, COMBINED  2007    Failed Deflux injection    LARYNGOSCOPY, BRONCHOSCOPY, COMBINED  12     VESICOSTOMY      Redo right ureteral reimplantation  3/12/2004    Excision bladder diverticuli, Left to Right pyelopyelostomy    REMOVE CATHETER PERITONEAL N/A 2023    Procedure: REMOVAL, CATHETER, DIALYSIS, PERITONEAL; IJ TEMPORARY DIALYSIS CATHETER PLACED;  Surgeon: Anurag Thompson MD;  Location: [facility] OR    Replacement of trach  10/15/12    Takedown and revision of Mitrofanoff stoma  2006    TRACHEOSTOMY INFANT      TRANSPLANT KIDNEY RECIPIENT  DONOR N/A 2025    Procedure: Transplant kidney recipient  donor, ureteral stent placement;  Surgeon: Jose Tariq MD;  Location: [facility] OR            Social History:     Social History     Tobacco Use    Smoking status: Never    Smokeless tobacco: Never   Substance Use Topics    Alcohol use: No     Alcohol/week: 0.0 standard drinks of alcohol            Family History:     Family History   Problem Relation Age of Onset    Diabetes Type 2  Mother     Diabetes Type 2  Maternal Grandmother     Deep Vein Thrombosis (DVT) Maternal Grandmother     Diabetes Type 2  Maternal Grandfather     Diabetes Type 2  Paternal Grandmother     Alzheimer Disease Paternal Grandfather     Thyroid Disease Maternal Aunt         hashimoto    Anesthesia Reaction Maternal  Aunt         patients mom reports her sister had an allergic reaction to something during a previous surgery    Thyroid Cancer Maternal Great-Grandmother             Allergies:     Allergies   Allergen Reactions    Latex      Other reaction(s): Other (see comments)  Other reaction(s): Contact Dermatitis, Other (see comments)    Adhesive Tape Rash            Medications:     Current Facility-Administered Medications   Medication Dose Route Frequency Provider Last Rate Last Admin    acetaminophen (TYLENOL) tablet 975 mg  975 mg Oral Q8H Jose Tariq MD   975 mg at 08/06/25 1130    amLODIPine (NORVASC) tablet 5 mg  5 mg Oral Daily Connie Gerard APRN CNP   5 mg at 08/06/25 0859    atorvastatin (LIPITOR) tablet 10 mg  10 mg Oral Daily Jose Tariq MD   10 mg at 08/06/25 0900    bisacodyl (DULCOLAX) suppository 10 mg  10 mg Rectal Daily PRN Jose Tariq MD        cinacalcet (SENSIPAR) tablet 30 mg  30 mg Oral Daily Brenda Rothman APRN CNP   30 mg at 08/06/25 0901    famotidine (PEPCID) tablet 20 mg  20 mg Oral BID Jose Tariq MD   20 mg at 08/06/25 0900    hydrALAZINE (APRESOLINE) injection 5-10 mg  5-10 mg Intravenous Q4H PRN Lupe Jj DO        ipratropium - albuterol 0.5 mg/2.5 mg (3mg)/3 mL (DUONEB) neb solution 3 mL  3 mL Nebulization Q8H Janett Molina MD   3 mL at 08/06/25 0226    ipratropium - albuterol 0.5 mg/2.5 mg (3mg)/3 mL (DUONEB) neb solution 3 mL  3 mL Nebulization Q4H PRN Kayley Davis NP   3 mL at 08/02/25 0316    magnesium hydroxide (MILK OF MAGNESIA) suspension 30 mL  30 mL Oral BID PRN Nasreen Parks PA-C        magnesium oxide (MAG-OX) tablet 800 mg  800 mg Oral BID Nasreen Parks PA-C        methocarbamol (ROBAXIN) tablet 500 mg  500 mg Oral Q6H Davis, Kayley J, NP   500 mg at 08/06/25 0900    mycophenolate (GENERIC EQUIVALENT) capsule 750 mg  750 mg Oral BID IS Jose Tariq  MD Anna   750 mg at 08/06/25 0859    naloxone (NARCAN) injection 0.2 mg  0.2 mg Intravenous Q2 Min PRN Jose Tariq MD        Or    naloxone (NARCAN) injection 0.4 mg  0.4 mg Intravenous Q2 Min PRN Jose Tariq MD        Or    naloxone (NARCAN) injection 0.2 mg  0.2 mg Intramuscular Q2 Min PRN Jose Tariq MD        Or    naloxone (NARCAN) injection 0.4 mg  0.4 mg Intramuscular Q2 Min PRN Jose Tariq MD        ondansetron (ZOFRAN ODT) ODT tab 4 mg  4 mg Oral Q6H PRJose Watson MD        Or    ondansetron (ZOFRAN) injection 4 mg  4 mg Intravenous Q6H PRN Jose Tariq MD   4 mg at 08/01/25 0953    pantoprazole (PROTONIX) EC tablet 40 mg  40 mg Oral Daily Kayley Davis NP   40 mg at 08/06/25 0901    phosphorus tablet 250 mg (PHOSPHA 250 NEUTRAL) per tablet 500 mg  500 mg Oral BID Mattie Willis APRN CNP   500 mg at 08/06/25 0859    polyethylene glycol (MIRALAX) Packet 17 g  17 g Oral BID Mattie Willis APRN CNP   17 g at 08/06/25 0911    predniSONE (DELTASONE) tablet 20 mg  20 mg Oral Daily Kayley Davis NP   20 mg at 08/06/25 0900    Followed by    [START ON 8/13/2025] predniSONE (DELTASONE) tablet 15 mg  15 mg Oral Daily Kayley Davis NP        Followed by    [START ON 8/20/2025] predniSONE (DELTASONE) tablet 10 mg  10 mg Oral Daily Kayley Davis NP        Followed by    [START ON 8/27/2025] predniSONE (DELTASONE) tablet 5 mg  5 mg Oral Daily Kayley Davis NP        prochlorperazine (COMPAZINE) injection 10 mg  10 mg Intravenous Q6H PRN Jose Tariq MD        Or    prochlorperazine (COMPAZINE) tablet 10 mg  10 mg Oral Q6H PRN Jose Tariq MD        senna-docusate (SENOKOT-S/PERICOLACE) 8.6-50 MG per tablet 2 tablet  2 tablet Oral BID Mattie Willis APRN CNP   2 tablet at 08/06/25 0901     sertraline (ZOLOFT) tablet 100 mg  100 mg Oral Daily Kayley Davis NP   100 mg at 08/06/25 0901    simethicone (MYLICON) chewable tablet 160 mg  160 mg Oral Q6H PRN Kayley Davis NP        sodium bicarbonate tablet 650 mg  650 mg Oral BID Brenda Rothman, LIVAN CNP   650 mg at 08/06/25 0901    sodium chloride (PF) 0.9% PF flush 10 mL  10 mL Intracatheter Q8H Jose Tariq MD   10 mL at 08/06/25 0910    sodium chloride (PF) 0.9% PF flush 10-20 mL  10-20 mL Intracatheter q1 min prn Jose Tariq MD        sodium phosphate (FLEET ENEMA) 1 enema  1 enema Rectal Once PRN Nasreen Parks PA-C        sulfamethoxazole-trimethoprim (BACTRIM) 400-80 MG per tablet 1 tablet  1 tablet Oral Daily Jose Tariq MD   1 tablet at 08/06/25 0900    tacrolimus (GENERIC EQUIVALENT) capsule 5 mg  5 mg Oral BID IS Connie Gerard APRN CNP   5 mg at 08/06/25 0901    valGANciclovir (VALCYTE) tablet 900 mg  900 mg Oral Daily Jose Tariq MD   900 mg at 08/06/25 0859             Review of Systems:    ROS: See HPI for pertinent details.  Remainder of 10-point ROS negative.   REVIEW OF SYSTEMS:  Skin: negative  Eyes: negative  Ears/Nose/Throat: negative  Respiratory: No shortness of breath, dyspnea on exertion, cough, or hemoptysis  Cardiovascular: negative  Gastrointestinal: negative  Genitourinary: as above  Musculoskeletal: negative  Neurologic: negative  Psychiatric: negative  Hematologic/Lymphatic/Immunologic: negative  Endocrine: negative           Physical Exam:   VS:  T: 97.7    HR: 82    BP: 110/57    RR: 20   GEN:  AOx3.  NAD.  Pleasant.  HEENT:  Sclerae anicteric.  Conjunctivae pink.  Moist mucous membranes  NECK:  Supple.  No lymphadenopathy.   CV:  RRR  ABD:  Soft.  NT.  ND.  No rebound or guarding.  No surgical scars. No masses.    :  Phallus circumcised.  Meatus patent. Normal penile shaft without plaques.  Testicles descended  "bilaterally  EXT:  Warm, well perfused.    SKIN:  Warm.  Dry.  No rashes.  NEURO:  CN grossly intact.      URINE: dark yellow          Data:   All laboratory data reviewed:    No results found for: \"PSA\"  Recent Labs   Lab 08/06/25  0532 08/05/25  0559 08/04/25  0536 08/03/25  0542   WBC 10.8 7.8 6.6 5.6   HGB 8.1* 7.9* 7.5* 7.6*    231 196 178       Recent Labs   Lab 08/06/25  0532 08/05/25  0559 08/04/25  0536 08/03/25  0542    139 138 141   POTASSIUM 4.3 4.3 4.5 4.8   CHLORIDE 112* 109* 112* 113*   CO2 21* 21* 20* 22   BUN 30.4* 25.2* 27.0* 21.7*   CR 1.02 0.99 1.15 1.31*   GLC 99 95 87 82   SUSAN 10.8* 10.8* 10.5* 10.5*   MAG 1.5* 1.5* 1.7 2.0   PHOS 2.3* 2.3* 2.2* 1.8*     No lab results found in last 7 days.    Invalid input(s): \"URINEBLOOD\"  Results for orders placed or performed during the hospital encounter of 07/30/25   Urine Culture Aerobic Bacterial    Collection Time: 07/30/25 10:30 AM    Specimen: Urine, Straight Catheter   Result Value Ref Range    Culture <1,000 CFU/mL Urogenital steven    Results for orders placed or performed during the hospital encounter of 04/06/23   Urine Culture    Collection Time: 04/06/23  6:53 PM    Specimen: Urine, Aguirre Catheter   Result Value Ref Range    Culture <10,000 CFU/mL Mixture of urogenital steven        All pertinent imaging reviewed:  Bladder US today  Findings:      Targeted ultrasound shows lobulated contour of the urinary bladder  likely post augmentation, intraluminal Aguirre retention balloon. Volume  of bladder measured approximately 180 ml .                                                                      Impression: Post augmentation changes of the urinary bladder with  visualization of intraluminal Aguirre retention balloon; bladder volume  approximately 180 mL , few internal echoes, nonspecific may represent  debris, consider correlation with urinalysis for infection.             "

## 2025-08-07 DIAGNOSIS — Z48.298 AFTERCARE FOLLOWING ORGAN TRANSPLANT: ICD-10-CM

## 2025-08-07 DIAGNOSIS — Z98.890 OTHER SPECIFIED POSTPROCEDURAL STATES: ICD-10-CM

## 2025-08-07 DIAGNOSIS — Z20.828 CONTACT WITH AND (SUSPECTED) EXPOSURE TO OTHER VIRAL COMMUNICABLE DISEASES: ICD-10-CM

## 2025-08-07 DIAGNOSIS — Z79.899 ENCOUNTER FOR LONG-TERM CURRENT USE OF MEDICATION: ICD-10-CM

## 2025-08-07 DIAGNOSIS — Z94.0 KIDNEY REPLACED BY TRANSPLANT: Primary | ICD-10-CM

## 2025-08-07 PROBLEM — D84.9 IMMUNOSUPPRESSED STATUS: Status: ACTIVE | Noted: 2025-08-07

## 2025-08-07 PROBLEM — J96.11 CHRONIC RESPIRATORY FAILURE WITH HYPOXIA (H): Status: ACTIVE | Noted: 2020-09-02

## 2025-08-07 ASSESSMENT — ACTIVITIES OF DAILY LIVING (ADL)
ADLS_ACUITY_SCORE: 62
ADLS_ACUITY_SCORE: 59
ADLS_ACUITY_SCORE: 62
ADLS_ACUITY_SCORE: 59
ADLS_ACUITY_SCORE: 62

## 2025-08-07 NOTE — PROGRESS NOTES
Care Management Discharge Note    Discharge Date: 08/07/2025       Discharge Disposition: Home Care    Discharge Services: Home Care    Discharge DME: None    Discharge Transportation: family or friend will provide    Is transportation arrangement complete? No    Private pay costs discussed: Not applicable    Does the patient's insurance plan have a 3 day qualifying hospital stay waiver?  No    PAS Confirmation Code:    Patient/family educated on Medicare website which has current facility and service quality ratings: yes    Education Provided on the Discharge Plan: Yes  Persons Notified of Discharge Plans: patient and mom    Patient/Family in Agreement with the Plan: yes    Handoff Referral Completed: Yes, non-[facility] PCP: External handoff communication completed    Additional Information:  Patient medically cleared for discharge today.  ATC appointments requested for 8/8 and 8/9.      Met with pt and his mom.  Pt mom reconfirmed they will be staying locally in [facility]   Reviewed that [facility] accepted for home RN and would plan to see patient/open patient to services once  pt returns to [place].  Pt mom confirmed she picked up pt home O2 set up, neb machine, suction machine and vest device.  Pt mom inquireda bout supplies need to irrigate izaguirre.  Discussed outpatient transplant labs M/TH.  Pt mom plans to bring patient into [facility] for lab draws while staying locally.  Pt and his mom noted no other concerns or questions.     VM left for [facility] care.  Final discharge orders faxed.        Accepting Home Care  [facility] Care Jose ROJAS  [phone]  Fax [phone]  Estimated SOC 8/14.     Discharge Address:      [address] [place] [id number]     Previous Community Resources:      [facility] [place]  Address: [address] [place] [address]  Phone: [phone]  Toll Free: [phone]     Care Management Services?    [facility]   [address] [place] [address]  Phone:  [phone]  Fax: [phone]     Oxygen   [facility], Jose [place]AURN      Sydnie Alvarez, RN BSN, PHN, ACM-RN  7A Nurse Care Coordinator    [facility] Acute Care Management  Phone: [phone]  Available on Vocera  7A SOT RNCC

## 2025-08-07 NOTE — PLAN OF CARE
Goal Outcome Evaluation:      Plan of Care Reviewed With: patient, parent, guardian    Overall Patient Progress: improvingOverall Patient Progress: improving    Outcome Evaluation: Pt and his mother had multiple complains during the day      /64 (BP Location: Right arm)   Pulse 78   Temp 97.8  F (36.6  C) (Oral)   Resp 20   Wt 44.3 kg (97 lb 9.6 oz)   SpO2 98%   BMI 18.44 kg/m    Neuro: Alert and oriented to his baseline    VS: VSS   Respiratory: Trach tube in place, trach dome at 18 L off and on, speaking valve in use off/on, pt suction himself 2x  Pain: Denies  GI: On regular diet and tolerating good. Denies nausea. Diarrhea x2  : Mitrofanoff izaguirre catheter in place, irrigated x2 per order, clear urine output about 1500 ml UOP  Skin: GT button care completed, GLENN dressing changed, new dressing on Mitrofanoff catheter. Rest of skin intact  PIV: removed  Activity: Independent  Education: Discharge instruction reviewed with pt and his mother, pt's mother performed bladder irrigation without difficulty  Plan: Pt is ready to discharge this afternoon

## 2025-08-07 NOTE — PLAN OF CARE
"Goal Outcome Evaluation:      Plan of Care Reviewed With: patient, parent, guardian    Overall Patient Progress: improvingOverall Patient Progress: improving    Outcome Evaluation: NS bolus x 2, loose stools x 2.    BP (!) 127/94 (BP Location: Right arm)   Pulse 109   Temp 97.4  F (36.3  C) (Oral)   Resp 20   Wt 43.9 kg (96 lb 11.2 oz)   SpO2 97%   BMI 18.27 kg/m      Shift: 9284-1233  Isolation Status: N/A  VS: VSS on Trach 30L 30% FiO2, afebrile  Neuro: Aox4, baseline cognitive delay  Behaviors: Calm, cooperative  BG: N/A  Labs/Imaging: Cl 112, CO2 21, BUN 30.4, Ca 10.8, Mg 1.5 (replaced orally), Phos 2.3  Respiratory: WDL on trach dome (Bivona 5 uncuffed), pt self-suctions w/trach kits @ bedside  Cardiac: Hypertensive 120s/90s, tachycardic low 100s  Pain/Nausea: Denies nausea. Endorses mild incisional pain, managed w/scheduled Tylenol & robaxin.  PRN: N/A  Diet: Regular diet, good appetite  LDA: R PIV infusing, trach, R GLENN, Aguirre  Infusion(s): NS bolus 1 L x 2 (2nd bag infusing)  GI/: Aguirre catheter in place w/275 mL output. NS boluses ordered x 2. Loose BMs x 2 this shift; bowel meds given per pt's mother's request (concerned re: constipation/not having \"true, solid BMs\")  Skin: RLQ hockeystick incision dermabonded, MARCELA. R GLENN drain w/50 mL serous output, dressing changed x 1.  Mobility: UAL in room, SBA w/walker in halls  Events/Education: RT canceled pt's evening neb d/t \"No RT available.\" RN contacted RT after 1900, night RT able to do evening neb. Concern for urinary retention - NS bolus x 2.  Plan: Continue w/plan of care & notify team w/any changes. Likely discharge tomorrow AM.  "

## 2025-08-07 NOTE — PROGRESS NOTES
[facility]  Transplant Nephrology Progress Note  Date of Admission:  7/30/2025  Today's Date: 08/07/2025  Requesting physician: Jose Tariq*    Recommendations:   - Agree with outpatient IV fluids if needed.   - Continue cinacalcet 30 mg daily.   - No acute indications for dialysis.  - Continue current immunosuppression.  - Strict I/Os and daily weights.     Assessment & Plan   # DDKT: Stable creatinine. Good urine output.    - Baseline Creatinine: ~ TBD   - Proteinuria: Not checked post transplant   - DSA Hx: Not checked recently due to time from transplant  - Last cPRA: 25%   - BK Viremia: Not checked recently due to time from transplant   - Kidney Tx Biopsy Hx: No biopsy history.    # Immunosuppression: Tacrolimus immediate release (goal 8-10), Mycophenolate mofetil (dose 750 mg every 12 hours), and Prednisone (dose taper)   - Induction with Recent Transplant:  Intermediate Intensity Protocol   - Continue with intensive monitoring of immunosuppression for efficacy and toxicity.   - Historical Changes in Immunosuppression: None   - Changes: Not at this time    # Infection Prevention:   Last CD4 Level: Not checked  - PJP: Sulfa/TMP (Bactrim)  - CMV: Valganciclovir (Valcyte)      - CMV IgG Ab High Risk Discordance (D+/R-) at time of transplant: Unknown  Present CMV Serostatus: Negative  - EBV IgG Ab High Risk Discordance (D+/R-) at time of transplant: No  Present EBV Serostatus: Positive    # Hypertension: Controlled;  Goal BP: < 150/90   - PTA medications: amlodipine 5 mg daily and carvedilol 3.125 mg bid.   - Currently on amlodipine 5 mg daily. Carvedilol discontinued due to reported bradycardia overnight 8/4- ECG showed NSR.   - Changes: Not at this time    # Anemia in Chronic Renal Disease: Hgb: Stable, low      POORNIMA: No   - Iron studies: Replete    # Mineral Bone Disorder:    - Secondary renal hyperparathyroidism; PTH level: Minimally elevated (  pg/ml)        On treatment: Cinacalcet  - Vitamin D; level: Normal        On supplement: No  - Calcium; level: High        On supplement: No; on cinacalcet  - Phosphorus; level: Low        On supplement: Yes; phospha neutral 250 mg bid.     # Electrolytes:  - Potassium; level: Normal        On supplement: No  - Magnesium; level: Low        On supplement: Yes; magnesium oxide 800 mg bid.  - Bicarbonate; level: Low        On supplement: Yes; sodium bicarb 650 mg bid. Would increase to 1300 mg bid.  - Sodium; level: Normal    # Concern for UTI:  # Neurogenic bladder: UA suspicious for UTI but urine culture negative. History of recurrent UTIs. Patient self caths 3-6 times on non HD days and 1-2 times on HD days. Currently on augmentin until stent removal.    - Urology consulted for assistance in managing neurogenic bladder and catheter cares.   - Management per Surgery.    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 Patient self caths 3-6 times on non HD days and 1-2 times on HD days. Takes cranberry supplements. Follows with Urology.    - Chronic respiratory failure and restrictive lung disease: s/p tracheostomy.   - History of malnutrition: secondary to poor oral intake now s/p G-tube.     # Transplant History:  Etiology of Kidney Failure: Reflux nephropathy  Tx: DDKT  Transplant: 7/30/2025 (Kidney)  Significant transplant-related complications: None    Recommendations were communicated to the primary team verbally.    Seen and discussed with Dr. Molina.    LIVAN Peres CNP  Transplant Nephrology  Contact information via Vocera Web Console      LIVAN Peres CNP  Date of Service (when I saw the patient): 08/06/25     Interval History  Mr. Louie's creatinine is 0.88 (08/07 0538); Stable.  Good  urine output.  Other significant labs/tests/vitals: VSS.  No events overnight.  No chest pain or shortness of breath.  No leg swelling.  No nausea and vomiting.  Bowel movements are loose.  No fever, sweats or chills.    Review of Systems:  4 point ROS neg other than the symptoms noted above.    MEDICATIONS:  Current Facility-Administered Medications   Medication Dose Route Frequency Provider Last Rate Last Admin    acetaminophen (TYLENOL) tablet 975 mg  975 mg Oral Q8H Jose Tariq MD   975 mg at 08/07/25 0202    amLODIPine (NORVASC) tablet 5 mg  5 mg Oral Daily Connie Gerard APRN CNP   5 mg at 08/06/25 0859    amoxicillin-clavulanate (AUGMENTIN) 875-125 MG per tablet 1 tablet  1 tablet Oral Q12H [facility] (08/20) Nasreen Parks PA-C   1 tablet at 08/06/25 2001    atorvastatin (LIPITOR) tablet 10 mg  10 mg Oral Daily Jose Tariq MD   10 mg at 08/06/25 0900    cinacalcet (SENSIPAR) tablet 30 mg  30 mg Oral Daily Brenda Rothman APRN CNP   30 mg at 08/06/25 0901    famotidine (PEPCID) tablet 20 mg  20 mg Oral BID Jose Tariq MD   20 mg at 08/06/25 2001    ipratropium - albuterol 0.5 mg/2.5 mg (3mg)/3 mL (DUONEB) neb solution 3 mL  3 mL Nebulization Q8H Janett Molina MD   3 mL at 08/07/25 0154    magnesium oxide (MAG-OX) tablet 800 mg  800 mg Oral BID Nasreen Parks PA-C   800 mg at 08/06/25 2001    methocarbamol (ROBAXIN) tablet 500 mg  500 mg Oral Q6H Kayley Davis NP   500 mg at 08/07/25 0202    mycophenolate (GENERIC EQUIVALENT) capsule 750 mg  750 mg Oral BID IS Jose Tariq MD   750 mg at 08/06/25 1818    pantoprazole (PROTONIX) EC tablet 40 mg  40 mg Oral Daily Kayley Davis NP   40 mg at 08/06/25 0901    phosphorus tablet 250 mg (PHOSPHA 250 NEUTRAL) per tablet 500 mg  500 mg Oral BID Mattie Willis APRN CNP   500 mg at 08/06/25 2001    polyethylene glycol (MIRALAX) Packet 17 g  17 g Oral BID  Mattie Willis APRN CNP   17 g at 25    predniSONE (DELTASONE) tablet 20 mg  20 mg Oral Daily Kayley Davis NP   20 mg at 25 0900    Followed by    [START ON 2025] predniSONE (DELTASONE) tablet 15 mg  15 mg Oral Daily Kayley Davis NP        Followed by    [START ON 2025] predniSONE (DELTASONE) tablet 10 mg  10 mg Oral Daily Kayley Davis NP        Followed by    [START ON 2025] predniSONE (DELTASONE) tablet 5 mg  5 mg Oral Daily Kayley Davis NP        senna-docusate (SENOKOT-S/PERICOLACE) 8.6-50 MG per tablet 2 tablet  2 tablet Oral BID Mattie Willis APRN CNP   2 tablet at 25    sertraline (ZOLOFT) tablet 100 mg  100 mg Oral Daily Kayley Davis NP   100 mg at 25 0901    sodium bicarbonate tablet 650 mg  650 mg Oral BID Brenda Rothman APRN CNP   650 mg at 25    sodium chloride (PF) 0.9% PF flush 10 mL  10 mL Intracatheter Q8H Jose Tariq MD   10 mL at 25 0910    sulfamethoxazole-trimethoprim (BACTRIM) 400-80 MG per tablet 1 tablet  1 tablet Oral Daily Jose Tariq MD   1 tablet at 25 0900    tacrolimus (GENERIC EQUIVALENT) capsule 5 mg  5 mg Oral BID IS Connie Gerard APRN CNP   5 mg at 25 1818    valGANciclovir (VALCYTE) tablet 900 mg  900 mg Oral Daily Jose Tariq MD   900 mg at 25 0859     Current Facility-Administered Medications   Medication Dose Route Frequency Provider Last Rate Last Admin       Physical Exam   Temp  Av.8  F (36.6  C)  Min: 97.8  F (36.6  C)  Max: 97.8  F (36.6  C)      Pulse  Av  Min: 72  Max: 72 Resp  Av  Min: 12  Max: 12  SpO2  Av %  Min: 97 %  Max: 97 %     /64 (BP Location: Right arm)   Pulse 78   Temp 97.8  F (36.6  C) (Oral)   Resp 20   Wt 43.9 kg (96 lb 11.2 oz)   SpO2 98%   BMI 18.27 kg/m      Admit Weight: 45.8 kg (100 lb 14.4 oz)     GENERAL APPEARANCE: alert  and no distress  HENT: atraumatic  RESP: lungs clear to auscultation Trach in place  CV: regular rhythm, normal rate,  EDEMA: no LE edema bilaterally  ABDOMEN: soft, nondistended, nontender  MS: extremities normal - no gross deformities noted  SKIN: no rash  DIALYSIS ACCESS: RUE fistula thrill present    Data   All labs reviewed by me.  CMP  Recent Labs   Lab 08/07/25  0538 08/06/25  0532 08/05/25  0559 08/04/25  0536    142 139 138   POTASSIUM 4.2 4.3 4.3 4.5   CHLORIDE 115* 112* 109* 112*   CO2 19* 21* 21* 20*   ANIONGAP 9 9 9 6*   * 99 95 87   BUN 20.6* 30.4* 25.2* 27.0*   CR 0.88 1.02 0.99 1.15   GFRESTIMATED >90 >90 >90 89   SUSAN 10.6* 10.8* 10.8* 10.5*   MAG 1.4* 1.5* 1.5* 1.7   PHOS 1.7* 2.3* 2.3* 2.2*     CBC  Recent Labs   Lab 08/07/25  0538 08/06/25  0532 08/05/25  0559 08/04/25  0536   HGB 7.5* 8.1* 7.9* 7.5*   WBC 9.3 10.8 7.8 6.6   RBC 2.43* 2.62* 2.58* 2.45*   HCT 23.9* 25.5* 24.8* 23.9*   MCV 98 97 96 98   MCH 30.9 30.9 30.6 30.6   MCHC 31.4* 31.8 31.9 31.4*   RDW 16.9* 16.3* 16.0* 16.0*    263 231 196     INR  No lab results found in last 7 days.    ABGNo lab results found in last 7 days.   Urine Studies  Recent Labs   Lab Test 07/30/25  1030 04/06/23  1853 11/30/20  1132 07/11/17  1341   COLOR Orange* Light Yellow Yellow Yellow   APPEARANCE Cloudy* Slightly Cloudy* Clear Clear   URINEGLC Negative 30* Negative Negative   URINEBILI Negative Negative Negative Negative   URINEKETONE Negative Trace* Negative Negative   SG 1.006 1.015 1.020 1.005   UBLD Moderate* Small* Small* Negative   URINEPH 8.0* 6.0 8.5* 6.0   PROTEIN 300* 300* >300* 100*   UROBILINOGEN  --   --  0.2  --    NITRITE Positive* Negative Negative Positive*   LEUKEST Large* Large* Small* Small*   RBCU 48* 3*  --  <1   WBCU >182* 80*  --  6*     Recent Labs   Lab Test 08/30/21  1307 01/03/19  1435 07/11/17  1341   UTPG 4.92* 2.60* 3.04*     PTH  Recent Labs   Lab Test 08/02/25  0544 08/30/21  1300 11/30/20  1415  12/09/19  1311 05/13/19  1027 01/03/19  1438 07/11/17  1337   PTHI 143* 1,178* 858* 331* 195* 177* 191*     Iron Studies  Recent Labs   Lab Test 04/13/23  0749 08/30/21  1300 11/30/20  1415 08/09/18  1056 08/09/18  1046 07/11/17  1337   IRON 24* 74 92 178  --  77   * 254 272 266  --  296   IRONSAT 21 29 34 67  --  26   JUVENAL 1,092* 66 43  --  14 16*       IMAGING:  All imaging studies reviewed by me.

## 2025-08-07 NOTE — DISCHARGE SUMMARY
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 on iHD (TTS in [place] [facility] via LUE AVF), neurogenic bladder s/p bladder augmentation with Mitrofanoff with 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 Tariq.     Hospital Course   Graft function: s/p DBDKT with stent, POD#8  Kidney: Post- op US with patent Doppler, no fluid collections. Admission creatinine 6.39. Cr down to 0.9 on the day of discharge. Low UO on POD 7. No urinary retention and izaguirre patent with flushing. Received 2L NS with improvement in UO and Cr trending down today. B/l Cr TBD.  -Plan for ureteral stent and drain to be removed 2 weeks post op in OR. Ureteral stent to be removed ~ 2 weeks to UTI.      Acute surgical pain:   - PRN Tylenol, Robaxin     Neuro/Psych:  Depression/Anxiety:   - Continue home sertraline      Immunosuppressed status secondary to medications:   Induction IS: Intermediate - cPRA 25  Thymoglobulin 100 mg POD#0  Basiliximab POD#1 and anticipate POD#5  (therapy plan entered)  SM pulse followed by prednisone taper  Maintenance IS:   mg BID  Tacrolimus with goal trough 8 to 12. 8/6 tac level 6.5. Dose increased. Discharged on tac 6 mg BID     Transplant coordinator: Aubrie Knutson RN  Graft type: DBD   DSA at time of transplant: None, historical Cw7 in 2020   Ureteral stent: Yes, tentative plan to remove in ~ 2 weeks in OR   CMV: Donor not on file  / Recipient -  EBV: Donor not on file / Recipient +  Prophylaxis: DVT, pneumocystis (Bactrim), viral (Valcyte)      Hematology:   Anemia of chronic disease:   Acute surgical blood loss anemia: Hemoglobin stable, he has not required transfusion.      Cardiorespiratory:   Chronic respiratory failure, s/p tracheostomy:  Restrictive lung disease: Managed post-operatively with respiratory care, early mobility, DuoNebs      Hypertension:    - Amlodipine 5 mg      Chest pain: RRT called for chest pain 8/1. Pain mid- sternal, non-radiating, and associated with some reflux. Patient has had Nissen fundoplication and does not vomit.    - EKG reassuring, no obvious acute myocardial ischemia   - Serial troponin, first trend reassuring    - Added simethicone, PPI, and vented G- tube      GI/Nutrition: Has G- tube and takes his nutrition by mouth unless ill. Pepcid, PPI.   Status post Nissen fundoplication:  Diet: Regular diet.    - Consult to RD   Stewart fierro  Constipation: PTA duclolax 5 mg daily, colace 100 mg TID, and Senna 1 BID  -Continue PEG BID, Senna colace BID, MOM PRN  -Abd XR to evaluate stool burden.     Endocrine: Hemoglobin A1c 4.6% 7/30/2025. No insulin requirement post- operatively.      Fluid/Electrolytes:   Hyperphosphatemia: Monitor.   Hypercalcemia: Monitor.      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 Izaguirre changed on 8/5/25.    - Urology consult for recommendations for catheterization and flushing of Mitrofanoff.     Urology recommendations: :  - resume bladder irrigation with 250cc of saline or sterile water 4 times a day.  - izaguirre removal in 1 week per transplant team. Okay to resume CIC 6x/day after.     Bladder irrigation-  Use sterile normal saline and a 60 mL syringe.  Instill 1-2 syringes ( mL) of normal saline, then withdraw one (60 mL), then instill one (60 mL), and withdraw one (60 mL).  Continue this process until there is no longer mucous being irrigated out.       Infectious disease: Afebrile.   Concern for UTI: Urine culture negative.    - Continue Augmentin until stent removed.    Significant Results and Procedures      Procedure date:  07/30/25    Preoperative diagnosis:  End Stage renal failure due to prune belly syndrome    Postoperative diagnosis:  Same    Procedure:  1. Left kidney  transplant,  Donation after Brain Death, Right  iliac fossa, without vascular reconstruction. A J-J ureteral stent was placed.  2. Kidney allograft preparation on Back Table  22 modifier should be used given his extensive reoperative abdomen for urologic reconstruction. The bladder was very deep and scarred and difficult to visualize and access    Surgeon: Jose Tariq MD    Fellow/Assistant:  Ap Shipman MD- Fellow. Dr. Shipman was the primary assistant for the procedure and participated in all aspects of the case under my supervision. His presence was necessary due to lack of a suitable level surgical resident to assist.   Deangelo Clifton MD- resident. Dr Clifton was a secondary assistant for the procedure who participated in exposure, venous anastomosis, and closure.     Anesthesia:  General    Specimen:  None    Drains:  Jair-Rothman drain    Urine output:  unrecorded mls- catheterized mitrofanoff on field    Estimated blood loss:  150     Fluids administered:        Pending Results   These results will be followed up by Aubrie Knutson   Unresulted Labs Ordered in the Past 30 Days of this Admission       Date and Time Order Name Status Description    7/30/2025  9:30 AM Prepare red blood cells (unit) Preliminary     7/30/2025  9:30 AM Prepare red blood cells (unit) Preliminary             Code Status   Full    Primary Care Physician   Yasmeen Garcia [facility]    General Appearance: in no apparent distress.   Skin: warm, perfused  Heart: appears adequately perfused   Lungs: no increased work of breathing   Abdomen: the abdomen is non-distended, incision intact, dry, glued. GLENN serous output, some leakage around tube. G tube.  : izaguirre is present.  Urine is clear, pink tinged .  Extremities: edema: no significant edema, strength adequate   Neurologic: alert and oriented, tremor absent.    Time Spent on this Encounter   Nasreen MOSER PA-C, personally saw the patient today and spent greater than 30 minutes discharging this patient.    Discharge Disposition   Discharged to home (local)  Condition at discharge: Stable    Consultations This Hospital Stay   NEPHROLOGY KIDNEY/PANCREAS TRANSPLANT ADULT IP CONSULT  CONSULT FOR INPATIENT VASCULAR ACCESS CARE  SOT MEDICATION HISTORY IP PHARMACY CONSULT  CARE MANAGEMENT / SOCIAL WORK IP CONSULT  PHARMACY IP CONSULT  NUTRITION SERVICES ADULT IP CONSULT  NEPHROLOGY KIDNEY/PANCREAS TRANSPLANT ADULT IP CONSULT  CARE MANAGEMENT / SOCIAL WORK IP CONSULT  CONSULT FOR INPATIENT VASCULAR ACCESS CARE  UROLOGY IP CONSULT  CONSULT FOR INPATIENT VASCULAR ACCESS CARE    Discharge Orders      Home Care Referral      Home Care Referral      Reason for your hospital stay    Patient underwent kidney transplant on 7/30/25.     Activity    Your activity upon discharge: Walk at least four times a day, lift no greater than 10 pounds for 6-8 weeks from the time of surgery.  No driving while taking narcotics or 3 weeks after surgery.      Monitor and record    Monitor blood pressure and weight daily.     When to contact your care team    WHEN TO CONTACT YOUR  COORDINATOR:     Transplant Coordinator: Aubrie Knutosn, phone: [phone]     Notify your coordinator if you have pain over your kidney, increased redness or drainage from your incision, fever greater than 100F, decreased urine output or new or increased amount of blood in urine.     Notify your coordinator if you have any issues with izaguirre catheter.    Notify your coordinator immediately if you are ever unable to take your immunosuppressive medications for any reason.     If you have URGENT concerns after office hours, please call the hospital switchboard at [phone] and ask to have the organ transplant nurse on-call paged. If you have a life-threatening emergency, go to the nearest emergency room.     Wound care and dressings    Instructions to care for your wound at home: Wash incision daily with soap and water. Do not soak or scrub.   .     Discharge Instructions    Once izaguirre removed should straight cath every 4 hours while awake and as needed. Recommend to flush with 250 ml as needed to clear mucus.     ADULT [facility] Specialty Follow-up and recommended labs and tests    [facility] FOLLOW UP:     1. [facility]: Over the next 2 days you will be seen in the [facility] (ph. [phone], option 3). Your labs will be drawn at the beginning of your appointment. DO NOT take your medications prior to having labs drawn. Please bring all your medications with you from home to take after labs are drawn.     2. Follow up with transplant ABDIAS in Transplant Clinic on 8/12/25 to evaluate hydration status and graft function    3. Follow up with Dr. Tariq in Transplant Clinic in 1-2 weeks.     4. Follow up with Transplant Nephrologist as scheduled.     5. Ureteral stent, izaguirre, and drain will be removed in OR two week post transplant. Transplant team  will arrange.    6. Follow up with [facility] Urology service as previously recommended by Urology    7. Follow up with your primary care provider in ~8 weeks. Patient to schedule.     Remember to always bring an updated medication list to all appointments.      Call your Transplant Coordinator ([phone]) with questions about Transplant Center appointment scheduling.     LABS:   CBC, BMP, magnesium, phosphorus, tacrolimus level to be drawn daily while in [facility], then every Monday and Thursday by home health care nurse if arranged, or at an outpatient lab.  PTH, iron panel, and Vitamin D level to be drawn on 1st day at [facility].     Tubes and Drains    1. Gastrostomy/Enterostomy Gastrostomy Pre existing PEG: Resume previous cares  2. Surgical drain. Drain to suction. Monitor daily output  3. Izaguirre catheter. Last exchanged on 8/5/25. Continue to gravity drainage.     Discharge Instructions    Izaguirre irrigation-  Use sterile normal saline and a 60 mL syringe.  Instill 1-2 syringes ( mL) of normal saline, then withdraw one (60 mL), then instill one (60 mL), and withdraw one (60 mL).  Continue this process until there is no longer mucous being irrigated out.    Plan:  - Once izaguirre removed in ~1 week will resume clean intermittent catheterization 6 times per day.   - Once izaguirre removed, resume bladder irrigation with 250cc of saline or sterile water 4 times a day.     Oxygen Adult/Peds    Oxygen Documentation  I certify that this patient, Aidan Louie has been under my care (or a nurse practitioner or physican's assistant working with me). This is the face-to-face encounter for oxygen medical necessity.      At the time of this encounter, I have reviewed the qualifying testing and have determined that supplemental oxygen is reasonable and necessary and is expected to improve the patient's condition in a home setting.         Patient has continued oxygen desaturation due to Acute Respiratory Failure J96.01.    If  portability is ordered, is the patient mobile within the home? yes    Was this visit performed as a telehealth visit: No    Resume oxygen, no change  Surgical Airway Tracheostomy Mindy Guerrero     Diet    Follow this diet upon discharge: Diet recommendations post-transplant: Heart healthy dietary habits long term (low saturated/trans fat, low sodium). High protein diet x 8 weeks. Practice food safety precautions.     Discharge Medications   Current Discharge Medication List        START taking these medications    Details   amoxicillin-clavulanate (AUGMENTIN) 875-125 MG tablet Take 1 tablet by mouth 2 times daily for 7 days.  Qty: 14 tablet, Refills: 0    Associated Diagnoses: Kidney transplant recipient      atorvastatin (LIPITOR) 10 MG tablet Take 1 tablet (10 mg) by mouth daily.  Qty: 30 tablet, Refills: 11    Associated Diagnoses: Kidney transplant recipient      !! magnesium hydroxide (MILK OF MAGNESIA) 400 MG/5ML suspension Take 30 mLs by mouth daily as needed for constipation (Use if polyethylene glycol (Miralax) is not effective after 24 hours.).  Qty: 118 mL, Refills: 1    Associated Diagnoses: Kidney transplant recipient      !! magnesium hydroxide (MILK OF MAGNESIA) 400 MG/5ML suspension Take 30 mLs by mouth 2 times daily as needed for constipation (Use if polyethylene glycol (Miralax) is not effective after 24 hours.).  Qty: 118 mL, Refills: 1    Associated Diagnoses: Kidney replaced by transplant      magnesium oxide (MAG-OX) 400 MG tablet Take 1 tablet (400 mg) by mouth daily (with lunch).  Qty: 30 tablet, Refills: 3    Associated Diagnoses: Kidney transplant recipient      methocarbamol (ROBAXIN) 500 MG tablet Take 1 tablet (500 mg) by mouth every 6 hours as needed for muscle spasms.  Qty: 20 tablet, Refills: 0    Associated Diagnoses: Kidney transplant recipient      mycophenolate (GENERIC EQUIVALENT) 250 MG capsule Take 3 capsules (750 mg) by mouth 2 times daily.  Qty: 180 capsule, Refills: 11     Associated Diagnoses: Kidney transplant recipient      pantoprazole (PROTONIX) 40 MG EC tablet Take 1 tablet (40 mg) by mouth daily.  Qty: 30 tablet, Refills: 3    Associated Diagnoses: Kidney transplant recipient      phosphorus tablet 250 mg (PHOSPHA 250 NEUTRAL) 250 MG per tablet Take 2 tablets (500 mg) by mouth 3 times daily.  Qty: 180 tablet, Refills: 1    Associated Diagnoses: Kidney replaced by transplant      polyethylene glycol (MIRALAX) 17 GM/Dose powder Take 17 g by mouth daily.  Qty: 510 g, Refills: 1    Associated Diagnoses: Kidney transplant recipient      senna-docusate (SENOKOT-S/PERICOLACE) 8.6-50 MG tablet Take 1-2 tablets by mouth 2 times daily.  Qty: 120 tablet, Refills: 1    Associated Diagnoses: Kidney transplant recipient      sodium bicarbonate 650 MG tablet Take 1 tablet (650 mg) by mouth 2 times daily.  Qty: 60 tablet, Refills: 3    Associated Diagnoses: Kidney transplant recipient      sulfamethoxazole-trimethoprim (BACTRIM) 400-80 MG tablet Take 1 tablet by mouth daily.  Qty: 30 tablet, Refills: 11    Associated Diagnoses: Kidney transplant recipient      !! tacrolimus (GENERIC EQUIVALENT) 0.5 MG capsule Tacrolimus 0.5 mg capsules BID to allow for dose adjustments.  Qty: 60 capsule, Refills: 11    Associated Diagnoses: Kidney transplant recipient      !! tacrolimus (GENERIC EQUIVALENT) 1 MG capsule Take 6 capsules (6 mg) by mouth 2 times daily.  Qty: 360 capsule, Refills: 11    Associated Diagnoses: Kidney replaced by transplant      valGANciclovir (VALCYTE) 450 MG tablet Take 2 tablets (900 mg) by mouth daily.  Qty: 60 tablet, Refills: 2    Associated Diagnoses: Kidney transplant recipient       !! - Potential duplicate medications found. Please discuss with provider.        CONTINUE these medications which have CHANGED    Details   acetaminophen (TYLENOL) 325 MG tablet Take 2 tablets (650 mg) by mouth every 6 hours as needed for mild pain.  Qty: 100 tablet, Refills: 1    Associated  Diagnoses: Kidney transplant recipient      amLODIPine (NORVASC) 5 MG tablet Take 1 tablet (5 mg) by mouth daily.  Qty: 30 tablet, Refills: 3    Associated Diagnoses: Kidney transplant recipient      bisacodyl (DULCOLAX) 5 MG EC tablet Take 1 tablet (5 mg) by mouth daily as needed for constipation.  Qty: 30 tablet, Refills: 1    Associated Diagnoses: Constipation, unspecified constipation type      cinacalcet (SENSIPAR) 30 MG tablet Take 1 tablet (30 mg) by mouth daily.  Qty: 30 tablet, Refills: 3    Associated Diagnoses: Kidney transplant recipient      famotidine (PEPCID) 20 MG tablet Take 1 tablet (20 mg) by mouth 2 times daily.  Qty: 60 tablet, Refills: 3    Associated Diagnoses: Kidney transplant recipient      predniSONE (DELTASONE) 10 MG tablet Take 2 tablets (20 mg) by mouth daily for 7 days, THEN 1.5 tablets (15 mg) daily for 7 days, THEN 1 tablet (10 mg) daily for 7 days, THEN 0.5 tablets (5 mg) daily for 7 days.  Qty: 35 tablet, Refills: 0    Associated Diagnoses: Kidney transplant recipient      !! sodium chloride 0.9%, bottle, 0.9 % irrigation  mL as needed to clear mucus from izaguirre catheter. Once catheter removed resume bladder irrigation with 250cc of saline or sterile water 4 times a day.  Qty: 9000 mL, Refills: 0    Associated Diagnoses: Neurogenic bladder      !! sodium chloride 0.9%, bottle, 0.9 % irrigation Irrigate with 250 mLs as directed 4 times daily.    Associated Diagnoses: Neurogenic bladder; Prune belly syndrome       !! - Potential duplicate medications found. Please discuss with provider.        CONTINUE these medications which have NOT CHANGED    Details   albuterol (2.5 MG/3ML) 0.083% nebulizer solution Take 1 ampule by nebulization 4 times daily     Associated Diagnoses: CKD (chronic kidney disease) stage 3, GFR 30-59 ml/min (H); HTN (hypertension); Prune belly syndrome      diphenhydrAMINE (BENADRYL) 25 MG capsule Take 25 mg by mouth every 6 hours as needed for itching or  allergies      ipratropium (ATROVENT) 0.02 % neb solution Take 0.5 mg by nebulization 4 times daily   Qty: 225 mL      lidocaine-prilocaine (EMLA) 2.5-2.5 % external cream Apply topically daily as needed for moderate pain. Apply to fistula site before dialysis      loratadine (CLARITIN) 10 MG tablet Take 1 tablet by mouth daily      Melatonin 10 MG TABS tablet Take 10 mg by mouth nightly as needed for sleep.      multivitamin w/minerals (THERA-VIT-M) tablet Take 1 tablet by mouth daily.      sertraline (ZOLOFT) 100 MG tablet Take 100 mg by mouth daily.      tobramycin, PF, (DAX) 300 MG/5ML nebulizer solution Take 1 ampule by nebulization as needed Take only when in [place]    Associated Diagnoses: CKD (chronic kidney disease) stage 3, GFR 30-59 ml/min (H); HTN (hypertension); Prune belly syndrome      Disposable Gloves (CAREMATES NITRILE GLOVES LARGE) MISC 4 Boxes every 30 days.  Qty: 4 each, Refills: 11    Comments: No powder, latex free, size L  Associated Diagnoses: Neurogenic bladder      Nutritional Supplements (NEPRO) LIQD Take 1 Can by mouth daily  Qty: 30 each, Refills: 11    Associated Diagnoses: Moderate protein-calorie malnutrition; CKD (chronic kidney disease) stage 4, GFR 15-29 ml/min (H)      !! order for DME Equipment being ordered: Medi-Vac, plastic tubing connector lara type, cat 361, [facility] [employer], 1 lara tip/month x 12 months. Use for tracheostomy suctioning  Qty: 1 each, Refills: 11    Associated Diagnoses: Tracheostomy care (H)      !! order for DME Equipment being ordered: DME    Adult Bivona Uncuffed Tracheostomy Tube-5.0    Patient will do weekly trach changes.  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 order for DME Equipment being ordered: DME -    Deep suction kits - 12 fr  Qty: 90 each, Refills: 3    Associated Diagnoses: Tracheostomy in place (H)      !! order for DME  "Tracheostomy Humidification Equipment  Equipment being ordered:     1) Air compressor  2) Nebulizer bottle  3) Aerosol tubing  4) Trach mask  5) Sterile water  6) Saline ampules (\"bullets\")  7) Heat Moisture Exchanger (HME) Also known by several other terms including: Thermal Humidifying Filters, Swedish nose, Artificial nose, Filter, Thermovent T.  8) Room/home humidifiers  Qty: 1 each, Refills: 11    Associated Diagnoses: Tracheostomy in place (H)      !! order for DME [employer]   [id number]  Qty: 30 each, Refills: 11    Associated Diagnoses: Granulation tissue of site of tracheostomy; Tracheostomy in place (H); Tracheostomy granuloma (H)      !! order for DME Equipment being ordered: Humidifier (heated), humidifier attachment, saline lavages, humidivent mask  Qty: 1 each, Refills: 3    Associated Diagnoses: Tracheostomy in place (H)      !! Syringe Luer Slip (CAREPOINT SYRINGE LUER SLIP) 60 ML MISC 1 Syringe 4 times daily.  Qty: 200 each, Refills: 11    Associated Diagnoses: Neurogenic bladder      !! Syringe Luer Slip 60 ML MISC 1 Syringe 4 times daily  Qty: 200 each, Refills: 11    Associated Diagnoses: Prune belly syndrome; Neurogenic bladder       !! - Potential duplicate medications found. Please discuss with provider.        STOP taking these medications       calcitRIOL (ROCALTROL) 0.25 MCG capsule Comments:   Reason for Stopping:         calcium carbonate (TUMS) 500 MG chewable tablet Comments:   Reason for Stopping:         carvedilol (COREG) 3.125 MG tablet Comments:   Reason for Stopping:         Cranberry 500 MG TABS Comments:   Reason for Stopping:         docusate sodium (COLACE) 100 MG capsule Comments:   Reason for Stopping:         sennosides (SENOKOT) 8.6 MG tablet Comments:   Reason for Stopping:         sevelamer carbonate (RENVELA) 800 MG tablet Comments:   Reason for Stopping:         sodium zirconium cyclosilicate (LOKELMA) 10 g PACK packet Comments:   Reason for Stopping:         " vitamin D3 (CHOLECALCIFEROL) 50 mcg (2000 units) tablet Comments:   Reason for Stopping:             Allergies   Allergies   Allergen Reactions    Latex      Other reaction(s): Other (see comments)  Other reaction(s): Contact Dermatitis, Other (see comments)    Adhesive Tape Rash     Data   Most Recent 3 CBC's:  Recent Labs   Lab Test 08/07/25  0538 08/06/25  0532 08/05/25  0559   WBC 9.3 10.8 7.8   HGB 7.5* 8.1* 7.9*   MCV 98 97 96    263 231      Most Recent 3 BMP's:  Recent Labs   Lab Test 08/07/25  0538 08/06/25  0532 08/05/25  0559    142 139   POTASSIUM 4.2 4.3 4.3   CHLORIDE 115* 112* 109*   CO2 19* 21* 21*   BUN 20.6* 30.4* 25.2*   CR 0.88 1.02 0.99   ANIONGAP 9 9 9   SUSAN 10.6* 10.8* 10.8*   * 99 95     Most Recent 2 LFT's:  Recent Labs   Lab Test 07/30/25  0820 04/12/23  0605   AST 15 30   ALT 11 11   ALKPHOS 69 69   BILITOTAL 0.2 0.2     Most Recent INR's and Anticoagulation Dosing History:  Anticoagulation Dose History          Latest Ref Rng & Units 10/15/2020 4/12/2023 7/30/2025   Recent Dosing and Labs   INR 0.85 - 1.15 1.03  1.21  1.08      Most Recent 3 Troponin's:No lab results found.  Most Recent Cholesterol Panel:  Recent Labs   Lab Test 07/30/25  0820   CHOL 138   LDL 69   HDL 58   TRIG 54     Most Recent 6 Bacteria Isolates From Any Culture (See EPIC Reports for Culture Details):  Recent Labs   Lab Test 08/02/19  1530 07/11/17  1322   CULT Light growth  Staphylococcus aureus  *  Light growth  Normal steven   50,000 to 100,000 colonies/mL Klebsiella oxytoca*     Most Recent TSH, T4 and A1c Labs:  Recent Labs   Lab Test 07/30/25  0820 06/05/19  0000   TSH  --  1.51   T4  --  0.8   A1C 4.6  --

## 2025-08-07 NOTE — PLAN OF CARE
DISCHARGE:  Patient with orders to discharge to home.     Education Provided:   Med Card Updated & sent w/pt  Lab Book Updated & sent w/pt  Handouts Discharge Summary, Caring for GLENN at Home, Caring for Aguirre at Home  Specialty Pharmacy Updated  LDAs PIV removed    Discharge instructions, medications & follow ups reviewed with pt's mother. Copy of discharge summary given to pt's mother. PIV removed. Belongings returned from security N/A. [facility] notified, report given this AM.    Patient in stable condition. AVSS. Pt & pt's mother had no further questions regarding discharge instructions and medications. Patient transferred out by wheelchair & left with pt's mother.  Plan for stay locally in [place] x several weeks, then home to [place].    Aubrie Farmer RN

## 2025-08-07 NOTE — PLAN OF CARE
Goal Outcome Evaluation:      Plan of Care Reviewed With: patient, parent, guardian    Overall Patient Progress: improvingOverall Patient Progress: improving    Outcome Evaluation: Vitally stable. Pain managed. Continue plan of care.    /64 (BP Location: Right arm)   Pulse 78   Temp 97.8  F (36.6  C) (Oral)   Resp 20   Wt 43.9 kg (96 lb 11.2 oz)   SpO2 98%   BMI 18.27 kg/m      Shift: 1801-4542  VS: Stable vitals with trach dome 15L 30% FIO2  Respiratory: Patient independently suctions trach. Nebs Q8H.  Neuro: Aox4. Guardian at bedside.   Pain/Nausea: Pain managed with tylenol, robaxin, and heat packs. Denied nausea.   Diet: Regular  LDA: R PIV, R GLENN (15 mL serous output), trach, Mitrofanoff to izaguirre bag  Infusions: NS bolus completed  GI/: 500 mL of urine output. Loose stools.   Skin: RLQ incision, glued   Mobility: UAL  Plan: Possible discharge today

## 2025-08-08 ENCOUNTER — TELEPHONE (OUTPATIENT)
Dept: TRANSPLANT | Facility: CLINIC | Age: 30
End: 2025-08-08

## 2025-08-08 DIAGNOSIS — Z94.0 KIDNEY TRANSPLANT RECIPIENT: Primary | ICD-10-CM

## 2025-08-08 PROCEDURE — 86832 HLA CLASS I HIGH DEFIN QUAL: CPT | Performed by: SURGERY

## 2025-08-08 PROCEDURE — 86833 HLA CLASS II HIGH DEFIN QUAL: CPT | Performed by: SURGERY

## 2025-08-08 PROCEDURE — 99000 SPECIMEN HANDLING OFFICE-LAB: CPT | Performed by: PATHOLOGY

## 2025-08-08 PROCEDURE — 80197 ASSAY OF TACROLIMUS: CPT | Performed by: NURSE PRACTITIONER

## 2025-08-08 PROCEDURE — 82306 VITAMIN D 25 HYDROXY: CPT | Performed by: NURSE PRACTITIONER

## 2025-08-08 PROCEDURE — 86828 HLA CLASS I&II ANTIBODY QUAL: CPT | Performed by: SURGERY

## 2025-08-09 ENCOUNTER — INFUSION THERAPY VISIT (OUTPATIENT)
Dept: INFUSION THERAPY | Facility: CLINIC | Age: 30
End: 2025-08-09
Attending: NURSE PRACTITIONER
Payer: MEDICARE

## 2025-08-09 ENCOUNTER — TELEPHONE (OUTPATIENT)
Dept: TRANSPLANT | Facility: CLINIC | Age: 30
End: 2025-08-09
Payer: MEDICARE

## 2025-08-09 VITALS
WEIGHT: 95.4 LBS | BODY MASS INDEX: 18.03 KG/M2 | TEMPERATURE: 98.1 F | SYSTOLIC BLOOD PRESSURE: 126 MMHG | OXYGEN SATURATION: 97 % | DIASTOLIC BLOOD PRESSURE: 69 MMHG | HEART RATE: 93 BPM

## 2025-08-09 DIAGNOSIS — Z94.0 KIDNEY TRANSPLANTED: Primary | ICD-10-CM

## 2025-08-09 DIAGNOSIS — Z94.0 KIDNEY REPLACED BY TRANSPLANT: ICD-10-CM

## 2025-08-09 LAB
ALBUMIN UR-MCNC: 10 MG/DL
ANION GAP SERPL CALCULATED.3IONS-SCNC: 11 MMOL/L (ref 7–15)
APPEARANCE UR: ABNORMAL
BACTERIA #/AREA URNS HPF: ABNORMAL /HPF
BASOPHILS # BLD AUTO: 0.1 10E3/UL (ref 0–0.2)
BASOPHILS NFR BLD AUTO: 1 %
BILIRUB UR QL STRIP: NEGATIVE
BUN SERPL-MCNC: 22 MG/DL (ref 6–20)
CALCIUM SERPL-MCNC: 11.1 MG/DL (ref 8.8–10.4)
CHLORIDE SERPL-SCNC: 109 MMOL/L (ref 98–107)
COLOR UR AUTO: ABNORMAL
CREAT SERPL-MCNC: 1.01 MG/DL (ref 0.67–1.17)
EGFRCR SERPLBLD CKD-EPI 2021: >90 ML/MIN/1.73M2
EOSINOPHIL # BLD AUTO: 0.2 10E3/UL (ref 0–0.7)
EOSINOPHIL NFR BLD AUTO: 2 %
ERYTHROCYTE [DISTWIDTH] IN BLOOD BY AUTOMATED COUNT: 16.7 % (ref 10–15)
GLUCOSE SERPL-MCNC: 107 MG/DL (ref 70–99)
GLUCOSE UR STRIP-MCNC: NEGATIVE MG/DL
HCO3 SERPL-SCNC: 21 MMOL/L (ref 22–29)
HCT VFR BLD AUTO: 28.6 % (ref 40–53)
HGB BLD-MCNC: 9.4 G/DL (ref 13.3–17.7)
HGB UR QL STRIP: ABNORMAL
IMM GRANULOCYTES # BLD: 0.3 10E3/UL
IMM GRANULOCYTES NFR BLD: 2 %
KETONES UR STRIP-MCNC: NEGATIVE MG/DL
LEUKOCYTE ESTERASE UR QL STRIP: ABNORMAL
LYMPHOCYTES # BLD AUTO: 2.1 10E3/UL (ref 0.8–5.3)
LYMPHOCYTES NFR BLD AUTO: 15 %
MAGNESIUM SERPL-MCNC: 1.5 MG/DL (ref 1.7–2.3)
MCH RBC QN AUTO: 31.5 PG (ref 26.5–33)
MCHC RBC AUTO-ENTMCNC: 32.9 G/DL (ref 31.5–36.5)
MCV RBC AUTO: 96 FL (ref 78–100)
MONOCYTES # BLD AUTO: 0.6 10E3/UL (ref 0–1.3)
MONOCYTES NFR BLD AUTO: 4 %
MUCOUS THREADS #/AREA URNS LPF: PRESENT /LPF
NEUTROPHILS # BLD AUTO: 10.2 10E3/UL (ref 1.6–8.3)
NEUTROPHILS NFR BLD AUTO: 76 %
NITRATE UR QL: NEGATIVE
NRBC # BLD AUTO: 0 10E3/UL
NRBC BLD AUTO-RTO: 0 /100
PH UR STRIP: 8 [PH] (ref 5–7)
PHOSPHATE SERPL-MCNC: 2.2 MG/DL (ref 2.5–4.5)
PLATELET # BLD AUTO: 394 10E3/UL (ref 150–450)
POTASSIUM SERPL-SCNC: 4.9 MMOL/L (ref 3.4–5.3)
RBC # BLD AUTO: 2.98 10E6/UL (ref 4.4–5.9)
RBC URINE: 132 /HPF
SODIUM SERPL-SCNC: 141 MMOL/L (ref 135–145)
SP GR UR STRIP: 1.01 (ref 1–1.03)
SQUAMOUS EPITHELIAL: 1 /HPF
TACROLIMUS BLD-MCNC: 5.2 UG/L (ref 5–15)
TME LAST DOSE: NORMAL H
TME LAST DOSE: NORMAL H
UROBILINOGEN UR STRIP-MCNC: NORMAL MG/DL
WBC # BLD AUTO: 13.5 10E3/UL (ref 4–11)
WBC URINE: 18 /HPF
YEAST #/AREA URNS HPF: ABNORMAL /HPF

## 2025-08-09 PROCEDURE — 80197 ASSAY OF TACROLIMUS: CPT

## 2025-08-09 PROCEDURE — 85018 HEMOGLOBIN: CPT

## 2025-08-09 PROCEDURE — 36415 COLL VENOUS BLD VENIPUNCTURE: CPT

## 2025-08-09 PROCEDURE — 82374 ASSAY BLOOD CARBON DIOXIDE: CPT

## 2025-08-09 PROCEDURE — 83735 ASSAY OF MAGNESIUM: CPT

## 2025-08-09 PROCEDURE — 81001 URINALYSIS AUTO W/SCOPE: CPT | Performed by: NURSE PRACTITIONER

## 2025-08-09 PROCEDURE — 87106 FUNGI IDENTIFICATION YEAST: CPT | Performed by: NURSE PRACTITIONER

## 2025-08-09 PROCEDURE — 84100 ASSAY OF PHOSPHORUS: CPT

## 2025-08-09 RX ORDER — TACROLIMUS 1 MG/1
8 CAPSULE ORAL 2 TIMES DAILY
Qty: 480 CAPSULE | Refills: 11 | Status: SHIPPED | OUTPATIENT
Start: 2025-08-09 | End: 2025-08-12

## 2025-08-10 LAB — BACTERIA UR CULT: ABNORMAL

## 2025-08-11 ENCOUNTER — TELEPHONE (OUTPATIENT)
Dept: TRANSPLANT | Facility: CLINIC | Age: 30
End: 2025-08-11

## 2025-08-11 ENCOUNTER — LAB (OUTPATIENT)
Dept: LAB | Facility: CLINIC | Age: 30
End: 2025-08-11
Payer: MEDICARE

## 2025-08-11 DIAGNOSIS — Z94.0 KIDNEY REPLACED BY TRANSPLANT: ICD-10-CM

## 2025-08-11 DIAGNOSIS — Z79.899 ENCOUNTER FOR LONG-TERM CURRENT USE OF MEDICATION: ICD-10-CM

## 2025-08-11 DIAGNOSIS — Z98.890 OTHER SPECIFIED POSTPROCEDURAL STATES: ICD-10-CM

## 2025-08-11 DIAGNOSIS — Z20.828 CONTACT WITH AND (SUSPECTED) EXPOSURE TO OTHER VIRAL COMMUNICABLE DISEASES: ICD-10-CM

## 2025-08-11 DIAGNOSIS — Z48.298 AFTERCARE FOLLOWING ORGAN TRANSPLANT: ICD-10-CM

## 2025-08-11 LAB
ANION GAP SERPL CALCULATED.3IONS-SCNC: 11 MMOL/L (ref 7–15)
BUN SERPL-MCNC: 21.3 MG/DL (ref 6–20)
CALCIUM SERPL-MCNC: 11.2 MG/DL (ref 8.8–10.4)
CHLORIDE SERPL-SCNC: 106 MMOL/L (ref 98–107)
CREAT SERPL-MCNC: 1.01 MG/DL (ref 0.67–1.17)
EGFRCR SERPLBLD CKD-EPI 2021: >90 ML/MIN/1.73M2
ERYTHROCYTE [DISTWIDTH] IN BLOOD BY AUTOMATED COUNT: 16.3 % (ref 10–15)
GLUCOSE SERPL-MCNC: 113 MG/DL (ref 70–99)
HCO3 SERPL-SCNC: 22 MMOL/L (ref 22–29)
HCT VFR BLD AUTO: 27.6 % (ref 40–53)
HGB BLD-MCNC: 8.6 G/DL (ref 13.3–17.7)
MCH RBC QN AUTO: 31.2 PG (ref 26.5–33)
MCHC RBC AUTO-ENTMCNC: 31.2 G/DL (ref 31.5–36.5)
MCV RBC AUTO: 100 FL (ref 78–100)
PLATELET # BLD AUTO: 397 10E3/UL (ref 150–450)
POTASSIUM SERPL-SCNC: 4.7 MMOL/L (ref 3.4–5.3)
RBC # BLD AUTO: 2.76 10E6/UL (ref 4.4–5.9)
SODIUM SERPL-SCNC: 139 MMOL/L (ref 135–145)
TACROLIMUS BLD-MCNC: 12.1 UG/L (ref 5–15)
TME LAST DOSE: NORMAL H
TME LAST DOSE: NORMAL H
WBC # BLD AUTO: 15.4 10E3/UL (ref 4–11)

## 2025-08-11 PROCEDURE — 80048 BASIC METABOLIC PNL TOTAL CA: CPT | Performed by: PATHOLOGY

## 2025-08-11 PROCEDURE — 36415 COLL VENOUS BLD VENIPUNCTURE: CPT | Performed by: PATHOLOGY

## 2025-08-11 PROCEDURE — 99000 SPECIMEN HANDLING OFFICE-LAB: CPT | Performed by: PATHOLOGY

## 2025-08-11 PROCEDURE — 80197 ASSAY OF TACROLIMUS: CPT | Performed by: INTERNAL MEDICINE

## 2025-08-11 PROCEDURE — 85027 COMPLETE CBC AUTOMATED: CPT | Performed by: PATHOLOGY

## 2025-08-11 RX ORDER — CINACALCET 30 MG/1
60 TABLET, FILM COATED ORAL DAILY
Qty: 180 TABLET | Refills: 3 | Status: ON HOLD | OUTPATIENT
Start: 2025-08-11

## 2025-08-12 RX ORDER — TACROLIMUS 1 MG/1
7 CAPSULE ORAL 2 TIMES DAILY
Qty: 420 CAPSULE | Refills: 11 | Status: ON HOLD | OUTPATIENT
Start: 2025-08-12

## 2025-08-13 ENCOUNTER — TELEPHONE (OUTPATIENT)
Dept: TRANSPLANT | Facility: CLINIC | Age: 30
End: 2025-08-13

## 2025-08-13 ENCOUNTER — LAB (OUTPATIENT)
Dept: LAB | Facility: CLINIC | Age: 30
End: 2025-08-13
Payer: MEDICARE

## 2025-08-13 ENCOUNTER — HOSPITAL ENCOUNTER (INPATIENT)
Facility: CLINIC | Age: 30
LOS: 6 days | Discharge: HOME | DRG: 177 | End: 2025-08-19
Attending: STUDENT IN AN ORGANIZED HEALTH CARE EDUCATION/TRAINING PROGRAM | Admitting: TRANSPLANT SURGERY
Payer: MEDICARE

## 2025-08-13 ENCOUNTER — APPOINTMENT (OUTPATIENT)
Dept: GENERAL RADIOLOGY | Facility: CLINIC | Age: 30
DRG: 177 | End: 2025-08-13
Attending: STUDENT IN AN ORGANIZED HEALTH CARE EDUCATION/TRAINING PROGRAM
Payer: MEDICARE

## 2025-08-13 ENCOUNTER — APPOINTMENT (OUTPATIENT)
Dept: ULTRASOUND IMAGING | Facility: CLINIC | Age: 30
DRG: 177 | End: 2025-08-13
Payer: MEDICARE

## 2025-08-13 ENCOUNTER — DOCUMENTATION ONLY (OUTPATIENT)
Dept: LAB | Facility: CLINIC | Age: 30
End: 2025-08-13

## 2025-08-13 DIAGNOSIS — Z94.0 KIDNEY REPLACED BY TRANSPLANT: ICD-10-CM

## 2025-08-13 DIAGNOSIS — J18.9 HCAP (HEALTHCARE-ASSOCIATED PNEUMONIA): Primary | ICD-10-CM

## 2025-08-13 DIAGNOSIS — Z48.298 AFTERCARE FOLLOWING ORGAN TRANSPLANT: ICD-10-CM

## 2025-08-13 DIAGNOSIS — Z94.0 KIDNEY TRANSPLANT RECIPIENT: ICD-10-CM

## 2025-08-13 DIAGNOSIS — E83.42 HYPOMAGNESEMIA: ICD-10-CM

## 2025-08-13 DIAGNOSIS — Z94.0 KIDNEY TRANSPLANTED: ICD-10-CM

## 2025-08-13 DIAGNOSIS — Z98.890 OTHER SPECIFIED POSTPROCEDURAL STATES: ICD-10-CM

## 2025-08-13 DIAGNOSIS — Z76.82 KIDNEY TRANSPLANT CANDIDATE: ICD-10-CM

## 2025-08-13 DIAGNOSIS — J96.11 CHRONIC RESPIRATORY FAILURE WITH HYPOXIA (H): ICD-10-CM

## 2025-08-13 DIAGNOSIS — Z20.828 CONTACT WITH AND (SUSPECTED) EXPOSURE TO OTHER VIRAL COMMUNICABLE DISEASES: ICD-10-CM

## 2025-08-13 DIAGNOSIS — Z79.899 ENCOUNTER FOR LONG-TERM CURRENT USE OF MEDICATION: ICD-10-CM

## 2025-08-13 LAB
ALBUMIN SERPL BCG-MCNC: 4 G/DL (ref 3.5–5.2)
ALBUMIN UR-MCNC: NEGATIVE MG/DL
ALP SERPL-CCNC: 84 U/L (ref 40–150)
ALT SERPL W P-5'-P-CCNC: 15 U/L (ref 0–70)
ANION GAP SERPL CALCULATED.3IONS-SCNC: 12 MMOL/L (ref 7–15)
ANION GAP SERPL CALCULATED.3IONS-SCNC: 13 MMOL/L (ref 7–15)
APPEARANCE UR: CLEAR
AST SERPL W P-5'-P-CCNC: 13 U/L (ref 0–45)
BASE EXCESS BLDV CALC-SCNC: -4.4 MMOL/L (ref -3–3)
BASOPHILS # BLD AUTO: 0.05 10E3/UL (ref 0–0.2)
BASOPHILS NFR BLD AUTO: 0.2 %
BILIRUB SERPL-MCNC: 0.3 MG/DL
BILIRUB UR QL STRIP: NEGATIVE
BUN SERPL-MCNC: 23.1 MG/DL (ref 6–20)
BUN SERPL-MCNC: 28.7 MG/DL (ref 6–20)
C PNEUM DNA SPEC QL NAA+PROBE: NOT DETECTED
CALCIUM SERPL-MCNC: 10.5 MG/DL (ref 8.8–10.4)
CALCIUM SERPL-MCNC: 11 MG/DL (ref 8.8–10.4)
CHLORIDE SERPL-SCNC: 107 MMOL/L (ref 98–107)
CHLORIDE SERPL-SCNC: 107 MMOL/L (ref 98–107)
COLOR UR AUTO: ABNORMAL
CREAT SERPL-MCNC: 0.94 MG/DL (ref 0.67–1.17)
CREAT SERPL-MCNC: 1.02 MG/DL (ref 0.67–1.17)
D DIMER PPP FEU-MCNC: 2.64 UG/ML FEU (ref 0–0.5)
EGFRCR SERPLBLD CKD-EPI 2021: >90 ML/MIN/1.73M2
EGFRCR SERPLBLD CKD-EPI 2021: >90 ML/MIN/1.73M2
EOSINOPHIL # BLD AUTO: <0.03 10E3/UL (ref 0–0.7)
EOSINOPHIL NFR BLD AUTO: 0 %
ERYTHROCYTE [DISTWIDTH] IN BLOOD BY AUTOMATED COUNT: 15.5 % (ref 10–15)
ERYTHROCYTE [DISTWIDTH] IN BLOOD BY AUTOMATED COUNT: 15.6 % (ref 10–15)
FLUAV H1 2009 PAND RNA SPEC QL NAA+PROBE: NOT DETECTED
FLUAV H1 RNA SPEC QL NAA+PROBE: NOT DETECTED
FLUAV H3 RNA SPEC QL NAA+PROBE: NOT DETECTED
FLUAV RNA SPEC QL NAA+PROBE: NOT DETECTED
FLUBV RNA SPEC QL NAA+PROBE: NOT DETECTED
GLUCOSE SERPL-MCNC: 135 MG/DL (ref 70–99)
GLUCOSE SERPL-MCNC: 153 MG/DL (ref 70–99)
GLUCOSE UR STRIP-MCNC: 300 MG/DL
HADV DNA SPEC QL NAA+PROBE: NOT DETECTED
HCO3 BLDV-SCNC: 21 MMOL/L (ref 21–28)
HCO3 SERPL-SCNC: 19 MMOL/L (ref 22–29)
HCO3 SERPL-SCNC: 20 MMOL/L (ref 22–29)
HCOV PNL SPEC NAA+PROBE: NOT DETECTED
HCT VFR BLD AUTO: 26.3 % (ref 40–53)
HCT VFR BLD AUTO: 26.8 % (ref 40–53)
HGB BLD-MCNC: 8.3 G/DL (ref 13.3–17.7)
HGB BLD-MCNC: 8.4 G/DL (ref 13.3–17.7)
HGB UR QL STRIP: NEGATIVE
HMPV RNA SPEC QL NAA+PROBE: NOT DETECTED
HOLD SPECIMEN: NORMAL
HOLD SPECIMEN: NORMAL
HPIV1 RNA SPEC QL NAA+PROBE: NOT DETECTED
HPIV2 RNA SPEC QL NAA+PROBE: NOT DETECTED
HPIV3 RNA SPEC QL NAA+PROBE: NOT DETECTED
HPIV4 RNA SPEC QL NAA+PROBE: NOT DETECTED
IMM GRANULOCYTES # BLD: 0.24 10E3/UL
IMM GRANULOCYTES NFR BLD: 1 %
INR PPP: 1.19 (ref 0.85–1.15)
KETONES UR STRIP-MCNC: NEGATIVE MG/DL
LACTATE SERPL-SCNC: 0.8 MMOL/L (ref 0.7–2)
LACTATE SERPL-SCNC: 2.2 MMOL/L (ref 0.7–2)
LEUKOCYTE ESTERASE UR QL STRIP: NEGATIVE
LYMPHOCYTES # BLD AUTO: 0.59 10E3/UL (ref 0.8–5.3)
LYMPHOCYTES NFR BLD AUTO: 2.4 %
M PNEUMO DNA SPEC QL NAA+PROBE: NOT DETECTED
MAGNESIUM SERPL-MCNC: 1.2 MG/DL (ref 1.7–2.3)
MAGNESIUM SERPL-MCNC: 1.4 MG/DL (ref 1.7–2.3)
MCH RBC QN AUTO: 30.9 PG (ref 26.5–33)
MCH RBC QN AUTO: 31.7 PG (ref 26.5–33)
MCHC RBC AUTO-ENTMCNC: 31.3 G/DL (ref 31.5–36.5)
MCHC RBC AUTO-ENTMCNC: 31.6 G/DL (ref 31.5–36.5)
MCV RBC AUTO: 100.4 FL (ref 78–100)
MCV RBC AUTO: 98.5 FL (ref 78–100)
MONOCYTES # BLD AUTO: 0.36 10E3/UL (ref 0–1.3)
MONOCYTES NFR BLD AUTO: 1.5 %
MRSA DNA SPEC QL NAA+PROBE: NEGATIVE
MYCOPHENOLATE SERPL LC/MS/MS-MCNC: 0.36 MG/L (ref 1–3.5)
MYCOPHENOLATE-G SERPL LC/MS/MS-MCNC: 7.6 MG/L (ref 30–95)
NEUTROPHILS # BLD AUTO: 23.15 10E3/UL (ref 1.6–8.3)
NEUTROPHILS NFR BLD AUTO: 94.9 %
NITRATE UR QL: NEGATIVE
NRBC # BLD AUTO: <0.03 10E3/UL
NRBC BLD AUTO-RTO: 0 /100
O2/TOTAL GAS SETTING VFR VENT: 21 %
OXYHGB MFR BLDV: 74 % (ref 70–75)
PCO2 BLDV: 42 MM HG (ref 40–50)
PH BLDV: 7.32 [PH] (ref 7.32–7.43)
PH UR STRIP: 8 [PH] (ref 5–7)
PHOSPHATE SERPL-MCNC: 1.9 MG/DL (ref 2.5–4.5)
PLATELET # BLD AUTO: 421 10E3/UL (ref 150–450)
PLATELET # BLD AUTO: 427 10E3/UL (ref 150–450)
PO2 BLDV: 44 MM HG (ref 25–47)
POTASSIUM SERPL-SCNC: 4.7 MMOL/L (ref 3.4–5.3)
POTASSIUM SERPL-SCNC: 5.9 MMOL/L (ref 3.4–5.3)
PROCALCITONIN SERPL IA-MCNC: 0.3 NG/ML
PROT SERPL-MCNC: 6.8 G/DL (ref 6.4–8.3)
PROTHROMBIN TIME: 15.4 SECONDS (ref 11.8–14.8)
RBC # BLD AUTO: 2.62 10E6/UL (ref 4.4–5.9)
RBC # BLD AUTO: 2.72 10E6/UL (ref 4.4–5.9)
RBC URINE: 4 /HPF
RSV RNA SPEC QL NAA+PROBE: NOT DETECTED
RSV RNA SPEC QL NAA+PROBE: NOT DETECTED
RV+EV RNA SPEC QL NAA+PROBE: NOT DETECTED
SA TARGET DNA: NEGATIVE
SAO2 % BLDV: 77 % (ref 70–75)
SODIUM SERPL-SCNC: 138 MMOL/L (ref 135–145)
SODIUM SERPL-SCNC: 140 MMOL/L (ref 135–145)
SP GR UR STRIP: 1.01 (ref 1–1.03)
TACROLIMUS BLD-MCNC: 8.5 UG/L (ref 5–15)
TME LAST DOSE: ABNORMAL H
TME LAST DOSE: ABNORMAL H
TME LAST DOSE: NORMAL H
TME LAST DOSE: NORMAL H
TROPONIN T SERPL HS-MCNC: 16 NG/L
TROPONIN T SERPL HS-MCNC: 17 NG/L
UROBILINOGEN UR STRIP-MCNC: NORMAL MG/DL
WBC # BLD AUTO: 19.91 10E3/UL (ref 4–11)
WBC # BLD AUTO: 24.39 10E3/UL (ref 4–11)
WBC URINE: 2 /HPF
YEAST #/AREA URNS HPF: ABNORMAL /HPF

## 2025-08-13 PROCEDURE — 87641 MR-STAPH DNA AMP PROBE: CPT | Performed by: STUDENT IN AN ORGANIZED HEALTH CARE EDUCATION/TRAINING PROGRAM

## 2025-08-13 PROCEDURE — 99285 EMERGENCY DEPT VISIT HI MDM: CPT | Performed by: STUDENT IN AN ORGANIZED HEALTH CARE EDUCATION/TRAINING PROGRAM

## 2025-08-13 PROCEDURE — 81001 URINALYSIS AUTO W/SCOPE: CPT | Performed by: STUDENT IN AN ORGANIZED HEALTH CARE EDUCATION/TRAINING PROGRAM

## 2025-08-13 PROCEDURE — 250N000012 HC RX MED GY IP 250 OP 636 PS 637

## 2025-08-13 PROCEDURE — 83735 ASSAY OF MAGNESIUM: CPT | Performed by: STUDENT IN AN ORGANIZED HEALTH CARE EDUCATION/TRAINING PROGRAM

## 2025-08-13 PROCEDURE — 84155 ASSAY OF PROTEIN SERUM: CPT | Performed by: STUDENT IN AN ORGANIZED HEALTH CARE EDUCATION/TRAINING PROGRAM

## 2025-08-13 PROCEDURE — 96365 THER/PROPH/DIAG IV INF INIT: CPT | Performed by: STUDENT IN AN ORGANIZED HEALTH CARE EDUCATION/TRAINING PROGRAM

## 2025-08-13 PROCEDURE — 999N000157 HC STATISTIC RCP TIME EA 10 MIN

## 2025-08-13 PROCEDURE — 83605 ASSAY OF LACTIC ACID: CPT | Performed by: STUDENT IN AN ORGANIZED HEALTH CARE EDUCATION/TRAINING PROGRAM

## 2025-08-13 PROCEDURE — 85379 FIBRIN DEGRADATION QUANT: CPT | Performed by: STUDENT IN AN ORGANIZED HEALTH CARE EDUCATION/TRAINING PROGRAM

## 2025-08-13 PROCEDURE — 83735 ASSAY OF MAGNESIUM: CPT | Performed by: PATHOLOGY

## 2025-08-13 PROCEDURE — 36415 COLL VENOUS BLD VENIPUNCTURE: CPT | Performed by: PATHOLOGY

## 2025-08-13 PROCEDURE — 99000 SPECIMEN HANDLING OFFICE-LAB: CPT | Performed by: PATHOLOGY

## 2025-08-13 PROCEDURE — 84484 ASSAY OF TROPONIN QUANT: CPT | Performed by: STUDENT IN AN ORGANIZED HEALTH CARE EDUCATION/TRAINING PROGRAM

## 2025-08-13 PROCEDURE — 85027 COMPLETE CBC AUTOMATED: CPT | Performed by: STUDENT IN AN ORGANIZED HEALTH CARE EDUCATION/TRAINING PROGRAM

## 2025-08-13 PROCEDURE — 93005 ELECTROCARDIOGRAM TRACING: CPT | Performed by: STUDENT IN AN ORGANIZED HEALTH CARE EDUCATION/TRAINING PROGRAM

## 2025-08-13 PROCEDURE — 85025 COMPLETE CBC W/AUTO DIFF WBC: CPT | Performed by: PATHOLOGY

## 2025-08-13 PROCEDURE — 250N000009 HC RX 250: Performed by: STUDENT IN AN ORGANIZED HEALTH CARE EDUCATION/TRAINING PROGRAM

## 2025-08-13 PROCEDURE — 96366 THER/PROPH/DIAG IV INF ADDON: CPT | Performed by: STUDENT IN AN ORGANIZED HEALTH CARE EDUCATION/TRAINING PROGRAM

## 2025-08-13 PROCEDURE — 71045 X-RAY EXAM CHEST 1 VIEW: CPT | Mod: 26 | Performed by: RADIOLOGY

## 2025-08-13 PROCEDURE — 85610 PROTHROMBIN TIME: CPT | Performed by: STUDENT IN AN ORGANIZED HEALTH CARE EDUCATION/TRAINING PROGRAM

## 2025-08-13 PROCEDURE — 250N000011 HC RX IP 250 OP 636: Performed by: STUDENT IN AN ORGANIZED HEALTH CARE EDUCATION/TRAINING PROGRAM

## 2025-08-13 PROCEDURE — 258N000003 HC RX IP 258 OP 636

## 2025-08-13 PROCEDURE — 99285 EMERGENCY DEPT VISIT HI MDM: CPT | Mod: 25 | Performed by: STUDENT IN AN ORGANIZED HEALTH CARE EDUCATION/TRAINING PROGRAM

## 2025-08-13 PROCEDURE — 76776 US EXAM K TRANSPL W/DOPPLER: CPT | Mod: 26 | Performed by: RADIOLOGY

## 2025-08-13 PROCEDURE — 258N000003 HC RX IP 258 OP 636: Performed by: STUDENT IN AN ORGANIZED HEALTH CARE EDUCATION/TRAINING PROGRAM

## 2025-08-13 PROCEDURE — 96361 HYDRATE IV INFUSION ADD-ON: CPT | Performed by: STUDENT IN AN ORGANIZED HEALTH CARE EDUCATION/TRAINING PROGRAM

## 2025-08-13 PROCEDURE — 87040 BLOOD CULTURE FOR BACTERIA: CPT | Performed by: STUDENT IN AN ORGANIZED HEALTH CARE EDUCATION/TRAINING PROGRAM

## 2025-08-13 PROCEDURE — 36415 COLL VENOUS BLD VENIPUNCTURE: CPT | Performed by: STUDENT IN AN ORGANIZED HEALTH CARE EDUCATION/TRAINING PROGRAM

## 2025-08-13 PROCEDURE — 80180 DRUG SCRN QUAN MYCOPHENOLATE: CPT | Performed by: INTERNAL MEDICINE

## 2025-08-13 PROCEDURE — 999N000127 HC STATISTIC PERIPHERAL IV START W US GUIDANCE

## 2025-08-13 PROCEDURE — 80197 ASSAY OF TACROLIMUS: CPT | Performed by: INTERNAL MEDICINE

## 2025-08-13 PROCEDURE — 80069 RENAL FUNCTION PANEL: CPT | Performed by: PATHOLOGY

## 2025-08-13 PROCEDURE — 96368 THER/DIAG CONCURRENT INF: CPT | Performed by: STUDENT IN AN ORGANIZED HEALTH CARE EDUCATION/TRAINING PROGRAM

## 2025-08-13 PROCEDURE — 84145 PROCALCITONIN (PCT): CPT | Performed by: STUDENT IN AN ORGANIZED HEALTH CARE EDUCATION/TRAINING PROGRAM

## 2025-08-13 PROCEDURE — 120N000011 HC R&B TRANSPLANT UMMC

## 2025-08-13 PROCEDURE — 76776 US EXAM K TRANSPL W/DOPPLER: CPT

## 2025-08-13 PROCEDURE — 82805 BLOOD GASES W/O2 SATURATION: CPT | Performed by: STUDENT IN AN ORGANIZED HEALTH CARE EDUCATION/TRAINING PROGRAM

## 2025-08-13 PROCEDURE — 93010 ELECTROCARDIOGRAM REPORT: CPT | Performed by: STUDENT IN AN ORGANIZED HEALTH CARE EDUCATION/TRAINING PROGRAM

## 2025-08-13 PROCEDURE — 87799 DETECT AGENT NOS DNA QUANT: CPT | Performed by: STUDENT IN AN ORGANIZED HEALTH CARE EDUCATION/TRAINING PROGRAM

## 2025-08-13 PROCEDURE — 71045 X-RAY EXAM CHEST 1 VIEW: CPT

## 2025-08-13 PROCEDURE — 87581 M.PNEUMON DNA AMP PROBE: CPT | Performed by: STUDENT IN AN ORGANIZED HEALTH CARE EDUCATION/TRAINING PROGRAM

## 2025-08-13 PROCEDURE — 250N000013 HC RX MED GY IP 250 OP 250 PS 637

## 2025-08-13 RX ORDER — ONDANSETRON 2 MG/ML
4 INJECTION INTRAMUSCULAR; INTRAVENOUS EVERY 30 MIN PRN
Status: DISCONTINUED | OUTPATIENT
Start: 2025-08-13 | End: 2025-08-13

## 2025-08-13 RX ORDER — MAGNESIUM SULFATE 1 G/100ML
1 INJECTION INTRAVENOUS ONCE
Status: COMPLETED | OUTPATIENT
Start: 2025-08-13 | End: 2025-08-13

## 2025-08-13 RX ORDER — MYCOPHENOLATE MOFETIL 250 MG/1
750 CAPSULE ORAL 2 TIMES DAILY
Status: DISCONTINUED | OUTPATIENT
Start: 2025-08-13 | End: 2025-08-19 | Stop reason: HOSPADM

## 2025-08-13 RX ORDER — SODIUM CHLORIDE, SODIUM LACTATE, POTASSIUM CHLORIDE, CALCIUM CHLORIDE 600; 310; 30; 20 MG/100ML; MG/100ML; MG/100ML; MG/100ML
INJECTION, SOLUTION INTRAVENOUS CONTINUOUS
Status: DISCONTINUED | OUTPATIENT
Start: 2025-08-13 | End: 2025-08-14

## 2025-08-13 RX ORDER — ONDANSETRON 2 MG/ML
4 INJECTION INTRAMUSCULAR; INTRAVENOUS EVERY 6 HOURS PRN
Status: DISCONTINUED | OUTPATIENT
Start: 2025-08-13 | End: 2025-08-19 | Stop reason: HOSPADM

## 2025-08-13 RX ORDER — CEFEPIME HYDROCHLORIDE 2 G/1
2 INJECTION, POWDER, FOR SOLUTION INTRAVENOUS ONCE
Status: COMPLETED | OUTPATIENT
Start: 2025-08-13 | End: 2025-08-13

## 2025-08-13 RX ORDER — AMOXICILLIN 250 MG
2 CAPSULE ORAL 2 TIMES DAILY PRN
Status: DISCONTINUED | OUTPATIENT
Start: 2025-08-13 | End: 2025-08-19 | Stop reason: HOSPADM

## 2025-08-13 RX ORDER — AMOXICILLIN 250 MG
1 CAPSULE ORAL 2 TIMES DAILY PRN
Status: DISCONTINUED | OUTPATIENT
Start: 2025-08-13 | End: 2025-08-19 | Stop reason: HOSPADM

## 2025-08-13 RX ORDER — IPRATROPIUM BROMIDE AND ALBUTEROL SULFATE 2.5; .5 MG/3ML; MG/3ML
3 SOLUTION RESPIRATORY (INHALATION)
Status: DISCONTINUED | OUTPATIENT
Start: 2025-08-13 | End: 2025-08-19 | Stop reason: HOSPADM

## 2025-08-13 RX ORDER — FAMOTIDINE 20 MG/1
20 TABLET, FILM COATED ORAL 2 TIMES DAILY
Status: DISCONTINUED | OUTPATIENT
Start: 2025-08-13 | End: 2025-08-19 | Stop reason: HOSPADM

## 2025-08-13 RX ORDER — ACETAMINOPHEN 650 MG/1
650 SUPPOSITORY RECTAL EVERY 4 HOURS PRN
Status: DISCONTINUED | OUTPATIENT
Start: 2025-08-13 | End: 2025-08-19 | Stop reason: HOSPADM

## 2025-08-13 RX ORDER — NALOXONE HYDROCHLORIDE 0.4 MG/ML
0.2 INJECTION, SOLUTION INTRAMUSCULAR; INTRAVENOUS; SUBCUTANEOUS
Status: DISCONTINUED | OUTPATIENT
Start: 2025-08-13 | End: 2025-08-19 | Stop reason: HOSPADM

## 2025-08-13 RX ORDER — ACETAMINOPHEN 325 MG/1
650 TABLET ORAL EVERY 4 HOURS PRN
Status: DISCONTINUED | OUTPATIENT
Start: 2025-08-13 | End: 2025-08-19 | Stop reason: HOSPADM

## 2025-08-13 RX ORDER — NALOXONE HYDROCHLORIDE 0.4 MG/ML
0.4 INJECTION, SOLUTION INTRAMUSCULAR; INTRAVENOUS; SUBCUTANEOUS
Status: DISCONTINUED | OUTPATIENT
Start: 2025-08-13 | End: 2025-08-19 | Stop reason: HOSPADM

## 2025-08-13 RX ORDER — CEFEPIME HYDROCHLORIDE 2 G/1
2 INJECTION, POWDER, FOR SOLUTION INTRAVENOUS EVERY 8 HOURS
Status: DISCONTINUED | OUTPATIENT
Start: 2025-08-14 | End: 2025-08-17

## 2025-08-13 RX ORDER — LIDOCAINE 40 MG/G
CREAM TOPICAL
Status: DISCONTINUED | OUTPATIENT
Start: 2025-08-13 | End: 2025-08-19 | Stop reason: HOSPADM

## 2025-08-13 RX ORDER — CEFEPIME HYDROCHLORIDE 2 G/1
2 INJECTION, POWDER, FOR SOLUTION INTRAVENOUS EVERY 8 HOURS
Status: DISCONTINUED | OUTPATIENT
Start: 2025-08-14 | End: 2025-08-13

## 2025-08-13 RX ORDER — SULFAMETHOXAZOLE AND TRIMETHOPRIM 400; 80 MG/1; MG/1
1 TABLET ORAL DAILY
Status: DISCONTINUED | OUTPATIENT
Start: 2025-08-14 | End: 2025-08-19 | Stop reason: HOSPADM

## 2025-08-13 RX ORDER — PROCHLORPERAZINE MALEATE 10 MG
10 TABLET ORAL EVERY 6 HOURS PRN
Status: DISCONTINUED | OUTPATIENT
Start: 2025-08-13 | End: 2025-08-19 | Stop reason: HOSPADM

## 2025-08-13 RX ORDER — VALGANCICLOVIR 450 MG/1
900 TABLET, FILM COATED ORAL DAILY
Status: DISCONTINUED | OUTPATIENT
Start: 2025-08-14 | End: 2025-08-19 | Stop reason: HOSPADM

## 2025-08-13 RX ORDER — OXYCODONE HYDROCHLORIDE 5 MG/1
5 TABLET ORAL EVERY 4 HOURS PRN
Status: DISCONTINUED | OUTPATIENT
Start: 2025-08-13 | End: 2025-08-15

## 2025-08-13 RX ORDER — ONDANSETRON 4 MG/1
4 TABLET, ORALLY DISINTEGRATING ORAL EVERY 6 HOURS PRN
Status: DISCONTINUED | OUTPATIENT
Start: 2025-08-13 | End: 2025-08-19 | Stop reason: HOSPADM

## 2025-08-13 RX ADMIN — SODIUM CHLORIDE, SODIUM LACTATE, POTASSIUM CHLORIDE, AND CALCIUM CHLORIDE 1000 ML: .6; .31; .03; .02 INJECTION, SOLUTION INTRAVENOUS at 17:27

## 2025-08-13 RX ADMIN — IPRATROPIUM BROMIDE AND ALBUTEROL SULFATE 3 ML: .5; 3 SOLUTION RESPIRATORY (INHALATION) at 18:19

## 2025-08-13 RX ADMIN — SODIUM CHLORIDE, SODIUM LACTATE, POTASSIUM CHLORIDE, AND CALCIUM CHLORIDE: .6; .31; .03; .02 INJECTION, SOLUTION INTRAVENOUS at 23:35

## 2025-08-13 RX ADMIN — IPRATROPIUM BROMIDE AND ALBUTEROL SULFATE 3 ML: .5; 3 SOLUTION RESPIRATORY (INHALATION) at 17:19

## 2025-08-13 RX ADMIN — VANCOMYCIN HYDROCHLORIDE 750 MG: 1 INJECTION, POWDER, LYOPHILIZED, FOR SOLUTION INTRAVENOUS at 18:20

## 2025-08-13 RX ADMIN — CEFEPIME HYDROCHLORIDE 2 G: 2 INJECTION, POWDER, FOR SOLUTION INTRAVENOUS at 18:20

## 2025-08-13 RX ADMIN — FAMOTIDINE 20 MG: 20 TABLET, FILM COATED ORAL at 21:52

## 2025-08-13 RX ADMIN — MAGNESIUM SULFATE HEPTAHYDRATE 1 G: 1 INJECTION, SOLUTION INTRAVENOUS at 19:05

## 2025-08-13 RX ADMIN — TACROLIMUS 7 MG: 5 CAPSULE ORAL at 21:53

## 2025-08-13 RX ADMIN — MYCOPHENOLATE MOFETIL 750 MG: 250 CAPSULE ORAL at 21:52

## 2025-08-13 ASSESSMENT — ACTIVITIES OF DAILY LIVING (ADL)
ADLS_ACUITY_SCORE: 61
ADLS_ACUITY_SCORE: 61
ADLS_ACUITY_SCORE: 57
ADLS_ACUITY_SCORE: 61

## 2025-08-13 ASSESSMENT — VISUAL ACUITY
OD: 20/20
OS: 20/20

## 2025-08-14 ENCOUNTER — ANESTHESIA (OUTPATIENT)
Dept: SURGERY | Facility: CLINIC | Age: 30
End: 2025-08-14
Payer: MEDICARE

## 2025-08-14 ENCOUNTER — ANESTHESIA EVENT (OUTPATIENT)
Dept: SURGERY | Facility: CLINIC | Age: 30
End: 2025-08-14
Payer: MEDICARE

## 2025-08-14 LAB
ALBUMIN SERPL BCG-MCNC: 3.2 G/DL (ref 3.5–5.2)
ALP SERPL-CCNC: 74 U/L (ref 40–150)
ALT SERPL W P-5'-P-CCNC: 11 U/L (ref 0–70)
ANION GAP SERPL CALCULATED.3IONS-SCNC: 8 MMOL/L (ref 7–15)
AST SERPL W P-5'-P-CCNC: 13 U/L (ref 0–45)
BILIRUB SERPL-MCNC: 0.3 MG/DL
BUN SERPL-MCNC: 17.3 MG/DL (ref 6–20)
CALCIUM SERPL-MCNC: 11.1 MG/DL (ref 8.8–10.4)
CHLORIDE SERPL-SCNC: 110 MMOL/L (ref 98–107)
CREAT SERPL-MCNC: 0.87 MG/DL (ref 0.67–1.17)
EBV DNA SERPL NAA+PROBE-ACNC: NOT DETECTED IU/ML
EGFRCR SERPLBLD CKD-EPI 2021: >90 ML/MIN/1.73M2
ERYTHROCYTE [DISTWIDTH] IN BLOOD BY AUTOMATED COUNT: 15.1 % (ref 10–15)
GLUCOSE SERPL-MCNC: 93 MG/DL (ref 70–99)
HCO3 SERPL-SCNC: 16 MMOL/L (ref 22–29)
HCT VFR BLD AUTO: 25.6 % (ref 40–53)
HGB BLD-MCNC: 7.7 G/DL (ref 13.3–17.7)
MAGNESIUM SERPL-MCNC: 1.2 MG/DL (ref 1.7–2.3)
MCH RBC QN AUTO: 30.6 PG (ref 26.5–33)
MCHC RBC AUTO-ENTMCNC: 30.1 G/DL (ref 31.5–36.5)
MCV RBC AUTO: 101.6 FL (ref 78–100)
PLATELET # BLD AUTO: 394 10E3/UL (ref 150–450)
POTASSIUM SERPL-SCNC: 5.3 MMOL/L (ref 3.4–5.3)
PROCALCITONIN SERPL IA-MCNC: 0.27 NG/ML
PROT SERPL-MCNC: 6 G/DL (ref 6.4–8.3)
RBC # BLD AUTO: 2.52 10E6/UL (ref 4.4–5.9)
SODIUM SERPL-SCNC: 134 MMOL/L (ref 135–145)
WBC # BLD AUTO: 16.87 10E3/UL (ref 4–11)

## 2025-08-14 PROCEDURE — 250N000009 HC RX 250

## 2025-08-14 PROCEDURE — G0545 PR INHRENT VISIT TO INPT/OBS W CNFRM/SUSPCT INFCT DIS BY INFCT DIS SPCIALST: HCPCS | Performed by: INTERNAL MEDICINE

## 2025-08-14 PROCEDURE — 250N000011 HC RX IP 250 OP 636: Performed by: ANESTHESIOLOGY

## 2025-08-14 PROCEDURE — 250N000009 HC RX 250: Performed by: ANESTHESIOLOGY

## 2025-08-14 PROCEDURE — 250N000011 HC RX IP 250 OP 636

## 2025-08-14 PROCEDURE — 99223 1ST HOSP IP/OBS HIGH 75: CPT | Mod: 24 | Performed by: INTERNAL MEDICINE

## 2025-08-14 PROCEDURE — 36415 COLL VENOUS BLD VENIPUNCTURE: CPT

## 2025-08-14 PROCEDURE — 250N000012 HC RX MED GY IP 250 OP 636 PS 637: Performed by: NURSE PRACTITIONER

## 2025-08-14 PROCEDURE — 87252 VIRUS INOCULATION TISSUE: CPT | Performed by: STUDENT IN AN ORGANIZED HEALTH CARE EDUCATION/TRAINING PROGRAM

## 2025-08-14 PROCEDURE — 83735 ASSAY OF MAGNESIUM: CPT | Performed by: NURSE PRACTITIONER

## 2025-08-14 PROCEDURE — 250N000011 HC RX IP 250 OP 636: Performed by: NURSE PRACTITIONER

## 2025-08-14 PROCEDURE — 250N000012 HC RX MED GY IP 250 OP 636 PS 637

## 2025-08-14 PROCEDURE — 99418 PROLNG IP/OBS E/M EA 15 MIN: CPT | Performed by: INTERNAL MEDICINE

## 2025-08-14 PROCEDURE — 250N000013 HC RX MED GY IP 250 OP 250 PS 637

## 2025-08-14 PROCEDURE — 999N000141 HC STATISTIC PRE-PROCEDURE NURSING ASSESSMENT: Performed by: TRANSPLANT SURGERY

## 2025-08-14 PROCEDURE — 94640 AIRWAY INHALATION TREATMENT: CPT | Mod: 76

## 2025-08-14 PROCEDURE — 0TP98DZ REMOVAL OF INTRALUMINAL DEVICE FROM URETER, VIA NATURAL OR ARTIFICIAL OPENING ENDOSCOPIC: ICD-10-PCS | Performed by: TRANSPLANT SURGERY

## 2025-08-14 PROCEDURE — 87205 SMEAR GRAM STAIN: CPT

## 2025-08-14 PROCEDURE — 85027 COMPLETE CBC AUTOMATED: CPT

## 2025-08-14 PROCEDURE — 99223 1ST HOSP IP/OBS HIGH 75: CPT | Mod: 24 | Performed by: NURSE PRACTITIONER

## 2025-08-14 PROCEDURE — 94640 AIRWAY INHALATION TREATMENT: CPT

## 2025-08-14 PROCEDURE — 250N000009 HC RX 250: Performed by: NURSE PRACTITIONER

## 2025-08-14 PROCEDURE — 370N000017 HC ANESTHESIA TECHNICAL FEE, PER MIN: Performed by: TRANSPLANT SURGERY

## 2025-08-14 PROCEDURE — 710N000009 HC RECOVERY PHASE 1, LEVEL 1, PER MIN: Performed by: TRANSPLANT SURGERY

## 2025-08-14 PROCEDURE — 52310 CYSTOSCOPY AND TREATMENT: CPT | Mod: 58 | Performed by: TRANSPLANT SURGERY

## 2025-08-14 PROCEDURE — 84145 PROCALCITONIN (PCT): CPT | Performed by: NURSE PRACTITIONER

## 2025-08-14 PROCEDURE — 258N000003 HC RX IP 258 OP 636: Performed by: NURSE PRACTITIONER

## 2025-08-14 PROCEDURE — 250N000009 HC RX 250: Performed by: TRANSPLANT SURGERY

## 2025-08-14 PROCEDURE — 84155 ASSAY OF PROTEIN SERUM: CPT

## 2025-08-14 PROCEDURE — 250N000013 HC RX MED GY IP 250 OP 250 PS 637: Performed by: NURSE PRACTITIONER

## 2025-08-14 PROCEDURE — 360N000075 HC SURGERY LEVEL 2, PER MIN: Performed by: TRANSPLANT SURGERY

## 2025-08-14 PROCEDURE — 999N000157 HC STATISTIC RCP TIME EA 10 MIN

## 2025-08-14 PROCEDURE — 258N000003 HC RX IP 258 OP 636: Performed by: ANESTHESIOLOGY

## 2025-08-14 PROCEDURE — 250N000011 HC RX IP 250 OP 636: Performed by: STUDENT IN AN ORGANIZED HEALTH CARE EDUCATION/TRAINING PROGRAM

## 2025-08-14 PROCEDURE — 258N000003 HC RX IP 258 OP 636: Performed by: STUDENT IN AN ORGANIZED HEALTH CARE EDUCATION/TRAINING PROGRAM

## 2025-08-14 PROCEDURE — 120N000011 HC R&B TRANSPLANT UMMC

## 2025-08-14 PROCEDURE — 272N000001 HC OR GENERAL SUPPLY STERILE: Performed by: TRANSPLANT SURGERY

## 2025-08-14 RX ORDER — LIDOCAINE HYDROCHLORIDE 20 MG/ML
JELLY TOPICAL PRN
Status: DISCONTINUED | OUTPATIENT
Start: 2025-08-14 | End: 2025-08-14 | Stop reason: HOSPADM

## 2025-08-14 RX ORDER — MAGNESIUM SULFATE HEPTAHYDRATE 40 MG/ML
4 INJECTION, SOLUTION INTRAVENOUS ONCE
Status: COMPLETED | OUTPATIENT
Start: 2025-08-14 | End: 2025-08-14

## 2025-08-14 RX ORDER — IPRATROPIUM BROMIDE AND ALBUTEROL SULFATE 2.5; .5 MG/3ML; MG/3ML
3 SOLUTION RESPIRATORY (INHALATION)
Status: DISCONTINUED | OUTPATIENT
Start: 2025-08-14 | End: 2025-08-19 | Stop reason: HOSPADM

## 2025-08-14 RX ORDER — CINACALCET 60 MG/1
60 TABLET, FILM COATED ORAL DAILY
Status: DISCONTINUED | OUTPATIENT
Start: 2025-08-14 | End: 2025-08-19 | Stop reason: HOSPADM

## 2025-08-14 RX ORDER — PROPOFOL 10 MG/ML
INJECTION, EMULSION INTRAVENOUS PRN
Status: DISCONTINUED | OUTPATIENT
Start: 2025-08-14 | End: 2025-08-14

## 2025-08-14 RX ORDER — LIDOCAINE HYDROCHLORIDE 20 MG/ML
INJECTION, SOLUTION INFILTRATION; PERINEURAL PRN
Status: DISCONTINUED | OUTPATIENT
Start: 2025-08-14 | End: 2025-08-14

## 2025-08-14 RX ORDER — PREDNISONE 10 MG/1
10 TABLET ORAL DAILY
Status: DISCONTINUED | OUTPATIENT
Start: 2025-08-20 | End: 2025-08-19 | Stop reason: HOSPADM

## 2025-08-14 RX ORDER — SERTRALINE HYDROCHLORIDE 100 MG/1
100 TABLET, FILM COATED ORAL DAILY
Status: DISCONTINUED | OUTPATIENT
Start: 2025-08-14 | End: 2025-08-19 | Stop reason: HOSPADM

## 2025-08-14 RX ORDER — ADHESIVE TAPE 3"X 2.3 YD
500 TAPE, NON-MEDICATED TOPICAL 3 TIMES DAILY
COMMUNITY

## 2025-08-14 RX ORDER — MAGNESIUM SULFATE HEPTAHYDRATE 40 MG/ML
2 INJECTION, SOLUTION INTRAVENOUS ONCE
Status: DISCONTINUED | OUTPATIENT
Start: 2025-08-14 | End: 2025-08-14

## 2025-08-14 RX ORDER — METHOCARBAMOL 500 MG/1
500 TABLET, FILM COATED ORAL EVERY 6 HOURS PRN
Status: DISCONTINUED | OUTPATIENT
Start: 2025-08-14 | End: 2025-08-19 | Stop reason: HOSPADM

## 2025-08-14 RX ORDER — PREDNISONE 5 MG/1
5 TABLET ORAL DAILY
Status: DISCONTINUED | OUTPATIENT
Start: 2025-08-27 | End: 2025-08-19 | Stop reason: HOSPADM

## 2025-08-14 RX ORDER — PANTOPRAZOLE SODIUM 40 MG/1
40 TABLET, DELAYED RELEASE ORAL DAILY
Status: DISCONTINUED | OUTPATIENT
Start: 2025-08-14 | End: 2025-08-19 | Stop reason: HOSPADM

## 2025-08-14 RX ORDER — MAGNESIUM OXIDE 400 MG/1
800 TABLET ORAL 2 TIMES DAILY
Status: DISCONTINUED | OUTPATIENT
Start: 2025-08-14 | End: 2025-08-19 | Stop reason: HOSPADM

## 2025-08-14 RX ORDER — SODIUM CHLORIDE, SODIUM LACTATE, POTASSIUM CHLORIDE, CALCIUM CHLORIDE 600; 310; 30; 20 MG/100ML; MG/100ML; MG/100ML; MG/100ML
INJECTION, SOLUTION INTRAVENOUS CONTINUOUS PRN
Status: DISCONTINUED | OUTPATIENT
Start: 2025-08-14 | End: 2025-08-14

## 2025-08-14 RX ORDER — FERROUS SULFATE 325(65) MG
325 TABLET ORAL
Status: ON HOLD | COMMUNITY
End: 2025-08-19

## 2025-08-14 RX ORDER — PROPOFOL 10 MG/ML
INJECTION, EMULSION INTRAVENOUS CONTINUOUS PRN
Status: DISCONTINUED | OUTPATIENT
Start: 2025-08-14 | End: 2025-08-14

## 2025-08-14 RX ORDER — ATORVASTATIN CALCIUM 10 MG/1
10 TABLET, FILM COATED ORAL DAILY
Status: DISCONTINUED | OUTPATIENT
Start: 2025-08-14 | End: 2025-08-19 | Stop reason: HOSPADM

## 2025-08-14 RX ORDER — BISACODYL 5 MG
5 TABLET, DELAYED RELEASE (ENTERIC COATED) ORAL DAILY PRN
Status: DISCONTINUED | OUTPATIENT
Start: 2025-08-14 | End: 2025-08-19 | Stop reason: HOSPADM

## 2025-08-14 RX ADMIN — MAGNESIUM SULFATE HEPTAHYDRATE 4 G: 40 INJECTION, SOLUTION INTRAVENOUS at 19:20

## 2025-08-14 RX ADMIN — SODIUM BICARBONATE: 84 INJECTION, SOLUTION INTRAVENOUS at 10:07

## 2025-08-14 RX ADMIN — FAMOTIDINE 20 MG: 20 TABLET, FILM COATED ORAL at 07:53

## 2025-08-14 RX ADMIN — MYCOPHENOLATE MOFETIL 750 MG: 250 CAPSULE ORAL at 20:02

## 2025-08-14 RX ADMIN — MYCOPHENOLATE MOFETIL 750 MG: 250 CAPSULE ORAL at 07:52

## 2025-08-14 RX ADMIN — OXYCODONE 2.5 MG: 5 TABLET ORAL at 07:49

## 2025-08-14 RX ADMIN — CEFEPIME HYDROCHLORIDE 2 G: 2 INJECTION, POWDER, FOR SOLUTION INTRAVENOUS at 18:35

## 2025-08-14 RX ADMIN — PANTOPRAZOLE SODIUM 40 MG: 40 TABLET, DELAYED RELEASE ORAL at 18:35

## 2025-08-14 RX ADMIN — PREDNISONE 15 MG: 10 TABLET ORAL at 16:09

## 2025-08-14 RX ADMIN — OXYCODONE HYDROCHLORIDE 5 MG: 5 TABLET ORAL at 23:37

## 2025-08-14 RX ADMIN — CEFEPIME HYDROCHLORIDE 2 G: 2 INJECTION, POWDER, FOR SOLUTION INTRAVENOUS at 01:39

## 2025-08-14 RX ADMIN — VANCOMYCIN HYDROCHLORIDE 750 MG: 1 INJECTION, POWDER, LYOPHILIZED, FOR SOLUTION INTRAVENOUS at 04:56

## 2025-08-14 RX ADMIN — PROPOFOL 30 MG: 10 INJECTION, EMULSION INTRAVENOUS at 10:28

## 2025-08-14 RX ADMIN — IPRATROPIUM BROMIDE AND ALBUTEROL SULFATE 3 ML: .5; 3 SOLUTION RESPIRATORY (INHALATION) at 19:47

## 2025-08-14 RX ADMIN — TACROLIMUS 7 MG: 5 CAPSULE ORAL at 18:35

## 2025-08-14 RX ADMIN — LIDOCAINE HYDROCHLORIDE 60 MG: 20 INJECTION, SOLUTION INFILTRATION; PERINEURAL at 10:27

## 2025-08-14 RX ADMIN — SODIUM CHLORIDE, SODIUM LACTATE, POTASSIUM CHLORIDE, AND CALCIUM CHLORIDE: .6; .31; .03; .02 INJECTION, SOLUTION INTRAVENOUS at 10:26

## 2025-08-14 RX ADMIN — IPRATROPIUM BROMIDE AND ALBUTEROL SULFATE 3 ML: .5; 3 SOLUTION RESPIRATORY (INHALATION) at 14:38

## 2025-08-14 RX ADMIN — ATORVASTATIN CALCIUM 10 MG: 10 TABLET, FILM COATED ORAL at 16:08

## 2025-08-14 RX ADMIN — CINACALCET 60 MG: 60 TABLET ORAL at 14:21

## 2025-08-14 RX ADMIN — ACETAMINOPHEN 650 MG: 325 TABLET ORAL at 20:01

## 2025-08-14 RX ADMIN — TACROLIMUS 7 MG: 5 CAPSULE ORAL at 07:53

## 2025-08-14 RX ADMIN — VANCOMYCIN HYDROCHLORIDE 750 MG: 1 INJECTION, POWDER, LYOPHILIZED, FOR SOLUTION INTRAVENOUS at 16:09

## 2025-08-14 RX ADMIN — FAMOTIDINE 20 MG: 20 TABLET, FILM COATED ORAL at 20:01

## 2025-08-14 RX ADMIN — VALGANCICLOVIR 900 MG: 450 TABLET, FILM COATED ORAL at 07:53

## 2025-08-14 RX ADMIN — SERTRALINE HYDROCHLORIDE 100 MG: 100 TABLET ORAL at 16:08

## 2025-08-14 RX ADMIN — CEFEPIME HYDROCHLORIDE 2 G: 2 INJECTION, POWDER, FOR SOLUTION INTRAVENOUS at 09:29

## 2025-08-14 RX ADMIN — MAGNESIUM OXIDE TAB 400 MG (241.3 MG ELEMENTAL MG) 800 MG: 400 (241.3 MG) TAB at 20:02

## 2025-08-14 RX ADMIN — IPRATROPIUM BROMIDE AND ALBUTEROL SULFATE 3 ML: .5; 3 SOLUTION RESPIRATORY (INHALATION) at 08:17

## 2025-08-14 RX ADMIN — PROPOFOL 80 MCG/KG/MIN: 10 INJECTION, EMULSION INTRAVENOUS at 10:27

## 2025-08-14 ASSESSMENT — ACTIVITIES OF DAILY LIVING (ADL)
ADLS_ACUITY_SCORE: 61
ADLS_ACUITY_SCORE: 61
ADLS_ACUITY_SCORE: 62
ADLS_ACUITY_SCORE: 61
ADLS_ACUITY_SCORE: 60
ADLS_ACUITY_SCORE: 61
ADLS_ACUITY_SCORE: 60
ADLS_ACUITY_SCORE: 62
ADLS_ACUITY_SCORE: 61
ADLS_ACUITY_SCORE: 60
ADLS_ACUITY_SCORE: 62
ADLS_ACUITY_SCORE: 61
ADLS_ACUITY_SCORE: 63
ADLS_ACUITY_SCORE: 61
ADLS_ACUITY_SCORE: 62
ADLS_ACUITY_SCORE: 63
ADLS_ACUITY_SCORE: 60
ADLS_ACUITY_SCORE: 60
ADLS_ACUITY_SCORE: 61
ADLS_ACUITY_SCORE: 60
ADLS_ACUITY_SCORE: 62
ADLS_ACUITY_SCORE: 60
ADLS_ACUITY_SCORE: 60

## 2025-08-15 LAB
ANION GAP SERPL CALCULATED.3IONS-SCNC: 8 MMOL/L (ref 7–15)
BUN SERPL-MCNC: 19.6 MG/DL (ref 6–20)
CALCIUM SERPL-MCNC: 11.3 MG/DL (ref 8.8–10.4)
CHLORIDE SERPL-SCNC: 106 MMOL/L (ref 98–107)
CREAT SERPL-MCNC: 0.94 MG/DL (ref 0.67–1.17)
EGFRCR SERPLBLD CKD-EPI 2021: >90 ML/MIN/1.73M2
ERYTHROCYTE [DISTWIDTH] IN BLOOD BY AUTOMATED COUNT: 14.6 % (ref 10–15)
GLUCOSE SERPL-MCNC: 107 MG/DL (ref 70–99)
HCO3 SERPL-SCNC: 21 MMOL/L (ref 22–29)
HCT VFR BLD AUTO: 24.7 % (ref 40–53)
HGB BLD-MCNC: 8 G/DL (ref 13.3–17.7)
HSV1 IGG SERPL QL IA: 0.08 INDEX
HSV1 IGG SERPL QL IA: NORMAL
HSV2 IGG SERPL QL IA: 0.27 INDEX
HSV2 IGG SERPL QL IA: NORMAL
MAGNESIUM SERPL-MCNC: 2.6 MG/DL (ref 1.7–2.3)
MCH RBC QN AUTO: 31 PG (ref 26.5–33)
MCHC RBC AUTO-ENTMCNC: 32.4 G/DL (ref 31.5–36.5)
MCV RBC AUTO: 95.7 FL (ref 78–100)
PHOSPHATE SERPL-MCNC: 2.7 MG/DL (ref 2.5–4.5)
PLATELET # BLD AUTO: 381 10E3/UL (ref 150–450)
POTASSIUM SERPL-SCNC: 5.1 MMOL/L (ref 3.4–5.3)
RBC # BLD AUTO: 2.58 10E6/UL (ref 4.4–5.9)
SODIUM SERPL-SCNC: 135 MMOL/L (ref 135–145)
TACROLIMUS BLD-MCNC: 24.3 UG/L (ref 5–15)
TME LAST DOSE: ABNORMAL H
TME LAST DOSE: ABNORMAL H
WBC # BLD AUTO: 13.49 10E3/UL (ref 4–11)

## 2025-08-15 PROCEDURE — 120N000011 HC R&B TRANSPLANT UMMC

## 2025-08-15 PROCEDURE — 83735 ASSAY OF MAGNESIUM: CPT | Performed by: NURSE PRACTITIONER

## 2025-08-15 PROCEDURE — 99233 SBSQ HOSP IP/OBS HIGH 50: CPT | Mod: 24 | Performed by: NURSE PRACTITIONER

## 2025-08-15 PROCEDURE — 250N000012 HC RX MED GY IP 250 OP 636 PS 637

## 2025-08-15 PROCEDURE — 250N000013 HC RX MED GY IP 250 OP 250 PS 637

## 2025-08-15 PROCEDURE — 250N000012 HC RX MED GY IP 250 OP 636 PS 637: Performed by: NURSE PRACTITIONER

## 2025-08-15 PROCEDURE — 84100 ASSAY OF PHOSPHORUS: CPT | Performed by: NURSE PRACTITIONER

## 2025-08-15 PROCEDURE — 999N000215 HC STATISTIC HFNC ADULT NON-CPAP

## 2025-08-15 PROCEDURE — 250N000009 HC RX 250: Performed by: NURSE PRACTITIONER

## 2025-08-15 PROCEDURE — 250N000013 HC RX MED GY IP 250 OP 250 PS 637: Performed by: NURSE PRACTITIONER

## 2025-08-15 PROCEDURE — 999N000226 HC STATISTIC SLP IP EVAL DEFER

## 2025-08-15 PROCEDURE — 80197 ASSAY OF TACROLIMUS: CPT | Performed by: NURSE PRACTITIONER

## 2025-08-15 PROCEDURE — 36415 COLL VENOUS BLD VENIPUNCTURE: CPT | Performed by: NURSE PRACTITIONER

## 2025-08-15 PROCEDURE — 94640 AIRWAY INHALATION TREATMENT: CPT | Mod: 76

## 2025-08-15 PROCEDURE — 250N000009 HC RX 250

## 2025-08-15 PROCEDURE — 80048 BASIC METABOLIC PNL TOTAL CA: CPT | Performed by: NURSE PRACTITIONER

## 2025-08-15 PROCEDURE — 250N000011 HC RX IP 250 OP 636

## 2025-08-15 PROCEDURE — 94640 AIRWAY INHALATION TREATMENT: CPT

## 2025-08-15 PROCEDURE — 999N000157 HC STATISTIC RCP TIME EA 10 MIN

## 2025-08-15 PROCEDURE — 86695 HERPES SIMPLEX TYPE 1 TEST: CPT | Performed by: NURSE PRACTITIONER

## 2025-08-15 PROCEDURE — 258N000003 HC RX IP 258 OP 636: Performed by: NURSE PRACTITIONER

## 2025-08-15 PROCEDURE — 85018 HEMOGLOBIN: CPT | Performed by: NURSE PRACTITIONER

## 2025-08-15 RX ORDER — SODIUM BICARBONATE 650 MG/1
650 TABLET ORAL 2 TIMES DAILY
Status: DISCONTINUED | OUTPATIENT
Start: 2025-08-15 | End: 2025-08-16

## 2025-08-15 RX ORDER — SODIUM CHLORIDE 9 MG/ML
INJECTION, SOLUTION INTRAVENOUS CONTINUOUS
Status: ACTIVE | OUTPATIENT
Start: 2025-08-15 | End: 2025-08-15

## 2025-08-15 RX ADMIN — CEFEPIME HYDROCHLORIDE 2 G: 2 INJECTION, POWDER, FOR SOLUTION INTRAVENOUS at 01:14

## 2025-08-15 RX ADMIN — IPRATROPIUM BROMIDE AND ALBUTEROL SULFATE 3 ML: .5; 3 SOLUTION RESPIRATORY (INHALATION) at 20:05

## 2025-08-15 RX ADMIN — PANTOPRAZOLE SODIUM 40 MG: 40 TABLET, DELAYED RELEASE ORAL at 08:07

## 2025-08-15 RX ADMIN — SODIUM CHLORIDE: 0.9 INJECTION, SOLUTION INTRAVENOUS at 11:57

## 2025-08-15 RX ADMIN — SODIUM CHLORIDE: 0.9 INJECTION, SOLUTION INTRAVENOUS at 20:17

## 2025-08-15 RX ADMIN — SERTRALINE HYDROCHLORIDE 100 MG: 100 TABLET ORAL at 08:07

## 2025-08-15 RX ADMIN — ATORVASTATIN CALCIUM 10 MG: 10 TABLET, FILM COATED ORAL at 08:07

## 2025-08-15 RX ADMIN — PREDNISONE 15 MG: 10 TABLET ORAL at 08:07

## 2025-08-15 RX ADMIN — TACROLIMUS 7 MG: 5 CAPSULE ORAL at 08:06

## 2025-08-15 RX ADMIN — MYCOPHENOLATE MOFETIL 750 MG: 250 CAPSULE ORAL at 20:01

## 2025-08-15 RX ADMIN — VALGANCICLOVIR 900 MG: 450 TABLET, FILM COATED ORAL at 08:07

## 2025-08-15 RX ADMIN — MYCOPHENOLATE MOFETIL 750 MG: 250 CAPSULE ORAL at 08:07

## 2025-08-15 RX ADMIN — SODIUM BICARBONATE 650 MG: 650 TABLET ORAL at 11:58

## 2025-08-15 RX ADMIN — ACETAMINOPHEN 650 MG: 325 TABLET ORAL at 13:51

## 2025-08-15 RX ADMIN — SODIUM BICARBONATE 650 MG: 650 TABLET ORAL at 20:01

## 2025-08-15 RX ADMIN — CEFEPIME HYDROCHLORIDE 2 G: 2 INJECTION, POWDER, FOR SOLUTION INTRAVENOUS at 10:19

## 2025-08-15 RX ADMIN — IPRATROPIUM BROMIDE AND ALBUTEROL SULFATE 3 ML: .5; 3 SOLUTION RESPIRATORY (INHALATION) at 16:37

## 2025-08-15 RX ADMIN — CINACALCET 60 MG: 60 TABLET ORAL at 08:07

## 2025-08-15 RX ADMIN — MAGNESIUM OXIDE TAB 400 MG (241.3 MG ELEMENTAL MG) 800 MG: 400 (241.3 MG) TAB at 20:01

## 2025-08-15 RX ADMIN — FAMOTIDINE 20 MG: 20 TABLET, FILM COATED ORAL at 08:07

## 2025-08-15 RX ADMIN — IPRATROPIUM BROMIDE AND ALBUTEROL SULFATE 3 ML: .5; 3 SOLUTION RESPIRATORY (INHALATION) at 01:08

## 2025-08-15 RX ADMIN — IPRATROPIUM BROMIDE AND ALBUTEROL SULFATE 3 ML: .5; 3 SOLUTION RESPIRATORY (INHALATION) at 08:39

## 2025-08-15 RX ADMIN — CEFEPIME HYDROCHLORIDE 2 G: 2 INJECTION, POWDER, FOR SOLUTION INTRAVENOUS at 18:37

## 2025-08-15 RX ADMIN — FAMOTIDINE 20 MG: 20 TABLET, FILM COATED ORAL at 20:01

## 2025-08-15 RX ADMIN — MAGNESIUM OXIDE TAB 400 MG (241.3 MG ELEMENTAL MG) 800 MG: 400 (241.3 MG) TAB at 08:07

## 2025-08-15 RX ADMIN — IPRATROPIUM BROMIDE AND ALBUTEROL SULFATE 3 ML: .5; 3 SOLUTION RESPIRATORY (INHALATION) at 12:05

## 2025-08-15 ASSESSMENT — ACTIVITIES OF DAILY LIVING (ADL)
ADLS_ACUITY_SCORE: 62
DEPENDENT_IADLS:: MEDICATION MANAGEMENT;MONEY MANAGEMENT;TRANSPORTATION
ADLS_ACUITY_SCORE: 62

## 2025-08-16 LAB
ANION GAP SERPL CALCULATED.3IONS-SCNC: 10 MMOL/L (ref 7–15)
BUN SERPL-MCNC: 28.6 MG/DL (ref 6–20)
CALCIUM SERPL-MCNC: 11 MG/DL (ref 8.8–10.4)
CHLORIDE SERPL-SCNC: 111 MMOL/L (ref 98–107)
CREAT SERPL-MCNC: 1 MG/DL (ref 0.67–1.17)
EGFRCR SERPLBLD CKD-EPI 2021: >90 ML/MIN/1.73M2
ERYTHROCYTE [DISTWIDTH] IN BLOOD BY AUTOMATED COUNT: 14.9 % (ref 10–15)
GLUCOSE SERPL-MCNC: 93 MG/DL (ref 70–99)
HCO3 SERPL-SCNC: 17 MMOL/L (ref 22–29)
HCT VFR BLD AUTO: 22.4 % (ref 40–53)
HGB BLD-MCNC: 7 G/DL (ref 13.3–17.7)
MAGNESIUM SERPL-MCNC: 1.5 MG/DL (ref 1.7–2.3)
MCH RBC QN AUTO: 31 PG (ref 26.5–33)
MCHC RBC AUTO-ENTMCNC: 31.3 G/DL (ref 31.5–36.5)
MCV RBC AUTO: 99.1 FL (ref 78–100)
PHOSPHATE SERPL-MCNC: 2.6 MG/DL (ref 2.5–4.5)
PLATELET # BLD AUTO: 394 10E3/UL (ref 150–450)
POTASSIUM SERPL-SCNC: 4.9 MMOL/L (ref 3.4–5.3)
RBC # BLD AUTO: 2.26 10E6/UL (ref 4.4–5.9)
SODIUM SERPL-SCNC: 138 MMOL/L (ref 135–145)
TACROLIMUS BLD-MCNC: 9.6 UG/L (ref 5–15)
TME LAST DOSE: NORMAL H
TME LAST DOSE: NORMAL H
WBC # BLD AUTO: 10.68 10E3/UL (ref 4–11)

## 2025-08-16 PROCEDURE — 80197 ASSAY OF TACROLIMUS: CPT | Performed by: NURSE PRACTITIONER

## 2025-08-16 PROCEDURE — 250N000011 HC RX IP 250 OP 636: Performed by: NURSE PRACTITIONER

## 2025-08-16 PROCEDURE — 250N000013 HC RX MED GY IP 250 OP 250 PS 637

## 2025-08-16 PROCEDURE — 250N000013 HC RX MED GY IP 250 OP 250 PS 637: Performed by: NURSE PRACTITIONER

## 2025-08-16 PROCEDURE — 250N000012 HC RX MED GY IP 250 OP 636 PS 637: Performed by: NURSE PRACTITIONER

## 2025-08-16 PROCEDURE — 94640 AIRWAY INHALATION TREATMENT: CPT | Mod: 76

## 2025-08-16 PROCEDURE — 250N000011 HC RX IP 250 OP 636

## 2025-08-16 PROCEDURE — 80048 BASIC METABOLIC PNL TOTAL CA: CPT | Performed by: NURSE PRACTITIONER

## 2025-08-16 PROCEDURE — 999N000157 HC STATISTIC RCP TIME EA 10 MIN

## 2025-08-16 PROCEDURE — 94642 AEROSOL INHALATION TREATMENT: CPT

## 2025-08-16 PROCEDURE — 36415 COLL VENOUS BLD VENIPUNCTURE: CPT | Performed by: NURSE PRACTITIONER

## 2025-08-16 PROCEDURE — 99233 SBSQ HOSP IP/OBS HIGH 50: CPT | Mod: 24 | Performed by: NURSE PRACTITIONER

## 2025-08-16 PROCEDURE — 83735 ASSAY OF MAGNESIUM: CPT | Performed by: NURSE PRACTITIONER

## 2025-08-16 PROCEDURE — 250N000012 HC RX MED GY IP 250 OP 636 PS 637

## 2025-08-16 PROCEDURE — 120N000011 HC R&B TRANSPLANT UMMC

## 2025-08-16 PROCEDURE — 85018 HEMOGLOBIN: CPT | Performed by: NURSE PRACTITIONER

## 2025-08-16 PROCEDURE — 84100 ASSAY OF PHOSPHORUS: CPT | Performed by: NURSE PRACTITIONER

## 2025-08-16 PROCEDURE — 250N000009 HC RX 250: Performed by: NURSE PRACTITIONER

## 2025-08-16 PROCEDURE — 250N000012 HC RX MED GY IP 250 OP 636 PS 637: Performed by: PHYSICIAN ASSISTANT

## 2025-08-16 PROCEDURE — 94640 AIRWAY INHALATION TREATMENT: CPT

## 2025-08-16 RX ORDER — TACROLIMUS 1 MG/1
4 CAPSULE ORAL
Status: DISCONTINUED | OUTPATIENT
Start: 2025-08-16 | End: 2025-08-18

## 2025-08-16 RX ORDER — MAGNESIUM SULFATE HEPTAHYDRATE 40 MG/ML
2 INJECTION, SOLUTION INTRAVENOUS ONCE
Status: COMPLETED | OUTPATIENT
Start: 2025-08-16 | End: 2025-08-16

## 2025-08-16 RX ORDER — SODIUM BICARBONATE 650 MG/1
1300 TABLET ORAL 2 TIMES DAILY
Status: DISCONTINUED | OUTPATIENT
Start: 2025-08-16 | End: 2025-08-19 | Stop reason: HOSPADM

## 2025-08-16 RX ADMIN — PANTOPRAZOLE SODIUM 40 MG: 40 TABLET, DELAYED RELEASE ORAL at 09:03

## 2025-08-16 RX ADMIN — VALGANCICLOVIR 900 MG: 450 TABLET, FILM COATED ORAL at 09:03

## 2025-08-16 RX ADMIN — IPRATROPIUM BROMIDE AND ALBUTEROL SULFATE 3 ML: .5; 3 SOLUTION RESPIRATORY (INHALATION) at 12:43

## 2025-08-16 RX ADMIN — TACROLIMUS 4 MG: 1 CAPSULE ORAL at 18:24

## 2025-08-16 RX ADMIN — PREDNISONE 15 MG: 10 TABLET ORAL at 09:04

## 2025-08-16 RX ADMIN — IPRATROPIUM BROMIDE AND ALBUTEROL SULFATE 3 ML: .5; 3 SOLUTION RESPIRATORY (INHALATION) at 16:21

## 2025-08-16 RX ADMIN — CINACALCET 60 MG: 60 TABLET ORAL at 09:05

## 2025-08-16 RX ADMIN — FAMOTIDINE 20 MG: 20 TABLET, FILM COATED ORAL at 19:34

## 2025-08-16 RX ADMIN — CEFEPIME HYDROCHLORIDE 2 G: 2 INJECTION, POWDER, FOR SOLUTION INTRAVENOUS at 02:44

## 2025-08-16 RX ADMIN — MYCOPHENOLATE MOFETIL 750 MG: 250 CAPSULE ORAL at 09:03

## 2025-08-16 RX ADMIN — FAMOTIDINE 20 MG: 20 TABLET, FILM COATED ORAL at 09:04

## 2025-08-16 RX ADMIN — MAGNESIUM OXIDE TAB 400 MG (241.3 MG ELEMENTAL MG) 800 MG: 400 (241.3 MG) TAB at 19:34

## 2025-08-16 RX ADMIN — METHOCARBAMOL 500 MG: 500 TABLET ORAL at 02:47

## 2025-08-16 RX ADMIN — MAGNESIUM SULFATE HEPTAHYDRATE 2 G: 2 INJECTION, SOLUTION INTRAVENOUS at 13:34

## 2025-08-16 RX ADMIN — IPRATROPIUM BROMIDE AND ALBUTEROL SULFATE 3 ML: .5; 3 SOLUTION RESPIRATORY (INHALATION) at 20:45

## 2025-08-16 RX ADMIN — MAGNESIUM OXIDE TAB 400 MG (241.3 MG ELEMENTAL MG) 800 MG: 400 (241.3 MG) TAB at 09:03

## 2025-08-16 RX ADMIN — IPRATROPIUM BROMIDE AND ALBUTEROL SULFATE 3 ML: .5; 3 SOLUTION RESPIRATORY (INHALATION) at 08:11

## 2025-08-16 RX ADMIN — MYCOPHENOLATE MOFETIL 750 MG: 250 CAPSULE ORAL at 19:34

## 2025-08-16 RX ADMIN — SODIUM BICARBONATE 650 MG: 650 TABLET ORAL at 09:04

## 2025-08-16 RX ADMIN — CEFEPIME HYDROCHLORIDE 2 G: 2 INJECTION, POWDER, FOR SOLUTION INTRAVENOUS at 10:05

## 2025-08-16 RX ADMIN — SERTRALINE HYDROCHLORIDE 100 MG: 100 TABLET ORAL at 09:04

## 2025-08-16 RX ADMIN — SODIUM BICARBONATE 1300 MG: 650 TABLET ORAL at 19:34

## 2025-08-16 RX ADMIN — ATORVASTATIN CALCIUM 10 MG: 10 TABLET, FILM COATED ORAL at 09:03

## 2025-08-16 RX ADMIN — CEFEPIME HYDROCHLORIDE 2 G: 2 INJECTION, POWDER, FOR SOLUTION INTRAVENOUS at 19:31

## 2025-08-16 ASSESSMENT — ACTIVITIES OF DAILY LIVING (ADL)
ADLS_ACUITY_SCORE: 67
ADLS_ACUITY_SCORE: 62
ADLS_ACUITY_SCORE: 67
ADLS_ACUITY_SCORE: 62
ADLS_ACUITY_SCORE: 67
ADLS_ACUITY_SCORE: 67
ADLS_ACUITY_SCORE: 62
ADLS_ACUITY_SCORE: 62
ADLS_ACUITY_SCORE: 66
ADLS_ACUITY_SCORE: 64
ADLS_ACUITY_SCORE: 67
ADLS_ACUITY_SCORE: 66
ADLS_ACUITY_SCORE: 67
ADLS_ACUITY_SCORE: 62
ADLS_ACUITY_SCORE: 62
ADLS_ACUITY_SCORE: 67
ADLS_ACUITY_SCORE: 67
ADLS_ACUITY_SCORE: 62
ADLS_ACUITY_SCORE: 66
ADLS_ACUITY_SCORE: 62
ADLS_ACUITY_SCORE: 62

## 2025-08-17 LAB
ANION GAP SERPL CALCULATED.3IONS-SCNC: 7 MMOL/L (ref 7–15)
BACTERIA SPT CULT: ABNORMAL
BUN SERPL-MCNC: 22.6 MG/DL (ref 6–20)
CALCIUM SERPL-MCNC: 10.8 MG/DL (ref 8.8–10.4)
CHLORIDE SERPL-SCNC: 108 MMOL/L (ref 98–107)
CREAT SERPL-MCNC: 0.81 MG/DL (ref 0.67–1.17)
EGFRCR SERPLBLD CKD-EPI 2021: >90 ML/MIN/1.73M2
ERYTHROCYTE [DISTWIDTH] IN BLOOD BY AUTOMATED COUNT: 14.6 % (ref 10–15)
GLUCOSE SERPL-MCNC: 97 MG/DL (ref 70–99)
GRAM STAIN RESULT: ABNORMAL
HCO3 SERPL-SCNC: 22 MMOL/L (ref 22–29)
HCT VFR BLD AUTO: 23.2 % (ref 40–53)
HGB BLD-MCNC: 7.2 G/DL (ref 13.3–17.7)
MAGNESIUM SERPL-MCNC: 1.7 MG/DL (ref 1.7–2.3)
MCH RBC QN AUTO: 30.3 PG (ref 26.5–33)
MCHC RBC AUTO-ENTMCNC: 31 G/DL (ref 31.5–36.5)
MCV RBC AUTO: 97.5 FL (ref 78–100)
PHOSPHATE SERPL-MCNC: 2 MG/DL (ref 2.5–4.5)
PLATELET # BLD AUTO: 387 10E3/UL (ref 150–450)
POTASSIUM SERPL-SCNC: 5.1 MMOL/L (ref 3.4–5.3)
RBC # BLD AUTO: 2.38 10E6/UL (ref 4.4–5.9)
SODIUM SERPL-SCNC: 137 MMOL/L (ref 135–145)
TACROLIMUS BLD-MCNC: 6.8 UG/L (ref 5–15)
TME LAST DOSE: NORMAL H
TME LAST DOSE: NORMAL H
WBC # BLD AUTO: 9.01 10E3/UL (ref 4–11)

## 2025-08-17 PROCEDURE — 120N000011 HC R&B TRANSPLANT UMMC

## 2025-08-17 PROCEDURE — 80197 ASSAY OF TACROLIMUS: CPT | Performed by: NURSE PRACTITIONER

## 2025-08-17 PROCEDURE — 250N000012 HC RX MED GY IP 250 OP 636 PS 637: Performed by: NURSE PRACTITIONER

## 2025-08-17 PROCEDURE — 250N000012 HC RX MED GY IP 250 OP 636 PS 637: Performed by: PHYSICIAN ASSISTANT

## 2025-08-17 PROCEDURE — 94667 MNPJ CHEST WALL 1ST: CPT

## 2025-08-17 PROCEDURE — 250N000013 HC RX MED GY IP 250 OP 250 PS 637: Performed by: NURSE PRACTITIONER

## 2025-08-17 PROCEDURE — 84100 ASSAY OF PHOSPHORUS: CPT | Performed by: NURSE PRACTITIONER

## 2025-08-17 PROCEDURE — 999N000248 HC STATISTIC IV INSERT WITH US BY RN

## 2025-08-17 PROCEDURE — 80048 BASIC METABOLIC PNL TOTAL CA: CPT | Performed by: NURSE PRACTITIONER

## 2025-08-17 PROCEDURE — 36415 COLL VENOUS BLD VENIPUNCTURE: CPT | Performed by: NURSE PRACTITIONER

## 2025-08-17 PROCEDURE — 99233 SBSQ HOSP IP/OBS HIGH 50: CPT | Mod: 24 | Performed by: NURSE PRACTITIONER

## 2025-08-17 PROCEDURE — 250N000012 HC RX MED GY IP 250 OP 636 PS 637

## 2025-08-17 PROCEDURE — 999N000157 HC STATISTIC RCP TIME EA 10 MIN

## 2025-08-17 PROCEDURE — 85027 COMPLETE CBC AUTOMATED: CPT | Performed by: NURSE PRACTITIONER

## 2025-08-17 PROCEDURE — 250N000013 HC RX MED GY IP 250 OP 250 PS 637

## 2025-08-17 PROCEDURE — 94640 AIRWAY INHALATION TREATMENT: CPT | Mod: 76

## 2025-08-17 PROCEDURE — 94640 AIRWAY INHALATION TREATMENT: CPT

## 2025-08-17 PROCEDURE — 250N000011 HC RX IP 250 OP 636

## 2025-08-17 PROCEDURE — 250N000009 HC RX 250: Performed by: NURSE PRACTITIONER

## 2025-08-17 PROCEDURE — 83735 ASSAY OF MAGNESIUM: CPT | Performed by: NURSE PRACTITIONER

## 2025-08-17 RX ORDER — LEVOFLOXACIN 750 MG/1
750 TABLET, FILM COATED ORAL DAILY
Status: DISCONTINUED | OUTPATIENT
Start: 2025-08-17 | End: 2025-08-19 | Stop reason: HOSPADM

## 2025-08-17 RX ADMIN — PANTOPRAZOLE SODIUM 40 MG: 40 TABLET, DELAYED RELEASE ORAL at 08:49

## 2025-08-17 RX ADMIN — CEFEPIME HYDROCHLORIDE 2 G: 2 INJECTION, POWDER, FOR SOLUTION INTRAVENOUS at 10:48

## 2025-08-17 RX ADMIN — MYCOPHENOLATE MOFETIL 750 MG: 250 CAPSULE ORAL at 19:35

## 2025-08-17 RX ADMIN — LEVOFLOXACIN 750 MG: 750 TABLET, FILM COATED ORAL at 13:09

## 2025-08-17 RX ADMIN — TACROLIMUS 4 MG: 1 CAPSULE ORAL at 18:09

## 2025-08-17 RX ADMIN — IPRATROPIUM BROMIDE AND ALBUTEROL SULFATE 3 ML: .5; 3 SOLUTION RESPIRATORY (INHALATION) at 16:29

## 2025-08-17 RX ADMIN — TACROLIMUS 4 MG: 1 CAPSULE ORAL at 08:48

## 2025-08-17 RX ADMIN — SODIUM BICARBONATE 1300 MG: 650 TABLET ORAL at 08:48

## 2025-08-17 RX ADMIN — IPRATROPIUM BROMIDE AND ALBUTEROL SULFATE 3 ML: .5; 3 SOLUTION RESPIRATORY (INHALATION) at 19:47

## 2025-08-17 RX ADMIN — IPRATROPIUM BROMIDE AND ALBUTEROL SULFATE 3 ML: .5; 3 SOLUTION RESPIRATORY (INHALATION) at 12:36

## 2025-08-17 RX ADMIN — MYCOPHENOLATE MOFETIL 750 MG: 250 CAPSULE ORAL at 08:48

## 2025-08-17 RX ADMIN — SODIUM BICARBONATE 1300 MG: 650 TABLET ORAL at 19:36

## 2025-08-17 RX ADMIN — CEFEPIME HYDROCHLORIDE 2 G: 2 INJECTION, POWDER, FOR SOLUTION INTRAVENOUS at 02:37

## 2025-08-17 RX ADMIN — ATORVASTATIN CALCIUM 10 MG: 10 TABLET, FILM COATED ORAL at 08:48

## 2025-08-17 RX ADMIN — SODIUM PHOSPHATE, DIBASIC, ANHYDROUS, POTASSIUM PHOSPHATE, MONOBASIC, AND SODIUM PHOSPHATE, MONOBASIC, MONOHYDRATE 250 MG: 852; 155; 130 TABLET, COATED ORAL at 10:48

## 2025-08-17 RX ADMIN — MAGNESIUM OXIDE TAB 400 MG (241.3 MG ELEMENTAL MG) 800 MG: 400 (241.3 MG) TAB at 08:48

## 2025-08-17 RX ADMIN — CINACALCET 60 MG: 60 TABLET ORAL at 08:48

## 2025-08-17 RX ADMIN — SODIUM PHOSPHATE, DIBASIC, ANHYDROUS, POTASSIUM PHOSPHATE, MONOBASIC, AND SODIUM PHOSPHATE, MONOBASIC, MONOHYDRATE 250 MG: 852; 155; 130 TABLET, COATED ORAL at 19:35

## 2025-08-17 RX ADMIN — MAGNESIUM OXIDE TAB 400 MG (241.3 MG ELEMENTAL MG) 800 MG: 400 (241.3 MG) TAB at 19:35

## 2025-08-17 RX ADMIN — PREDNISONE 15 MG: 10 TABLET ORAL at 08:48

## 2025-08-17 RX ADMIN — FAMOTIDINE 20 MG: 20 TABLET, FILM COATED ORAL at 08:48

## 2025-08-17 RX ADMIN — VALGANCICLOVIR 900 MG: 450 TABLET, FILM COATED ORAL at 08:48

## 2025-08-17 RX ADMIN — FAMOTIDINE 20 MG: 20 TABLET, FILM COATED ORAL at 19:36

## 2025-08-17 RX ADMIN — SERTRALINE HYDROCHLORIDE 100 MG: 100 TABLET ORAL at 08:48

## 2025-08-17 RX ADMIN — IPRATROPIUM BROMIDE AND ALBUTEROL SULFATE 3 ML: .5; 3 SOLUTION RESPIRATORY (INHALATION) at 08:21

## 2025-08-17 ASSESSMENT — ACTIVITIES OF DAILY LIVING (ADL)
ADLS_ACUITY_SCORE: 66
ADLS_ACUITY_SCORE: 64
ADLS_ACUITY_SCORE: 66
ADLS_ACUITY_SCORE: 61
ADLS_ACUITY_SCORE: 66
ADLS_ACUITY_SCORE: 61
ADLS_ACUITY_SCORE: 61
ADLS_ACUITY_SCORE: 66

## 2025-08-18 ENCOUNTER — APPOINTMENT (OUTPATIENT)
Dept: GENERAL RADIOLOGY | Facility: CLINIC | Age: 30
DRG: 177 | End: 2025-08-18
Payer: MEDICARE

## 2025-08-18 LAB
ANION GAP SERPL CALCULATED.3IONS-SCNC: 7 MMOL/L (ref 7–15)
BACTERIA SPEC CULT: NO GROWTH
BACTERIA SPEC CULT: NO GROWTH
BUN SERPL-MCNC: 32.3 MG/DL (ref 6–20)
CALCIUM SERPL-MCNC: 10.7 MG/DL (ref 8.8–10.4)
CHLORIDE SERPL-SCNC: 109 MMOL/L (ref 98–107)
CREAT SERPL-MCNC: 0.98 MG/DL (ref 0.67–1.17)
EGFRCR SERPLBLD CKD-EPI 2021: >90 ML/MIN/1.73M2
ERYTHROCYTE [DISTWIDTH] IN BLOOD BY AUTOMATED COUNT: 14.6 % (ref 10–15)
GLUCOSE SERPL-MCNC: 88 MG/DL (ref 70–99)
HCO3 SERPL-SCNC: 21 MMOL/L (ref 22–29)
HCT VFR BLD AUTO: 24.7 % (ref 40–53)
HGB BLD-MCNC: 7.6 G/DL (ref 13.3–17.7)
MAGNESIUM SERPL-MCNC: 1.4 MG/DL (ref 1.7–2.3)
MCH RBC QN AUTO: 30.6 PG (ref 26.5–33)
MCHC RBC AUTO-ENTMCNC: 30.8 G/DL (ref 31.5–36.5)
MCV RBC AUTO: 99.6 FL (ref 78–100)
PHOSPHATE SERPL-MCNC: 2.2 MG/DL (ref 2.5–4.5)
PLATELET # BLD AUTO: 422 10E3/UL (ref 150–450)
POTASSIUM SERPL-SCNC: 5.1 MMOL/L (ref 3.4–5.3)
RBC # BLD AUTO: 2.48 10E6/UL (ref 4.4–5.9)
SODIUM SERPL-SCNC: 137 MMOL/L (ref 135–145)
TACROLIMUS BLD-MCNC: 7.3 UG/L (ref 5–15)
TME LAST DOSE: NORMAL H
TME LAST DOSE: NORMAL H
WBC # BLD AUTO: 9.16 10E3/UL (ref 4–11)

## 2025-08-18 PROCEDURE — 80048 BASIC METABOLIC PNL TOTAL CA: CPT | Performed by: NURSE PRACTITIONER

## 2025-08-18 PROCEDURE — 250N000013 HC RX MED GY IP 250 OP 250 PS 637: Performed by: NURSE PRACTITIONER

## 2025-08-18 PROCEDURE — 84100 ASSAY OF PHOSPHORUS: CPT | Performed by: NURSE PRACTITIONER

## 2025-08-18 PROCEDURE — 80197 ASSAY OF TACROLIMUS: CPT | Performed by: NURSE PRACTITIONER

## 2025-08-18 PROCEDURE — 94668 MNPJ CHEST WALL SBSQ: CPT

## 2025-08-18 PROCEDURE — 83735 ASSAY OF MAGNESIUM: CPT | Performed by: NURSE PRACTITIONER

## 2025-08-18 PROCEDURE — 99233 SBSQ HOSP IP/OBS HIGH 50: CPT | Mod: 24 | Performed by: NURSE PRACTITIONER

## 2025-08-18 PROCEDURE — 250N000011 HC RX IP 250 OP 636

## 2025-08-18 PROCEDURE — 250N000013 HC RX MED GY IP 250 OP 250 PS 637

## 2025-08-18 PROCEDURE — 94640 AIRWAY INHALATION TREATMENT: CPT | Mod: 76

## 2025-08-18 PROCEDURE — 36415 COLL VENOUS BLD VENIPUNCTURE: CPT | Performed by: NURSE PRACTITIONER

## 2025-08-18 PROCEDURE — 71045 X-RAY EXAM CHEST 1 VIEW: CPT

## 2025-08-18 PROCEDURE — 250N000012 HC RX MED GY IP 250 OP 636 PS 637: Performed by: PHYSICIAN ASSISTANT

## 2025-08-18 PROCEDURE — 250N000012 HC RX MED GY IP 250 OP 636 PS 637

## 2025-08-18 PROCEDURE — 999N000157 HC STATISTIC RCP TIME EA 10 MIN

## 2025-08-18 PROCEDURE — 71045 X-RAY EXAM CHEST 1 VIEW: CPT | Mod: 26 | Performed by: RADIOLOGY

## 2025-08-18 PROCEDURE — 120N000011 HC R&B TRANSPLANT UMMC

## 2025-08-18 PROCEDURE — 85027 COMPLETE CBC AUTOMATED: CPT | Performed by: NURSE PRACTITIONER

## 2025-08-18 PROCEDURE — 250N000012 HC RX MED GY IP 250 OP 636 PS 637: Performed by: NURSE PRACTITIONER

## 2025-08-18 PROCEDURE — 94640 AIRWAY INHALATION TREATMENT: CPT

## 2025-08-18 PROCEDURE — 250N000009 HC RX 250: Performed by: NURSE PRACTITIONER

## 2025-08-18 RX ORDER — MAGNESIUM SULFATE HEPTAHYDRATE 40 MG/ML
2 INJECTION, SOLUTION INTRAVENOUS ONCE
Status: COMPLETED | OUTPATIENT
Start: 2025-08-18 | End: 2025-08-18

## 2025-08-18 RX ADMIN — FAMOTIDINE 20 MG: 20 TABLET, FILM COATED ORAL at 08:28

## 2025-08-18 RX ADMIN — IPRATROPIUM BROMIDE AND ALBUTEROL SULFATE 3 ML: .5; 3 SOLUTION RESPIRATORY (INHALATION) at 22:34

## 2025-08-18 RX ADMIN — IPRATROPIUM BROMIDE AND ALBUTEROL SULFATE 3 ML: .5; 3 SOLUTION RESPIRATORY (INHALATION) at 12:18

## 2025-08-18 RX ADMIN — MAGNESIUM OXIDE TAB 400 MG (241.3 MG ELEMENTAL MG) 800 MG: 400 (241.3 MG) TAB at 20:11

## 2025-08-18 RX ADMIN — LEVOFLOXACIN 750 MG: 750 TABLET, FILM COATED ORAL at 08:29

## 2025-08-18 RX ADMIN — MAGNESIUM SULFATE HEPTAHYDRATE 2 G: 2 INJECTION, SOLUTION INTRAVENOUS at 09:50

## 2025-08-18 RX ADMIN — SODIUM BICARBONATE 1300 MG: 650 TABLET ORAL at 20:11

## 2025-08-18 RX ADMIN — FAMOTIDINE 20 MG: 20 TABLET, FILM COATED ORAL at 20:11

## 2025-08-18 RX ADMIN — SERTRALINE HYDROCHLORIDE 100 MG: 100 TABLET ORAL at 08:29

## 2025-08-18 RX ADMIN — ATORVASTATIN CALCIUM 10 MG: 10 TABLET, FILM COATED ORAL at 08:28

## 2025-08-18 RX ADMIN — MAGNESIUM OXIDE TAB 400 MG (241.3 MG ELEMENTAL MG) 800 MG: 400 (241.3 MG) TAB at 08:28

## 2025-08-18 RX ADMIN — ACETAMINOPHEN 650 MG: 325 TABLET ORAL at 15:32

## 2025-08-18 RX ADMIN — SODIUM BICARBONATE 1300 MG: 650 TABLET ORAL at 08:29

## 2025-08-18 RX ADMIN — IPRATROPIUM BROMIDE AND ALBUTEROL SULFATE 3 ML: .5; 3 SOLUTION RESPIRATORY (INHALATION) at 08:09

## 2025-08-18 RX ADMIN — MYCOPHENOLATE MOFETIL 750 MG: 250 CAPSULE ORAL at 20:11

## 2025-08-18 RX ADMIN — PANTOPRAZOLE SODIUM 40 MG: 40 TABLET, DELAYED RELEASE ORAL at 08:28

## 2025-08-18 RX ADMIN — TACROLIMUS 4.5 MG: 1 CAPSULE ORAL at 17:36

## 2025-08-18 RX ADMIN — IPRATROPIUM BROMIDE AND ALBUTEROL SULFATE 3 ML: .5; 3 SOLUTION RESPIRATORY (INHALATION) at 17:10

## 2025-08-18 RX ADMIN — PREDNISONE 15 MG: 10 TABLET ORAL at 08:29

## 2025-08-18 RX ADMIN — TACROLIMUS 4 MG: 1 CAPSULE ORAL at 08:28

## 2025-08-18 RX ADMIN — METHOCARBAMOL 500 MG: 500 TABLET ORAL at 02:00

## 2025-08-18 RX ADMIN — CINACALCET 60 MG: 60 TABLET ORAL at 08:28

## 2025-08-18 RX ADMIN — MYCOPHENOLATE MOFETIL 750 MG: 250 CAPSULE ORAL at 08:28

## 2025-08-18 RX ADMIN — SODIUM PHOSPHATE, DIBASIC, ANHYDROUS, POTASSIUM PHOSPHATE, MONOBASIC, AND SODIUM PHOSPHATE, MONOBASIC, MONOHYDRATE 500 MG: 852; 155; 130 TABLET, COATED ORAL at 20:11

## 2025-08-18 RX ADMIN — SODIUM PHOSPHATE, DIBASIC, ANHYDROUS, POTASSIUM PHOSPHATE, MONOBASIC, AND SODIUM PHOSPHATE, MONOBASIC, MONOHYDRATE 250 MG: 852; 155; 130 TABLET, COATED ORAL at 08:28

## 2025-08-18 RX ADMIN — VALGANCICLOVIR 900 MG: 450 TABLET, FILM COATED ORAL at 08:28

## 2025-08-18 RX ADMIN — ACETAMINOPHEN 650 MG: 325 TABLET ORAL at 21:48

## 2025-08-18 ASSESSMENT — ACTIVITIES OF DAILY LIVING (ADL)
DRESSING/BATHING_DIFFICULTY: NO
ADLS_ACUITY_SCORE: 52
ADLS_ACUITY_SCORE: 52
ADLS_ACUITY_SCORE: 61
ADLS_ACUITY_SCORE: 61
ADLS_ACUITY_SCORE: 52
WALKING_OR_CLIMBING_STAIRS_DIFFICULTY: NO
DOING_ERRANDS_INDEPENDENTLY_DIFFICULTY: YES
ADLS_ACUITY_SCORE: 61
ADLS_ACUITY_SCORE: 61
CHANGE_IN_FUNCTIONAL_STATUS_SINCE_ONSET_OF_CURRENT_ILLNESS/INJURY: NO
ADLS_ACUITY_SCORE: 61
ADLS_ACUITY_SCORE: 52
FALL_HISTORY_WITHIN_LAST_SIX_MONTHS: NO
TOILETING_ISSUES: NO
ADLS_ACUITY_SCORE: 61
ADLS_ACUITY_SCORE: 52
ADLS_ACUITY_SCORE: 61
ADLS_ACUITY_SCORE: 52
ADLS_ACUITY_SCORE: 52
ADLS_ACUITY_SCORE: 61
ADLS_ACUITY_SCORE: 61
ADLS_ACUITY_SCORE: 52

## 2025-08-19 VITALS
TEMPERATURE: 97.8 F | SYSTOLIC BLOOD PRESSURE: 128 MMHG | RESPIRATION RATE: 18 BRPM | BODY MASS INDEX: 17.94 KG/M2 | DIASTOLIC BLOOD PRESSURE: 86 MMHG | OXYGEN SATURATION: 100 % | WEIGHT: 95 LBS | HEIGHT: 61 IN | HEART RATE: 76 BPM

## 2025-08-19 PROBLEM — E83.42 HYPOMAGNESEMIA: Status: ACTIVE | Noted: 2025-08-19

## 2025-08-19 LAB
ANION GAP SERPL CALCULATED.3IONS-SCNC: 8 MMOL/L (ref 7–15)
BUN SERPL-MCNC: 29.3 MG/DL (ref 6–20)
CALCIUM SERPL-MCNC: 10.8 MG/DL (ref 8.8–10.4)
CHLORIDE SERPL-SCNC: 107 MMOL/L (ref 98–107)
CREAT SERPL-MCNC: 0.87 MG/DL (ref 0.67–1.17)
EGFRCR SERPLBLD CKD-EPI 2021: >90 ML/MIN/1.73M2
ERYTHROCYTE [DISTWIDTH] IN BLOOD BY AUTOMATED COUNT: 14.8 % (ref 10–15)
GLUCOSE SERPL-MCNC: 91 MG/DL (ref 70–99)
HCO3 SERPL-SCNC: 21 MMOL/L (ref 22–29)
HCT VFR BLD AUTO: 23.9 % (ref 40–53)
HGB BLD-MCNC: 7.5 G/DL (ref 13.3–17.7)
MAGNESIUM SERPL-MCNC: 1.5 MG/DL (ref 1.7–2.3)
MCH RBC QN AUTO: 30.2 PG (ref 26.5–33)
MCHC RBC AUTO-ENTMCNC: 31.4 G/DL (ref 31.5–36.5)
MCV RBC AUTO: 96.4 FL (ref 78–100)
PHOSPHATE SERPL-MCNC: 2.3 MG/DL (ref 2.5–4.5)
PLATELET # BLD AUTO: 421 10E3/UL (ref 150–450)
POTASSIUM SERPL-SCNC: 4.8 MMOL/L (ref 3.4–5.3)
RBC # BLD AUTO: 2.48 10E6/UL (ref 4.4–5.9)
SODIUM SERPL-SCNC: 136 MMOL/L (ref 135–145)
TACROLIMUS BLD-MCNC: 6.1 UG/L (ref 5–15)
TME LAST DOSE: NORMAL H
TME LAST DOSE: NORMAL H
WBC # BLD AUTO: 8.31 10E3/UL (ref 4–11)

## 2025-08-19 PROCEDURE — 80197 ASSAY OF TACROLIMUS: CPT | Performed by: NURSE PRACTITIONER

## 2025-08-19 PROCEDURE — 99233 SBSQ HOSP IP/OBS HIGH 50: CPT | Mod: 24 | Performed by: NURSE PRACTITIONER

## 2025-08-19 PROCEDURE — 999N000157 HC STATISTIC RCP TIME EA 10 MIN

## 2025-08-19 PROCEDURE — 250N000012 HC RX MED GY IP 250 OP 636 PS 637: Performed by: NURSE PRACTITIONER

## 2025-08-19 PROCEDURE — 250N000013 HC RX MED GY IP 250 OP 250 PS 637: Performed by: NURSE PRACTITIONER

## 2025-08-19 PROCEDURE — 250N000009 HC RX 250: Performed by: NURSE PRACTITIONER

## 2025-08-19 PROCEDURE — 250N000013 HC RX MED GY IP 250 OP 250 PS 637

## 2025-08-19 PROCEDURE — 94640 AIRWAY INHALATION TREATMENT: CPT

## 2025-08-19 PROCEDURE — 250N000011 HC RX IP 250 OP 636: Performed by: PHYSICIAN ASSISTANT

## 2025-08-19 PROCEDURE — 94640 AIRWAY INHALATION TREATMENT: CPT | Mod: 76

## 2025-08-19 PROCEDURE — 250N000012 HC RX MED GY IP 250 OP 636 PS 637

## 2025-08-19 PROCEDURE — 258N000003 HC RX IP 258 OP 636

## 2025-08-19 PROCEDURE — 84100 ASSAY OF PHOSPHORUS: CPT | Performed by: NURSE PRACTITIONER

## 2025-08-19 PROCEDURE — 80048 BASIC METABOLIC PNL TOTAL CA: CPT | Performed by: NURSE PRACTITIONER

## 2025-08-19 PROCEDURE — 85018 HEMOGLOBIN: CPT | Performed by: NURSE PRACTITIONER

## 2025-08-19 PROCEDURE — 83735 ASSAY OF MAGNESIUM: CPT | Performed by: NURSE PRACTITIONER

## 2025-08-19 PROCEDURE — 36415 COLL VENOUS BLD VENIPUNCTURE: CPT | Performed by: NURSE PRACTITIONER

## 2025-08-19 RX ORDER — TACROLIMUS 0.5 MG/1
CAPSULE ORAL
Qty: 60 CAPSULE | Refills: 11 | Status: ACTIVE | OUTPATIENT
Start: 2025-08-19

## 2025-08-19 RX ORDER — TACROLIMUS 0.5 MG/1
4 CAPSULE ORAL 2 TIMES DAILY
Qty: 480 CAPSULE | Refills: 11 | Status: ACTIVE | OUTPATIENT
Start: 2025-08-19 | End: 2025-08-19

## 2025-08-19 RX ORDER — LEVOFLOXACIN 750 MG/1
750 TABLET, FILM COATED ORAL DAILY
Qty: 2 TABLET | Refills: 0 | Status: ACTIVE | OUTPATIENT
Start: 2025-08-20 | End: 2025-08-22

## 2025-08-19 RX ORDER — MAGNESIUM SULFATE HEPTAHYDRATE 40 MG/ML
2 INJECTION, SOLUTION INTRAVENOUS ONCE
Status: COMPLETED | OUTPATIENT
Start: 2025-08-19 | End: 2025-08-19

## 2025-08-19 RX ORDER — TACROLIMUS 5 MG/1
5 CAPSULE ORAL
Status: DISCONTINUED | OUTPATIENT
Start: 2025-08-19 | End: 2025-08-19 | Stop reason: HOSPADM

## 2025-08-19 RX ORDER — TACROLIMUS 1 MG/1
5 CAPSULE ORAL 2 TIMES DAILY
Qty: 300 CAPSULE | Refills: 11 | Status: ACTIVE | OUTPATIENT
Start: 2025-08-19 | End: 2025-08-26

## 2025-08-19 RX ORDER — PREDNISONE 10 MG/1
TABLET ORAL
Status: SHIPPED
Start: 2025-08-20 | End: 2025-09-03

## 2025-08-19 RX ORDER — AMOXICILLIN 250 MG
1-2 CAPSULE ORAL 2 TIMES DAILY PRN
COMMUNITY
Start: 2025-08-19

## 2025-08-19 RX ORDER — TACROLIMUS 0.5 MG/1
0.5 CAPSULE ORAL 2 TIMES DAILY
Qty: 60 CAPSULE | Refills: 11 | Status: ACTIVE | OUTPATIENT
Start: 2025-08-19 | End: 2025-08-19

## 2025-08-19 RX ORDER — SODIUM BICARBONATE 650 MG/1
1300 TABLET ORAL 2 TIMES DAILY
Qty: 120 TABLET | Refills: 3 | Status: SHIPPED | OUTPATIENT
Start: 2025-08-19

## 2025-08-19 RX ORDER — TACROLIMUS 1 MG/1
4 CAPSULE ORAL 2 TIMES DAILY
Qty: 240 CAPSULE | Refills: 11 | Status: SHIPPED | OUTPATIENT
Start: 2025-08-19 | End: 2025-08-19

## 2025-08-19 RX ADMIN — IPRATROPIUM BROMIDE AND ALBUTEROL SULFATE 3 ML: .5; 3 SOLUTION RESPIRATORY (INHALATION) at 12:29

## 2025-08-19 RX ADMIN — TACROLIMUS 4.5 MG: 1 CAPSULE ORAL at 08:48

## 2025-08-19 RX ADMIN — LEVOFLOXACIN 750 MG: 750 TABLET, FILM COATED ORAL at 08:50

## 2025-08-19 RX ADMIN — IPRATROPIUM BROMIDE AND ALBUTEROL SULFATE 3 ML: .5; 3 SOLUTION RESPIRATORY (INHALATION) at 08:24

## 2025-08-19 RX ADMIN — SODIUM PHOSPHATE, DIBASIC, ANHYDROUS, POTASSIUM PHOSPHATE, MONOBASIC, AND SODIUM PHOSPHATE, MONOBASIC, MONOHYDRATE 500 MG: 852; 155; 130 TABLET, COATED ORAL at 08:49

## 2025-08-19 RX ADMIN — VALGANCICLOVIR 900 MG: 450 TABLET, FILM COATED ORAL at 08:49

## 2025-08-19 RX ADMIN — SERTRALINE HYDROCHLORIDE 100 MG: 100 TABLET ORAL at 08:52

## 2025-08-19 RX ADMIN — MAGNESIUM SULFATE HEPTAHYDRATE 2 G: 2 INJECTION, SOLUTION INTRAVENOUS at 11:00

## 2025-08-19 RX ADMIN — PANTOPRAZOLE SODIUM 40 MG: 40 TABLET, DELAYED RELEASE ORAL at 08:52

## 2025-08-19 RX ADMIN — SODIUM CHLORIDE, SODIUM LACTATE, POTASSIUM CHLORIDE, AND CALCIUM CHLORIDE 500 ML: .6; .31; .03; .02 INJECTION, SOLUTION INTRAVENOUS at 03:22

## 2025-08-19 RX ADMIN — ATORVASTATIN CALCIUM 10 MG: 10 TABLET, FILM COATED ORAL at 08:50

## 2025-08-19 RX ADMIN — PREDNISONE 15 MG: 10 TABLET ORAL at 08:51

## 2025-08-19 RX ADMIN — SULFAMETHOXAZOLE AND TRIMETHOPRIM 1 TABLET: 400; 80 TABLET ORAL at 08:52

## 2025-08-19 RX ADMIN — MAGNESIUM OXIDE TAB 400 MG (241.3 MG ELEMENTAL MG) 800 MG: 400 (241.3 MG) TAB at 08:50

## 2025-08-19 RX ADMIN — SODIUM BICARBONATE 1300 MG: 650 TABLET ORAL at 08:51

## 2025-08-19 RX ADMIN — FAMOTIDINE 20 MG: 20 TABLET, FILM COATED ORAL at 08:52

## 2025-08-19 RX ADMIN — CINACALCET 60 MG: 60 TABLET ORAL at 08:56

## 2025-08-19 RX ADMIN — MYCOPHENOLATE MOFETIL 750 MG: 250 CAPSULE ORAL at 08:49

## 2025-08-19 ASSESSMENT — ACTIVITIES OF DAILY LIVING (ADL)
ADLS_ACUITY_SCORE: 53
ADLS_ACUITY_SCORE: 51

## 2025-08-19 ASSESSMENT — VISUAL ACUITY
OD: 20/20
OS: 20/20

## 2025-08-20 ENCOUNTER — TELEPHONE (OUTPATIENT)
Dept: PHARMACY | Facility: OTHER | Age: 30
End: 2025-08-20
Payer: MEDICARE

## 2025-08-22 ENCOUNTER — EXTERNAL ORDER RESULTS (OUTPATIENT)
Dept: LAB | Facility: CLINIC | Age: 30
End: 2025-08-22
Payer: MEDICARE

## 2025-08-22 ENCOUNTER — TELEPHONE (OUTPATIENT)
Dept: FAMILY MEDICINE | Facility: CLINIC | Age: 30
End: 2025-08-22
Payer: MEDICARE

## 2025-08-25 ENCOUNTER — EXTERNAL ORDER RESULTS (OUTPATIENT)
Dept: LAB | Facility: CLINIC | Age: 30
End: 2025-08-25
Payer: MEDICARE

## 2025-08-25 LAB
ABSOLUTE BASOPHILS (EXTERNAL): 0.07 X10(9)/L (ref 0.01–0.08)
ABSOLUTE EOSINOPHILS (EXTERNAL): 0.13 X10(9)/L (ref 0.03–0.48)
ABSOLUTE LYMPHOCYTES (EXTERNAL): 3.23 X10(9)/L (ref 0.95–3.07)
ABSOLUTE MONOCYTES (EXTERNAL): 0.35 X10(9)/L (ref 0.26–0.81)
ABSOLUTE NEUTROPHILS (EXTERNAL): 4.97 X10(9)/L (ref 1.56–6.45)

## 2025-08-26 ENCOUNTER — TELEPHONE (OUTPATIENT)
Dept: TRANSPLANT | Facility: CLINIC | Age: 30
End: 2025-08-26
Payer: MEDICARE

## 2025-08-26 DIAGNOSIS — Z94.0 KIDNEY TRANSPLANT RECIPIENT: ICD-10-CM

## 2025-08-26 LAB
ABSOLUTE BASOPHILS (EXTERNAL): 0.08 X10(9)/L (ref 0.01–0.08)
ABSOLUTE EOSINOPHILS (EXTERNAL): 0.12 X10(9)/L (ref 0.03–0.48)
ABSOLUTE LYMPHOCYTES (EXTERNAL): 2.76 X10(9)/L (ref 0.95–3.07)
ABSOLUTE MONOCYTES (EXTERNAL): 0.27 X10(9)/L (ref 0.26–0.81)
ABSOLUTE NEUTROPHILS (EXTERNAL): 6.14 X10(9)/L (ref 1.56–6.45)

## 2025-08-26 RX ORDER — TACROLIMUS 5 MG/1
5 CAPSULE ORAL 2 TIMES DAILY
Qty: 60 CAPSULE | Refills: 11 | Status: SHIPPED | OUTPATIENT
Start: 2025-08-26

## 2025-08-26 RX ORDER — TACROLIMUS 1 MG/1
1 CAPSULE ORAL 2 TIMES DAILY
Qty: 60 CAPSULE | Refills: 11 | Status: SHIPPED | OUTPATIENT
Start: 2025-08-26

## 2025-08-27 ENCOUNTER — PATIENT OUTREACH (OUTPATIENT)
Dept: CARE COORDINATION | Facility: CLINIC | Age: 30
End: 2025-08-27
Payer: MEDICARE

## 2025-08-27 ENCOUNTER — TELEPHONE (OUTPATIENT)
Dept: TRANSPLANT | Facility: CLINIC | Age: 30
End: 2025-08-27
Payer: MEDICARE

## 2025-08-27 DIAGNOSIS — D63.8 ANEMIA IN OTHER CHRONIC DISEASES CLASSIFIED ELSEWHERE: Primary | ICD-10-CM

## 2025-08-27 DIAGNOSIS — Z94.0 KIDNEY REPLACED BY TRANSPLANT: ICD-10-CM

## 2025-08-27 DIAGNOSIS — Z48.298 AFTERCARE FOLLOWING ORGAN TRANSPLANT: Primary | ICD-10-CM

## 2025-08-27 RX ORDER — DIPHENHYDRAMINE HYDROCHLORIDE 50 MG/ML
25 INJECTION, SOLUTION INTRAMUSCULAR; INTRAVENOUS
Start: 2025-08-27

## 2025-08-27 RX ORDER — DIPHENHYDRAMINE HYDROCHLORIDE 50 MG/ML
50 INJECTION, SOLUTION INTRAMUSCULAR; INTRAVENOUS
Start: 2025-08-27

## 2025-08-27 RX ORDER — ALBUTEROL SULFATE 90 UG/1
1-2 INHALANT RESPIRATORY (INHALATION)
Start: 2025-08-27

## 2025-08-27 RX ORDER — MEPERIDINE HYDROCHLORIDE 25 MG/ML
25 INJECTION INTRAMUSCULAR; INTRAVENOUS; SUBCUTANEOUS
OUTPATIENT
Start: 2025-08-27

## 2025-08-27 RX ORDER — EPINEPHRINE 1 MG/ML
0.3 INJECTION, SOLUTION, CONCENTRATE INTRAVENOUS EVERY 5 MIN PRN
OUTPATIENT
Start: 2025-08-27

## 2025-08-27 RX ORDER — ALBUTEROL SULFATE 0.83 MG/ML
2.5 SOLUTION RESPIRATORY (INHALATION)
OUTPATIENT
Start: 2025-08-27

## 2025-08-27 RX ORDER — METHYLPREDNISOLONE SODIUM SUCCINATE 40 MG/ML
40 INJECTION INTRAMUSCULAR; INTRAVENOUS
Start: 2025-08-27

## 2025-08-28 ENCOUNTER — EXTERNAL ORDER RESULTS (OUTPATIENT)
Dept: LAB | Facility: CLINIC | Age: 30
End: 2025-08-28
Payer: MEDICARE

## 2025-08-30 ENCOUNTER — TELEPHONE (OUTPATIENT)
Dept: TRANSPLANT | Facility: CLINIC | Age: 30
End: 2025-08-30
Payer: MEDICARE

## 2025-08-30 DIAGNOSIS — Z94.0 KIDNEY TRANSPLANT RECIPIENT: ICD-10-CM

## 2025-08-30 RX ORDER — CINACALCET 30 MG/1
60 TABLET, FILM COATED ORAL DAILY
Qty: 10 TABLET | Refills: 0 | OUTPATIENT
Start: 2025-08-30 | End: 2025-08-30

## 2025-08-30 RX ORDER — CINACALCET 30 MG/1
60 TABLET, FILM COATED ORAL DAILY
Qty: 60 TABLET | Refills: 11 | Status: SHIPPED | OUTPATIENT
Start: 2025-08-30

## 2025-09-01 ENCOUNTER — EXTERNAL ORDER RESULTS (OUTPATIENT)
Dept: LAB | Facility: CLINIC | Age: 30
End: 2025-09-01
Payer: MEDICARE

## 2025-09-02 ENCOUNTER — TELEPHONE (OUTPATIENT)
Dept: TRANSPLANT | Facility: CLINIC | Age: 30
End: 2025-09-02
Payer: MEDICARE

## 2025-09-02 LAB
ABSOLUTE BASOPHILS (EXTERNAL): 0.06 X10(9)/L (ref 0.01–0.08)
ABSOLUTE EOSINOPHILS (EXTERNAL): 0.09 X10(9)/L (ref 0.03–0.48)
ABSOLUTE LYMPHOCYTES (EXTERNAL): 3.05 X10(9)/L (ref 0.95–3.07)
ABSOLUTE MONOCYTES (EXTERNAL): 0.27 X10(9)/L (ref 0.26–0.81)
ABSOLUTE NEUTROPHILS (EXTERNAL): 4.3 X10(9)/L (ref 1.56–6.45)

## 2025-09-03 LAB
ABSOLUTE BASOPHILS (EXTERNAL): 0.08 X10(9)/L (ref 0.01–0.08)
ABSOLUTE EOSINOPHILS (EXTERNAL): 0.11 X10(9)/L (ref 0.03–0.48)
ABSOLUTE LYMPHOCYTES (EXTERNAL): 2.91 X10(9)/L (ref 0.95–3.07)
ABSOLUTE MONOCYTES (EXTERNAL): 0.21 X10(9)/L (ref 0.26–0.81)
ABSOLUTE NEUTROPHILS (EXTERNAL): 3.99 X10(9)/L (ref 1.56–6.45)

## 2025-09-04 ENCOUNTER — TELEPHONE (OUTPATIENT)
Dept: PHARMACY | Facility: CLINIC | Age: 30
End: 2025-09-04

## 2025-09-04 ENCOUNTER — PATIENT OUTREACH (OUTPATIENT)
Dept: CARE COORDINATION | Facility: CLINIC | Age: 30
End: 2025-09-04

## (undated) DEVICE — Device

## (undated) DEVICE — TRAY CATH SURESTEP OD14 FR INTERMITTENT STER LTX INTS14

## (undated) DEVICE — SU SILK 0 TIE 6X30" A306H

## (undated) DEVICE — CONNECTOR WATER VALVE PERFUSION PACK STR 020272801

## (undated) DEVICE — DRAPE MAYO STAND 23X54 8337

## (undated) DEVICE — CATH ROB NEL 16FR LATEX FREE 8888492058

## (undated) DEVICE — SU VICRYL 3-0 SH 27" UND J416H

## (undated) DEVICE — DRSG PRIMAPORE 02X3" 7133

## (undated) DEVICE — PREP POVIDONE IODINE SOLUTION 10% 4OZ BOTTLE 29906-004

## (undated) DEVICE — SU MONOCRYL 4-0 PS-2 27" UND Y426H

## (undated) DEVICE — LINEN TOWEL PACK X5 5464

## (undated) DEVICE — TUBING IRRIG CYSTO/BLADDER SET 81" LF 2C4040

## (undated) DEVICE — SYR 10ML LL W/O NDL 302995

## (undated) DEVICE — ESU GROUND PAD ADULT W/CORD E7507

## (undated) DEVICE — SOL WATER IRRIG 1000ML BOTTLE 2F7114

## (undated) DEVICE — PAD CHUX UNDERPAD 23X24" 7136

## (undated) DEVICE — SOL ADH LIQUID BENZOIN SWAB 0.6ML C1544

## (undated) DEVICE — PREP CHLORAPREP 26ML TINTED HI-LITE ORANGE 930815

## (undated) DEVICE — BASIN EMESIS STERILE  SSK9005A

## (undated) DEVICE — SOL NACL 0.9% IRRIG 1000ML BOTTLE 2F7124

## (undated) DEVICE — PREP POVIDONE IODINE SCRUB 7.5% 4OZ APL82212

## (undated) DEVICE — SPONGE RAY-TEC 4X8" 7318

## (undated) DEVICE — SUCTION MANIFOLD NEPTUNE 2 SYS 4 PORT 0702-020-000

## (undated) DEVICE — PREP SKIN SCRUB TRAY 4461A

## (undated) DEVICE — DRAPE LAP W/ARMBOARD 29410

## (undated) DEVICE — DRSG STERI STRIP 1/2X4" R1547

## (undated) RX ORDER — SODIUM CHLORIDE, SODIUM LACTATE, POTASSIUM CHLORIDE, CALCIUM CHLORIDE 600; 310; 30; 20 MG/100ML; MG/100ML; MG/100ML; MG/100ML
INJECTION, SOLUTION INTRAVENOUS
Status: DISPENSED
Start: 2023-04-07

## (undated) RX ORDER — SULFAMETHOXAZOLE/TRIMETHOPRIM 800-160 MG
TABLET ORAL
Status: DISPENSED
Start: 2020-11-30

## (undated) RX ORDER — METRONIDAZOLE 500 MG/100ML
INJECTION, SOLUTION INTRAVENOUS
Status: DISPENSED
Start: 2023-04-07

## (undated) RX ORDER — FENTANYL CITRATE 50 UG/ML
INJECTION, SOLUTION INTRAMUSCULAR; INTRAVENOUS
Status: DISPENSED
Start: 2023-04-07

## (undated) RX ORDER — BUPIVACAINE HYDROCHLORIDE 2.5 MG/ML
INJECTION, SOLUTION EPIDURAL; INFILTRATION; INTRACAUDAL
Status: DISPENSED
Start: 2023-04-07

## (undated) RX ORDER — TRIAMCINOLONE ACETONIDE 40 MG/ML
INJECTION, SUSPENSION INTRA-ARTICULAR; INTRAMUSCULAR
Status: DISPENSED
Start: 2019-08-02

## (undated) RX ORDER — CEFAZOLIN SODIUM 1 G/3ML
INJECTION, POWDER, FOR SOLUTION INTRAMUSCULAR; INTRAVENOUS
Status: DISPENSED
Start: 2023-04-14

## (undated) RX ORDER — LIDOCAINE HYDROCHLORIDE 10 MG/ML
INJECTION, SOLUTION EPIDURAL; INFILTRATION; INTRACAUDAL; PERINEURAL
Status: DISPENSED
Start: 2023-04-14

## (undated) RX ORDER — TRIAMCINOLONE ACETONIDE 40 MG/ML
INJECTION, SUSPENSION INTRA-ARTICULAR; INTRAMUSCULAR
Status: DISPENSED
Start: 2019-10-07

## (undated) RX ORDER — CEFAZOLIN SODIUM 2 G/100ML
INJECTION, SOLUTION INTRAVENOUS
Status: DISPENSED
Start: 2023-04-14

## (undated) RX ORDER — ACETAMINOPHEN 500 MG
TABLET ORAL
Status: DISPENSED
Start: 2023-04-07

## (undated) RX ORDER — LIDOCAINE HYDROCHLORIDE AND EPINEPHRINE 10; 10 MG/ML; UG/ML
INJECTION, SOLUTION INFILTRATION; PERINEURAL
Status: DISPENSED
Start: 2019-10-07

## (undated) RX ORDER — FENTANYL CITRATE 50 UG/ML
INJECTION, SOLUTION INTRAMUSCULAR; INTRAVENOUS
Status: DISPENSED
Start: 2023-04-14

## (undated) RX ORDER — IPRATROPIUM BROMIDE AND ALBUTEROL SULFATE 2.5; .5 MG/3ML; MG/3ML
SOLUTION RESPIRATORY (INHALATION)
Status: DISPENSED
Start: 2023-04-07

## (undated) RX ORDER — HEPARIN SODIUM (PORCINE) LOCK FLUSH IV SOLN 100 UNIT/ML 100 UNIT/ML
SOLUTION INTRAVENOUS
Status: DISPENSED
Start: 2023-04-14

## (undated) RX ORDER — HEPARIN SODIUM,PORCINE 10 UNIT/ML
VIAL (ML) INTRAVENOUS
Status: DISPENSED
Start: 2023-04-07

## (undated) RX ORDER — TRIAMCINOLONE ACETONIDE 40 MG/ML
INJECTION, SUSPENSION INTRA-ARTICULAR; INTRAMUSCULAR
Status: DISPENSED
Start: 2019-09-09